# Patient Record
Sex: MALE | Race: WHITE | NOT HISPANIC OR LATINO | Employment: OTHER | URBAN - METROPOLITAN AREA
[De-identification: names, ages, dates, MRNs, and addresses within clinical notes are randomized per-mention and may not be internally consistent; named-entity substitution may affect disease eponyms.]

---

## 2017-01-17 ENCOUNTER — HOSPITAL ENCOUNTER (OUTPATIENT)
Dept: FAMILY MEDICINE CLINIC | Facility: CLINIC | Age: 67
Setting detail: SPECIMEN
Discharge: HOME | End: 2017-01-17
Attending: FAMILY MEDICINE | Admitting: FAMILY MEDICINE

## 2017-01-23 ENCOUNTER — HOSPITAL ENCOUNTER (OUTPATIENT)
Dept: OTHER | Facility: HOSPITAL | Age: 67
Discharge: HOME | End: 2017-01-23
Attending: FAMILY MEDICINE | Admitting: FAMILY MEDICINE

## 2017-01-23 LAB
ALBUMIN SERPL-MCNC: 4 G/DL (ref 3.5–4.8)
ALBUMIN/GLOB SERPL: 1.3 {RATIO} (ref 1–1.7)
ALP SERPL-CCNC: 79 IU/L (ref 32–91)
ALT SERPL-CCNC: 19 IU/L (ref 17–63)
ANION GAP SERPL CALC-SCNC: 17 MMOL/L (ref 10–20)
AST SERPL-CCNC: 24 IU/L (ref 15–41)
BASOPHILS # BLD AUTO: 0.1 10*3/UL (ref 0–0.2)
BASOPHILS NFR BLD AUTO: 1 % (ref 0–2)
BILIRUB SERPL-MCNC: 0.7 MG/DL (ref 0.3–1.2)
BUN SERPL-MCNC: 16 MG/DL (ref 8–20)
BUN/CREAT SERPL: 17.8 (ref 6.2–20.3)
CALCIUM SERPL-MCNC: 9.7 MG/DL (ref 8.9–10.3)
CHLORIDE SERPL-SCNC: 108 MMOL/L (ref 101–111)
CHOLEST SERPL-MCNC: 152 MG/DL
CHOLEST/HDLC SERPL: 3.9 {RATIO}
CONV CO2: 24 MMOL/L (ref 22–32)
CONV LDL CHOLESTEROL DIRECT: 92 MG/DL (ref 0–100)
CONV TOTAL PROTEIN: 7 G/DL (ref 6.1–7.9)
CREAT UR-MCNC: 0.9 MG/DL (ref 0.7–1.2)
DIFFERENTIAL METHOD BLD: (no result)
EOSINOPHIL # BLD AUTO: 0.1 10*3/UL (ref 0–0.3)
EOSINOPHIL # BLD AUTO: 1 % (ref 0–3)
ERYTHROCYTE [DISTWIDTH] IN BLOOD BY AUTOMATED COUNT: 13.9 % (ref 11.5–14.5)
ERYTHROCYTE [SEDIMENTATION RATE] IN BLOOD BY WESTERGREN METHOD: 17 MM/HR (ref 0–20)
GLOBULIN UR ELPH-MCNC: 3 G/DL (ref 2.5–3.8)
GLUCOSE SERPL-MCNC: 132 MG/DL (ref 65–99)
HCT VFR BLD AUTO: 45.9 % (ref 40–54)
HDLC SERPL-MCNC: 39 MG/DL
HGB BLD-MCNC: 15 G/DL (ref 14–18)
LDLC/HDLC SERPL: 2.4 {RATIO}
LIPID INTERPRETATION: ABNORMAL
LYMPHOCYTES # BLD AUTO: 2.1 10*3/UL (ref 0.8–4.8)
LYMPHOCYTES NFR BLD AUTO: 23 % (ref 18–42)
MCH RBC QN AUTO: 28.1 PG (ref 26–32)
MCHC RBC AUTO-ENTMCNC: 32.8 G/DL (ref 32–36)
MCV RBC AUTO: 85.8 FL (ref 80–94)
MONOCYTES # BLD AUTO: 0.6 10*3/UL (ref 0.1–1.3)
MONOCYTES NFR BLD AUTO: 6 % (ref 2–11)
NEUTROPHILS # BLD AUTO: 6.5 10*3/UL (ref 2.3–8.6)
NEUTROPHILS NFR BLD AUTO: 69 % (ref 50–75)
NRBC BLD AUTO-RTO: 0 /100{WBCS}
NRBC/RBC NFR BLD MANUAL: 0 10*3/UL
PLATELET # BLD AUTO: 247 10*3/UL (ref 150–450)
PMV BLD AUTO: 7.8 FL (ref 7.4–10.4)
POTASSIUM SERPL-SCNC: 5 MMOL/L (ref 3.6–5.1)
RBC # BLD AUTO: 5.35 10*6/UL (ref 4.6–6)
SODIUM SERPL-SCNC: 144 MMOL/L (ref 136–144)
TRIGL SERPL-MCNC: 126 MG/DL
VLDLC SERPL CALC-MCNC: 21.2 MG/DL
WBC # BLD AUTO: 9.3 10*3/UL (ref 4.5–11.5)

## 2017-07-24 ENCOUNTER — HOSPITAL ENCOUNTER (OUTPATIENT)
Dept: FAMILY MEDICINE CLINIC | Facility: CLINIC | Age: 67
Setting detail: SPECIMEN
Discharge: HOME | End: 2017-07-24
Attending: FAMILY MEDICINE | Admitting: FAMILY MEDICINE

## 2017-07-27 ENCOUNTER — HOSPITAL ENCOUNTER (OUTPATIENT)
Dept: FAMILY MEDICINE CLINIC | Facility: CLINIC | Age: 67
Setting detail: SPECIMEN
Discharge: HOME | End: 2017-07-27
Attending: FAMILY MEDICINE | Admitting: FAMILY MEDICINE

## 2017-07-27 LAB
ANION GAP SERPL CALC-SCNC: 13.8 MMOL/L (ref 10–20)
BUN SERPL-MCNC: 16 MG/DL (ref 8–20)
BUN/CREAT SERPL: 14.5 (ref 6.2–20.3)
CALCIUM SERPL-MCNC: 9.4 MG/DL (ref 8.9–10.3)
CHLORIDE SERPL-SCNC: 101 MMOL/L (ref 101–111)
CONV CO2: 30 MMOL/L (ref 22–32)
CREAT UR-MCNC: 1.1 MG/DL (ref 0.7–1.2)
GLUCOSE SERPL-MCNC: 184 MG/DL (ref 65–99)
POTASSIUM SERPL-SCNC: 4.8 MMOL/L (ref 3.6–5.1)
SODIUM SERPL-SCNC: 140 MMOL/L (ref 136–144)

## 2017-09-12 ENCOUNTER — HOSPITAL ENCOUNTER (OUTPATIENT)
Dept: SLEEP MEDICINE | Facility: HOSPITAL | Age: 67
Discharge: HOME | End: 2017-09-12
Attending: PSYCHIATRY & NEUROLOGY | Admitting: PSYCHIATRY & NEUROLOGY

## 2017-12-23 ENCOUNTER — HOSPITAL ENCOUNTER (OUTPATIENT)
Dept: URGENT CARE | Facility: CLINIC | Age: 67
Discharge: HOME | End: 2017-12-23
Attending: FAMILY MEDICINE | Admitting: FAMILY MEDICINE

## 2018-01-24 ENCOUNTER — HOSPITAL ENCOUNTER (OUTPATIENT)
Dept: FAMILY MEDICINE CLINIC | Facility: CLINIC | Age: 68
Setting detail: SPECIMEN
Discharge: HOME | End: 2018-01-24
Attending: FAMILY MEDICINE | Admitting: FAMILY MEDICINE

## 2018-01-24 LAB
ALBUMIN SERPL-MCNC: 4.2 G/DL (ref 3.5–4.8)
ALBUMIN/GLOB SERPL: 1.4 {RATIO} (ref 1–1.7)
ALP SERPL-CCNC: 66 IU/L (ref 32–91)
ALT SERPL-CCNC: 30 IU/L (ref 17–63)
ANION GAP SERPL CALC-SCNC: 12.3 MMOL/L (ref 10–20)
AST SERPL-CCNC: 28 IU/L (ref 15–41)
BASOPHILS # BLD AUTO: 0.1 10*3/UL (ref 0–0.2)
BASOPHILS NFR BLD AUTO: 1 % (ref 0–2)
BILIRUB SERPL-MCNC: 0.6 MG/DL (ref 0.3–1.2)
BUN SERPL-MCNC: 16 MG/DL (ref 8–20)
BUN/CREAT SERPL: 16 (ref 6.2–20.3)
CALCIUM SERPL-MCNC: 9.3 MG/DL (ref 8.9–10.3)
CHLORIDE SERPL-SCNC: 105 MMOL/L (ref 101–111)
CHOLEST SERPL-MCNC: 161 MG/DL
CHOLEST/HDLC SERPL: 4.5 {RATIO}
CONV CO2: 28 MMOL/L (ref 22–32)
CONV LDL CHOLESTEROL DIRECT: 99 MG/DL (ref 0–100)
CONV TOTAL PROTEIN: 7.1 G/DL (ref 6.1–7.9)
CREAT UR-MCNC: 1 MG/DL (ref 0.7–1.2)
DIFFERENTIAL METHOD BLD: (no result)
EOSINOPHIL # BLD AUTO: 0.1 10*3/UL (ref 0–0.3)
EOSINOPHIL # BLD AUTO: 1 % (ref 0–3)
ERYTHROCYTE [DISTWIDTH] IN BLOOD BY AUTOMATED COUNT: 13.3 % (ref 11.5–14.5)
GLOBULIN UR ELPH-MCNC: 2.9 G/DL (ref 2.5–3.8)
GLUCOSE SERPL-MCNC: 116 MG/DL (ref 65–99)
HCT VFR BLD AUTO: 45.6 % (ref 40–54)
HDLC SERPL-MCNC: 36 MG/DL
HGB BLD-MCNC: 15.4 G/DL (ref 14–18)
LDLC/HDLC SERPL: 2.8 {RATIO}
LIPID INTERPRETATION: ABNORMAL
LYMPHOCYTES # BLD AUTO: 2.2 10*3/UL (ref 0.8–4.8)
LYMPHOCYTES NFR BLD AUTO: 25 % (ref 18–42)
MCH RBC QN AUTO: 29.1 PG (ref 26–32)
MCHC RBC AUTO-ENTMCNC: 33.8 G/DL (ref 32–36)
MCV RBC AUTO: 85.9 FL (ref 80–94)
MONOCYTES # BLD AUTO: 0.7 10*3/UL (ref 0.1–1.3)
MONOCYTES NFR BLD AUTO: 7 % (ref 2–11)
NEUTROPHILS # BLD AUTO: 6 10*3/UL (ref 2.3–8.6)
NEUTROPHILS NFR BLD AUTO: 66 % (ref 50–75)
NRBC BLD AUTO-RTO: 0 /100{WBCS}
NRBC/RBC NFR BLD MANUAL: 0 10*3/UL
PLATELET # BLD AUTO: 209 10*3/UL (ref 150–450)
PMV BLD AUTO: 8.1 FL (ref 7.4–10.4)
POTASSIUM SERPL-SCNC: 4.3 MMOL/L (ref 3.6–5.1)
RBC # BLD AUTO: 5.31 10*6/UL (ref 4.6–6)
SODIUM SERPL-SCNC: 141 MMOL/L (ref 136–144)
TRIGL SERPL-MCNC: 195 MG/DL
VLDLC SERPL CALC-MCNC: 26.1 MG/DL
WBC # BLD AUTO: 9.1 10*3/UL (ref 4.5–11.5)

## 2018-01-29 ENCOUNTER — HOSPITAL ENCOUNTER (OUTPATIENT)
Dept: OTHER | Facility: HOSPITAL | Age: 68
Discharge: HOME | End: 2018-01-29
Attending: FAMILY MEDICINE | Admitting: FAMILY MEDICINE

## 2018-01-31 ENCOUNTER — HOSPITAL ENCOUNTER (OUTPATIENT)
Dept: CT IMAGING | Facility: HOSPITAL | Age: 68
Discharge: HOME | End: 2018-01-31
Attending: FAMILY MEDICINE | Admitting: FAMILY MEDICINE

## 2018-07-26 ENCOUNTER — HOSPITAL ENCOUNTER (OUTPATIENT)
Dept: FAMILY MEDICINE CLINIC | Facility: CLINIC | Age: 68
Setting detail: SPECIMEN
Discharge: HOME | End: 2018-07-26
Attending: FAMILY MEDICINE | Admitting: FAMILY MEDICINE

## 2018-07-26 LAB
ANION GAP SERPL CALC-SCNC: 11.1 MMOL/L (ref 10–20)
BUN SERPL-MCNC: 14 MG/DL (ref 8–20)
BUN/CREAT SERPL: 14 (ref 6.2–20.3)
CALCIUM SERPL-MCNC: 9.4 MG/DL (ref 8.9–10.3)
CHLORIDE SERPL-SCNC: 103 MMOL/L (ref 101–111)
CONV CO2: 28 MMOL/L (ref 22–32)
CREAT UR-MCNC: 1 MG/DL (ref 0.7–1.2)
GLUCOSE SERPL-MCNC: 127 MG/DL (ref 65–99)
POTASSIUM SERPL-SCNC: 4.1 MMOL/L (ref 3.6–5.1)
SODIUM SERPL-SCNC: 138 MMOL/L (ref 136–144)

## 2019-02-06 ENCOUNTER — HOSPITAL ENCOUNTER (OUTPATIENT)
Dept: CARDIOLOGY | Facility: HOSPITAL | Age: 69
Discharge: HOME | End: 2019-02-06
Attending: INTERNAL MEDICINE | Admitting: INTERNAL MEDICINE

## 2019-02-25 ENCOUNTER — HOSPITAL ENCOUNTER (OUTPATIENT)
Dept: FAMILY MEDICINE CLINIC | Facility: CLINIC | Age: 69
Setting detail: SPECIMEN
Discharge: HOME | End: 2019-02-25
Attending: FAMILY MEDICINE | Admitting: FAMILY MEDICINE

## 2019-02-25 LAB
ALBUMIN SERPL-MCNC: 4.2 G/DL (ref 3.5–4.8)
ALBUMIN/GLOB SERPL: 1.2 {RATIO} (ref 1–1.7)
ALP SERPL-CCNC: 87 IU/L (ref 32–91)
ALT SERPL-CCNC: 44 IU/L (ref 17–63)
ANION GAP SERPL CALC-SCNC: 15 MMOL/L (ref 10–20)
AST SERPL-CCNC: 37 IU/L (ref 15–41)
BASOPHILS # BLD AUTO: 0.1 10*3/UL (ref 0–0.2)
BASOPHILS NFR BLD AUTO: 1 % (ref 0–2)
BILIRUB SERPL-MCNC: 0.7 MG/DL (ref 0.3–1.2)
BUN SERPL-MCNC: 19 MG/DL (ref 8–20)
BUN/CREAT SERPL: 21.1 (ref 6.2–20.3)
CALCIUM SERPL-MCNC: 9.3 MG/DL (ref 8.9–10.3)
CHLORIDE SERPL-SCNC: 98 MMOL/L (ref 101–111)
CHOLEST SERPL-MCNC: 178 MG/DL
CHOLEST/HDLC SERPL: 5 {RATIO}
CONV CO2: 26 MMOL/L (ref 22–32)
CONV LDL CHOLESTEROL DIRECT: 115 MG/DL (ref 0–100)
CONV TOTAL PROTEIN: 7.7 G/DL (ref 6.1–7.9)
CREAT UR-MCNC: 0.9 MG/DL (ref 0.7–1.2)
DIFFERENTIAL METHOD BLD: (no result)
EOSINOPHIL # BLD AUTO: 0.1 10*3/UL (ref 0–0.3)
EOSINOPHIL # BLD AUTO: 1 % (ref 0–3)
ERYTHROCYTE [DISTWIDTH] IN BLOOD BY AUTOMATED COUNT: 13.9 % (ref 11.5–14.5)
GLOBULIN UR ELPH-MCNC: 3.5 G/DL (ref 2.5–3.8)
GLUCOSE SERPL-MCNC: 164 MG/DL (ref 65–99)
HAV IGM SERPL QL IA: NONREACTIVE
HBV CORE IGM SERPL QL IA: NONREACTIVE
HBV SURFACE AG SERPL QL IA: NONREACTIVE
HCT VFR BLD AUTO: 49.1 % (ref 40–54)
HCV AB SER DONR QL: NORMAL
HCV AB SER DONR QL: NORMAL
HDLC SERPL-MCNC: 36 MG/DL
HGB BLD-MCNC: 16.5 G/DL (ref 14–18)
LDLC/HDLC SERPL: 3.2 {RATIO}
LIPID INTERPRETATION: ABNORMAL
LYMPHOCYTES # BLD AUTO: 2.2 10*3/UL (ref 0.8–4.8)
LYMPHOCYTES NFR BLD AUTO: 24 % (ref 18–42)
MCH RBC QN AUTO: 28.8 PG (ref 26–32)
MCHC RBC AUTO-ENTMCNC: 33.7 G/DL (ref 32–36)
MCV RBC AUTO: 85.5 FL (ref 80–94)
MONOCYTES # BLD AUTO: 0.6 10*3/UL (ref 0.1–1.3)
MONOCYTES NFR BLD AUTO: 7 % (ref 2–11)
NEUTROPHILS # BLD AUTO: 6.3 10*3/UL (ref 2.3–8.6)
NEUTROPHILS NFR BLD AUTO: 67 % (ref 50–75)
NRBC BLD AUTO-RTO: 0 /100{WBCS}
NRBC/RBC NFR BLD MANUAL: 0 10*3/UL
PLATELET # BLD AUTO: 278 10*3/UL (ref 150–450)
PMV BLD AUTO: 8 FL (ref 7.4–10.4)
POTASSIUM SERPL-SCNC: 4 MMOL/L (ref 3.6–5.1)
RBC # BLD AUTO: 5.74 10*6/UL (ref 4.6–6)
SODIUM SERPL-SCNC: 135 MMOL/L (ref 136–144)
TRIGL SERPL-MCNC: 227 MG/DL
VLDLC SERPL CALC-MCNC: 27.6 MG/DL
WBC # BLD AUTO: 9.3 10*3/UL (ref 4.5–11.5)

## 2019-03-08 ENCOUNTER — HOSPITAL ENCOUNTER (OUTPATIENT)
Dept: ULTRASOUND IMAGING | Facility: HOSPITAL | Age: 69
Discharge: HOME | End: 2019-03-08
Attending: FAMILY MEDICINE | Admitting: FAMILY MEDICINE

## 2019-06-20 ENCOUNTER — ANTICOAGULATION VISIT (OUTPATIENT)
Dept: CARDIOLOGY | Facility: CLINIC | Age: 69
End: 2019-06-20

## 2019-06-20 VITALS
WEIGHT: 219 LBS | BODY MASS INDEX: 32.81 KG/M2 | SYSTOLIC BLOOD PRESSURE: 124 MMHG | HEART RATE: 74 BPM | DIASTOLIC BLOOD PRESSURE: 71 MMHG

## 2019-06-20 DIAGNOSIS — I48.21 PERMANENT ATRIAL FIBRILLATION (HCC): ICD-10-CM

## 2019-06-20 DIAGNOSIS — Z79.01 LONG TERM (CURRENT) USE OF ANTICOAGULANTS: ICD-10-CM

## 2019-06-20 PROBLEM — I48.91 ATRIAL FIBRILLATION: Status: ACTIVE | Noted: 2019-06-20

## 2019-06-20 LAB — INR PPP: 2.4 (ref 2–3)

## 2019-06-20 PROCEDURE — 85610 PROTHROMBIN TIME: CPT | Performed by: INTERNAL MEDICINE

## 2019-06-20 PROCEDURE — 36416 COLLJ CAPILLARY BLOOD SPEC: CPT | Performed by: INTERNAL MEDICINE

## 2019-07-24 RX ORDER — LOSARTAN POTASSIUM 50 MG/1
TABLET ORAL
Qty: 90 TABLET | Refills: 0 | Status: SHIPPED | OUTPATIENT
Start: 2019-07-24 | End: 2019-10-16 | Stop reason: SDUPTHER

## 2019-07-25 ENCOUNTER — ANTICOAGULATION VISIT (OUTPATIENT)
Dept: CARDIOLOGY | Facility: CLINIC | Age: 69
End: 2019-07-25

## 2019-07-25 VITALS
DIASTOLIC BLOOD PRESSURE: 65 MMHG | WEIGHT: 224 LBS | BODY MASS INDEX: 33.56 KG/M2 | HEART RATE: 63 BPM | SYSTOLIC BLOOD PRESSURE: 110 MMHG

## 2019-07-25 DIAGNOSIS — I48.21 PERMANENT ATRIAL FIBRILLATION (HCC): ICD-10-CM

## 2019-07-25 DIAGNOSIS — Z79.01 LONG TERM (CURRENT) USE OF ANTICOAGULANTS: ICD-10-CM

## 2019-07-25 LAB — INR PPP: 2.2 (ref 2–3)

## 2019-07-25 PROCEDURE — 36416 COLLJ CAPILLARY BLOOD SPEC: CPT | Performed by: INTERNAL MEDICINE

## 2019-07-25 PROCEDURE — 85610 PROTHROMBIN TIME: CPT | Performed by: INTERNAL MEDICINE

## 2019-08-09 RX ORDER — WARFARIN SODIUM 7.5 MG/1
TABLET ORAL
Qty: 94 TABLET | Refills: 0 | Status: SHIPPED | OUTPATIENT
Start: 2019-08-09 | End: 2019-09-03 | Stop reason: SDUPTHER

## 2019-08-26 ENCOUNTER — LAB (OUTPATIENT)
Dept: LAB | Facility: HOSPITAL | Age: 69
End: 2019-08-26

## 2019-08-26 ENCOUNTER — OFFICE VISIT (OUTPATIENT)
Dept: FAMILY MEDICINE CLINIC | Facility: CLINIC | Age: 69
End: 2019-08-26

## 2019-08-26 VITALS
DIASTOLIC BLOOD PRESSURE: 78 MMHG | HEART RATE: 84 BPM | OXYGEN SATURATION: 94 % | TEMPERATURE: 97.8 F | BODY MASS INDEX: 33.3 KG/M2 | RESPIRATION RATE: 14 BRPM | HEIGHT: 69 IN | SYSTOLIC BLOOD PRESSURE: 133 MMHG | WEIGHT: 224.8 LBS

## 2019-08-26 DIAGNOSIS — C67.9 MALIGNANT NEOPLASM OF URINARY BLADDER, UNSPECIFIED SITE (HCC): ICD-10-CM

## 2019-08-26 DIAGNOSIS — I48.20 CHRONIC ATRIAL FIBRILLATION (HCC): ICD-10-CM

## 2019-08-26 DIAGNOSIS — E79.0 HYPERURICEMIA: ICD-10-CM

## 2019-08-26 DIAGNOSIS — E78.5 HYPERLIPIDEMIA, UNSPECIFIED HYPERLIPIDEMIA TYPE: ICD-10-CM

## 2019-08-26 DIAGNOSIS — I10 ESSENTIAL HYPERTENSION: ICD-10-CM

## 2019-08-26 DIAGNOSIS — I25.10 CORONARY ARTERY DISEASE INVOLVING NATIVE CORONARY ARTERY OF NATIVE HEART WITHOUT ANGINA PECTORIS: ICD-10-CM

## 2019-08-26 DIAGNOSIS — I10 ESSENTIAL HYPERTENSION: Primary | ICD-10-CM

## 2019-08-26 PROBLEM — Z13.9 ENCOUNTER FOR SCREENING: Status: RESOLVED | Noted: 2019-02-25 | Resolved: 2019-08-26

## 2019-08-26 PROBLEM — Z79.01 LONG TERM (CURRENT) USE OF ANTICOAGULANTS: Status: RESOLVED | Noted: 2019-06-20 | Resolved: 2019-08-26

## 2019-08-26 PROBLEM — Z13.9 ENCOUNTER FOR SCREENING: Status: ACTIVE | Noted: 2019-02-25

## 2019-08-26 LAB
ANION GAP SERPL CALCULATED.3IONS-SCNC: 17.5 MMOL/L (ref 5–15)
BUN BLD-MCNC: 14 MG/DL (ref 8–20)
BUN/CREAT SERPL: 15.6 (ref 6.2–20.3)
CALCIUM SPEC-SCNC: 8.8 MG/DL (ref 8.9–10.3)
CHLORIDE SERPL-SCNC: 100 MMOL/L (ref 101–111)
CO2 SERPL-SCNC: 24 MMOL/L (ref 22–32)
CREAT BLD-MCNC: 0.9 MG/DL (ref 0.7–1.2)
GFR SERPL CREATININE-BSD FRML MDRD: 84 ML/MIN/1.73
GLUCOSE BLD-MCNC: 174 MG/DL (ref 65–99)
POTASSIUM BLD-SCNC: 4.5 MMOL/L (ref 3.6–5.1)
SODIUM BLD-SCNC: 137 MMOL/L (ref 136–144)

## 2019-08-26 PROCEDURE — 80048 BASIC METABOLIC PNL TOTAL CA: CPT

## 2019-08-26 PROCEDURE — 99214 OFFICE O/P EST MOD 30 MIN: CPT | Performed by: FAMILY MEDICINE

## 2019-08-26 PROCEDURE — 36415 COLL VENOUS BLD VENIPUNCTURE: CPT

## 2019-08-26 RX ORDER — ALBUTEROL SULFATE 90 UG/1
1 AEROSOL, METERED RESPIRATORY (INHALATION) EVERY 4 HOURS PRN
COMMUNITY
Start: 2019-04-18

## 2019-08-26 RX ORDER — ALLOPURINOL 300 MG/1
300 TABLET ORAL DAILY
Refills: 1 | COMMUNITY
Start: 2019-08-08 | End: 2019-11-06

## 2019-08-26 RX ORDER — ATENOLOL 50 MG/1
50 TABLET ORAL DAILY
Refills: 2 | COMMUNITY
Start: 2019-08-10 | End: 2019-11-06 | Stop reason: SDUPTHER

## 2019-08-26 RX ORDER — FUROSEMIDE 40 MG/1
TABLET ORAL EVERY 24 HOURS
COMMUNITY
Start: 2017-11-11 | End: 2021-01-06 | Stop reason: SDUPTHER

## 2019-08-26 RX ORDER — ATORVASTATIN CALCIUM 20 MG/1
20 TABLET, FILM COATED ORAL DAILY
Refills: 1 | COMMUNITY
Start: 2019-08-17 | End: 2020-02-10

## 2019-08-26 RX ORDER — INDOMETHACIN 50 MG/1
CAPSULE ORAL
Refills: 1 | COMMUNITY
Start: 2019-06-27 | End: 2019-11-06

## 2019-08-26 RX ORDER — ALFUZOSIN HYDROCHLORIDE 10 MG/1
TABLET, EXTENDED RELEASE ORAL
Refills: 3 | COMMUNITY
Start: 2019-06-07 | End: 2021-10-21

## 2019-08-26 NOTE — PATIENT INSTRUCTIONS
Continue current medications and treatment.    Have the follow-up labs done and call for results.    Follow-up with me in 6 months.

## 2019-08-26 NOTE — PROGRESS NOTES
"Subjective   Sandro Ramirez is a 69 y.o. male.     Chief Complaint   Patient presents with   • Coronary Artery Disease     6 MTH F/U   • Hyperlipidemia   • Hypertension       HPI  Chief complaint: Hypertension hyperlipidemia coronary artery disease atrial fibrillation bladder cancer    The patient is a 69-year-old white male comes in for follow-up and maintenance of his current problems which include    1.  Hypertension-stable-patient is on Cozaar 50 mg daily and atenolol 50 mg daily and Lasix 40 mg daily.  He denied headache lightheadedness dizziness or chest pain.    2.  Hyperlipidemia-stable-patient on Lipitor 20 mg daily.  He denies myalgias and arthralgias.  Denied nausea or anorexia.    3.  Coronary artery disease with ischemic cardiomyopathy-stable-patient on Lasix atenolol Cozaar and aspirin.  He denies chest pain shortness of breath orthopnea or PND.    4.  Atrial fibrillation-stable-patient on atenolol 50 mg daily and Coumadin.  He denies episodes of rapid or slow heart rhythm.  He denied heart palpitations lightheadedness or dizziness.    5.  Hyperuricemia-stable-patient is currently on Zyloprim and Indocin as needed.  Denied recent episodes of gouty arthritis or tophi.    6.  Bladder cancer-stable-patient on Uroxatrol as needed for urinary frequency and urgency.      The following portions of the patient's history were reviewed and updated as appropriate: allergies, current medications, past family history, past medical history, past social history, past surgical history and problem list.    Review of Systems    Objective     /78 (BP Location: Left arm, Patient Position: Sitting, Cuff Size: Adult)   Pulse 84   Temp 97.8 °F (36.6 °C) (Oral)   Resp 14   Ht 174.1 cm (68.54\")   Wt 102 kg (224 lb 12.8 oz)   SpO2 94%   BMI 33.64 kg/m²     Physical Exam   Constitutional: He is oriented to person, place, and time. He appears well-developed and well-nourished.   HENT:   Head: Normocephalic and " atraumatic.   Eyes: Conjunctivae and EOM are normal. Pupils are equal, round, and reactive to light.   Neck: Normal range of motion. Neck supple.   Cardiovascular: Normal rate, normal heart sounds and intact distal pulses. An irregularly irregular rhythm present.   Pulmonary/Chest: Effort normal and breath sounds normal.   Abdominal: Soft. Bowel sounds are normal.   Musculoskeletal: Normal range of motion.   Neurological: He is alert and oriented to person, place, and time.   Skin: Skin is warm and dry.   Psychiatric: He has a normal mood and affect. His behavior is normal.   Nursing note and vitals reviewed.        Assessment/Plan   Sandro was seen today for coronary artery disease, hyperlipidemia and hypertension.    Diagnoses and all orders for this visit:    Essential hypertension  -     Basic Metabolic Panel; Future    Hyperlipidemia, unspecified hyperlipidemia type    Chronic atrial fibrillation (CMS/HCC)    Coronary artery disease involving native coronary artery of native heart without angina pectoris    Malignant neoplasm of urinary bladder, unspecified site (CMS/HCC)  -     PSA Screen; Future    Hyperuricemia      Patient Instructions   Continue current medications and treatment.    Have the follow-up labs done and call for results.    Follow-up with me in 6 months.      James Tirado Jr., MD    08/26/19

## 2019-08-29 ENCOUNTER — ANTICOAGULATION VISIT (OUTPATIENT)
Dept: CARDIOLOGY | Facility: CLINIC | Age: 69
End: 2019-08-29

## 2019-08-29 VITALS
DIASTOLIC BLOOD PRESSURE: 75 MMHG | HEART RATE: 92 BPM | SYSTOLIC BLOOD PRESSURE: 137 MMHG | WEIGHT: 222 LBS | BODY MASS INDEX: 33.23 KG/M2

## 2019-08-29 DIAGNOSIS — I48.20 CHRONIC ATRIAL FIBRILLATION (HCC): ICD-10-CM

## 2019-08-29 LAB — INR PPP: 2.1 (ref 0.9–1.1)

## 2019-08-29 PROCEDURE — 36416 COLLJ CAPILLARY BLOOD SPEC: CPT | Performed by: INTERNAL MEDICINE

## 2019-08-29 PROCEDURE — 85610 PROTHROMBIN TIME: CPT | Performed by: INTERNAL MEDICINE

## 2019-09-03 RX ORDER — WARFARIN SODIUM 7.5 MG/1
TABLET ORAL
Qty: 94 TABLET | Refills: 0 | Status: SHIPPED | OUTPATIENT
Start: 2019-09-03 | End: 2019-12-01 | Stop reason: SDUPTHER

## 2019-10-03 ENCOUNTER — ANTICOAGULATION VISIT (OUTPATIENT)
Dept: CARDIOLOGY | Facility: CLINIC | Age: 69
End: 2019-10-03

## 2019-10-03 VITALS
HEART RATE: 81 BPM | DIASTOLIC BLOOD PRESSURE: 72 MMHG | BODY MASS INDEX: 33.67 KG/M2 | SYSTOLIC BLOOD PRESSURE: 111 MMHG | WEIGHT: 225 LBS

## 2019-10-03 DIAGNOSIS — I48.21 PERMANENT ATRIAL FIBRILLATION (HCC): ICD-10-CM

## 2019-10-03 LAB — INR PPP: 2.3 (ref 0.9–1.1)

## 2019-10-03 PROCEDURE — 85610 PROTHROMBIN TIME: CPT | Performed by: INTERNAL MEDICINE

## 2019-10-03 PROCEDURE — 36416 COLLJ CAPILLARY BLOOD SPEC: CPT | Performed by: INTERNAL MEDICINE

## 2019-10-17 RX ORDER — LOSARTAN POTASSIUM 50 MG/1
TABLET ORAL
Qty: 90 TABLET | Refills: 0 | Status: SHIPPED | OUTPATIENT
Start: 2019-10-17 | End: 2020-01-11

## 2019-11-06 ENCOUNTER — ANTICOAGULATION VISIT (OUTPATIENT)
Dept: CARDIOLOGY | Facility: CLINIC | Age: 69
End: 2019-11-06

## 2019-11-06 ENCOUNTER — OFFICE VISIT (OUTPATIENT)
Dept: CARDIOLOGY | Facility: CLINIC | Age: 69
End: 2019-11-06

## 2019-11-06 VITALS
DIASTOLIC BLOOD PRESSURE: 78 MMHG | WEIGHT: 225.5 LBS | BODY MASS INDEX: 33.75 KG/M2 | HEART RATE: 92 BPM | SYSTOLIC BLOOD PRESSURE: 131 MMHG

## 2019-11-06 VITALS
DIASTOLIC BLOOD PRESSURE: 78 MMHG | BODY MASS INDEX: 34.17 KG/M2 | HEART RATE: 92 BPM | SYSTOLIC BLOOD PRESSURE: 131 MMHG | HEIGHT: 68 IN | WEIGHT: 225.5 LBS

## 2019-11-06 DIAGNOSIS — I71.40 ABDOMINAL AORTIC ANEURYSM (AAA) WITHOUT RUPTURE (HCC): ICD-10-CM

## 2019-11-06 DIAGNOSIS — E78.00 PURE HYPERCHOLESTEROLEMIA: ICD-10-CM

## 2019-11-06 DIAGNOSIS — I48.21 PERMANENT ATRIAL FIBRILLATION (HCC): ICD-10-CM

## 2019-11-06 DIAGNOSIS — I10 ESSENTIAL HYPERTENSION: ICD-10-CM

## 2019-11-06 DIAGNOSIS — G47.33 OBSTRUCTIVE SLEEP APNEA SYNDROME: ICD-10-CM

## 2019-11-06 DIAGNOSIS — I25.10 CORONARY ARTERY DISEASE INVOLVING NATIVE CORONARY ARTERY OF NATIVE HEART WITHOUT ANGINA PECTORIS: Primary | ICD-10-CM

## 2019-11-06 LAB — INR PPP: 2 (ref 0.9–1.1)

## 2019-11-06 PROCEDURE — 36416 COLLJ CAPILLARY BLOOD SPEC: CPT | Performed by: INTERNAL MEDICINE

## 2019-11-06 PROCEDURE — 99214 OFFICE O/P EST MOD 30 MIN: CPT | Performed by: INTERNAL MEDICINE

## 2019-11-06 PROCEDURE — 85610 PROTHROMBIN TIME: CPT | Performed by: INTERNAL MEDICINE

## 2019-11-06 RX ORDER — ATENOLOL 50 MG/1
TABLET ORAL
Qty: 90 TABLET | Refills: 2 | Status: SHIPPED | OUTPATIENT
Start: 2019-11-06 | End: 2020-07-27

## 2019-11-06 RX ORDER — ALLOPURINOL 300 MG/1
300 TABLET ORAL NIGHTLY
COMMUNITY
Start: 2019-11-05

## 2019-11-06 NOTE — PROGRESS NOTES
"    Subjective:     Encounter Date:11/06/2019      Patient ID: Sandro Ramirez is a 69 y.o. male.    Chief Complaint:  History of Present Illness 69-year-old white male with history of coronary artery disease history of atrial fibrillation abdominal aortic aneurysm hypertension hyperlipidemia sleep apnea coronary artery bypass surgery presents to my office for follow-up.  Patient is currently stable without any symptoms of chest pain but has some shortness of breath with exertion.  No complaints of any PND orthopnea.  No palpitations dizziness syncope but he has some swelling of the feet.  He noticed that he is gaining weight.  He is on diuretics.  He does not smoke.  He is unable to exercise very well because of the symptoms.    The following portions of the patient's history were reviewed and updated as appropriate: allergies, current medications, past family history, past medical history, past social history, past surgical history and problem list.  Past Medical History:   Diagnosis Date   • Atrial fibrillation (CMS/HCC)    • Coronary artery disease    • Hyperlipidemia    • Hypertension      Past Surgical History:   Procedure Laterality Date   • CHOLECYSTECTOMY     • CORONARY ARTERY BYPASS GRAFT       /78   Pulse 92   Ht 172.7 cm (68\")   Wt 102 kg (225 lb 8 oz)   BMI 34.29 kg/m²   Family History   Problem Relation Age of Onset   • Cancer Father    • Heart attack Father    • Heart disease Paternal Grandfather    • Heart attack Paternal Grandfather        Current Outpatient Medications:   •  albuterol sulfate HFA (VENTOLIN HFA) 108 (90 Base) MCG/ACT inhaler, VENTOLIN  (90 Base) MCG/ACT AERS, Disp: , Rfl:   •  alfuzosin (UROXATRAL) 10 MG 24 hr tablet, TAKE 1 TABLET BY MOUTH 2 TIMES EACH DAY, Disp: , Rfl: 3  •  allopurinol (ZYLOPRIM) 300 MG tablet, , Disp: , Rfl:   •  aspirin 81 MG tablet, ASPIRIN 81 MG ORAL TABLET, Disp: , Rfl:   •  atenolol (TENORMIN) 50 MG tablet, TAKE 1 TABLET BY MOUTH EVERY " DAY, Disp: 90 tablet, Rfl: 2  •  atorvastatin (LIPITOR) 20 MG tablet, Take 20 mg by mouth Daily., Disp: , Rfl: 1  •  furosemide (LASIX) 40 MG tablet, Daily., Disp: , Rfl:   •  losartan (COZAAR) 50 MG tablet, TAKE 1 TABLET BY MOUTH EVERY DAY, Disp: 90 tablet, Rfl: 0  •  warfarin (COUMADIN) 7.5 MG tablet, TAKE 1 AND ONE-HALF TABLETY BY MOUTH ON SUNDAY AND 1 TABLET BY MOUTH DAILY, Disp: 94 tablet, Rfl: 0  Allergies   Allergen Reactions   • Meperidine GI Intolerance   • Midazolam Mental Status Change   • Propofol Mental Status Change   • Sulfa Antibiotics Hives   • Simvastatin Myalgia     Social History     Socioeconomic History   • Marital status:      Spouse name: Not on file   • Number of children: Not on file   • Years of education: Not on file   • Highest education level: Not on file   Tobacco Use   • Smoking status: Former Smoker   • Smokeless tobacco: Never Used   Substance and Sexual Activity   • Alcohol use: No     Frequency: Never     Review of Systems   Constitution: Negative for fever and malaise/fatigue.   HENT: Negative for ear pain and nosebleeds.    Eyes: Negative for blurred vision and double vision.   Cardiovascular: Positive for leg swelling. Negative for chest pain, dyspnea on exertion and palpitations.   Respiratory: Positive for shortness of breath. Negative for cough.    Skin: Negative for rash.   Musculoskeletal: Negative for joint pain.   Gastrointestinal: Negative for abdominal pain, nausea and vomiting.   Neurological: Negative for focal weakness, headaches, light-headedness and numbness.   Psychiatric/Behavioral: Negative for depression. The patient is not nervous/anxious.    All other systems reviewed and are negative.             Objective:     Physical Exam   Constitutional: He appears well-developed and well-nourished.   HENT:   Head: Normocephalic and atraumatic.   Eyes: Conjunctivae and EOM are normal. Pupils are equal, round, and reactive to light. No scleral icterus.   Neck:  Normal range of motion. Neck supple. No JVD present. Carotid bruit is not present.   Cardiovascular: Normal rate, regular rhythm, S1 normal, S2 normal, normal heart sounds and intact distal pulses. PMI is not displaced.   Pulmonary/Chest: Effort normal and breath sounds normal. He has no wheezes. He has no rales.   Abdominal: Soft. Bowel sounds are normal.   Musculoskeletal: Normal range of motion.   Neurological: He is alert. He has normal strength.   No focal deficits   Skin: Skin is warm and dry. No rash noted.   Psychiatric: He has a normal mood and affect.     Procedures    Lab Review:       Assessment:          Diagnosis Plan   1. Coronary artery disease involving native coronary artery of native heart without angina pectoris     2. Permanent atrial fibrillation     3. Abdominal aortic aneurysm (AAA) without rupture (CMS/HCC)     4. Pure hypercholesterolemia     5. Essential hypertension     6. Obstructive sleep apnea syndrome            Plan:       Patient has history of coronary disease status post coronary bypass surgery and is currently stable on medications  Patient has shortness of breath and has sleep apnea with right heart failure  Patient is currently on diuretics and feeling better now  Patient's blood pressure and heart rate are stable  Patient's lipid levels are followed by the primary care doctor  Patient has sleep apnea and uses CPAP machine.  Patient also has abdominal aortic aneurysm and is followed by the primary care doctor.

## 2019-11-14 ENCOUNTER — HOSPITAL ENCOUNTER (OUTPATIENT)
Facility: HOSPITAL | Age: 69
Setting detail: OBSERVATION
Discharge: HOME | End: 2019-11-16
Attending: EMERGENCY MEDICINE | Admitting: FAMILY MEDICINE

## 2019-11-14 ENCOUNTER — APPOINTMENT (OUTPATIENT)
Dept: GENERAL RADIOLOGY | Facility: HOSPITAL | Age: 69
End: 2019-11-14

## 2019-11-14 DIAGNOSIS — I25.10 CORONARY ARTERY DISEASE INVOLVING NATIVE CORONARY ARTERY OF NATIVE HEART WITHOUT ANGINA PECTORIS: ICD-10-CM

## 2019-11-14 DIAGNOSIS — R50.9 FEVER AND CHILLS: Primary | ICD-10-CM

## 2019-11-14 DIAGNOSIS — I48.21 PERMANENT ATRIAL FIBRILLATION (HCC): ICD-10-CM

## 2019-11-14 DIAGNOSIS — J18.9 PNEUMONIA OF LEFT LOWER LOBE DUE TO INFECTIOUS ORGANISM: ICD-10-CM

## 2019-11-14 DIAGNOSIS — J98.01 BRONCHOSPASM: ICD-10-CM

## 2019-11-14 DIAGNOSIS — A41.9 SEPSIS, DUE TO UNSPECIFIED ORGANISM, UNSPECIFIED WHETHER ACUTE ORGAN DYSFUNCTION PRESENT (HCC): ICD-10-CM

## 2019-11-14 LAB
ALBUMIN SERPL-MCNC: 4.5 G/DL (ref 3.5–5.2)
ALBUMIN/GLOB SERPL: 1.5 G/DL
ALP SERPL-CCNC: 98 U/L (ref 39–117)
ALT SERPL W P-5'-P-CCNC: 59 U/L (ref 1–41)
ANION GAP SERPL CALCULATED.3IONS-SCNC: 11 MMOL/L (ref 5–15)
APTT PPP: 33.4 SECONDS (ref 24–31)
AST SERPL-CCNC: 42 U/L (ref 1–40)
BASOPHILS # BLD AUTO: 0.1 10*3/MM3 (ref 0–0.2)
BASOPHILS NFR BLD AUTO: 0.6 % (ref 0–1.5)
BILIRUB SERPL-MCNC: 0.6 MG/DL (ref 0.2–1.2)
BILIRUB UR QL STRIP: NEGATIVE
BUN BLD-MCNC: 15 MG/DL (ref 8–23)
BUN/CREAT SERPL: 16.7 (ref 7–25)
CA-I SERPL ISE-MCNC: 1.14 MMOL/L (ref 1.2–1.3)
CALCIUM SPEC-SCNC: 9 MG/DL (ref 8.6–10.5)
CHLORIDE SERPL-SCNC: 102 MMOL/L (ref 98–107)
CLARITY UR: CLEAR
CO2 SERPL-SCNC: 27 MMOL/L (ref 22–29)
COLOR UR: YELLOW
CREAT BLD-MCNC: 0.9 MG/DL (ref 0.76–1.27)
CRP SERPL-MCNC: 6.9 MG/DL (ref 0–0.5)
D-LACTATE SERPL-SCNC: 2.6 MMOL/L (ref 0.5–2)
DEPRECATED RDW RBC AUTO: 42 FL (ref 37–54)
EOSINOPHIL # BLD AUTO: 0.1 10*3/MM3 (ref 0–0.4)
EOSINOPHIL NFR BLD AUTO: 0.5 % (ref 0.3–6.2)
ERYTHROCYTE [DISTWIDTH] IN BLOOD BY AUTOMATED COUNT: 13.9 % (ref 12.3–15.4)
FLUAV SUBTYP SPEC NAA+PROBE: NOT DETECTED
FLUBV RNA ISLT QL NAA+PROBE: NOT DETECTED
GFR SERPL CREATININE-BSD FRML MDRD: 84 ML/MIN/1.73
GLOBULIN UR ELPH-MCNC: 3.1 GM/DL
GLUCOSE BLD-MCNC: 133 MG/DL (ref 65–99)
GLUCOSE UR STRIP-MCNC: NEGATIVE MG/DL
HCT VFR BLD AUTO: 41.8 % (ref 37.5–51)
HGB BLD-MCNC: 14.4 G/DL (ref 13–17.7)
HGB UR QL STRIP.AUTO: NEGATIVE
HOLD SPECIMEN: NORMAL
HOLD SPECIMEN: NORMAL
INR PPP: 1.69 (ref 2–3)
KETONES UR QL STRIP: ABNORMAL
L PNEUMO1 AG UR QL IA: NEGATIVE
LEUKOCYTE ESTERASE UR QL STRIP.AUTO: NEGATIVE
LYMPHOCYTES # BLD AUTO: 1.3 10*3/MM3 (ref 0.7–3.1)
LYMPHOCYTES NFR BLD AUTO: 9.6 % (ref 19.6–45.3)
MAGNESIUM SERPL-MCNC: 2 MG/DL (ref 1.6–2.4)
MCH RBC QN AUTO: 29.3 PG (ref 26.6–33)
MCHC RBC AUTO-ENTMCNC: 34.4 G/DL (ref 31.5–35.7)
MCV RBC AUTO: 85.2 FL (ref 79–97)
MONOCYTES # BLD AUTO: 0.9 10*3/MM3 (ref 0.1–0.9)
MONOCYTES NFR BLD AUTO: 6.8 % (ref 5–12)
NEUTROPHILS # BLD AUTO: 11.5 10*3/MM3 (ref 1.7–7)
NEUTROPHILS NFR BLD AUTO: 82.5 % (ref 42.7–76)
NITRITE UR QL STRIP: NEGATIVE
NRBC BLD AUTO-RTO: 0 /100 WBC (ref 0–0.2)
NT-PROBNP SERPL-MCNC: 505.3 PG/ML (ref 5–900)
PH UR STRIP.AUTO: 6 [PH] (ref 5–8)
PHOSPHATE SERPL-MCNC: 3.1 MG/DL (ref 2.5–4.5)
PLATELET # BLD AUTO: 225 10*3/MM3 (ref 140–450)
PMV BLD AUTO: 7.7 FL (ref 6–12)
POTASSIUM BLD-SCNC: 4.4 MMOL/L (ref 3.5–5.2)
PROT SERPL-MCNC: 7.6 G/DL (ref 6–8.5)
PROT UR QL STRIP: NEGATIVE
PROTHROMBIN TIME: 16.4 SECONDS (ref 19.4–28.5)
RBC # BLD AUTO: 4.9 10*6/MM3 (ref 4.14–5.8)
RSV AG SPEC QL: NEGATIVE
S PNEUM AG SPEC QL LA: NEGATIVE
SODIUM BLD-SCNC: 140 MMOL/L (ref 136–145)
SP GR UR STRIP: 1.02 (ref 1–1.03)
TROPONIN T SERPL-MCNC: <0.01 NG/ML (ref 0–0.03)
UROBILINOGEN UR QL STRIP: ABNORMAL
WBC NRBC COR # BLD: 13.9 10*3/MM3 (ref 3.4–10.8)
WHOLE BLOOD HOLD SPECIMEN: NORMAL
WHOLE BLOOD HOLD SPECIMEN: NORMAL

## 2019-11-14 PROCEDURE — 83735 ASSAY OF MAGNESIUM: CPT | Performed by: NURSE PRACTITIONER

## 2019-11-14 PROCEDURE — 84484 ASSAY OF TROPONIN QUANT: CPT | Performed by: NURSE PRACTITIONER

## 2019-11-14 PROCEDURE — 87899 AGENT NOS ASSAY W/OPTIC: CPT | Performed by: FAMILY MEDICINE

## 2019-11-14 PROCEDURE — 94799 UNLISTED PULMONARY SVC/PX: CPT

## 2019-11-14 PROCEDURE — 86140 C-REACTIVE PROTEIN: CPT | Performed by: NURSE PRACTITIONER

## 2019-11-14 PROCEDURE — 85025 COMPLETE CBC W/AUTO DIFF WBC: CPT

## 2019-11-14 PROCEDURE — 25010000002 CEFTRIAXONE PER 250 MG: Performed by: EMERGENCY MEDICINE

## 2019-11-14 PROCEDURE — 87040 BLOOD CULTURE FOR BACTERIA: CPT

## 2019-11-14 PROCEDURE — 0099U HC BIOFIRE FILMARRAY RESP PANEL 1: CPT | Performed by: FAMILY MEDICINE

## 2019-11-14 PROCEDURE — 99285 EMERGENCY DEPT VISIT HI MDM: CPT

## 2019-11-14 PROCEDURE — 96365 THER/PROPH/DIAG IV INF INIT: CPT

## 2019-11-14 PROCEDURE — 85730 THROMBOPLASTIN TIME PARTIAL: CPT | Performed by: NURSE PRACTITIONER

## 2019-11-14 PROCEDURE — 84100 ASSAY OF PHOSPHORUS: CPT | Performed by: NURSE PRACTITIONER

## 2019-11-14 PROCEDURE — 82330 ASSAY OF CALCIUM: CPT | Performed by: NURSE PRACTITIONER

## 2019-11-14 PROCEDURE — 25010000002 METHYLPREDNISOLONE PER 125 MG: Performed by: EMERGENCY MEDICINE

## 2019-11-14 PROCEDURE — G0378 HOSPITAL OBSERVATION PER HR: HCPCS

## 2019-11-14 PROCEDURE — 87807 RSV ASSAY W/OPTIC: CPT | Performed by: NURSE PRACTITIONER

## 2019-11-14 PROCEDURE — 87502 INFLUENZA DNA AMP PROBE: CPT | Performed by: NURSE PRACTITIONER

## 2019-11-14 PROCEDURE — 81003 URINALYSIS AUTO W/O SCOPE: CPT | Performed by: NURSE PRACTITIONER

## 2019-11-14 PROCEDURE — 83605 ASSAY OF LACTIC ACID: CPT

## 2019-11-14 PROCEDURE — 80053 COMPREHEN METABOLIC PANEL: CPT

## 2019-11-14 PROCEDURE — 94640 AIRWAY INHALATION TREATMENT: CPT

## 2019-11-14 PROCEDURE — 71045 X-RAY EXAM CHEST 1 VIEW: CPT

## 2019-11-14 PROCEDURE — 93005 ELECTROCARDIOGRAM TRACING: CPT | Performed by: EMERGENCY MEDICINE

## 2019-11-14 PROCEDURE — 25010000002 CEFTRIAXONE PER 250 MG: Performed by: NURSE PRACTITIONER

## 2019-11-14 PROCEDURE — 85610 PROTHROMBIN TIME: CPT | Performed by: NURSE PRACTITIONER

## 2019-11-14 PROCEDURE — 93005 ELECTROCARDIOGRAM TRACING: CPT

## 2019-11-14 PROCEDURE — 83880 ASSAY OF NATRIURETIC PEPTIDE: CPT | Performed by: NURSE PRACTITIONER

## 2019-11-14 PROCEDURE — 99214 OFFICE O/P EST MOD 30 MIN: CPT | Performed by: FAMILY MEDICINE

## 2019-11-14 PROCEDURE — 96375 TX/PRO/DX INJ NEW DRUG ADDON: CPT

## 2019-11-14 PROCEDURE — 25010000002 AZITHROMYCIN PER 500 MG: Performed by: EMERGENCY MEDICINE

## 2019-11-14 RX ORDER — METHYLPREDNISOLONE SODIUM SUCCINATE 125 MG/2ML
125 INJECTION, POWDER, LYOPHILIZED, FOR SOLUTION INTRAMUSCULAR; INTRAVENOUS ONCE
Status: COMPLETED | OUTPATIENT
Start: 2019-11-14 | End: 2019-11-14

## 2019-11-14 RX ORDER — ALBUTEROL SULFATE 2.5 MG/3ML
2.5 SOLUTION RESPIRATORY (INHALATION)
Status: COMPLETED | OUTPATIENT
Start: 2019-11-14 | End: 2019-11-14

## 2019-11-14 RX ORDER — IPRATROPIUM BROMIDE AND ALBUTEROL SULFATE 2.5; .5 MG/3ML; MG/3ML
3 SOLUTION RESPIRATORY (INHALATION)
Status: DISCONTINUED | OUTPATIENT
Start: 2019-11-14 | End: 2019-11-16 | Stop reason: HOSPADM

## 2019-11-14 RX ORDER — SODIUM CHLORIDE 0.9 % (FLUSH) 0.9 %
10 SYRINGE (ML) INJECTION AS NEEDED
Status: DISCONTINUED | OUTPATIENT
Start: 2019-11-14 | End: 2019-11-16 | Stop reason: HOSPADM

## 2019-11-14 RX ORDER — ACETAMINOPHEN 500 MG
1000 TABLET ORAL ONCE
Status: COMPLETED | OUTPATIENT
Start: 2019-11-14 | End: 2019-11-14

## 2019-11-14 RX ORDER — PREDNISONE 20 MG/1
20 TABLET ORAL 2 TIMES DAILY WITH MEALS
Status: DISCONTINUED | OUTPATIENT
Start: 2019-11-15 | End: 2019-11-16

## 2019-11-14 RX ADMIN — CEFTRIAXONE SODIUM 1 G: 10 INJECTION, POWDER, FOR SOLUTION INTRAVENOUS at 19:35

## 2019-11-14 RX ADMIN — AZITHROMYCIN MONOHYDRATE 500 MG: 500 INJECTION, POWDER, LYOPHILIZED, FOR SOLUTION INTRAVENOUS at 20:31

## 2019-11-14 RX ADMIN — ALBUTEROL SULFATE 2.5 MG: 2.5 SOLUTION RESPIRATORY (INHALATION) at 20:24

## 2019-11-14 RX ADMIN — ACETAMINOPHEN 1000 MG: 500 TABLET, FILM COATED ORAL at 20:19

## 2019-11-14 RX ADMIN — METHYLPREDNISOLONE SODIUM SUCCINATE 125 MG: 125 INJECTION, POWDER, FOR SOLUTION INTRAMUSCULAR; INTRAVENOUS at 20:19

## 2019-11-14 RX ADMIN — ALBUTEROL SULFATE 2.5 MG: 2.5 SOLUTION RESPIRATORY (INHALATION) at 20:20

## 2019-11-14 RX ADMIN — CEFTRIAXONE SODIUM 1 G: 10 INJECTION, POWDER, FOR SOLUTION INTRAVENOUS at 20:31

## 2019-11-14 RX ADMIN — ALBUTEROL SULFATE 2.5 MG: 2.5 SOLUTION RESPIRATORY (INHALATION) at 20:15

## 2019-11-14 RX ADMIN — IPRATROPIUM BROMIDE AND ALBUTEROL SULFATE 3 ML: .5; 3 SOLUTION RESPIRATORY (INHALATION) at 19:55

## 2019-11-14 NOTE — ED NOTES
Pt c/o soa with a productive cough with green sputum for 1 week. Pt sees dr spring. Hx of CABG with no stent placement.     Jacinta Rosario, LPN  11/14/19 [id number]

## 2019-11-15 ENCOUNTER — APPOINTMENT (OUTPATIENT)
Dept: CT IMAGING | Facility: HOSPITAL | Age: 69
End: 2019-11-15

## 2019-11-15 PROBLEM — E11.9 TYPE 2 DIABETES MELLITUS WITHOUT COMPLICATION, WITHOUT LONG-TERM CURRENT USE OF INSULIN: Status: ACTIVE | Noted: 2019-11-15

## 2019-11-15 LAB
ALBUMIN SERPL-MCNC: 4.1 G/DL (ref 3.5–5.2)
ALBUMIN/GLOB SERPL: 1.6 G/DL
ALP SERPL-CCNC: 87 U/L (ref 39–117)
ALT SERPL W P-5'-P-CCNC: 49 U/L (ref 1–41)
ANION GAP SERPL CALCULATED.3IONS-SCNC: 13 MMOL/L (ref 5–15)
AST SERPL-CCNC: 30 U/L (ref 1–40)
B PERT DNA SPEC QL NAA+PROBE: NOT DETECTED
BASOPHILS # BLD AUTO: 0 10*3/MM3 (ref 0–0.2)
BASOPHILS NFR BLD AUTO: 0.2 % (ref 0–1.5)
BILIRUB SERPL-MCNC: 0.4 MG/DL (ref 0.2–1.2)
BUN BLD-MCNC: 15 MG/DL (ref 8–23)
BUN/CREAT SERPL: 16.7 (ref 7–25)
C PNEUM DNA NPH QL NAA+NON-PROBE: NOT DETECTED
CALCIUM SPEC-SCNC: 8.6 MG/DL (ref 8.6–10.5)
CHLORIDE SERPL-SCNC: 101 MMOL/L (ref 98–107)
CO2 SERPL-SCNC: 25 MMOL/L (ref 22–29)
CREAT BLD-MCNC: 0.9 MG/DL (ref 0.76–1.27)
D-LACTATE SERPL-SCNC: 1.5 MMOL/L (ref 0.5–2)
DEPRECATED RDW RBC AUTO: 41.6 FL (ref 37–54)
EOSINOPHIL # BLD AUTO: 0 10*3/MM3 (ref 0–0.4)
EOSINOPHIL NFR BLD AUTO: 0 % (ref 0.3–6.2)
ERYTHROCYTE [DISTWIDTH] IN BLOOD BY AUTOMATED COUNT: 13.7 % (ref 12.3–15.4)
FLUAV H1 2009 PAND RNA NPH QL NAA+PROBE: NOT DETECTED
FLUAV H1 HA GENE NPH QL NAA+PROBE: NOT DETECTED
FLUAV H3 RNA NPH QL NAA+PROBE: NOT DETECTED
FLUAV SUBTYP SPEC NAA+PROBE: NOT DETECTED
FLUBV RNA ISLT QL NAA+PROBE: NOT DETECTED
GFR SERPL CREATININE-BSD FRML MDRD: 84 ML/MIN/1.73
GLOBULIN UR ELPH-MCNC: 2.6 GM/DL
GLUCOSE BLD-MCNC: 426 MG/DL (ref 65–99)
GLUCOSE BLDC GLUCOMTR-MCNC: 267 MG/DL (ref 70–105)
GLUCOSE BLDC GLUCOMTR-MCNC: 267 MG/DL (ref 70–105)
GLUCOSE BLDC GLUCOMTR-MCNC: 275 MG/DL (ref 70–105)
GLUCOSE BLDC GLUCOMTR-MCNC: 281 MG/DL (ref 70–105)
GLUCOSE BLDC GLUCOMTR-MCNC: 327 MG/DL (ref 70–105)
GLUCOSE BLDC GLUCOMTR-MCNC: 371 MG/DL (ref 70–105)
HADV DNA SPEC NAA+PROBE: NOT DETECTED
HCOV 229E RNA SPEC QL NAA+PROBE: NOT DETECTED
HCOV HKU1 RNA SPEC QL NAA+PROBE: NOT DETECTED
HCOV NL63 RNA SPEC QL NAA+PROBE: NOT DETECTED
HCOV OC43 RNA SPEC QL NAA+PROBE: NOT DETECTED
HCT VFR BLD AUTO: 38.7 % (ref 37.5–51)
HGB BLD-MCNC: 13.5 G/DL (ref 13–17.7)
HMPV RNA NPH QL NAA+NON-PROBE: NOT DETECTED
HPIV1 RNA SPEC QL NAA+PROBE: DETECTED
HPIV2 RNA SPEC QL NAA+PROBE: NOT DETECTED
HPIV3 RNA NPH QL NAA+PROBE: NOT DETECTED
HPIV4 P GENE NPH QL NAA+PROBE: NOT DETECTED
INR PPP: 1.67 (ref 2–3)
LYMPHOCYTES # BLD AUTO: 0.4 10*3/MM3 (ref 0.7–3.1)
LYMPHOCYTES NFR BLD AUTO: 3.4 % (ref 19.6–45.3)
M PNEUMO IGG SER IA-ACNC: NOT DETECTED
MCH RBC QN AUTO: 30.1 PG (ref 26.6–33)
MCHC RBC AUTO-ENTMCNC: 34.9 G/DL (ref 31.5–35.7)
MCV RBC AUTO: 86.2 FL (ref 79–97)
MONOCYTES # BLD AUTO: 0.1 10*3/MM3 (ref 0.1–0.9)
MONOCYTES NFR BLD AUTO: 1 % (ref 5–12)
NEUTROPHILS # BLD AUTO: 12.1 10*3/MM3 (ref 1.7–7)
NEUTROPHILS NFR BLD AUTO: 95.4 % (ref 42.7–76)
NRBC BLD AUTO-RTO: 0.1 /100 WBC (ref 0–0.2)
PLATELET # BLD AUTO: 202 10*3/MM3 (ref 140–450)
PMV BLD AUTO: 8.1 FL (ref 6–12)
POTASSIUM BLD-SCNC: 4.3 MMOL/L (ref 3.5–5.2)
PROT SERPL-MCNC: 6.7 G/DL (ref 6–8.5)
PROTHROMBIN TIME: 16.3 SECONDS (ref 19.4–28.5)
RBC # BLD AUTO: 4.5 10*6/MM3 (ref 4.14–5.8)
RHINOVIRUS RNA SPEC NAA+PROBE: NOT DETECTED
RSV RNA NPH QL NAA+NON-PROBE: NOT DETECTED
SODIUM BLD-SCNC: 139 MMOL/L (ref 136–145)
WBC NRBC COR # BLD: 12.7 10*3/MM3 (ref 3.4–10.8)

## 2019-11-15 PROCEDURE — G0378 HOSPITAL OBSERVATION PER HR: HCPCS

## 2019-11-15 PROCEDURE — 25010000002 AZITHROMYCIN PER 500 MG: Performed by: FAMILY MEDICINE

## 2019-11-15 PROCEDURE — 80053 COMPREHEN METABOLIC PANEL: CPT | Performed by: FAMILY MEDICINE

## 2019-11-15 PROCEDURE — 71250 CT THORAX DX C-: CPT

## 2019-11-15 PROCEDURE — 94799 UNLISTED PULMONARY SVC/PX: CPT

## 2019-11-15 PROCEDURE — 63710000001 PREDNISONE PER 1 MG: Performed by: FAMILY MEDICINE

## 2019-11-15 PROCEDURE — 87070 CULTURE OTHR SPECIMN AEROBIC: CPT | Performed by: FAMILY MEDICINE

## 2019-11-15 PROCEDURE — 87205 SMEAR GRAM STAIN: CPT | Performed by: FAMILY MEDICINE

## 2019-11-15 PROCEDURE — 63710000001 INSULIN LISPRO (HUMAN) PER 5 UNITS: Performed by: FAMILY MEDICINE

## 2019-11-15 PROCEDURE — 83605 ASSAY OF LACTIC ACID: CPT

## 2019-11-15 PROCEDURE — 85025 COMPLETE CBC W/AUTO DIFF WBC: CPT | Performed by: FAMILY MEDICINE

## 2019-11-15 PROCEDURE — 25010000002 CEFTRIAXONE PER 250 MG: Performed by: FAMILY MEDICINE

## 2019-11-15 PROCEDURE — 85610 PROTHROMBIN TIME: CPT | Performed by: FAMILY MEDICINE

## 2019-11-15 PROCEDURE — 99214 OFFICE O/P EST MOD 30 MIN: CPT | Performed by: INTERNAL MEDICINE

## 2019-11-15 PROCEDURE — 99214 OFFICE O/P EST MOD 30 MIN: CPT | Performed by: FAMILY MEDICINE

## 2019-11-15 PROCEDURE — 82962 GLUCOSE BLOOD TEST: CPT

## 2019-11-15 RX ORDER — WARFARIN SODIUM 2.5 MG/1
3.75 TABLET ORAL
Status: DISCONTINUED | OUTPATIENT
Start: 2019-11-17 | End: 2019-11-16 | Stop reason: HOSPADM

## 2019-11-15 RX ORDER — ALLOPURINOL 300 MG/1
300 TABLET ORAL DAILY
Status: DISCONTINUED | OUTPATIENT
Start: 2019-11-15 | End: 2019-11-16 | Stop reason: HOSPADM

## 2019-11-15 RX ORDER — DEXTROSE MONOHYDRATE 25 G/50ML
25 INJECTION, SOLUTION INTRAVENOUS
Status: DISCONTINUED | OUTPATIENT
Start: 2019-11-15 | End: 2019-11-16 | Stop reason: HOSPADM

## 2019-11-15 RX ORDER — FUROSEMIDE 20 MG/1
20 TABLET ORAL DAILY
Status: DISCONTINUED | OUTPATIENT
Start: 2019-11-15 | End: 2019-11-16 | Stop reason: HOSPADM

## 2019-11-15 RX ORDER — ALBUTEROL SULFATE 90 UG/1
2 AEROSOL, METERED RESPIRATORY (INHALATION) EVERY 4 HOURS PRN
Status: DISCONTINUED | OUTPATIENT
Start: 2019-11-15 | End: 2019-11-16 | Stop reason: HOSPADM

## 2019-11-15 RX ORDER — CALCIUM CARBONATE 200(500)MG
2 TABLET,CHEWABLE ORAL 2 TIMES DAILY PRN
Status: DISCONTINUED | OUTPATIENT
Start: 2019-11-15 | End: 2019-11-16 | Stop reason: HOSPADM

## 2019-11-15 RX ORDER — ACETAMINOPHEN 650 MG/1
650 SUPPOSITORY RECTAL EVERY 4 HOURS PRN
Status: DISCONTINUED | OUTPATIENT
Start: 2019-11-15 | End: 2019-11-16 | Stop reason: HOSPADM

## 2019-11-15 RX ORDER — LOSARTAN POTASSIUM 50 MG/1
50 TABLET ORAL DAILY
Status: DISCONTINUED | OUTPATIENT
Start: 2019-11-15 | End: 2019-11-16 | Stop reason: HOSPADM

## 2019-11-15 RX ORDER — ATENOLOL 50 MG/1
50 TABLET ORAL DAILY
Status: DISCONTINUED | OUTPATIENT
Start: 2019-11-15 | End: 2019-11-16 | Stop reason: HOSPADM

## 2019-11-15 RX ORDER — ACETAMINOPHEN 160 MG/5ML
650 SOLUTION ORAL EVERY 4 HOURS PRN
Status: DISCONTINUED | OUTPATIENT
Start: 2019-11-15 | End: 2019-11-16 | Stop reason: HOSPADM

## 2019-11-15 RX ORDER — SODIUM CHLORIDE 0.9 % (FLUSH) 0.9 %
10 SYRINGE (ML) INJECTION EVERY 12 HOURS SCHEDULED
Status: DISCONTINUED | OUTPATIENT
Start: 2019-11-15 | End: 2019-11-16 | Stop reason: HOSPADM

## 2019-11-15 RX ORDER — NICOTINE POLACRILEX 4 MG
15 LOZENGE BUCCAL
Status: DISCONTINUED | OUTPATIENT
Start: 2019-11-15 | End: 2019-11-16 | Stop reason: HOSPADM

## 2019-11-15 RX ORDER — NITROGLYCERIN 0.4 MG/1
0.4 TABLET SUBLINGUAL
Status: DISCONTINUED | OUTPATIENT
Start: 2019-11-15 | End: 2019-11-16 | Stop reason: HOSPADM

## 2019-11-15 RX ORDER — ONDANSETRON 2 MG/ML
4 INJECTION INTRAMUSCULAR; INTRAVENOUS EVERY 6 HOURS PRN
Status: DISCONTINUED | OUTPATIENT
Start: 2019-11-15 | End: 2019-11-16 | Stop reason: HOSPADM

## 2019-11-15 RX ORDER — CHOLECALCIFEROL (VITAMIN D3) 125 MCG
5 CAPSULE ORAL NIGHTLY PRN
Status: DISCONTINUED | OUTPATIENT
Start: 2019-11-15 | End: 2019-11-16 | Stop reason: HOSPADM

## 2019-11-15 RX ORDER — SODIUM CHLORIDE 0.9 % (FLUSH) 0.9 %
10 SYRINGE (ML) INJECTION AS NEEDED
Status: DISCONTINUED | OUTPATIENT
Start: 2019-11-15 | End: 2019-11-16 | Stop reason: HOSPADM

## 2019-11-15 RX ORDER — ACETAMINOPHEN 325 MG/1
650 TABLET ORAL EVERY 4 HOURS PRN
Status: DISCONTINUED | OUTPATIENT
Start: 2019-11-15 | End: 2019-11-16 | Stop reason: HOSPADM

## 2019-11-15 RX ORDER — FAMOTIDINE 20 MG/1
40 TABLET, FILM COATED ORAL DAILY
Status: DISCONTINUED | OUTPATIENT
Start: 2019-11-15 | End: 2019-11-16 | Stop reason: HOSPADM

## 2019-11-15 RX ORDER — ASPIRIN 81 MG/1
81 TABLET, CHEWABLE ORAL DAILY
Status: DISCONTINUED | OUTPATIENT
Start: 2019-11-15 | End: 2019-11-16 | Stop reason: HOSPADM

## 2019-11-15 RX ADMIN — IPRATROPIUM BROMIDE AND ALBUTEROL SULFATE 3 ML: .5; 3 SOLUTION RESPIRATORY (INHALATION) at 08:30

## 2019-11-15 RX ADMIN — IPRATROPIUM BROMIDE AND ALBUTEROL SULFATE 3 ML: .5; 3 SOLUTION RESPIRATORY (INHALATION) at 19:29

## 2019-11-15 RX ADMIN — CEFTRIAXONE SODIUM 1 G: 1 INJECTION, POWDER, FOR SOLUTION INTRAMUSCULAR; INTRAVENOUS at 21:27

## 2019-11-15 RX ADMIN — INSULIN LISPRO 6 UNITS: 100 INJECTION, SOLUTION INTRAVENOUS; SUBCUTANEOUS at 14:35

## 2019-11-15 RX ADMIN — IPRATROPIUM BROMIDE AND ALBUTEROL SULFATE 3 ML: .5; 3 SOLUTION RESPIRATORY (INHALATION) at 15:05

## 2019-11-15 RX ADMIN — INSULIN LISPRO 6 UNITS: 100 INJECTION, SOLUTION INTRAVENOUS; SUBCUTANEOUS at 21:28

## 2019-11-15 RX ADMIN — IPRATROPIUM BROMIDE AND ALBUTEROL SULFATE 3 ML: .5; 3 SOLUTION RESPIRATORY (INHALATION) at 11:58

## 2019-11-15 RX ADMIN — Medication 10 ML: at 02:21

## 2019-11-15 RX ADMIN — PREDNISONE 20 MG: 20 TABLET ORAL at 10:44

## 2019-11-15 RX ADMIN — Medication 10 ML: at 10:43

## 2019-11-15 RX ADMIN — INSULIN LISPRO 7 UNITS: 100 INJECTION, SOLUTION INTRAVENOUS; SUBCUTANEOUS at 10:40

## 2019-11-15 RX ADMIN — INSULIN LISPRO 6 UNITS: 100 INJECTION, SOLUTION INTRAVENOUS; SUBCUTANEOUS at 17:28

## 2019-11-15 RX ADMIN — Medication 10 ML: at 21:28

## 2019-11-15 RX ADMIN — PREDNISONE 20 MG: 20 TABLET ORAL at 17:28

## 2019-11-15 RX ADMIN — AZITHROMYCIN MONOHYDRATE 500 MG: 500 INJECTION, POWDER, LYOPHILIZED, FOR SOLUTION INTRAVENOUS at 22:18

## 2019-11-15 NOTE — ED NOTES
As the pt was leaving the floor he mention the congestion has increased slightly. Prior to this he was sleeping in bed.  JOSE Sanchez notified.      Mar Spencer RN  11/15/19 0122

## 2019-11-15 NOTE — NURSING NOTE
Secure chatted with Dr. Tirado and received verification to allow pt. To use home meds.  Taking home meds to [facility] pharmacist to have them dispense them.

## 2019-11-15 NOTE — NURSING NOTE
Placed call out to Dr. Tirado to check pt. Taking home medication.  Waiting on call back for verification.

## 2019-11-15 NOTE — CONSULTS
Referring Provider: Dr. Tirado  Reason for Consultation: Shortness of breath and cough    Patient Care Team:  James Tirado Jr., MD as PCP - General  RikyGiovanny Jr., DPM as PCP - Claims Attributed  Thai Galloway MD as Consulting Physician (Interventional Cardiology)    Chief complaint loss of breath and cough    Subjective .     History of present illness:  Sandro Ramirez is a 69 y.o. male with history of coronary artery disease congestive heart failure atrial fibrillation hypertension hyperlipidemia sleep apnea presented to the hospital with complaints of shortness of breath and cough and fever for the last several days.  Patient was noted to have pneumonia and was admitted to the hospital.  No complaints of any chest pain.  No PND orthopnea.  No palpitations dizziness syncope or swelling of the feet.  In the ER patient was noted to have atrial fibrillation with higher heart rates.  He says that he is taking his medicines regularly.  He does not smoke.  He is following a good diet.    Review of Systems   Constitution: Negative for fever and malaise/fatigue.   HENT: Negative for ear pain and nosebleeds.    Eyes: Negative for blurred vision and double vision.   Cardiovascular: Negative for chest pain, dyspnea on exertion and palpitations.   Respiratory: Positive for cough and shortness of breath.    Skin: Negative for rash.   Musculoskeletal: Negative for joint pain.   Gastrointestinal: Negative for abdominal pain, nausea and vomiting.   Neurological: Negative for focal weakness and headaches.   Psychiatric/Behavioral: Negative for depression. The patient is not nervous/anxious.    All other systems reviewed and are negative.      History  Past Medical History:   Diagnosis Date   • Arthritis    • Atrial fibrillation (CMS/HCC)    • Cancer (CMS/HCC)    • CHF (congestive heart failure) (CMS/HCC)    • Coronary artery disease    • Elevated cholesterol    • Hyperlipidemia    • Hypertension    • Sleep  apnea        Past Surgical History:   Procedure Laterality Date   • CARDIAC CATHETERIZATION     • CHOLECYSTECTOMY     • CORONARY ARTERY BYPASS GRAFT         Family History   Problem Relation Age of Onset   • Cancer Father    • Heart attack Father    • Heart disease Paternal Grandfather    • Heart attack Paternal Grandfather        Social History     Tobacco Use   • Smoking status: Former Smoker   • Smokeless tobacco: Never Used   Substance Use Topics   • Alcohol use: No     Frequency: Never   • Drug use: No        Medications Prior to Admission   Medication Sig Dispense Refill Last Dose   • albuterol sulfate HFA (VENTOLIN HFA) 108 (90 Base) MCG/ACT inhaler VENTOLIN  (90 Base) MCG/ACT AERS   Taking   • alfuzosin (UROXATRAL) 10 MG 24 hr tablet TAKE 1 TABLET BY MOUTH 2 TIMES EACH DAY  3 Taking   • allopurinol (ZYLOPRIM) 300 MG tablet Take 300 mg by mouth Daily.   Taking   • aspirin 81 MG tablet ASPIRIN 81 MG ORAL TABLET   Taking   • atenolol (TENORMIN) 50 MG tablet TAKE 1 TABLET BY MOUTH EVERY DAY 90 tablet 2 Taking   • atorvastatin (LIPITOR) 20 MG tablet Take 20 mg by mouth Daily.  1 Taking   • furosemide (LASIX) 40 MG tablet Daily.   Taking   • losartan (COZAAR) 50 MG tablet TAKE 1 TABLET BY MOUTH EVERY DAY 90 tablet 0 Taking   • warfarin (COUMADIN) 7.5 MG tablet TAKE 1 AND ONE-HALF TABLETY BY MOUTH ON SUNDAY AND 1 TABLET BY MOUTH DAILY 94 tablet 0 Taking         Meperidine; Midazolam; Propofol; Sulfa antibiotics; and Simvastatin    Scheduled Meds:    allopurinol 300 mg Oral Daily   aspirin 81 mg Oral Daily   atenolol 50 mg Oral Daily   azithromycin 500 mg Intravenous Q24H   cefTRIAXone 1 g Intravenous Q24H   famotidine 40 mg Oral Daily   furosemide 20 mg Oral Daily   insulin lispro 0-9 Units Subcutaneous 4x Daily With Meals & Nightly   ipratropium-albuterol 3 mL Nebulization 4x Daily - RT   losartan 50 mg Oral Daily   pharmacy 1 each Does not apply Once   predniSONE 20 mg Oral BID With Meals   sodium chloride  "10 mL Intravenous Q12H   warfarin 7.5 mg Oral Daily     Continuous Infusions:    Pharmacy Consult - Pharmacy to dose    Pharmacy to dose warfarin      PRN Meds:.•  acetaminophen **OR** acetaminophen **OR** acetaminophen  •  albuterol sulfate HFA  •  calcium carbonate  •  dextrose  •  dextrose  •  glucagon (human recombinant)  •  insulin lispro **AND** insulin lispro  •  magnesium hydroxide  •  melatonin  •  nitroglycerin  •  ondansetron  •  Pharmacy Consult - Pharmacy to dose  •  Pharmacy to dose warfarin  •  sodium chloride  •  sodium chloride    Objective     VITAL SIGNS  Vitals:    11/15/19 0834 11/15/19 1158 11/15/19 1201 11/15/19 1225   BP:    108/58   BP Location:    Left arm   Patient Position:    Lying   Pulse: 92 88 88 74   Resp: 18 18 18 18   Temp:    97.7 °F (36.5 °C)   TempSrc:    Oral   SpO2:    94%   Weight:       Height:           Flowsheet Rows      First Filed Value   Admission Height  180.3 cm (71\") Documented at 11/14/2019 1828   Admission Weight  103 kg (226 lb 9.6 oz) Documented at 11/14/2019 1828           TELEMETRY: Atrial fibrillation    Physical Exam:  Physical Exam   Constitutional: He appears well-developed and well-nourished.   HENT:   Head: Normocephalic and atraumatic.   Eyes: Conjunctivae and EOM are normal. Pupils are equal, round, and reactive to light. No scleral icterus.   Neck: Normal range of motion. Neck supple. No JVD present. Carotid bruit is not present.   Cardiovascular: Normal rate, S1 normal, S2 normal, normal heart sounds and intact distal pulses. An irregularly irregular rhythm present. PMI is not displaced.   Pulmonary/Chest: Effort normal and breath sounds normal. He has no wheezes. He has no rales.   Abdominal: Soft. Bowel sounds are normal.   Musculoskeletal: Normal range of motion.   Neurological: He is alert. He has normal strength.   No focal deficits   Skin: Skin is warm and dry. No rash noted.   Psychiatric: He has a normal mood and affect.        Results " Review:   I reviewed the patient's new clinical results.  Lab Results (last 24 hours)     Procedure Component Value Units Date/Time    POC Glucose Once [[id number]]  (Abnormal) Collected:  11/15/19 1154    Specimen:  Blood Updated:  11/15/19 1202     Glucose 267 mg/dL      Comment: Serial Number: [id number]:  [id number]       POC Glucose Once [[id number]]  (Abnormal) Collected:  11/15/19 0740    Specimen:  Blood Updated:  11/15/19 0744     Glucose 327 mg/dL      Comment: Serial Number: [id number]:  [id number]       POC Glucose Once [[id number]]  (Abnormal) Collected:  11/15/19 0441    Specimen:  Blood Updated:  11/15/19 0443     Glucose 371 mg/dL      Comment: Serial Number: [id number]:  [id number]       Comprehensive Metabolic Panel [[id number]]  (Abnormal) Collected:  11/15/19 0322    Specimen:  Blood Updated:  11/15/19 0433     Glucose 426 mg/dL      BUN 15 mg/dL      Creatinine 0.90 mg/dL      Sodium 139 mmol/L      Potassium 4.3 mmol/L      Chloride 101 mmol/L      CO2 25.0 mmol/L      Calcium 8.6 mg/dL      Total Protein 6.7 g/dL      Albumin 4.10 g/dL      ALT (SGPT) 49 U/L      AST (SGOT) 30 U/L      Alkaline Phosphatase 87 U/L      Total Bilirubin 0.4 mg/dL      eGFR Non African Amer 84 mL/min/1.73      Globulin 2.6 gm/dL      A/G Ratio 1.6 g/dL      BUN/Creatinine Ratio 16.7     Anion Gap 13.0 mmol/L     Narrative:       GFR Normal >60  Chronic Kidney Disease <60  Kidney Failure <15    Protime-INR [[id number]]  (Abnormal) Collected:  11/15/19 0322    Specimen:  Blood Updated:  11/15/19 0420     Protime 16.3 Seconds      INR 1.67    CBC & Differential [[id number]] Collected:  11/15/19 0322    Specimen:  Blood Updated:  11/15/19 0407    Narrative:       The following orders were created for panel order CBC & Differential.  Procedure                               Abnormality         Status                     ---------                               -----------         ------                      CBC Auto Differential[[id number]]        Abnormal            Final result                 Please view results for these tests on the individual orders.    CBC Auto Differential [[id number]]  (Abnormal) Collected:  11/15/19 0322    Specimen:  Blood Updated:  11/15/19 0407     WBC 12.70 10*3/mm3      RBC 4.50 10*6/mm3      Hemoglobin 13.5 g/dL      Hematocrit 38.7 %      MCV 86.2 fL      MCH 30.1 pg      MCHC 34.9 g/dL      RDW 13.7 %      RDW-SD 41.6 fl      MPV 8.1 fL      Platelets 202 10*3/mm3      Neutrophil % 95.4 %      Lymphocyte % 3.4 %      Monocyte % 1.0 %      Eosinophil % 0.0 %      Basophil % 0.2 %      Neutrophils, Absolute 12.10 10*3/mm3      Lymphocytes, Absolute 0.40 10*3/mm3      Monocytes, Absolute 0.10 10*3/mm3      Eosinophils, Absolute 0.00 10*3/mm3      Basophils, Absolute 0.00 10*3/mm3      nRBC 0.1 /100 WBC     Respiratory Panel, PCR - Wash, Nasopharynx [[id number]]  (Abnormal) Collected:  11/14/19 1939    Specimen:  Wash from Nasopharynx Updated:  11/15/19 0120     ADENOVIRUS, PCR Not Detected     Coronavirus 229E Not Detected     Coronavirus HKU1 Not Detected     Coronavirus NL63 Not Detected     Coronavirus OC43 Not Detected     Human Metapneumovirus Not Detected     Human Rhinovirus/Enterovirus Not Detected     Influenza B PCR Not Detected     Parainfluenza Virus 1 Detected     Parainfluenza Virus 2 Not Detected     Parainfluenza Virus 3 Not Detected     Parainfluenza Virus 4 Not Detected     Bordetella pertussis pcr Not Detected     Influenza A H1 2009 PCR Not Detected     Chlamydophila pneumoniae PCR Not Detected     Mycoplasma pneumo by PCR Not Detected     Influenza A PCR Not Detected     Influenza A H3 Not Detected     Influenza A H1 Not Detected     RSV, PCR Not Detected    Lactic Acid, Reflex [[id number]]  (Normal) Collected:  11/15/19 0024    Specimen:  Blood Updated:  11/15/19 0056     Lactate 1.5 mmol/L     [place] Draw [[id number]] Collected:  11/14/19 1846    Specimen:  Blood  Updated:  11/15/19 0021    Narrative:       The following orders were created for panel order [place] Draw.  Procedure                               Abnormality         Status                     ---------                               -----------         ------                     Light Blue Top[[id number]]                                   Final result               Green Top (Gel)[[id number]]                                  Final result               Lavender Top[[id number]]                                     Final result               Gold Top - SST[[id number]]                                                                Please view results for these tests on the individual orders.    S. Pneumo Ag Urine or CSF - Urine, Urine, Clean Catch [[id number]]  (Normal) Collected:  11/14/19 2302    Specimen:  Urine, Clean Catch Updated:  11/14/19 2329     Strep Pneumo Ag Negative    Legionella Antigen, Urine - Urine, Urine, Clean Catch [[id number]]  (Normal) Collected:  11/14/19 2302    Specimen:  Urine, Clean Catch Updated:  11/14/19 2328     LEGIONELLA ANTIGEN, URINE Negative    Urinalysis With Microscopic If Indicated (No Culture) - Urine, Clean Catch [[id number]]  (Abnormal) Collected:  11/14/19 2302    Specimen:  Urine, Clean Catch Updated:  11/14/19 2315     Color, UA Yellow     Appearance, UA Clear     pH, UA 6.0     Specific Gravity, UA 1.020     Glucose, UA Negative     Ketones, UA 15 mg/dL (1+)     Bilirubin, UA Negative     Blood, UA Negative     Protein, UA Negative     Leuk Esterase, UA Negative     Nitrite, UA Negative     Urobilinogen, UA 1.0 E.U./dL    Narrative:       Urine microscopic not indicated.    Lactic Acid, Reflex Timer (This will reflex a repeat order 3-3:15 hours after ordered.) [[id number]] Collected:  11/14/19 1852    Specimen:  Blood Updated:  11/14/19 2240     Extra Tube Hold for add-ons.     Comment: Auto resulted.       RSV Screen - Wash, Nasopharynx [[id number]]  (Normal) Collected:  11/14/19  1939    Specimen:  Wash from Nasopharynx Updated:  11/14/19 2007     RSV Rapid Ag Negative    Influenza Antigen, Rapid - Swab, Nasopharynx [[id number]]  (Normal) Collected:  11/14/19 1933    Specimen:  Swab from Nasopharynx Updated:  11/14/19 2004     Influenza A PCR Not Detected     Influenza B PCR Not Detected    Light Blue Top [[id number]] Collected:  11/14/19 1846    Specimen:  Blood Updated:  11/14/19 2002     Extra Tube hold for add-on     Comment: Auto resulted       Green Top (Gel) [[id number]] Collected:  11/14/19 1846    Specimen:  Blood Updated:  11/14/19 2002     Extra Tube Hold for add-ons.     Comment: Auto resulted.       Lavender Top [[id number]] Collected:  11/14/19 1846    Specimen:  Blood Updated:  11/14/19 2002     Extra Tube hold for add-on     Comment: Auto resulted       Calcium, Ionized [[id number]]  (Abnormal) Collected:  11/14/19 1938    Specimen:  Blood from Arm, Left Updated:  11/14/19 1959     Ionized Calcium 1.14 mmol/L     Blood Culture - Blood, Arm, Left [[id number]] Collected:  11/14/19 1935    Specimen:  Blood from Arm, Left Updated:  11/14/19 1945    C-reactive Protein [[id number]]  (Abnormal) Collected:  11/14/19 1846    Specimen:  Blood Updated:  11/14/19 1945     C-Reactive Protein 6.90 mg/dL     Magnesium [[id number]]  (Normal) Collected:  11/14/19 1846    Specimen:  Blood Updated:  11/14/19 1928     Magnesium 2.0 mg/dL     Phosphorus [[id number]]  (Normal) Collected:  11/14/19 1846    Specimen:  Blood Updated:  11/14/19 1928     Phosphorus 3.1 mg/dL     Protime-INR [[id number]]  (Abnormal) Collected:  11/14/19 1846    Specimen:  Blood Updated:  11/14/19 1927     Protime 16.4 Seconds      INR 1.69    aPTT [[id number]]  (Abnormal) Collected:  11/14/19 1846    Specimen:  Blood Updated:  11/14/19 1927     PTT 33.4 seconds     Troponin [[id number]]  (Normal) Collected:  11/14/19 1846    Specimen:  Blood Updated:  11/14/19 1927     Troponin T <0.010 ng/mL     Narrative:       Troponin T  Reference Range:  <= 0.03 ng/mL-   Negative for AMI  >0.03 ng/mL-     Abnormal for myocardial necrosis.  Clinicians would have to utilize clinical acumen, EKG, Troponin and serial changes to determine if it is an Acute Myocardial Infarction or myocardial injury due to an underlying chronic condition.     Comprehensive Metabolic Panel [[id number]]  (Abnormal) Collected:  11/14/19 1846    Specimen:  Blood Updated:  11/14/19 1920     Glucose 133 mg/dL      BUN 15 mg/dL      Creatinine 0.90 mg/dL      Sodium 140 mmol/L      Potassium 4.4 mmol/L      Chloride 102 mmol/L      CO2 27.0 mmol/L      Calcium 9.0 mg/dL      Total Protein 7.6 g/dL      Albumin 4.50 g/dL      ALT (SGPT) 59 U/L      AST (SGOT) 42 U/L      Alkaline Phosphatase 98 U/L      Total Bilirubin 0.6 mg/dL      eGFR Non African Amer 84 mL/min/1.73      Globulin 3.1 gm/dL      A/G Ratio 1.5 g/dL      BUN/Creatinine Ratio 16.7     Anion Gap 11.0 mmol/L     Narrative:       GFR Normal >60  Chronic Kidney Disease <60  Kidney Failure <15    BNP [[id number]]  (Normal) Collected:  11/14/19 1846    Specimen:  Blood Updated:  11/14/19 1916     proBNP 505.3 pg/mL     Narrative:       Among patients with dyspnea, NT-proBNP is highly sensitive for the detection of acute congestive heart failure. In addition NT-proBNP of <300 pg/ml effectively rules out acute congestive heart failure with 99% negative predictive value.    CBC & Differential [[id number]] Collected:  11/14/19 1846    Specimen:  Blood Updated:  11/14/19 1856    Narrative:       The following orders were created for panel order CBC & Differential.  Procedure                               Abnormality         Status                     ---------                               -----------         ------                     CBC Auto Differential[[id number]]        Abnormal            Final result                 Please view results for these tests on the individual orders.    CBC Auto Differential [[id number]]   (Abnormal) Collected:  11/14/19 1846    Specimen:  Blood Updated:  11/14/19 1856     WBC 13.90 10*3/mm3      RBC 4.90 10*6/mm3      Hemoglobin 14.4 g/dL      Hematocrit 41.8 %      MCV 85.2 fL      MCH 29.3 pg      MCHC 34.4 g/dL      RDW 13.9 %      RDW-SD 42.0 fl      MPV 7.7 fL      Platelets 225 10*3/mm3      Neutrophil % 82.5 %      Lymphocyte % 9.6 %      Monocyte % 6.8 %      Eosinophil % 0.5 %      Basophil % 0.6 %      Neutrophils, Absolute 11.50 10*3/mm3      Lymphocytes, Absolute 1.30 10*3/mm3      Monocytes, Absolute 0.90 10*3/mm3      Eosinophils, Absolute 0.10 10*3/mm3      Basophils, Absolute 0.10 10*3/mm3      nRBC 0.0 /100 WBC     POC Lactate [[id number]]  (Abnormal) Collected:  11/14/19 1852    Specimen:  Blood Updated:  11/14/19 1853     Lactate 2.6 mmol/L      Comment: Serial Number: [id number]:  [id number]       Blood Culture - Blood, Blood, Venous Line [[id number]] Collected:  11/14/19 1846    Specimen:  Blood, Venous Line Updated:  11/14/19 1853          Imaging Results (Last 24 Hours)     Procedure Component Value Units Date/Time    XR Chest 1 View [[id number]] Collected:  11/14/19 2209     Updated:  11/14/19 2214    Narrative:       Examination: XR CHEST 1 VW-     Date of Exam: 11/14/2019 9:46 PM     Indication: soa; R50.9-Fever, unspecified; A41.9-Sepsis, unspecified  organism; J98.01-Acute bronchospasm.     Comparison: Radiograph 11/14/2019, 01/29/2018     Technique: 1 view of the chest      Findings:  New mild patchy airspace changes are present within the left upper lobe.  Interstitial opacities are present bilaterally. No pleural effusions. No  pneumothorax. The heart size is mildly enlarged. Median sternotomy wires  are present. The pulmonary vasculature appears within normal limits. No  acute osseous abnormality identified.       Impression:       Mild patchy airspace changes within the left upper lobe likely related  to a mild pneumonia or atelectasis. Follow-up to resolution  recommended.     Electronically Signed By-Pauline Infante On:11/14/2019 10:11 PM  This report was finalized on [id number] by  Pauline Infante, .          EKG      I personally viewed and interpreted the patient's EKG/Telemetry data:    ECHOCARDIOGRAM:      STRESS MYOVIEW:    CARDIAC CATHETERIZATION:    OTHER:         Assessment/Plan     #1 shortness of breath with cough  2.  Pneumonia with probable sepsis  3.  Coronary artery disease  4.  Congestive heart failure  5.  Hypertension  6.  Hyperlipidemia  7.  Diabetes  8.  Atrial fibrillation    Patient presents with shortness of breath and cough and has pneumonia and is on IV antibiotics  Patient's lactate is slightly elevated  Patient is ruled out for MI by EKG and enzymes  Patient has history of atrial fibrillation is currently on medical therapy including atenolol and warfarin  We will keep his INR between 2.0-3.0  We will use additional medicines if his heart rate is high  Blood pressure and heart rate are stable  No further cardiac work-up at this time.  I discussed the patients findings and my recommendations with patient and nurse    Thai Galloway MD  11/15/19  2:32 PM

## 2019-11-15 NOTE — H&P
Patient Care Team:  James Tirado Jr., MD as PCP - General  Giovanny Lan Jr., DPM as PCP - Claims Attributed  Thai Galloway MD as Consulting Physician (Interventional Cardiology)    Chief complaint cough and shortness of breath    Subjective     The patient is a 69-year-old white male was admitted to hospital with cough and shortness of breath and wheezing.    Patient was admitted to hospital from the emergency room.    The patient states he became ill about 3 weeks ago.  He states at that time he developed sinus congestion drainage sore throat and a cough.  Patient states that he had some shortness of breath.  He states however he was on a hunting trip and was able to continue to hunt.  The patient states that over the past 3 weeks he has had gradually increasing shortness of breath.  Patient finally came home within the past 2 days.  His shortness of breath increased to the point that he could not walk across the room.  Patient also had fever of 100 101 °F.  Because of this he came the emergency room.  In the emergency room he was evaluated.  He was found to have pneumonia and bronchospasm.  Patient was stabilized and admitted for further care.    Review of Systems   Constitutional: Positive for chills, fatigue and fever.   HENT: Negative for sinus pain, sore throat and trouble swallowing.    Eyes: Negative for pain and discharge.   Respiratory: Positive for cough and shortness of breath.    Cardiovascular: Negative for chest pain and leg swelling.   Gastrointestinal: Negative for abdominal pain, diarrhea, nausea and vomiting.   Endocrine: Negative for cold intolerance, heat intolerance, polydipsia, polyphagia and polyuria.   Genitourinary: Negative for dysuria and frequency.   Musculoskeletal: Negative for arthralgias and myalgias.   Skin: Negative for color change and rash.   Neurological: Negative for dizziness, syncope and weakness.   Psychiatric/Behavioral: Negative for agitation, confusion  and decreased concentration.          History  Past Medical History:   Diagnosis Date   • Atrial fibrillation (CMS/HCC)    • Coronary artery disease    • Hyperlipidemia    • Hypertension      Past Surgical History:   Procedure Laterality Date   • CHOLECYSTECTOMY     • CORONARY ARTERY BYPASS GRAFT       Family History   Problem Relation Age of Onset   • Cancer Father    • Heart attack Father    • Heart disease Paternal Grandfather    • Heart attack Paternal Grandfather      Social History     Tobacco Use   • Smoking status: Former Smoker   • Smokeless tobacco: Never Used   Substance Use Topics   • Alcohol use: No     Frequency: Never   • Drug use: Not on file       (Not in a hospital admission)  Allergies:  Meperidine; Midazolam; Propofol; Sulfa antibiotics; and Simvastatin    Objective     Vital Signs  Temp:  [97.6 °F (36.4 °C)-97.9 °F (36.6 °C)] 97.9 °F (36.6 °C)  Heart Rate:  [85-92] 89  Resp:  [20-28] 20  BP: (142-180)/(55-73) 144/55    Physical Exam   Constitutional: He is oriented to person, place, and time. He appears well-developed and well-nourished.   HENT:   Head: Normocephalic and atraumatic.   Eyes: EOM are normal. Pupils are equal, round, and reactive to light.   Neck: Neck supple.   Cardiovascular: Normal rate, normal heart sounds and intact distal pulses. An irregularly irregular rhythm present.   Pulmonary/Chest: Effort normal. He has decreased breath sounds in the right middle field, the right lower field, the left middle field and the left lower field. He has wheezes in the right middle field, the right lower field, the left middle field and the left lower field. He has rales in the right lower field and the left lower field.   Abdominal: Soft. Bowel sounds are normal.   Musculoskeletal: Normal range of motion.   Neurological: He is oriented to person, place, and time.   Skin: Skin is warm and dry.   Psychiatric: He has a normal mood and affect. His behavior is normal. Judgment and thought content  normal.   Nursing note and vitals reviewed.       Results Review:   Lab Results (last 24 hours)     Procedure Component Value Units Date/Time    RSV Screen - Wash, Nasopharynx [[id number]]  (Normal) Collected:  11/14/19 1939    Specimen:  Wash from Nasopharynx Updated:  11/14/19 2007     RSV Rapid Ag Negative    Influenza Antigen, Rapid - Swab, Nasopharynx [[id number]]  (Normal) Collected:  11/14/19 1933    Specimen:  Swab from Nasopharynx Updated:  11/14/19 2004     Influenza A PCR Not Detected     Influenza B PCR Not Detected    [place] Draw [[id number]] Collected:  11/14/19 1846    Specimen:  Blood Updated:  11/14/19 2002    Narrative:       The following orders were created for panel order [place] Draw.  Procedure                               Abnormality         Status                     ---------                               -----------         ------                     Light Blue Top[[id number]]                                   Final result               Green Top (Gel)[[id number]]                                  Final result               Lavender Top[[id number]]                                     Final result               Gold Top - SST[[id number]]                                                                Please view results for these tests on the individual orders.    Light Blue Top [[id number]] Collected:  11/14/19 1846    Specimen:  Blood Updated:  11/14/19 2002     Extra Tube hold for add-on     Comment: Auto resulted       Green Top (Gel) [[id number]] Collected:  11/14/19 1846    Specimen:  Blood Updated:  11/14/19 2002     Extra Tube Hold for add-ons.     Comment: Auto resulted.       Lavender Top [[id number]] Collected:  11/14/19 1846    Specimen:  Blood Updated:  11/14/19 2002     Extra Tube hold for add-on     Comment: Auto resulted       Calcium, Ionized [[id number]]  (Abnormal) Collected:  11/14/19 1938    Specimen:  Blood from Arm, Left Updated:  11/14/19 1959     Ionized Calcium 1.14 mmol/L      Blood Culture - Blood, Arm, Left [[id number]] Collected:  11/14/19 1935    Specimen:  Blood from Arm, Left Updated:  11/14/19 1945    C-reactive Protein [[id number]]  (Abnormal) Collected:  11/14/19 1846    Specimen:  Blood Updated:  11/14/19 1945     C-Reactive Protein 6.90 mg/dL     Magnesium [[id number]]  (Normal) Collected:  11/14/19 1846    Specimen:  Blood Updated:  11/14/19 1928     Magnesium 2.0 mg/dL     Phosphorus [[id number]]  (Normal) Collected:  11/14/19 1846    Specimen:  Blood Updated:  11/14/19 1928     Phosphorus 3.1 mg/dL     Protime-INR [[id number]]  (Abnormal) Collected:  11/14/19 1846    Specimen:  Blood Updated:  11/14/19 1927     Protime 16.4 Seconds      INR 1.69    aPTT [[id number]]  (Abnormal) Collected:  11/14/19 1846    Specimen:  Blood Updated:  11/14/19 1927     PTT 33.4 seconds     Troponin [[id number]]  (Normal) Collected:  11/14/19 1846    Specimen:  Blood Updated:  11/14/19 1927     Troponin T <0.010 ng/mL     Narrative:       Troponin T Reference Range:  <= 0.03 ng/mL-   Negative for AMI  >0.03 ng/mL-     Abnormal for myocardial necrosis.  Clinicians would have to utilize clinical acumen, EKG, Troponin and serial changes to determine if it is an Acute Myocardial Infarction or myocardial injury due to an underlying chronic condition.     Comprehensive Metabolic Panel [[id number]]  (Abnormal) Collected:  11/14/19 1846    Specimen:  Blood Updated:  11/14/19 1920     Glucose 133 mg/dL      BUN 15 mg/dL      Creatinine 0.90 mg/dL      Sodium 140 mmol/L      Potassium 4.4 mmol/L      Chloride 102 mmol/L      CO2 27.0 mmol/L      Calcium 9.0 mg/dL      Total Protein 7.6 g/dL      Albumin 4.50 g/dL      ALT (SGPT) 59 U/L      AST (SGOT) 42 U/L      Alkaline Phosphatase 98 U/L      Total Bilirubin 0.6 mg/dL      eGFR Non African Amer 84 mL/min/1.73      Globulin 3.1 gm/dL      A/G Ratio 1.5 g/dL      BUN/Creatinine Ratio 16.7     Anion Gap 11.0 mmol/L     Narrative:       GFR Normal >60  Chronic  Kidney Disease <60  Kidney Failure <15    BNP [[id number]]  (Normal) Collected:  11/14/19 1846    Specimen:  Blood Updated:  11/14/19 1916     proBNP 505.3 pg/mL     Narrative:       Among patients with dyspnea, NT-proBNP is highly sensitive for the detection of acute congestive heart failure. In addition NT-proBNP of <300 pg/ml effectively rules out acute congestive heart failure with 99% negative predictive value.    CBC & Differential [[id number]] Collected:  11/14/19 1846    Specimen:  Blood Updated:  11/14/19 1856    Narrative:       The following orders were created for panel order CBC & Differential.  Procedure                               Abnormality         Status                     ---------                               -----------         ------                     CBC Auto Differential[[id number]]        Abnormal            Final result                 Please view results for these tests on the individual orders.    CBC Auto Differential [[id number]]  (Abnormal) Collected:  11/14/19 1846    Specimen:  Blood Updated:  11/14/19 1856     WBC 13.90 10*3/mm3      RBC 4.90 10*6/mm3      Hemoglobin 14.4 g/dL      Hematocrit 41.8 %      MCV 85.2 fL      MCH 29.3 pg      MCHC 34.4 g/dL      RDW 13.9 %      RDW-SD 42.0 fl      MPV 7.7 fL      Platelets 225 10*3/mm3      Neutrophil % 82.5 %      Lymphocyte % 9.6 %      Monocyte % 6.8 %      Eosinophil % 0.5 %      Basophil % 0.6 %      Neutrophils, Absolute 11.50 10*3/mm3      Lymphocytes, Absolute 1.30 10*3/mm3      Monocytes, Absolute 0.90 10*3/mm3      Eosinophils, Absolute 0.10 10*3/mm3      Basophils, Absolute 0.10 10*3/mm3      nRBC 0.0 /100 WBC     POC Lactate [[id number]]  (Abnormal) Collected:  11/14/19 1852    Specimen:  Blood Updated:  11/14/19 1853     Lactate 2.6 mmol/L      Comment: Serial Number: [id number]:  [id number]       Lactic Acid, Reflex Timer (This will reflex a repeat order 3-3:15 hours after ordered.) [[id number]] Collected:  11/14/19  1852    Specimen:  Blood Updated:  11/14/19 1853    Blood Culture - Blood, Blood, Venous Line [[id number]] Collected:  11/14/19 1846    Specimen:  Blood, Venous Line Updated:  11/14/19 1853        Imaging Results (Last 24 Hours)     Procedure Component Value Units Date/Time    XR Chest 1 View [[id number]] Collected:  11/14/19 2209     Updated:  11/14/19 2214    Narrative:       Examination: XR CHEST 1 VW-     Date of Exam: 11/14/2019 9:46 PM     Indication: soa; R50.9-Fever, unspecified; A41.9-Sepsis, unspecified  organism; J98.01-Acute bronchospasm.     Comparison: Radiograph 11/14/2019, 01/29/2018     Technique: 1 view of the chest      Findings:  New mild patchy airspace changes are present within the left upper lobe.  Interstitial opacities are present bilaterally. No pleural effusions. No  pneumothorax. The heart size is mildly enlarged. Median sternotomy wires  are present. The pulmonary vasculature appears within normal limits. No  acute osseous abnormality identified.       Impression:       Mild patchy airspace changes within the left upper lobe likely related  to a mild pneumonia or atelectasis. Follow-up to resolution recommended.     Electronically Signed By-Pauline Infante On:11/14/2019 10:11 PM  This report was finalized on [id number] by  Pauline Infante, .         ECG/EMG Results (last 24 hours)     Procedure Component Value Units Date/Time    ECG 12 Lead [[id number]] Collected:  11/14/19 1841     Updated:  11/14/19 1842    Narrative:       HEART RATE= 81  bpm  RR Interval= 738  ms  WI Interval=   ms  P Horizontal Axis=   deg  P Front Axis=   deg  QRSD Interval= 151  ms  QT Interval= 409  ms  QRS Axis= 116  deg  T Wave Axis= 21  deg  - ABNORMAL ECG -  Atrial fibrillation  Ventricular premature complex  When compared with ECG of 11-Feb-2019 7:48:23,  Significant change in rhythm  Electronically Signed By:   Date and Time of Study: 2019-11-14 18:41:18           I reviewed the patient's new clinical  results.    Principal Problem:    Pneumonia due to infectious organism  Active Problems:    Fever and chills     The patient will be admitted to hospital.  He will be given antibiotics and bronchodilators and corticosteroids.  Furtherr evaluation will be performed.  Further care will depend results of studies and how he progresses.    Assessment & Plan    I discussed the patients findings and my recommendations with patient and family.     James Tirado Jr., MD  11/14/19  10:17 PM

## 2019-11-15 NOTE — PLAN OF CARE
Problem: Patient Care Overview  Goal: Individualization and Mutuality  Outcome: Ongoing (interventions implemented as appropriate)    Goal: Discharge Needs Assessment  Outcome: Ongoing (interventions implemented as appropriate)    Goal: Interprofessional Rounds/Family Conf  Outcome: Ongoing (interventions implemented as appropriate)      Problem: Pneumonia (Adult)  Goal: Signs and Symptoms of Listed Potential Problems Will be Absent, Minimized or Managed (Pneumonia)  Outcome: Ongoing (interventions implemented as appropriate)

## 2019-11-15 NOTE — CONSULTS
[facility]: [facility]         CONSULT NOTE    Patient Identification:  Sandro Ramirez  69 y.o.  male  1950  [id number]            Requesting physician: Attending physician    Reason for Consultation: Pneumonia       History of Present Illness:  69 y.o. male with history of obstructive sleep apnea and coronary artery disease congestive heart failure atrial fibrillation hypertension hyperlipidemia   presented to the hospital with complaints of shortness of breath and cough and fever for the last several days.  Patient was admitted for pneumonia   Has mild PND and orthopnea.  No palpitations dizziness syncope or swelling of the feet.    In the ER patient was noted to have atrial fibrillation   He does not smoke.    Assessment:    Acute bronchitis secondary to parainfluenza  Pulmonary edema  Atrial fibrillation  CHF  Obstructive sleep apnea  Hypertension  Hyperlipidemia          Recommendations:  CT scan chest without contrast  Antibiotics Rocephin and azithromycin  Oxygen titration  Bronchodilator as needed                Review of Sytems:  Review of Systems   HENT: Positive for congestion. Negative for rhinorrhea and sneezing.    Respiratory: Positive for shortness of breath and wheezing. Negative for apnea, cough, choking, chest tightness and stridor.    Cardiovascular: Positive for leg swelling.       Past Medical History:  Past Medical History:   Diagnosis Date   • Arthritis    • Atrial fibrillation (CMS/HCC)    • Cancer (CMS/HCC)    • CHF (congestive heart failure) (CMS/HCC)    • Coronary artery disease    • Elevated cholesterol    • Hyperlipidemia    • Hypertension    • Sleep apnea        Past Surgical History:  Past Surgical History:   Procedure Laterality Date   • CARDIAC CATHETERIZATION     • CHOLECYSTECTOMY     • CORONARY ARTERY BYPASS GRAFT          Home Meds:  Medications Prior to Admission   Medication Sig Dispense Refill Last Dose   • albuterol sulfate HFA (VENTOLIN HFA) 108 (90 Base)  "MCG/ACT inhaler VENTOLIN  (90 Base) MCG/ACT AERS   Taking   • alfuzosin (UROXATRAL) 10 MG 24 hr tablet TAKE 1 TABLET BY MOUTH 2 TIMES EACH DAY  3 Taking   • allopurinol (ZYLOPRIM) 300 MG tablet Take 300 mg by mouth Daily.   Taking   • aspirin 81 MG tablet ASPIRIN 81 MG ORAL TABLET   Taking   • atenolol (TENORMIN) 50 MG tablet TAKE 1 TABLET BY MOUTH EVERY DAY 90 tablet 2 Taking   • atorvastatin (LIPITOR) 20 MG tablet Take 20 mg by mouth Daily.  1 Taking   • furosemide (LASIX) 40 MG tablet Daily.   Taking   • losartan (COZAAR) 50 MG tablet TAKE 1 TABLET BY MOUTH EVERY DAY 90 tablet 0 Taking   • warfarin (COUMADIN) 7.5 MG tablet TAKE 1 AND ONE-HALF TABLETY BY MOUTH ON SUNDAY AND 1 TABLET BY MOUTH DAILY 94 tablet 0 Taking       Allergies:  Allergies   Allergen Reactions   • Meperidine GI Intolerance   • Midazolam Mental Status Change   • Propofol Mental Status Change   • Sulfa Antibiotics Hives   • Simvastatin Myalgia       Social History:   Social History     Socioeconomic History   • Marital status:      Spouse name: Not on file   • Number of children: Not on file   • Years of education: Not on file   • Highest education level: Not on file   Tobacco Use   • Smoking status: Former Smoker   • Smokeless tobacco: Never Used   Substance and Sexual Activity   • Alcohol use: No     Frequency: Never   • Drug use: No   • Sexual activity: Defer       Family History:  Family History   Problem Relation Age of Onset   • Cancer Father    • Heart attack Father    • Heart disease Paternal Grandfather    • Heart attack Paternal Grandfather        Physical Exam:  /58 (BP Location: Left arm, Patient Position: Lying)   Pulse 75   Temp 97.7 °F (36.5 °C) (Oral)   Resp 16   Ht 180.3 cm (71\")   Wt 102 kg (224 lb 12.8 oz)   SpO2 95%   BMI 31.35 kg/m²  Body mass index is 31.35 kg/m². 95% 102 kg (224 lb 12.8 oz)  Physical Exam   Cardiovascular:   Murmur heard.  Pulmonary/Chest: No respiratory distress. He has no " wheezes. He has rales. He exhibits no tenderness.   Abdominal: He exhibits no distension. There is no tenderness.   Musculoskeletal: He exhibits edema.       LABS:  Lab Results   Component Value Date    CALCIUM 8.6 11/15/2019    PHOS 3.1 11/14/2019     Results from last 7 days   Lab Units 11/15/19  0322 11/14/19  1846   MAGNESIUM mg/dL  --  2.0   SODIUM mmol/L 139 140   POTASSIUM mmol/L 4.3 4.4   CHLORIDE mmol/L 101 102   CO2 mmol/L 25.0 27.0   BUN mg/dL 15 15   CREATININE mg/dL 0.90 0.90   GLUCOSE mg/dL 426* 133*   CALCIUM mg/dL 8.6 9.0   WBC 10*3/mm3 12.70* 13.90*   HEMOGLOBIN g/dL 13.5 14.4   PLATELETS 10*3/mm3 202 225   ALT (SGPT) U/L 49* 59*   AST (SGOT) U/L 30 42*   PROBNP pg/mL  --  505.3     Lab Results   Component Value Date    TROPONINT <0.010 11/14/2019     Results from last 7 days   Lab Units 11/14/19  1846   TROPONIN T ng/mL <0.010         Results from last 7 days   Lab Units 11/15/19  0024 11/14/19  1852   LACTATE mmol/L 1.5 2.6*         Results from last 7 days   Lab Units 11/14/19  1939   ADENOVIRUS DETECTION BY PCR  Not Detected   CORONAVIRUS 229E  Not Detected   CORONAVIRUS HKU1  Not Detected   CORONAVIRUS NL63  Not Detected   CORONAVIRUS OC43  Not Detected   HUMAN METAPNEUMOVIRUS  Not Detected   HUMAN RHINOVIRUS/ENTEROVIRUS  Not Detected   INFLUENZA B PCR  Not Detected   PARAINFLUENZA 1  Detected*   PARAINFLUENZA VIRUS 2  Not Detected   PARAINFLUENZA VIRUS 3  Not Detected   PARAINFLUENZA VIRUS 4  Not Detected   BORDETELLA PERTUSSIS PCR  Not Detected   CHLAMYDOPHILA PNEUMONIAE PCR  Not Detected   MYCOPLAMA PNEUMO PCR  Not Detected   INFLUENZA A PCR  Not Detected   INFLUENZA A H3  Not Detected   INFLUENZA A H1  Not Detected   RSV, PCR  Not Detected     Results from last 7 days   Lab Units 11/15/19  0322 11/14/19  1846   INR  1.67* 1.69*         No results found for: TSH  Estimated Creatinine Clearance: 96.9 mL/min (by C-G formula based on SCr of 0.9 mg/dL).  Results from last 7 days   Lab Units  11/14/19  2302   NITRITE UA  Negative        Imaging:  Imaging Results (Last 24 Hours)     Procedure Component Value Units Date/Time    XR Chest 1 View [[id number]] Collected:  11/14/19 2209     Updated:  11/14/19 2214    Narrative:       Examination: XR CHEST 1 VW-     Date of Exam: 11/14/2019 9:46 PM     Indication: soa; R50.9-Fever, unspecified; A41.9-Sepsis, unspecified  organism; J98.01-Acute bronchospasm.     Comparison: Radiograph 11/14/2019, 01/29/2018     Technique: 1 view of the chest      Findings:  New mild patchy airspace changes are present within the left upper lobe.  Interstitial opacities are present bilaterally. No pleural effusions. No  pneumothorax. The heart size is mildly enlarged. Median sternotomy wires  are present. The pulmonary vasculature appears within normal limits. No  acute osseous abnormality identified.       Impression:       Mild patchy airspace changes within the left upper lobe likely related  to a mild pneumonia or atelectasis. Follow-up to resolution recommended.     Electronically Signed By-Pauline Infante On:11/14/2019 10:11 PM  This report was finalized on [id number] by  Pauline Infante, .            Current Meds:   SCHEDULE    allopurinol 300 mg Oral Daily   aspirin 81 mg Oral Daily   atenolol 50 mg Oral Daily   azithromycin 500 mg Intravenous Q24H   cefTRIAXone 1 g Intravenous Q24H   famotidine 40 mg Oral Daily   furosemide 20 mg Oral Daily   insulin lispro 0-9 Units Subcutaneous 4x Daily With Meals & Nightly   ipratropium-albuterol 3 mL Nebulization 4x Daily - RT   losartan 50 mg Oral Daily   predniSONE 20 mg Oral BID With Meals   sodium chloride 10 mL Intravenous Q12H   [START ON 11/17/2019] warfarin 3.75 mg Oral Q7 Days   warfarin 7.5 mg Oral Daily     Infusions    Pharmacy Consult - Pharmacy to dose    Pharmacy to dose warfarin      PRNs  •  acetaminophen **OR** acetaminophen **OR** acetaminophen  •  albuterol sulfate HFA  •  calcium carbonate  •  dextrose  •  dextrose  •   glucagon (human recombinant)  •  insulin lispro **AND** insulin lispro  •  magnesium hydroxide  •  melatonin  •  nitroglycerin  •  ondansetron  •  Pharmacy Consult - Pharmacy to dose  •  Pharmacy to dose warfarin  •  sodium chloride  •  sodium chloride        Jaylan Phillips MD  11/15/2019  5:39 PM      Much of this encounter note is an electronic transcription/translation of spoken language to printed text using Dragon Software.

## 2019-11-15 NOTE — PROGRESS NOTES
Continued Stay Note  KATHY Parr     Patient Name: Sandro Ramirez  MRN: [id number]  Today's Date: 11/15/2019    Admit Date: 11/14/2019    Discharge Plan     Row Name 11/15/19 1414       Plan    Plan  Anticipate routine home.                     Tashia Goldsmith RN

## 2019-11-15 NOTE — PLAN OF CARE
Problem: Patient Care Overview  Goal: Plan of Care Review  Outcome: Ongoing (interventions implemented as appropriate)   11/15/19 0512   Coping/Psychosocial   Plan of Care Reviewed With patient   Plan of Care Review   Progress no change   OTHER   Outcome Summary Patient rested well through the night. Has had no complaints. Patient states he is not diabetic but had elevated blood sugar this morning.       Problem: Pneumonia (Adult)  Goal: Signs and Symptoms of Listed Potential Problems Will be Absent, Minimized or Managed (Pneumonia)  Outcome: Ongoing (interventions implemented as appropriate)

## 2019-11-16 VITALS
HEIGHT: 71 IN | HEART RATE: 63 BPM | TEMPERATURE: 97.3 F | OXYGEN SATURATION: 97 % | RESPIRATION RATE: 16 BRPM | BODY MASS INDEX: 30.86 KG/M2 | SYSTOLIC BLOOD PRESSURE: 120 MMHG | DIASTOLIC BLOOD PRESSURE: 66 MMHG | WEIGHT: 220.46 LBS

## 2019-11-16 LAB
GLUCOSE BLDC GLUCOMTR-MCNC: 186 MG/DL (ref 70–105)
GLUCOSE BLDC GLUCOMTR-MCNC: 290 MG/DL (ref 70–105)
GLUCOSE BLDC GLUCOMTR-MCNC: 328 MG/DL (ref 70–105)
INR PPP: 1.98 (ref 2–3)
PROTHROMBIN TIME: 19 SECONDS (ref 19.4–28.5)

## 2019-11-16 PROCEDURE — 82962 GLUCOSE BLOOD TEST: CPT

## 2019-11-16 PROCEDURE — 99217 PR OBSERVATION CARE DISCHARGE MANAGEMENT: CPT | Performed by: FAMILY MEDICINE

## 2019-11-16 PROCEDURE — 63710000001 INSULIN LISPRO (HUMAN) PER 5 UNITS: Performed by: FAMILY MEDICINE

## 2019-11-16 PROCEDURE — 85610 PROTHROMBIN TIME: CPT | Performed by: FAMILY MEDICINE

## 2019-11-16 PROCEDURE — 94799 UNLISTED PULMONARY SVC/PX: CPT

## 2019-11-16 PROCEDURE — 63710000001 PREDNISONE PER 1 MG: Performed by: FAMILY MEDICINE

## 2019-11-16 PROCEDURE — G0378 HOSPITAL OBSERVATION PER HR: HCPCS

## 2019-11-16 RX ORDER — AZITHROMYCIN 500 MG/1
500 TABLET, FILM COATED ORAL DAILY
Qty: 2 TABLET | Refills: 0 | Status: SHIPPED | OUTPATIENT
Start: 2019-11-16 | End: 2019-11-25

## 2019-11-16 RX ORDER — PREDNISONE 20 MG/1
20 TABLET ORAL DAILY
Qty: 12 TABLET | Refills: 0 | Status: SHIPPED | OUTPATIENT
Start: 2019-11-16 | End: 2019-11-25

## 2019-11-16 RX ADMIN — INSULIN LISPRO 7 UNITS: 100 INJECTION, SOLUTION INTRAVENOUS; SUBCUTANEOUS at 17:47

## 2019-11-16 RX ADMIN — LOSARTAN POTASSIUM 50 MG: 50 TABLET ORAL at 08:47

## 2019-11-16 RX ADMIN — IPRATROPIUM BROMIDE AND ALBUTEROL SULFATE 3 ML: .5; 3 SOLUTION RESPIRATORY (INHALATION) at 15:15

## 2019-11-16 RX ADMIN — FUROSEMIDE 20 MG: 20 TABLET ORAL at 08:48

## 2019-11-16 RX ADMIN — Medication 10 ML: at 08:45

## 2019-11-16 RX ADMIN — IPRATROPIUM BROMIDE AND ALBUTEROL SULFATE 3 ML: .5; 3 SOLUTION RESPIRATORY (INHALATION) at 11:31

## 2019-11-16 RX ADMIN — INSULIN LISPRO 6 UNITS: 100 INJECTION, SOLUTION INTRAVENOUS; SUBCUTANEOUS at 12:35

## 2019-11-16 RX ADMIN — INSULIN LISPRO 2 UNITS: 100 INJECTION, SOLUTION INTRAVENOUS; SUBCUTANEOUS at 08:45

## 2019-11-16 RX ADMIN — PREDNISONE 20 MG: 20 TABLET ORAL at 08:45

## 2019-11-16 RX ADMIN — ALLOPURINOL 300 MG: 300 TABLET ORAL at 08:47

## 2019-11-16 RX ADMIN — ATENOLOL 50 MG: 50 TABLET ORAL at 08:47

## 2019-11-16 RX ADMIN — IPRATROPIUM BROMIDE AND ALBUTEROL SULFATE 3 ML: .5; 3 SOLUTION RESPIRATORY (INHALATION) at 07:20

## 2019-11-16 RX ADMIN — ASPIRIN 81 MG: 81 TABLET, CHEWABLE ORAL at 08:47

## 2019-11-16 NOTE — PROGRESS NOTES
Daily Progress Note        Pneumonia due to infectious organism    Atrial fibrillation (CMS/[facility]) [I48.91]    Coronary artery disease    Fever and chills    Type 2 diabetes mellitus without complication, without long-term current use of insulin (CMS/[facility])    Assessment:     Acute bronchitis secondary to parainfluenza  Pulmonary edema  Atrial fibrillation  CHF  Obstructive sleep apnea  Hypertension  Hyperlipidemia              Recommendations:    Antibiotics Rocephin and azithromycin   May DC Rocephin on discharge   due to hyperglycemia will DC prednisone  Oxygen titration  Bronchodilator as needed                      LOS: 0 days     Subjective         Objective     Vital signs for last 24 hours:  Vitals:    11/16/19 1131 11/16/19 1136 11/16/19 1515 11/16/19 1519   BP:       BP Location:       Patient Position:       Pulse: 62 61 64 63   Resp: 16 16 16 16   Temp:       TempSrc:       SpO2: 97% 98% 97% 97%   Weight:       Height:           Intake/Output last 3 shifts:  I/O last 3 completed shifts:  In: 1200 [P.O.:1200]  Out: 0   Intake/Output this shift:  I/O this shift:  In: 400 [P.O.:400]  Out: 501 [Urine:500; Stool:1]      Radiology  Imaging Results (Last 24 Hours)     Procedure Component Value Units Date/Time    CT Chest Without Contrast [[id number]] Collected:  11/15/19 1910     Updated:  11/15/19 1917    Narrative:       CT CHEST WO CONTRAST-     Date of Exam: 11/15/2019 6:17 PM     Indication: Shortness of air, cough, pneumonia Dyspnea chronic, prior  xray; R50.9-Fever, unspecified; A41.9-Sepsis, unspecified organism;  J98.01-Acute bronchospasm; J18.1-Lobar pneumonia, unspecified organism.     Comparison: CT 01/31/2018, radiograph 11/14/2019, ultrasound 03/08/2019     Technique: Serial and axial CT images of the chest were obtained.  Reconstructions in the coronal and sagittal planes were performed.   Automated exposure control and iterative reconstruction methods were  used.     FINDINGS:     Hilum and  Mediastinum: No pathologically enlarged lymph nodes. The heart  appears mildly enlarged. No pericardial effusion. Atherosclerotic  calcifications are present. There is dilatation of the descending aorta,  incompletely visualized measuring up to 3.8 cm which has been previously  imaged.     Lung Parenchyma and Pleura: No focal consolidations.  No suspicious  pulmonary nodules.  No endobronchial lesions.  No significant pleural  effusions.     Upper Abdomen: No acute process identified. Postsurgical changes are  seen from previous cholecystectomy.     Soft tissues: Unremarkable.     Osseous structures: No aggressive focal lytic or sclerotic osseous  lesions.       Impression:          1. No acute cardiac pulmonary process. The previously described left  upper lobe airspace disease is likely related to atelectasis.  2. Significant atherosclerosis.  3. Aneurysmal dilatation of the descending aorta as seen on previous  imaging, incompletely visualized.  4. Ancillary findings as described above.           Electronically Signed By-Pauline Infante On:11/15/2019 7:15 PM  This report was finalized on [id number] by  Pauline Infante, .          Labs:  Results from last 7 days   Lab Units 11/15/19  0322   WBC 10*3/mm3 12.70*   HEMOGLOBIN g/dL 13.5   HEMATOCRIT % 38.7   PLATELETS 10*3/mm3 202     Results from last 7 days   Lab Units 11/15/19  0322   SODIUM mmol/L 139   POTASSIUM mmol/L 4.3   CHLORIDE mmol/L 101   CO2 mmol/L 25.0   BUN mg/dL 15   CREATININE mg/dL 0.90   CALCIUM mg/dL 8.6   BILIRUBIN mg/dL 0.4   ALK PHOS U/L 87   ALT (SGPT) U/L 49*   AST (SGOT) U/L 30   GLUCOSE mg/dL 426*         Results from last 7 days   Lab Units 11/15/19  0322 11/14/19  1846   ALBUMIN g/dL 4.10 4.50     Results from last 7 days   Lab Units 11/14/19  1846   TROPONIN T ng/mL <0.010         Results from last 7 days   Lab Units 11/14/19  1846   MAGNESIUM mg/dL 2.0     Results from last 7 days   Lab Units 11/16/19  0335 11/15/19  0322 11/14/19  1846    INR  1.98* 1.67* 1.69*   APTT seconds  --   --  33.4*               Meds:   SCHEDULE    allopurinol 300 mg Oral Daily   aspirin 81 mg Oral Daily   atenolol 50 mg Oral Daily   azithromycin 500 mg Intravenous Q24H   cefTRIAXone 1 g Intravenous Q24H   famotidine 40 mg Oral Daily   furosemide 20 mg Oral Daily   insulin lispro 0-9 Units Subcutaneous 4x Daily With Meals & Nightly   ipratropium-albuterol 3 mL Nebulization 4x Daily - RT   losartan 50 mg Oral Daily   sodium chloride 10 mL Intravenous Q12H   [START ON 11/17/2019] warfarin 3.75 mg Oral Q7 Days   warfarin 7.5 mg Oral Daily     Infusions    Pharmacy Consult - Pharmacy to dose    Pharmacy to dose warfarin      PRNs  •  acetaminophen **OR** acetaminophen **OR** acetaminophen  •  albuterol sulfate HFA  •  calcium carbonate  •  dextrose  •  dextrose  •  glucagon (human recombinant)  •  insulin lispro **AND** insulin lispro  •  magnesium hydroxide  •  melatonin  •  nitroglycerin  •  ondansetron  •  Pharmacy Consult - Pharmacy to dose  •  Pharmacy to dose warfarin  •  sodium chloride  •  sodium chloride    Physical Exam:  Physical Exam   Cardiovascular:   Murmur heard.  Pulmonary/Chest: No respiratory distress. He has wheezes. He has rales. He exhibits no tenderness.   Abdominal: He exhibits no distension. There is no tenderness.   Musculoskeletal: He exhibits edema.       ROS  Review of Systems   HENT: Positive for congestion, rhinorrhea and sneezing.    Respiratory: Positive for cough, shortness of breath and wheezing. Negative for apnea, choking, chest tightness and stridor.    Cardiovascular: Positive for leg swelling.             Total time spent with patient greater than: 1 Hour

## 2019-11-16 NOTE — DISCHARGE SUMMARY
Date of Discharge:  11/16/2019    Discharge Diagnosis:   Pneumonia due to infectious organism  Respiratory infection secondary to parainfluenza virus  Atrial fibrillation  Ischemic cardiomyopathy    Presenting Problem/History of Present Illness  Active Hospital Problems    Diagnosis  POA   • **Pneumonia due to infectious organism [J18.9]  Yes   • Type 2 diabetes mellitus without complication, without long-term current use of insulin (CMS/[facility]) [E11.9]  Unknown   • Fever and chills [R50.9]  Yes   • Atrial fibrillation (CMS/[facility]) [I48.91] [I48.91]  Yes   • Coronary artery disease [I25.10]  Yes      Resolved Hospital Problems   No resolved problems to display.     Hospital Course  Patient is a 69 y.o. male presented with shortness of breath cough and wheezing.      The patient is a 69-year-old white male was admitted to hospital with cough and shortness of breath and wheezing.     Patient was admitted to hospital from the emergency room.     The patient states he became ill about 3 weeks ago.  He states at that time he developed sinus congestion drainage sore throat and a cough.  Patient states that he had some shortness of breath.  He states however he was on a hunting trip and was able to continue to hunt.  The patient states that over the past 3 weeks he has had gradually increasing shortness of breath.  Patient finally came home within the past 2 days.  His shortness of breath increased to the point that he could not walk across the room.  Patient also had fever of 100 101 °F.  Because of this he came the emergency room.  In the emergency room he was evaluated.  He was found to have pneumonia and bronchospasm.  Patient was stabilized and admitted for further care.    Patient was admitted to hospital.  He was seen in consultation by pulmonary medicine.  Evaluation was performed.  Patient was treated with antibiotics corticosteroids and bronchodilators.  With this the patient improved significantly.      At the  present time the patient feels much better.  He states he would like to go home.  He states his cough shortness of breath and wheezing is much better.    The patient is therefore can be discharged home he is to complete a 3-day course of Zithromax.  He is to complete a 7-day course of prednisone.  He is to use an inhaler as needed.    He is to follow-up in the office in 1 to 2 weeks.  He is to follow-up with cardiology in 1 to 2 weeks.  He is to have an INR in 2 days.    Procedures Performed         Consults:   Consults     Date and Time Order Name Status Description    11/14/2019 2223 Inpatient Pulmonology Consult Completed     11/14/2019 2223 Inpatient Cardiology Consult            Pertinent Test Results:   Lab Results (last 48 hours)     Procedure Component Value Units Date/Time    POC Glucose Once [[id number]]  (Abnormal) Collected:  11/16/19 1636    Specimen:  Blood Updated:  11/16/19 1639     Glucose 328 mg/dL      Comment: Serial Number: [id number]:  [id number]       Respiratory Culture - Sputum, Cough [[id number]] Collected:  11/15/19 1726    Specimen:  Sputum from Cough Updated:  11/16/19 1343     Respiratory Culture Scant growth (1+) Normal Respiratory Michaela     Gram Stain No organisms seen    POC Glucose Once [[id number]]  (Abnormal) Collected:  11/16/19 1125    Specimen:  Blood Updated:  11/16/19 1129     Glucose 290 mg/dL      Comment: Serial Number: [id number]:  [id number]       POC Glucose Once [[id number]]  (Abnormal) Collected:  11/16/19 0804    Specimen:  Blood Updated:  11/16/19 0806     Glucose 186 mg/dL      Comment: Serial Number: [id number]:  [id number]       Protime-INR [[id number]]  (Abnormal) Collected:  11/16/19 0335    Specimen:  Blood Updated:  11/16/19 0502     Protime 19.0 Seconds      INR 1.98    POC Glucose Once [[id number]]  (Abnormal) Collected:  11/15/19 2120    Specimen:  Blood Updated:  11/15/19 2122     Glucose 267 mg/dL      Comment: Serial Number:  [id number]:  [id number]       Blood Culture - Blood, Arm, Left [[id number]] Collected:  11/14/19 1935    Specimen:  Blood from Arm, Left Updated:  11/15/19 1946     Blood Culture No growth at 24 hours    POC Glucose Once [[id number]]  (Abnormal) Collected:  11/15/19 1943    Specimen:  Blood Updated:  11/15/19 1944     Glucose 281 mg/dL      Comment: Serial Number: [id number]:  [id number]       Blood Culture - Blood, Blood, Venous Line [[id number]] Collected:  11/14/19 1846    Specimen:  Blood, Venous Line Updated:  11/15/19 1901     Blood Culture No growth at 24 hours    POC Glucose Once [[id number]]  (Abnormal) Collected:  11/15/19 1652    Specimen:  Blood Updated:  11/15/19 1703     Glucose 275 mg/dL      Comment: Serial Number: [id number]:  [id number]       POC Glucose Once [[id number]]  (Abnormal) Collected:  11/15/19 1154    Specimen:  Blood Updated:  11/15/19 1202     Glucose 267 mg/dL      Comment: Serial Number: [id number]:  [id number]       POC Glucose Once [[id number]]  (Abnormal) Collected:  11/15/19 0740    Specimen:  Blood Updated:  11/15/19 0744     Glucose 327 mg/dL      Comment: Serial Number: [id number]:  [id number]       POC Glucose Once [[id number]]  (Abnormal) Collected:  11/15/19 0441    Specimen:  Blood Updated:  11/15/19 0443     Glucose 371 mg/dL      Comment: Serial Number: [id number]:  [id number]       Comprehensive Metabolic Panel [[id number]]  (Abnormal) Collected:  11/15/19 0322    Specimen:  Blood Updated:  11/15/19 0433     Glucose 426 mg/dL      BUN 15 mg/dL      Creatinine 0.90 mg/dL      Sodium 139 mmol/L      Potassium 4.3 mmol/L      Chloride 101 mmol/L      CO2 25.0 mmol/L      Calcium 8.6 mg/dL      Total Protein 6.7 g/dL      Albumin 4.10 g/dL      ALT (SGPT) 49 U/L      AST (SGOT) 30 U/L      Alkaline Phosphatase 87 U/L      Total Bilirubin 0.4 mg/dL      eGFR Non African Amer 84 mL/min/1.73      Globulin 2.6 gm/dL      A/G Ratio 1.6 g/dL       BUN/Creatinine Ratio 16.7     Anion Gap 13.0 mmol/L     Narrative:       GFR Normal >60  Chronic Kidney Disease <60  Kidney Failure <15    Protime-INR [[id number]]  (Abnormal) Collected:  11/15/19 0322    Specimen:  Blood Updated:  11/15/19 0420     Protime 16.3 Seconds      INR 1.67    CBC & Differential [[id number]] Collected:  11/15/19 0322    Specimen:  Blood Updated:  11/15/19 0407    Narrative:       The following orders were created for panel order CBC & Differential.  Procedure                               Abnormality         Status                     ---------                               -----------         ------                     CBC Auto Differential[[id number]]        Abnormal            Final result                 Please view results for these tests on the individual orders.    CBC Auto Differential [[id number]]  (Abnormal) Collected:  11/15/19 0322    Specimen:  Blood Updated:  11/15/19 0407     WBC 12.70 10*3/mm3      RBC 4.50 10*6/mm3      Hemoglobin 13.5 g/dL      Hematocrit 38.7 %      MCV 86.2 fL      MCH 30.1 pg      MCHC 34.9 g/dL      RDW 13.7 %      RDW-SD 41.6 fl      MPV 8.1 fL      Platelets 202 10*3/mm3      Neutrophil % 95.4 %      Lymphocyte % 3.4 %      Monocyte % 1.0 %      Eosinophil % 0.0 %      Basophil % 0.2 %      Neutrophils, Absolute 12.10 10*3/mm3      Lymphocytes, Absolute 0.40 10*3/mm3      Monocytes, Absolute 0.10 10*3/mm3      Eosinophils, Absolute 0.00 10*3/mm3      Basophils, Absolute 0.00 10*3/mm3      nRBC 0.1 /100 WBC     Respiratory Panel, PCR - Wash, Nasopharynx [[id number]]  (Abnormal) Collected:  11/14/19 1939    Specimen:  Wash from Nasopharynx Updated:  11/15/19 0120     ADENOVIRUS, PCR Not Detected     Coronavirus 229E Not Detected     Coronavirus HKU1 Not Detected     Coronavirus NL63 Not Detected     Coronavirus OC43 Not Detected     Human Metapneumovirus Not Detected     Human Rhinovirus/Enterovirus Not Detected     Influenza B PCR Not Detected      Parainfluenza Virus 1 Detected     Parainfluenza Virus 2 Not Detected     Parainfluenza Virus 3 Not Detected     Parainfluenza Virus 4 Not Detected     Bordetella pertussis pcr Not Detected     Influenza A H1 2009 PCR Not Detected     Chlamydophila pneumoniae PCR Not Detected     Mycoplasma pneumo by PCR Not Detected     Influenza A PCR Not Detected     Influenza A H3 Not Detected     Influenza A H1 Not Detected     RSV, PCR Not Detected    Lactic Acid, Reflex [[id number]]  (Normal) Collected:  11/15/19 0024    Specimen:  Blood Updated:  11/15/19 0056     Lactate 1.5 mmol/L     [place] Draw [[id number]] Collected:  11/14/19 1846    Specimen:  Blood Updated:  11/15/19 0021    Narrative:       The following orders were created for panel order [place] Draw.  Procedure                               Abnormality         Status                     ---------                               -----------         ------                     Light Blue Top[[id number]]                                   Final result               Green Top (Gel)[[id number]]                                  Final result               Lavender Top[[id number]]                                     Final result               Gold Top - SST[[id number]]                                                                Please view results for these tests on the individual orders.    S. Pneumo Ag Urine or CSF - Urine, Urine, Clean Catch [[id number]]  (Normal) Collected:  11/14/19 2302    Specimen:  Urine, Clean Catch Updated:  11/14/19 2329     Strep Pneumo Ag Negative    Legionella Antigen, Urine - Urine, Urine, Clean Catch [[id number]]  (Normal) Collected:  11/14/19 2302    Specimen:  Urine, Clean Catch Updated:  11/14/19 2328     LEGIONELLA ANTIGEN, URINE Negative    Urinalysis With Microscopic If Indicated (No Culture) - Urine, Clean Catch [[id number]]  (Abnormal) Collected:  11/14/19 2302    Specimen:  Urine, Clean Catch Updated:  11/14/19 2315     Color, UA Yellow      Appearance, UA Clear     pH, UA 6.0     Specific Gravity, UA 1.020     Glucose, UA Negative     Ketones, UA 15 mg/dL (1+)     Bilirubin, UA Negative     Blood, UA Negative     Protein, UA Negative     Leuk Esterase, UA Negative     Nitrite, UA Negative     Urobilinogen, UA 1.0 E.U./dL    Narrative:       Urine microscopic not indicated.    Lactic Acid, Reflex Timer (This will reflex a repeat order 3-3:15 hours after ordered.) [[id number]] Collected:  11/14/19 1852    Specimen:  Blood Updated:  11/14/19 2240     Extra Tube Hold for add-ons.     Comment: Auto resulted.       RSV Screen - Wash, Nasopharynx [[id number]]  (Normal) Collected:  11/14/19 1939    Specimen:  Wash from Nasopharynx Updated:  11/14/19 2007     RSV Rapid Ag Negative    Influenza Antigen, Rapid - Swab, Nasopharynx [[id number]]  (Normal) Collected:  11/14/19 1933    Specimen:  Swab from Nasopharynx Updated:  11/14/19 2004     Influenza A PCR Not Detected     Influenza B PCR Not Detected    Light Blue Top [[id number]] Collected:  11/14/19 1846    Specimen:  Blood Updated:  11/14/19 2002     Extra Tube hold for add-on     Comment: Auto resulted       Green Top (Gel) [[id number]] Collected:  11/14/19 1846    Specimen:  Blood Updated:  11/14/19 2002     Extra Tube Hold for add-ons.     Comment: Auto resulted.       Lavender Top [[id number]] Collected:  11/14/19 1846    Specimen:  Blood Updated:  11/14/19 2002     Extra Tube hold for add-on     Comment: Auto resulted       Calcium, Ionized [[id number]]  (Abnormal) Collected:  11/14/19 1938    Specimen:  Blood from Arm, Left Updated:  11/14/19 1959     Ionized Calcium 1.14 mmol/L     C-reactive Protein [[id number]]  (Abnormal) Collected:  11/14/19 1846    Specimen:  Blood Updated:  11/14/19 1945     C-Reactive Protein 6.90 mg/dL     Magnesium [[id number]]  (Normal) Collected:  11/14/19 1846    Specimen:  Blood Updated:  11/14/19 1928     Magnesium 2.0 mg/dL     Phosphorus [[id number]]  (Normal) Collected:   11/14/19 1846    Specimen:  Blood Updated:  11/14/19 1928     Phosphorus 3.1 mg/dL     Protime-INR [[id number]]  (Abnormal) Collected:  11/14/19 1846    Specimen:  Blood Updated:  11/14/19 1927     Protime 16.4 Seconds      INR 1.69    aPTT [[id number]]  (Abnormal) Collected:  11/14/19 1846    Specimen:  Blood Updated:  11/14/19 1927     PTT 33.4 seconds     Troponin [[id number]]  (Normal) Collected:  11/14/19 1846    Specimen:  Blood Updated:  11/14/19 1927     Troponin T <0.010 ng/mL     Narrative:       Troponin T Reference Range:  <= 0.03 ng/mL-   Negative for AMI  >0.03 ng/mL-     Abnormal for myocardial necrosis.  Clinicians would have to utilize clinical acumen, EKG, Troponin and serial changes to determine if it is an Acute Myocardial Infarction or myocardial injury due to an underlying chronic condition.     Comprehensive Metabolic Panel [[id number]]  (Abnormal) Collected:  11/14/19 1846    Specimen:  Blood Updated:  11/14/19 1920     Glucose 133 mg/dL      BUN 15 mg/dL      Creatinine 0.90 mg/dL      Sodium 140 mmol/L      Potassium 4.4 mmol/L      Chloride 102 mmol/L      CO2 27.0 mmol/L      Calcium 9.0 mg/dL      Total Protein 7.6 g/dL      Albumin 4.50 g/dL      ALT (SGPT) 59 U/L      AST (SGOT) 42 U/L      Alkaline Phosphatase 98 U/L      Total Bilirubin 0.6 mg/dL      eGFR Non African Amer 84 mL/min/1.73      Globulin 3.1 gm/dL      A/G Ratio 1.5 g/dL      BUN/Creatinine Ratio 16.7     Anion Gap 11.0 mmol/L     Narrative:       GFR Normal >60  Chronic Kidney Disease <60  Kidney Failure <15    BNP [[id number]]  (Normal) Collected:  11/14/19 1846    Specimen:  Blood Updated:  11/14/19 1916     proBNP 505.3 pg/mL     Narrative:       Among patients with dyspnea, NT-proBNP is highly sensitive for the detection of acute congestive heart failure. In addition NT-proBNP of <300 pg/ml effectively rules out acute congestive heart failure with 99% negative predictive value.    CBC & Differential [[id number]]  Collected:  11/14/19 1846    Specimen:  Blood Updated:  11/14/19 1856    Narrative:       The following orders were created for panel order CBC & Differential.  Procedure                               Abnormality         Status                     ---------                               -----------         ------                     CBC Auto Differential[[id number]]        Abnormal            Final result                 Please view results for these tests on the individual orders.    CBC Auto Differential [[id number]]  (Abnormal) Collected:  11/14/19 1846    Specimen:  Blood Updated:  11/14/19 1856     WBC 13.90 10*3/mm3      RBC 4.90 10*6/mm3      Hemoglobin 14.4 g/dL      Hematocrit 41.8 %      MCV 85.2 fL      MCH 29.3 pg      MCHC 34.4 g/dL      RDW 13.9 %      RDW-SD 42.0 fl      MPV 7.7 fL      Platelets 225 10*3/mm3      Neutrophil % 82.5 %      Lymphocyte % 9.6 %      Monocyte % 6.8 %      Eosinophil % 0.5 %      Basophil % 0.6 %      Neutrophils, Absolute 11.50 10*3/mm3      Lymphocytes, Absolute 1.30 10*3/mm3      Monocytes, Absolute 0.90 10*3/mm3      Eosinophils, Absolute 0.10 10*3/mm3      Basophils, Absolute 0.10 10*3/mm3      nRBC 0.0 /100 WBC     POC Lactate [[id number]]  (Abnormal) Collected:  11/14/19 1852    Specimen:  Blood Updated:  11/14/19 1853     Lactate 2.6 mmol/L      Comment: Serial Number: [id number]:  [id number]            Imaging Results (Last 24 Hours)     Procedure Component Value Units Date/Time    CT Chest Without Contrast [[id number]] Collected:  11/15/19 1910     Updated:  11/15/19 1917    Narrative:       CT CHEST WO CONTRAST-     Date of Exam: 11/15/2019 6:17 PM     Indication: Shortness of air, cough, pneumonia Dyspnea chronic, prior  xray; R50.9-Fever, unspecified; A41.9-Sepsis, unspecified organism;  J98.01-Acute bronchospasm; J18.1-Lobar pneumonia, unspecified organism.     Comparison: CT 01/31/2018, radiograph 11/14/2019, ultrasound 03/08/2019     Technique: Serial and  axial CT images of the chest were obtained.  Reconstructions in the coronal and sagittal planes were performed.   Automated exposure control and iterative reconstruction methods were  used.     FINDINGS:     Hilum and Mediastinum: No pathologically enlarged lymph nodes. The heart  appears mildly enlarged. No pericardial effusion. Atherosclerotic  calcifications are present. There is dilatation of the descending aorta,  incompletely visualized measuring up to 3.8 cm which has been previously  imaged.     Lung Parenchyma and Pleura: No focal consolidations.  No suspicious  pulmonary nodules.  No endobronchial lesions.  No significant pleural  effusions.     Upper Abdomen: No acute process identified. Postsurgical changes are  seen from previous cholecystectomy.     Soft tissues: Unremarkable.     Osseous structures: No aggressive focal lytic or sclerotic osseous  lesions.       Impression:          1. No acute cardiac pulmonary process. The previously described left  upper lobe airspace disease is likely related to atelectasis.  2. Significant atherosclerosis.  3. Aneurysmal dilatation of the descending aorta as seen on previous  imaging, incompletely visualized.  4. Ancillary findings as described above.           Electronically Signed By-Pauline Infante On:11/15/2019 7:15 PM  This report was finalized on [id number] by  Pauline Infante, .         ECG/EMG Results (last 24 hours)     ** No results found for the last 24 hours. **               Condition on Discharge: Stable    Vital Signs  Temp:  [97.3 °F (36.3 °C)-97.7 °F (36.5 °C)] 97.3 °F (36.3 °C)  Heart Rate:  [56-77] 63  Resp:  [15-18] 16  BP: (113-120)/(54-66) 120/66     Physical Exam   Cardiovascular: Normal rate. A regularly irregular rhythm present.   Pulmonary/Chest: Effort normal and breath sounds normal.   Abdominal: Soft. Bowel sounds are normal.   Musculoskeletal: Normal range of motion.   Skin: Skin is warm and dry.   Nursing note and vitals reviewed.        Discharge Disposition  Home or Self Care    Discharge Medications     Discharge Medications      New Medications      Instructions Start Date   azithromycin 500 MG tablet  Commonly known as:  ZITHROMAX   500 mg, Oral, Daily      predniSONE 20 MG tablet  Commonly known as:  DELTASONE   20 mg, Oral, Daily         Continue These Medications      Instructions Start Date   alfuzosin 10 MG 24 hr tablet  Commonly known as:  UROXATRAL   TAKE 1 TABLET BY MOUTH 2 TIMES EACH DAY      allopurinol 300 MG tablet  Commonly known as:  ZYLOPRIM   300 mg, Oral, Daily      aspirin 81 MG tablet   ASPIRIN 81 MG ORAL TABLET      atenolol 50 MG tablet  Commonly known as:  TENORMIN   TAKE 1 TABLET BY MOUTH EVERY DAY      atorvastatin 20 MG tablet  Commonly known as:  LIPITOR   20 mg, Oral, Daily      furosemide 40 MG tablet  Commonly known as:  LASIX   Every 24 Hours      losartan 50 MG tablet  Commonly known as:  COZAAR   TAKE 1 TABLET BY MOUTH EVERY DAY      VENTOLIN  (90 Base) MCG/ACT inhaler  Generic drug:  albuterol sulfate HFA   VENTOLIN  (90 Base) MCG/ACT AERS      warfarin 7.5 MG tablet  Commonly known as:  COUMADIN   TAKE 1 AND ONE-HALF TABLETY BY MOUTH ON SUNDAY AND 1 TABLET BY MOUTH DAILY             Discharge Diet:   Diet Instructions     Diet: Regular      Discharge Diet:  Regular          Activity at Discharge:   Activity Instructions     Activity as Tolerated            Follow-up Appointments  Future Appointments   Date Time Provider Department Center   12/9/2019  8:45 AM Seipel, Joseph F, MD [facility] None   12/11/2019  9:45 AM MAGDALENA CARRINGTON Clever [facility]   2/26/2020  9:00 AM James Tirado Jr., MD [facility] None   9/24/2020  2:10 PM Thai Galloway MD [facility]     Additional Instructions for the Follow-ups that You Need to Schedule     Discharge Follow-up with PCP   As directed       Currently Documented PCP:    James Tirado Jr., MD    PCP Phone Number:     [phone]     Follow Up Details:  1 - 2 weeks         Protime-INR    Nov 21, 2019 (Approximate)      Is Patient on anti-coag:  Yes               Test Results Pending at Discharge   Order Current Status    Blood Culture - Blood, Arm, Left Preliminary result    Blood Culture - Blood, Blood, Venous Line Preliminary result    Respiratory Culture - Sputum, Cough Preliminary result           James Tirado Jr., MD  11/16/19  5:29 PM

## 2019-11-16 NOTE — PROGRESS NOTES
LOS: 0 days   Patient Care Team:  Jaems Tirado Jr., MD as PCP - General  Giovanny Lan Jr., DPM as PCP - Claims Attributed  Thai Galloway MD as Consulting Physician (Interventional Cardiology)    Chief Complaint: Cough and fever    Subjective     Interval History:     States that he feels significantly better.  States his cough shortness of breath and fevers improved.  He states he wants to go home.    Review of Systems   Constitutional: Negative for chills and fatigue.   Respiratory: Positive for cough and wheezing.    Cardiovascular: Negative for chest pain and leg swelling.   Gastrointestinal: Negative for abdominal pain, diarrhea and nausea.   Skin: Negative for pallor and rash.         Objective     Vital Signs  Temp:  [97.3 °F (36.3 °C)-97.7 °F (36.5 °C)] 97.3 °F (36.3 °C)  Heart Rate:  [56-88] 77  Resp:  [15-18] 15  BP: (108-118)/(54-58) 113/57    Physical Exam   Cardiovascular: Normal rate and S2 normal. An irregularly irregular rhythm present.   Pulmonary/Chest: Effort normal.   Abdominal: Soft. Bowel sounds are normal.   Musculoskeletal: Normal range of motion.   Skin: Skin is warm and dry.   Nursing note and vitals reviewed.        Results Review:    Lab Results (last 24 hours)     Procedure Component Value Units Date/Time    POC Glucose Once [[id number]]  (Abnormal) Collected:  11/16/19 0804    Specimen:  Blood Updated:  11/16/19 0806     Glucose 186 mg/dL      Comment: Serial Number: [id number]:  [id number]       Protime-INR [[id number]]  (Abnormal) Collected:  11/16/19 0335    Specimen:  Blood Updated:  11/16/19 0502     Protime 19.0 Seconds      INR 1.98    POC Glucose Once [[id number]]  (Abnormal) Collected:  11/15/19 2120    Specimen:  Blood Updated:  11/15/19 2122     Glucose 267 mg/dL      Comment: Serial Number: [id number]:  [id number]       Respiratory Culture - Sputum, Cough [[id number]] Collected:  11/15/19 1726    Specimen:  Sputum from Cough Updated:  11/15/19 1951      Gram Stain No organisms seen    Blood Culture - Blood, Arm, Left [[id number]] Collected:  11/14/19 1935    Specimen:  Blood from Arm, Left Updated:  11/15/19 1946     Blood Culture No growth at 24 hours    POC Glucose Once [[id number]]  (Abnormal) Collected:  11/15/19 1943    Specimen:  Blood Updated:  11/15/19 1944     Glucose 281 mg/dL      Comment: Serial Number: [id number]:  [id number]       Blood Culture - Blood, Blood, Venous Line [[id number]] Collected:  11/14/19 1846    Specimen:  Blood, Venous Line Updated:  11/15/19 1901     Blood Culture No growth at 24 hours    POC Glucose Once [[id number]]  (Abnormal) Collected:  11/15/19 1652    Specimen:  Blood Updated:  11/15/19 1703     Glucose 275 mg/dL      Comment: Serial Number: [id number]:  [id number]       POC Glucose Once [[id number]]  (Abnormal) Collected:  11/15/19 1154    Specimen:  Blood Updated:  11/15/19 1202     Glucose 267 mg/dL      Comment: Serial Number: [id number]:  [id number]            Imaging Results (Last 24 Hours)     Procedure Component Value Units Date/Time    CT Chest Without Contrast [[id number]] Collected:  11/15/19 1910     Updated:  11/15/19 1917    Narrative:       CT CHEST WO CONTRAST-     Date of Exam: 11/15/2019 6:17 PM     Indication: Shortness of air, cough, pneumonia Dyspnea chronic, prior  xray; R50.9-Fever, unspecified; A41.9-Sepsis, unspecified organism;  J98.01-Acute bronchospasm; J18.1-Lobar pneumonia, unspecified organism.     Comparison: CT 01/31/2018, radiograph 11/14/2019, ultrasound 03/08/2019     Technique: Serial and axial CT images of the chest were obtained.  Reconstructions in the coronal and sagittal planes were performed.   Automated exposure control and iterative reconstruction methods were  used.     FINDINGS:     Hilum and Mediastinum: No pathologically enlarged lymph nodes. The heart  appears mildly enlarged. No pericardial effusion. Atherosclerotic  calcifications are present. There is  dilatation of the descending aorta,  incompletely visualized measuring up to 3.8 cm which has been previously  imaged.     Lung Parenchyma and Pleura: No focal consolidations.  No suspicious  pulmonary nodules.  No endobronchial lesions.  No significant pleural  effusions.     Upper Abdomen: No acute process identified. Postsurgical changes are  seen from previous cholecystectomy.     Soft tissues: Unremarkable.     Osseous structures: No aggressive focal lytic or sclerotic osseous  lesions.       Impression:          1. No acute cardiac pulmonary process. The previously described left  upper lobe airspace disease is likely related to atelectasis.  2. Significant atherosclerosis.  3. Aneurysmal dilatation of the descending aorta as seen on previous  imaging, incompletely visualized.  4. Ancillary findings as described above.           Electronically Signed By-Pauline Infante On:11/15/2019 7:15 PM  This report was finalized on [id number] by  Pauline Infante, .         ECG/EMG Results (last 24 hours)     ** No results found for the last 24 hours. **           I reviewed the patient's new clinical results.    Medication Review: I have reviewed the medications    Current Facility-Administered Medications:   •  acetaminophen (TYLENOL) tablet 650 mg, 650 mg, Oral, Q4H PRN **OR** acetaminophen (TYLENOL) 160 MG/5ML solution 650 mg, 650 mg, Oral, Q4H PRN **OR** acetaminophen (TYLENOL) suppository 650 mg, 650 mg, Rectal, Q4H PRN, James Tirado Jr., MD  •  albuterol sulfate HFA (PROVENTIL HFA;VENTOLIN HFA;PROAIR HFA) inhaler 2 puff, 2 puff, Inhalation, Q4H PRN, James Tirado Jr., MD  •  allopurinol (ZYLOPRIM) tablet 300 mg, 300 mg, Oral, Daily, James Tirado Jr., MD, 300 mg at 11/16/19 0847  •  aspirin chewable tablet 81 mg, 81 mg, Oral, Daily, James Tirado Jr., MD, 81 mg at 11/16/19 0847  •  atenolol (TENORMIN) tablet 50 mg, 50 mg, Oral, Daily, James Tirado Jr., MD, 50 mg at 11/16/19 0847  •  azithromycin  (ZITHROMAX) 500 mg in 250mL NS IVPB, 500 mg, Intravenous, Q24H, James Tirado Jr., MD, 500 mg at 11/15/19 2218  •  calcium carbonate (TUMS) chewable tablet 500 mg (200 mg elemental), 2 tablet, Oral, BID PRN, James Tirado Jr., MD  •  cefTRIAXone (ROCEPHIN) 1 g in sodium chloride 0.9 % 100 mL IVPB, 1 g, Intravenous, Q24H, James Tirado Jr., MD, Last Rate: 200 mL/hr at 11/15/19 2127, 1 g at 11/15/19 2127  •  dextrose (D50W) 25 g/ 50mL Intravenous Solution 25 g, 25 g, Intravenous, Q15 Min PRN, James Tirado Jr., MD  •  dextrose (GLUTOSE) oral gel 15 g, 15 g, Oral, Q15 Min PRN, James Tirado Jr., MD  •  famotidine (PEPCID) tablet 40 mg, 40 mg, Oral, Daily, James Tirado Jr., MD  •  furosemide (LASIX) tablet 20 mg, 20 mg, Oral, Daily, James Tirado Jr., MD, 20 mg at 11/16/19 0848  •  glucagon (human recombinant) (GLUCAGEN DIAGNOSTIC) injection 1 mg, 1 mg, Subcutaneous, Q15 Min PRN, James Tirado Jr., MD  •  insulin lispro (humaLOG) injection 0-9 Units, 0-9 Units, Subcutaneous, 4x Daily With Meals & Nightly, 2 Units at 11/16/19 0845 **AND** insulin lispro (humaLOG) injection 0-9 Units, 0-9 Units, Subcutaneous, PRN, James Tirado Jr., MD  •  ipratropium-albuterol (DUO-NEB) nebulizer solution 3 mL, 3 mL, Nebulization, 4x Daily - RT, Trinidad Yu APRN, 3 mL at 11/16/19 0720  •  losartan (COZAAR) tablet 50 mg, 50 mg, Oral, Daily, James Tirado Jr., MD, 50 mg at 11/16/19 0847  •  magnesium hydroxide (MILK OF MAGNESIA) suspension 2400 mg/10mL 10 mL, 10 mL, Oral, Daily PRN, James Tirado Jr., MD  •  melatonin tablet 5 mg, 5 mg, Oral, Nightly PRN, James Tirado Jr., MD  •  nitroglycerin (NITROSTAT) SL tablet 0.4 mg, 0.4 mg, Sublingual, Q5 Min PRN, James Tirado Jr., MD  •  ondansetron (ZOFRAN) injection 4 mg, 4 mg, Intravenous, Q6H PRN, James Tirado Jr., MD  •  Pharmacy Consult - Pharmacy to dose, , Does not apply, Continuous PRN, James Tirado Jr., MD  •   Pharmacy to dose warfarin, , Does not apply, Continuous PRN, James Tirado Jr., MD  •  predniSONE (DELTASONE) tablet 20 mg, 20 mg, Oral, BID With Meals, James Tirado Jr., MD, 20 mg at 11/16/19 0845  •  sodium chloride 0.9 % flush 10 mL, 10 mL, Intravenous, PRN, Nilesh Maki MD  •  sodium chloride 0.9 % flush 10 mL, 10 mL, Intravenous, Q12H, James Tirado Jr., MD, 10 mL at 11/16/19 0845  •  sodium chloride 0.9 % flush 10 mL, 10 mL, Intravenous, PRN, James Tirado Jr., MD  •  [START ON 11/17/2019] warfarin (COUMADIN) tablet 3.75 mg, 3.75 mg, Oral, Q7 Days, James Tirado Jr., MD  •  warfarin (COUMADIN) tablet 7.5 mg, 7.5 mg, Oral, Daily, James Tirado Jr., MD       Principal Problem:    Pneumonia due to infectious knkxjdbk-xvtvib-[id number] revealed infection with parainfluenza virus.  Patient is improved.  We will continue antibiotics and steroids and bronchodilators.  Patient could go home if okay with other providers    Active Problems:    Fever and chills-improved      Type 2 diabetes mellitus without complication, without long-term current use of insulin (CMS/[facility])-stable-blood sugars are coming down.  Blood sugar elevation is likely secondary to corticosteroids.    Ischemic cardiomyopathy-stable     Continue current treatment.  He will be discharged home on oral antibiotics corticosteroids and bronchodilators okay with other providers.    Assessment & Plan     Plan for disposition:Where: home    James Tirado Jr., MD  11/16/19  11:14 AM

## 2019-11-16 NOTE — PLAN OF CARE
Problem: Pneumonia (Adult)  Goal: Signs and Symptoms of Listed Potential Problems Will be Absent, Minimized or Managed (Pneumonia)   11/16/19 0504   Goal/Outcome Evaluation   Problems Assessed (Pneumonia) infection progression;respiratory compromise   Problems Present (Pneumonia) infection progression

## 2019-11-16 NOTE — PLAN OF CARE
Problem: Patient Care Overview  Goal: Plan of Care Review   11/16/19 0507   Coping/Psychosocial   Plan of Care Reviewed With patient   OTHER   Outcome Summary pt restedwell through the night. pt states he is feeling much better since admission

## 2019-11-17 ENCOUNTER — READMISSION MANAGEMENT (OUTPATIENT)
Dept: CALL CENTER | Facility: HOSPITAL | Age: 69
End: 2019-11-17

## 2019-11-17 LAB
BACTERIA SPEC RESP CULT: NORMAL
GRAM STN SPEC: NORMAL

## 2019-11-17 NOTE — OUTREACH NOTE
Prep Survey      Responses   Facility patient discharged from?  [facility]   Is patient eligible?  Yes   Discharge diagnosis  Pneumonia   Does the patient have one of the following disease processes/diagnoses(primary or secondary)?  COPD/Pneumonia   Does the patient have Home health ordered?  No   Is there a DME ordered?  No   Prep survey completed?  Yes          Lucita Edmonds RN

## 2019-11-18 NOTE — PROGRESS NOTES
Case Management Discharge Note                 Final Discharge Disposition Code: 01 - home or self-care

## 2019-11-19 ENCOUNTER — ANTICOAGULATION VISIT (OUTPATIENT)
Dept: CARDIOLOGY | Facility: CLINIC | Age: 69
End: 2019-11-19

## 2019-11-19 ENCOUNTER — READMISSION MANAGEMENT (OUTPATIENT)
Dept: CALL CENTER | Facility: HOSPITAL | Age: 69
End: 2019-11-19

## 2019-11-19 VITALS
WEIGHT: 222 LBS | DIASTOLIC BLOOD PRESSURE: 87 MMHG | BODY MASS INDEX: 30.96 KG/M2 | HEART RATE: 77 BPM | SYSTOLIC BLOOD PRESSURE: 148 MMHG

## 2019-11-19 DIAGNOSIS — I48.21 PERMANENT ATRIAL FIBRILLATION (HCC): ICD-10-CM

## 2019-11-19 LAB
BACTERIA SPEC AEROBE CULT: NORMAL
BACTERIA SPEC AEROBE CULT: NORMAL
INR PPP: 1.9 (ref 0.9–1.1)

## 2019-11-19 PROCEDURE — 36416 COLLJ CAPILLARY BLOOD SPEC: CPT | Performed by: INTERNAL MEDICINE

## 2019-11-19 PROCEDURE — 85610 PROTHROMBIN TIME: CPT | Performed by: INTERNAL MEDICINE

## 2019-11-19 NOTE — OUTREACH NOTE
COPD/PN Week 1 Survey      Responses   Facility patient discharged from?  [facility]   Does the patient have one of the following disease processes/diagnoses(primary or secondary)?  COPD/Pneumonia   Is there a successful TCM telephone encounter documented?  No   Was the primary reason for admission:  Pneumonia   Week 1 attempt successful?  Yes   Call start time  0956   Call end time  0959   Discharge diagnosis  Pneumonia   Meds reviewed with patient/caregiver?  Yes   Is the patient having any side effects they believe may be caused by any medication additions or changes?  No   Does the patient have all medications ordered at discharge?  Yes   Is the patient taking all medications as directed (includes completed medication regime)?  Yes   Does the patient have a primary care provider?   Yes   Does the patient have an appointment with their PCP or pulmonologist within 7 days of discharge?  Greater than 7 days   Comments regarding PCP  PATIENT HAS APPOINTMENT WITH DR. KRUSE ON 11/25   What is preventing the patient from scheduling follow up appointments within 7 days of discharge?  Earlier appointment not available   Nursing Interventions  Verified appointment date/time/provider   Has the patient kept scheduled appointments due by today?  N/A   Has home health visited the patient within 72 hours of discharge?  N/A   Did the patient receive a copy of their discharge instructions?  Yes   Nursing interventions  Reviewed instructions with patient   What is the patient's perception of their health status since discharge?  Improving   Is the patient/caregiver able to teach back the hierarchy of who to call/visit for symptoms/problems? PCP, Specialist, Home health nurse, Urgent Care, ED, 911  Yes   Is the patient/caregiver able to teach back signs and symptoms of worsening condition:  Fever/chills, Shortness of breath, Chest pain   Is the patient/caregiver able to teach back importance of completing antibiotic course of  treatment?  Yes   Week 1 call completed?  Yes          Stephany Antonio LPN

## 2019-11-25 ENCOUNTER — OFFICE VISIT (OUTPATIENT)
Dept: FAMILY MEDICINE CLINIC | Facility: CLINIC | Age: 69
End: 2019-11-25

## 2019-11-25 VITALS
BODY MASS INDEX: 30.69 KG/M2 | HEIGHT: 71 IN | DIASTOLIC BLOOD PRESSURE: 78 MMHG | TEMPERATURE: 97.4 F | OXYGEN SATURATION: 96 % | RESPIRATION RATE: 18 BRPM | HEART RATE: 93 BPM | SYSTOLIC BLOOD PRESSURE: 148 MMHG | WEIGHT: 219.2 LBS

## 2019-11-25 DIAGNOSIS — J18.9 PNEUMONIA OF BOTH LUNGS DUE TO INFECTIOUS ORGANISM, UNSPECIFIED PART OF LUNG: Primary | ICD-10-CM

## 2019-11-25 DIAGNOSIS — I25.10 CORONARY ARTERY DISEASE INVOLVING NATIVE CORONARY ARTERY OF NATIVE HEART WITHOUT ANGINA PECTORIS: ICD-10-CM

## 2019-11-25 DIAGNOSIS — I48.21 PERMANENT ATRIAL FIBRILLATION (HCC): ICD-10-CM

## 2019-11-25 PROBLEM — R50.9 FEVER AND CHILLS: Status: RESOLVED | Noted: 2019-11-14 | Resolved: 2019-11-25

## 2019-11-25 PROCEDURE — 99213 OFFICE O/P EST LOW 20 MIN: CPT | Performed by: FAMILY MEDICINE

## 2019-11-25 NOTE — PROGRESS NOTES
"Subjective   Sandro Ramirez is a 69 y.o. male.     Chief Complaint   Patient presents with   • Pneumonia     E/R FOLLOWUP       HPI  Chief complaint: Pneumonia coronary artery disease atrial fibrillation    The patient is a 69-year-old white male comes in for follow-up and maintenance of his current problems which includes    1.  Pneumonia-stable-patient was recently hospitalized because of pneumonia.  Patient was treated with antibiotic therapy and may empirically.  Patient was given bronchodilators and corticosteroids.  With this he improved to the point that he can be discharged home.  Evaluation revealed patient had infection with parainfluenza virus.  Does still have some cough.  He states however he feels significantly better.    2.  Coronary artery disease-stable-patient on aspirin and a atenolol.  He denies chest pain shortness of breath orthopnea or PND.    3.  Atrial fibrillation-stable-patient is currently on Coumadin and a beta-blocker.  He denies episodes of rapid or slow heart rhythm.  Denied heart palpitations lightheadedness or dizziness.  Patient's dose of her Coumadin was adjusted because of taking Zithromax for the infection.        The following portions of the patient's history were reviewed and updated as appropriate: allergies, current medications, past family history, past medical history, past social history, past surgical history and problem list.    Review of Systems    Objective     /78 (BP Location: Right arm, Patient Position: Sitting, Cuff Size: Adult)   Pulse 93   Temp 97.4 °F (36.3 °C) (Oral)   Resp 18   Ht 180.3 cm (71\")   Wt 99.4 kg (219 lb 3.2 oz)   SpO2 96%   BMI 30.57 kg/m²     Physical Exam   Constitutional: He is oriented to person, place, and time. He appears well-developed and well-nourished.   HENT:   Head: Normocephalic and atraumatic.   Eyes: Conjunctivae and EOM are normal. Pupils are equal, round, and reactive to light.   Neck: Normal range of motion. " Neck supple.   Cardiovascular: Normal rate, regular rhythm, normal heart sounds and intact distal pulses.   Pulmonary/Chest: Effort normal and breath sounds normal.   Abdominal: Soft. Bowel sounds are normal.   Musculoskeletal: Normal range of motion.   Neurological: He is alert and oriented to person, place, and time.   Skin: Skin is warm and dry.   Psychiatric: He has a normal mood and affect. His behavior is normal.         Assessment/Plan   Sandro was seen today for pneumonia.    Diagnoses and all orders for this visit:    Pneumonia of both lungs due to infectious organism, unspecified part of lung    Permanent atrial fibrillation    Coronary artery disease involving native coronary artery of native heart without angina pectoris      Patient Instructions   Continue your current medications and treatment.    Follow up in the office at your next appointment.    Laboratory testing at that time.          James Tirado Jr., MD    11/25/19

## 2019-11-25 NOTE — PATIENT INSTRUCTIONS
Continue your current medications and treatment.    Follow up in the office at your next appointment.    Laboratory testing at that time.

## 2019-11-26 ENCOUNTER — READMISSION MANAGEMENT (OUTPATIENT)
Dept: CALL CENTER | Facility: HOSPITAL | Age: 69
End: 2019-11-26

## 2019-11-26 NOTE — OUTREACH NOTE
"COPD/PN Week 2 Survey      Responses   Facility patient discharged from?  [facility]   Does the patient have one of the following disease processes/diagnoses(primary or secondary)?  COPD/Pneumonia   Was the primary reason for admission:  Pneumonia   Week 2 attempt successful?  Yes   Call start time  1106   Call end time  1109   Discharge diagnosis  Pneumonia   Meds reviewed with patient/caregiver?  Yes   Is the patient having any side effects they believe may be caused by any medication additions or changes?  No   Does the patient have all medications ordered at discharge?  Yes   Does the patient have a primary care provider?   Yes   Comments regarding PCP  PATIENT SAW DR. KRUSE YESTERDAY   Has the patient kept scheduled appointments due by today?  Yes   Has home health visited the patient within 72 hours of discharge?  N/A   Did the patient receive a copy of their discharge instructions?  Yes   Nursing interventions  Reviewed instructions with patient   What is the patient's perception of their health status since discharge?  Improving   Is the patient/caregiver able to teach back the hierarchy of who to call/visit for symptoms/problems? PCP, Specialist, Home health nurse, Urgent Care, ED, 911  Yes   Is the patient/caregiver able to teach back signs and symptoms of worsening condition:  Fever/chills, Shortness of breath, Chest pain   Is the patient/caregiver able to teach back importance of completing antibiotic course of treatment?  Yes   Week 2 call completed?  Yes   Revoked  No further contact(revokes)-requires comment   Graduated/Revoked comments  PATIENT VOICES FEELING MUCH BETTER AND BACK TO \"NORMAL.\" PATIENT VOICES NO NEED FOR FURTHER CALLS.           Stephany Antonio, AURELIA  "

## 2019-11-27 ENCOUNTER — EPISODE CHANGES (OUTPATIENT)
Dept: CASE MANAGEMENT | Facility: OTHER | Age: 69
End: 2019-11-27

## 2019-12-03 RX ORDER — WARFARIN SODIUM 7.5 MG/1
TABLET ORAL
Qty: 96 TABLET | Refills: 0 | Status: SHIPPED | OUTPATIENT
Start: 2019-12-03 | End: 2020-02-24

## 2019-12-09 ENCOUNTER — OFFICE VISIT (OUTPATIENT)
Dept: NEUROLOGY | Facility: CLINIC | Age: 69
End: 2019-12-09

## 2019-12-09 VITALS
WEIGHT: 221.4 LBS | SYSTOLIC BLOOD PRESSURE: 130 MMHG | HEIGHT: 69 IN | BODY MASS INDEX: 32.79 KG/M2 | DIASTOLIC BLOOD PRESSURE: 78 MMHG | HEART RATE: 90 BPM

## 2019-12-09 DIAGNOSIS — G47.33 OBSTRUCTIVE SLEEP APNEA SYNDROME: Primary | ICD-10-CM

## 2019-12-09 DIAGNOSIS — E66.09 CLASS 1 OBESITY DUE TO EXCESS CALORIES WITH BODY MASS INDEX (BMI) OF 33.0 TO 33.9 IN ADULT, UNSPECIFIED WHETHER SERIOUS COMORBIDITY PRESENT: ICD-10-CM

## 2019-12-09 PROCEDURE — 99213 OFFICE O/P EST LOW 20 MIN: CPT | Performed by: PSYCHIATRY & NEUROLOGY

## 2019-12-09 NOTE — PROGRESS NOTES
Sleep medicine follow-up visit    Sandro Ramirez   1950  69 y.o. male   DATE OF SERVICE: 12/9/2019     Chief complaint: viviane treated with pap    On NPSG at [facility] , 09/12/2017 patient had Mild obstructive sleep apnea syndrome with apnea-hypopnea index of 6.3 per sleep hour, minimum SpO2 of 88%    The compliance data reviewed and the patient is on CPAP therapy at 10-20 cm/H2O and compliance data indicates excellent compliance with 95% usage for more than 4 hours with an average usage of 10 hours 58 minutes. AHI down to 0.8 with CPAP therapy and mean CPAP pressure 10.3 cm of water.  The patient's hypersomnia also resolved with [place] Sleepiness Scale score of 3 with CPAP therapy.  The patient feels great and is benefiting from it and is compliant.     Yearly f/u for CPAP compliance, patient doing well with pap therapy. Patient uses a nasal mask and goes through "[employer] for supplies.     pts weight is up some, on steroids recently.     Review of Systems   Constitutional: Negative for appetite change and fatigue.   HENT: Negative for sinus pressure and sinus pain.    Eyes: Negative for pain and itching.   Respiratory: Positive for shortness of breath. Negative for cough.    Cardiovascular: Negative for chest pain and leg swelling.   Gastrointestinal: Negative for constipation and diarrhea.   Endocrine: Negative for cold intolerance and heat intolerance.   Genitourinary: Positive for frequency. Negative for difficulty urinating.   Musculoskeletal: Negative for back pain and neck pain.   Allergic/Immunologic: Negative for environmental allergies.   Neurological: Negative for dizziness, tremors, seizures, syncope, facial asymmetry, speech difficulty, weakness, light-headedness, numbness and headaches.   Psychiatric/Behavioral: Negative for agitation and confusion.     I reviewed and addressed ROS entered by MA.        The following portions of the patient's history were reviewed and updated as appropriate: allergies,  current medications, past family history, past medical history, past social history, past surgical history and problem list.      Family History   Problem Relation Age of Onset   • Cancer Father    • Heart attack Father    • Heart disease Paternal Grandfather    • Heart attack Paternal Grandfather        Past Medical History:   Diagnosis Date   • Arthritis    • Atrial fibrillation (CMS/HCC)    • Cancer (CMS/HCC)    • CHF (congestive heart failure) (CMS/HCC)    • Coronary artery disease    • Elevated cholesterol    • Hyperlipidemia    • Hypertension    • Sleep apnea        Social History     Socioeconomic History   • Marital status:      Spouse name: Not on file   • Number of children: Not on file   • Years of education: Not on file   • Highest education level: Not on file   Tobacco Use   • Smoking status: Former Smoker   • Smokeless tobacco: Never Used   Substance and Sexual Activity   • Alcohol use: No     Frequency: Never   • Drug use: No   • Sexual activity: Defer         Current Outpatient Medications:   •  albuterol sulfate HFA (VENTOLIN HFA) 108 (90 Base) MCG/ACT inhaler, VENTOLIN  (90 Base) MCG/ACT AERS, Disp: , Rfl:   •  alfuzosin (UROXATRAL) 10 MG 24 hr tablet, TAKE 1 TABLET BY MOUTH 2 TIMES EACH DAY, Disp: , Rfl: 3  •  allopurinol (ZYLOPRIM) 300 MG tablet, Take 300 mg by mouth Daily., Disp: , Rfl:   •  aspirin 81 MG tablet, ASPIRIN 81 MG ORAL TABLET, Disp: , Rfl:   •  atenolol (TENORMIN) 50 MG tablet, TAKE 1 TABLET BY MOUTH EVERY DAY, Disp: 90 tablet, Rfl: 2  •  atorvastatin (LIPITOR) 20 MG tablet, Take 20 mg by mouth Daily., Disp: , Rfl: 1  •  furosemide (LASIX) 40 MG tablet, Daily., Disp: , Rfl:   •  losartan (COZAAR) 50 MG tablet, TAKE 1 TABLET BY MOUTH EVERY DAY, Disp: 90 tablet, Rfl: 0  •  warfarin (COUMADIN) 7.5 MG tablet, TAKE 1 AND A HALF TABLETS BY MOUTH ON SUNDAY AND 1 TABLET BY MOUTH DAILY, Disp: 96 tablet, Rfl: 0    Allergies   Allergen Reactions   • Meperidine GI Intolerance   •  Midazolam Mental Status Change   • Propofol Mental Status Change   • Sulfa Antibiotics Hives   • Simvastatin Myalgia        PHYSICAL EXAMINATION:  Vitals:    12/09/19 0845   BP: 130/78   Pulse: 90      Body mass index is 33.17 kg/m².       HEENT: Normal.      EXTREMITIES: No edema.     IMPRESSION:     Patient with obstructive sleep apnea syndrome with hypersomnia successfully treated with CPAP therapy and is compliant and benefiting from it.     Obesity, bmi 33.2    RECOMMENDATIONS:   1. Continue present CPAP.   2. Follow up 1 year.   3. Pt encouraged to lose weight    EPWORTH SLEEPINESS SCALE  Sitting and reading  0  WatchingTV  0  Sitting, inactive, in a public place  0  As a passenger in a car for 1 hour w/o a break  0  Lying down to rest in the afternoon  3  Sitting and talking to someone  0  Sitting quietly after a lunch  0  In a car, while stopped for traffic or a light  0  Total 3        This document has been electronically signed by Joseph Seipel, MD on December 9, 2019 8:56 AM

## 2019-12-11 ENCOUNTER — ANTICOAGULATION VISIT (OUTPATIENT)
Dept: CARDIOLOGY | Facility: CLINIC | Age: 69
End: 2019-12-11

## 2019-12-11 VITALS
DIASTOLIC BLOOD PRESSURE: 67 MMHG | BODY MASS INDEX: 33 KG/M2 | SYSTOLIC BLOOD PRESSURE: 103 MMHG | HEART RATE: 93 BPM | WEIGHT: 220.25 LBS

## 2019-12-11 DIAGNOSIS — I48.21 PERMANENT ATRIAL FIBRILLATION (HCC): ICD-10-CM

## 2019-12-11 LAB — INR PPP: 2.1 (ref 0.9–1.1)

## 2019-12-11 PROCEDURE — 85610 PROTHROMBIN TIME: CPT | Performed by: INTERNAL MEDICINE

## 2019-12-11 PROCEDURE — 36416 COLLJ CAPILLARY BLOOD SPEC: CPT | Performed by: INTERNAL MEDICINE

## 2019-12-13 ENCOUNTER — TELEPHONE (OUTPATIENT)
Dept: NEUROLOGY | Facility: CLINIC | Age: 69
End: 2019-12-13

## 2019-12-13 DIAGNOSIS — G47.33 OBSTRUCTIVE SLEEP APNEA: Primary | ICD-10-CM

## 2019-12-17 ENCOUNTER — ANTICOAGULATION VISIT (OUTPATIENT)
Dept: CARDIOLOGY | Facility: CLINIC | Age: 69
End: 2019-12-17

## 2019-12-17 ENCOUNTER — EPISODE CHANGES (OUTPATIENT)
Dept: CASE MANAGEMENT | Facility: OTHER | Age: 69
End: 2019-12-17

## 2019-12-17 VITALS
DIASTOLIC BLOOD PRESSURE: 64 MMHG | SYSTOLIC BLOOD PRESSURE: 140 MMHG | HEART RATE: 60 BPM | WEIGHT: 222 LBS | BODY MASS INDEX: 33.26 KG/M2

## 2019-12-17 DIAGNOSIS — I48.21 PERMANENT ATRIAL FIBRILLATION (HCC): ICD-10-CM

## 2019-12-17 LAB — INR PPP: 1.9 (ref 0.9–1.1)

## 2019-12-17 PROCEDURE — 36416 COLLJ CAPILLARY BLOOD SPEC: CPT | Performed by: INTERNAL MEDICINE

## 2019-12-17 PROCEDURE — 85610 PROTHROMBIN TIME: CPT | Performed by: INTERNAL MEDICINE

## 2020-01-11 RX ORDER — LOSARTAN POTASSIUM 50 MG/1
TABLET ORAL
Qty: 90 TABLET | Refills: 0 | Status: SHIPPED | OUTPATIENT
Start: 2020-01-11 | End: 2020-06-30

## 2020-01-15 ENCOUNTER — ANTICOAGULATION VISIT (OUTPATIENT)
Dept: CARDIOLOGY | Facility: CLINIC | Age: 70
End: 2020-01-15

## 2020-01-15 VITALS
HEART RATE: 97 BPM | DIASTOLIC BLOOD PRESSURE: 78 MMHG | WEIGHT: 214 LBS | BODY MASS INDEX: 32.07 KG/M2 | SYSTOLIC BLOOD PRESSURE: 133 MMHG

## 2020-01-15 DIAGNOSIS — I48.21 PERMANENT ATRIAL FIBRILLATION (HCC): ICD-10-CM

## 2020-01-15 LAB — INR PPP: 1.7 (ref 0.9–1.1)

## 2020-01-15 PROCEDURE — 36416 COLLJ CAPILLARY BLOOD SPEC: CPT | Performed by: INTERNAL MEDICINE

## 2020-01-15 PROCEDURE — 85610 PROTHROMBIN TIME: CPT | Performed by: INTERNAL MEDICINE

## 2020-01-28 RX ORDER — LOSARTAN POTASSIUM 25 MG/1
50 TABLET ORAL DAILY
Qty: 180 TABLET | Refills: 1 | Status: SHIPPED | OUTPATIENT
Start: 2020-01-28 | End: 2020-07-20

## 2020-02-10 RX ORDER — ATORVASTATIN CALCIUM 20 MG/1
TABLET, FILM COATED ORAL
Qty: 90 TABLET | Refills: 3 | Status: SHIPPED | OUTPATIENT
Start: 2020-02-10 | End: 2021-02-01

## 2020-02-13 ENCOUNTER — ANTICOAGULATION VISIT (OUTPATIENT)
Dept: CARDIOLOGY | Facility: CLINIC | Age: 70
End: 2020-02-13

## 2020-02-13 VITALS
BODY MASS INDEX: 32.81 KG/M2 | HEART RATE: 79 BPM | WEIGHT: 219 LBS | SYSTOLIC BLOOD PRESSURE: 147 MMHG | DIASTOLIC BLOOD PRESSURE: 72 MMHG

## 2020-02-13 DIAGNOSIS — I48.21 PERMANENT ATRIAL FIBRILLATION (HCC): ICD-10-CM

## 2020-02-13 LAB — INR PPP: 1.9 (ref 0.9–1.1)

## 2020-02-13 PROCEDURE — 85610 PROTHROMBIN TIME: CPT | Performed by: INTERNAL MEDICINE

## 2020-02-13 PROCEDURE — 36416 COLLJ CAPILLARY BLOOD SPEC: CPT | Performed by: INTERNAL MEDICINE

## 2020-02-24 RX ORDER — WARFARIN SODIUM 7.5 MG/1
TABLET ORAL
Qty: 96 TABLET | Refills: 0 | Status: SHIPPED | OUTPATIENT
Start: 2020-02-24 | End: 2020-05-20

## 2020-02-26 ENCOUNTER — LAB (OUTPATIENT)
Dept: LAB | Facility: HOSPITAL | Age: 70
End: 2020-02-26

## 2020-02-26 ENCOUNTER — HOSPITAL ENCOUNTER (OUTPATIENT)
Dept: GENERAL RADIOLOGY | Facility: HOSPITAL | Age: 70
Discharge: HOME | End: 2020-02-26
Admitting: FAMILY MEDICINE

## 2020-02-26 ENCOUNTER — OFFICE VISIT (OUTPATIENT)
Dept: FAMILY MEDICINE CLINIC | Facility: CLINIC | Age: 70
End: 2020-02-26

## 2020-02-26 VITALS
HEIGHT: 69 IN | OXYGEN SATURATION: 93 % | HEART RATE: 101 BPM | WEIGHT: 218.2 LBS | TEMPERATURE: 98.3 F | DIASTOLIC BLOOD PRESSURE: 88 MMHG | RESPIRATION RATE: 18 BRPM | SYSTOLIC BLOOD PRESSURE: 124 MMHG | BODY MASS INDEX: 32.32 KG/M2

## 2020-02-26 DIAGNOSIS — Z00.00 HEALTH CARE MAINTENANCE: ICD-10-CM

## 2020-02-26 DIAGNOSIS — Z00.00 ENCOUNTER FOR MEDICARE ANNUAL WELLNESS EXAM: ICD-10-CM

## 2020-02-26 DIAGNOSIS — J18.9 PNEUMONIA OF BOTH LUNGS DUE TO INFECTIOUS ORGANISM, UNSPECIFIED PART OF LUNG: ICD-10-CM

## 2020-02-26 DIAGNOSIS — R73.01 IMPAIRED FASTING GLUCOSE: ICD-10-CM

## 2020-02-26 DIAGNOSIS — C67.9 MALIGNANT NEOPLASM OF URINARY BLADDER, UNSPECIFIED SITE (HCC): ICD-10-CM

## 2020-02-26 DIAGNOSIS — I48.21 PERMANENT ATRIAL FIBRILLATION (HCC): ICD-10-CM

## 2020-02-26 DIAGNOSIS — I10 ESSENTIAL HYPERTENSION: Primary | ICD-10-CM

## 2020-02-26 DIAGNOSIS — I25.10 CORONARY ARTERY DISEASE INVOLVING NATIVE CORONARY ARTERY OF NATIVE HEART WITHOUT ANGINA PECTORIS: ICD-10-CM

## 2020-02-26 DIAGNOSIS — E78.00 PURE HYPERCHOLESTEROLEMIA: ICD-10-CM

## 2020-02-26 DIAGNOSIS — I71.40 ABDOMINAL AORTIC ANEURYSM (AAA) WITHOUT RUPTURE (HCC): ICD-10-CM

## 2020-02-26 DIAGNOSIS — I10 ESSENTIAL HYPERTENSION: ICD-10-CM

## 2020-02-26 LAB
ALBUMIN SERPL-MCNC: 4.5 G/DL (ref 3.5–5.2)
ALBUMIN/GLOB SERPL: 1.9 G/DL
ALP SERPL-CCNC: 84 U/L (ref 39–117)
ALT SERPL W P-5'-P-CCNC: 34 U/L (ref 1–41)
ANION GAP SERPL CALCULATED.3IONS-SCNC: 13.3 MMOL/L (ref 5–15)
AST SERPL-CCNC: 23 U/L (ref 1–40)
BASOPHILS # BLD AUTO: 0.04 10*3/MM3 (ref 0–0.2)
BASOPHILS NFR BLD AUTO: 0.5 % (ref 0–1.5)
BILIRUB SERPL-MCNC: 0.4 MG/DL (ref 0.2–1.2)
BUN BLD-MCNC: 17 MG/DL (ref 8–23)
BUN/CREAT SERPL: 15.2 (ref 7–25)
CALCIUM SPEC-SCNC: 9.5 MG/DL (ref 8.6–10.5)
CHLORIDE SERPL-SCNC: 97 MMOL/L (ref 98–107)
CHOLEST SERPL-MCNC: 165 MG/DL (ref 0–200)
CO2 SERPL-SCNC: 27.7 MMOL/L (ref 22–29)
CREAT BLD-MCNC: 1.12 MG/DL (ref 0.76–1.27)
DEPRECATED RDW RBC AUTO: 39.3 FL (ref 37–54)
EOSINOPHIL # BLD AUTO: 0.09 10*3/MM3 (ref 0–0.4)
EOSINOPHIL NFR BLD AUTO: 1.1 % (ref 0.3–6.2)
ERYTHROCYTE [DISTWIDTH] IN BLOOD BY AUTOMATED COUNT: 12.7 % (ref 12.3–15.4)
GFR SERPL CREATININE-BSD FRML MDRD: 65 ML/MIN/1.73
GLOBULIN UR ELPH-MCNC: 2.4 GM/DL
GLUCOSE BLD-MCNC: 325 MG/DL (ref 65–99)
HBA1C MFR BLD: 9.1 % (ref 3.5–5.6)
HCT VFR BLD AUTO: 44.4 % (ref 37.5–51)
HDLC SERPL-MCNC: 30 MG/DL (ref 40–60)
HGB BLD-MCNC: 14.8 G/DL (ref 13–17.7)
IMM GRANULOCYTES # BLD AUTO: 0.03 10*3/MM3 (ref 0–0.05)
IMM GRANULOCYTES NFR BLD AUTO: 0.4 % (ref 0–0.5)
LDLC SERPL CALC-MCNC: 64 MG/DL (ref 0–100)
LDLC/HDLC SERPL: 2.13 {RATIO}
LYMPHOCYTES # BLD AUTO: 1.73 10*3/MM3 (ref 0.7–3.1)
LYMPHOCYTES NFR BLD AUTO: 21.8 % (ref 19.6–45.3)
MCH RBC QN AUTO: 28.7 PG (ref 26.6–33)
MCHC RBC AUTO-ENTMCNC: 33.3 G/DL (ref 31.5–35.7)
MCV RBC AUTO: 86.2 FL (ref 79–97)
MONOCYTES # BLD AUTO: 0.53 10*3/MM3 (ref 0.1–0.9)
MONOCYTES NFR BLD AUTO: 6.7 % (ref 5–12)
NEUTROPHILS # BLD AUTO: 5.52 10*3/MM3 (ref 1.7–7)
NEUTROPHILS NFR BLD AUTO: 69.5 % (ref 42.7–76)
NRBC BLD AUTO-RTO: 0 /100 WBC (ref 0–0.2)
PLATELET # BLD AUTO: 235 10*3/MM3 (ref 140–450)
PMV BLD AUTO: 10.4 FL (ref 6–12)
POTASSIUM BLD-SCNC: 4.1 MMOL/L (ref 3.5–5.2)
PROT SERPL-MCNC: 6.9 G/DL (ref 6–8.5)
PSA SERPL-MCNC: 4.37 NG/ML (ref 0–4)
RBC # BLD AUTO: 5.15 10*6/MM3 (ref 4.14–5.8)
SODIUM BLD-SCNC: 138 MMOL/L (ref 136–145)
TRIGL SERPL-MCNC: 355 MG/DL (ref 0–150)
VLDLC SERPL-MCNC: 71 MG/DL (ref 5–40)
WBC NRBC COR # BLD: 7.94 10*3/MM3 (ref 3.4–10.8)

## 2020-02-26 PROCEDURE — G0103 PSA SCREENING: HCPCS

## 2020-02-26 PROCEDURE — 36415 COLL VENOUS BLD VENIPUNCTURE: CPT

## 2020-02-26 PROCEDURE — G0438 PPPS, INITIAL VISIT: HCPCS | Performed by: FAMILY MEDICINE

## 2020-02-26 PROCEDURE — 71046 X-RAY EXAM CHEST 2 VIEWS: CPT

## 2020-02-26 PROCEDURE — 85025 COMPLETE CBC W/AUTO DIFF WBC: CPT

## 2020-02-26 PROCEDURE — 80061 LIPID PANEL: CPT

## 2020-02-26 PROCEDURE — 83036 HEMOGLOBIN GLYCOSYLATED A1C: CPT

## 2020-02-26 PROCEDURE — 99214 OFFICE O/P EST MOD 30 MIN: CPT | Performed by: FAMILY MEDICINE

## 2020-02-26 PROCEDURE — 80053 COMPREHEN METABOLIC PANEL: CPT

## 2020-02-26 RX ORDER — COLCHICINE 0.6 MG/1
0.6 TABLET ORAL 2 TIMES DAILY
COMMUNITY
Start: 2020-02-07 | End: 2022-06-22

## 2020-02-26 NOTE — PROGRESS NOTES
"Subjective   Sandro Ramirez is a 69 y.o. male.     Chief Complaint   Patient presents with   • Medicare Wellness-Initial Visit   • Hypertension     6 MTH F/U   • Knee Pain       HPI  Chief complaint: Hypertension hyperlipidemia coronary disease atrial fibrillation hyperuricemia    The patient is a 69-year-old white male comes in for follow-up maintenance of his current problems which include    1.  Hypertension-stable-patient Lasix 40 mg daily potassium replacement therapy atenolol 50 mg daily and losartan 50 mg daily.  He denied headache lightheadedness dizziness or chest pain.    2.  Hyperlipidemia-stable-patient on Lipitor 20 mg daily.  He denies myalgias arthralgias.  No nausea or anorexia.      3. atrial fibrillation-stable-patient currently on.  Coumadin and atenolol.  He denies episodes of rapid or slow heart rhythm.  Denied heart palpitations lightheadedness or dizziness.      4.  Coronary artery disease-stable-patient on aspirin and beta-blocker.  Denies chest pain shortness breath orthopnea or PND.    5.  Impaired fasting glucose-stable-patient's currently on a diet.  He is asymptomatic.      6. hyperuricemia-stable-patient is on allopurinol and colchicine.  Recent episode of gout that required colchicine.    7.  Bladder cancer-stable-patient's had no evidence of recurrence or metastatic disease        The following portions of the patient's history were reviewed and updated as appropriate: allergies, current medications, past family history, past medical history, past social history, past surgical history and problem list.    Review of Systems    Objective     /88 (BP Location: Left arm, Patient Position: Sitting, Cuff Size: Large Adult)   Pulse 101   Temp 98.3 °F (36.8 °C) (Oral)   Resp 18   Ht 174 cm (68.5\")   Wt 99 kg (218 lb 3.2 oz)   SpO2 93%   BMI 32.69 kg/m²     Physical Exam   Constitutional: He is oriented to person, place, and time.   HENT:   Head: Normocephalic and atraumatic. "   Eyes: Pupils are equal, round, and reactive to light. Conjunctivae and EOM are normal.   Neck: Normal range of motion. Neck supple.   Cardiovascular: Normal rate, normal heart sounds and intact distal pulses. A regularly irregular rhythm present.   Pulmonary/Chest: Effort normal and breath sounds normal.   Abdominal: Soft. Bowel sounds are normal.   Musculoskeletal: Normal range of motion.   Neurological: He is alert and oriented to person, place, and time.   Skin: Skin is warm and dry.   Psychiatric: He has a normal mood and affect. His behavior is normal.   Nursing note and vitals reviewed.        Assessment/Plan   Sandro was seen today for medicare wellness-initial visit, hypertension and knee pain.    Diagnoses and all orders for this visit:    Essential hypertension  -     Comprehensive Metabolic Panel; Future  -     CBC & Differential; Future    Pure hypercholesterolemia  -     Lipid Panel; Future    Impaired fasting glucose  -     Hemoglobin A1c; Future    Malignant neoplasm of urinary bladder, unspecified site (CMS/HCC)    Health care maintenance  -     PSA Screen; Future    Encounter for Medicare annual wellness exam    Abdominal aortic aneurysm (AAA) without rupture (CMS/HCC)  -     US Aorta Limited; Future    Permanent atrial fibrillation    Coronary artery disease involving native coronary artery of native heart without angina pectoris      Patient Instructions   Continue your current medicines and treatment.    Have the follow up labs done and call for results.    Create a living will.    Follow up in the office in 6 months.        James Tirado Jr., MD    02/26/20

## 2020-02-26 NOTE — PROGRESS NOTES
The ABCs of the Annual Wellness Visit  Initial Medicare Wellness Visit    Chief Complaint   Patient presents with   • Medicare Wellness-Initial Visit   • Hypertension     6 MTH F/U   • Knee Pain       Subjective   History of Present Illness:  Sandro Ramirez is a 69 y.o. male who presents for an Initial Medicare Wellness Visit.    HEALTH RISK ASSESSMENT    Recent Hospitalizations:  Recently treated at the following:  [facility]     Current Medical Providers:  Patient Care Team:  James Tirado Jr., MD as PCP - General  RikyGiovanny Jr., DPM as PCP - Claims Attributed  Thai Galloway MD as Consulting Physician (Interventional Cardiology)    Smoking Status:  Social History     Tobacco Use   Smoking Status Former Smoker   Smokeless Tobacco Never Used       Alcohol Consumption:  Social History     Substance and Sexual Activity   Alcohol Use No   • Frequency: Never       Depression Screen:   PHQ-2/PHQ-9 Depression Screening 2/26/2020   Little interest or pleasure in doing things 0   Feeling down, depressed, or hopeless 0   Total Score 0       Fall Risk Screen:  JESSICA Fall Risk Assessment was completed, and patient is at LOW risk for falls.Assessment completed on:2/26/2020    Health Habits and Functional and Cognitive Screening:  Functional & Cognitive Status 2/26/2020   Do you have difficulty preparing food and eating? No   Do you have difficulty bathing yourself, getting dressed or grooming yourself? No   Do you have difficulty using the toilet? No   Do you have difficulty moving around from place to place? No   Do you have trouble with steps or getting out of a bed or a chair? No   Current Diet Unhealthy Diet   Dental Exam Not up to date   Eye Exam Up to date   Exercise (times per week) 0 times per week   Current Exercise Activities Include None   Do you need help using the phone?  No   Are you deaf or do you have serious difficulty hearing?  Yes   Do you need help with transportation? No   Do  you need help shopping? No   Do you need help preparing meals?  No   Do you need help with housework?  No   Do you need help with laundry? No   Do you need help taking your medications? No   Do you need help managing money? No   Do you ever drive or ride in a car without wearing a seat belt? No   Have you felt unusual stress, anger or loneliness in the last month? No   Who do you live with? Spouse   If you need help, do you have trouble finding someone available to you? No   Have you been bothered in the last four weeks by sexual problems? No   Do you have difficulty concentrating, remembering or making decisions? Yes         Does the patient have evidence of cognitive impairment? No    Asprin use counseling:Taking ASA appropriately as indicated    Age-appropriate Screening Schedule:  Refer to the list below for future screening recommendations based on patient's age, sex and/or medical conditions. Orders for these recommended tests are listed in the plan section. The patient has been provided with a written plan.    Health Maintenance   Topic Date Due   • LIPID PANEL  02/26/2020 (Originally 2/25/2020)   • TDAP/TD VACCINES (1 - Tdap) 02/26/2020 (Originally 6/18/1961)   • HEMOGLOBIN A1C  08/26/2020   • DIABETIC EYE EXAM  09/15/2020   • COLONOSCOPY  10/25/2027   • INFLUENZA VACCINE  Completed   • DIABETIC FOOT EXAM  Discontinued   • URINE MICROALBUMIN  Discontinued   • ZOSTER VACCINE  Discontinued          The following portions of the patient's history were reviewed and updated as appropriate: allergies, current medications, past family history, past medical history, past social history, past surgical history and problem list.    Outpatient Medications Prior to Visit   Medication Sig Dispense Refill   • albuterol sulfate HFA (VENTOLIN HFA) 108 (90 Base) MCG/ACT inhaler VENTOLIN  (90 Base) MCG/ACT AERS     • alfuzosin (UROXATRAL) 10 MG 24 hr tablet TAKE 1 TABLET BY MOUTH 2 TIMES EACH DAY  3   • allopurinol  "(ZYLOPRIM) 300 MG tablet Take 300 mg by mouth Daily.     • aspirin 81 MG tablet ASPIRIN 81 MG ORAL TABLET     • atenolol (TENORMIN) 50 MG tablet TAKE 1 TABLET BY MOUTH EVERY DAY 90 tablet 2   • atorvastatin (LIPITOR) 20 MG tablet TAKE 1 TABLET BY MOUTH EVERY DAY 90 tablet 3   • colchicine 0.6 MG tablet Take 0.6 mg by mouth 2 (Two) Times a Day.     • furosemide (LASIX) 40 MG tablet Daily.     • losartan (COZAAR) 25 MG tablet Take 2 tablets by mouth Daily. 180 tablet 1   • losartan (COZAAR) 50 MG tablet TAKE 1 TABLET BY MOUTH EVERY DAY 90 tablet 0   • warfarin (COUMADIN) 7.5 MG tablet TAKE 1 AND A HALF TABLETS BY MOUTH ON SUNDAY AND 1 TABLET BY MOUTH DAILY 96 tablet 0     No facility-administered medications prior to visit.        Patient Active Problem List   Diagnosis   • Atrial fibrillation (CMS/HCC) [I48.91]   • Abdominal aortic aneurysm (CMS/HCC)   • Bladder cancer (CMS/HCC)   • Coronary artery disease   • Hyperlipidemia   • Hypertension   • Sleep apnea   • Status post coronary artery bypass graft   • Hyperuricemia   • Pneumonia due to infectious organism   • Impaired fasting glucose       Advanced Care Planning:  ACP discussion was held with the patient during this visit.    Review of Systems    Compared to one year ago, the patient feels his physical health is the same.  Compared to one year ago, the patient feels his mental health is the same.    Reviewed chart for potential of high risk medication in the elderly: yes  Reviewed chart for potential of harmful drug interactions in the elderly:yes    Objective         Vitals:    02/26/20 0848   BP: 124/88   BP Location: Left arm   Patient Position: Sitting   Cuff Size: Large Adult   Pulse: 101   Resp: 18   Temp: 98.3 °F (36.8 °C)   TempSrc: Oral   SpO2: 93%   Weight: 99 kg (218 lb 3.2 oz)   Height: 174 cm (68.5\")       Body mass index is 32.69 kg/m².  Discussed the patient's BMI with him. The BMI is above average; BMI management plan is completed.    Physical " Exam   Constitutional: He is oriented to person, place, and time. He appears well-developed and well-nourished.   HENT:   Head: Normocephalic and atraumatic.   Eyes: Pupils are equal, round, and reactive to light. EOM are normal.   Neck: Neck supple.   Cardiovascular: Normal rate, regular rhythm, normal heart sounds and intact distal pulses.   Pulmonary/Chest: Effort normal and breath sounds normal.   Abdominal: Soft. Bowel sounds are normal.   Musculoskeletal: Normal range of motion.   Neurological: He is oriented to person, place, and time.   Skin: Skin is warm and dry.   Psychiatric: He has a normal mood and affect. His behavior is normal. Judgment and thought content normal.   Nursing note and vitals reviewed.            Assessment/Plan   Medicare Risks and Personalized Health Plan  CMS Preventative Services Quick Reference  Advance Directive Discussion  Immunizations Discussed/Encouraged (specific immunizations; Td, Influenza, Pneumococcal 23 and Shingrix )    The above risks/problems have been discussed with the patient.  Pertinent information has been shared with the patient in the After Visit Summary.  Follow up plans and orders are seen below in the Assessment/Plan Section.    Diagnoses and all orders for this visit:    1. Encounter for Medicare annual wellness exam (Primary)    2. Essential hypertension  -     Comprehensive Metabolic Panel; Future  -     CBC & Differential; Future    3. Pure hypercholesterolemia  -     Lipid Panel; Future    4. Impaired fasting glucose  -     Hemoglobin A1c; Future    5. Malignant neoplasm of urinary bladder, unspecified site (CMS/HCC)    6. Health care maintenance  -     PSA Screen; Future    7. Abdominal aortic aneurysm (AAA) without rupture (CMS/[facility])    8. Permanent atrial fibrillation      Follow Up:  Return in about 6 months (around 8/26/2020) for Recheck.     An After Visit Summary and PPPS were given to the patient.

## 2020-02-26 NOTE — PATIENT INSTRUCTIONS
Continue your current medicines and treatment.    Have the follow up labs done and call for results.    Create a living will.    Follow up in the office in 6 months.

## 2020-02-29 ENCOUNTER — TELEPHONE (OUTPATIENT)
Dept: FAMILY MEDICINE CLINIC | Facility: CLINIC | Age: 70
End: 2020-02-29

## 2020-02-29 NOTE — TELEPHONE ENCOUNTER
I spoke with the patient regarding his blood sugar.  He is to decrease his Carbohydrate intake.  He is to start Metformin and follow up in the offcie in 6 weeks.

## 2020-03-04 ENCOUNTER — HOSPITAL ENCOUNTER (OUTPATIENT)
Dept: ULTRASOUND IMAGING | Facility: HOSPITAL | Age: 70
Discharge: HOME | End: 2020-03-04
Admitting: FAMILY MEDICINE

## 2020-03-04 DIAGNOSIS — I71.40 ABDOMINAL AORTIC ANEURYSM (AAA) WITHOUT RUPTURE (HCC): ICD-10-CM

## 2020-03-04 PROCEDURE — 76775 US EXAM ABDO BACK WALL LIM: CPT

## 2020-03-19 ENCOUNTER — ANTICOAGULATION VISIT (OUTPATIENT)
Dept: CARDIOLOGY | Facility: CLINIC | Age: 70
End: 2020-03-19

## 2020-03-19 VITALS
SYSTOLIC BLOOD PRESSURE: 130 MMHG | BODY MASS INDEX: 33.11 KG/M2 | HEART RATE: 80 BPM | DIASTOLIC BLOOD PRESSURE: 62 MMHG | WEIGHT: 221 LBS

## 2020-03-19 DIAGNOSIS — I48.21 PERMANENT ATRIAL FIBRILLATION (HCC): ICD-10-CM

## 2020-03-19 LAB — INR PPP: 1.7 (ref 0.9–1.1)

## 2020-03-19 PROCEDURE — 36416 COLLJ CAPILLARY BLOOD SPEC: CPT | Performed by: INTERNAL MEDICINE

## 2020-03-19 PROCEDURE — 85610 PROTHROMBIN TIME: CPT | Performed by: INTERNAL MEDICINE

## 2020-03-31 ENCOUNTER — LAB (OUTPATIENT)
Dept: LAB | Facility: HOSPITAL | Age: 70
End: 2020-03-31

## 2020-03-31 ENCOUNTER — OFFICE VISIT (OUTPATIENT)
Dept: FAMILY MEDICINE CLINIC | Facility: CLINIC | Age: 70
End: 2020-03-31

## 2020-03-31 VITALS
BODY MASS INDEX: 32.7 KG/M2 | TEMPERATURE: 97.3 F | HEIGHT: 69 IN | HEART RATE: 64 BPM | RESPIRATION RATE: 18 BRPM | DIASTOLIC BLOOD PRESSURE: 80 MMHG | OXYGEN SATURATION: 97 % | SYSTOLIC BLOOD PRESSURE: 138 MMHG | WEIGHT: 220.8 LBS

## 2020-03-31 DIAGNOSIS — E11.9 TYPE 2 DIABETES MELLITUS WITHOUT COMPLICATION, WITHOUT LONG-TERM CURRENT USE OF INSULIN (HCC): ICD-10-CM

## 2020-03-31 DIAGNOSIS — E11.9 TYPE 2 DIABETES MELLITUS WITHOUT COMPLICATION, WITHOUT LONG-TERM CURRENT USE OF INSULIN (HCC): Primary | ICD-10-CM

## 2020-03-31 PROBLEM — J18.9 PNEUMONIA DUE TO INFECTIOUS ORGANISM: Status: RESOLVED | Noted: 2019-11-14 | Resolved: 2020-03-31

## 2020-03-31 LAB
ANION GAP SERPL CALCULATED.3IONS-SCNC: 12.7 MMOL/L (ref 5–15)
BUN BLD-MCNC: 13 MG/DL (ref 8–23)
BUN/CREAT SERPL: 15.7 (ref 7–25)
CALCIUM SPEC-SCNC: 9.1 MG/DL (ref 8.6–10.5)
CHLORIDE SERPL-SCNC: 103 MMOL/L (ref 98–107)
CO2 SERPL-SCNC: 24.3 MMOL/L (ref 22–29)
CREAT BLD-MCNC: 0.83 MG/DL (ref 0.76–1.27)
GFR SERPL CREATININE-BSD FRML MDRD: 92 ML/MIN/1.73
GLUCOSE BLD-MCNC: 123 MG/DL (ref 65–99)
POTASSIUM BLD-SCNC: 4.4 MMOL/L (ref 3.5–5.2)
SODIUM BLD-SCNC: 140 MMOL/L (ref 136–145)

## 2020-03-31 PROCEDURE — 80048 BASIC METABOLIC PNL TOTAL CA: CPT

## 2020-03-31 PROCEDURE — 83036 HEMOGLOBIN GLYCOSYLATED A1C: CPT

## 2020-03-31 PROCEDURE — 99213 OFFICE O/P EST LOW 20 MIN: CPT | Performed by: FAMILY MEDICINE

## 2020-03-31 PROCEDURE — 36415 COLL VENOUS BLD VENIPUNCTURE: CPT

## 2020-03-31 NOTE — PROGRESS NOTES
"Subjective   Sandro Ramirez is a 69 y.o. male.     Chief Complaint   Patient presents with   • Blood Sugar Problem     running high    • Elevated PSA       HPI  Chief complaint: Type 2 diabetes mellitus    The patient is a 69-year-old white male comes in for follow-up and maintenance of his current problems which include    1.  Type 2 diabetes mellitus-new-patient was seen by me for follow-up pneumonia and for follow-up of all his medical problems recently.  His A1c was found to be 9.2.  His blood sugar was 325.  The patient was placed on metformin 500 mg twice a day.  He states he is not checked his sugar recently.  He denied polydipsia polyphagia or polyuria.  Denied blurred vision.    His other problems include atrial fibrillation history of abdominal aortic aneurysm coronary artery disease hypertension hyperlipidemia sleep apnea bladder cancer and hyperuricemia.        The following portions of the patient's history were reviewed and updated as appropriate: allergies, current medications, past family history, past medical history, past social history, past surgical history and problem list.    Review of Systems    Objective     /80 (BP Location: Left arm, Patient Position: Sitting, Cuff Size: Large Adult)   Pulse 64   Temp 97.3 °F (36.3 °C) (Oral)   Resp 18   Ht 174 cm (68.5\")   Wt 100 kg (220 lb 12.8 oz)   SpO2 97%   BMI 33.08 kg/m²     Physical Exam   Constitutional: He is oriented to person, place, and time. He appears well-developed and well-nourished.   Obese   HENT:   Head: Normocephalic and atraumatic.   Eyes: Pupils are equal, round, and reactive to light. Conjunctivae and EOM are normal.   Neck: Normal range of motion. Neck supple.   Cardiovascular: Normal rate, regular rhythm, normal heart sounds and intact distal pulses.   Pulmonary/Chest: Effort normal and breath sounds normal.   Abdominal: Soft. Bowel sounds are normal.   Musculoskeletal: Normal range of motion.   Neurological: He is " alert and oriented to person, place, and time.   Skin: Skin is warm and dry.   Psychiatric: He has a normal mood and affect. His behavior is normal.   Nursing note and vitals reviewed.        Assessment/Plan   Sandro was seen today for blood sugar problem and elevated psa.    Diagnoses and all orders for this visit:    Type 2 diabetes mellitus without complication, without long-term current use of insulin (CMS/[facility])-patient states he is tolerating his medications well.  He does not want to go to the [facility] at the present time.  He is to let me know the type of glucometer his insurance will pay for so I can prescribe this and he can start checking his sugars regularly.  Right now is using his wife's glucometer.          Patient Instructions   Continue your current medications and treatment.    Have the follow up labs done and call for results.    Call to let me know the type of glucometer your insurance covers.     Follow up in the office in 3 months.        James Tirado Jr., MD    03/31/20

## 2020-03-31 NOTE — PATIENT INSTRUCTIONS
Continue your current medications and treatment.    Have the follow up labs done and call for results.    Call to let me know the type of glucometer your insurance covers.     Follow up in the office in 3 months.

## 2020-04-01 LAB — HBA1C MFR BLD: 8.4 % (ref 3.5–5.6)

## 2020-04-28 ENCOUNTER — ANTICOAGULATION VISIT (OUTPATIENT)
Dept: CARDIOLOGY | Facility: CLINIC | Age: 70
End: 2020-04-28

## 2020-04-28 VITALS
BODY MASS INDEX: 31.92 KG/M2 | HEART RATE: 90 BPM | SYSTOLIC BLOOD PRESSURE: 129 MMHG | DIASTOLIC BLOOD PRESSURE: 71 MMHG | WEIGHT: 213 LBS

## 2020-04-28 DIAGNOSIS — I48.21 PERMANENT ATRIAL FIBRILLATION (HCC): ICD-10-CM

## 2020-04-28 LAB — INR PPP: 1.8 (ref 0.9–1.1)

## 2020-04-28 PROCEDURE — 36416 COLLJ CAPILLARY BLOOD SPEC: CPT | Performed by: INTERNAL MEDICINE

## 2020-04-28 PROCEDURE — 85610 PROTHROMBIN TIME: CPT | Performed by: INTERNAL MEDICINE

## 2020-05-14 NOTE — ED PROVIDER NOTES
Pt says he thinks he has a mycoplasma and a ureaplasma infection. Asks if he can be tested. Subjective   Chief complaint cough/shortness of breath.  This is a 69-year-old presents with shortness of breath and cough productive of green sputum is had over the last 2 to 3 days.  He states that he does not normally take oxygen or inhalers.  He states that usually he develops a bronchial infection about once a year and responds to antibiotics and steroids.  The patient was seen initially urgent care center and sent here for further evaluation.  He does have a history of A. fib.  He states he has some diffuse myalgias he rates as 6/10 with no radiation no other alleviating or exacerbating factors he describes a sharp stabbing.  He denies any recent known ill contacts.        History provided by:  Patient      Review of Systems   Constitutional: Positive for chills and fever.   HENT: Negative for nosebleeds and sore throat.    Eyes: Negative for redness.   Respiratory: Positive for cough and shortness of breath.    Cardiovascular: Negative for chest pain.   Gastrointestinal: Negative for abdominal pain.   Endocrine: Negative for polyuria.   Genitourinary: Negative for decreased urine volume.   Musculoskeletal: Negative for arthralgias and myalgias.   Skin: Negative for rash and wound.   Allergic/Immunologic: Negative for immunocompromised state.   Neurological: Negative for weakness.   Hematological: Does not bruise/bleed easily.   Psychiatric/Behavioral: Negative for behavioral problems.   All other systems reviewed and are negative.      Past Medical History:   Diagnosis Date   • Atrial fibrillation (CMS/HCC)    • Coronary artery disease    • Hyperlipidemia    • Hypertension        Allergies   Allergen Reactions   • Meperidine GI Intolerance   • Midazolam Mental Status Change   • Propofol Mental Status Change   • Sulfa Antibiotics Hives   • Simvastatin Myalgia       Past Surgical History:   Procedure Laterality Date   • CHOLECYSTECTOMY     • CORONARY ARTERY BYPASS GRAFT         Family History   Problem Relation  Age of Onset   • Cancer Father    • Heart attack Father    • Heart disease Paternal Grandfather    • Heart attack Paternal Grandfather        Social History     Socioeconomic History   • Marital status:      Spouse name: Not on file   • Number of children: Not on file   • Years of education: Not on file   • Highest education level: Not on file   Tobacco Use   • Smoking status: Former Smoker   • Smokeless tobacco: Never Used   Substance and Sexual Activity   • Alcohol use: No     Frequency: Never           Objective   Physical Exam   Constitutional: He is oriented to person, place, and time. He appears well-developed and well-nourished. No distress.   HENT:   Head: Normocephalic and atraumatic.   Right Ear: External ear normal.   Left Ear: External ear normal.   Nose: Nose normal.   Mouth/Throat: Oropharynx is clear and moist.   Eyes: Conjunctivae and EOM are normal. Pupils are equal, round, and reactive to light.   Neck: Normal range of motion. Neck supple. No JVD present. No thyromegaly present.   Cardiovascular: Normal rate, normal heart sounds and intact distal pulses. An irregularly irregular rhythm present.   Pulmonary/Chest: Effort normal. No stridor. Tachypnea noted. He has wheezes.   Abdominal: Soft. Bowel sounds are normal. There is no tenderness.   Musculoskeletal: Normal range of motion.   Lymphadenopathy:     He has no cervical adenopathy.   Neurological: He is alert and oriented to person, place, and time.   Skin: Skin is warm and dry. Capillary refill takes less than 2 seconds. No rash noted.   Psychiatric: He has a normal mood and affect.   Nursing note and vitals reviewed.      Procedures           ED Course      XR Chest 1 View    (Results Pending)     Lab Results (last 72 hours)     Procedure Component Value Units Date/Time    CBC & Differential [[id number]] Collected:  11/14/19 1846    Specimen:  Blood Updated:  11/14/19 1856    Narrative:       The following orders were created for panel  order CBC & Differential.  Procedure                               Abnormality         Status                     ---------                               -----------         ------                     CBC Auto Differential[[id number]]        Abnormal            Final result                 Please view results for these tests on the individual orders.    Comprehensive Metabolic Panel [[id number]]  (Abnormal) Collected:  11/14/19 1846    Specimen:  Blood Updated:  11/14/19 1920     Glucose 133 mg/dL      BUN 15 mg/dL      Creatinine 0.90 mg/dL      Sodium 140 mmol/L      Potassium 4.4 mmol/L      Chloride 102 mmol/L      CO2 27.0 mmol/L      Calcium 9.0 mg/dL      Total Protein 7.6 g/dL      Albumin 4.50 g/dL      ALT (SGPT) 59 U/L      AST (SGOT) 42 U/L      Alkaline Phosphatase 98 U/L      Total Bilirubin 0.6 mg/dL      eGFR Non African Amer 84 mL/min/1.73      Globulin 3.1 gm/dL      A/G Ratio 1.5 g/dL      BUN/Creatinine Ratio 16.7     Anion Gap 11.0 mmol/L     Narrative:       GFR Normal >60  Chronic Kidney Disease <60  Kidney Failure <15    Blood Culture - Blood, Blood, Venous Line [[id number]] Collected:  11/14/19 1846    Specimen:  Blood, Venous Line Updated:  11/14/19 1853    CBC Auto Differential [[id number]]  (Abnormal) Collected:  11/14/19 1846    Specimen:  Blood Updated:  11/14/19 1856     WBC 13.90 10*3/mm3      RBC 4.90 10*6/mm3      Hemoglobin 14.4 g/dL      Hematocrit 41.8 %      MCV 85.2 fL      MCH 29.3 pg      MCHC 34.4 g/dL      RDW 13.9 %      RDW-SD 42.0 fl      MPV 7.7 fL      Platelets 225 10*3/mm3      Neutrophil % 82.5 %      Lymphocyte % 9.6 %      Monocyte % 6.8 %      Eosinophil % 0.5 %      Basophil % 0.6 %      Neutrophils, Absolute 11.50 10*3/mm3      Lymphocytes, Absolute 1.30 10*3/mm3      Monocytes, Absolute 0.90 10*3/mm3      Eosinophils, Absolute 0.10 10*3/mm3      Basophils, Absolute 0.10 10*3/mm3      nRBC 0.0 /100 WBC     BNP [[id number]]  (Normal) Collected:  11/14/19 1846     Specimen:  Blood Updated:  11/14/19 1916     proBNP 505.3 pg/mL     Narrative:       Among patients with dyspnea, NT-proBNP is highly sensitive for the detection of acute congestive heart failure. In addition NT-proBNP of <300 pg/ml effectively rules out acute congestive heart failure with 99% negative predictive value.    Protime-INR [[id number]]  (Abnormal) Collected:  11/14/19 1846    Specimen:  Blood Updated:  11/14/19 1927     Protime 16.4 Seconds      INR 1.69    aPTT [[id number]]  (Abnormal) Collected:  11/14/19 1846    Specimen:  Blood Updated:  11/14/19 1927     PTT 33.4 seconds     Troponin [[id number]]  (Normal) Collected:  11/14/19 1846    Specimen:  Blood Updated:  11/14/19 1927     Troponin T <0.010 ng/mL     Narrative:       Troponin T Reference Range:  <= 0.03 ng/mL-   Negative for AMI  >0.03 ng/mL-     Abnormal for myocardial necrosis.  Clinicians would have to utilize clinical acumen, EKG, Troponin and serial changes to determine if it is an Acute Myocardial Infarction or myocardial injury due to an underlying chronic condition.     Magnesium [[id number]]  (Normal) Collected:  11/14/19 1846    Specimen:  Blood Updated:  11/14/19 1928     Magnesium 2.0 mg/dL     Phosphorus [[id number]]  (Normal) Collected:  11/14/19 1846    Specimen:  Blood Updated:  11/14/19 1928     Phosphorus 3.1 mg/dL     C-reactive Protein [[id number]]  (Abnormal) Collected:  11/14/19 1846    Specimen:  Blood Updated:  11/14/19 1945     C-Reactive Protein 6.90 mg/dL     POC Lactate [[id number]]  (Abnormal) Collected:  11/14/19 1852    Specimen:  Blood Updated:  11/14/19 1853     Lactate 2.6 mmol/L      Comment: Serial Number: [id number]:  [id number]       Lactic Acid, Reflex Timer (This will reflex a repeat order 3-3:15 hours after ordered.) [[id number]] Collected:  11/14/19 1852    Specimen:  Blood Updated:  11/14/19 1853    Influenza Antigen, Rapid - Swab, Nasopharynx [[id number]]  (Normal) Collected:  11/14/19 1933    Specimen:  " Swab from Nasopharynx Updated:  11/14/19 2004     Influenza A PCR Not Detected     Influenza B PCR Not Detected    Blood Culture - Blood, Arm, Left [[id number]] Collected:  11/14/19 1935    Specimen:  Blood from Arm, Left Updated:  11/14/19 1945    Calcium, Ionized [[id number]]  (Abnormal) Collected:  11/14/19 1938    Specimen:  Blood from Arm, Left Updated:  11/14/19 1959     Ionized Calcium 1.14 mmol/L     RSV Screen - Wash, Nasopharynx [[id number]]  (Normal) Collected:  11/14/19 1939    Specimen:  Wash from Nasopharynx Updated:  11/14/19 2007     RSV Rapid Ag Negative        Medications   sodium chloride 0.9 % flush 10 mL (not administered)   ipratropium-albuterol (DUO-NEB) nebulizer solution 3 mL (3 mL Nebulization Given 11/14/19 1955)   cefTRIAXone (ROCEPHIN) in SWFI 1 gram/10ml IV PUSH syringe (1 g Intravenous Given 11/14/19 1935)   azithromycin (ZITHROMAX) 500 mg in 250mL NS IVPB (500 mg Intravenous New Bag 11/14/19 2031)   acetaminophen (TYLENOL) tablet 1,000 mg (1,000 mg Oral Given 11/14/19 2019)   methylPREDNISolone sodium succinate (SOLU-Medrol) injection 125 mg (125 mg Intravenous Given 11/14/19 2019)   albuterol (PROVENTIL) nebulizer solution 0.083% 2.5 mg/3mL (2.5 mg Nebulization Given 11/14/19 2024)   cefTRIAXone (ROCEPHIN) in SWFI 1 gram/10ml IV PUSH syringe (1 g Intravenous Given 11/14/19 2031)     /55   Pulse 89   Temp 97.9 °F (36.6 °C) (Oral)   Resp 20   Ht 180.3 cm (71\")   Wt 103 kg (226 lb 9.6 oz)   SpO2 99%   BMI 31.60 kg/m²   EKG personally reviewed and interpreted by me shows:    A. fib with PVC and right bundle branch block.  No significant change noted compared to previous EKG dated 2/11/2019.  further information as documented on EKG.    Chest x-ray reviewed by me shows slight increased left basilar lung markings given the patient's total picture I feel are consistent with left basilar pneumonia  Patient started on IV fluids and IV antibiotics for sepsis criteria.  Records " obtained from urgent care center showed chest x-ray reportedly showing no acute disease.  Duo nebulizer treatments and IV Solu-Medrol was given.    I discussed results with the patient and family as well as call discussed results with the patient's PMD, Dr. Tirado, who agreed to admit          MDM  Differential diagnosis; this does not constitute the entirety of considered causes:        Acute coronary syndrome, pneumothorax, pneumonia, dissection, electrolyte abnormality, anemia, DVT, pulmonary embolus    Final diagnoses:   Fever and chills   Sepsis, due to unspecified organism, unspecified whether acute organ dysfunction present (CMS/HCC)   Bronchospasm   Pneumonia of left lower lobe due to infectious organism (CMS/HCC)              Nilesh Maki MD  11/14/19 6146

## 2020-05-20 RX ORDER — WARFARIN SODIUM 7.5 MG/1
TABLET ORAL
Qty: 90 TABLET | Refills: 0 | Status: SHIPPED | OUTPATIENT
Start: 2020-05-20 | End: 2020-08-12

## 2020-06-09 ENCOUNTER — ANTICOAGULATION VISIT (OUTPATIENT)
Dept: CARDIOLOGY | Facility: CLINIC | Age: 70
End: 2020-06-09

## 2020-06-09 VITALS
HEART RATE: 88 BPM | SYSTOLIC BLOOD PRESSURE: 124 MMHG | BODY MASS INDEX: 31.92 KG/M2 | DIASTOLIC BLOOD PRESSURE: 69 MMHG | WEIGHT: 213 LBS

## 2020-06-09 DIAGNOSIS — I48.21 PERMANENT ATRIAL FIBRILLATION (HCC): ICD-10-CM

## 2020-06-09 LAB — INR PPP: 2 (ref 0.9–1.1)

## 2020-06-09 PROCEDURE — 36416 COLLJ CAPILLARY BLOOD SPEC: CPT | Performed by: INTERNAL MEDICINE

## 2020-06-09 PROCEDURE — 85610 PROTHROMBIN TIME: CPT | Performed by: INTERNAL MEDICINE

## 2020-06-30 ENCOUNTER — OFFICE VISIT (OUTPATIENT)
Dept: FAMILY MEDICINE CLINIC | Facility: CLINIC | Age: 70
End: 2020-06-30

## 2020-06-30 ENCOUNTER — LAB (OUTPATIENT)
Dept: LAB | Facility: HOSPITAL | Age: 70
End: 2020-06-30

## 2020-06-30 VITALS
TEMPERATURE: 98.7 F | HEIGHT: 71 IN | SYSTOLIC BLOOD PRESSURE: 110 MMHG | RESPIRATION RATE: 22 BRPM | OXYGEN SATURATION: 94 % | HEART RATE: 90 BPM | WEIGHT: 212.6 LBS | DIASTOLIC BLOOD PRESSURE: 66 MMHG | BODY MASS INDEX: 29.76 KG/M2

## 2020-06-30 DIAGNOSIS — E79.0 HYPERURICEMIA: ICD-10-CM

## 2020-06-30 DIAGNOSIS — I25.119 ATHEROSCLEROSIS OF NATIVE CORONARY ARTERY OF NATIVE HEART WITH ANGINA PECTORIS (HCC): ICD-10-CM

## 2020-06-30 DIAGNOSIS — E78.00 PURE HYPERCHOLESTEROLEMIA: ICD-10-CM

## 2020-06-30 DIAGNOSIS — E11.9 TYPE 2 DIABETES MELLITUS WITHOUT COMPLICATION, WITHOUT LONG-TERM CURRENT USE OF INSULIN (HCC): Primary | ICD-10-CM

## 2020-06-30 DIAGNOSIS — E11.9 TYPE 2 DIABETES MELLITUS WITHOUT COMPLICATION, WITHOUT LONG-TERM CURRENT USE OF INSULIN (HCC): ICD-10-CM

## 2020-06-30 DIAGNOSIS — G47.33 OBSTRUCTIVE SLEEP APNEA SYNDROME: ICD-10-CM

## 2020-06-30 DIAGNOSIS — I10 ESSENTIAL HYPERTENSION: ICD-10-CM

## 2020-06-30 DIAGNOSIS — I48.21 PERMANENT ATRIAL FIBRILLATION (HCC): ICD-10-CM

## 2020-06-30 LAB
ALBUMIN SERPL-MCNC: 4.4 G/DL (ref 3.5–5.2)
ALBUMIN/GLOB SERPL: 1.6 G/DL
ALP SERPL-CCNC: 77 U/L (ref 39–117)
ALT SERPL W P-5'-P-CCNC: 28 U/L (ref 1–41)
ANION GAP SERPL CALCULATED.3IONS-SCNC: 10.7 MMOL/L (ref 5–15)
AST SERPL-CCNC: 21 U/L (ref 1–40)
BASOPHILS # BLD AUTO: 0.06 10*3/MM3 (ref 0–0.2)
BASOPHILS NFR BLD AUTO: 0.7 % (ref 0–1.5)
BILIRUB SERPL-MCNC: 0.3 MG/DL (ref 0.2–1.2)
BUN SERPL-MCNC: 14 MG/DL (ref 8–23)
BUN/CREAT SERPL: 14.9 (ref 7–25)
CALCIUM SPEC-SCNC: 9.5 MG/DL (ref 8.6–10.5)
CHLORIDE SERPL-SCNC: 98 MMOL/L (ref 98–107)
CHOLEST SERPL-MCNC: 141 MG/DL (ref 0–200)
CO2 SERPL-SCNC: 28.3 MMOL/L (ref 22–29)
CREAT SERPL-MCNC: 0.94 MG/DL (ref 0.76–1.27)
CREAT UR-MCNC: 40 MG/DL
DEPRECATED RDW RBC AUTO: 42.6 FL (ref 37–54)
EOSINOPHIL # BLD AUTO: 0.09 10*3/MM3 (ref 0–0.4)
EOSINOPHIL NFR BLD AUTO: 1 % (ref 0.3–6.2)
ERYTHROCYTE [DISTWIDTH] IN BLOOD BY AUTOMATED COUNT: 13.4 % (ref 12.3–15.4)
GFR SERPL CREATININE-BSD FRML MDRD: 80 ML/MIN/1.73
GLOBULIN UR ELPH-MCNC: 2.7 GM/DL
GLUCOSE SERPL-MCNC: 131 MG/DL (ref 65–99)
HBA1C MFR BLD: 6.9 % (ref 3.5–5.6)
HCT VFR BLD AUTO: 45.9 % (ref 37.5–51)
HDLC SERPL-MCNC: 35 MG/DL (ref 40–60)
HGB BLD-MCNC: 15.2 G/DL (ref 13–17.7)
IMM GRANULOCYTES # BLD AUTO: 0.03 10*3/MM3 (ref 0–0.05)
IMM GRANULOCYTES NFR BLD AUTO: 0.3 % (ref 0–0.5)
LDLC SERPL CALC-MCNC: 72 MG/DL (ref 0–100)
LDLC/HDLC SERPL: 2.06 {RATIO}
LYMPHOCYTES # BLD AUTO: 2.04 10*3/MM3 (ref 0.7–3.1)
LYMPHOCYTES NFR BLD AUTO: 23.1 % (ref 19.6–45.3)
MCH RBC QN AUTO: 28.9 PG (ref 26.6–33)
MCHC RBC AUTO-ENTMCNC: 33.1 G/DL (ref 31.5–35.7)
MCV RBC AUTO: 87.3 FL (ref 79–97)
MONOCYTES # BLD AUTO: 0.57 10*3/MM3 (ref 0.1–0.9)
MONOCYTES NFR BLD AUTO: 6.4 % (ref 5–12)
NEUTROPHILS NFR BLD AUTO: 6.06 10*3/MM3 (ref 1.7–7)
NEUTROPHILS NFR BLD AUTO: 68.5 % (ref 42.7–76)
NRBC BLD AUTO-RTO: 0 /100 WBC (ref 0–0.2)
PLATELET # BLD AUTO: 243 10*3/MM3 (ref 140–450)
PMV BLD AUTO: 9.8 FL (ref 6–12)
POTASSIUM SERPL-SCNC: 4.4 MMOL/L (ref 3.5–5.2)
PROT SERPL-MCNC: 7.1 G/DL (ref 6–8.5)
PROT UR-MCNC: <4 MG/DL
PROT/CREAT UR: NORMAL MG/G{CREAT}
RBC # BLD AUTO: 5.26 10*6/MM3 (ref 4.14–5.8)
SODIUM SERPL-SCNC: 137 MMOL/L (ref 136–145)
TRIGL SERPL-MCNC: 169 MG/DL (ref 0–150)
VLDLC SERPL-MCNC: 33.8 MG/DL (ref 5–40)
WBC # BLD AUTO: 8.85 10*3/MM3 (ref 3.4–10.8)

## 2020-06-30 PROCEDURE — 99214 OFFICE O/P EST MOD 30 MIN: CPT | Performed by: FAMILY MEDICINE

## 2020-06-30 PROCEDURE — 82570 ASSAY OF URINE CREATININE: CPT

## 2020-06-30 PROCEDURE — 83036 HEMOGLOBIN GLYCOSYLATED A1C: CPT

## 2020-06-30 PROCEDURE — 84156 ASSAY OF PROTEIN URINE: CPT

## 2020-06-30 PROCEDURE — 36415 COLL VENOUS BLD VENIPUNCTURE: CPT

## 2020-06-30 PROCEDURE — 85025 COMPLETE CBC W/AUTO DIFF WBC: CPT

## 2020-06-30 PROCEDURE — 80053 COMPREHEN METABOLIC PANEL: CPT

## 2020-06-30 PROCEDURE — 80061 LIPID PANEL: CPT

## 2020-06-30 NOTE — PROGRESS NOTES
Subjective   Sandro Ramirez is a 70 y.o. male.     Chief Complaint   Patient presents with   • Hypertension     6 month follow up    • Hyperlipidemia   • Diabetes   • TicK Bites     One leg and shoulder        HPI  Chief complaint: Hypertension hyperlipidemia atrial fibrillation coronary artery disease type 2 diabetes mellitus gout sleep apnea    Patient is a 70-year-old white male comes in for follow-up and maintenance of his current problems which include    1.  Hypertension-stable-patient is currently on Cozaar 50 mg daily atenolol 50 mg daily Lasix 40 mg daily potassium.  He denied headache lightheadedness dizziness or chest pain.    2.  Hyperlipidemia-stable-patient on Lipitor 20 mg daily.  He denies myalgias and arthralgias.  Denied nausea or anorexia.    3.  Type 2 diabetes mellitus-stable-patient on metformin.  He denied polydipsia polyphagia or polyuria.  Denied low blood sugars.    5.  Atrial fibrillation-stable-patient is on Coumadin and atenolol.  He denies episodes of rapid or slow heart rhythm.  He denied heart palpitations lightheadedness or dizziness.    6.  Coronary artery disease-stable-patient on aspirin and atenolol.  He denies chest pain shortness of breath orthopnea or PND.    7.  Gout-stable-patient is currently on allopurinol and colchicine.  Denied recent gouty arthritis.    8.  Sleep apnea-stable-patient on CPAP.    Patient was found to have an elevated PSA last time.  Patient has since been to urology and was treated for prostatitis.  Patient's follow-up PSA was less than 3.            The following portions of the patient's history were reviewed and updated as appropriate: allergies, current medications, past family history, past medical history, past social history, past surgical history and problem list.    Review of Systems    Objective     /66 (BP Location: Left arm, Patient Position: Sitting, Cuff Size: Large Adult)   Pulse 90   Temp 98.7 °F (37.1 °C) (Oral)   Resp 22    "Ht 180.3 cm (71\")   Wt 96.4 kg (212 lb 9.6 oz)   SpO2 94%   BMI 29.65 kg/m²     Physical Exam   Constitutional: He is oriented to person, place, and time.   HENT:   Head: Normocephalic and atraumatic.   Eyes: Pupils are equal, round, and reactive to light. Conjunctivae and EOM are normal.   Neck: Normal range of motion. Neck supple.   Cardiovascular: Normal rate, normal heart sounds and intact distal pulses. An irregularly irregular rhythm present.   Pulmonary/Chest: Effort normal and breath sounds normal.   Abdominal: Soft. Bowel sounds are normal.   Musculoskeletal: Normal range of motion.   Neurological: He is alert and oriented to person, place, and time.   Skin: Skin is warm and dry.   The patient has 3 tick bites 1 in the left anterior chest and 2 on the posterior left lower extremity.  These do not appear to be inflamed.  There is no evidence of erythema marginatum   Psychiatric: He has a normal mood and affect. His behavior is normal.   Nursing note and vitals reviewed.        Assessment/Plan   Sandro was seen today for hypertension, hyperlipidemia, diabetes and tick bites.    Diagnoses and all orders for this visit:    Type 2 diabetes mellitus without complication, without long-term current use of insulin (CMS/HCC)  -     Hemoglobin A1c; Future  -     Protein / Creatinine Ratio, Urine - Urine, Clean Catch; Future    Atherosclerosis of native coronary artery of native heart with angina pectoris (CMS/HCC)    Pure hypercholesterolemia  -     Lipid Panel; Future    Essential hypertension  -     CBC & Differential; Future  -     Comprehensive Metabolic Panel; Future    Permanent atrial fibrillation (CMS/HCC)    Obstructive sleep apnea syndrome    Hyperuricemia      Patient Instructions   Continiue your current medications and treatment.    Have the follow up labs done and call for results.    Follow up in the office in 6 months.        James Tirado Jr., MD    06/30/20  "

## 2020-06-30 NOTE — PATIENT INSTRUCTIONS
Continiue your current medications and treatment.    Have the follow up labs done and call for results.    Follow up in the office in 6 months.

## 2020-07-20 RX ORDER — LOSARTAN POTASSIUM 25 MG/1
TABLET ORAL
Qty: 180 TABLET | Refills: 1 | Status: SHIPPED | OUTPATIENT
Start: 2020-07-20 | End: 2021-01-14

## 2020-07-21 ENCOUNTER — ANTICOAGULATION VISIT (OUTPATIENT)
Dept: CARDIOLOGY | Facility: CLINIC | Age: 70
End: 2020-07-21

## 2020-07-21 VITALS
DIASTOLIC BLOOD PRESSURE: 72 MMHG | WEIGHT: 217 LBS | SYSTOLIC BLOOD PRESSURE: 133 MMHG | BODY MASS INDEX: 30.27 KG/M2 | HEART RATE: 89 BPM

## 2020-07-21 DIAGNOSIS — I48.21 PERMANENT ATRIAL FIBRILLATION (HCC): ICD-10-CM

## 2020-07-21 LAB — INR PPP: 1.4 (ref 0.9–1.1)

## 2020-07-21 PROCEDURE — 36416 COLLJ CAPILLARY BLOOD SPEC: CPT | Performed by: INTERNAL MEDICINE

## 2020-07-21 PROCEDURE — 85610 PROTHROMBIN TIME: CPT | Performed by: INTERNAL MEDICINE

## 2020-07-27 RX ORDER — ATENOLOL 50 MG/1
TABLET ORAL
Qty: 90 TABLET | Refills: 2 | Status: SHIPPED | OUTPATIENT
Start: 2020-07-27 | End: 2021-04-26

## 2020-08-12 RX ORDER — WARFARIN SODIUM 7.5 MG/1
TABLET ORAL
Qty: 90 TABLET | Refills: 0 | Status: SHIPPED | OUTPATIENT
Start: 2020-08-12 | End: 2020-11-04

## 2020-08-21 ENCOUNTER — ANTICOAGULATION VISIT (OUTPATIENT)
Dept: CARDIOLOGY | Facility: CLINIC | Age: 70
End: 2020-08-21

## 2020-08-21 VITALS
WEIGHT: 219 LBS | HEART RATE: 81 BPM | BODY MASS INDEX: 30.54 KG/M2 | DIASTOLIC BLOOD PRESSURE: 73 MMHG | SYSTOLIC BLOOD PRESSURE: 127 MMHG

## 2020-08-21 DIAGNOSIS — I48.21 PERMANENT ATRIAL FIBRILLATION (HCC): ICD-10-CM

## 2020-08-21 LAB — INR PPP: 1.8 (ref 0.9–1.1)

## 2020-08-21 PROCEDURE — 85610 PROTHROMBIN TIME: CPT | Performed by: INTERNAL MEDICINE

## 2020-08-21 PROCEDURE — 36416 COLLJ CAPILLARY BLOOD SPEC: CPT | Performed by: INTERNAL MEDICINE

## 2020-09-24 ENCOUNTER — OFFICE VISIT (OUTPATIENT)
Dept: CARDIOLOGY | Facility: CLINIC | Age: 70
End: 2020-09-24

## 2020-09-24 ENCOUNTER — ANTICOAGULATION VISIT (OUTPATIENT)
Dept: CARDIOLOGY | Facility: CLINIC | Age: 70
End: 2020-09-24

## 2020-09-24 VITALS
SYSTOLIC BLOOD PRESSURE: 130 MMHG | BODY MASS INDEX: 32.66 KG/M2 | WEIGHT: 218 LBS | HEART RATE: 90 BPM | DIASTOLIC BLOOD PRESSURE: 77 MMHG

## 2020-09-24 VITALS
OXYGEN SATURATION: 97 % | SYSTOLIC BLOOD PRESSURE: 130 MMHG | WEIGHT: 218 LBS | HEART RATE: 90 BPM | BODY MASS INDEX: 32.29 KG/M2 | DIASTOLIC BLOOD PRESSURE: 77 MMHG | HEIGHT: 69 IN

## 2020-09-24 DIAGNOSIS — I25.10 CORONARY ARTERY DISEASE INVOLVING NATIVE CORONARY ARTERY OF NATIVE HEART WITHOUT ANGINA PECTORIS: ICD-10-CM

## 2020-09-24 DIAGNOSIS — E78.00 PURE HYPERCHOLESTEROLEMIA: ICD-10-CM

## 2020-09-24 DIAGNOSIS — I10 ESSENTIAL HYPERTENSION: ICD-10-CM

## 2020-09-24 DIAGNOSIS — I48.21 PERMANENT ATRIAL FIBRILLATION (HCC): Primary | ICD-10-CM

## 2020-09-24 DIAGNOSIS — G47.33 OBSTRUCTIVE SLEEP APNEA SYNDROME: ICD-10-CM

## 2020-09-24 DIAGNOSIS — I48.21 PERMANENT ATRIAL FIBRILLATION (HCC): ICD-10-CM

## 2020-09-24 DIAGNOSIS — I71.40 ABDOMINAL AORTIC ANEURYSM (AAA) WITHOUT RUPTURE (HCC): ICD-10-CM

## 2020-09-24 LAB — INR PPP: 2 (ref 0.9–1.1)

## 2020-09-24 PROCEDURE — 85610 PROTHROMBIN TIME: CPT | Performed by: INTERNAL MEDICINE

## 2020-09-24 PROCEDURE — 36416 COLLJ CAPILLARY BLOOD SPEC: CPT | Performed by: INTERNAL MEDICINE

## 2020-09-24 PROCEDURE — 99214 OFFICE O/P EST MOD 30 MIN: CPT | Performed by: INTERNAL MEDICINE

## 2020-09-24 NOTE — PROGRESS NOTES
"    Subjective:     Encounter Date:09/24/2020      Patient ID: Sandro Ramirez is a 70 y.o. male.    Chief Complaint:  History of Present Illness 70-year-old white male with history of coronary artery disease history of atrial fibrillation history of diabetes hypertension hyperlipidemia sleep apnea presents to my office for follow-up.  Patient is currently stable without any signs of chest pain or shortness of breath at rest on exertion.  No complains any PND orthopnea.  No palpitations dizziness syncope or swelling of the feet.  Patient has been taking all the medicines regularly.  Patient does not smoke.  Patient is trying to exercise regularly he follows a good diet.    The following portions of the patient's history were reviewed and updated as appropriate: allergies, current medications, past family history, past medical history, past social history, past surgical history and problem list.  Past Medical History:   Diagnosis Date   • Arthritis    • Asthma    • Atrial fibrillation (CMS/HCC)    • Benign prostatic hyperplasia    • Cancer (CMS/HCC)    • CHF (congestive heart failure) (CMS/HCC)    • Colon polyp    • Coronary artery disease    • Diabetes mellitus (CMS/HCC)    • Elevated cholesterol    • Erectile dysfunction    • HL (hearing loss)    • Hyperlipidemia    • Hypertension    • Myocardial infarction (CMS/HCC)    • Obesity    • Pancreatitis    • Pneumonia    • Sleep apnea      Past Surgical History:   Procedure Laterality Date   • CARDIAC CATHETERIZATION     • CHOLECYSTECTOMY     • COLONOSCOPY     • CORONARY ARTERY BYPASS GRAFT       /77 (BP Location: Left arm, Patient Position: Sitting)   Pulse 90   Ht 174 cm (68.5\")   Wt 98.9 kg (218 lb)   SpO2 97%   BMI 32.66 kg/m²   Family History   Problem Relation Age of Onset   • Cancer Father    • Heart attack Father    • Hearing loss Father    • Heart disease Paternal Grandfather    • Heart attack Paternal Grandfather        Current Outpatient " Medications:   •  albuterol sulfate HFA (VENTOLIN HFA) 108 (90 Base) MCG/ACT inhaler, VENTOLIN  (90 Base) MCG/ACT AERS, Disp: , Rfl:   •  alfuzosin (UROXATRAL) 10 MG 24 hr tablet, TAKE 1 TABLET BY MOUTH 2 TIMES EACH DAY, Disp: , Rfl: 3  •  allopurinol (ZYLOPRIM) 300 MG tablet, Take 300 mg by mouth Daily., Disp: , Rfl:   •  aspirin 81 MG tablet, ASPIRIN 81 MG ORAL TABLET, Disp: , Rfl:   •  atenolol (TENORMIN) 50 MG tablet, TAKE 1 TABLET BY MOUTH EVERY DAY, Disp: 90 tablet, Rfl: 2  •  atorvastatin (LIPITOR) 20 MG tablet, TAKE 1 TABLET BY MOUTH EVERY DAY, Disp: 90 tablet, Rfl: 3  •  colchicine 0.6 MG tablet, Take 0.6 mg by mouth 2 (Two) Times a Day., Disp: , Rfl:   •  furosemide (LASIX) 40 MG tablet, Daily., Disp: , Rfl:   •  losartan (COZAAR) 25 MG tablet, TAKE 2 TABLETS BY MOUTH EVERY DAY, Disp: 180 tablet, Rfl: 1  •  metFORMIN (GLUCOPHAGE) 500 MG tablet, TAKE 1 TABLET BY MOUTH TWICE A DAY WITH MEALS, Disp: 180 tablet, Rfl: 0  •  warfarin (COUMADIN) 7.5 MG tablet, TAKE 1 TABLET BY MOUTH DAILY, OR AS DIRECTED., Disp: 90 tablet, Rfl: 0  Allergies   Allergen Reactions   • Meperidine GI Intolerance   • Midazolam Mental Status Change   • Propofol Mental Status Change   • Sulfa Antibiotics Hives   • Simvastatin Myalgia     Social History     Socioeconomic History   • Marital status:      Spouse name: Not on file   • Number of children: Not on file   • Years of education: Not on file   • Highest education level: Not on file   Tobacco Use   • Smoking status: Former Smoker     Packs/day: 1.00     Years: 50.00     Pack years: 50.00     Types: Cigarettes   • Smokeless tobacco: Never Used   Substance and Sexual Activity   • Alcohol use: No     Frequency: Never   • Drug use: No   • Sexual activity: Not Currently     Partners: Female     Birth control/protection: Surgical     Review of Systems   Constitution: Negative for fever and malaise/fatigue.   HENT: Negative for ear pain and nosebleeds.    Eyes: Negative for  blurred vision and double vision.   Cardiovascular: Negative for chest pain, dyspnea on exertion and palpitations.   Respiratory: Negative for cough and shortness of breath.    Skin: Negative for rash.   Musculoskeletal: Negative for joint pain.   Gastrointestinal: Negative for abdominal pain, nausea and vomiting.   Neurological: Negative for focal weakness and headaches.   Psychiatric/Behavioral: Negative for depression. The patient is not nervous/anxious.    All other systems reviewed and are negative.             Objective:     Constitutional:       Appearance: Well-developed.   Eyes:      General: No scleral icterus.     Conjunctiva/sclera: Conjunctivae normal.      Pupils: Pupils are equal, round, and reactive to light.   HENT:      Head: Normocephalic and atraumatic.   Neck:      Musculoskeletal: Normal range of motion and neck supple.      Vascular: No carotid bruit or JVD.   Pulmonary:      Effort: Pulmonary effort is normal.      Breath sounds: Normal breath sounds. No wheezing. No rales.   Cardiovascular:      Normal rate. Regular rhythm.   Pulses:     Intact distal pulses.   Abdominal:      General: Bowel sounds are normal.      Palpations: Abdomen is soft.   Musculoskeletal: Normal range of motion.   Skin:     General: Skin is warm and dry.      Findings: No rash.   Neurological:      Mental Status: Alert.      Comments: No focal deficits       Procedures    Lab Review:       Assessment:          Diagnosis Plan   1. Permanent atrial fibrillation (CMS/HCC)     2. Coronary artery disease involving native coronary artery of native heart without angina pectoris     3. Abdominal aortic aneurysm (AAA) without rupture (CMS/HCC)     4. Pure hypercholesterolemia     5. Essential hypertension     6. Obstructive sleep apnea syndrome            Plan:       Patient has history of coronaries and is currently stable on medical therapy  Patient's blood pressure and heart rate stable  Patient has congestive heart failure  diastolic dysfunction  Patient is sleep apnea and uses CPAP machine  Patient blood pressure heart rate stable  Patient lipid levels are followed by the primary care doctor  Patient has history of permanent atrial fibrillation and is on anticoagulation for the same.  Continue current medicines and follow-up in 6 months

## 2020-10-13 ENCOUNTER — OFFICE VISIT (OUTPATIENT)
Dept: NEUROLOGY | Facility: CLINIC | Age: 70
End: 2020-10-13

## 2020-10-13 ENCOUNTER — TELEPHONE (OUTPATIENT)
Dept: NEUROLOGY | Facility: CLINIC | Age: 70
End: 2020-10-13

## 2020-10-13 VITALS
HEART RATE: 80 BPM | HEIGHT: 69 IN | WEIGHT: 218 LBS | BODY MASS INDEX: 32.29 KG/M2 | SYSTOLIC BLOOD PRESSURE: 115 MMHG | DIASTOLIC BLOOD PRESSURE: 74 MMHG | TEMPERATURE: 97.1 F

## 2020-10-13 DIAGNOSIS — G47.33 OBSTRUCTIVE SLEEP APNEA: Primary | ICD-10-CM

## 2020-10-13 DIAGNOSIS — G47.33 OBSTRUCTIVE SLEEP APNEA SYNDROME: Primary | ICD-10-CM

## 2020-10-13 PROCEDURE — 99212 OFFICE O/P EST SF 10 MIN: CPT | Performed by: PSYCHIATRY & NEUROLOGY

## 2020-10-13 NOTE — TELEPHONE ENCOUNTER
----- Message from Joseph F Seipel, MD sent at 10/13/2020  9:54 AM EDT -----   nasal mask and goes through [facility]

## 2020-10-13 NOTE — PROGRESS NOTES
Sleep medicine follow-up visit    Sandro Ramirez   1950  70 y.o. male   DATE OF SERVICE: 10/13/2020     SUPA, yearly f/u for CPAP compliance, patient doing well with pap therapy. Patient uses a nasal mask and goes through [employer] for supplies.      SLEEP TESTING HISTORY:    On NPSG at [facility] , 09/12/2017 patient had Mild obstructive sleep apnea syndrome with apnea-hypopnea index of 6.3 per sleep hour, minimum SpO2 of 88%    The compliance data reviewed and the patient is on CPAP therapy at 10-20 cm/H2O and compliance data indicates excellent compliance with 89% usage for more than 4 hours with an average usage of 10 hours 49 minutes. AHI down to 0.5 with CPAP therapy and mean CPAP pressure 10.3 cm of water.  The patient's hypersomnia also resolved with Dunlap Sleepiness Scale score of 3 with CPAP therapy.  The patient feels great and is benefiting from it and is compliant.     Obesity, BMI-32.6    Review of Systems   Constitutional: Negative for appetite change and fatigue.   HENT: Negative for sinus pressure and sinus pain.    Eyes: Negative for pain and itching.   Respiratory: Positive for apnea and shortness of breath. Negative for cough.    Cardiovascular: Negative for chest pain and leg swelling.   Gastrointestinal: Negative for constipation and diarrhea.   Endocrine: Negative for cold intolerance and heat intolerance.   Genitourinary: Positive for frequency. Negative for difficulty urinating.   Musculoskeletal: Negative for back pain and neck pain.   Allergic/Immunologic: Negative for environmental allergies.   Neurological: Negative for dizziness, tremors, seizures, syncope, facial asymmetry, speech difficulty, weakness, light-headedness, numbness and headaches.   Psychiatric/Behavioral: Negative for agitation and confusion.     I reviewed and addressed ROS entered by MA.        The following portions of the patient's history were reviewed and updated as appropriate: allergies, current medications, past  family history, past medical history, past social history, past surgical history and problem list.      Family History   Problem Relation Age of Onset   • Cancer Father    • Heart attack Father    • Hearing loss Father    • Heart disease Paternal Grandfather    • Heart attack Paternal Grandfather        Past Medical History:   Diagnosis Date   • Arthritis    • Asthma    • Atrial fibrillation (CMS/HCC)    • Benign prostatic hyperplasia    • Cancer (CMS/HCC)    • CHF (congestive heart failure) (CMS/HCC)    • Colon polyp    • Coronary artery disease    • Diabetes mellitus (CMS/HCC)    • Elevated cholesterol    • Erectile dysfunction    • HL (hearing loss)    • Hyperlipidemia    • Hypertension    • Myocardial infarction (CMS/HCC)    • Obesity    • Pancreatitis    • Pneumonia    • Sleep apnea        Social History     Socioeconomic History   • Marital status:      Spouse name: Not on file   • Number of children: Not on file   • Years of education: Not on file   • Highest education level: Not on file   Tobacco Use   • Smoking status: Former Smoker     Packs/day: 1.00     Years: 50.00     Pack years: 50.00     Types: Cigarettes   • Smokeless tobacco: Never Used   Substance and Sexual Activity   • Alcohol use: No     Frequency: Never   • Drug use: No   • Sexual activity: Not Currently     Partners: Female     Birth control/protection: Surgical         Current Outpatient Medications:   •  albuterol sulfate HFA (VENTOLIN HFA) 108 (90 Base) MCG/ACT inhaler, VENTOLIN  (90 Base) MCG/ACT AERS, Disp: , Rfl:   •  alfuzosin (UROXATRAL) 10 MG 24 hr tablet, TAKE 1 TABLET BY MOUTH 2 TIMES EACH DAY, Disp: , Rfl: 3  •  allopurinol (ZYLOPRIM) 300 MG tablet, Take 300 mg by mouth Daily., Disp: , Rfl:   •  aspirin 81 MG tablet, ASPIRIN 81 MG ORAL TABLET, Disp: , Rfl:   •  atenolol (TENORMIN) 50 MG tablet, TAKE 1 TABLET BY MOUTH EVERY DAY, Disp: 90 tablet, Rfl: 2  •  atorvastatin (LIPITOR) 20 MG tablet, TAKE 1 TABLET BY MOUTH  EVERY DAY, Disp: 90 tablet, Rfl: 3  •  colchicine 0.6 MG tablet, Take 0.6 mg by mouth 2 (Two) Times a Day., Disp: , Rfl:   •  furosemide (LASIX) 40 MG tablet, Daily., Disp: , Rfl:   •  losartan (COZAAR) 25 MG tablet, TAKE 2 TABLETS BY MOUTH EVERY DAY, Disp: 180 tablet, Rfl: 1  •  metFORMIN (GLUCOPHAGE) 500 MG tablet, TAKE 1 TABLET BY MOUTH TWICE A DAY WITH MEALS, Disp: 180 tablet, Rfl: 0  •  warfarin (COUMADIN) 7.5 MG tablet, TAKE 1 TABLET BY MOUTH DAILY, OR AS DIRECTED., Disp: 90 tablet, Rfl: 0    Allergies   Allergen Reactions   • Meperidine GI Intolerance   • Midazolam Mental Status Change   • Propofol Mental Status Change   • Sulfa Antibiotics Hives   • Simvastatin Myalgia        PHYSICAL EXAMINATION:  Vitals:    10/13/20 0920   BP: 115/74   Pulse: 80   Temp: 97.1 °F (36.2 °C)      Body mass index is 32.66 kg/m².       HEENT: Normal.      EXTREMITIES: No edema.     IMPRESSION:     Patient with obstructive sleep apnea syndrome with hypersomnia successfully treated with CPAP therapy and is compliant and benefiting from it.         RECOMMENDATIONS:   1. Continue present CPAP.   2. Follow up 1 year.     EPWORTH SLEEPINESS SCALE  Sitting and reading  0  WatchingTV  0  Sitting, inactive, in a public place  0  As a passenger in a car for 1 hour w/o a break  0  Lying down to rest in the afternoon  3  Sitting and talking to someone  0  Sitting quietly after a lunch  0  In a car, while stopped for traffic or a light  0  Total 3          This document has been electronically signed by Joseph Seipel, MD on October 13, 2020 09:55 EDT

## 2020-10-27 ENCOUNTER — ANTICOAGULATION VISIT (OUTPATIENT)
Dept: CARDIOLOGY | Facility: CLINIC | Age: 70
End: 2020-10-27

## 2020-10-27 VITALS
BODY MASS INDEX: 32.96 KG/M2 | DIASTOLIC BLOOD PRESSURE: 64 MMHG | WEIGHT: 220 LBS | HEART RATE: 86 BPM | SYSTOLIC BLOOD PRESSURE: 165 MMHG

## 2020-10-27 DIAGNOSIS — I48.21 PERMANENT ATRIAL FIBRILLATION (HCC): ICD-10-CM

## 2020-10-27 LAB — INR PPP: 1.9 (ref 0.9–1.1)

## 2020-10-27 PROCEDURE — 85610 PROTHROMBIN TIME: CPT | Performed by: INTERNAL MEDICINE

## 2020-10-27 PROCEDURE — 36416 COLLJ CAPILLARY BLOOD SPEC: CPT | Performed by: INTERNAL MEDICINE

## 2020-11-04 RX ORDER — WARFARIN SODIUM 7.5 MG/1
TABLET ORAL
Qty: 90 TABLET | Refills: 0 | Status: SHIPPED | OUTPATIENT
Start: 2020-11-04 | End: 2021-01-27

## 2020-11-10 ENCOUNTER — TELEPHONE (OUTPATIENT)
Dept: FAMILY MEDICINE CLINIC | Facility: CLINIC | Age: 70
End: 2020-11-10

## 2020-11-10 RX ORDER — BLOOD-GLUCOSE METER
KIT MISCELLANEOUS
Qty: 100 EACH | Refills: 3 | Status: SHIPPED | OUTPATIENT
Start: 2020-11-10 | End: 2022-06-22

## 2020-11-10 NOTE — TELEPHONE ENCOUNTER
I spoke with the patient's wife.  He stopped taking the Metformin due to Diarrhea.  His sugars are controlled without the Metformin.  He will call if his sugars are consistently above 150.

## 2020-12-10 ENCOUNTER — ANTICOAGULATION VISIT (OUTPATIENT)
Dept: CARDIOLOGY | Facility: CLINIC | Age: 70
End: 2020-12-10

## 2020-12-10 VITALS
HEART RATE: 88 BPM | SYSTOLIC BLOOD PRESSURE: 125 MMHG | DIASTOLIC BLOOD PRESSURE: 71 MMHG | TEMPERATURE: 97.5 F | WEIGHT: 215 LBS | BODY MASS INDEX: 32.22 KG/M2

## 2020-12-10 DIAGNOSIS — I48.21 PERMANENT ATRIAL FIBRILLATION (HCC): ICD-10-CM

## 2020-12-10 LAB — INR PPP: 2 (ref 0.9–1.1)

## 2020-12-10 PROCEDURE — 36416 COLLJ CAPILLARY BLOOD SPEC: CPT | Performed by: INTERNAL MEDICINE

## 2020-12-10 PROCEDURE — 85610 PROTHROMBIN TIME: CPT | Performed by: INTERNAL MEDICINE

## 2021-01-06 ENCOUNTER — LAB (OUTPATIENT)
Dept: LAB | Facility: HOSPITAL | Age: 71
End: 2021-01-06

## 2021-01-06 ENCOUNTER — OFFICE VISIT (OUTPATIENT)
Dept: FAMILY MEDICINE CLINIC | Facility: CLINIC | Age: 71
End: 2021-01-06

## 2021-01-06 VITALS
OXYGEN SATURATION: 97 % | WEIGHT: 218 LBS | HEIGHT: 69 IN | BODY MASS INDEX: 32.29 KG/M2 | RESPIRATION RATE: 26 BRPM | DIASTOLIC BLOOD PRESSURE: 77 MMHG | SYSTOLIC BLOOD PRESSURE: 138 MMHG | TEMPERATURE: 96.8 F | HEART RATE: 77 BPM

## 2021-01-06 DIAGNOSIS — E11.9 TYPE 2 DIABETES MELLITUS WITHOUT COMPLICATION, WITHOUT LONG-TERM CURRENT USE OF INSULIN (HCC): Chronic | ICD-10-CM

## 2021-01-06 DIAGNOSIS — I71.40 ABDOMINAL AORTIC ANEURYSM (AAA) WITHOUT RUPTURE (HCC): Primary | ICD-10-CM

## 2021-01-06 DIAGNOSIS — E11.9 TYPE 2 DIABETES MELLITUS WITHOUT COMPLICATION, WITHOUT LONG-TERM CURRENT USE OF INSULIN (HCC): ICD-10-CM

## 2021-01-06 DIAGNOSIS — I25.119 ATHEROSCLEROSIS OF NATIVE CORONARY ARTERY OF NATIVE HEART WITH ANGINA PECTORIS (HCC): Chronic | ICD-10-CM

## 2021-01-06 DIAGNOSIS — G47.33 OBSTRUCTIVE SLEEP APNEA SYNDROME: Chronic | ICD-10-CM

## 2021-01-06 DIAGNOSIS — E78.00 PURE HYPERCHOLESTEROLEMIA: Chronic | ICD-10-CM

## 2021-01-06 DIAGNOSIS — I10 ESSENTIAL HYPERTENSION: Chronic | ICD-10-CM

## 2021-01-06 DIAGNOSIS — E79.0 HYPERURICEMIA: Chronic | ICD-10-CM

## 2021-01-06 DIAGNOSIS — I48.21 PERMANENT ATRIAL FIBRILLATION (HCC): Chronic | ICD-10-CM

## 2021-01-06 PROBLEM — I48.91 ATRIAL FIBRILLATION: Chronic | Status: ACTIVE | Noted: 2019-06-20

## 2021-01-06 LAB
ANION GAP SERPL CALCULATED.3IONS-SCNC: 9.3 MMOL/L (ref 5–15)
BUN SERPL-MCNC: 17 MG/DL (ref 8–23)
BUN/CREAT SERPL: 17.5 (ref 7–25)
CALCIUM SPEC-SCNC: 9.7 MG/DL (ref 8.6–10.5)
CHLORIDE SERPL-SCNC: 98 MMOL/L (ref 98–107)
CO2 SERPL-SCNC: 28.7 MMOL/L (ref 22–29)
CREAT SERPL-MCNC: 0.97 MG/DL (ref 0.76–1.27)
CREAT UR-MCNC: 25.9 MG/DL
GFR SERPL CREATININE-BSD FRML MDRD: 77 ML/MIN/1.73
GLUCOSE SERPL-MCNC: 229 MG/DL (ref 65–99)
HBA1C MFR BLD: 8.8 % (ref 3.5–5.6)
POTASSIUM SERPL-SCNC: 4.9 MMOL/L (ref 3.5–5.2)
PROT UR-MCNC: 5 MG/DL
PROT/CREAT UR: 193.1 MG/G CREA (ref 0–200)
SODIUM SERPL-SCNC: 136 MMOL/L (ref 136–145)

## 2021-01-06 PROCEDURE — 36415 COLL VENOUS BLD VENIPUNCTURE: CPT

## 2021-01-06 PROCEDURE — 80048 BASIC METABOLIC PNL TOTAL CA: CPT

## 2021-01-06 PROCEDURE — 82570 ASSAY OF URINE CREATININE: CPT

## 2021-01-06 PROCEDURE — 83036 HEMOGLOBIN GLYCOSYLATED A1C: CPT

## 2021-01-06 PROCEDURE — 99214 OFFICE O/P EST MOD 30 MIN: CPT | Performed by: FAMILY MEDICINE

## 2021-01-06 PROCEDURE — 84156 ASSAY OF PROTEIN URINE: CPT

## 2021-01-06 RX ORDER — FUROSEMIDE 40 MG/1
40 TABLET ORAL DAILY
Qty: 90 TABLET | Refills: 3 | Status: SHIPPED | OUTPATIENT
Start: 2021-01-06 | End: 2021-10-21

## 2021-01-06 NOTE — PATIENT INSTRUCTIONS
Continue your current medications and treatment.    Have the follow up labs and ultrasound done and call for results.    Follow up in the office in 6 months.

## 2021-01-06 NOTE — PROGRESS NOTES
Subjective   Sandro Ramirez is a 70 y.o. male.     Chief Complaint   Patient presents with   • Diabetes     6 month follow up    • Hyperlipidemia   • Hypertension   • Atrial Fibrillation       HPI chief complaint: Type 2 diabetes mellitus hypertension hyperlipidemia atrial fibrillation coronary artery disease gout sleep apnea    The patient is a 70-year-old white male comes in for for maintenance of his current problems which include    1.  Type 2 diabetes mellitus-deteriorated-patient states he had to quit taking Metformin because of gastrointestinal issues.  Patient states his blood sugars have been running higher since being off Metformin.  His last A1c was excellent.  Patient is asymptomatic.    2.  Hypertension-stable-the patient is on Cozaar 25 mg daily atenolol 50 mg daily Lasix 40 mg once a day.  He denied headache lightheadedness dizziness or chest pain.    3.  Hyperlipidemia-stable-patient on Lipitor 20 mg daily.  He denies myalgias and arthralgias.  He denies nausea or anorexia.    4.  Atrial fibrillation-stable-patient is on atenolol 50 mg daily and Coumadin.  He denies episodes of rapid or slow heart rhythm.  Denied heart palpitations lightheadedness or dizziness.    5.  Coronary artery disease-stable-patient on aspirin and metoprolol.  He denied chest breath orthopnea or PND.    6.  Gout-stable-patient is on colchicine 0.6 mg twice a day and Zyloprim 300 mg daily.  He denied episodes of gouty arthritis or tophi.    7.  Bladder cancer-stable-patient sees urology on a regular basis.  There is no evidence of recurrence or metastatic disease.    8.  Sleep apnea-stable-patient on CPAP    9.  Abdominal aortic aneurysm-deteriorated-patient has an asymptomatic abdominal aortic aneurysm.  It is gradually getting larger.  He is due for follow-up ultrasound.          The following portions of the patient's history were reviewed and updated as appropriate: allergies, current medications, past family history,  "past medical history, past social history, past surgical history and problem list.    Review of Systems    Objective     /77 (BP Location: Left arm, Patient Position: Sitting, Cuff Size: Adult)   Pulse 77   Temp 96.8 °F (36 °C) (Temporal)   Resp 26   Ht 174 cm (68.5\")   Wt 98.9 kg (218 lb)   SpO2 97%   BMI 32.66 kg/m²     Physical Exam  Vitals signs and nursing note reviewed.   Constitutional:       Appearance: He is well-developed. He is obese.   HENT:      Head: Normocephalic and atraumatic.   Eyes:      Pupils: Pupils are equal, round, and reactive to light.   Cardiovascular:      Rate and Rhythm: Normal rate. Rhythm irregularly irregular.      Heart sounds: Normal heart sounds.   Pulmonary:      Effort: Pulmonary effort is normal.      Breath sounds: Normal breath sounds.   Abdominal:      General: Bowel sounds are normal.      Palpations: Abdomen is soft.   Musculoskeletal: Normal range of motion.   Skin:     General: Skin is warm and dry.   Neurological:      Mental Status: He is alert and oriented to person, place, and time.   Psychiatric:         Behavior: Behavior normal.         Thought Content: Thought content normal.         Judgment: Judgment normal.       US Aorta Limited (03/04/2020 08:17)    Protein / Creatinine Ratio, Urine - Urine, Clean Catch (06/30/2020 09:01)  Lipid Panel (06/30/2020 09:01)  Hemoglobin A1c (06/30/2020 09:01)  Comprehensive Metabolic Panel (06/30/2020 09:01)  CBC & Differential (06/30/2020 09:01)    Assessment/Plan   Diagnoses and all orders for this visit:    1. Abdominal aortic aneurysm (AAA) without rupture (CMS/HCC) (Primary)-have a follow-up ultrasound  -     US Aorta Limited; Future    2. Type 2 diabetes mellitus without complication, without long-term current use of insulin (CMS/HCC)-have a follow-up laboratory testing.  Further care depend results of studies  -     Basic Metabolic Panel; Future  -     Hemoglobin A1c; Future    3. Essential " hypertension-continue Lasix 40 daily Cozaar 25 mg daily and atenolol 50 mg daily    4. Pure hypercholesterolemia-continue Lipitor 20 daily    5. Permanent atrial fibrillation (CMS/HCC)-continue atenolol 50 mg daily and Coumadin 7.5 mg a day    6. Atherosclerosis of native coronary artery of native heart with angina pectoris (CMS/HCC)    7. Hyperuricemia-continue colchicine 0.6 twice a day and allopurinol 300 mg daily    8. Obstructive sleep apnea syndrome    Other orders  -     furosemide (LASIX) 40 MG tablet; Take 1 tablet by mouth Daily.  Dispense: 90 tablet; Refill: 3      Patient Instructions   Continue your current medications and treatment.    Have the follow up labs and ultrasound done and call for results.    Follow up in the office in 6 months.      James Tirado Jr., MD    01/06/21

## 2021-01-08 ENCOUNTER — TELEPHONE (OUTPATIENT)
Dept: FAMILY MEDICINE CLINIC | Facility: CLINIC | Age: 71
End: 2021-01-08

## 2021-01-08 RX ORDER — EMPAGLIFLOZIN 25 MG/1
1 TABLET, FILM COATED ORAL DAILY
Qty: 90 TABLET | Refills: 1 | Status: SHIPPED | OUTPATIENT
Start: 2021-01-08 | End: 2021-01-15

## 2021-01-11 ENCOUNTER — HOSPITAL ENCOUNTER (OUTPATIENT)
Dept: ULTRASOUND IMAGING | Facility: HOSPITAL | Age: 71
Discharge: HOME | End: 2021-01-11
Admitting: FAMILY MEDICINE

## 2021-01-11 DIAGNOSIS — I71.40 ABDOMINAL AORTIC ANEURYSM (AAA) WITHOUT RUPTURE (HCC): ICD-10-CM

## 2021-01-11 PROCEDURE — 76775 US EXAM ABDO BACK WALL LIM: CPT

## 2021-01-13 ENCOUNTER — TELEPHONE (OUTPATIENT)
Dept: FAMILY MEDICINE CLINIC | Facility: CLINIC | Age: 71
End: 2021-01-13

## 2021-01-13 NOTE — TELEPHONE ENCOUNTER
Patient called stating Jardiance is too expensive, cannot afford it. Would like generic. Please call

## 2021-01-14 RX ORDER — LOSARTAN POTASSIUM 25 MG/1
TABLET ORAL
Qty: 180 TABLET | Refills: 1 | Status: SHIPPED | OUTPATIENT
Start: 2021-01-14 | End: 2021-07-14

## 2021-01-15 ENCOUNTER — TELEPHONE (OUTPATIENT)
Dept: FAMILY MEDICINE CLINIC | Facility: CLINIC | Age: 71
End: 2021-01-15

## 2021-01-15 RX ORDER — GLIMEPIRIDE 2 MG/1
2 TABLET ORAL
Qty: 60 TABLET | Refills: 2 | Status: SHIPPED | OUTPATIENT
Start: 2021-01-15 | End: 2021-02-08 | Stop reason: SDUPTHER

## 2021-01-15 NOTE — TELEPHONE ENCOUNTER
I spoke with the patient.  He is going to try Amaryl.  He was advise that he needs to eat regularly because fo the potential for Hypoglycemia.

## 2021-01-21 ENCOUNTER — ANTICOAGULATION VISIT (OUTPATIENT)
Dept: CARDIOLOGY | Facility: CLINIC | Age: 71
End: 2021-01-21

## 2021-01-21 VITALS
TEMPERATURE: 96.8 F | HEART RATE: 82 BPM | BODY MASS INDEX: 32.36 KG/M2 | WEIGHT: 216 LBS | DIASTOLIC BLOOD PRESSURE: 74 MMHG | SYSTOLIC BLOOD PRESSURE: 138 MMHG

## 2021-01-21 DIAGNOSIS — I48.21 PERMANENT ATRIAL FIBRILLATION (HCC): ICD-10-CM

## 2021-01-21 LAB — INR PPP: 1.7 (ref 0.9–1.1)

## 2021-01-21 PROCEDURE — 36416 COLLJ CAPILLARY BLOOD SPEC: CPT | Performed by: INTERNAL MEDICINE

## 2021-01-21 PROCEDURE — 85610 PROTHROMBIN TIME: CPT | Performed by: INTERNAL MEDICINE

## 2021-01-27 RX ORDER — WARFARIN SODIUM 7.5 MG/1
TABLET ORAL
Qty: 110 TABLET | Refills: 0 | Status: SHIPPED | OUTPATIENT
Start: 2021-01-27 | End: 2021-04-29

## 2021-02-01 RX ORDER — ATORVASTATIN CALCIUM 20 MG/1
TABLET, FILM COATED ORAL
Qty: 90 TABLET | Refills: 1 | Status: SHIPPED | OUTPATIENT
Start: 2021-02-01 | End: 2021-09-29 | Stop reason: SDUPTHER

## 2021-02-08 RX ORDER — GLIMEPIRIDE 2 MG/1
2 TABLET ORAL
Qty: 60 TABLET | Refills: 2 | Status: CANCELLED | OUTPATIENT
Start: 2021-02-08

## 2021-02-08 RX ORDER — GLIMEPIRIDE 2 MG/1
2 TABLET ORAL
Qty: 60 TABLET | Refills: 2 | Status: SHIPPED | OUTPATIENT
Start: 2021-02-08 | End: 2021-02-10 | Stop reason: SDUPTHER

## 2021-02-10 ENCOUNTER — TELEPHONE (OUTPATIENT)
Dept: FAMILY MEDICINE CLINIC | Facility: CLINIC | Age: 71
End: 2021-02-10

## 2021-02-10 RX ORDER — GLIMEPIRIDE 2 MG/1
2 TABLET ORAL 2 TIMES DAILY
Qty: 60 TABLET | Refills: 2 | Status: SHIPPED | OUTPATIENT
Start: 2021-02-10 | End: 2021-03-08

## 2021-02-10 NOTE — TELEPHONE ENCOUNTER
Patient states last time he spoke with you, you increased his Glimepiride to 2x a day. His most recent refill is for only 1 tablet a day.

## 2021-02-12 ENCOUNTER — ANTICOAGULATION VISIT (OUTPATIENT)
Dept: CARDIOLOGY | Facility: CLINIC | Age: 71
End: 2021-02-12

## 2021-02-12 VITALS
WEIGHT: 220 LBS | TEMPERATURE: 97.2 F | BODY MASS INDEX: 32.96 KG/M2 | DIASTOLIC BLOOD PRESSURE: 71 MMHG | SYSTOLIC BLOOD PRESSURE: 151 MMHG | HEART RATE: 75 BPM

## 2021-02-12 DIAGNOSIS — I48.21 PERMANENT ATRIAL FIBRILLATION (HCC): ICD-10-CM

## 2021-02-12 LAB — INR PPP: 2 (ref 0.9–1.1)

## 2021-02-12 PROCEDURE — 85610 PROTHROMBIN TIME: CPT | Performed by: INTERNAL MEDICINE

## 2021-02-12 PROCEDURE — 36416 COLLJ CAPILLARY BLOOD SPEC: CPT | Performed by: INTERNAL MEDICINE

## 2021-03-08 RX ORDER — GLIMEPIRIDE 2 MG/1
TABLET ORAL
Qty: 60 TABLET | Refills: 2 | Status: SHIPPED | OUTPATIENT
Start: 2021-03-08 | End: 2021-07-19

## 2021-03-18 ENCOUNTER — ANTICOAGULATION VISIT (OUTPATIENT)
Dept: CARDIOLOGY | Facility: CLINIC | Age: 71
End: 2021-03-18

## 2021-03-18 VITALS
DIASTOLIC BLOOD PRESSURE: 75 MMHG | BODY MASS INDEX: 32.66 KG/M2 | WEIGHT: 218 LBS | SYSTOLIC BLOOD PRESSURE: 125 MMHG | HEART RATE: 94 BPM

## 2021-03-18 DIAGNOSIS — I48.21 PERMANENT ATRIAL FIBRILLATION (HCC): ICD-10-CM

## 2021-03-18 LAB — INR PPP: 1.8 (ref 0.9–1.1)

## 2021-03-18 PROCEDURE — 36416 COLLJ CAPILLARY BLOOD SPEC: CPT | Performed by: INTERNAL MEDICINE

## 2021-03-18 PROCEDURE — 85610 PROTHROMBIN TIME: CPT | Performed by: INTERNAL MEDICINE

## 2021-03-22 ENCOUNTER — OFFICE VISIT (OUTPATIENT)
Dept: FAMILY MEDICINE CLINIC | Facility: CLINIC | Age: 71
End: 2021-03-22

## 2021-03-22 VITALS
TEMPERATURE: 97.1 F | SYSTOLIC BLOOD PRESSURE: 141 MMHG | BODY MASS INDEX: 32.58 KG/M2 | OXYGEN SATURATION: 95 % | DIASTOLIC BLOOD PRESSURE: 79 MMHG | HEART RATE: 88 BPM | HEIGHT: 69 IN | WEIGHT: 220 LBS | RESPIRATION RATE: 18 BRPM

## 2021-03-22 DIAGNOSIS — E11.9 TYPE 2 DIABETES MELLITUS WITHOUT COMPLICATION, WITHOUT LONG-TERM CURRENT USE OF INSULIN (HCC): Primary | Chronic | ICD-10-CM

## 2021-03-22 PROCEDURE — 99214 OFFICE O/P EST MOD 30 MIN: CPT | Performed by: FAMILY MEDICINE

## 2021-03-22 NOTE — PATIENT INSTRUCTIONS
Continue your current medications and treatment.    Start taking Januvia in addition to the Amaryl.    Go to the [facility] for education.    Follow up in the office in 6 weeks.

## 2021-03-22 NOTE — PROGRESS NOTES
"Subjective   Sandro Ramirez is a 70 y.o. male.     Chief Complaint   Patient presents with   • Blood Sugar Problem     running high       HPI  Chief complaint: Type 2 diabetes mellitus    The patient is a 70-year-old white male comes in for follow-up and maintenance of his current problems which include    1.  Type 2 diabetes mellitus-deteriorated-patient is currently on Amaryl 2 mg twice a day.  His blood sugars are running consistently around 200-2 20 fasting.  Patient has not been to diabetic education he really does not know what to do to control his diabetes.  Patient could not tolerate Metformin. Jardiance was too expensive.        The following portions of the patient's history were reviewed and updated as appropriate: allergies, current medications, past family history, past medical history, past social history, past surgical history and problem list.    Review of Systems    Objective     /79 (BP Location: Right arm, Patient Position: Sitting, Cuff Size: Adult)   Pulse 88   Temp 97.1 °F (36.2 °C) (Infrared)   Resp 18   Ht 174 cm (68.5\")   Wt 99.8 kg (220 lb)   SpO2 95%   BMI 32.96 kg/m²     Physical Exam  Vitals and nursing note reviewed.   Constitutional:       Appearance: He is well-developed. He is obese.   HENT:      Head: Normocephalic and atraumatic.   Cardiovascular:      Rate and Rhythm: Normal rate and regular rhythm.      Pulses: Normal pulses.      Heart sounds: Normal heart sounds.   Pulmonary:      Effort: Pulmonary effort is normal.      Breath sounds: Normal breath sounds.   Abdominal:      General: Abdomen is flat. Bowel sounds are normal.      Palpations: Abdomen is soft.   Musculoskeletal:         General: Normal range of motion.   Skin:     General: Skin is warm and dry.   Neurological:      Mental Status: He is alert and oriented to person, place, and time.   Psychiatric:         Behavior: Behavior normal.         Thought Content: Thought content normal.         Judgment: " Judgment normal.       Hemoglobin A1c (01/06/2021 08:54)  Basic Metabolic Panel (01/06/2021 08:54)  Protein / Creatinine Ratio, Urine - Urine, Clean Catch (01/06/2021 08:54)      Assessment/Plan   Diagnoses and all orders for this visit:    1. Type 2 diabetes mellitus without complication, without long-term current use of insulin (CMS/[facility]) (Primary)-the patient was counseled on decreasing his caloric intake and decreasing his carbohydrate intake.  He was counseled on increasing activity.  Patient was referred to the [facility] for education.  Patient was advised that first-line drug for type 2 diabetes mellitus is Metformin which he says he cannot tolerate due to diarrhea.  Is therefore to start Januvia in addition to the Amaryl.  He is to follow-up in the office in 6 weeks.  -     Ambulatory Referral to Diabetic Education    Other orders  -     SITagliptin (Januvia) 50 MG tablet; Take 1 tablet by mouth Daily.  Dispense: 30 tablet; Refill: 2      Patient Instructions   Continue your current medications and treatment.    Start taking Januvia in addition to the Amaryl.    Go to the [facility] for education.    Follow up in the office in 6 weeks.          James Tirado Jr., MD    03/22/21

## 2021-03-23 ENCOUNTER — OFFICE VISIT (OUTPATIENT)
Dept: ENDOCRINOLOGY | Facility: CLINIC | Age: 71
End: 2021-03-23

## 2021-03-23 DIAGNOSIS — E11.9 TYPE 2 DIABETES MELLITUS WITHOUT COMPLICATION, WITHOUT LONG-TERM CURRENT USE OF INSULIN (HCC): Chronic | ICD-10-CM

## 2021-03-23 PROCEDURE — G0108 DIAB MANAGE TRN  PER INDIV: HCPCS | Performed by: DIETITIAN, REGISTERED

## 2021-03-29 ENCOUNTER — HOSPITAL ENCOUNTER (EMERGENCY)
Facility: HOSPITAL | Age: 71
Discharge: HOME | End: 2021-03-29
Admitting: EMERGENCY MEDICINE

## 2021-03-29 VITALS
HEART RATE: 61 BPM | HEIGHT: 71 IN | SYSTOLIC BLOOD PRESSURE: 154 MMHG | RESPIRATION RATE: 15 BRPM | WEIGHT: 220.46 LBS | BODY MASS INDEX: 30.86 KG/M2 | OXYGEN SATURATION: 92 % | DIASTOLIC BLOOD PRESSURE: 95 MMHG | TEMPERATURE: 97.9 F

## 2021-03-29 DIAGNOSIS — R04.0 ACUTE ANTERIOR EPISTAXIS: Primary | ICD-10-CM

## 2021-03-29 LAB
INR PPP: 1.47 (ref 2–3)
PROTHROMBIN TIME: 15.9 SECONDS (ref 19.4–28.5)

## 2021-03-29 PROCEDURE — 99283 EMERGENCY DEPT VISIT LOW MDM: CPT

## 2021-03-29 PROCEDURE — 25010000002 COCAINE HCL 40 MG/ML SOLUTION: Performed by: PHYSICIAN ASSISTANT

## 2021-03-29 PROCEDURE — C9046 COCAINE HCL NASAL SOLUTION: HCPCS | Performed by: PHYSICIAN ASSISTANT

## 2021-03-29 PROCEDURE — 85610 PROTHROMBIN TIME: CPT | Performed by: PHYSICIAN ASSISTANT

## 2021-03-29 RX ORDER — COCAINE HYDROCHLORIDE 40 MG/ML
SOLUTION NASAL ONCE
Status: COMPLETED | OUTPATIENT
Start: 2021-03-29 | End: 2021-03-29

## 2021-03-29 RX ADMIN — COCAINE HYDROCHLORIDE 40 MG: 40 SOLUTION NASAL at 07:38

## 2021-03-29 RX ADMIN — PHENYLEPHRINE HYDROCHLORIDE 2 SPRAY: 0.5 SPRAY NASAL at 07:38

## 2021-04-10 ENCOUNTER — OFFICE VISIT (OUTPATIENT)
Dept: ENDOCRINOLOGY | Facility: CLINIC | Age: 71
End: 2021-04-10

## 2021-04-10 DIAGNOSIS — E11.9 TYPE 2 DIABETES MELLITUS WITHOUT COMPLICATION, WITHOUT LONG-TERM CURRENT USE OF INSULIN (HCC): Chronic | ICD-10-CM

## 2021-04-10 PROCEDURE — G0109 DIAB MANAGE TRN IND/GROUP: HCPCS | Performed by: DIETITIAN, REGISTERED

## 2021-04-12 ENCOUNTER — TELEPHONE (OUTPATIENT)
Dept: ENDOCRINOLOGY | Facility: CLINIC | Age: 71
End: 2021-04-12

## 2021-04-14 ENCOUNTER — ANTICOAGULATION VISIT (OUTPATIENT)
Dept: CARDIOLOGY | Facility: CLINIC | Age: 71
End: 2021-04-14

## 2021-04-14 VITALS
HEART RATE: 76 BPM | DIASTOLIC BLOOD PRESSURE: 70 MMHG | WEIGHT: 219 LBS | BODY MASS INDEX: 30.54 KG/M2 | TEMPERATURE: 97.1 F | SYSTOLIC BLOOD PRESSURE: 130 MMHG

## 2021-04-14 DIAGNOSIS — I48.11 LONGSTANDING PERSISTENT ATRIAL FIBRILLATION (HCC): Primary | ICD-10-CM

## 2021-04-14 LAB — INR PPP: 2.1 (ref 0.9–1.1)

## 2021-04-14 PROCEDURE — 36416 COLLJ CAPILLARY BLOOD SPEC: CPT | Performed by: INTERNAL MEDICINE

## 2021-04-14 PROCEDURE — 85610 PROTHROMBIN TIME: CPT | Performed by: INTERNAL MEDICINE

## 2021-04-26 RX ORDER — ATENOLOL 50 MG/1
TABLET ORAL
Qty: 90 TABLET | Refills: 1 | Status: SHIPPED | OUTPATIENT
Start: 2021-04-26 | End: 2021-10-27

## 2021-04-29 RX ORDER — WARFARIN SODIUM 7.5 MG/1
TABLET ORAL
Qty: 110 TABLET | Refills: 0 | Status: SHIPPED | OUTPATIENT
Start: 2021-04-29 | End: 2021-07-23

## 2021-05-05 ENCOUNTER — OFFICE VISIT (OUTPATIENT)
Dept: CARDIOLOGY | Facility: CLINIC | Age: 71
End: 2021-05-05

## 2021-05-05 ENCOUNTER — ANTICOAGULATION VISIT (OUTPATIENT)
Dept: CARDIOLOGY | Facility: CLINIC | Age: 71
End: 2021-05-05

## 2021-05-05 VITALS
BODY MASS INDEX: 31.92 KG/M2 | HEART RATE: 85 BPM | DIASTOLIC BLOOD PRESSURE: 64 MMHG | WEIGHT: 213 LBS | SYSTOLIC BLOOD PRESSURE: 137 MMHG

## 2021-05-05 VITALS
SYSTOLIC BLOOD PRESSURE: 137 MMHG | HEIGHT: 69 IN | BODY MASS INDEX: 31.55 KG/M2 | WEIGHT: 213 LBS | HEART RATE: 85 BPM | DIASTOLIC BLOOD PRESSURE: 64 MMHG

## 2021-05-05 DIAGNOSIS — I48.21 PERMANENT ATRIAL FIBRILLATION (HCC): Primary | ICD-10-CM

## 2021-05-05 DIAGNOSIS — I25.10 CORONARY ARTERY DISEASE INVOLVING NATIVE CORONARY ARTERY OF NATIVE HEART WITHOUT ANGINA PECTORIS: ICD-10-CM

## 2021-05-05 DIAGNOSIS — I10 ESSENTIAL HYPERTENSION: ICD-10-CM

## 2021-05-05 DIAGNOSIS — I71.40 ABDOMINAL AORTIC ANEURYSM (AAA) WITHOUT RUPTURE (HCC): ICD-10-CM

## 2021-05-05 DIAGNOSIS — E78.00 PURE HYPERCHOLESTEROLEMIA: ICD-10-CM

## 2021-05-05 DIAGNOSIS — E11.9 TYPE 2 DIABETES MELLITUS WITHOUT COMPLICATION, WITHOUT LONG-TERM CURRENT USE OF INSULIN (HCC): ICD-10-CM

## 2021-05-05 DIAGNOSIS — G47.33 OBSTRUCTIVE SLEEP APNEA SYNDROME: ICD-10-CM

## 2021-05-05 DIAGNOSIS — I48.91 ATRIAL FIBRILLATION, UNSPECIFIED TYPE (HCC): Primary | ICD-10-CM

## 2021-05-05 LAB — INR PPP: 1.6 (ref 0.9–1.1)

## 2021-05-05 PROCEDURE — 85610 PROTHROMBIN TIME: CPT | Performed by: INTERNAL MEDICINE

## 2021-05-05 PROCEDURE — 99214 OFFICE O/P EST MOD 30 MIN: CPT | Performed by: INTERNAL MEDICINE

## 2021-05-05 PROCEDURE — 36416 COLLJ CAPILLARY BLOOD SPEC: CPT | Performed by: INTERNAL MEDICINE

## 2021-05-05 NOTE — PROGRESS NOTES
"    Subjective:     Encounter Date:05/05/2021      Patient ID: Sandro Ramirez is a 70 y.o. male.    Chief Complaint:  History of Present Illness 70-year-old white male with history of coronary artery disease status post coronary artery bypass surgery history of atrial fibrillation abdominal aortic aneurysm hypertension hyperlipidemia sleep apnea presents to my office for follow-up.  Patient is currently stable without any symptoms of chest pain or shortness of breath at rest on exertion.  No complaints any PND orthopnea.  Patient has occasional palpitations without any dizziness syncope or swelling of the feet but does take his medicine regularly but he is also using his CPAP patient.  He recently was diagnosed with diabetes and is careful with his blood pressure and heart rates and exercising.    The following portions of the patient's history were reviewed and updated as appropriate: allergies, current medications, past family history, past medical history, past social history, past surgical history and problem list.  Past Medical History:   Diagnosis Date   • Arthritis    • Asthma    • Atrial fibrillation (CMS/HCC)    • Benign prostatic hyperplasia    • Cancer (CMS/HCC)    • CHF (congestive heart failure) (CMS/HCC)    • Colon polyp    • Coronary artery disease    • Diabetes mellitus (CMS/HCC)    • Elevated cholesterol    • Erectile dysfunction    • HL (hearing loss)    • Hyperlipidemia    • Hypertension    • Myocardial infarction (CMS/HCC)    • Obesity    • Pancreatitis    • Pneumonia    • Sleep apnea      Past Surgical History:   Procedure Laterality Date   • CARDIAC CATHETERIZATION     • CHOLECYSTECTOMY     • COLONOSCOPY     • CORONARY ARTERY BYPASS GRAFT       /64   Pulse 85   Ht 174 cm (68.5\")   Wt 96.6 kg (213 lb)   BMI 31.92 kg/m²   Family History   Problem Relation Age of Onset   • Cancer Father    • Heart attack Father    • Hearing loss Father    • Heart disease Paternal Grandfather    • " Heart attack Paternal Grandfather        Current Outpatient Medications:   •  albuterol sulfate HFA (VENTOLIN HFA) 108 (90 Base) MCG/ACT inhaler, VENTOLIN  (90 Base) MCG/ACT AERS, Disp: , Rfl:   •  alfuzosin (UROXATRAL) 10 MG 24 hr tablet, TAKE 1 TABLET BY MOUTH 2 TIMES EACH DAY, Disp: , Rfl: 3  •  allopurinol (ZYLOPRIM) 300 MG tablet, Take 300 mg by mouth Daily., Disp: , Rfl:   •  aspirin 81 MG tablet, ASPIRIN 81 MG ORAL TABLET, Disp: , Rfl:   •  atenolol (TENORMIN) 50 MG tablet, TAKE 1 TABLET BY MOUTH EVERY DAY, Disp: 90 tablet, Rfl: 1  •  atorvastatin (LIPITOR) 20 MG tablet, TAKE 1 TABLET BY MOUTH EVERY DAY, Disp: 90 tablet, Rfl: 1  •  colchicine 0.6 MG tablet, Take 0.6 mg by mouth 2 (Two) Times a Day., Disp: , Rfl:   •  furosemide (LASIX) 40 MG tablet, Take 1 tablet by mouth Daily., Disp: 90 tablet, Rfl: 3  •  glimepiride (AMARYL) 2 MG tablet, TAKE 1 TABLET BY MOUTH TWICE A DAY, Disp: 60 tablet, Rfl: 2  •  glucose blood ([employer] Blood Glucose Test) test strip, Use to check blood sugars once a day  Dx E11.9, Disp: 100 each, Rfl: 3  •  losartan (COZAAR) 25 MG tablet, TAKE 2 TABLETS BY MOUTH EVERY DAY, Disp: 180 tablet, Rfl: 1  •  warfarin (COUMADIN) 7.5 MG tablet, TAKE 1 AND 1/2 TABLETS ON MONDAY AND 1 TABLET BY MOUTH THE OTHER 6 DAYS A WEEK., Disp: 110 tablet, Rfl: 0  Allergies   Allergen Reactions   • Meperidine GI Intolerance   • Midazolam Mental Status Change   • Propofol Mental Status Change   • Sulfa Antibiotics Hives   • Simvastatin Myalgia     Social History     Socioeconomic History   • Marital status:      Spouse name: Not on file   • Number of children: Not on file   • Years of education: Not on file   • Highest education level: Not on file   Tobacco Use   • Smoking status: Former Smoker     Packs/day: 1.00     Years: 50.00     Pack years: 50.00     Types: Cigarettes     Quit date: 2006     Years since quitting: 15.3   • Smokeless tobacco: Never Used   Vaping Use   • Vaping Use: Never  used   Substance and Sexual Activity   • Alcohol use: No   • Drug use: No   • Sexual activity: Not Currently     Partners: Female     Birth control/protection: Surgical     Review of Systems   Constitutional: Negative for fever and malaise/fatigue.   Cardiovascular: Positive for palpitations. Negative for chest pain, dyspnea on exertion and leg swelling.   Respiratory: Negative for cough and shortness of breath.    Skin: Negative for rash.   Gastrointestinal: Negative for abdominal pain, nausea and vomiting.   Neurological: Negative for focal weakness, headaches, light-headedness and numbness.   All other systems reviewed and are negative.             Objective:     Constitutional:       Appearance: Well-developed.   Eyes:      General: No scleral icterus.     Conjunctiva/sclera: Conjunctivae normal.   HENT:      Head: Normocephalic and atraumatic.   Neck:      Vascular: No carotid bruit or JVD.   Pulmonary:      Effort: Pulmonary effort is normal.      Breath sounds: Normal breath sounds. No wheezing. No rales.   Cardiovascular:      Normal rate. Regular rhythm.   Pulses:     Intact distal pulses.   Abdominal:      General: Bowel sounds are normal.      Palpations: Abdomen is soft.   Musculoskeletal:      Cervical back: Normal range of motion and neck supple. Skin:     General: Skin is warm and dry.      Findings: No rash.   Neurological:      Mental Status: Alert.       Procedures    Lab Review:         MDM  1.  Coronary disease  Patient has coronary artery bypass surgery x3 vessels with a LIMA to LAD and saphenous graft to the marginal branch and RCA in the past and has normal LV systolic function is currently stable on medications  2.  Atrial fibrillation  Patient has history of atrial fibrillation and is currently stable on medical therapy including atenolol and warfarin  3.  Diabetes  Patient has new onset diabetes and is on Amaryl  4.  Hyperlipidemia  Patient lipid levels are followed by the primary care  doctor is on Lipitor 20 mg once a day  4.  Sleep apnea  Patient is sleep apnea and uses a CPAP machine  5.  Hypertension  Patient blood pressure currently stable on atenolol 50 mg and losartan 25 mg once a day  6.  Abdominal aortic aneurysm  Patient is followed by the primary care doctor for the same.

## 2021-05-24 ENCOUNTER — OFFICE VISIT (OUTPATIENT)
Dept: FAMILY MEDICINE CLINIC | Facility: CLINIC | Age: 71
End: 2021-05-24

## 2021-05-24 ENCOUNTER — LAB (OUTPATIENT)
Dept: LAB | Facility: HOSPITAL | Age: 71
End: 2021-05-24

## 2021-05-24 VITALS
DIASTOLIC BLOOD PRESSURE: 54 MMHG | HEIGHT: 69 IN | WEIGHT: 213.8 LBS | OXYGEN SATURATION: 92 % | BODY MASS INDEX: 31.67 KG/M2 | HEART RATE: 95 BPM | RESPIRATION RATE: 17 BRPM | SYSTOLIC BLOOD PRESSURE: 131 MMHG | TEMPERATURE: 97.1 F

## 2021-05-24 DIAGNOSIS — E11.9 TYPE 2 DIABETES MELLITUS WITHOUT COMPLICATION, WITHOUT LONG-TERM CURRENT USE OF INSULIN (HCC): ICD-10-CM

## 2021-05-24 DIAGNOSIS — Z00.00 ENCOUNTER FOR ANNUAL WELLNESS EXAM IN MEDICARE PATIENT: ICD-10-CM

## 2021-05-24 DIAGNOSIS — E11.9 TYPE 2 DIABETES MELLITUS WITHOUT COMPLICATION, WITHOUT LONG-TERM CURRENT USE OF INSULIN (HCC): Primary | ICD-10-CM

## 2021-05-24 LAB
ANION GAP SERPL CALCULATED.3IONS-SCNC: 9.6 MMOL/L (ref 5–15)
BUN SERPL-MCNC: 17 MG/DL (ref 8–23)
BUN/CREAT SERPL: 16.7 (ref 7–25)
CALCIUM SPEC-SCNC: 9.7 MG/DL (ref 8.6–10.5)
CHLORIDE SERPL-SCNC: 100 MMOL/L (ref 98–107)
CO2 SERPL-SCNC: 27.4 MMOL/L (ref 22–29)
CREAT SERPL-MCNC: 1.02 MG/DL (ref 0.76–1.27)
GFR SERPL CREATININE-BSD FRML MDRD: 73 ML/MIN/1.73
GLUCOSE SERPL-MCNC: 150 MG/DL (ref 65–99)
HBA1C MFR BLD: 7.4 % (ref 3.5–5.6)
POTASSIUM SERPL-SCNC: 4.2 MMOL/L (ref 3.5–5.2)
SODIUM SERPL-SCNC: 137 MMOL/L (ref 136–145)

## 2021-05-24 PROCEDURE — 80048 BASIC METABOLIC PNL TOTAL CA: CPT

## 2021-05-24 PROCEDURE — 99213 OFFICE O/P EST LOW 20 MIN: CPT | Performed by: FAMILY MEDICINE

## 2021-05-24 PROCEDURE — 83036 HEMOGLOBIN GLYCOSYLATED A1C: CPT

## 2021-05-24 PROCEDURE — 36415 COLL VENOUS BLD VENIPUNCTURE: CPT

## 2021-05-24 PROCEDURE — G0439 PPPS, SUBSEQ VISIT: HCPCS | Performed by: FAMILY MEDICINE

## 2021-05-24 NOTE — PATIENT INSTRUCTIONS
Continue your current medications and treatment.    Have the follow up labs done and call for results.    Follow up in the office in 3 months.    Further care will depend on the results of the tests and how you progress.

## 2021-05-24 NOTE — PROGRESS NOTES
The ABCs of the Annual Wellness Visit  Subsequent Medicare Wellness Visit    Chief Complaint   Patient presents with   • Medicare Wellness-subsequent   • Blood Sugar Problem     6 week followup       Subjective   History of Present Illness:  Sandro Ramirez is a 70 y.o. male who presents for a Subsequent Medicare Wellness Visit.    HEALTH RISK ASSESSMENT    Recent Hospitalizations:  No hospitalization(s) within the last year.    Current Medical Providers:  Patient Care Team:  James Tirado Jr., MD as PCP - General  Thai Galloway MD as Consulting Physician (Interventional Cardiology)    Smoking Status:  Social History     Tobacco Use   Smoking Status Former Smoker   • Packs/day: 1.00   • Years: 50.00   • Pack years: 50.00   • Types: Cigarettes   • Quit date: 2006   • Years since quitting: 15.4   Smokeless Tobacco Never Used       Alcohol Consumption:  Social History     Substance and Sexual Activity   Alcohol Use No       Depression Screen:   PHQ-2/PHQ-9 Depression Screening 5/24/2021   Little interest or pleasure in doing things 0   Feeling down, depressed, or hopeless 0   Total Score 0       Fall Risk Screen:  STEADI Fall Risk Assessment was completed, and patient is at LOW risk for falls.Assessment completed on:5/24/2021    Health Habits and Functional and Cognitive Screening:  Functional & Cognitive Status 5/24/2021   Do you have difficulty preparing food and eating? No   Do you have difficulty bathing yourself, getting dressed or grooming yourself? No   Do you have difficulty using the toilet? No   Do you have difficulty moving around from place to place? No   Do you have trouble with steps or getting out of a bed or a chair? No   Current Diet Well Balanced Diet   Dental Exam Not up to date   Eye Exam Up to date   Exercise (times per week) 0 times per week   Current Exercise Activities Include None   Do you need help using the phone?  No   Are you deaf or do you have serious difficulty hearing?  Yes   Do  you need help with transportation? No   Do you need help shopping? No   Do you need help preparing meals?  No   Do you need help with housework?  No   Do you need help with laundry? No   Do you need help taking your medications? No   Do you need help managing money? No   Do you ever drive or ride in a car without wearing a seat belt? No   Have you felt unusual stress, anger or loneliness in the last month? No   Who do you live with? Spouse   If you need help, do you have trouble finding someone available to you? No   Have you been bothered in the last four weeks by sexual problems? No   Do you have difficulty concentrating, remembering or making decisions? Yes         Does the patient have evidence of cognitive impairment? No    Asprin use counseling:Taking ASA appropriately as indicated    Age-appropriate Screening Schedule:  Refer to the list below for future screening recommendations based on patient's age, sex and/or medical conditions. Orders for these recommended tests are listed in the plan section. The patient has been provided with a written plan.    Health Maintenance   Topic Date Due   • LIPID PANEL  06/30/2021   • HEMOGLOBIN A1C  07/06/2021   • INFLUENZA VACCINE  08/01/2021   • DIABETIC EYE EXAM  09/21/2021   • DIABETIC FOOT EXAM  Discontinued   • URINE MICROALBUMIN  Discontinued   • TDAP/TD VACCINES  Discontinued   • ZOSTER VACCINE  Discontinued          The following portions of the patient's history were reviewed and updated as appropriate: allergies, current medications, past family history, past medical history, past social history, past surgical history and problem list.    Outpatient Medications Prior to Visit   Medication Sig Dispense Refill   • albuterol sulfate HFA (VENTOLIN HFA) 108 (90 Base) MCG/ACT inhaler VENTOLIN  (90 Base) MCG/ACT AERS     • alfuzosin (UROXATRAL) 10 MG 24 hr tablet TAKE 1 TABLET BY MOUTH 2 TIMES EACH DAY  3   • allopurinol (ZYLOPRIM) 300 MG tablet Take 300 mg by  mouth Daily.     • aspirin 81 MG tablet ASPIRIN 81 MG ORAL TABLET     • atenolol (TENORMIN) 50 MG tablet TAKE 1 TABLET BY MOUTH EVERY DAY 90 tablet 1   • atorvastatin (LIPITOR) 20 MG tablet TAKE 1 TABLET BY MOUTH EVERY DAY 90 tablet 1   • colchicine 0.6 MG tablet Take 0.6 mg by mouth 2 (Two) Times a Day.     • furosemide (LASIX) 40 MG tablet Take 1 tablet by mouth Daily. 90 tablet 3   • glimepiride (AMARYL) 2 MG tablet TAKE 1 TABLET BY MOUTH TWICE A DAY 60 tablet 2   • glucose blood ([facility] Blood Glucose Test) test strip Use to check blood sugars once a day    Dx E11.9 100 each 3   • losartan (COZAAR) 25 MG tablet TAKE 2 TABLETS BY MOUTH EVERY  tablet 1   • warfarin (COUMADIN) 7.5 MG tablet TAKE 1 AND 1/2 TABLETS ON MONDAY AND 1 TABLET BY MOUTH THE OTHER 6 DAYS A WEEK. 110 tablet 0     No facility-administered medications prior to visit.       Patient Active Problem List   Diagnosis   • Atrial fibrillation (CMS/HCC) [I48.91]   • Abdominal aortic aneurysm (CMS/HCC)   • Bladder cancer (CMS/HCC)   • Atherosclerosis of native coronary artery of native heart with angina pectoris (CMS/HCC)   • Hyperlipidemia   • Hypertension   • Sleep apnea   • Status post coronary artery bypass graft   • Hyperuricemia   • Type 2 diabetes mellitus without complication, without long-term current use of insulin (CMS/HCC)       Advanced Care Planning:  ACP discussion was held with the patient during this visit. Patient does not have an advance directive, information provided.    Review of Systems    Compared to one year ago, the patient feels his physical health is the same.  Compared to one year ago, the patient feels his mental health is the same.    Reviewed chart for potential of high risk medication in the elderly: yes  Reviewed chart for potential of harmful drug interactions in the elderly:yes    Objective         Vitals:    05/24/21 0956   BP: 131/54   BP Location: Left arm   Patient Position: Sitting   Cuff Size: Adult  "  Pulse: 95   Resp: 17   Temp: 97.1 °F (36.2 °C)   TempSrc: Infrared   SpO2: 92%   Weight: 97 kg (213 lb 12.8 oz)   Height: 174 cm (68.5\")   PainSc: 0-No pain       Body mass index is 32.04 kg/m².  Discussed the patient's BMI with him. The BMI is above average; BMI management plan is completed.    Physical Exam  Vitals and nursing note reviewed.   Constitutional:       Appearance: He is well-developed.   HENT:      Head: Normocephalic and atraumatic.      Mouth/Throat:      Mouth: Mucous membranes are moist.      Pharynx: Oropharynx is clear.   Eyes:      Extraocular Movements: Extraocular movements intact.      Conjunctiva/sclera: Conjunctivae normal.      Pupils: Pupils are equal, round, and reactive to light.   Cardiovascular:      Rate and Rhythm: Normal rate. Rhythm irregularly irregular.      Pulses: Normal pulses.      Heart sounds: Normal heart sounds.   Pulmonary:      Effort: Pulmonary effort is normal.      Breath sounds: Normal breath sounds.   Abdominal:      General: Abdomen is flat. Bowel sounds are normal.      Palpations: Abdomen is soft.   Musculoskeletal:         General: Normal range of motion.      Cervical back: Neck supple.   Skin:     General: Skin is warm and dry.   Neurological:      Mental Status: He is oriented to person, place, and time.   Psychiatric:         Behavior: Behavior normal.         Thought Content: Thought content normal.         Judgment: Judgment normal.               Assessment/Plan   Medicare Risks and Personalized Health Plan  CMS Preventative Services Quick Reference  Advance Directive Discussion  Immunizations Discussed/Encouraged (specific immunizations; Td, Influenza, Pneumococcal 23, Shingrix and COVID19 )    The above risks/problems have been discussed with the patient.  Pertinent information has been shared with the patient in the After Visit Summary.  Follow up plans and orders are seen below in the Assessment/Plan Section.    Diagnoses and all orders for this " visit:    1. Encounter for annual wellness exam in Medicare patient (Primary)      Follow Up:  Return in about 3 months (around 8/24/2021).     An After Visit Summary and PPPS were given to the patient.

## 2021-05-24 NOTE — PROGRESS NOTES
"Subjective   Sandro Ramirez is a 70 y.o. male.     Chief Complaint   Patient presents with   • Medicare Wellness-subsequent   • Blood Sugar Problem     6 week followup       HPI  Chief complaint: Type 2 diabetes mellitus    The patient is a 70-year-old white male comes in for follow-up and maintenance of his current problems which include    1.  Type 2 diabetes mellitus-stable-patient is currently on Amaryl 2 mg twice a day.  Patient's last A1c was not at goal.  Patient states that he has been doing better with his diet.  I recommended that the patient take Jardiance in addition to his current medications.  He states he cannot afford it.  He also states he cannot tolerate Metformin.  States his blood sugars are coming down.    His other problems include atrial fibrillation coronary artery disease hypertension hyperlipidemia bladder cancer.        The following portions of the patient's history were reviewed and updated as appropriate: allergies, current medications, past family history, past medical history, past social history, past surgical history and problem list.    Review of Systems    Objective     /54 (BP Location: Left arm, Patient Position: Sitting, Cuff Size: Adult)   Pulse 95   Temp 97.1 °F (36.2 °C) (Infrared)   Resp 17   Ht 174 cm (68.5\")   Wt 97 kg (213 lb 12.8 oz)   SpO2 92%   BMI 32.04 kg/m²     Physical Exam  Vitals and nursing note reviewed.   Constitutional:       Appearance: He is well-developed. He is obese.   HENT:      Head: Normocephalic and atraumatic.      Nose: Nose normal.      Mouth/Throat:      Mouth: Mucous membranes are moist.      Pharynx: Oropharynx is clear.   Eyes:      Extraocular Movements: Extraocular movements intact.      Conjunctiva/sclera: Conjunctivae normal.      Pupils: Pupils are equal, round, and reactive to light.   Cardiovascular:      Rate and Rhythm: Normal rate. Rhythm irregularly irregular.      Pulses: Normal pulses.      Heart sounds: Normal " heart sounds.   Pulmonary:      Effort: Pulmonary effort is normal.      Breath sounds: Normal breath sounds.   Abdominal:      General: Bowel sounds are normal.      Palpations: Abdomen is soft.   Musculoskeletal:         General: Normal range of motion.      Cervical back: Neck supple.   Skin:     General: Skin is warm and dry.   Neurological:      Mental Status: He is alert and oriented to person, place, and time.   Psychiatric:         Behavior: Behavior normal.         Thought Content: Thought content normal.         Judgment: Judgment normal.           Assessment/Plan   Diagnoses and all orders for this visit:    1. Type 2 diabetes mellitus without complication, without long-term current use of insulin (CMS/[facility]) (Primary)  -     Hemoglobin A1c; Future  -     Basic Metabolic Panel; Future    2. Encounter for annual wellness exam in Medicare patient      Patient Instructions   Continue your current medications and treatment.    Have the follow up labs done and call for results.    Follow up in the office in 3 months.    Further care will depend on the results of the tests and how you progress.      James Tirado Jr., MD    05/24/21

## 2021-06-10 ENCOUNTER — ANTICOAGULATION VISIT (OUTPATIENT)
Dept: CARDIOLOGY | Facility: CLINIC | Age: 71
End: 2021-06-10

## 2021-06-10 VITALS
HEART RATE: 75 BPM | BODY MASS INDEX: 32.81 KG/M2 | SYSTOLIC BLOOD PRESSURE: 142 MMHG | DIASTOLIC BLOOD PRESSURE: 78 MMHG | WEIGHT: 219 LBS

## 2021-06-10 DIAGNOSIS — I48.91 ATRIAL FIBRILLATION, UNSPECIFIED TYPE (HCC): Primary | ICD-10-CM

## 2021-06-10 LAB — INR PPP: 2.3 (ref 0.9–1.1)

## 2021-06-10 PROCEDURE — 36416 COLLJ CAPILLARY BLOOD SPEC: CPT | Performed by: INTERNAL MEDICINE

## 2021-06-10 PROCEDURE — 85610 PROTHROMBIN TIME: CPT | Performed by: INTERNAL MEDICINE

## 2021-06-17 ENCOUNTER — TELEPHONE (OUTPATIENT)
Dept: CARDIOLOGY | Facility: CLINIC | Age: 71
End: 2021-06-17

## 2021-06-17 NOTE — TELEPHONE ENCOUNTER
DR. DENTON LESTER  PHONE [phone]  [phone]  [facility]  OK TO HOLD WARFARIN?  LAST OFFICE VISIT 5/5/21

## 2021-07-14 RX ORDER — LOSARTAN POTASSIUM 25 MG/1
TABLET ORAL
Qty: 180 TABLET | Refills: 1 | Status: SHIPPED | OUTPATIENT
Start: 2021-07-14 | End: 2022-03-08

## 2021-07-19 RX ORDER — GLIMEPIRIDE 2 MG/1
TABLET ORAL
Qty: 180 TABLET | Refills: 0 | Status: SHIPPED | OUTPATIENT
Start: 2021-07-19 | End: 2021-07-21 | Stop reason: SDUPTHER

## 2021-07-20 ENCOUNTER — ANTICOAGULATION VISIT (OUTPATIENT)
Dept: CARDIOLOGY | Facility: CLINIC | Age: 71
End: 2021-07-20

## 2021-07-20 VITALS
DIASTOLIC BLOOD PRESSURE: 62 MMHG | BODY MASS INDEX: 32.66 KG/M2 | HEART RATE: 88 BPM | SYSTOLIC BLOOD PRESSURE: 144 MMHG | WEIGHT: 218 LBS

## 2021-07-20 DIAGNOSIS — I48.91 ATRIAL FIBRILLATION, UNSPECIFIED TYPE (HCC): Primary | ICD-10-CM

## 2021-07-20 LAB — INR PPP: 1.1 (ref 0.9–1.1)

## 2021-07-20 PROCEDURE — 36416 COLLJ CAPILLARY BLOOD SPEC: CPT | Performed by: INTERNAL MEDICINE

## 2021-07-20 PROCEDURE — 85610 PROTHROMBIN TIME: CPT | Performed by: INTERNAL MEDICINE

## 2021-07-21 NOTE — TELEPHONE ENCOUNTER
Caller: Sandro Ramirez    Relationship: Self    Best call back number: [phone]    Medication needed:   Requested Prescriptions     Pending Prescriptions Disp Refills   • glimepiride (AMARYL) 2 MG tablet 180 tablet 0     Sig: Take 1 tablet by mouth 2 (Two) Times a Day.       What additional details did the patient provide when requesting the medication: PATIENT IS GOING TO PAY FOR IT HIMSELF AT [facility] WITH [employer]. PATIENT CURRENTLY HAS A 90 SUPPLY. PATIENT IS REQUESTING AN EXTRA 90 DAY SUPPLY     Does the patient have less than a 3 day supply:  [] Yes  [x] No    What is the patient's preferred pharmacy:   [address], [place]  [address] [facility] [phone] [facility] [phone] [facility]

## 2021-07-22 RX ORDER — GLIMEPIRIDE 2 MG/1
2 TABLET ORAL 2 TIMES DAILY
Qty: 180 TABLET | Refills: 0 | Status: SHIPPED | OUTPATIENT
Start: 2021-07-22 | End: 2021-10-14

## 2021-07-23 RX ORDER — WARFARIN SODIUM 7.5 MG/1
TABLET ORAL
Qty: 110 TABLET | Refills: 0 | Status: SHIPPED | OUTPATIENT
Start: 2021-07-23 | End: 2022-06-13 | Stop reason: SDUPTHER

## 2021-08-12 ENCOUNTER — ANTICOAGULATION VISIT (OUTPATIENT)
Dept: CARDIOLOGY | Facility: CLINIC | Age: 71
End: 2021-08-12

## 2021-08-12 VITALS
HEART RATE: 83 BPM | SYSTOLIC BLOOD PRESSURE: 140 MMHG | WEIGHT: 218 LBS | BODY MASS INDEX: 32.66 KG/M2 | DIASTOLIC BLOOD PRESSURE: 74 MMHG

## 2021-08-12 DIAGNOSIS — I48.11 LONGSTANDING PERSISTENT ATRIAL FIBRILLATION (HCC): Primary | ICD-10-CM

## 2021-08-12 LAB — INR PPP: 1.9 (ref 0.9–1.1)

## 2021-08-12 PROCEDURE — 36416 COLLJ CAPILLARY BLOOD SPEC: CPT | Performed by: INTERNAL MEDICINE

## 2021-08-12 PROCEDURE — 85610 PROTHROMBIN TIME: CPT | Performed by: INTERNAL MEDICINE

## 2021-08-25 ENCOUNTER — LAB (OUTPATIENT)
Dept: LAB | Facility: HOSPITAL | Age: 71
End: 2021-08-25

## 2021-08-25 ENCOUNTER — OFFICE VISIT (OUTPATIENT)
Dept: FAMILY MEDICINE CLINIC | Facility: CLINIC | Age: 71
End: 2021-08-25

## 2021-08-25 VITALS
DIASTOLIC BLOOD PRESSURE: 88 MMHG | TEMPERATURE: 96.8 F | SYSTOLIC BLOOD PRESSURE: 127 MMHG | HEART RATE: 81 BPM | RESPIRATION RATE: 18 BRPM | OXYGEN SATURATION: 97 % | BODY MASS INDEX: 32.29 KG/M2 | WEIGHT: 218 LBS | HEIGHT: 69 IN

## 2021-08-25 DIAGNOSIS — E11.9 TYPE 2 DIABETES MELLITUS WITHOUT COMPLICATION, WITHOUT LONG-TERM CURRENT USE OF INSULIN (HCC): Chronic | ICD-10-CM

## 2021-08-25 DIAGNOSIS — G47.33 OBSTRUCTIVE SLEEP APNEA SYNDROME: Chronic | ICD-10-CM

## 2021-08-25 DIAGNOSIS — E78.00 PURE HYPERCHOLESTEROLEMIA: Chronic | ICD-10-CM

## 2021-08-25 DIAGNOSIS — I25.119 ATHEROSCLEROSIS OF NATIVE CORONARY ARTERY OF NATIVE HEART WITH ANGINA PECTORIS (HCC): Chronic | ICD-10-CM

## 2021-08-25 DIAGNOSIS — I10 ESSENTIAL HYPERTENSION: Chronic | ICD-10-CM

## 2021-08-25 DIAGNOSIS — I10 ESSENTIAL HYPERTENSION: Primary | Chronic | ICD-10-CM

## 2021-08-25 DIAGNOSIS — E79.0 HYPERURICEMIA: Chronic | ICD-10-CM

## 2021-08-25 DIAGNOSIS — I48.21 PERMANENT ATRIAL FIBRILLATION (HCC): Chronic | ICD-10-CM

## 2021-08-25 PROBLEM — N40.0 PROSTATISM: Status: ACTIVE | Noted: 2021-08-25

## 2021-08-25 LAB
ALT SERPL W P-5'-P-CCNC: 23 U/L (ref 1–41)
ANION GAP SERPL CALCULATED.3IONS-SCNC: 7.3 MMOL/L (ref 5–15)
BUN SERPL-MCNC: 15 MG/DL (ref 8–23)
BUN/CREAT SERPL: 16.1 (ref 7–25)
CALCIUM SPEC-SCNC: 8.9 MG/DL (ref 8.6–10.5)
CHLORIDE SERPL-SCNC: 102 MMOL/L (ref 98–107)
CHOLEST SERPL-MCNC: 134 MG/DL (ref 0–200)
CO2 SERPL-SCNC: 26.7 MMOL/L (ref 22–29)
CREAT SERPL-MCNC: 0.93 MG/DL (ref 0.76–1.27)
CREAT UR-MCNC: 120.8 MG/DL
GFR SERPL CREATININE-BSD FRML MDRD: 81 ML/MIN/1.73
GLUCOSE SERPL-MCNC: 134 MG/DL (ref 65–99)
HBA1C MFR BLD: 7.9 % (ref 3.5–5.6)
HDLC SERPL-MCNC: 33 MG/DL (ref 40–60)
LDLC SERPL CALC-MCNC: 76 MG/DL (ref 0–100)
LDLC/HDLC SERPL: 2.19 {RATIO}
POTASSIUM SERPL-SCNC: 4.3 MMOL/L (ref 3.5–5.2)
PROT UR-MCNC: 38 MG/DL
PROT/CREAT UR: 314.6 MG/G CREA (ref 0–200)
SODIUM SERPL-SCNC: 136 MMOL/L (ref 136–145)
TRIGL SERPL-MCNC: 144 MG/DL (ref 0–150)
VLDLC SERPL-MCNC: 25 MG/DL (ref 5–40)

## 2021-08-25 PROCEDURE — 84156 ASSAY OF PROTEIN URINE: CPT

## 2021-08-25 PROCEDURE — 99214 OFFICE O/P EST MOD 30 MIN: CPT | Performed by: FAMILY MEDICINE

## 2021-08-25 PROCEDURE — 80061 LIPID PANEL: CPT

## 2021-08-25 PROCEDURE — 36415 COLL VENOUS BLD VENIPUNCTURE: CPT

## 2021-08-25 PROCEDURE — 82570 ASSAY OF URINE CREATININE: CPT

## 2021-08-25 PROCEDURE — 80048 BASIC METABOLIC PNL TOTAL CA: CPT

## 2021-08-25 PROCEDURE — 84460 ALANINE AMINO (ALT) (SGPT): CPT

## 2021-08-25 PROCEDURE — 83036 HEMOGLOBIN GLYCOSYLATED A1C: CPT

## 2021-08-25 NOTE — PROGRESS NOTES
"Subjective   Sandro Ramirez is a 71 y.o. male.     Chief Complaint   Patient presents with   • Diabetes     6 month follouwp   • Hypertension   • Hyperlipidemia   • Atrial Fibrillation       HPI  Chief complaint: Hypertension hyperlipidemia coronary artery disease atrial fibrillation type 2 diabetes mellitus    Patient is a 71-year-old white male comes in for follow-up and maintenance of his current problems which include    1.  Hypertension-stable-patient on Cozaar 25 mg daily atenolol 50 mg daily Lasix 40 mg daily.  He denied headache lightheadedness dizziness or chest pain.    2.  Hyperlipidemia-stable-patient on Lipitor 20 mg daily.  He denies myalgias and arthralgias.  Denies nausea or anorexia.    3.  Type 2 diabetes mellitus-stable-patient on glimepiride 2 mg twice a day.  He is asymptomatic.  States his blood sugars run between 150 and 200.  He is due for an A1c.    4.  Atrial fibrillation-stable-patient on atenolol 50 mg daily and Coumadin 7.5 mg daily.  He denies episodes of rapid or slow heart rhythm.  Are no palpitations lightheadedness or dizziness.      5.  Coronary artery disease-stable-patient on aspirin 81 mg daily metoprolol.  Denies chest pain shortness of breath orthopnea or PND.    6.  Hyperuricemia-stable-patient on allopurinol 300 mg daily colchicine 0.6 twice a day.  Denied urinary episode of gouty arthritis.    7.  Prostatism-stable on patient on Uroxatrol 10 mg daily.        The following portions of the patient's history were reviewed and updated as appropriate: allergies, current medications, past family history, past medical history, past social history, past surgical history and problem list.    Review of Systems    Objective     /88 (BP Location: Left arm, Patient Position: Sitting, Cuff Size: Adult)   Pulse 81   Temp 96.8 °F (36 °C) (Infrared)   Resp 18   Ht 174 cm (68.5\")   Wt 98.9 kg (218 lb)   SpO2 97%   BMI 32.66 kg/m²     Physical Exam  Vitals and nursing note " reviewed.   Constitutional:       Appearance: He is well-developed. He is obese.   HENT:      Head: Normocephalic and atraumatic.      Nose: Nose normal.      Mouth/Throat:      Mouth: Mucous membranes are moist.      Pharynx: Oropharynx is clear.   Eyes:      Extraocular Movements: Extraocular movements intact.      Conjunctiva/sclera: Conjunctivae normal.      Pupils: Pupils are equal, round, and reactive to light.   Cardiovascular:      Rate and Rhythm: Normal rate. Rhythm regularly irregular.      Pulses: Normal pulses.      Heart sounds: Normal heart sounds.   Pulmonary:      Effort: Pulmonary effort is normal.      Breath sounds: Normal breath sounds.   Abdominal:      General: Bowel sounds are normal.      Palpations: Abdomen is soft.   Musculoskeletal:         General: Normal range of motion.      Cervical back: Neck supple.   Skin:     General: Skin is warm and dry.   Neurological:      Mental Status: He is alert and oriented to person, place, and time.   Psychiatric:         Behavior: Behavior normal.         Thought Content: Thought content normal.         Judgment: Judgment normal.           Assessment/Plan   Diagnoses and all orders for this visit:    1. Essential hypertension (Primary)  -     Basic Metabolic Panel; Future    2. Pure hypercholesterolemia  -     Lipid Panel; Future  -     ALT; Future    3. Atherosclerosis of native coronary artery of native heart with angina pectoris (CMS/HCC)    4. Type 2 diabetes mellitus without complication, without long-term current use of insulin (CMS/HCC)  -     Protein / Creatinine Ratio, Urine - Urine, Clean Catch; Future  -     Hemoglobin A1c; Future    5. Permanent atrial fibrillation (CMS/HCC)    6. Hyperuricemia    7. Obstructive sleep apnea syndrome      Patient Instructions   Continue your current medications and treatment.    Have the follow up labs done and call for results.    Follow up in the office in 3 months.      James Tirado Jr.,  MD    08/25/21

## 2021-09-16 ENCOUNTER — ANTICOAGULATION VISIT (OUTPATIENT)
Dept: CARDIOLOGY | Facility: CLINIC | Age: 71
End: 2021-09-16

## 2021-09-16 VITALS
SYSTOLIC BLOOD PRESSURE: 169 MMHG | WEIGHT: 219 LBS | DIASTOLIC BLOOD PRESSURE: 84 MMHG | BODY MASS INDEX: 32.81 KG/M2 | HEART RATE: 73 BPM

## 2021-09-16 DIAGNOSIS — I48.11 LONGSTANDING PERSISTENT ATRIAL FIBRILLATION (HCC): Primary | ICD-10-CM

## 2021-09-16 DIAGNOSIS — I48.91 ATRIAL FIBRILLATION, UNSPECIFIED TYPE (HCC): ICD-10-CM

## 2021-09-16 LAB — INR PPP: 1.9 (ref 0.9–1.1)

## 2021-09-16 PROCEDURE — 36416 COLLJ CAPILLARY BLOOD SPEC: CPT | Performed by: INTERNAL MEDICINE

## 2021-09-16 PROCEDURE — 85610 PROTHROMBIN TIME: CPT | Performed by: INTERNAL MEDICINE

## 2021-09-29 ENCOUNTER — TELEPHONE (OUTPATIENT)
Dept: CARDIOLOGY | Facility: CLINIC | Age: 71
End: 2021-09-29

## 2021-09-29 RX ORDER — ATORVASTATIN CALCIUM 20 MG/1
20 TABLET, FILM COATED ORAL DAILY
Qty: 90 TABLET | Refills: 1 | Status: SHIPPED | OUTPATIENT
Start: 2021-09-29 | End: 2022-03-28

## 2021-09-29 NOTE — TELEPHONE ENCOUNTER
Incoming Refill Request      Medication requested (name and dose): Atorvastatin 20 mg    Pharmacy where request should be sent: [facility]    Additional details provided by patient: HE IS OUT OF MED. KNOWS HIS BP IS HIGH.    Best call back number: [phone]    Does the patient have less than a 3 day supply:  [x] Yes  [] No    Robyn Munson Rep  09/29/21, 09:37 EDT

## 2021-09-29 NOTE — TELEPHONE ENCOUNTER
Rx Refill Note  Requested Prescriptions     Pending Prescriptions Disp Refills   • atorvastatin (LIPITOR) 20 MG tablet 90 tablet 1     Sig: Take 1 tablet by mouth Daily.      Last office visit with prescribing clinician: 5/5/2021      Next office visit with prescribing clinician: 12/2/2021            Camelia Mccray MA  09/29/21, 09:41 EDT

## 2021-10-08 NOTE — PROGRESS NOTES
Chief Complaint  Sleep Apnea    Subjective          Sandro Ramirez presents to [facility] NEUROLOGY  History of Present Illness  SUPA, yearly f/u for CPAP compliance, patient doing well with pap therapy. paptient states he want to change to FFM due to sinus issues this time of year,Patient uses a nasal mask and goes through [employer] for supplies.      SLEEP TESTING HISTORY:    On NPSG at [facility] , 09/12/2017 patient had Mild obstructive sleep apnea syndrome with apnea-hypopnea index of 6.3 per sleep hour, minimum SpO2 of 88%    PAP download:  The patient is on CPAP therapy at 10-20 cm/H2O.   Data indicates Excellent compliance. With 90% usage for more than 4 hours with an average usage of 11 hours 4 minutes. AHI down to 0.6 .  Average pressures 10.3.  Average large leak 2min.     The patient's hypersomnia has resolved       [place]on Sleepiness Scale:  Sitting and reading 0 WatchingTV 0  Sitting, inactive, in a public place 0  As a passenger in a car for 1 hour w/o a break  0  Lying down to rest in the afternoon  3  Sitting and talking to someone  0  Sitting quietly after a lunch  0  In a car, while stopped for traffic or a light  0  Total 3  Review of Systems   Constitutional: Positive for fatigue.   HENT: Positive for congestion, hearing loss and sinus pressure. Negative for sinus pain.    Eyes: Positive for itching.   Respiratory: Negative for cough and shortness of breath.    Cardiovascular: Negative for chest pain.   Gastrointestinal: Negative for abdominal distention and abdominal pain.   Endocrine: Negative for cold intolerance and heat intolerance.   Genitourinary: Negative for frequency and urgency.   Musculoskeletal: Negative for neck pain and neck stiffness.   Neurological: Negative for dizziness and headaches.   Psychiatric/Behavioral: Negative for decreased concentration and sleep disturbance.       Objective   Vital Signs:   /91   Pulse 80   Temp 96.9 °F (36.1 °C) (Temporal)    "Resp 18   Ht 180.3 cm (71\")   Wt 96.2 kg (212 lb)   BMI 29.57 kg/m²     Physical Exam  Vitals reviewed.   Neurological:      General: No focal deficit present.      Mental Status: He is alert and oriented to person, place, and time.   Psychiatric:         Mood and Affect: Mood normal.         Behavior: Behavior normal.        Result Review :                 Assessment and Plan    Diagnoses and all orders for this visit:    1. Obstructive sleep apnea syndrome (Primary)      Pt is aware of the recall, will continue to use due to cad, chf history  The patient is compliant with and benefiting from PAP therapy.      Follow Up   Return in about 1 year (around 10/14/2022).    Patient was given instructions and counseling regarding his condition or for health maintenance advice. Please see specific information pulled into the AVS if appropriate.       This document has been electronically signed by Joseph Seipel, MD on October 14, 2021 11:24 EDT  "

## 2021-10-14 ENCOUNTER — OFFICE VISIT (OUTPATIENT)
Dept: NEUROLOGY | Facility: CLINIC | Age: 71
End: 2021-10-14

## 2021-10-14 VITALS
DIASTOLIC BLOOD PRESSURE: 91 MMHG | WEIGHT: 212 LBS | SYSTOLIC BLOOD PRESSURE: 147 MMHG | HEIGHT: 71 IN | BODY MASS INDEX: 29.68 KG/M2 | HEART RATE: 80 BPM | RESPIRATION RATE: 18 BRPM | TEMPERATURE: 96.9 F

## 2021-10-14 DIAGNOSIS — G47.33 OBSTRUCTIVE SLEEP APNEA SYNDROME: Primary | Chronic | ICD-10-CM

## 2021-10-14 PROCEDURE — 99213 OFFICE O/P EST LOW 20 MIN: CPT | Performed by: PSYCHIATRY & NEUROLOGY

## 2021-10-14 RX ORDER — GLIMEPIRIDE 2 MG/1
TABLET ORAL
Qty: 90 TABLET | Refills: 1 | Status: SHIPPED | OUTPATIENT
Start: 2021-10-14 | End: 2021-11-23

## 2021-10-21 ENCOUNTER — ANTICOAGULATION VISIT (OUTPATIENT)
Dept: CARDIOLOGY | Facility: CLINIC | Age: 71
End: 2021-10-21

## 2021-10-21 VITALS
BODY MASS INDEX: 29.29 KG/M2 | SYSTOLIC BLOOD PRESSURE: 152 MMHG | WEIGHT: 210 LBS | DIASTOLIC BLOOD PRESSURE: 77 MMHG | HEART RATE: 77 BPM

## 2021-10-21 DIAGNOSIS — Z79.01 LONG TERM (CURRENT) USE OF ANTICOAGULANTS: ICD-10-CM

## 2021-10-21 DIAGNOSIS — I48.21 PERMANENT ATRIAL FIBRILLATION (HCC): Primary | ICD-10-CM

## 2021-10-21 LAB — INR PPP: 1.7 (ref 0.9–1.1)

## 2021-10-21 PROCEDURE — 36416 COLLJ CAPILLARY BLOOD SPEC: CPT | Performed by: INTERNAL MEDICINE

## 2021-10-21 PROCEDURE — 85610 PROTHROMBIN TIME: CPT | Performed by: INTERNAL MEDICINE

## 2021-10-26 ENCOUNTER — TELEPHONE (OUTPATIENT)
Dept: NEUROLOGY | Facility: CLINIC | Age: 71
End: 2021-10-26

## 2021-10-26 DIAGNOSIS — G47.33 OBSTRUCTIVE SLEEP APNEA SYNDROME: Primary | ICD-10-CM

## 2021-10-26 NOTE — TELEPHONE ENCOUNTER
----- Message from Joseph F Seipel, MD sent at 10/14/2021 11:22 AM EDT -----   nasal mask and goes through [facility]

## 2021-10-27 RX ORDER — ATENOLOL 50 MG/1
TABLET ORAL
Qty: 90 TABLET | Refills: 3 | Status: SHIPPED | OUTPATIENT
Start: 2021-10-27 | End: 2022-12-19

## 2021-10-27 NOTE — TELEPHONE ENCOUNTER
Rx Refill Note  Requested Prescriptions     Pending Prescriptions Disp Refills   • atenolol (TENORMIN) 50 MG tablet [Pharmacy Med Name: ATENOLOL 50 MG TABLET] 90 tablet 1     Sig: TAKE 1 TABLET BY MOUTH EVERY DAY      Last office visit with prescribing clinician: 5/5/2021      Next office visit with prescribing clinician: 12/2/2021            Kaleb Tamez MA  10/27/21, 08:07 EDT

## 2021-11-23 RX ORDER — GLIMEPIRIDE 2 MG/1
TABLET ORAL
Qty: 180 TABLET | Refills: 0 | Status: SHIPPED | OUTPATIENT
Start: 2021-11-23 | End: 2022-03-23

## 2021-11-24 ENCOUNTER — APPOINTMENT (OUTPATIENT)
Dept: CT IMAGING | Facility: HOSPITAL | Age: 71
End: 2021-11-24

## 2021-11-24 ENCOUNTER — HOSPITAL ENCOUNTER (EMERGENCY)
Facility: HOSPITAL | Age: 71
Discharge: HOME | End: 2021-11-24
Admitting: EMERGENCY MEDICINE

## 2021-11-24 VITALS
SYSTOLIC BLOOD PRESSURE: 129 MMHG | WEIGHT: 215 LBS | HEIGHT: 71 IN | HEART RATE: 89 BPM | TEMPERATURE: 98 F | DIASTOLIC BLOOD PRESSURE: 65 MMHG | OXYGEN SATURATION: 93 % | RESPIRATION RATE: 18 BRPM | BODY MASS INDEX: 30.1 KG/M2

## 2021-11-24 DIAGNOSIS — B34.8 RHINOVIRUS: ICD-10-CM

## 2021-11-24 DIAGNOSIS — R06.02 SHORTNESS OF BREATH: Primary | ICD-10-CM

## 2021-11-24 DIAGNOSIS — J20.6 ACUTE BRONCHITIS DUE TO RHINOVIRUS: ICD-10-CM

## 2021-11-24 LAB
ALBUMIN SERPL-MCNC: 4.4 G/DL (ref 3.5–5.2)
ALBUMIN/GLOB SERPL: 1.6 G/DL
ALP SERPL-CCNC: 91 U/L (ref 39–117)
ALT SERPL W P-5'-P-CCNC: 22 U/L (ref 1–41)
ANION GAP SERPL CALCULATED.3IONS-SCNC: 14 MMOL/L (ref 5–15)
ARTERIAL PATENCY WRIST A: POSITIVE
AST SERPL-CCNC: 21 U/L (ref 1–40)
ATMOSPHERIC PRESS: ABNORMAL MM[HG]
B PARAPERT DNA SPEC QL NAA+PROBE: NOT DETECTED
B PERT DNA SPEC QL NAA+PROBE: NOT DETECTED
BASE EXCESS BLDA CALC-SCNC: 1.9 MMOL/L (ref 0–3)
BASOPHILS # BLD AUTO: 0.1 10*3/MM3 (ref 0–0.2)
BASOPHILS NFR BLD AUTO: 0.8 % (ref 0–1.5)
BDY SITE: ABNORMAL
BILIRUB SERPL-MCNC: 0.4 MG/DL (ref 0–1.2)
BUN SERPL-MCNC: 20 MG/DL (ref 8–23)
BUN/CREAT SERPL: 22.7 (ref 7–25)
C PNEUM DNA NPH QL NAA+NON-PROBE: NOT DETECTED
CALCIUM SPEC-SCNC: 9 MG/DL (ref 8.6–10.5)
CHLORIDE SERPL-SCNC: 101 MMOL/L (ref 98–107)
CO2 BLDA-SCNC: 29 MMOL/L (ref 22–29)
CO2 SERPL-SCNC: 24 MMOL/L (ref 22–29)
CREAT SERPL-MCNC: 0.88 MG/DL (ref 0.76–1.27)
D DIMER PPP FEU-MCNC: 0.56 MG/L (FEU) (ref 0–0.59)
DEPRECATED RDW RBC AUTO: 44.2 FL (ref 37–54)
EOSINOPHIL # BLD AUTO: 0 10*3/MM3 (ref 0–0.4)
EOSINOPHIL NFR BLD AUTO: 0.5 % (ref 0.3–6.2)
ERYTHROCYTE [DISTWIDTH] IN BLOOD BY AUTOMATED COUNT: 14.8 % (ref 12.3–15.4)
FLUAV SUBTYP SPEC NAA+PROBE: NOT DETECTED
FLUBV RNA ISLT QL NAA+PROBE: NOT DETECTED
GFR SERPL CREATININE-BSD FRML MDRD: 86 ML/MIN/1.73
GLOBULIN UR ELPH-MCNC: 2.7 GM/DL
GLUCOSE SERPL-MCNC: 95 MG/DL (ref 65–99)
HADV DNA SPEC NAA+PROBE: NOT DETECTED
HCO3 BLDA-SCNC: 27.6 MMOL/L (ref 21–28)
HCOV 229E RNA SPEC QL NAA+PROBE: NOT DETECTED
HCOV HKU1 RNA SPEC QL NAA+PROBE: NOT DETECTED
HCOV NL63 RNA SPEC QL NAA+PROBE: NOT DETECTED
HCOV OC43 RNA SPEC QL NAA+PROBE: NOT DETECTED
HCT VFR BLD AUTO: 45.3 % (ref 37.5–51)
HEMODILUTION: NO
HGB BLD-MCNC: 15.3 G/DL (ref 13–17.7)
HMPV RNA NPH QL NAA+NON-PROBE: NOT DETECTED
HOLD SPECIMEN: NORMAL
HOLD SPECIMEN: NORMAL
HPIV1 RNA ISLT QL NAA+PROBE: NOT DETECTED
HPIV2 RNA SPEC QL NAA+PROBE: NOT DETECTED
HPIV3 RNA NPH QL NAA+PROBE: NOT DETECTED
HPIV4 P GENE NPH QL NAA+PROBE: NOT DETECTED
INHALED O2 CONCENTRATION: 21 %
INR PPP: 1.85 (ref 2–3)
LYMPHOCYTES # BLD AUTO: 0.8 10*3/MM3 (ref 0.7–3.1)
LYMPHOCYTES NFR BLD AUTO: 11.1 % (ref 19.6–45.3)
M PNEUMO IGG SER IA-ACNC: NOT DETECTED
MCH RBC QN AUTO: 28.7 PG (ref 26.6–33)
MCHC RBC AUTO-ENTMCNC: 33.7 G/DL (ref 31.5–35.7)
MCV RBC AUTO: 85.1 FL (ref 79–97)
MODALITY: ABNORMAL
MONOCYTES # BLD AUTO: 0.8 10*3/MM3 (ref 0.1–0.9)
MONOCYTES NFR BLD AUTO: 10.6 % (ref 5–12)
NEUTROPHILS NFR BLD AUTO: 5.8 10*3/MM3 (ref 1.7–7)
NEUTROPHILS NFR BLD AUTO: 77 % (ref 42.7–76)
NRBC BLD AUTO-RTO: 0.4 /100 WBC (ref 0–0.2)
NT-PROBNP SERPL-MCNC: 280.3 PG/ML (ref 0–900)
PCO2 BLDA: 45.6 MM HG (ref 35–48)
PH BLDA: 7.39 PH UNITS (ref 7.35–7.45)
PLATELET # BLD AUTO: 206 10*3/MM3 (ref 140–450)
PMV BLD AUTO: 7.6 FL (ref 6–12)
PO2 BLDA: 60 MM HG (ref 83–108)
POTASSIUM SERPL-SCNC: 3.9 MMOL/L (ref 3.5–5.2)
PROCALCITONIN SERPL-MCNC: 0.05 NG/ML (ref 0–0.25)
PROT SERPL-MCNC: 7.1 G/DL (ref 6–8.5)
PROTHROMBIN TIME: 19.6 SECONDS (ref 19.4–28.5)
RBC # BLD AUTO: 5.33 10*6/MM3 (ref 4.14–5.8)
RHINOVIRUS RNA SPEC NAA+PROBE: DETECTED
RSV RNA NPH QL NAA+NON-PROBE: NOT DETECTED
SAO2 % BLDCOA: 90.1 % (ref 94–98)
SARS-COV-2 RNA NPH QL NAA+NON-PROBE: NOT DETECTED
SODIUM SERPL-SCNC: 139 MMOL/L (ref 136–145)
TROPONIN T SERPL-MCNC: <0.01 NG/ML (ref 0–0.03)
WBC NRBC COR # BLD: 7.6 10*3/MM3 (ref 3.4–10.8)
WHOLE BLOOD HOLD SPECIMEN: NORMAL
WHOLE BLOOD HOLD SPECIMEN: NORMAL

## 2021-11-24 PROCEDURE — 85610 PROTHROMBIN TIME: CPT | Performed by: NURSE PRACTITIONER

## 2021-11-24 PROCEDURE — 84484 ASSAY OF TROPONIN QUANT: CPT | Performed by: NURSE PRACTITIONER

## 2021-11-24 PROCEDURE — 85379 FIBRIN DEGRADATION QUANT: CPT | Performed by: NURSE PRACTITIONER

## 2021-11-24 PROCEDURE — 0 IOPAMIDOL PER 1 ML: Performed by: NURSE PRACTITIONER

## 2021-11-24 PROCEDURE — 71275 CT ANGIOGRAPHY CHEST: CPT

## 2021-11-24 PROCEDURE — 94640 AIRWAY INHALATION TREATMENT: CPT

## 2021-11-24 PROCEDURE — 96374 THER/PROPH/DIAG INJ IV PUSH: CPT

## 2021-11-24 PROCEDURE — 85025 COMPLETE CBC W/AUTO DIFF WBC: CPT | Performed by: NURSE PRACTITIONER

## 2021-11-24 PROCEDURE — 36415 COLL VENOUS BLD VENIPUNCTURE: CPT

## 2021-11-24 PROCEDURE — 93005 ELECTROCARDIOGRAM TRACING: CPT

## 2021-11-24 PROCEDURE — 80053 COMPREHEN METABOLIC PANEL: CPT | Performed by: NURSE PRACTITIONER

## 2021-11-24 PROCEDURE — 84145 PROCALCITONIN (PCT): CPT | Performed by: NURSE PRACTITIONER

## 2021-11-24 PROCEDURE — 36600 WITHDRAWAL OF ARTERIAL BLOOD: CPT

## 2021-11-24 PROCEDURE — 0202U NFCT DS 22 TRGT SARS-COV-2: CPT | Performed by: NURSE PRACTITIONER

## 2021-11-24 PROCEDURE — 94799 UNLISTED PULMONARY SVC/PX: CPT

## 2021-11-24 PROCEDURE — 25010000002 METHYLPREDNISOLONE PER 125 MG: Performed by: NURSE PRACTITIONER

## 2021-11-24 PROCEDURE — 83880 ASSAY OF NATRIURETIC PEPTIDE: CPT | Performed by: NURSE PRACTITIONER

## 2021-11-24 PROCEDURE — 99284 EMERGENCY DEPT VISIT MOD MDM: CPT

## 2021-11-24 PROCEDURE — 82803 BLOOD GASES ANY COMBINATION: CPT

## 2021-11-24 RX ORDER — DOXYCYCLINE HYCLATE 100 MG
100 TABLET ORAL 2 TIMES DAILY
Qty: 20 TABLET | Refills: 0 | Status: SHIPPED | OUTPATIENT
Start: 2021-11-24 | End: 2021-12-02

## 2021-11-24 RX ORDER — SODIUM CHLORIDE 0.9 % (FLUSH) 0.9 %
10 SYRINGE (ML) INJECTION AS NEEDED
Status: DISCONTINUED | OUTPATIENT
Start: 2021-11-24 | End: 2021-11-25 | Stop reason: HOSPADM

## 2021-11-24 RX ORDER — PREDNISONE 20 MG/1
20 TABLET ORAL 2 TIMES DAILY
Qty: 10 TABLET | Refills: 0 | Status: SHIPPED | OUTPATIENT
Start: 2021-11-24 | End: 2021-11-29

## 2021-11-24 RX ORDER — ALBUTEROL SULFATE 90 UG/1
2 AEROSOL, METERED RESPIRATORY (INHALATION) ONCE
Status: COMPLETED | OUTPATIENT
Start: 2021-11-24 | End: 2021-11-24

## 2021-11-24 RX ORDER — METHYLPREDNISOLONE SODIUM SUCCINATE 125 MG/2ML
125 INJECTION, POWDER, LYOPHILIZED, FOR SOLUTION INTRAMUSCULAR; INTRAVENOUS ONCE
Status: COMPLETED | OUTPATIENT
Start: 2021-11-24 | End: 2021-11-24

## 2021-11-24 RX ORDER — BENZONATATE 200 MG/1
200 CAPSULE ORAL 3 TIMES DAILY PRN
Qty: 15 CAPSULE | Refills: 0 | Status: SHIPPED | OUTPATIENT
Start: 2021-11-24 | End: 2021-12-02

## 2021-11-24 RX ORDER — ALBUTEROL SULFATE 2.5 MG/3ML
2.5 SOLUTION RESPIRATORY (INHALATION) EVERY 4 HOURS PRN
Qty: 3 ML | Refills: 0 | Status: SHIPPED | OUTPATIENT
Start: 2021-11-24 | End: 2021-11-25 | Stop reason: SDUPTHER

## 2021-11-24 RX ADMIN — IOPAMIDOL 100 ML: 755 INJECTION, SOLUTION INTRAVENOUS at 22:14

## 2021-11-24 RX ADMIN — METHYLPREDNISOLONE SODIUM SUCCINATE 125 MG: 125 INJECTION, POWDER, FOR SOLUTION INTRAMUSCULAR; INTRAVENOUS at 21:03

## 2021-11-24 RX ADMIN — ALBUTEROL SULFATE 2 PUFF: 108 INHALANT RESPIRATORY (INHALATION) at 20:51

## 2021-11-25 ENCOUNTER — NURSE TRIAGE (OUTPATIENT)
Dept: CALL CENTER | Facility: HOSPITAL | Age: 71
End: 2021-11-25

## 2021-11-25 LAB — QT INTERVAL: 382 MS

## 2021-11-25 RX ORDER — ALBUTEROL SULFATE 2.5 MG/3ML
2.5 SOLUTION RESPIRATORY (INHALATION) EVERY 4 HOURS PRN
Qty: 3 ML | Refills: 0 | Status: SHIPPED | OUTPATIENT
Start: 2021-11-25

## 2021-11-25 NOTE — DISCHARGE INSTRUCTIONS
Take medications as prescribed.  Drink plenty of fluids.  Use Mucinex for congestion.  Follow-up with your primary care provider.  Return for new or worsening symptoms.

## 2021-11-25 NOTE — TELEPHONE ENCOUNTER
His prescription for Albuterol needs a diagnosis code before they can fill it.  Was unable to reach a  and called the House Supervisor at [facility] who states she will take care of it.      Reason for Disposition  • [1] Caller has URGENT medicine question about med that PCP or specialist prescribed AND [2] triager unable to answer question    Additional Information  • Negative: [1] Intentional drug overdose AND [2] suicidal thoughts or ideas  • Negative: Drug overdose and triager unable to answer question  • Negative: Caller requesting information unrelated to medicine  • Negative: Caller requesting information about COVID-19 Vaccine  • Negative: Caller requesting information about Emergency Contraception  • Negative: Caller requesting information about Combined Birth Control Pills  • Negative: Caller requesting information about Progestin Birth Control Pills  • Negative: Caller requesting information about Post-Op pain or medicines  • Negative: Caller requesting a prescription antibiotic (such as Penicillin) for Strep throat and has a positive culture result  • Negative: Caller requesting a prescription anti-viral med (such as Tamiflu) and has influenza (flu)  symptoms  • Negative: Immunization reaction suspected  • Negative: Rash while taking a medicine or within 3 days of stopping it  • Negative: [1] Asthma and [2] having symptoms of asthma (cough, wheezing, etc.)  • Negative: [1] Symptom of illness (e.g., headache, abdominal pain, earache, vomiting) AND [2] more than mild  • Negative: Breastfeeding questions about mother's medicines and diet  • Negative: MORE THAN A DOUBLE DOSE of a prescription or over-the-counter (OTC) drug  • Negative: [1] DOUBLE DOSE (an extra dose or lesser amount) of prescription drug AND [2] any symptoms (e.g., dizziness, nausea, pain, sleepiness)  • Negative: [1] DOUBLE DOSE (an extra dose or lesser amount) of over-the-counter (OTC) drug AND [2] any symptoms (e.g., dizziness,  "nausea, pain, sleepiness)  • Negative: Took another person's prescription drug  • Negative: [1] DOUBLE DOSE (an extra dose or lesser amount) of prescription drug AND [2] NO symptoms (Exception: a double dose of antibiotics)  • Negative: Diabetes drug error or overdose (e.g., took wrong type of insulin or took extra dose)  • Negative: [1] Prescription refill request for ESSENTIAL medicine (i.e., likelihood of harm to patient if not taken) AND [2] triager unable to refill per department policy  • Negative: [1] Prescription not at pharmacy AND [2] was prescribed by PCP recently (Exception: triager has access to EMR and prescription is recorded there. Go to Home Care and confirm for pharmacy.)  • Negative: [1] Pharmacy calling with prescription question AND [2] triager unable to answer question  • Negative: Medicine patch causing local rash or itching  • Negative: [1] Caller has medicine question about med NOT prescribed by PCP AND [2] triager unable to answer question (e.g., compatibility with other med, storage)  • Negative: Prescription request for new medicine (not a refill)  • Negative: Prescription refill request for a CONTROLLED substance (e.g., narcotics, ADHD medicines)  • Negative: [1] Prescription refill request for NON-ESSENTIAL medicine (i.e., no harm to patient if med not taken) AND [2] triager unable to refill per department policy  • Negative: [1] Caller has NON-URGENT medicine question about med that PCP prescribed AND [2] triager unable to answer question  • Negative: Caller wants to use a complementary or alternative medicine  • Negative: [1] Prescription prescribed recently is not at pharmacy AND [2] triager has access to patient's EMR AND [3] prescription is recorded in the EMR    Answer Assessment - Initial Assessment Questions  1. NAME of MEDICATION: \"What medicine are you calling about?\"      Pharmacy would not fill his Albuterol for his nebulizer because it needs a diagnosis code.    2. " "QUESTION: \"What is your question?\" (e.g., medication refill, side effect)      Needs medication filled.    3. PRESCRIBING HCP: \"Who prescribed it?\" Reason: if prescribed by specialist, call should be referred to that group.      Kirstie Prieto Md    4. SYMPTOMS: \"Do you have any symptoms?\"     Unknown    5. SEVERITY: If symptoms are present, ask \"Are they mild, moderate or severe?\"      Unknown   6. PREGNANCY:  \"Is there any chance that you are pregnant?\" \"When was your last menstrual period?\"      Male    Protocols used: MEDICATION QUESTION CALL-ADULT-      "

## 2021-11-25 NOTE — ED PROVIDER NOTES
Subjective   Patient is a 71-year-old white male with history of hypertension, high cholesterol, CABG, A. fib on warfarin, non-insulin-dependent diabetes.  Also reports history of mild COPD not oxygen dependent.  Presents today from the urgent care center with reports of cough congestion and intermittent shortness of breath.  States has had symptoms for 3 or 4 days.  States he has felt feverish.  He denies chills.  He denies any chest pain.  He reports intermittent shortness of breath mostly after his coughing fits.  Does report some wheezing.  Denies any leg swelling.  He denies nausea vomiting or diarrhea.  He denies any ill contacts or recent travel.  States he has been fully Covid vaccinated.          Review of Systems   Constitutional: Positive for fever. Negative for chills.   HENT: Positive for congestion.    Respiratory: Positive for cough, shortness of breath and wheezing.    Cardiovascular: Negative for chest pain, palpitations and leg swelling.   Gastrointestinal: Negative for abdominal pain, nausea and vomiting.   Genitourinary: Negative for decreased urine volume.   Musculoskeletal: Negative for neck pain and neck stiffness.   Skin: Negative for rash.   Neurological: Negative for dizziness, weakness and light-headedness.       Past Medical History:   Diagnosis Date   • Arthritis    • Asthma    • Atrial fibrillation (HCC)    • Benign prostatic hyperplasia    • Cancer (HCC)    • CHF (congestive heart failure) (HCC)    • Clotting disorder (HCC)    • Colon polyp    • Coronary artery disease    • Diabetes mellitus (HCC)    • Elevated cholesterol    • Erectile dysfunction    • HL (hearing loss)    • Hyperlipidemia    • Hypertension    • Myocardial infarction (HCC)    • Obesity    • Pancreatitis    • Pneumonia    • Sleep apnea        Allergies   Allergen Reactions   • Meperidine GI Intolerance   • Midazolam Mental Status Change   • Propofol Mental Status Change   • Sulfa Antibiotics Hives   • Simvastatin  Myalgia       Past Surgical History:   Procedure Laterality Date   • CARDIAC CATHETERIZATION     • CHOLECYSTECTOMY     • COLONOSCOPY     • CORONARY ARTERY BYPASS GRAFT         Family History   Problem Relation Age of Onset   • Cancer Father    • Heart attack Father    • Hearing loss Father    • Heart disease Paternal Grandfather    • Heart attack Paternal Grandfather        Social History     Socioeconomic History   • Marital status:    Tobacco Use   • Smoking status: Former Smoker     Packs/day: 1.00     Years: 15.00     Pack years: 15.00     Types: Cigarettes     Quit date: 2006     Years since quitting: 15.4   • Smokeless tobacco: Never Used   Vaping Use   • Vaping Use: Never used   Substance and Sexual Activity   • Alcohol use: No   • Drug use: No   • Sexual activity: Not Currently     Partners: Female     Birth control/protection: Surgical           Objective   Physical Exam  Vital signs and triage nurse note reviewed.  Constitutional: Awake, alert; well-developed and well-nourished. No acute distress is noted.  Obese.  HEENT: Normocephalic, atraumatic; pupils are PERRL with intact EOM; oropharynx is pink and moist without exudate or erythema.  No drooling or pooling of oral secretions.  Neck: Supple, full range of motion without pain; no cervical lymphadenopathy. Normal phonation.  Cardiovascular: Regular rate and rhythm, normal S1-S2.  No murmur noted.  Pulmonary: Respiratory effort regular nonlabored, breath sounds diffuse expiratory wheezes.  No rhonchi or rales noted.  Abdomen: Soft, nontender, nondistended with normoactive bowel sounds; no rebound or guarding.  Musculoskeletal: Independent range of motion of all extremities with no palpable tenderness or edema.  Calves are symmetric and nontender.  Neuro: Alert oriented x3, speech is clear and appropriate, GCS 15.    Skin: Flesh tone, warm, dry, intact; no erythematous or petechial rash or lesion.      Procedures           ED Course      Labs  Reviewed   RESPIRATORY PANEL PCR W/ COVID-19 (SARS-COV-2) SAVITA/SAMARIA/MICHAEL/PAD/COR/MAD/COLT IN-HOUSE, NP SWAB IN UT/VTP, 3-4 HR TAT - Abnormal; Notable for the following components:       Result Value    Human Rhinovirus/Enterovirus Detected (*)     All other components within normal limits    Narrative:     In the setting of a positive respiratory panel with a viral infection PLUS a negative procalcitonin without other underlying concern for bacterial infection, consider observing off antibiotics or discontinuation of antibiotics and continue supportive care. If the respiratory panel is positive for atypical bacterial infection (Bordetella pertussis, Chlamydophila pneumoniae, or Mycoplasma pneumoniae), consider antibiotic de-escalation to target atypical bacterial infection.   PROTIME-INR - Abnormal; Notable for the following components:    INR 1.85 (*)     All other components within normal limits   CBC WITH AUTO DIFFERENTIAL - Abnormal; Notable for the following components:    Neutrophil % 77.0 (*)     Lymphocyte % 11.1 (*)     nRBC 0.4 (*)     All other components within normal limits   BLOOD GAS, ARTERIAL - Abnormal; Notable for the following components:    pO2, Arterial 60.0 (*)     O2 Saturation, Arterial 90.1 (*)     All other components within normal limits   PROCALCITONIN - Normal    Narrative:     As a Marker for Sepsis (Non-Neonates):     1. <0.5 ng/mL represents a low risk of severe sepsis and/or septic shock.  2. >2 ng/mL represents a high risk of severe sepsis and/or septic shock.    As a Marker for Lower Respiratory Tract Infections that require antibiotic therapy:  PCT on Admission     Antibiotic Therapy             6-12 Hrs later  >0.5                          Strongly Recommended            >0.25 - <0.5             Recommended  0.1 - 0.25                  Discouraged                       Remeasure/reassess PCT  <0.1                         Strongly Discouraged         Remeasure/reassess PCT      As 28  "day mortality risk marker: \"Change in Procalcitonin Result\" (>80% or <=80%) if Day 0 (or Day 1) and Day 4 values are available. Refer to http://www.[email or web address] – [email or web address] Communications Holdingspct-calculator.com/    Change in PCT <=80 %   A decrease of PCT levels below or equal to 80% defines a positive change in PCT test result representing a higher risk for 28-day all-cause mortality of patients diagnosed with severe sepsis or septic shock.    Change in PCT >80 %   A decrease of PCT levels of more than 80% defines a negative change in PCT result representing a lower risk for 28-day all-cause mortality of patients diagnosed with severe sepsis or septic shock.               BNP (IN-HOUSE) - Normal    Narrative:     Among patients with dyspnea, NT-proBNP is highly sensitive for the detection of acute congestive heart failure. In addition NT-proBNP of <300 pg/ml effectively rules out acute congestive heart failure with 99% negative predictive value.    Results may be falsely decreased if patient taking Biotin.     TROPONIN (IN-HOUSE) - Normal    Narrative:     Troponin T Reference Range:  <= 0.03 ng/mL-   Negative for AMI  >0.03 ng/mL-     Abnormal for myocardial necrosis.  Clinicians would have to utilize clinical acumen, EKG, Troponin and serial changes to determine if it is an Acute Myocardial Infarction or myocardial injury due to an underlying chronic condition.       Results may be falsely decreased if patient taking Biotin.     D-DIMER, QUANTITATIVE - Normal    Narrative:     Reference Range  --------------------------------------------------------------------     < 0.50   Negative Predictive Value  0.50-0.59   Indeterminate    >= 0.60   Probable VTE             A very low percentage of patients with DVT may yield D-Dimer results   below the cut-off of 0.50 mg/L FEU.  This is known to be more   prevalent in patients with distal DVT.             Results of this test should always be interpreted in conjunction with   the patient's medical history, " clinical presentation and other   findings.  Clinical diagnosis should not be based on the result of   INNOVANCE D-Dimer alone.   RAINBOW DRAW    Narrative:     The following orders were created for panel order [place] Draw.  Procedure                               Abnormality         Status                     ---------                               -----------         ------                     Green Top (Gel)[[id number]]                                  Final result               Lavender Top[[id number]]                                     Final result               Gold Top - SST[[id number]]                                   Final result               Light Blue Top[[id number]]                                   Final result                 Please view results for these tests on the individual orders.   COMPREHENSIVE METABOLIC PANEL    Narrative:     GFR Normal >60  Chronic Kidney Disease <60  Kidney Failure <15     BLOOD GAS, ARTERIAL   GREEN TOP   LAVENDER TOP   GOLD TOP - SST   LIGHT BLUE TOP   CBC AND DIFFERENTIAL    Narrative:     The following orders were created for panel order CBC & Differential.  Procedure                               Abnormality         Status                     ---------                               -----------         ------                     CBC Auto Differential[[id number]]        Abnormal            Final result                 Please view results for these tests on the individual orders.     XR Chest 2 View    Result Date: 11/24/2021  No change from the previous study with no evidence for acute cardiopulmonary process.  Electronically Signed By-Cody Denny MD On:11/24/2021 5:54 PM This report was finalized on [id number] by  Cody Denny MD.    CT Chest Pulmonary Embolism    Result Date: 11/24/2021  1.No evidence for pulmonary embolism. 2.No evidence for acute intrathoracic abnormality.  Electronically Signed By-Cody Denny MD On:11/24/2021 10:22 PM This report was  finalized on [id number] by  Cody Denny MD.    Medications   sodium chloride 0.9 % flush 10 mL (has no administration in time range)   albuterol sulfate HFA (PROVENTIL HFA;VENTOLIN HFA;PROAIR HFA) inhaler 2 puff (2 puffs Inhalation Given 11/24/21 2051)   methylPREDNISolone sodium succinate (SOLU-Medrol) injection 125 mg (125 mg Intravenous Given 11/24/21 2103)   iopamidol (ISOVUE-370) 76 % injection 100 mL (100 mL Intravenous Given 11/24/21 2214)                                          MDM  Number of Diagnoses or Management Options  Acute bronchitis due to Rhinovirus  Rhinovirus  Shortness of breath  Diagnosis management comments: Comorbidities: Hypertension, high cholesterol, CABG, A. fib on warfarin, COPD, diabetes non-insulin-dependent  Differentials: Bronchitis, Covid, viral respiratory infection, pneumonia, PE, cardiac ischemia, CHF exacerbation;this list is not all inclusive and does not constitute the entirety of considered causes  Discussion with provider:  Radiology interpretation: X-rays reviewed by me and interpreted by radiologist: As above  Lab interpretation: Labs viewed by me significant for: As above    Patient was placed on continuous cardiac monitor.  He had IV established.  He had labs, EKG obtained.  His chest x-ray from urgent care center was reviewed and showed no acute abnormality.  He was given a dose of IV Solu-Medrol as well as 2 puffs of an albuterol inhaler.    Work-up: EKG reviewed by me and interpreted by Dr. Bernal shows A. fib with ventricular rate of 96, right bundle branch block, no acute ST or T wave changes.  CBC and metabolic panel are grossly unremarkable.  Negative troponin.  proBNP within normal limits.  D-dimer indeterminate at 0.56.  INR 1.85. Viral respiratory panel including Covid is negative for Covid but positive for rhinovirus.  CT PE protocol shows no acute abnormality.    On reexamination the patient is resting comfortably.  He is in no distress.  He has stable  vital signs.  He has a work-up positive for rhinovirus consistent with acute bronchitis due to rhinovirus.  He has had some coughing throughout his ED stay.  He is in no distress.  He has stable vital signs.  He is not hypoxic on reexamination he is an O2 saturation of 93% on room air bedside monitor.  He is ambulatory without difficulty.  He is anxious to go home.    Diagnosis and treatment plan discussed with patient.  Patient agreeable to plan.   I discussed findings with patient who voices understanding of discharge instructions, signs and symptoms requiring return to ED; discharged improved and in stable condition with follow up for re-evaluation.  Prescriptions for prednisone, doxycycline, Tessalon, albuterol solution.         Amount and/or Complexity of Data Reviewed  Clinical lab tests: reviewed and ordered  Tests in the radiology section of CPT®: reviewed and ordered  Tests in the medicine section of CPT®: reviewed    Patient Progress  Patient progress: stable      Final diagnoses:   Shortness of breath   Acute bronchitis due to Rhinovirus   Rhinovirus       ED Disposition  ED Disposition     ED Disposition Condition Comment    Discharge Stable           James Tirado Jr., MD  [address] [place], [place] [id number]  [phone]               Medication List      New Prescriptions    benzonatate 200 MG capsule  Commonly known as: TESSALON  Take 1 capsule by mouth 3 (Three) Times a Day As Needed for Cough.     doxycycline 100 MG tablet  Commonly known as: VIBRAMYICN  Take 1 tablet by mouth 2 (Two) Times a Day for 10 days.     predniSONE 20 MG tablet  Commonly known as: DELTASONE  Take 1 tablet by mouth 2 (Two) Times a Day for 5 days.        Changed    * Ventolin  (90 Base) MCG/ACT inhaler  Generic drug: albuterol sulfate HFA  What changed: Another medication with the same name was added. Make sure you understand how and when to take each.     * albuterol (2.5 MG/3ML) 0.083% nebulizer  solution  Commonly known as: PROVENTIL  Take 2.5 mg by nebulization Every 4 (Four) Hours As Needed for Wheezing.  What changed: You were already taking a medication with the same name, and this prescription was added. Make sure you understand how and when to take each.         * This list has 2 medication(s) that are the same as other medications prescribed for you. Read the directions carefully, and ask your doctor or other care provider to review them with you.               Where to Get Your Medications      These medications were sent to [facility]/pharmacy #[id number] - [facility] [place] - [address] [place] [address] [phone] [place] [phone] [address] [place] [address]    Hours: 24-hours Phone: [phone]   · albuterol (2.5 MG/3ML) 0.083% nebulizer solution  · benzonatate 200 MG capsule  · doxycycline 100 MG tablet  · predniSONE 20 MG tablet          Olimpia De La Torre APRN  11/24/21 [id number]

## 2021-12-02 ENCOUNTER — ANTICOAGULATION VISIT (OUTPATIENT)
Dept: CARDIOLOGY | Facility: CLINIC | Age: 71
End: 2021-12-02

## 2021-12-02 ENCOUNTER — OFFICE VISIT (OUTPATIENT)
Dept: FAMILY MEDICINE CLINIC | Facility: CLINIC | Age: 71
End: 2021-12-02

## 2021-12-02 ENCOUNTER — LAB (OUTPATIENT)
Dept: LAB | Facility: HOSPITAL | Age: 71
End: 2021-12-02

## 2021-12-02 ENCOUNTER — OFFICE VISIT (OUTPATIENT)
Dept: CARDIOLOGY | Facility: CLINIC | Age: 71
End: 2021-12-02

## 2021-12-02 VITALS
SYSTOLIC BLOOD PRESSURE: 140 MMHG | BODY MASS INDEX: 29.29 KG/M2 | HEART RATE: 80 BPM | WEIGHT: 210 LBS | DIASTOLIC BLOOD PRESSURE: 76 MMHG

## 2021-12-02 VITALS
BODY MASS INDEX: 29.4 KG/M2 | SYSTOLIC BLOOD PRESSURE: 140 MMHG | WEIGHT: 210 LBS | HEIGHT: 71 IN | DIASTOLIC BLOOD PRESSURE: 76 MMHG | HEART RATE: 80 BPM

## 2021-12-02 VITALS
TEMPERATURE: 96.4 F | WEIGHT: 210 LBS | OXYGEN SATURATION: 94 % | HEART RATE: 58 BPM | BODY MASS INDEX: 29.4 KG/M2 | HEIGHT: 71 IN | RESPIRATION RATE: 16 BRPM | SYSTOLIC BLOOD PRESSURE: 146 MMHG | DIASTOLIC BLOOD PRESSURE: 78 MMHG

## 2021-12-02 DIAGNOSIS — I10 PRIMARY HYPERTENSION: Primary | Chronic | ICD-10-CM

## 2021-12-02 DIAGNOSIS — Z79.01 LONG TERM (CURRENT) USE OF ANTICOAGULANTS: ICD-10-CM

## 2021-12-02 DIAGNOSIS — E11.9 TYPE 2 DIABETES MELLITUS WITHOUT COMPLICATION, WITHOUT LONG-TERM CURRENT USE OF INSULIN (HCC): ICD-10-CM

## 2021-12-02 DIAGNOSIS — G47.33 OBSTRUCTIVE SLEEP APNEA SYNDROME: ICD-10-CM

## 2021-12-02 DIAGNOSIS — I48.21 PERMANENT ATRIAL FIBRILLATION (HCC): Chronic | ICD-10-CM

## 2021-12-02 DIAGNOSIS — E78.00 PURE HYPERCHOLESTEROLEMIA: ICD-10-CM

## 2021-12-02 DIAGNOSIS — I48.21 PERMANENT ATRIAL FIBRILLATION (HCC): Primary | ICD-10-CM

## 2021-12-02 DIAGNOSIS — I25.119 ATHEROSCLEROSIS OF NATIVE CORONARY ARTERY OF NATIVE HEART WITH ANGINA PECTORIS (HCC): Chronic | ICD-10-CM

## 2021-12-02 DIAGNOSIS — E11.9 TYPE 2 DIABETES MELLITUS WITHOUT COMPLICATION, WITHOUT LONG-TERM CURRENT USE OF INSULIN (HCC): Chronic | ICD-10-CM

## 2021-12-02 DIAGNOSIS — I25.10 CORONARY ARTERY DISEASE INVOLVING NATIVE CORONARY ARTERY OF NATIVE HEART WITHOUT ANGINA PECTORIS: ICD-10-CM

## 2021-12-02 DIAGNOSIS — J22 ACUTE RESPIRATORY INFECTION: ICD-10-CM

## 2021-12-02 DIAGNOSIS — I10 ESSENTIAL HYPERTENSION: ICD-10-CM

## 2021-12-02 DIAGNOSIS — E78.00 PURE HYPERCHOLESTEROLEMIA: Chronic | ICD-10-CM

## 2021-12-02 DIAGNOSIS — E79.0 HYPERURICEMIA: Chronic | ICD-10-CM

## 2021-12-02 LAB
ANION GAP SERPL CALCULATED.3IONS-SCNC: 8.8 MMOL/L (ref 5–15)
BUN SERPL-MCNC: 17 MG/DL (ref 8–23)
BUN/CREAT SERPL: 21.5 (ref 7–25)
CALCIUM SPEC-SCNC: 8.8 MG/DL (ref 8.6–10.5)
CHLORIDE SERPL-SCNC: 102 MMOL/L (ref 98–107)
CO2 SERPL-SCNC: 28.2 MMOL/L (ref 22–29)
CREAT SERPL-MCNC: 0.79 MG/DL (ref 0.76–1.27)
GFR SERPL CREATININE-BSD FRML MDRD: 97 ML/MIN/1.73
GLUCOSE SERPL-MCNC: 144 MG/DL (ref 65–99)
HBA1C MFR BLD: 7.5 % (ref 3.5–5.6)
INR PPP: 2.1 (ref 0.9–1.1)
POTASSIUM SERPL-SCNC: 4.4 MMOL/L (ref 3.5–5.2)
SODIUM SERPL-SCNC: 139 MMOL/L (ref 136–145)

## 2021-12-02 PROCEDURE — 85610 PROTHROMBIN TIME: CPT | Performed by: INTERNAL MEDICINE

## 2021-12-02 PROCEDURE — 99214 OFFICE O/P EST MOD 30 MIN: CPT | Performed by: INTERNAL MEDICINE

## 2021-12-02 PROCEDURE — 99214 OFFICE O/P EST MOD 30 MIN: CPT | Performed by: FAMILY MEDICINE

## 2021-12-02 PROCEDURE — 36416 COLLJ CAPILLARY BLOOD SPEC: CPT | Performed by: INTERNAL MEDICINE

## 2021-12-02 PROCEDURE — 80048 BASIC METABOLIC PNL TOTAL CA: CPT

## 2021-12-02 PROCEDURE — 83036 HEMOGLOBIN GLYCOSYLATED A1C: CPT

## 2021-12-02 PROCEDURE — 36415 COLL VENOUS BLD VENIPUNCTURE: CPT

## 2021-12-02 RX ORDER — DOXYCYCLINE HYCLATE 100 MG
100 TABLET ORAL 2 TIMES DAILY
Qty: 20 TABLET | Refills: 0
Start: 2021-12-02 | End: 2021-12-12

## 2021-12-02 RX ORDER — BENZONATATE 200 MG/1
200 CAPSULE ORAL 3 TIMES DAILY PRN
Qty: 15 CAPSULE | Refills: 0
Start: 2021-12-02 | End: 2022-03-08

## 2021-12-02 NOTE — PROGRESS NOTES
Subjective   Sandro Ramirez is a 71 y.o. male.     Chief Complaint   Patient presents with   • Diabetes     3 month followup   • Hyperlipidemia   • Hypertension       HPI  Chief complaint: Hypertension atrial fibrillation coronary artery disease type 2 diabetes mellitus respiratory infection    The patient is a 71-year-old white male comes in for follow-up and maintenance of his current problems which include    1.  Hypertension-stable patient on Cozaar 25 mg daily atenolol 50 mg daily.  He denied headache lightheadedness dizziness or chest pain.    2.  Hyperlipidemia-stable-patient on Lipitor 20 mg daily.  Denies myalgias and arthralgias.    3.  Coronary artery disease with ischemic cardiomyopathy-stable-patient on Cozaar 25 mg daily atenolol 50 mg daily and aspirin 81 mg daily.  He denies chest pain shortness breath orthopnea or PND.    4.  Type 2 diabetes mellitus-stable-patient on glimepiride 2 mg twice a day.  He is asymptomatic.  He is trying to do better with diet and compliance.    5.  Atrial fibrillation-stable patient on atenolol 50 mg daily and Coumadin 7.5 mg daily.  He denied episodes of rapid or slow heart rhythm.  Denied heart palpitations lightheadedness or dizziness.    6.  Hyperuricemia-stable-patient on colchicine 0.6 mg twice daily and allopurinol 300 mg daily.  He denied episodes of gouty arthritis or tophi.    7.  Respiratory infection-new-patient was recently treated at the urgent care center for respiratory infection.  Patient is on doxycycline and Tessalon.  He states he is gradually getting better.  He tested positive for rhinovirus.        The following portions of the patient's history were reviewed and updated as appropriate: allergies, current medications, past family history, past medical history, past social history, past surgical history and problem list.    Review of Systems    Objective     /78 (BP Location: Left arm, Patient Position: Sitting, Cuff Size: Adult)   Pulse  "58   Temp 96.4 °F (35.8 °C) (Infrared)   Resp 16   Ht 180.3 cm (71\")   Wt 95.3 kg (210 lb)   SpO2 94%   BMI 29.29 kg/m²     Physical Exam  Vitals and nursing note reviewed.   Constitutional:       Appearance: He is well-developed. He is obese.   HENT:      Head: Normocephalic and atraumatic.      Nose: Nose normal.      Mouth/Throat:      Mouth: Mucous membranes are moist.      Pharynx: Oropharynx is clear.   Eyes:      Conjunctiva/sclera: Conjunctivae normal.      Pupils: Pupils are equal, round, and reactive to light.   Cardiovascular:      Rate and Rhythm: Normal rate. Rhythm irregularly irregular.      Pulses: Normal pulses.      Heart sounds: Normal heart sounds. No murmur heard.  No friction rub. No gallop.    Pulmonary:      Effort: Pulmonary effort is normal.      Breath sounds: Normal breath sounds.   Abdominal:      General: Bowel sounds are normal.      Palpations: Abdomen is soft.   Musculoskeletal:         General: Normal range of motion.      Cervical back: Neck supple.   Skin:     General: Skin is warm and dry.   Neurological:      Mental Status: He is alert and oriented to person, place, and time.   Psychiatric:         Behavior: Behavior normal.         Thought Content: Thought content normal.         Judgment: Judgment normal.       Blood Gas, Arterial - (11/24/2021 20:15)  Respiratory Panel PCR w/COVID-19(SARS-CoV-2) S[facility]/SAMARIA/MICHAEL/[facility] In-House, NP Swab in UTM/VTM, 3-4 HR TAT - Swab, Nasopharynx (11/24/2021 20:08)  Blood Gas, Arterial - (11/24/2021 19:57)  D-dimer, Quantitative (11/24/2021 19:41)  Protime-INR (11/24/2021 19:41)  Troponin (11/24/2021 19:41)  BNP (11/24/2021 19:41)  Procalcitonin (11/24/2021 19:41)  Comprehensive Metabolic Panel (11/24/2021 19:41)  CBC & Differential (11/24/2021 19:41)  ECG 12 Lead (11/24/2021 19:24)  CT Chest Pulmonary Embolism (11/24/2021 22:14)      Assessment/Plan   Diagnoses and all orders for this visit:    1. Primary hypertension " (Primary)    2. Pure hypercholesterolemia    3. Atherosclerosis of native coronary artery of native heart with angina pectoris (HCC)    4. Permanent atrial fibrillation (HCC)    5. Type 2 diabetes mellitus without complication, without long-term current use of insulin (HCC)  -     Hemoglobin A1c; Future  -     Basic Metabolic Panel; Future    6. Hyperuricemia    7. Acute respiratory infection    Other orders  -     doxycycline (VIBRAMYICN) 100 MG tablet; Take 1 tablet by mouth 2 (Two) Times a Day for 10 days.  Dispense: 20 tablet; Refill: 0  -     benzonatate (TESSALON) 200 MG capsule; Take 1 capsule by mouth 3 (Three) Times a Day As Needed for Cough.  Dispense: 15 capsule; Refill: 0      Patient Instructions   Continue your current medications and treatment.    Follow up in the office in 3 months.    Laboraoty testing at that time.      James Tirado Jr., MD    12/02/21

## 2021-12-02 NOTE — PATIENT INSTRUCTIONS
Continue your current medications and treatment.    Follow up in the office in 3 months.    Laboraoty testing at that time.

## 2021-12-02 NOTE — PROGRESS NOTES
"    Subjective:     Encounter Date:12/02/2021      Patient ID: Sandro Ramirez is a 71 y.o. male.    Chief Complaint:  History of Present Illness 71-year-old white male with history of coronary disease history of atrial fibrillation sleep apnea hypertension hyperlipidemia and diabetes presents to my office for follow-up.  Patient is currently stable without any symptoms of chest pain or shortness of breath at rest or exertion.  No complains any PND orthopnea.  He has occasional palpitation without any dizziness syncope or swelling of the feet.  He is taking meds regular but he does not smoke.  He is trying to exercise Regular but he also uses CPAP machine regularly.    The following portions of the patient's history were reviewed and updated as appropriate: allergies, current medications, past family history, past medical history, past social history, past surgical history and problem list.  Past Medical History:   Diagnosis Date   • Arthritis    • Asthma    • Atrial fibrillation (HCC)    • Benign prostatic hyperplasia    • Cancer (HCC)    • CHF (congestive heart failure) (HCC)    • Clotting disorder (HCC)    • Colon polyp    • Coronary artery disease    • Diabetes mellitus (HCC)    • Elevated cholesterol    • Erectile dysfunction    • HL (hearing loss)    • Hyperlipidemia    • Hypertension    • Myocardial infarction (HCC)    • Obesity    • Pancreatitis    • Pneumonia    • Sleep apnea      Past Surgical History:   Procedure Laterality Date   • CARDIAC CATHETERIZATION     • CHOLECYSTECTOMY     • COLONOSCOPY     • CORONARY ARTERY BYPASS GRAFT       /76 (BP Location: Left arm, Patient Position: Sitting, Cuff Size: Adult)   Pulse 80   Ht 180.3 cm (71\")   Wt 95.3 kg (210 lb)   BMI 29.29 kg/m²   Family History   Problem Relation Age of Onset   • Cancer Father    • Heart attack Father    • Hearing loss Father    • Heart disease Paternal Grandfather    • Heart attack Paternal Grandfather        Current " Outpatient Medications:   •  albuterol (PROVENTIL) (2.5 MG/3ML) 0.083% nebulizer solution, Take 2.5 mg by nebulization Every 4 (Four) Hours As Needed for Wheezing. Indications: Dx codes: R06.02, J20.6, Disp: 3 mL, Rfl: 0  •  albuterol sulfate HFA (VENTOLIN HFA) 108 (90 Base) MCG/ACT inhaler, VENTOLIN  (90 Base) MCG/ACT AERS, Disp: , Rfl:   •  allopurinol (ZYLOPRIM) 300 MG tablet, Take 300 mg by mouth Daily., Disp: , Rfl:   •  aspirin 81 MG tablet, ASPIRIN 81 MG ORAL TABLET, Disp: , Rfl:   •  atenolol (TENORMIN) 50 MG tablet, TAKE 1 TABLET BY MOUTH EVERY DAY, Disp: 90 tablet, Rfl: 3  •  atorvastatin (LIPITOR) 20 MG tablet, Take 1 tablet by mouth Daily., Disp: 90 tablet, Rfl: 1  •  benzonatate (TESSALON) 200 MG capsule, Take 1 capsule by mouth 3 (Three) Times a Day As Needed for Cough., Disp: 15 capsule, Rfl: 0  •  colchicine 0.6 MG tablet, Take 0.6 mg by mouth 2 (Two) Times a Day., Disp: , Rfl:   •  doxycycline (VIBRAMYICN) 100 MG tablet, Take 1 tablet by mouth 2 (Two) Times a Day for 10 days., Disp: 20 tablet, Rfl: 0  •  glimepiride (AMARYL) 2 MG tablet, TAKE 1 TABLET BY MOUTH TWICE A DAY, Disp: 180 tablet, Rfl: 0  •  glucose blood ([employer] Blood Glucose Test) test strip, Use to check blood sugars once a day  Dx E11.9, Disp: 100 each, Rfl: 3  •  losartan (COZAAR) 25 MG tablet, TAKE 2 TABLETS BY MOUTH EVERY DAY, Disp: 180 tablet, Rfl: 1  •  warfarin (COUMADIN) 7.5 MG tablet, TAKE 1 AND 1/2 TABLETS ON MONDAY AND 1 TABLET BY MOUTH THE OTHER 6 DAYS A WEEK., Disp: 110 tablet, Rfl: 0  Allergies   Allergen Reactions   • Meperidine GI Intolerance   • Midazolam Mental Status Change   • Propofol Mental Status Change   • Sulfa Antibiotics Hives   • Simvastatin Myalgia     Social History     Socioeconomic History   • Marital status:    Tobacco Use   • Smoking status: Former Smoker     Packs/day: 1.00     Years: 15.00     Pack years: 15.00     Types: Cigarettes     Quit date: 2006     Years since quitting: 15.4    • Smokeless tobacco: Never Used   Vaping Use   • Vaping Use: Never used   Substance and Sexual Activity   • Alcohol use: No   • Drug use: No   • Sexual activity: Not Currently     Partners: Female     Birth control/protection: Surgical     Review of Systems   Constitutional: Negative for fever and malaise/fatigue.   Cardiovascular: Positive for palpitations (due to patients afib). Negative for chest pain and dyspnea on exertion.   Respiratory: Negative for cough and shortness of breath.    Skin: Negative for rash.   Gastrointestinal: Negative for abdominal pain, nausea and vomiting.   Neurological: Negative for focal weakness and headaches.   All other systems reviewed and are negative.             Objective:     Constitutional:       Appearance: Well-developed.   Eyes:      General: No scleral icterus.     Conjunctiva/sclera: Conjunctivae normal.   HENT:      Head: Normocephalic and atraumatic.   Neck:      Vascular: No carotid bruit or JVD.   Pulmonary:      Effort: Pulmonary effort is normal.      Breath sounds: Normal breath sounds. No wheezing. No rales.   Cardiovascular:      Normal rate. Irregularly irregular rhythm.   Pulses:     Intact distal pulses.   Abdominal:      General: Bowel sounds are normal.      Palpations: Abdomen is soft.   Musculoskeletal:      Cervical back: Normal range of motion and neck supple. Skin:     General: Skin is warm and dry.      Findings: No rash.   Neurological:      Mental Status: Alert.       Procedures    Lab Review:         MDM  1.  Coronary disease  Patient had coronary bypass surgery x3 vessels with a LIMA to LAD and saphenous graft to the marginal branch and RCA with normal LV function in the past and is currently stable on medical therapy  2 hypertension  Patient blood pressure currently stable on atenolol and losartan  3.  Atrial fibrillation  Patient's heart rate is well controlled with atenolol and is also on warfarin to keep his INR between 2.0-3.0  4.   Hyperlipidemia  Patient is currently on atorvastatin and lipid levels are well within normal limits  5.  Diabetes  Patient is on oral medicines and followed by the primary care doctor  6.  Obstructive sleep apnea  Venous sleep apnea and uses CPAP machine    Patient's previous medical records, labs, and EKG were reviewed and discussed with the patient at today's visit.

## 2022-01-06 ENCOUNTER — ANTICOAGULATION VISIT (OUTPATIENT)
Dept: CARDIOLOGY | Facility: CLINIC | Age: 72
End: 2022-01-06

## 2022-01-06 VITALS
DIASTOLIC BLOOD PRESSURE: 77 MMHG | BODY MASS INDEX: 29.57 KG/M2 | WEIGHT: 212 LBS | HEART RATE: 79 BPM | SYSTOLIC BLOOD PRESSURE: 154 MMHG

## 2022-01-06 DIAGNOSIS — I48.11 LONGSTANDING PERSISTENT ATRIAL FIBRILLATION: Primary | ICD-10-CM

## 2022-01-06 LAB — INR PPP: 1.8 (ref 0.9–1.1)

## 2022-01-06 PROCEDURE — 36416 COLLJ CAPILLARY BLOOD SPEC: CPT | Performed by: INTERNAL MEDICINE

## 2022-01-06 PROCEDURE — 85610 PROTHROMBIN TIME: CPT | Performed by: INTERNAL MEDICINE

## 2022-01-22 RX ORDER — FUROSEMIDE 40 MG/1
TABLET ORAL
Qty: 90 TABLET | Refills: 3 | Status: SHIPPED | OUTPATIENT
Start: 2022-01-22

## 2022-01-26 ENCOUNTER — TELEPHONE (OUTPATIENT)
Dept: CARDIOLOGY | Facility: CLINIC | Age: 72
End: 2022-01-26

## 2022-01-26 NOTE — TELEPHONE ENCOUNTER
CARDIAC CLEARANCE  DENTAL SURGERY  [facility]  FAX: [phone]

## 2022-02-09 ENCOUNTER — ANTICOAGULATION VISIT (OUTPATIENT)
Dept: CARDIOLOGY | Facility: CLINIC | Age: 72
End: 2022-02-09

## 2022-02-09 VITALS
SYSTOLIC BLOOD PRESSURE: 152 MMHG | WEIGHT: 216 LBS | DIASTOLIC BLOOD PRESSURE: 69 MMHG | BODY MASS INDEX: 30.13 KG/M2 | HEART RATE: 75 BPM

## 2022-02-09 DIAGNOSIS — I48.11 LONGSTANDING PERSISTENT ATRIAL FIBRILLATION: Primary | ICD-10-CM

## 2022-02-09 LAB — INR PPP: 1.5 (ref 0.9–1.1)

## 2022-02-09 PROCEDURE — 85610 PROTHROMBIN TIME: CPT | Performed by: INTERNAL MEDICINE

## 2022-02-09 PROCEDURE — 36416 COLLJ CAPILLARY BLOOD SPEC: CPT | Performed by: INTERNAL MEDICINE

## 2022-02-23 ENCOUNTER — ANTICOAGULATION VISIT (OUTPATIENT)
Dept: CARDIOLOGY | Facility: CLINIC | Age: 72
End: 2022-02-23

## 2022-02-23 VITALS
HEART RATE: 74 BPM | WEIGHT: 216 LBS | BODY MASS INDEX: 30.13 KG/M2 | DIASTOLIC BLOOD PRESSURE: 81 MMHG | SYSTOLIC BLOOD PRESSURE: 137 MMHG

## 2022-02-23 DIAGNOSIS — I48.21 PERMANENT ATRIAL FIBRILLATION: Primary | ICD-10-CM

## 2022-02-23 LAB — INR PPP: 1.5 (ref 0.9–1.1)

## 2022-02-23 PROCEDURE — 36416 COLLJ CAPILLARY BLOOD SPEC: CPT | Performed by: INTERNAL MEDICINE

## 2022-02-23 PROCEDURE — 85610 PROTHROMBIN TIME: CPT | Performed by: INTERNAL MEDICINE

## 2022-03-08 ENCOUNTER — OFFICE VISIT (OUTPATIENT)
Dept: FAMILY MEDICINE CLINIC | Facility: CLINIC | Age: 72
End: 2022-03-08

## 2022-03-08 VITALS
TEMPERATURE: 95.2 F | BODY MASS INDEX: 30.24 KG/M2 | DIASTOLIC BLOOD PRESSURE: 73 MMHG | SYSTOLIC BLOOD PRESSURE: 136 MMHG | HEIGHT: 71 IN | RESPIRATION RATE: 18 BRPM | HEART RATE: 84 BPM | OXYGEN SATURATION: 96 % | WEIGHT: 216 LBS

## 2022-03-08 DIAGNOSIS — I48.21 PERMANENT ATRIAL FIBRILLATION: Chronic | ICD-10-CM

## 2022-03-08 DIAGNOSIS — I25.119 ATHEROSCLEROSIS OF NATIVE CORONARY ARTERY OF NATIVE HEART WITH ANGINA PECTORIS: Chronic | ICD-10-CM

## 2022-03-08 DIAGNOSIS — E79.0 HYPERURICEMIA: Chronic | ICD-10-CM

## 2022-03-08 DIAGNOSIS — I10 PRIMARY HYPERTENSION: Primary | Chronic | ICD-10-CM

## 2022-03-08 DIAGNOSIS — I71.40 ABDOMINAL AORTIC ANEURYSM (AAA) WITHOUT RUPTURE: ICD-10-CM

## 2022-03-08 DIAGNOSIS — E11.9 TYPE 2 DIABETES MELLITUS WITHOUT COMPLICATION, WITHOUT LONG-TERM CURRENT USE OF INSULIN: ICD-10-CM

## 2022-03-08 DIAGNOSIS — E78.00 PURE HYPERCHOLESTEROLEMIA: Chronic | ICD-10-CM

## 2022-03-08 DIAGNOSIS — N40.0 PROSTATISM: ICD-10-CM

## 2022-03-08 PROCEDURE — 99214 OFFICE O/P EST MOD 30 MIN: CPT | Performed by: FAMILY MEDICINE

## 2022-03-08 RX ORDER — LOSARTAN POTASSIUM 50 MG/1
50 TABLET ORAL DAILY
Start: 2022-03-08 | End: 2022-05-06 | Stop reason: SDUPTHER

## 2022-03-08 NOTE — PATIENT INSTRUCTIONS
Continue your current medications and treatment.    Have the CT abdomen done and call for results.    I recommend that you drop weight.    Follow up in the office in 3 months.

## 2022-03-08 NOTE — PROGRESS NOTES
"Subjective   Sandro Ramirez is a 71 y.o. male.     Chief Complaint   Patient presents with   • Diabetes     3 month follow up   • Hypertension   • Hyperlipidemia       HPI  Chief complaint: Type 2 diabetes mellitus hypertension hyperlipidemia atrial fibrillation cardiomyopathy    Patient is a 71-year-old white male comes in for follow-up and maintenance of his current problems which include    1.  Hypertension-stable-patient is on atenolol 50 mg daily losartan 50 mg daily Lasix 40 mg daily.  Is asymptomatic.  Blood pressures controlled.    2.  Hyperlipidemia-stable out of is on Lipitor 20 mg daily.  Denies myalgias and arthralgias.    3.  Atrial fibrillation-stable-patient on atenolol 50 mg daily and Coumadin.  He denied episodes of rapid or slow heart rhythm.    4.  Ischemic cardiomyopathy/coronary artery disease-stable-patient is currently on Lasix atenolol and losartan.  He denies chest pain shortness of breath orthopnea or PND.    5.  Type 2 diabetes mellitus-deteriorated-patient is on Amaryl 2 mg twice a day.  He does not tolerate Metformin.  He cannot afford sodium glucose transporter inhibitors.  Patient's blood sugars are running higher.  His weight is up 6 pounds.  I strongly encouraged the patient to drop weight.    6.  Hyperuricemia-stable-patient is currently on allopurinol 300 mg daily colchicine 0.6 mg twice a day as needed.  Denied recent episodes of gout.        The following portions of the patient's history were reviewed and updated as appropriate: allergies, current medications, past family history, past medical history, past social history, past surgical history and problem list.    Review of Systems    Objective     /73   Pulse 84   Temp 95.2 °F (35.1 °C) (Infrared)   Resp 18   Ht 180.3 cm (71\")   Wt 98 kg (216 lb)   SpO2 96%   BMI 30.13 kg/m²     Physical Exam  Vitals and nursing note reviewed.   Constitutional:       Appearance: He is well-developed. He is obese.   HENT:      " Head: Normocephalic and atraumatic.      Nose: Nose normal.      Mouth/Throat:      Mouth: Mucous membranes are moist.      Pharynx: Oropharynx is clear.   Eyes:      Extraocular Movements: Extraocular movements intact.      Conjunctiva/sclera: Conjunctivae normal.      Pupils: Pupils are equal, round, and reactive to light.   Cardiovascular:      Rate and Rhythm: Normal rate. Rhythm irregularly irregular.      Pulses: Normal pulses.      Heart sounds: Normal heart sounds. No murmur heard.    No friction rub. No gallop.   Pulmonary:      Effort: Pulmonary effort is normal.      Breath sounds: Normal breath sounds.   Abdominal:      General: Bowel sounds are normal.      Palpations: Abdomen is soft.   Musculoskeletal:         General: Normal range of motion.      Cervical back: Neck supple.   Skin:     General: Skin is warm and dry.   Neurological:      Mental Status: He is alert and oriented to person, place, and time.   Psychiatric:         Behavior: Behavior normal.         Thought Content: Thought content normal.         Judgment: Judgment normal.           Assessment/Plan   Diagnoses and all orders for this visit:    1. Primary hypertension (Primary)  -     Basic Metabolic Panel; Future    2. Pure hypercholesterolemia    3. Atherosclerosis of native coronary artery of native heart with angina pectoris (HCC)    4. Permanent atrial fibrillation (HCC)    5. Prostatism    6. Hyperuricemia    7. Abdominal aortic aneurysm (AAA) without rupture (HCC)  -     CT Abdomen Pelvis With Contrast; Future    8. Type 2 diabetes mellitus without complication, without long-term current use of insulin (HCC)  -     Hemoglobin A1c; Future      Patient Instructions   Continue your current medications and treatment.    Have the CT abdomen done and call for results.    I recommend that you drop weight.    Follow up in the office in 3 months.          James Tirado Jr., MD    03/08/22  Answers for HPI/ROS submitted by the patient on  3/4/2022  What is the primary reason for your visit?: Physical

## 2022-03-11 ENCOUNTER — ANTICOAGULATION VISIT (OUTPATIENT)
Dept: CARDIOLOGY | Facility: CLINIC | Age: 72
End: 2022-03-11

## 2022-03-11 VITALS
SYSTOLIC BLOOD PRESSURE: 155 MMHG | DIASTOLIC BLOOD PRESSURE: 81 MMHG | WEIGHT: 217 LBS | HEART RATE: 81 BPM | BODY MASS INDEX: 30.27 KG/M2

## 2022-03-11 DIAGNOSIS — I48.91 ATRIAL FIBRILLATION, UNSPECIFIED TYPE: Primary | ICD-10-CM

## 2022-03-11 LAB — INR PPP: 2 (ref 0.9–1.1)

## 2022-03-11 PROCEDURE — 36416 COLLJ CAPILLARY BLOOD SPEC: CPT | Performed by: INTERNAL MEDICINE

## 2022-03-11 PROCEDURE — 85610 PROTHROMBIN TIME: CPT | Performed by: INTERNAL MEDICINE

## 2022-03-11 NOTE — PROGRESS NOTES
INR is WNL, CPM and recheck in a month. Pt is scheduled for possible tooth extraction and may be holding Warfarin for that procedure. Maribel

## 2022-03-22 ENCOUNTER — HOSPITAL ENCOUNTER (OUTPATIENT)
Dept: CT IMAGING | Facility: HOSPITAL | Age: 72
Discharge: HOME | End: 2022-03-22

## 2022-03-22 ENCOUNTER — LAB (OUTPATIENT)
Dept: LAB | Facility: HOSPITAL | Age: 72
End: 2022-03-22

## 2022-03-22 DIAGNOSIS — I10 PRIMARY HYPERTENSION: Chronic | ICD-10-CM

## 2022-03-22 DIAGNOSIS — E11.9 TYPE 2 DIABETES MELLITUS WITHOUT COMPLICATION, WITHOUT LONG-TERM CURRENT USE OF INSULIN: ICD-10-CM

## 2022-03-22 DIAGNOSIS — I71.40 ABDOMINAL AORTIC ANEURYSM (AAA) WITHOUT RUPTURE: ICD-10-CM

## 2022-03-22 LAB
ANION GAP SERPL CALCULATED.3IONS-SCNC: 9.9 MMOL/L (ref 5–15)
BUN SERPL-MCNC: 18 MG/DL (ref 8–23)
BUN/CREAT SERPL: 22.5 (ref 7–25)
CALCIUM SPEC-SCNC: 9.6 MG/DL (ref 8.6–10.5)
CHLORIDE SERPL-SCNC: 98 MMOL/L (ref 98–107)
CO2 SERPL-SCNC: 25.1 MMOL/L (ref 22–29)
CREAT BLDA-MCNC: 0.7 MG/DL (ref 0.6–1.3)
CREAT SERPL-MCNC: 0.8 MG/DL (ref 0.76–1.27)
EGFRCR SERPLBLD CKD-EPI 2021: 95.8 ML/MIN/1.73
EGFRCR SERPLBLD CKD-EPI 2021: 99.7 ML/MIN/1.73
GLUCOSE SERPL-MCNC: 237 MG/DL (ref 65–99)
HBA1C MFR BLD: 9.6 % (ref 3.5–5.6)
POTASSIUM SERPL-SCNC: 5.1 MMOL/L (ref 3.5–5.2)
SODIUM SERPL-SCNC: 133 MMOL/L (ref 136–145)

## 2022-03-22 PROCEDURE — 82565 ASSAY OF CREATININE: CPT

## 2022-03-22 PROCEDURE — 80048 BASIC METABOLIC PNL TOTAL CA: CPT

## 2022-03-22 PROCEDURE — 83036 HEMOGLOBIN GLYCOSYLATED A1C: CPT

## 2022-03-22 PROCEDURE — 0 IOPAMIDOL PER 1 ML: Performed by: FAMILY MEDICINE

## 2022-03-22 PROCEDURE — 36415 COLL VENOUS BLD VENIPUNCTURE: CPT

## 2022-03-22 PROCEDURE — 74177 CT ABD & PELVIS W/CONTRAST: CPT

## 2022-03-22 RX ADMIN — IOPAMIDOL 100 ML: 755 INJECTION, SOLUTION INTRAVENOUS at 10:19

## 2022-03-23 ENCOUNTER — ANTICOAGULATION VISIT (OUTPATIENT)
Dept: CARDIOLOGY | Facility: CLINIC | Age: 72
End: 2022-03-23

## 2022-03-23 VITALS
BODY MASS INDEX: 30.4 KG/M2 | DIASTOLIC BLOOD PRESSURE: 78 MMHG | HEART RATE: 79 BPM | WEIGHT: 218 LBS | SYSTOLIC BLOOD PRESSURE: 144 MMHG

## 2022-03-23 DIAGNOSIS — I48.91 ATRIAL FIBRILLATION, UNSPECIFIED TYPE: Primary | ICD-10-CM

## 2022-03-23 LAB — INR PPP: 1.1 (ref 0.9–1.1)

## 2022-03-23 PROCEDURE — 36416 COLLJ CAPILLARY BLOOD SPEC: CPT | Performed by: INTERNAL MEDICINE

## 2022-03-23 PROCEDURE — 85610 PROTHROMBIN TIME: CPT | Performed by: INTERNAL MEDICINE

## 2022-03-23 RX ORDER — GLIMEPIRIDE 2 MG/1
TABLET ORAL
Qty: 180 TABLET | Refills: 0 | Status: SHIPPED | OUTPATIENT
Start: 2022-03-23 | End: 2022-03-24 | Stop reason: SDUPTHER

## 2022-03-24 DIAGNOSIS — I71.40 ABDOMINAL AORTIC ANEURYSM (AAA) WITHOUT RUPTURE: ICD-10-CM

## 2022-03-24 DIAGNOSIS — E11.9 TYPE 2 DIABETES MELLITUS WITHOUT COMPLICATION, WITHOUT LONG-TERM CURRENT USE OF INSULIN: Primary | ICD-10-CM

## 2022-03-24 RX ORDER — GLIMEPIRIDE 2 MG/1
2 TABLET ORAL 2 TIMES DAILY
Qty: 180 TABLET | Refills: 0 | Status: SHIPPED | OUTPATIENT
Start: 2022-03-24 | End: 2022-05-31

## 2022-03-28 RX ORDER — ATORVASTATIN CALCIUM 20 MG/1
TABLET, FILM COATED ORAL
Qty: 90 TABLET | Refills: 1 | Status: SHIPPED | OUTPATIENT
Start: 2022-03-28 | End: 2022-07-13

## 2022-03-28 NOTE — TELEPHONE ENCOUNTER
Rx Refill Note  Requested Prescriptions     Pending Prescriptions Disp Refills   • atorvastatin (LIPITOR) 20 MG tablet [Pharmacy Med Name: ATORVASTATIN 20 MG TABLET] 90 tablet 1     Sig: TAKE 1 TABLET BY MOUTH EVERY DAY      Last office visit with prescribing clinician: 12/2/2021      Next office visit with prescribing clinician: 7/14/2022            Kaleb Tamez MA  03/28/22, 07:28 EDT

## 2022-03-30 ENCOUNTER — TELEPHONE (OUTPATIENT)
Dept: CARDIOLOGY | Facility: CLINIC | Age: 72
End: 2022-03-30

## 2022-03-30 ENCOUNTER — TELEPHONE (OUTPATIENT)
Dept: FAMILY MEDICINE CLINIC | Facility: CLINIC | Age: 72
End: 2022-03-30

## 2022-03-30 NOTE — TELEPHONE ENCOUNTER
DR. JOHN CAZARES  CYSTOSCOPY REMOVAL OF FOREIGN BODY ON ASAP  SCHEDULED TBD  [phone] PHONE  [phone] FAX    PLACED ON DR JULIA TOTH DESK.

## 2022-04-01 RX ORDER — EMPAGLIFLOZIN 25 MG/1
TABLET, FILM COATED ORAL DAILY
Qty: 90 TABLET | Refills: 1 | OUTPATIENT
Start: 2022-04-01

## 2022-04-01 NOTE — TELEPHONE ENCOUNTER
PATIENT'S WIFE CALLED AND STATES PATIENT WILL USE THE MEDICATION JAURDIANCE 25 MG.    SHE STATES THE PHARMACY TOLD HER IT HAS BEEN DENIED    [facility]/pharmacy #[id number] - [place], [place] - [address] [phone] [facility] [phone] [phone]    CALL BACK NUMBER [phone]    THIS MEDICATION WAS MENTIONED AT HIS LAST VISIT.

## 2022-04-04 PROCEDURE — 82962 GLUCOSE BLOOD TEST: CPT

## 2022-04-06 ENCOUNTER — LAB (OUTPATIENT)
Dept: LAB | Facility: HOSPITAL | Age: 72
End: 2022-04-06

## 2022-04-06 ENCOUNTER — TRANSCRIBE ORDERS (OUTPATIENT)
Dept: ADMINISTRATIVE | Facility: HOSPITAL | Age: 72
End: 2022-04-06

## 2022-04-06 ENCOUNTER — HOSPITAL ENCOUNTER (OUTPATIENT)
Dept: CARDIOLOGY | Facility: HOSPITAL | Age: 72
Discharge: HOME | End: 2022-04-06

## 2022-04-06 DIAGNOSIS — Z01.818 PRE-OP TESTING: ICD-10-CM

## 2022-04-06 DIAGNOSIS — Z01.818 PRE-OP TESTING: Primary | ICD-10-CM

## 2022-04-06 LAB
ANION GAP SERPL CALCULATED.3IONS-SCNC: 13 MMOL/L (ref 5–15)
BASOPHILS # BLD AUTO: 0.04 10*3/MM3 (ref 0–0.2)
BASOPHILS NFR BLD AUTO: 0.5 % (ref 0–1.5)
BUN SERPL-MCNC: 22 MG/DL (ref 8–23)
BUN/CREAT SERPL: 21 (ref 7–25)
CALCIUM SPEC-SCNC: 9.4 MG/DL (ref 8.6–10.5)
CHLORIDE SERPL-SCNC: 103 MMOL/L (ref 98–107)
CO2 SERPL-SCNC: 25 MMOL/L (ref 22–29)
CREAT SERPL-MCNC: 1.05 MG/DL (ref 0.76–1.27)
DEPRECATED RDW RBC AUTO: 40.4 FL (ref 37–54)
EGFRCR SERPLBLD CKD-EPI 2021: 76.8 ML/MIN/1.73
EOSINOPHIL # BLD AUTO: 0.06 10*3/MM3 (ref 0–0.4)
EOSINOPHIL NFR BLD AUTO: 0.7 % (ref 0.3–6.2)
ERYTHROCYTE [DISTWIDTH] IN BLOOD BY AUTOMATED COUNT: 13.1 % (ref 12.3–15.4)
GLUCOSE SERPL-MCNC: 131 MG/DL (ref 65–99)
HCT VFR BLD AUTO: 44.7 % (ref 37.5–51)
HGB BLD-MCNC: 15.1 G/DL (ref 13–17.7)
IMM GRANULOCYTES # BLD AUTO: 0.03 10*3/MM3 (ref 0–0.05)
IMM GRANULOCYTES NFR BLD AUTO: 0.3 % (ref 0–0.5)
LYMPHOCYTES # BLD AUTO: 2.21 10*3/MM3 (ref 0.7–3.1)
LYMPHOCYTES NFR BLD AUTO: 25.5 % (ref 19.6–45.3)
MCH RBC QN AUTO: 29.2 PG (ref 26.6–33)
MCHC RBC AUTO-ENTMCNC: 33.8 G/DL (ref 31.5–35.7)
MCV RBC AUTO: 86.3 FL (ref 79–97)
MONOCYTES # BLD AUTO: 0.59 10*3/MM3 (ref 0.1–0.9)
MONOCYTES NFR BLD AUTO: 6.8 % (ref 5–12)
NEUTROPHILS NFR BLD AUTO: 5.74 10*3/MM3 (ref 1.7–7)
NEUTROPHILS NFR BLD AUTO: 66.2 % (ref 42.7–76)
NRBC BLD AUTO-RTO: 0 /100 WBC (ref 0–0.2)
PLATELET # BLD AUTO: 232 10*3/MM3 (ref 140–450)
PMV BLD AUTO: 9.8 FL (ref 6–12)
POTASSIUM SERPL-SCNC: 4.5 MMOL/L (ref 3.5–5.2)
QT INTERVAL: 417 MS
RBC # BLD AUTO: 5.18 10*6/MM3 (ref 4.14–5.8)
SODIUM SERPL-SCNC: 141 MMOL/L (ref 136–145)
WBC NRBC COR # BLD: 8.67 10*3/MM3 (ref 3.4–10.8)

## 2022-04-06 PROCEDURE — 85025 COMPLETE CBC W/AUTO DIFF WBC: CPT

## 2022-04-06 PROCEDURE — 93010 ELECTROCARDIOGRAM REPORT: CPT | Performed by: INTERNAL MEDICINE

## 2022-04-06 PROCEDURE — 36415 COLL VENOUS BLD VENIPUNCTURE: CPT

## 2022-04-06 PROCEDURE — 93005 ELECTROCARDIOGRAM TRACING: CPT | Performed by: UROLOGY

## 2022-04-06 PROCEDURE — 80048 BASIC METABOLIC PNL TOTAL CA: CPT

## 2022-04-12 RX ORDER — LOSARTAN POTASSIUM 25 MG/1
TABLET ORAL
Qty: 180 TABLET | Refills: 1 | OUTPATIENT
Start: 2022-04-12

## 2022-04-19 ENCOUNTER — APPOINTMENT (OUTPATIENT)
Dept: VASCULAR SURGERY | Facility: HOSPITAL | Age: 72
End: 2022-04-19

## 2022-05-02 ENCOUNTER — ANTICOAGULATION VISIT (OUTPATIENT)
Dept: CARDIOLOGY | Facility: CLINIC | Age: 72
End: 2022-05-02

## 2022-05-02 VITALS
BODY MASS INDEX: 30.68 KG/M2 | HEART RATE: 71 BPM | WEIGHT: 220 LBS | SYSTOLIC BLOOD PRESSURE: 125 MMHG | DIASTOLIC BLOOD PRESSURE: 66 MMHG

## 2022-05-02 DIAGNOSIS — I48.11 LONGSTANDING PERSISTENT ATRIAL FIBRILLATION: Primary | ICD-10-CM

## 2022-05-02 LAB — INR PPP: 1.9 (ref 0.9–1.1)

## 2022-05-02 PROCEDURE — 85610 PROTHROMBIN TIME: CPT | Performed by: INTERNAL MEDICINE

## 2022-05-02 PROCEDURE — 36416 COLLJ CAPILLARY BLOOD SPEC: CPT | Performed by: INTERNAL MEDICINE

## 2022-05-06 ENCOUNTER — TELEPHONE (OUTPATIENT)
Dept: CARDIOLOGY | Facility: CLINIC | Age: 72
End: 2022-05-06

## 2022-05-06 DIAGNOSIS — I48.11 LONGSTANDING PERSISTENT ATRIAL FIBRILLATION: Primary | ICD-10-CM

## 2022-05-06 RX ORDER — LOSARTAN POTASSIUM 25 MG/1
TABLET ORAL
Qty: 90 TABLET | Refills: 0 | Status: SHIPPED | OUTPATIENT
Start: 2022-05-06 | End: 2022-06-13

## 2022-05-06 NOTE — TELEPHONE ENCOUNTER
Patient requesting a 90 day refill on Losartan Potassium 25mg once daily. Dr. Tirado was prescribing this previously and the patient has been having difficult getting medication ordered. Spoke with Dr. Galloway. Per Dr. Galloway sent in RX for 25mg daily apLPN

## 2022-05-10 ENCOUNTER — APPOINTMENT (OUTPATIENT)
Dept: VASCULAR SURGERY | Facility: HOSPITAL | Age: 72
End: 2022-05-10

## 2022-05-10 ENCOUNTER — TRANSCRIBE ORDERS (OUTPATIENT)
Dept: ADMINISTRATIVE | Facility: HOSPITAL | Age: 72
End: 2022-05-10

## 2022-05-10 DIAGNOSIS — I71.30 AAA (ABDOMINAL AORTIC ANEURYSM, RUPTURED): Primary | ICD-10-CM

## 2022-05-10 PROCEDURE — G0463 HOSPITAL OUTPT CLINIC VISIT: HCPCS

## 2022-05-16 ENCOUNTER — HOSPITAL ENCOUNTER (OUTPATIENT)
Dept: CT IMAGING | Facility: HOSPITAL | Age: 72
Discharge: HOME | End: 2022-05-16
Admitting: SURGERY

## 2022-05-16 DIAGNOSIS — I71.30 AAA (ABDOMINAL AORTIC ANEURYSM, RUPTURED): ICD-10-CM

## 2022-05-16 LAB
CREAT BLDA-MCNC: 0.9 MG/DL (ref 0.6–1.3)
EGFRCR SERPLBLD CKD-EPI 2021: 92.5 ML/MIN/1.73

## 2022-05-16 PROCEDURE — 74174 CTA ABD&PLVS W/CONTRAST: CPT

## 2022-05-16 PROCEDURE — 0 IOPAMIDOL PER 1 ML: Performed by: SURGERY

## 2022-05-16 PROCEDURE — 82565 ASSAY OF CREATININE: CPT

## 2022-05-16 RX ADMIN — IOPAMIDOL 100 ML: 755 INJECTION, SOLUTION INTRAVENOUS at 15:25

## 2022-05-25 ENCOUNTER — TELEPHONE (OUTPATIENT)
Dept: NEUROLOGY | Facility: CLINIC | Age: 72
End: 2022-05-25

## 2022-05-25 NOTE — TELEPHONE ENCOUNTER
"Caller: Sandro Ramirez     Relationship: SELF    Best call back number: [phone]    Can the office call and leave a detailed voicemail regarding the call: [x]Yes []No    Do you have an active MyChart: [x]Yes []No   If yes, can the office send a response through [employer] regarding the recall: []Yes [x]No    Have you registered the machine with [employer]: [x]Yes []No    Registration confirmation #: [id number]    How old is the current machine: 9/27/2017 IS THE MANUFACTURED DATE    [facility]    PT STATES THAT HE RECEIVED THE NOZZLE OF THE HOSE AND SAW THAT THERE WERE BLACK SPOTS INSIDE. PT STATES THAT THERE IS ALSO A \"FUNNY ODOR\" COMING FROM THE MACHINE AS WELL. PT STATES THAT MACHINE IS MAKING A VERY LOUD NOISE, SIMILAR TO A VACUUM.    PT HAS BEEN SCHEDULED FOR F/U SLEEP APPT W/ DR. SEIPEL ON Thursday, 6/2/22.    I DID ADVISE PT THAT CORRESPONDENCE REGARDING RECOMMENDATION TO EITHER CONTINUE OR STOP USAGE OF PAP MACHINE MAY TAKE A FEW DAYS. PT VERBALIZED UNDERSTANDING.    PLEASE REVIEW AND ADVISE.    "

## 2022-05-31 ENCOUNTER — LAB (OUTPATIENT)
Dept: LAB | Facility: HOSPITAL | Age: 72
End: 2022-05-31

## 2022-05-31 ENCOUNTER — APPOINTMENT (OUTPATIENT)
Dept: VASCULAR SURGERY | Facility: HOSPITAL | Age: 72
End: 2022-05-31

## 2022-05-31 ENCOUNTER — OFFICE VISIT (OUTPATIENT)
Dept: FAMILY MEDICINE CLINIC | Facility: CLINIC | Age: 72
End: 2022-05-31

## 2022-05-31 ENCOUNTER — HOSPITAL ENCOUNTER (OUTPATIENT)
Dept: GENERAL RADIOLOGY | Facility: HOSPITAL | Age: 72
Discharge: HOME | End: 2022-05-31

## 2022-05-31 VITALS
SYSTOLIC BLOOD PRESSURE: 116 MMHG | HEART RATE: 103 BPM | TEMPERATURE: 96.9 F | DIASTOLIC BLOOD PRESSURE: 70 MMHG | WEIGHT: 218 LBS | BODY MASS INDEX: 30.52 KG/M2 | OXYGEN SATURATION: 93 % | RESPIRATION RATE: 16 BRPM | HEIGHT: 71 IN

## 2022-05-31 DIAGNOSIS — I10 PRIMARY HYPERTENSION: Primary | Chronic | ICD-10-CM

## 2022-05-31 DIAGNOSIS — Z00.00 ENCOUNTER FOR ANNUAL WELLNESS EXAM IN MEDICARE PATIENT: ICD-10-CM

## 2022-05-31 DIAGNOSIS — J44.9 CHRONIC OBSTRUCTIVE PULMONARY DISEASE, UNSPECIFIED COPD TYPE: ICD-10-CM

## 2022-05-31 DIAGNOSIS — I71.40 ABDOMINAL AORTIC ANEURYSM (AAA) WITHOUT RUPTURE: ICD-10-CM

## 2022-05-31 DIAGNOSIS — R05.9 COUGH: ICD-10-CM

## 2022-05-31 DIAGNOSIS — E78.00 PURE HYPERCHOLESTEROLEMIA: Chronic | ICD-10-CM

## 2022-05-31 DIAGNOSIS — E11.9 TYPE 2 DIABETES MELLITUS WITHOUT COMPLICATION, WITHOUT LONG-TERM CURRENT USE OF INSULIN: Chronic | ICD-10-CM

## 2022-05-31 DIAGNOSIS — I25.119 ATHEROSCLEROSIS OF NATIVE CORONARY ARTERY OF NATIVE HEART WITH ANGINA PECTORIS: Chronic | ICD-10-CM

## 2022-05-31 DIAGNOSIS — I10 PRIMARY HYPERTENSION: ICD-10-CM

## 2022-05-31 DIAGNOSIS — I48.21 PERMANENT ATRIAL FIBRILLATION: Chronic | ICD-10-CM

## 2022-05-31 DIAGNOSIS — E79.0 HYPERURICEMIA: Chronic | ICD-10-CM

## 2022-05-31 LAB
ALBUMIN SERPL-MCNC: 4.2 G/DL (ref 3.5–5.2)
ALBUMIN/GLOB SERPL: 1.4 G/DL
ALP SERPL-CCNC: 83 U/L (ref 39–117)
ALT SERPL W P-5'-P-CCNC: 21 U/L (ref 1–41)
ANION GAP SERPL CALCULATED.3IONS-SCNC: 14.4 MMOL/L (ref 5–15)
AST SERPL-CCNC: 21 U/L (ref 1–40)
BASOPHILS # BLD AUTO: 0.06 10*3/MM3 (ref 0–0.2)
BASOPHILS NFR BLD AUTO: 0.7 % (ref 0–1.5)
BILIRUB SERPL-MCNC: 0.5 MG/DL (ref 0–1.2)
BUN SERPL-MCNC: 22 MG/DL (ref 8–23)
BUN/CREAT SERPL: 25 (ref 7–25)
CALCIUM SPEC-SCNC: 9.7 MG/DL (ref 8.6–10.5)
CHLORIDE SERPL-SCNC: 102 MMOL/L (ref 98–107)
CO2 SERPL-SCNC: 25.6 MMOL/L (ref 22–29)
CREAT SERPL-MCNC: 0.88 MG/DL (ref 0.76–1.27)
CREAT UR-MCNC: 59.6 MG/DL
D DIMER PPP FEU-MCNC: 0.68 MG/L (FEU) (ref 0–0.59)
DEPRECATED RDW RBC AUTO: 37.9 FL (ref 37–54)
EGFRCR SERPLBLD CKD-EPI 2021: 93.1 ML/MIN/1.73
EOSINOPHIL # BLD AUTO: 0.07 10*3/MM3 (ref 0–0.4)
EOSINOPHIL NFR BLD AUTO: 0.8 % (ref 0.3–6.2)
ERYTHROCYTE [DISTWIDTH] IN BLOOD BY AUTOMATED COUNT: 12.4 % (ref 12.3–15.4)
GLOBULIN UR ELPH-MCNC: 3 GM/DL
GLUCOSE SERPL-MCNC: 120 MG/DL (ref 65–99)
HBA1C MFR BLD: 7.4 % (ref 3.5–5.6)
HCT VFR BLD AUTO: 47.9 % (ref 37.5–51)
HGB BLD-MCNC: 16.2 G/DL (ref 13–17.7)
IMM GRANULOCYTES # BLD AUTO: 0.05 10*3/MM3 (ref 0–0.05)
IMM GRANULOCYTES NFR BLD AUTO: 0.6 % (ref 0–0.5)
LYMPHOCYTES # BLD AUTO: 2.3 10*3/MM3 (ref 0.7–3.1)
LYMPHOCYTES NFR BLD AUTO: 25.5 % (ref 19.6–45.3)
MCH RBC QN AUTO: 28.9 PG (ref 26.6–33)
MCHC RBC AUTO-ENTMCNC: 33.8 G/DL (ref 31.5–35.7)
MCV RBC AUTO: 85.4 FL (ref 79–97)
MONOCYTES # BLD AUTO: 0.69 10*3/MM3 (ref 0.1–0.9)
MONOCYTES NFR BLD AUTO: 7.7 % (ref 5–12)
NEUTROPHILS NFR BLD AUTO: 5.84 10*3/MM3 (ref 1.7–7)
NEUTROPHILS NFR BLD AUTO: 64.7 % (ref 42.7–76)
NRBC BLD AUTO-RTO: 0 /100 WBC (ref 0–0.2)
NT-PROBNP SERPL-MCNC: 250 PG/ML (ref 0–900)
PLATELET # BLD AUTO: 231 10*3/MM3 (ref 140–450)
PMV BLD AUTO: 9.5 FL (ref 6–12)
POTASSIUM SERPL-SCNC: 4.2 MMOL/L (ref 3.5–5.2)
PROCALCITONIN SERPL-MCNC: <0.02 NG/ML (ref 0–0.25)
PROT ?TM UR-MCNC: 30.3 MG/DL
PROT SERPL-MCNC: 7.2 G/DL (ref 6–8.5)
PROT/CREAT UR: 508.4 MG/G CREA (ref 0–200)
RBC # BLD AUTO: 5.61 10*6/MM3 (ref 4.14–5.8)
SODIUM SERPL-SCNC: 142 MMOL/L (ref 136–145)
WBC NRBC COR # BLD: 9.01 10*3/MM3 (ref 3.4–10.8)

## 2022-05-31 PROCEDURE — 36415 COLL VENOUS BLD VENIPUNCTURE: CPT

## 2022-05-31 PROCEDURE — 85379 FIBRIN DEGRADATION QUANT: CPT

## 2022-05-31 PROCEDURE — 99214 OFFICE O/P EST MOD 30 MIN: CPT | Performed by: FAMILY MEDICINE

## 2022-05-31 PROCEDURE — 1170F FXNL STATUS ASSESSED: CPT | Performed by: FAMILY MEDICINE

## 2022-05-31 PROCEDURE — 83036 HEMOGLOBIN GLYCOSYLATED A1C: CPT

## 2022-05-31 PROCEDURE — 85025 COMPLETE CBC W/AUTO DIFF WBC: CPT

## 2022-05-31 PROCEDURE — 71046 X-RAY EXAM CHEST 2 VIEWS: CPT

## 2022-05-31 PROCEDURE — 82570 ASSAY OF URINE CREATININE: CPT

## 2022-05-31 PROCEDURE — 1159F MED LIST DOCD IN RCRD: CPT | Performed by: FAMILY MEDICINE

## 2022-05-31 PROCEDURE — 83880 ASSAY OF NATRIURETIC PEPTIDE: CPT

## 2022-05-31 PROCEDURE — G0439 PPPS, SUBSEQ VISIT: HCPCS | Performed by: FAMILY MEDICINE

## 2022-05-31 PROCEDURE — 80053 COMPREHEN METABOLIC PANEL: CPT

## 2022-05-31 PROCEDURE — G0463 HOSPITAL OUTPT CLINIC VISIT: HCPCS

## 2022-05-31 PROCEDURE — 1126F AMNT PAIN NOTED NONE PRSNT: CPT | Performed by: FAMILY MEDICINE

## 2022-05-31 PROCEDURE — 84145 PROCALCITONIN (PCT): CPT

## 2022-05-31 PROCEDURE — 84156 ASSAY OF PROTEIN URINE: CPT

## 2022-05-31 NOTE — PROGRESS NOTES
Subjective   Sandro Ramirez is a 71 y.o. male.     Chief Complaint   Patient presents with   • Medicare Wellness-subsequent   • Diabetes     3 month follow up       HPI  Chief complaint: Type 2 diabetes mellitus hypertension hyperlipidemia abdominal aortic aneurysm COPD    Patient is a 71-year-old white male comes in for follow-up and maintenance of his current problems which include    1.  Type 2 diabetes mellitus-deteriorated-patient is current on Jardiance 25 mg daily.  He was supposed to add this to glimepiride.  Patient however stopped taking the glimepiride and is just on Jardiance.  He states his blood sugars are lower.  Patient however is gained 8 pounds since I saw him last.    2.  Hypertension-stable-patient on Cozaar 25 mg daily.  He also takes atenolol 50 mg daily Lasix 40 mg daily and potassium.  Blood pressure stable.    3.  Hyperlipidemia-stable-patient is on Lipitor 20 mg daily.  Denies myalgias and arthralgias.    4.  Abdominal aortic aneurysm-deteriorated-patient has abdominal aortic aneurysm.  Is gradually getting larger.  Is been evaluated by vascular surgery.  Patient is then scheduled to have an endovascular repair of the aneurysm.  Patient also has iliac aneurysm and is scheduled to have this repaired at the same time.    5.  Atrial fibrillation-stable-patient is currently on atenolol 50 mg daily and Coumadin 7.5 mg daily.  Denied episodes of rapid or slow heart rhythm.    6.  COPD-stable-patient is on a rescue inhaler mini nebs every 4 hours as needed.  Patient is complains of increased shortness of breath and cough.  Cough is productive of sputum.  Patient states he has had this for several weeks.  Patient states that he has CPAP and was wearing his CPAP.  He states that he quit using his CPAP and the cough is improved.    7.  Hyperuricemia-stable-patient is currently on allopurinol 300 mg daily colchicine 0.6 mg twice a day as needed.  The following portions of the patient's history were  "reviewed and updated as appropriate: allergies, current medications, past family history, past medical history, past social history, past surgical history and problem list.    Review of Systems    Objective     /70 (BP Location: Left arm, Patient Position: Sitting, Cuff Size: Adult)   Pulse 103   Temp 96.9 °F (36.1 °C) (Infrared)   Resp 16   Ht 180.3 cm (71\")   Wt 98.9 kg (218 lb)   SpO2 93%   BMI 30.40 kg/m²     Physical Exam  Vitals and nursing note reviewed.   Constitutional:       Appearance: He is well-developed. He is obese.   HENT:      Head: Normocephalic and atraumatic.   Eyes:      Pupils: Pupils are equal, round, and reactive to light.   Cardiovascular:      Rate and Rhythm: Normal rate. Rhythm irregularly irregular.      Pulses: Normal pulses.      Heart sounds: Normal heart sounds. No murmur heard.    No friction rub. No gallop.   Pulmonary:      Effort: Respiratory distress present.      Breath sounds: Rhonchi present.   Abdominal:      General: Abdomen is flat. Bowel sounds are normal.      Palpations: Abdomen is soft.   Musculoskeletal:         General: Normal range of motion.      Cervical back: Neck supple.   Skin:     General: Skin is warm and dry.   Neurological:      Mental Status: He is alert and oriented to person, place, and time.   Psychiatric:         Behavior: Behavior normal.         Thought Content: Thought content normal.         Judgment: Judgment normal.           Assessment & Plan   Diagnoses and all orders for this visit:    1. Primary hypertension (Primary)  -     Cancel: Basic Metabolic Panel; Future  -     CBC & Differential; Future  -     Comprehensive Metabolic Panel; Future    2. Encounter for annual wellness exam in Medicare patient    3. Pure hypercholesterolemia    4. Permanent atrial fibrillation (HCC)    5. Atherosclerosis of native coronary artery of native heart with angina pectoris (HCC)    6. Type 2 diabetes mellitus without complication, without " long-term current use of insulin (HCC)  -     Cancel: Hemoglobin A1c; Future  -     Hemoglobin A1c; Future  -     Protein / Creatinine Ratio, Urine - Urine, Clean Catch; Future    7. Hyperuricemia    8. Abdominal aortic aneurysm (AAA) without rupture (HCC)    9. Cough-we discussed evaluation and treatment options.  Patient is audibly wheezing today.  He is to have chest x-ray and laboratory testing.  Further care depend results of the studies and how he progresses.  -     D-dimer, Quantitative; Future  -     proBNP; Future  -     Procalcitonin; Future  -     XR Chest 2 View; Future    10. Chronic obstructive pulmonary disease, unspecified COPD type (HCC)      Patient Instructions   Continue your current medications and treatment.    Create a living will.    Follow up in the office in 6 months.    Have the labs done and call for results.    Called around the results of the studies and how you progress.      James Tirado Jr., MD    05/31/22

## 2022-05-31 NOTE — PATIENT INSTRUCTIONS
Continue your current medications and treatment.    Create a living will.    Follow up in the office in 6 months.    Have the labs done and call for results.    Called around the results of the studies and how you progress.

## 2022-05-31 NOTE — PROGRESS NOTES
The ABCs of the Annual Wellness Visit  Subsequent Medicare Wellness Visit    Chief Complaint   Patient presents with   • Medicare Wellness-subsequent   • Diabetes     3 month follow up      Subjective    History of Present Illness:  Sandro Ramirez is a 71 y.o. male who presents for a Subsequent Medicare Wellness Visit.    The following portions of the patient's history were reviewed and   updated as appropriate: allergies, current medications, past family history, past medical history, past social history, past surgical history and problem list.    Compared to one year ago, the patient feels his physical   health is the same.    Compared to one year ago, the patient feels his mental   health is the same.    Recent Hospitalizations:  He was not admitted to the hospital during the last year.       Current Medical Providers:  Patient Care Team:  James Tirado Jr., MD as PCP - Thai Contreras MD as Consulting Physician (Interventional Cardiology)    Outpatient Medications Prior to Visit   Medication Sig Dispense Refill   • albuterol (PROVENTIL) (2.5 MG/3ML) 0.083% nebulizer solution Take 2.5 mg by nebulization Every 4 (Four) Hours As Needed for Wheezing. Indications: Dx codes: R06.02, J20.6 3 mL 0   • albuterol sulfate HFA (VENTOLIN HFA) 108 (90 Base) MCG/ACT inhaler VENTOLIN  (90 Base) MCG/ACT AERS     • allopurinol (ZYLOPRIM) 300 MG tablet Take 300 mg by mouth Daily.     • aspirin 81 MG tablet ASPIRIN 81 MG ORAL TABLET     • atenolol (TENORMIN) 50 MG tablet TAKE 1 TABLET BY MOUTH EVERY DAY 90 tablet 3   • atorvastatin (LIPITOR) 20 MG tablet TAKE 1 TABLET BY MOUTH EVERY DAY 90 tablet 1   • colchicine 0.6 MG tablet Take 0.6 mg by mouth 2 (Two) Times a Day.     • empagliflozin (Jardiance) 25 MG tablet tablet Take 1 tablet by mouth Daily. 90 tablet 1   • furosemide (LASIX) 40 MG tablet TAKE 1 TABLET BY MOUTH EVERY DAY 90 tablet 3   • glimepiride (AMARYL) 2 MG tablet Take 1 tablet by mouth 2 (Two)  "Times a Day. 180 tablet 0   • glucose blood ([employer] Blood Glucose Test) test strip Use to check blood sugars once a day    Dx E11.9 100 each 3   • losartan (Cozaar) 25 MG tablet Take one tablet by mouth daily 90 tablet 0   • warfarin (COUMADIN) 7.5 MG tablet TAKE 1 AND 1/2 TABLETS ON MONDAY AND 1 TABLET BY MOUTH THE OTHER 6 DAYS A WEEK. 110 tablet 0     No facility-administered medications prior to visit.       No opioid medication identified on active medication list. I have reviewed chart for other potential  high risk medication/s and harmful drug interactions in the elderly.          Aspirin is on active medication list. Aspirin use is indicated based on review of current medical condition/s. Pros and cons of this therapy have been discussed today. Benefits of this medication outweigh potential harm.  Patient has been encouraged to continue taking this medication.  .      Patient Active Problem List   Diagnosis   • Atrial fibrillation (CMS/HCC) [I48.91]   • Abdominal aortic aneurysm (HCC)   • Bladder cancer (HCC)   • Atherosclerosis of native coronary artery of native heart with angina pectoris (HCC)   • Hyperlipidemia   • Hypertension   • Sleep apnea   • Status post coronary artery bypass graft   • Hyperuricemia   • Type 2 diabetes mellitus without complication, without long-term current use of insulin (HCC)   • Prostatism     Advance Care Planning  Advance Directive is not on file.  ACP discussion was held with the patient during this visit. Patient does not have an advance directive, information provided.          Objective    Vitals:    05/31/22 1409   BP: 116/70   BP Location: Left arm   Patient Position: Sitting   Cuff Size: Adult   Pulse: 103   Resp: 16   Temp: 96.9 °F (36.1 °C)   TempSrc: Infrared   SpO2: 93%   Weight: 98.9 kg (218 lb)   Height: 180.3 cm (71\")   PainSc: 0-No pain     Body mass index is 30.4 kg/m².  BMI is >= 30 and <= 34.9 (Class 1 obesity). The following options were offered after " discussion: exercise counseling/recommendations    Does the patient have evidence of cognitive impairment? No    Physical Exam  Vitals and nursing note reviewed.   Constitutional:       Appearance: He is well-developed.   HENT:      Head: Normocephalic and atraumatic.   Eyes:      Pupils: Pupils are equal, round, and reactive to light.   Cardiovascular:      Rate and Rhythm: Normal rate. Rhythm irregularly irregular.      Pulses: Normal pulses.      Heart sounds: Normal heart sounds. No murmur heard.    No friction rub. No gallop.   Pulmonary:      Effort: Pulmonary effort is normal.      Breath sounds: Normal breath sounds.   Abdominal:      General: Bowel sounds are normal.      Palpations: Abdomen is soft.   Musculoskeletal:         General: Normal range of motion.      Cervical back: Neck supple.   Skin:     General: Skin is warm and dry.   Neurological:      Mental Status: He is oriented to person, place, and time.   Psychiatric:         Behavior: Behavior normal.         Thought Content: Thought content normal.         Judgment: Judgment normal.       Lab Results   Component Value Date    HGBA1C 9.6 (H) 03/22/2022            HEALTH RISK ASSESSMENT    Smoking Status:  Social History     Tobacco Use   Smoking Status Former Smoker   • Packs/day: 1.00   • Years: 30.00   • Pack years: 30.00   • Types: Cigarettes   • Quit date: 2006   • Years since quitting: 15.9   Smokeless Tobacco Never Used     Alcohol Consumption:  Social History     Substance and Sexual Activity   Alcohol Use Not Currently    Comment: one or two beers 6 times a year     Fall Risk Screen:    JESSICA Fall Risk Assessment was completed, and patient is at LOW risk for falls.Assessment completed on:5/31/2022    Depression Screening:  PHQ-2/PHQ-9 Depression Screening 5/31/2022   Retired PHQ-9 Total Score -   Retired Total Score -   Little Interest or Pleasure in Doing Things 0-->not at all   Feeling Down, Depressed or Hopeless 0-->not at all   PHQ-9:  Brief Depression Severity Measure Score 0       Health Habits and Functional and Cognitive Screening:  Functional & Cognitive Status 5/31/2022   Do you have difficulty preparing food and eating? No   Do you have difficulty bathing yourself, getting dressed or grooming yourself? No   Do you have difficulty using the toilet? No   Do you have difficulty moving around from place to place? No   Do you have trouble with steps or getting out of a bed or a chair? No   Current Diet Well Balanced Diet   Dental Exam Up to date   Eye Exam Up to date   Exercise (times per week) -   Current Exercise Activities Include -   Do you need help using the phone?  No   Are you deaf or do you have serious difficulty hearing?  Yes   Do you need help with transportation? No   Do you need help shopping? No   Do you need help preparing meals?  No   Do you need help with housework?  No   Do you need help with laundry? No   Do you need help taking your medications? No   Do you need help managing money? No   Do you ever drive or ride in a car without wearing a seat belt? No   Have you felt unusual stress, anger or loneliness in the last month? No   Who do you live with? Spouse   If you need help, do you have trouble finding someone available to you? No   Have you been bothered in the last four weeks by sexual problems? No   Do you have difficulty concentrating, remembering or making decisions? Yes       Age-appropriate Screening Schedule:  Refer to the list below for future screening recommendations based on patient's age, sex and/or medical conditions. Orders for these recommended tests are listed in the plan section. The patient has been provided with a written plan.    Health Maintenance   Topic Date Due   • DIABETIC EYE EXAM  07/19/2022 (Originally 9/21/2021)   • INFLUENZA VACCINE  08/01/2022   • LIPID PANEL  08/25/2022   • HEMOGLOBIN A1C  09/22/2022   • DIABETIC FOOT EXAM  Discontinued   • URINE MICROALBUMIN  Discontinued   • TDAP/TD  VACCINES  Discontinued   • ZOSTER VACCINE  Discontinued              Assessment & Plan   CMS Preventative Services Quick Reference  Risk Factors Identified During Encounter  Immunizations Discussed/Encouraged (specific Immunizations; Td, Influenza, Pneumococcal 23, Prevnar 20 (Pneumococcal 20-valent conjugate), Vaxneuvance (Pneumococcal 15-valent conjugate), Shingrix and COVID19  The above risks/problems have been discussed with the patient.  Follow up actions/plans if indicated are seen below in the Assessment/Plan Section.  Pertinent information has been shared with the patient in the After Visit Summary.    Diagnoses and all orders for this visit:    1. Encounter for annual wellness exam in Medicare patient (Primary)        Follow Up:   Return in about 6 months (around 11/30/2022).     An After Visit Summary and PPPS were made available to the patient.

## 2022-06-02 ENCOUNTER — OFFICE VISIT (OUTPATIENT)
Dept: NEUROLOGY | Facility: CLINIC | Age: 72
End: 2022-06-02

## 2022-06-02 VITALS
DIASTOLIC BLOOD PRESSURE: 59 MMHG | HEART RATE: 76 BPM | BODY MASS INDEX: 29.82 KG/M2 | SYSTOLIC BLOOD PRESSURE: 110 MMHG | HEIGHT: 71 IN | TEMPERATURE: 98.5 F | RESPIRATION RATE: 18 BRPM | WEIGHT: 213 LBS

## 2022-06-02 DIAGNOSIS — G47.33 OBSTRUCTIVE SLEEP APNEA SYNDROME: Primary | Chronic | ICD-10-CM

## 2022-06-02 PROCEDURE — 99213 OFFICE O/P EST LOW 20 MIN: CPT | Performed by: PSYCHIATRY & NEUROLOGY

## 2022-06-06 ENCOUNTER — ANTICOAGULATION VISIT (OUTPATIENT)
Dept: CARDIOLOGY | Facility: CLINIC | Age: 72
End: 2022-06-06

## 2022-06-06 VITALS
HEART RATE: 58 BPM | BODY MASS INDEX: 29.99 KG/M2 | SYSTOLIC BLOOD PRESSURE: 107 MMHG | DIASTOLIC BLOOD PRESSURE: 59 MMHG | WEIGHT: 215 LBS

## 2022-06-06 DIAGNOSIS — I48.11 LONGSTANDING PERSISTENT ATRIAL FIBRILLATION: Primary | ICD-10-CM

## 2022-06-06 LAB — INR PPP: 1.7 (ref 0.9–1.1)

## 2022-06-06 PROCEDURE — 85610 PROTHROMBIN TIME: CPT | Performed by: INTERNAL MEDICINE

## 2022-06-06 PROCEDURE — 36416 COLLJ CAPILLARY BLOOD SPEC: CPT | Performed by: INTERNAL MEDICINE

## 2022-06-08 ENCOUNTER — TELEPHONE (OUTPATIENT)
Dept: CARDIOLOGY | Facility: CLINIC | Age: 72
End: 2022-06-08

## 2022-06-08 NOTE — TELEPHONE ENCOUNTER
DR. MYCHAL MARTIN  [facility]  AORTIC STENT GRAFT WITH LEFT SARITA  SCHEDULED 6/29/22  PHONE# [phone]  FAX# [phone]      PLACED ON DR. JULIA TOTH DESDANIELLA

## 2022-06-13 ENCOUNTER — TELEPHONE (OUTPATIENT)
Dept: CARDIOLOGY | Facility: CLINIC | Age: 72
End: 2022-06-13

## 2022-06-13 DIAGNOSIS — I48.21 PERMANENT ATRIAL FIBRILLATION: Primary | ICD-10-CM

## 2022-06-13 DIAGNOSIS — I48.11 LONGSTANDING PERSISTENT ATRIAL FIBRILLATION: ICD-10-CM

## 2022-06-13 RX ORDER — WARFARIN SODIUM 7.5 MG/1
TABLET ORAL
Qty: 110 TABLET | Refills: 0 | Status: SHIPPED | OUTPATIENT
Start: 2022-06-13 | End: 2022-07-13

## 2022-06-13 RX ORDER — LOSARTAN POTASSIUM 25 MG/1
TABLET ORAL
Qty: 90 TABLET | Refills: 0 | Status: SHIPPED | OUTPATIENT
Start: 2022-06-13 | End: 2022-10-21

## 2022-06-13 NOTE — TELEPHONE ENCOUNTER
Rx Refill Note  Requested Prescriptions     Pending Prescriptions Disp Refills   • losartan (COZAAR) 25 MG tablet [Pharmacy Med Name: LOSARTAN POTASSIUM 25 MG TAB] 90 tablet 0     Sig: TAKE 1 TABLET BY MOUTH DAILY      Last office visit with prescribing clinician: 12/2/2021      Next office visit with prescribing clinician: 7/14/2022            Kaleb Tamez MA  06/13/22, 15:10 EDT

## 2022-06-13 NOTE — TELEPHONE ENCOUNTER
Rx Refill Note  Requested Prescriptions     Pending Prescriptions Disp Refills   • warfarin (COUMADIN) 7.5 MG tablet 110 tablet 0     Sig: Take one and one half tablets on Monday Wednesday and Friday and one tablet by mouth all other days or as directed      Last office visit with prescribing clinician: 12/2/2021      Next office visit with prescribing clinician: 6/13/2022              Anticoagulation Visit (06/06/2022)      Samantha Santizo LPN  06/13/22, 16:05 EDT

## 2022-06-13 NOTE — TELEPHONE ENCOUNTER
Incoming Refill Request      Medication requested (name and dose):warfarin 7.5 mg    Pharmacy where request should be sent: [facility] [address]    Additional details provided by patient:     Best call back number: [phone]    Does the patient have less than a 3 day supply:  [x] Yes  [] No    Robyn Esquivel Rep  06/13/22, 15:21 EDT        Incoming Refill Request      Medication requested (name and dose):    Pharmacy where request should be sent:     Additional details provided by patient:     Best call back number:     Does the patient have less than a 3 day supply:  [] Yes  [] No    Robyn Esquivel Rep  06/13/22, 15:21 EDT

## 2022-06-22 ENCOUNTER — PRE-ADMISSION TESTING (OUTPATIENT)
Dept: PREADMISSION TESTING | Facility: HOSPITAL | Age: 72
End: 2022-06-22

## 2022-06-22 VITALS
SYSTOLIC BLOOD PRESSURE: 118 MMHG | DIASTOLIC BLOOD PRESSURE: 77 MMHG | TEMPERATURE: 98.1 F | OXYGEN SATURATION: 94 % | WEIGHT: 211.4 LBS | RESPIRATION RATE: 20 BRPM | HEIGHT: 71 IN | BODY MASS INDEX: 29.6 KG/M2 | HEART RATE: 73 BPM

## 2022-06-22 RX ORDER — VIBEGRON 75 MG/1
75 TABLET, FILM COATED ORAL DAILY
COMMUNITY

## 2022-06-22 RX ORDER — ALFUZOSIN HYDROCHLORIDE 10 MG/1
10 TABLET, EXTENDED RELEASE ORAL 2 TIMES DAILY
COMMUNITY

## 2022-06-22 RX ORDER — ENOXAPARIN SODIUM 100 MG/ML
100 INJECTION SUBCUTANEOUS EVERY 12 HOURS SCHEDULED
COMMUNITY
End: 2022-07-13

## 2022-06-28 ENCOUNTER — LAB (OUTPATIENT)
Dept: LAB | Facility: HOSPITAL | Age: 72
End: 2022-06-28

## 2022-06-28 DIAGNOSIS — Z01.818 OTHER SPECIFIED PRE-OPERATIVE EXAMINATION: Primary | ICD-10-CM

## 2022-06-29 ENCOUNTER — HOSPITAL ENCOUNTER (INPATIENT)
Facility: HOSPITAL | Age: 72
LOS: 1 days | Discharge: HOME | End: 2022-06-30
Attending: SURGERY | Admitting: SURGERY

## 2022-06-29 ENCOUNTER — ANESTHESIA EVENT (OUTPATIENT)
Dept: PERIOP | Facility: HOSPITAL | Age: 72
End: 2022-06-29

## 2022-06-29 ENCOUNTER — APPOINTMENT (OUTPATIENT)
Dept: GENERAL RADIOLOGY | Facility: HOSPITAL | Age: 72
End: 2022-06-29

## 2022-06-29 ENCOUNTER — ANESTHESIA (OUTPATIENT)
Dept: PERIOP | Facility: HOSPITAL | Age: 72
End: 2022-06-29

## 2022-06-29 DIAGNOSIS — I71.40 AAA (ABDOMINAL AORTIC ANEURYSM) WITHOUT RUPTURE: Primary | ICD-10-CM

## 2022-06-29 LAB
ABO GROUP BLD: NORMAL
BLD GP AB SCN SERPL QL: NEGATIVE
GLUCOSE BLDC GLUCOMTR-MCNC: 126 MG/DL (ref 70–130)
GLUCOSE BLDC GLUCOMTR-MCNC: 141 MG/DL (ref 70–130)
GLUCOSE BLDC GLUCOMTR-MCNC: 144 MG/DL (ref 70–130)
GLUCOSE BLDC GLUCOMTR-MCNC: 149 MG/DL (ref 70–130)
INR PPP: 1.04 (ref 0.9–1.1)
PROTHROMBIN TIME: 13.5 SECONDS (ref 11.7–14.2)
RH BLD: POSITIVE
SARS-COV-2 RNA PNL SPEC NAA+PROBE: NOT DETECTED
T&S EXPIRATION DATE: NORMAL

## 2022-06-29 PROCEDURE — 85610 PROTHROMBIN TIME: CPT | Performed by: STUDENT IN AN ORGANIZED HEALTH CARE EDUCATION/TRAINING PROGRAM

## 2022-06-29 PROCEDURE — 87635 SARS-COV-2 COVID-19 AMP PRB: CPT | Performed by: SURGERY

## 2022-06-29 PROCEDURE — C1889 IMPLANT/INSERT DEVICE, NOC: HCPCS | Performed by: SURGERY

## 2022-06-29 PROCEDURE — 25010000002 PROTAMINE SULFATE PER 10 MG: Performed by: NURSE ANESTHETIST, CERTIFIED REGISTERED

## 2022-06-29 PROCEDURE — C1894 INTRO/SHEATH, NON-LASER: HCPCS | Performed by: SURGERY

## 2022-06-29 PROCEDURE — 82962 GLUCOSE BLOOD TEST: CPT

## 2022-06-29 PROCEDURE — 25010000002 ALBUMIN HUMAN 5% PER 50 ML: Performed by: NURSE ANESTHETIST, CERTIFIED REGISTERED

## 2022-06-29 PROCEDURE — 25010000002 CEFAZOLIN IN DEXTROSE 2-4 GM/100ML-% SOLUTION: Performed by: SURGERY

## 2022-06-29 PROCEDURE — 85347 COAGULATION TIME ACTIVATED: CPT

## 2022-06-29 PROCEDURE — 25010000002 SUCCINYLCHOLINE PER 20 MG: Performed by: NURSE ANESTHETIST, CERTIFIED REGISTERED

## 2022-06-29 PROCEDURE — C2628 CATHETER, OCCLUSION: HCPCS | Performed by: SURGERY

## 2022-06-29 PROCEDURE — C1725 CATH, TRANSLUMIN NON-LASER: HCPCS | Performed by: SURGERY

## 2022-06-29 PROCEDURE — C1769 GUIDE WIRE: HCPCS | Performed by: SURGERY

## 2022-06-29 PROCEDURE — 047E3ZZ DILATION OF RIGHT INTERNAL ILIAC ARTERY, PERCUTANEOUS APPROACH: ICD-10-PCS | Performed by: SURGERY

## 2022-06-29 PROCEDURE — 0 IOPAMIDOL PER 1 ML: Performed by: SURGERY

## 2022-06-29 PROCEDURE — 25010000002 HYDROMORPHONE PER 4 MG: Performed by: NURSE ANESTHETIST, CERTIFIED REGISTERED

## 2022-06-29 PROCEDURE — 25010000002 FENTANYL CITRATE (PF) 50 MCG/ML SOLUTION: Performed by: NURSE ANESTHETIST, CERTIFIED REGISTERED

## 2022-06-29 PROCEDURE — 25010000002 HEPARIN (PORCINE) PER 1000 UNITS: Performed by: SURGERY

## 2022-06-29 PROCEDURE — C1887 CATHETER, GUIDING: HCPCS | Performed by: SURGERY

## 2022-06-29 PROCEDURE — 25010000002 PHENYLEPHRINE 10 MG/ML SOLUTION: Performed by: NURSE ANESTHETIST, CERTIFIED REGISTERED

## 2022-06-29 PROCEDURE — P9041 ALBUMIN (HUMAN),5%, 50ML: HCPCS | Performed by: NURSE ANESTHETIST, CERTIFIED REGISTERED

## 2022-06-29 PROCEDURE — 25010000002 ONDANSETRON PER 1 MG: Performed by: NURSE ANESTHETIST, CERTIFIED REGISTERED

## 2022-06-29 PROCEDURE — 86850 RBC ANTIBODY SCREEN: CPT | Performed by: SURGERY

## 2022-06-29 PROCEDURE — 86900 BLOOD TYPING SEROLOGIC ABO: CPT | Performed by: SURGERY

## 2022-06-29 PROCEDURE — C1760 CLOSURE DEV, VASC: HCPCS | Performed by: SURGERY

## 2022-06-29 PROCEDURE — 04VF3DZ RESTRICTION OF LEFT INTERNAL ILIAC ARTERY WITH INTRALUMINAL DEVICE, PERCUTANEOUS APPROACH: ICD-10-PCS | Performed by: SURGERY

## 2022-06-29 PROCEDURE — 04VB3DZ RESTRICTION OF INFERIOR MESENTERIC ARTERY WITH INTRALUMINAL DEVICE, PERCUTANEOUS APPROACH: ICD-10-PCS | Performed by: SURGERY

## 2022-06-29 PROCEDURE — 86901 BLOOD TYPING SEROLOGIC RH(D): CPT | Performed by: SURGERY

## 2022-06-29 PROCEDURE — 04V03EZ RESTRICTION OF ABDOMINAL AORTA WITH BRANCHED OR FENESTRATED INTRALUMINAL DEVICE, ONE OR TWO ARTERIES, PERCUTANEOUS APPROACH: ICD-10-PCS | Performed by: SURGERY

## 2022-06-29 PROCEDURE — 25010000002 HEPARIN (PORCINE) PER 1000 UNITS: Performed by: NURSE ANESTHETIST, CERTIFIED REGISTERED

## 2022-06-29 PROCEDURE — 25010000002 PHENYLEPHRINE 10 MG/ML SOLUTION 5 ML VIAL: Performed by: NURSE ANESTHETIST, CERTIFIED REGISTERED

## 2022-06-29 DEVICE — IMPLANTABLE DEVICE: Type: IMPLANTABLE DEVICE | Site: ARTERY ILIAC | Status: FUNCTIONAL

## 2022-06-29 DEVICE — COIL AZUR CX 5MMX11CM: Type: IMPLANTABLE DEVICE | Site: ARTERY ILIAC | Status: FUNCTIONAL

## 2022-06-29 DEVICE — GRFT EXCLDR CONTRALAT 12F 16MM 11.5CM: Type: IMPLANTABLE DEVICE | Site: ARTERY ILIAC | Status: FUNCTIONAL

## 2022-06-29 DEVICE — IMPLANTABLE DEVICE: Type: IMPLANTABLE DEVICE | Site: AORTA | Status: FUNCTIONAL

## 2022-06-29 RX ORDER — TAMSULOSIN HYDROCHLORIDE 0.4 MG/1
0.4 CAPSULE ORAL NIGHTLY
Status: DISCONTINUED | OUTPATIENT
Start: 2022-06-29 | End: 2022-06-30 | Stop reason: HOSPADM

## 2022-06-29 RX ORDER — LOSARTAN POTASSIUM 25 MG/1
25 TABLET ORAL DAILY
Status: DISCONTINUED | OUTPATIENT
Start: 2022-06-29 | End: 2022-06-30 | Stop reason: HOSPADM

## 2022-06-29 RX ORDER — ATORVASTATIN CALCIUM 20 MG/1
20 TABLET, FILM COATED ORAL DAILY
Status: DISCONTINUED | OUTPATIENT
Start: 2022-06-29 | End: 2022-06-30 | Stop reason: HOSPADM

## 2022-06-29 RX ORDER — SUCCINYLCHOLINE CHLORIDE 20 MG/ML
INJECTION INTRAMUSCULAR; INTRAVENOUS AS NEEDED
Status: DISCONTINUED | OUTPATIENT
Start: 2022-06-29 | End: 2022-06-29 | Stop reason: SURG

## 2022-06-29 RX ORDER — SODIUM CHLORIDE, SODIUM LACTATE, POTASSIUM CHLORIDE, CALCIUM CHLORIDE 600; 310; 30; 20 MG/100ML; MG/100ML; MG/100ML; MG/100ML
9 INJECTION, SOLUTION INTRAVENOUS CONTINUOUS
Status: DISCONTINUED | OUTPATIENT
Start: 2022-06-29 | End: 2022-06-30

## 2022-06-29 RX ORDER — HYDRALAZINE HYDROCHLORIDE 20 MG/ML
5 INJECTION INTRAMUSCULAR; INTRAVENOUS
Status: DISCONTINUED | OUTPATIENT
Start: 2022-06-29 | End: 2022-06-29 | Stop reason: HOSPADM

## 2022-06-29 RX ORDER — FENTANYL CITRATE 50 UG/ML
50 INJECTION, SOLUTION INTRAMUSCULAR; INTRAVENOUS
Status: DISCONTINUED | OUTPATIENT
Start: 2022-06-29 | End: 2022-06-30 | Stop reason: HOSPADM

## 2022-06-29 RX ORDER — EPHEDRINE SULFATE 50 MG/ML
5 INJECTION, SOLUTION INTRAVENOUS ONCE AS NEEDED
Status: DISCONTINUED | OUTPATIENT
Start: 2022-06-29 | End: 2022-06-29 | Stop reason: HOSPADM

## 2022-06-29 RX ORDER — FENTANYL CITRATE 50 UG/ML
50 INJECTION, SOLUTION INTRAMUSCULAR; INTRAVENOUS
Status: DISCONTINUED | OUTPATIENT
Start: 2022-06-29 | End: 2022-06-29 | Stop reason: HOSPADM

## 2022-06-29 RX ORDER — ESMOLOL HYDROCHLORIDE 10 MG/ML
INJECTION INTRAVENOUS AS NEEDED
Status: DISCONTINUED | OUTPATIENT
Start: 2022-06-29 | End: 2022-06-29 | Stop reason: SURG

## 2022-06-29 RX ORDER — SODIUM CHLORIDE, SODIUM LACTATE, POTASSIUM CHLORIDE, CALCIUM CHLORIDE 600; 310; 30; 20 MG/100ML; MG/100ML; MG/100ML; MG/100ML
75 INJECTION, SOLUTION INTRAVENOUS CONTINUOUS
Status: DISCONTINUED | OUTPATIENT
Start: 2022-06-29 | End: 2022-06-30

## 2022-06-29 RX ORDER — BUPIVACAINE HYDROCHLORIDE 2.5 MG/ML
INJECTION, SOLUTION INFILTRATION; PERINEURAL AS NEEDED
Status: DISCONTINUED | OUTPATIENT
Start: 2022-06-29 | End: 2022-06-29 | Stop reason: HOSPADM

## 2022-06-29 RX ORDER — CEFAZOLIN SODIUM 2 G/100ML
2 INJECTION, SOLUTION INTRAVENOUS ONCE
Status: COMPLETED | OUTPATIENT
Start: 2022-06-29 | End: 2022-06-29

## 2022-06-29 RX ORDER — ACETAMINOPHEN 325 MG/1
650 TABLET ORAL EVERY 4 HOURS PRN
Status: DISCONTINUED | OUTPATIENT
Start: 2022-06-29 | End: 2022-06-30 | Stop reason: HOSPADM

## 2022-06-29 RX ORDER — DEXTROSE MONOHYDRATE 25 G/50ML
25 INJECTION, SOLUTION INTRAVENOUS
Status: DISCONTINUED | OUTPATIENT
Start: 2022-06-29 | End: 2022-06-30 | Stop reason: HOSPADM

## 2022-06-29 RX ORDER — ALBUTEROL SULFATE 2.5 MG/3ML
2.5 SOLUTION RESPIRATORY (INHALATION) EVERY 4 HOURS PRN
Status: DISCONTINUED | OUTPATIENT
Start: 2022-06-29 | End: 2022-06-30 | Stop reason: HOSPADM

## 2022-06-29 RX ORDER — ETOMIDATE 2 MG/ML
INJECTION INTRAVENOUS AS NEEDED
Status: DISCONTINUED | OUTPATIENT
Start: 2022-06-29 | End: 2022-06-29 | Stop reason: SURG

## 2022-06-29 RX ORDER — LIDOCAINE HYDROCHLORIDE 10 MG/ML
0.5 INJECTION, SOLUTION EPIDURAL; INFILTRATION; INTRACAUDAL; PERINEURAL ONCE AS NEEDED
Status: DISCONTINUED | OUTPATIENT
Start: 2022-06-29 | End: 2022-06-30 | Stop reason: HOSPADM

## 2022-06-29 RX ORDER — PROTAMINE SULFATE 10 MG/ML
INJECTION, SOLUTION INTRAVENOUS AS NEEDED
Status: DISCONTINUED | OUTPATIENT
Start: 2022-06-29 | End: 2022-06-29 | Stop reason: SURG

## 2022-06-29 RX ORDER — NITROGLYCERIN 0.4 MG/1
0.4 TABLET SUBLINGUAL
Status: DISCONTINUED | OUTPATIENT
Start: 2022-06-29 | End: 2022-06-30 | Stop reason: HOSPADM

## 2022-06-29 RX ORDER — NICOTINE POLACRILEX 4 MG
15 LOZENGE BUCCAL
Status: DISCONTINUED | OUTPATIENT
Start: 2022-06-29 | End: 2022-06-30 | Stop reason: HOSPADM

## 2022-06-29 RX ORDER — ONDANSETRON 2 MG/ML
4 INJECTION INTRAMUSCULAR; INTRAVENOUS ONCE AS NEEDED
Status: DISCONTINUED | OUTPATIENT
Start: 2022-06-29 | End: 2022-06-29 | Stop reason: HOSPADM

## 2022-06-29 RX ORDER — PROMETHAZINE HYDROCHLORIDE 25 MG/1
25 SUPPOSITORY RECTAL ONCE AS NEEDED
Status: DISCONTINUED | OUTPATIENT
Start: 2022-06-29 | End: 2022-06-29 | Stop reason: HOSPADM

## 2022-06-29 RX ORDER — DIPHENHYDRAMINE HCL 25 MG
25 CAPSULE ORAL
Status: DISCONTINUED | OUTPATIENT
Start: 2022-06-29 | End: 2022-06-29 | Stop reason: HOSPADM

## 2022-06-29 RX ORDER — CALCIUM CARBONATE 200(500)MG
2 TABLET,CHEWABLE ORAL 3 TIMES DAILY PRN
Status: DISCONTINUED | OUTPATIENT
Start: 2022-06-29 | End: 2022-06-30 | Stop reason: HOSPADM

## 2022-06-29 RX ORDER — INSULIN LISPRO 100 [IU]/ML
0-7 INJECTION, SOLUTION INTRAVENOUS; SUBCUTANEOUS
Status: DISCONTINUED | OUTPATIENT
Start: 2022-06-29 | End: 2022-06-30 | Stop reason: HOSPADM

## 2022-06-29 RX ORDER — ALBUMIN, HUMAN INJ 5% 5 %
SOLUTION INTRAVENOUS CONTINUOUS PRN
Status: DISCONTINUED | OUTPATIENT
Start: 2022-06-29 | End: 2022-06-29 | Stop reason: SURG

## 2022-06-29 RX ORDER — SODIUM CHLORIDE 0.9 % (FLUSH) 0.9 %
3-10 SYRINGE (ML) INJECTION AS NEEDED
Status: DISCONTINUED | OUTPATIENT
Start: 2022-06-29 | End: 2022-06-30 | Stop reason: HOSPADM

## 2022-06-29 RX ORDER — OXYCODONE HYDROCHLORIDE 5 MG/1
5 TABLET ORAL EVERY 6 HOURS PRN
Status: DISCONTINUED | OUTPATIENT
Start: 2022-06-29 | End: 2022-06-30 | Stop reason: HOSPADM

## 2022-06-29 RX ORDER — ROCURONIUM BROMIDE 10 MG/ML
INJECTION, SOLUTION INTRAVENOUS AS NEEDED
Status: DISCONTINUED | OUTPATIENT
Start: 2022-06-29 | End: 2022-06-29 | Stop reason: SURG

## 2022-06-29 RX ORDER — HYDROCODONE BITARTRATE AND ACETAMINOPHEN 7.5; 325 MG/1; MG/1
1 TABLET ORAL ONCE AS NEEDED
Status: DISCONTINUED | OUTPATIENT
Start: 2022-06-29 | End: 2022-06-29 | Stop reason: HOSPADM

## 2022-06-29 RX ORDER — FLUMAZENIL 0.1 MG/ML
0.2 INJECTION INTRAVENOUS AS NEEDED
Status: DISCONTINUED | OUTPATIENT
Start: 2022-06-29 | End: 2022-06-29 | Stop reason: HOSPADM

## 2022-06-29 RX ORDER — ONDANSETRON 2 MG/ML
4 INJECTION INTRAMUSCULAR; INTRAVENOUS EVERY 6 HOURS PRN
Status: DISCONTINUED | OUTPATIENT
Start: 2022-06-29 | End: 2022-06-30 | Stop reason: HOSPADM

## 2022-06-29 RX ORDER — FUROSEMIDE 40 MG/1
40 TABLET ORAL DAILY
Status: DISCONTINUED | OUTPATIENT
Start: 2022-06-29 | End: 2022-06-30 | Stop reason: HOSPADM

## 2022-06-29 RX ORDER — ENOXAPARIN SODIUM 100 MG/ML
40 INJECTION SUBCUTANEOUS DAILY
Status: DISCONTINUED | OUTPATIENT
Start: 2022-06-30 | End: 2022-06-30 | Stop reason: HOSPADM

## 2022-06-29 RX ORDER — HYDROMORPHONE HYDROCHLORIDE 1 MG/ML
0.5 INJECTION, SOLUTION INTRAMUSCULAR; INTRAVENOUS; SUBCUTANEOUS
Status: DISCONTINUED | OUTPATIENT
Start: 2022-06-29 | End: 2022-06-29 | Stop reason: HOSPADM

## 2022-06-29 RX ORDER — HEPARIN SODIUM 1000 [USP'U]/ML
INJECTION, SOLUTION INTRAVENOUS; SUBCUTANEOUS AS NEEDED
Status: DISCONTINUED | OUTPATIENT
Start: 2022-06-29 | End: 2022-06-29 | Stop reason: SURG

## 2022-06-29 RX ORDER — ATENOLOL 50 MG/1
50 TABLET ORAL DAILY
Status: DISCONTINUED | OUTPATIENT
Start: 2022-06-29 | End: 2022-06-30 | Stop reason: HOSPADM

## 2022-06-29 RX ORDER — NALOXONE HCL 0.4 MG/ML
0.2 VIAL (ML) INJECTION AS NEEDED
Status: DISCONTINUED | OUTPATIENT
Start: 2022-06-29 | End: 2022-06-29 | Stop reason: HOSPADM

## 2022-06-29 RX ORDER — OXYCODONE AND ACETAMINOPHEN 7.5; 325 MG/1; MG/1
1 TABLET ORAL EVERY 4 HOURS PRN
Status: DISCONTINUED | OUTPATIENT
Start: 2022-06-29 | End: 2022-06-29 | Stop reason: HOSPADM

## 2022-06-29 RX ORDER — ONDANSETRON 4 MG/1
4 TABLET, FILM COATED ORAL EVERY 6 HOURS PRN
Status: DISCONTINUED | OUTPATIENT
Start: 2022-06-29 | End: 2022-06-30 | Stop reason: HOSPADM

## 2022-06-29 RX ORDER — DIPHENHYDRAMINE HYDROCHLORIDE 50 MG/ML
12.5 INJECTION INTRAMUSCULAR; INTRAVENOUS
Status: DISCONTINUED | OUTPATIENT
Start: 2022-06-29 | End: 2022-06-29 | Stop reason: HOSPADM

## 2022-06-29 RX ORDER — PANTOPRAZOLE SODIUM 40 MG/1
40 TABLET, DELAYED RELEASE ORAL
Status: DISCONTINUED | OUTPATIENT
Start: 2022-06-29 | End: 2022-06-30 | Stop reason: HOSPADM

## 2022-06-29 RX ORDER — CEFAZOLIN SODIUM 2 G/100ML
2 INJECTION, SOLUTION INTRAVENOUS EVERY 8 HOURS
Status: COMPLETED | OUTPATIENT
Start: 2022-06-29 | End: 2022-06-30

## 2022-06-29 RX ORDER — ONDANSETRON 2 MG/ML
INJECTION INTRAMUSCULAR; INTRAVENOUS AS NEEDED
Status: DISCONTINUED | OUTPATIENT
Start: 2022-06-29 | End: 2022-06-29 | Stop reason: SURG

## 2022-06-29 RX ORDER — LABETALOL HYDROCHLORIDE 5 MG/ML
5 INJECTION, SOLUTION INTRAVENOUS
Status: DISCONTINUED | OUTPATIENT
Start: 2022-06-29 | End: 2022-06-29 | Stop reason: HOSPADM

## 2022-06-29 RX ORDER — FENTANYL CITRATE 50 UG/ML
INJECTION, SOLUTION INTRAMUSCULAR; INTRAVENOUS AS NEEDED
Status: DISCONTINUED | OUTPATIENT
Start: 2022-06-29 | End: 2022-06-29 | Stop reason: SURG

## 2022-06-29 RX ORDER — SODIUM CHLORIDE 0.9 % (FLUSH) 0.9 %
3 SYRINGE (ML) INJECTION EVERY 12 HOURS SCHEDULED
Status: DISCONTINUED | OUTPATIENT
Start: 2022-06-29 | End: 2022-06-30 | Stop reason: HOSPADM

## 2022-06-29 RX ORDER — PROMETHAZINE HYDROCHLORIDE 25 MG/1
25 TABLET ORAL ONCE AS NEEDED
Status: DISCONTINUED | OUTPATIENT
Start: 2022-06-29 | End: 2022-06-29 | Stop reason: HOSPADM

## 2022-06-29 RX ORDER — SODIUM CHLORIDE, SODIUM LACTATE, POTASSIUM CHLORIDE, CALCIUM CHLORIDE 600; 310; 30; 20 MG/100ML; MG/100ML; MG/100ML; MG/100ML
INJECTION, SOLUTION INTRAVENOUS CONTINUOUS PRN
Status: DISCONTINUED | OUTPATIENT
Start: 2022-06-29 | End: 2022-06-29 | Stop reason: SURG

## 2022-06-29 RX ORDER — PHENYLEPHRINE HYDROCHLORIDE 10 MG/ML
INJECTION INTRAVENOUS AS NEEDED
Status: DISCONTINUED | OUTPATIENT
Start: 2022-06-29 | End: 2022-06-29 | Stop reason: SURG

## 2022-06-29 RX ADMIN — CEFAZOLIN SODIUM 2 G: 2 INJECTION, SOLUTION INTRAVENOUS at 18:34

## 2022-06-29 RX ADMIN — ESMOLOL HYDROCHLORIDE 30 MG: 100 INJECTION, SOLUTION INTRAVENOUS at 08:01

## 2022-06-29 RX ADMIN — ROCURONIUM BROMIDE 10 MG: 50 INJECTION INTRAVENOUS at 08:35

## 2022-06-29 RX ADMIN — HYDROMORPHONE HYDROCHLORIDE 0.5 MG: 1 INJECTION, SOLUTION INTRAMUSCULAR; INTRAVENOUS; SUBCUTANEOUS at 12:38

## 2022-06-29 RX ADMIN — FENTANYL CITRATE 50 MCG: 50 INJECTION INTRAMUSCULAR; INTRAVENOUS at 11:40

## 2022-06-29 RX ADMIN — LOSARTAN POTASSIUM 25 MG: 25 TABLET, FILM COATED ORAL at 18:34

## 2022-06-29 RX ADMIN — ONDANSETRON 4 MG: 2 INJECTION INTRAMUSCULAR; INTRAVENOUS at 09:46

## 2022-06-29 RX ADMIN — IOPAMIDOL 137 ML: 510 INJECTION, SOLUTION INTRAVASCULAR at 09:17

## 2022-06-29 RX ADMIN — PROTAMINE SULFATE 10 MG: 10 INJECTION, SOLUTION INTRAVENOUS at 09:53

## 2022-06-29 RX ADMIN — ALBUMIN HUMAN: 0.05 INJECTION, SOLUTION INTRAVENOUS at 09:03

## 2022-06-29 RX ADMIN — HEPARIN SODIUM 5000 UNITS: 1000 INJECTION INTRAVENOUS; SUBCUTANEOUS at 08:45

## 2022-06-29 RX ADMIN — SUCCINYLCHOLINE CHLORIDE 180 MG: 20 INJECTION, SOLUTION INTRAMUSCULAR; INTRAVENOUS; PARENTERAL at 07:55

## 2022-06-29 RX ADMIN — PANTOPRAZOLE SODIUM 40 MG: 40 TABLET, DELAYED RELEASE ORAL at 20:01

## 2022-06-29 RX ADMIN — ESMOLOL HYDROCHLORIDE 20 MG: 100 INJECTION, SOLUTION INTRAVENOUS at 08:18

## 2022-06-29 RX ADMIN — METOPROLOL TARTRATE 1 MG: 5 INJECTION, SOLUTION INTRAVENOUS at 08:51

## 2022-06-29 RX ADMIN — FUROSEMIDE 40 MG: 40 TABLET ORAL at 18:34

## 2022-06-29 RX ADMIN — FENTANYL CITRATE 50 MCG: 50 INJECTION INTRAMUSCULAR; INTRAVENOUS at 07:59

## 2022-06-29 RX ADMIN — FENTANYL CITRATE 50 MCG: 50 INJECTION INTRAMUSCULAR; INTRAVENOUS at 07:55

## 2022-06-29 RX ADMIN — PHENYLEPHRINE HYDROCHLORIDE 100 MCG: 10 INJECTION, SOLUTION INTRAVENOUS at 08:11

## 2022-06-29 RX ADMIN — CALCIUM CARBONATE 2 TABLET: 500 TABLET, CHEWABLE ORAL at 20:01

## 2022-06-29 RX ADMIN — ONDANSETRON 4 MG: 2 INJECTION INTRAMUSCULAR; INTRAVENOUS at 15:05

## 2022-06-29 RX ADMIN — HEPARIN SODIUM 10000 UNITS: 1000 INJECTION INTRAVENOUS; SUBCUTANEOUS at 08:31

## 2022-06-29 RX ADMIN — PHENYLEPHRINE HYDROCHLORIDE 0.5 MCG/KG/MIN: 10 INJECTION INTRAVENOUS at 08:11

## 2022-06-29 RX ADMIN — ATENOLOL 50 MG: 50 TABLET ORAL at 20:01

## 2022-06-29 RX ADMIN — METOPROLOL TARTRATE 2 MG: 5 INJECTION, SOLUTION INTRAVENOUS at 10:01

## 2022-06-29 RX ADMIN — PROTAMINE SULFATE 10 MG: 10 INJECTION, SOLUTION INTRAVENOUS at 09:51

## 2022-06-29 RX ADMIN — PHENYLEPHRINE HYDROCHLORIDE 100 MCG: 10 INJECTION, SOLUTION INTRAVENOUS at 09:57

## 2022-06-29 RX ADMIN — PROTAMINE SULFATE 10 MG: 10 INJECTION, SOLUTION INTRAVENOUS at 09:50

## 2022-06-29 RX ADMIN — SODIUM CHLORIDE, POTASSIUM CHLORIDE, SODIUM LACTATE AND CALCIUM CHLORIDE 75 ML/HR: 600; 310; 30; 20 INJECTION, SOLUTION INTRAVENOUS at 16:21

## 2022-06-29 RX ADMIN — ROCURONIUM BROMIDE 20 MG: 50 INJECTION INTRAVENOUS at 09:18

## 2022-06-29 RX ADMIN — ETOMIDATE 30 MG: 2 INJECTION, SOLUTION INTRAVENOUS at 07:55

## 2022-06-29 RX ADMIN — SODIUM CHLORIDE, POTASSIUM CHLORIDE, SODIUM LACTATE AND CALCIUM CHLORIDE: 600; 310; 30; 20 INJECTION, SOLUTION INTRAVENOUS at 07:38

## 2022-06-29 RX ADMIN — OXYCODONE HYDROCHLORIDE AND ACETAMINOPHEN 1 TABLET: 7.5; 325 TABLET ORAL at 14:09

## 2022-06-29 RX ADMIN — ESMOLOL HYDROCHLORIDE 20 MG: 100 INJECTION, SOLUTION INTRAVENOUS at 10:07

## 2022-06-29 RX ADMIN — CEFAZOLIN SODIUM 2 G: 2 INJECTION, SOLUTION INTRAVENOUS at 07:33

## 2022-06-29 RX ADMIN — ROCURONIUM BROMIDE 30 MG: 50 INJECTION INTRAVENOUS at 08:05

## 2022-06-29 RX ADMIN — PROTAMINE SULFATE 10 MG: 10 INJECTION, SOLUTION INTRAVENOUS at 09:57

## 2022-06-29 RX ADMIN — ATORVASTATIN CALCIUM 20 MG: 20 TABLET, FILM COATED ORAL at 20:01

## 2022-06-29 RX ADMIN — METOPROLOL TARTRATE 1 MG: 5 INJECTION, SOLUTION INTRAVENOUS at 10:07

## 2022-06-29 RX ADMIN — PROTAMINE SULFATE 10 MG: 10 INJECTION, SOLUTION INTRAVENOUS at 09:47

## 2022-06-29 RX ADMIN — Medication 3 ML: at 20:01

## 2022-06-29 RX ADMIN — SUGAMMADEX 100 MG: 100 INJECTION, SOLUTION INTRAVENOUS at 10:03

## 2022-06-29 RX ADMIN — HYDROMORPHONE HYDROCHLORIDE 0.5 MG: 1 INJECTION, SOLUTION INTRAMUSCULAR; INTRAVENOUS; SUBCUTANEOUS at 14:29

## 2022-06-29 RX ADMIN — METOPROLOL TARTRATE 1 MG: 5 INJECTION, SOLUTION INTRAVENOUS at 08:22

## 2022-06-29 RX ADMIN — HYDROMORPHONE HYDROCHLORIDE 0.5 MG: 1 INJECTION, SOLUTION INTRAMUSCULAR; INTRAVENOUS; SUBCUTANEOUS at 13:03

## 2022-06-29 RX ADMIN — ROCURONIUM BROMIDE 10 MG: 50 INJECTION INTRAVENOUS at 08:51

## 2022-06-29 RX ADMIN — SUGAMMADEX 200 MG: 100 INJECTION, SOLUTION INTRAVENOUS at 09:54

## 2022-06-29 RX ADMIN — TAMSULOSIN HYDROCHLORIDE 0.4 MG: 0.4 CAPSULE ORAL at 20:01

## 2022-06-29 NOTE — ANESTHESIA PREPROCEDURE EVALUATION
Anesthesia Evaluation     Patient summary reviewed and Nursing notes reviewed   history of anesthetic complications: difficult airway  NPO Solid Status: > 8 hours  NPO Liquid Status: > 2 hours           Airway   Mallampati: III  TM distance: <3 FB  Neck ROM: full  Difficult intubation highly probable and Large neck circumference  Dental      Pulmonary    (+) a smoker Former, COPD, sleep apnea,   Cardiovascular     ECG reviewed    (+) hypertension, CABG, dysrhythmias Atrial Fib, hyperlipidemia,     ROS comment: Abdominal aortic aneurysm    Neuro/Psych  GI/Hepatic/Renal/Endo    (+) morbid obesity,  diabetes mellitus,     Musculoskeletal     Abdominal    Substance History      OB/GYN          Other                      Anesthesia Plan    ASA 4     general and Madhavi     (Plan for CMAC)  intravenous induction     Anesthetic plan, risks, benefits, and alternatives have been provided, discussed and informed consent has been obtained with: patient.        CODE STATUS:

## 2022-06-29 NOTE — ANESTHESIA PROCEDURE NOTES
Arterial Line      Patient reassessed immediately prior to procedure    Patient location during procedure: pre-op  Start time: 6/29/2022 7:06 AM  Stop Time:6/29/2022 7:08 AM       Line placed for hemodynamic monitoring and ABGs/Labs/ISTAT.  Performed By   Anesthesiologist: Lui Bowles MD  Preanesthetic Checklist  Completed: patient identified, IV checked, site marked, risks and benefits discussed, surgical consent, monitors and equipment checked, pre-op evaluation and timeout performed  Arterial Line Prep   Sterile Tech: gloves, mask, cap and sterile barriers  Prep: ChloraPrep  Patient monitoring: blood pressure monitoring, continuous pulse oximetry and EKG  Arterial Line Procedure   Laterality:right  Location:  radial artery  Catheter size: 20 G   Guidance: ultrasound guided  PROCEDURE NOTE/ULTRASOUND INTERPRETATION.  Using ultrasound guidance the potential vascular sites for insertion of the catheter were visualized to determine the patency of the vessel to be used for vascular access.  After selecting the appropriate site for insertion, the needle was visualized under ultrasound being inserted into the radial artery, followed by ultrasound confirmation of wire and catheter placement. There were no abnormalities seen on ultrasound; an image was taken; and the patient tolerated the procedure with no complications.   Number of attempts: 1  Successful placement: yes  Post Assessment   Dressing Type: occlusive dressing applied, secured with tape and wrist guard applied.   Complications no  Circ/Move/Sens Assessment: unchanged.   Patient Tolerance: patient tolerated the procedure well with no apparent complications  Additional Notes  US used to visualize radial artery, guide catheter placement

## 2022-06-29 NOTE — ANESTHESIA POSTPROCEDURE EVALUATION
Patient: Sandro Ramirez    Procedure Summary     Date: 06/29/22 Room / Location: [facility]    Anesthesia Start: 0738 Anesthesia Stop: 1027    Procedure: AORTIC STENT GRAFT PLACEMENT WITH ILIAC COILING (Left ) Diagnosis:     Surgeons: Eric Sainz MD Provider: Lui Bowles MD    Anesthesia Type: general, Manson ASA Status: 4          Anesthesia Type: general, Madhavi    Vitals  Vitals Value Taken Time   /55 06/29/22 1201   Temp 36.7 °C (98 °F) 06/29/22 1020   Pulse 82 06/29/22 1209   Resp 14 06/29/22 1130   SpO2 88 % 06/29/22 1209   Vitals shown include unvalidated device data.        Post Anesthesia Care and Evaluation    Patient location during evaluation: bedside  Pain management: adequate    Airway patency: patent  Anesthetic complications: No anesthetic complications    Cardiovascular status: acceptable  Respiratory status: acceptable  Hydration status: acceptable

## 2022-06-29 NOTE — ANESTHESIA PROCEDURE NOTES
Airway  Urgency: elective    Date/Time: 6/29/2022 7:58 AM  Airway not difficult    General Information and Staff    Patient location during procedure: OR    Indications and Patient Condition  Indications for airway management: airway protection    Preoxygenated: yes  Mask difficulty assessment: 2 - vent by mask + OA or adjuvant +/- NMBA    Final Airway Details  Final airway type: endotracheal airway      Successful airway: ETT  Cuffed: yes   Successful intubation technique: video laryngoscopy  Facilitating devices/methods: intubating stylet  Endotracheal tube insertion site: oral  Blade: CMAC  Blade size: D  ETT size (mm): 7.5  Cormack-Lehane Classification: grade IIb - view of arytenoids or posterior of glottis only  Placement verified by: chest auscultation and capnometry   Measured from: lips  ETT/EBT  to lips (cm): 23  Number of attempts at approach: 1  Assessment: lips, teeth, and gum same as pre-op and atraumatic intubation    Additional Comments  Smooth induction, able to 2 hand mask with 100mm oral airway, limited view of cords with CMAC but able to easily pass ETT

## 2022-06-30 ENCOUNTER — READMISSION MANAGEMENT (OUTPATIENT)
Dept: CALL CENTER | Facility: HOSPITAL | Age: 72
End: 2022-06-30

## 2022-06-30 VITALS
TEMPERATURE: 98.4 F | SYSTOLIC BLOOD PRESSURE: 138 MMHG | BODY MASS INDEX: 29.82 KG/M2 | WEIGHT: 213 LBS | HEIGHT: 71 IN | OXYGEN SATURATION: 95 % | HEART RATE: 69 BPM | RESPIRATION RATE: 16 BRPM | DIASTOLIC BLOOD PRESSURE: 59 MMHG

## 2022-06-30 LAB
ACT BLD: 126 SECONDS (ref 82–152)
ACT BLD: 231 SECONDS (ref 82–152)
ACT BLD: 254 SECONDS (ref 82–152)
ANION GAP SERPL CALCULATED.3IONS-SCNC: 8.4 MMOL/L (ref 5–15)
BASOPHILS # BLD AUTO: 0.04 10*3/MM3 (ref 0–0.2)
BASOPHILS NFR BLD AUTO: 0.3 % (ref 0–1.5)
BUN SERPL-MCNC: 8 MG/DL (ref 8–23)
BUN/CREAT SERPL: 11.6 (ref 7–25)
CALCIUM SPEC-SCNC: 9.3 MG/DL (ref 8.6–10.5)
CHLORIDE SERPL-SCNC: 98 MMOL/L (ref 98–107)
CO2 SERPL-SCNC: 28.6 MMOL/L (ref 22–29)
CREAT SERPL-MCNC: 0.69 MG/DL (ref 0.76–1.27)
DEPRECATED RDW RBC AUTO: 39.9 FL (ref 37–54)
EGFRCR SERPLBLD CKD-EPI 2021: 99.6 ML/MIN/1.73
EOSINOPHIL # BLD AUTO: 0.04 10*3/MM3 (ref 0–0.4)
EOSINOPHIL NFR BLD AUTO: 0.3 % (ref 0.3–6.2)
ERYTHROCYTE [DISTWIDTH] IN BLOOD BY AUTOMATED COUNT: 12.4 % (ref 12.3–15.4)
GLUCOSE BLDC GLUCOMTR-MCNC: 187 MG/DL (ref 70–130)
GLUCOSE SERPL-MCNC: 155 MG/DL (ref 65–99)
HCT VFR BLD AUTO: 44.7 % (ref 37.5–51)
HGB BLD-MCNC: 14.3 G/DL (ref 13–17.7)
IMM GRANULOCYTES # BLD AUTO: 0.06 10*3/MM3 (ref 0–0.05)
IMM GRANULOCYTES NFR BLD AUTO: 0.5 % (ref 0–0.5)
LYMPHOCYTES # BLD AUTO: 1.49 10*3/MM3 (ref 0.7–3.1)
LYMPHOCYTES NFR BLD AUTO: 12.3 % (ref 19.6–45.3)
MCH RBC QN AUTO: 28.1 PG (ref 26.6–33)
MCHC RBC AUTO-ENTMCNC: 32 G/DL (ref 31.5–35.7)
MCV RBC AUTO: 87.8 FL (ref 79–97)
MONOCYTES # BLD AUTO: 0.85 10*3/MM3 (ref 0.1–0.9)
MONOCYTES NFR BLD AUTO: 7 % (ref 5–12)
NEUTROPHILS NFR BLD AUTO: 79.6 % (ref 42.7–76)
NEUTROPHILS NFR BLD AUTO: 9.59 10*3/MM3 (ref 1.7–7)
NRBC BLD AUTO-RTO: 0 /100 WBC (ref 0–0.2)
PLATELET # BLD AUTO: 170 10*3/MM3 (ref 140–450)
PMV BLD AUTO: 9.6 FL (ref 6–12)
POTASSIUM SERPL-SCNC: 3.9 MMOL/L (ref 3.5–5.2)
RBC # BLD AUTO: 5.09 10*6/MM3 (ref 4.14–5.8)
SODIUM SERPL-SCNC: 135 MMOL/L (ref 136–145)
WBC NRBC COR # BLD: 12.07 10*3/MM3 (ref 3.4–10.8)

## 2022-06-30 PROCEDURE — 85025 COMPLETE CBC W/AUTO DIFF WBC: CPT | Performed by: SURGERY

## 2022-06-30 PROCEDURE — 63710000001 INSULIN LISPRO (HUMAN) PER 5 UNITS: Performed by: SURGERY

## 2022-06-30 PROCEDURE — 80048 BASIC METABOLIC PNL TOTAL CA: CPT | Performed by: SURGERY

## 2022-06-30 PROCEDURE — 25010000002 CEFAZOLIN IN DEXTROSE 2-4 GM/100ML-% SOLUTION: Performed by: SURGERY

## 2022-06-30 PROCEDURE — 25010000002 ENOXAPARIN PER 10 MG: Performed by: SURGERY

## 2022-06-30 PROCEDURE — 82962 GLUCOSE BLOOD TEST: CPT

## 2022-06-30 RX ORDER — ASPIRIN 81 MG/1
81 TABLET, CHEWABLE ORAL DAILY
Status: DISCONTINUED | OUTPATIENT
Start: 2022-06-30 | End: 2022-06-30 | Stop reason: HOSPADM

## 2022-06-30 RX ORDER — OXYCODONE HYDROCHLORIDE AND ACETAMINOPHEN 5; 325 MG/1; MG/1
1 TABLET ORAL EVERY 6 HOURS PRN
Qty: 10 TABLET | Refills: 0 | Status: SHIPPED | OUTPATIENT
Start: 2022-06-30 | End: 2022-07-13

## 2022-06-30 RX ADMIN — INSULIN LISPRO 2 UNITS: 100 INJECTION, SOLUTION INTRAVENOUS; SUBCUTANEOUS at 08:38

## 2022-06-30 RX ADMIN — SODIUM CHLORIDE, POTASSIUM CHLORIDE, SODIUM LACTATE AND CALCIUM CHLORIDE 75 ML/HR: 600; 310; 30; 20 INJECTION, SOLUTION INTRAVENOUS at 05:37

## 2022-06-30 RX ADMIN — CEFAZOLIN SODIUM 2 G: 2 INJECTION, SOLUTION INTRAVENOUS at 00:41

## 2022-06-30 RX ADMIN — LOSARTAN POTASSIUM 25 MG: 25 TABLET, FILM COATED ORAL at 08:38

## 2022-06-30 RX ADMIN — ASPIRIN 81 MG: 81 TABLET, CHEWABLE ORAL at 08:38

## 2022-06-30 RX ADMIN — ENOXAPARIN SODIUM 40 MG: 100 INJECTION SUBCUTANEOUS at 08:38

## 2022-06-30 RX ADMIN — PANTOPRAZOLE SODIUM 40 MG: 40 TABLET, DELAYED RELEASE ORAL at 05:32

## 2022-07-01 ENCOUNTER — TRANSITIONAL CARE MANAGEMENT TELEPHONE ENCOUNTER (OUTPATIENT)
Dept: CALL CENTER | Facility: HOSPITAL | Age: 72
End: 2022-07-01

## 2022-07-01 NOTE — OUTREACH NOTE
Call Center TCM Note    Flowsheet Row Responses   [facility] patient discharged from? [place]   Does the patient have one of the following disease processes/diagnoses(primary or secondary)? General Surgery   TCM attempt successful? Yes   Call start time 1343   Call end time 1345   Discharge diagnosis Abdominal Aortic Aneurysm-repaired   Is patient permission given to speak with other caregiver? Yes   List who call center can speak with spouse- Nichelle   Person spoke with today (if not patient) and relationship spouse   Does the patient have all medications related to this admission filled (includes all antibiotics, pain medications, etc.) Yes   Is the patient taking all medications as directed (includes completed medication regime)? Yes   Does the patient have a follow up appointment scheduled with their surgeon? No   Nursing Interventions Advised patient to make appointment   Has the patient kept scheduled appointments due by today? N/A   Comments f/u with cardiology on 7/14   Has home health visited the patient within 72 hours of discharge? N/A   Psychosocial issues? No   Did the patient receive a copy of their discharge instructions? Yes   What is the patient's perception of their health status since discharge? Improving   Nursing interventions Nurse provided patient education  [advised to follow discharge instructions]   Is the patient/caregiver able to teach back signs and symptoms of incisional infection? Fever, Pus or odor from incision, Incisional warmth, Increased drainage or bleeding, Increased redness, swelling or pain at the incisonal site   Is the patient/caregiver able to teach back the hierarchy of who to call/visit for symptoms/problems? PCP, Specialist, Home health nurse, Urgent Care, ED, 911 Yes   TCM call completed? Yes   Wrap up additional comments Per spouse, patient is Doing well, no questions, he will be following up with cardiology and his surgeon.          Antonette Liriano,  RN    7/1/2022, 13:45 EDT

## 2022-07-05 ENCOUNTER — TELEPHONE (OUTPATIENT)
Dept: NEUROLOGY | Facility: CLINIC | Age: 72
End: 2022-07-05

## 2022-07-05 DIAGNOSIS — G47.33 OBSTRUCTIVE SLEEP APNEA: Primary | ICD-10-CM

## 2022-07-05 NOTE — TELEPHONE ENCOUNTER
----- Message from Joseph F Seipel, MD sent at 6/2/2022 10:52 AM EDT -----  Pt reports that his machine is not working. Making loud noise like a vacuome  and not putting out enough air.    Order new auto cpap 10-20  if possible

## 2022-07-06 ENCOUNTER — APPOINTMENT (OUTPATIENT)
Dept: VASCULAR SURGERY | Facility: HOSPITAL | Age: 72
End: 2022-07-06

## 2022-07-06 ENCOUNTER — TRANSCRIBE ORDERS (OUTPATIENT)
Dept: ADMINISTRATIVE | Facility: HOSPITAL | Age: 72
End: 2022-07-06

## 2022-07-06 DIAGNOSIS — I71.40 AAA (ABDOMINAL AORTIC ANEURYSM) WITHOUT RUPTURE: Primary | ICD-10-CM

## 2022-07-06 PROCEDURE — G0463 HOSPITAL OUTPT CLINIC VISIT: HCPCS

## 2022-07-13 DIAGNOSIS — I48.21 PERMANENT ATRIAL FIBRILLATION: ICD-10-CM

## 2022-07-13 RX ORDER — ATORVASTATIN CALCIUM 20 MG/1
TABLET, FILM COATED ORAL
Qty: 90 TABLET | Refills: 1 | Status: SHIPPED | OUTPATIENT
Start: 2022-07-13 | End: 2023-03-08

## 2022-07-13 RX ORDER — WARFARIN SODIUM 7.5 MG/1
TABLET ORAL
Qty: 110 TABLET | Refills: 0 | Status: SHIPPED | OUTPATIENT
Start: 2022-07-13 | End: 2022-12-05

## 2022-07-13 NOTE — TELEPHONE ENCOUNTER
Rx Refill Note  Requested Prescriptions     Pending Prescriptions Disp Refills   • atorvastatin (LIPITOR) 20 MG tablet [Pharmacy Med Name: ATORVASTATIN 20 MG TABLET] 90 tablet 1     Sig: TAKE 1 TABLET BY MOUTH EVERY DAY   • warfarin (COUMADIN) 7.5 MG tablet [Pharmacy Med Name: WARFARIN SODIUM 7.5 MG TABLET] 110 tablet 0     Sig: TAKE 1 AND 1/2 TABLETS BY MOUTH ON MONDAY,WEDNESDAY,AND FRIDAY AND 1 TABLET ON OTHER DAYS      Last office visit with prescribing clinician: 12/2/2021      Next office visit with prescribing clinician: 7/14/2022            Radha Ruiz MA  07/13/22, 14:39 EDT

## 2022-07-13 NOTE — TELEPHONE ENCOUNTER
Rx Refill Note  Requested Prescriptions     Pending Prescriptions Disp Refills   • atorvastatin (LIPITOR) 20 MG tablet [Pharmacy Med Name: ATORVASTATIN 20 MG TABLET] 90 tablet 1     Sig: TAKE 1 TABLET BY MOUTH EVERY DAY     Signed Prescriptions Disp Refills   • warfarin (COUMADIN) 7.5 MG tablet 110 tablet 0     Sig: TAKE 1 AND 1/2 TABLETS BY MOUTH ON Monday AND FRIDAY AND 1 TABLET ON ALL OTHER DAYS OR AS DIRECTED     Authorizing Provider: MEGAN DUMONT     Ordering User: SAMANTHA SANTIZO      Last office visit with prescribing clinician: 12/2/2021      Next office visit with prescribing clinician: 7/14/2022              Anticoagulation Visit (06/06/2022)      Samantha Santizo LPN  07/13/22, 15:47 EDT

## 2022-07-14 ENCOUNTER — ANTICOAGULATION VISIT (OUTPATIENT)
Dept: CARDIOLOGY | Facility: CLINIC | Age: 72
End: 2022-07-14

## 2022-07-14 ENCOUNTER — OFFICE VISIT (OUTPATIENT)
Dept: CARDIOLOGY | Facility: CLINIC | Age: 72
End: 2022-07-14

## 2022-07-14 VITALS
HEART RATE: 90 BPM | SYSTOLIC BLOOD PRESSURE: 126 MMHG | DIASTOLIC BLOOD PRESSURE: 68 MMHG | WEIGHT: 207 LBS | BODY MASS INDEX: 28.87 KG/M2

## 2022-07-14 VITALS
WEIGHT: 207 LBS | DIASTOLIC BLOOD PRESSURE: 68 MMHG | BODY MASS INDEX: 28.98 KG/M2 | HEART RATE: 90 BPM | SYSTOLIC BLOOD PRESSURE: 126 MMHG | OXYGEN SATURATION: 94 % | HEIGHT: 71 IN

## 2022-07-14 DIAGNOSIS — E78.00 PURE HYPERCHOLESTEROLEMIA: ICD-10-CM

## 2022-07-14 DIAGNOSIS — I71.40 ABDOMINAL AORTIC ANEURYSM (AAA) WITHOUT RUPTURE: ICD-10-CM

## 2022-07-14 DIAGNOSIS — I48.21 PERMANENT ATRIAL FIBRILLATION: Primary | ICD-10-CM

## 2022-07-14 DIAGNOSIS — I10 ESSENTIAL HYPERTENSION: ICD-10-CM

## 2022-07-14 DIAGNOSIS — I48.91 ATRIAL FIBRILLATION, UNSPECIFIED TYPE: Primary | ICD-10-CM

## 2022-07-14 DIAGNOSIS — G47.33 OBSTRUCTIVE SLEEP APNEA SYNDROME: ICD-10-CM

## 2022-07-14 DIAGNOSIS — I25.10 CORONARY ARTERY DISEASE INVOLVING NATIVE CORONARY ARTERY OF NATIVE HEART WITHOUT ANGINA PECTORIS: ICD-10-CM

## 2022-07-14 DIAGNOSIS — E11.9 TYPE 2 DIABETES MELLITUS WITHOUT COMPLICATION, WITHOUT LONG-TERM CURRENT USE OF INSULIN: ICD-10-CM

## 2022-07-14 LAB — INR PPP: 2.4 (ref 0.9–1.1)

## 2022-07-14 PROCEDURE — 99214 OFFICE O/P EST MOD 30 MIN: CPT | Performed by: INTERNAL MEDICINE

## 2022-07-14 PROCEDURE — 85610 PROTHROMBIN TIME: CPT | Performed by: INTERNAL MEDICINE

## 2022-07-14 PROCEDURE — 36416 COLLJ CAPILLARY BLOOD SPEC: CPT | Performed by: INTERNAL MEDICINE

## 2022-07-14 NOTE — PROGRESS NOTES
Subjective:     Encounter Date:07/14/2022      Patient ID: Sandro Ramirez is a 72 y.o. male.    Chief Complaint:  History of Present Illness 72-year-old white male with history of coronary status post coronary artery bypass surgery history of atrial fibrillation hypertension hyperlipidemia diabetes sleep apnea and abdominal aortic asthma presents to office for follow-up.  Patient is currently stable without any signs of chest pain or shortness of breath at rest on exertion.  No complains any PND orthopnea.  No palpitation dizziness syncope or swelling of the feet.  He is taking meds regular but he does not smoke.  The following portions of the patient's history were reviewed and updated as appropriate: allergies, current medications, past family history, past medical history, past social history, past surgical history and problem list.  Past Medical History:   Diagnosis Date   • AAA (abdominal aortic aneurysm) (HCC) 2006   • Arthritis    • Asthma    • Atrial fibrillation (HCC)     COUMADIN   • Benign prostatic hyperplasia    • Bladder cancer (HCC)    • CHF (congestive heart failure) (HCC)    • Chronic anticoagulation     COUMADIN STARTED 2006   • Coronary artery disease    • Diabetes mellitus (HCC)    • Elevated cholesterol    • Erectile dysfunction    • Facial basal cell cancer    • Gout    • Hard to intubate    • History of pancreatitis 2006   • History of pneumonia    • History of tick-borne disease 2015   • HL (hearing loss)     BILAT HEARING AIDS   • Hyperlipidemia    • Hypertension    • Myocardial infarction (HCC)     X5   • Obesity    • Pneumonia    • Sleep apnea     CURRENTLY NOT USING CPAP D/T RECALL   • Transfusion history     STATES HIS SISTER HAD A SEVERE REACTION TO BLOOD TRANSFUSION     Past Surgical History:   Procedure Laterality Date   • ARTERIOGRAM AORTIC Left 6/29/2022    Procedure: AORTIC STENT GRAFT PLACEMENT WITH ILIAC COILING;  Surgeon: Eric Sainz MD;  Location: [facility] OR  "18/19;  Service: Vascular;  Laterality: Left;   • CARDIAC CATHETERIZATION     • CHOLECYSTECTOMY     • COLONOSCOPY     • CORONARY ARTERY BYPASS GRAFT  2006    X2   • CYSTOSCOPY      STATES HAD BLADDER TUMORS REMOVED X2   • PROSTATE SURGERY      X2- STATES PLACED COILS IN TO HELP WITH FLOW AND THEN HAD TO REMOVE A PIECE THAT BROKE OFF AND WAS IN BLADDER     /68 (BP Location: Left arm, Patient Position: Sitting)   Pulse 90   Ht 180.3 cm (71\")   Wt 93.9 kg (207 lb)   SpO2 94%   BMI 28.87 kg/m²   Family History   Problem Relation Age of Onset   • Cancer Father    • Heart attack Father    • Hearing loss Father    • Heart disease Paternal Grandfather    • Heart attack Paternal Grandfather    • Malig Hyperthermia Neg Hx        Current Outpatient Medications:   •  albuterol (PROVENTIL) (2.5 MG/3ML) 0.083% nebulizer solution, Take 2.5 mg by nebulization Every 4 (Four) Hours As Needed for Wheezing. Indications: Dx codes: R06.02, J20.6, Disp: 3 mL, Rfl: 0  •  albuterol sulfate  (90 Base) MCG/ACT inhaler, Inhale 1 puff Every 4 (Four) Hours As Needed., Disp: , Rfl:   •  alfuzosin (UROXATRAL) 10 MG 24 hr tablet, Take 10 mg by mouth 2 (Two) Times a Day., Disp: , Rfl:   •  allopurinol (ZYLOPRIM) 300 MG tablet, Take 300 mg by mouth Every Night., Disp: , Rfl:   •  aspirin 81 MG tablet, Take 81 mg by mouth Daily. PT STATES WAS INSTRUCTED TO CONTINUE TO TAKE THIS PER GET ANDERSEN, Disp: , Rfl:   •  atenolol (TENORMIN) 50 MG tablet, TAKE 1 TABLET BY MOUTH EVERY DAY (Patient taking differently: Take 50 mg by mouth Every Night.), Disp: 90 tablet, Rfl: 3  •  atorvastatin (LIPITOR) 20 MG tablet, TAKE 1 TABLET BY MOUTH EVERY DAY, Disp: 90 tablet, Rfl: 1  •  empagliflozin (Jardiance) 25 MG tablet tablet, Take 1 tablet by mouth Daily., Disp: 90 tablet, Rfl: 1  •  furosemide (LASIX) 40 MG tablet, TAKE 1 TABLET BY MOUTH EVERY DAY (Patient taking differently: Take 40 mg by mouth As Needed. STATES HAS NOT TAKEN IN THE LAST YEAR), " Disp: 90 tablet, Rfl: 3  •  losartan (COZAAR) 25 MG tablet, TAKE 1 TABLET BY MOUTH DAILY (Patient taking differently: Take 25 mg by mouth Daily.), Disp: 90 tablet, Rfl: 0  •  Vibegron (Gemtesa) 75 MG tablet, Take 75 mg by mouth Daily., Disp: , Rfl:   •  warfarin (COUMADIN) 7.5 MG tablet, TAKE 1 AND 1/2 TABLETS BY MOUTH ON Monday AND FRIDAY AND 1 TABLET ON ALL OTHER DAYS OR AS DIRECTED, Disp: 110 tablet, Rfl: 0  Allergies   Allergen Reactions   • Bee Venom Anaphylaxis     HIVES-SWOLLEN THROAT   • Meperidine Hives   • Midazolam Hives   • Propofol Other (See Comments)     UNCLEAR-SVT, HYPOTENSION-STATES SEVERE ALMOST CODED   • Sulfa Antibiotics Hives   • Hydrocodone Nausea And Vomiting   • Simvastatin Myalgia     Social History     Socioeconomic History   • Marital status:    Tobacco Use   • Smoking status: Former Smoker     Packs/day: 1.00     Years: 30.00     Pack years: 30.00     Types: Cigarettes     Quit date:      Years since quittin.0   • Smokeless tobacco: Never Used   Vaping Use   • Vaping Use: Never used   Substance and Sexual Activity   • Alcohol use: Not Currently     Comment: one or two beers 6 times a year   • Drug use: Never   • Sexual activity: Not Currently     Partners: Female     Birth control/protection: None     Review of Systems   Constitutional: Negative for fever and malaise/fatigue.   Cardiovascular: Negative for chest pain, dyspnea on exertion and palpitations.   Respiratory: Negative for cough and shortness of breath.    Skin: Negative for rash.   Gastrointestinal: Negative for abdominal pain, nausea and vomiting.   Neurological: Negative for focal weakness and headaches.   All other systems reviewed and are negative.             Objective:     Constitutional:       Appearance: Well-developed.   Eyes:      General: No scleral icterus.     Conjunctiva/sclera: Conjunctivae normal.   HENT:      Head: Normocephalic and atraumatic.   Neck:      Vascular: No carotid bruit or JVD.    Pulmonary:      Effort: Pulmonary effort is normal.      Breath sounds: Normal breath sounds. No wheezing. No rales.   Cardiovascular:      Normal rate. Regular rhythm.   Pulses:     Intact distal pulses.   Abdominal:      General: Bowel sounds are normal.      Palpations: Abdomen is soft.   Musculoskeletal:      Cervical back: Normal range of motion and neck supple. Skin:     General: Skin is warm and dry.      Findings: No rash.   Neurological:      Mental Status: Alert.       Procedures    Lab Review:         MDM  1.  Coronary disease  Patient has nonobstructive disease now but had coronary bypass surgery with a LIMA to LAD and saphenous graft to the diagonal branch and RCA  2.  Hypertension  Patient blood pressure currently stable on medications with atenolol and losartan  3.  Hyperlipidemia  Patient is on Lipitor and the lipid levels are well within normal limits  4.  Diabetes  Patient is on oral medicines  5.  Atrial fibrillation  Patient is currently stable on beta-blockers and warfarin to keep his INR between 2.0-3.0  Over 6 obstructive sleep apnea  Venous sleep apnea and uses a CPAP      Patient's previous medical records, labs, and EKG were reviewed and discussed with the patient at today's visit.

## 2022-07-15 RX ORDER — EMPAGLIFLOZIN 25 MG/1
TABLET, FILM COATED ORAL
Qty: 90 TABLET | Refills: 1 | OUTPATIENT
Start: 2022-07-15

## 2022-07-15 RX ORDER — GLIMEPIRIDE 2 MG/1
TABLET ORAL
Qty: 180 TABLET | Refills: 0 | OUTPATIENT
Start: 2022-07-15

## 2022-08-01 ENCOUNTER — HOSPITAL ENCOUNTER (OUTPATIENT)
Dept: CT IMAGING | Facility: HOSPITAL | Age: 72
Discharge: HOME | End: 2022-08-01
Admitting: SURGERY

## 2022-08-01 DIAGNOSIS — I71.40 AAA (ABDOMINAL AORTIC ANEURYSM) WITHOUT RUPTURE: ICD-10-CM

## 2022-08-01 LAB
CREAT BLDA-MCNC: 0.8 MG/DL (ref 0.6–1.3)
EGFRCR SERPLBLD CKD-EPI 2021: 95.2 ML/MIN/1.73

## 2022-08-01 PROCEDURE — 82565 ASSAY OF CREATININE: CPT

## 2022-08-01 PROCEDURE — 74174 CTA ABD&PLVS W/CONTRAST: CPT

## 2022-08-01 PROCEDURE — 0 IOPAMIDOL PER 1 ML: Performed by: SURGERY

## 2022-08-01 RX ADMIN — IOPAMIDOL 100 ML: 755 INJECTION, SOLUTION INTRAVENOUS at 09:53

## 2022-08-08 ENCOUNTER — APPOINTMENT (OUTPATIENT)
Dept: VASCULAR SURGERY | Facility: HOSPITAL | Age: 72
End: 2022-08-08

## 2022-08-08 PROCEDURE — G0463 HOSPITAL OUTPT CLINIC VISIT: HCPCS

## 2022-08-18 ENCOUNTER — ANTICOAGULATION VISIT (OUTPATIENT)
Dept: CARDIOLOGY | Facility: CLINIC | Age: 72
End: 2022-08-18

## 2022-08-18 VITALS
WEIGHT: 204 LBS | BODY MASS INDEX: 28.45 KG/M2 | HEART RATE: 74 BPM | SYSTOLIC BLOOD PRESSURE: 116 MMHG | DIASTOLIC BLOOD PRESSURE: 73 MMHG

## 2022-08-18 DIAGNOSIS — I48.21 PERMANENT ATRIAL FIBRILLATION: Primary | ICD-10-CM

## 2022-08-18 LAB — INR PPP: 2.3 (ref 0.9–1.1)

## 2022-08-18 PROCEDURE — 36416 COLLJ CAPILLARY BLOOD SPEC: CPT | Performed by: INTERNAL MEDICINE

## 2022-08-18 PROCEDURE — 85610 PROTHROMBIN TIME: CPT | Performed by: INTERNAL MEDICINE

## 2022-09-01 ENCOUNTER — TRANSCRIBE ORDERS (OUTPATIENT)
Dept: ADMINISTRATIVE | Facility: HOSPITAL | Age: 72
End: 2022-09-01

## 2022-09-01 DIAGNOSIS — I71.40 ABDOMINAL AORTIC ANEURYSM WITHOUT RUPTURE: Primary | ICD-10-CM

## 2022-09-20 ENCOUNTER — ANTICOAGULATION VISIT (OUTPATIENT)
Dept: CARDIOLOGY | Facility: CLINIC | Age: 72
End: 2022-09-20

## 2022-09-20 VITALS
HEART RATE: 63 BPM | WEIGHT: 202 LBS | SYSTOLIC BLOOD PRESSURE: 130 MMHG | BODY MASS INDEX: 28.17 KG/M2 | DIASTOLIC BLOOD PRESSURE: 65 MMHG

## 2022-09-20 DIAGNOSIS — I48.21 PERMANENT ATRIAL FIBRILLATION: Primary | ICD-10-CM

## 2022-09-20 LAB — INR PPP: 2.2 (ref 0.9–1.1)

## 2022-09-20 PROCEDURE — 85610 PROTHROMBIN TIME: CPT | Performed by: INTERNAL MEDICINE

## 2022-09-20 PROCEDURE — 36416 COLLJ CAPILLARY BLOOD SPEC: CPT | Performed by: INTERNAL MEDICINE

## 2022-10-21 DIAGNOSIS — I48.11 LONGSTANDING PERSISTENT ATRIAL FIBRILLATION: ICD-10-CM

## 2022-10-21 RX ORDER — LOSARTAN POTASSIUM 25 MG/1
25 TABLET ORAL DAILY
Qty: 90 TABLET | Refills: 3 | Status: SHIPPED | OUTPATIENT
Start: 2022-10-21

## 2022-10-21 NOTE — TELEPHONE ENCOUNTER
Rx Refill Note  Requested Prescriptions     Pending Prescriptions Disp Refills   • losartan (COZAAR) 25 MG tablet [Pharmacy Med Name: LOSARTAN POTASSIUM 25 MG TAB] 90 tablet 3     Sig: Take 1 tablet by mouth Daily.      Last office visit with prescribing clinician: 7/14/2022      Next office visit with prescribing clinician: 1/23/2023            Kaleb Tamez MA  10/21/22, 08:11 EDT

## 2022-10-25 ENCOUNTER — ANTICOAGULATION VISIT (OUTPATIENT)
Dept: CARDIOLOGY | Facility: CLINIC | Age: 72
End: 2022-10-25

## 2022-10-25 VITALS
HEART RATE: 72 BPM | WEIGHT: 205 LBS | DIASTOLIC BLOOD PRESSURE: 73 MMHG | BODY MASS INDEX: 28.59 KG/M2 | SYSTOLIC BLOOD PRESSURE: 132 MMHG

## 2022-10-25 DIAGNOSIS — I48.21 PERMANENT ATRIAL FIBRILLATION: Primary | Chronic | ICD-10-CM

## 2022-10-25 LAB — INR PPP: 2.7 (ref 0.9–1.1)

## 2022-10-25 PROCEDURE — 85610 PROTHROMBIN TIME: CPT | Performed by: INTERNAL MEDICINE

## 2022-10-25 PROCEDURE — 36416 COLLJ CAPILLARY BLOOD SPEC: CPT | Performed by: INTERNAL MEDICINE

## 2022-11-21 ENCOUNTER — TELEPHONE (OUTPATIENT)
Dept: CARDIOLOGY | Facility: CLINIC | Age: 72
End: 2022-11-21

## 2022-11-21 NOTE — TELEPHONE ENCOUNTER
Pt called to report that he has recently had the flu and a sinus infection. He is on antibiotic and steroids currently. He has noticed some nose bleeding and some blood tinged phlegm. Advised him to hold Warfarin for 2 days, then resume at a lower dosage of 5 mgs, qd until we check him in the office on Monday 11/28/22. Pt to call if any further problems. Maribel

## 2022-11-28 ENCOUNTER — ANTICOAGULATION VISIT (OUTPATIENT)
Dept: CARDIOLOGY | Facility: CLINIC | Age: 72
End: 2022-11-28

## 2022-11-28 VITALS
HEART RATE: 79 BPM | SYSTOLIC BLOOD PRESSURE: 135 MMHG | BODY MASS INDEX: 28.6 KG/M2 | WEIGHT: 205 LBS | DIASTOLIC BLOOD PRESSURE: 72 MMHG

## 2022-11-28 DIAGNOSIS — I48.21 PERMANENT ATRIAL FIBRILLATION: Primary | Chronic | ICD-10-CM

## 2022-11-28 LAB — INR PPP: 2.1 (ref 0.9–1.1)

## 2022-11-28 PROCEDURE — 85610 PROTHROMBIN TIME: CPT | Performed by: INTERNAL MEDICINE

## 2022-11-28 PROCEDURE — 36416 COLLJ CAPILLARY BLOOD SPEC: CPT | Performed by: INTERNAL MEDICINE

## 2022-11-30 ENCOUNTER — LAB (OUTPATIENT)
Dept: LAB | Facility: HOSPITAL | Age: 72
End: 2022-11-30

## 2022-11-30 ENCOUNTER — OFFICE VISIT (OUTPATIENT)
Dept: FAMILY MEDICINE CLINIC | Facility: CLINIC | Age: 72
End: 2022-11-30

## 2022-11-30 VITALS
HEIGHT: 70 IN | SYSTOLIC BLOOD PRESSURE: 133 MMHG | DIASTOLIC BLOOD PRESSURE: 71 MMHG | TEMPERATURE: 97.9 F | HEART RATE: 97 BPM | WEIGHT: 201 LBS | BODY MASS INDEX: 28.77 KG/M2 | OXYGEN SATURATION: 93 %

## 2022-11-30 DIAGNOSIS — E11.9 TYPE 2 DIABETES MELLITUS WITHOUT COMPLICATION, WITHOUT LONG-TERM CURRENT USE OF INSULIN: Chronic | ICD-10-CM

## 2022-11-30 DIAGNOSIS — I48.21 PERMANENT ATRIAL FIBRILLATION: Chronic | ICD-10-CM

## 2022-11-30 DIAGNOSIS — E79.0 HYPERURICEMIA: Chronic | ICD-10-CM

## 2022-11-30 DIAGNOSIS — J44.9 CHRONIC OBSTRUCTIVE PULMONARY DISEASE, UNSPECIFIED COPD TYPE: ICD-10-CM

## 2022-11-30 DIAGNOSIS — N40.0 PROSTATISM: ICD-10-CM

## 2022-11-30 DIAGNOSIS — I10 PRIMARY HYPERTENSION: Primary | Chronic | ICD-10-CM

## 2022-11-30 DIAGNOSIS — I25.119 ATHEROSCLEROSIS OF NATIVE CORONARY ARTERY OF NATIVE HEART WITH ANGINA PECTORIS: Chronic | ICD-10-CM

## 2022-11-30 DIAGNOSIS — I10 PRIMARY HYPERTENSION: Chronic | ICD-10-CM

## 2022-11-30 DIAGNOSIS — E78.00 PURE HYPERCHOLESTEROLEMIA: Chronic | ICD-10-CM

## 2022-11-30 LAB
ALT SERPL W P-5'-P-CCNC: 29 U/L (ref 1–41)
CHOLEST SERPL-MCNC: 138 MG/DL (ref 0–200)
CREAT UR-MCNC: 90.2 MG/DL
HBA1C MFR BLD: 6.8 % (ref 3.5–5.6)
HDLC SERPL-MCNC: 35 MG/DL (ref 40–60)
LDLC SERPL CALC-MCNC: 72 MG/DL (ref 0–100)
LDLC/HDLC SERPL: 1.9 {RATIO}
PROT ?TM UR-MCNC: 44.5 MG/DL
PROT/CREAT UR: 493.3 MG/G CREA (ref 0–200)
TRIGL SERPL-MCNC: 182 MG/DL (ref 0–150)
VLDLC SERPL-MCNC: 31 MG/DL (ref 5–40)

## 2022-11-30 PROCEDURE — 83036 HEMOGLOBIN GLYCOSYLATED A1C: CPT

## 2022-11-30 PROCEDURE — 80061 LIPID PANEL: CPT

## 2022-11-30 PROCEDURE — 36415 COLL VENOUS BLD VENIPUNCTURE: CPT

## 2022-11-30 PROCEDURE — 84460 ALANINE AMINO (ALT) (SGPT): CPT

## 2022-11-30 PROCEDURE — 82570 ASSAY OF URINE CREATININE: CPT

## 2022-11-30 PROCEDURE — 84156 ASSAY OF PROTEIN URINE: CPT

## 2022-11-30 PROCEDURE — 99214 OFFICE O/P EST MOD 30 MIN: CPT | Performed by: FAMILY MEDICINE

## 2022-12-04 DIAGNOSIS — I48.21 PERMANENT ATRIAL FIBRILLATION: ICD-10-CM

## 2022-12-05 RX ORDER — WARFARIN SODIUM 7.5 MG/1
TABLET ORAL
Qty: 117 TABLET | Refills: 0 | Status: SHIPPED | OUTPATIENT
Start: 2022-12-05 | End: 2023-03-06

## 2022-12-05 NOTE — TELEPHONE ENCOUNTER
Rx Refill Note  Requested Prescriptions     Pending Prescriptions Disp Refills   • warfarin (COUMADIN) 7.5 MG tablet [Pharmacy Med Name: WARFARIN SODIUM 7.5 MG TABLET] 117 tablet 0     Sig: TAKE 1 AND 1/2 TABLETS BY MOUTH ON Monday Wednesday and FRIDAY AND 1 TABLET ON ALL OTHER DAYS OR AS DIRECTED      Last office visit with prescribing clinician: 7/14/2022   Last telemedicine visit with prescribing clinician: 12/28/2022   Next office visit with prescribing clinician: 1/23/2023                         Would you like a call back once the refill request has been completed: [] Yes [] No    If the office needs to give you a call back, can they leave a voicemail: [] Yes [] No    Anticoagulation Visit (11/28/2022)      Samantha Santizo LPN  12/05/22, 16:39 EST

## 2022-12-19 RX ORDER — ATENOLOL 50 MG/1
50 TABLET ORAL NIGHTLY
Qty: 90 TABLET | Refills: 3 | Status: SHIPPED | OUTPATIENT
Start: 2022-12-19

## 2022-12-19 NOTE — TELEPHONE ENCOUNTER
Rx Refill Note  Requested Prescriptions     Pending Prescriptions Disp Refills   • atenolol (TENORMIN) 50 MG tablet [Pharmacy Med Name: ATENOLOL 50 MG TABLET] 90 tablet 3     Sig: TAKE 1 TABLET BY MOUTH EVERY DAY      Last office visit with prescribing clinician: 7/14/2022   Last telemedicine visit with prescribing clinician: 12/28/2022   Next office visit with prescribing clinician: 1/23/2023   {TIP  Encounters:23}                      Would you like a call back once the refill request has been completed: [] Yes [] No    If the office needs to give you a call back, can they leave a voicemail: [] Yes [] No    Stacey Martínez MA  12/19/22, 08:43 EST

## 2022-12-28 ENCOUNTER — ANTICOAGULATION VISIT (OUTPATIENT)
Dept: CARDIOLOGY | Facility: CLINIC | Age: 72
End: 2022-12-28

## 2022-12-28 VITALS
SYSTOLIC BLOOD PRESSURE: 156 MMHG | BODY MASS INDEX: 28.55 KG/M2 | HEART RATE: 76 BPM | WEIGHT: 199 LBS | DIASTOLIC BLOOD PRESSURE: 75 MMHG

## 2022-12-28 DIAGNOSIS — I48.21 PERMANENT ATRIAL FIBRILLATION: Primary | Chronic | ICD-10-CM

## 2022-12-28 LAB — INR PPP: 2.2 (ref 0.9–1.1)

## 2022-12-28 PROCEDURE — 36416 COLLJ CAPILLARY BLOOD SPEC: CPT | Performed by: INTERNAL MEDICINE

## 2022-12-28 PROCEDURE — 85610 PROTHROMBIN TIME: CPT | Performed by: INTERNAL MEDICINE

## 2023-01-23 ENCOUNTER — ANTICOAGULATION VISIT (OUTPATIENT)
Dept: CARDIOLOGY | Facility: CLINIC | Age: 73
End: 2023-01-23
Payer: MEDICARE

## 2023-01-23 ENCOUNTER — OFFICE VISIT (OUTPATIENT)
Dept: CARDIOLOGY | Facility: CLINIC | Age: 73
End: 2023-01-23
Payer: MEDICARE

## 2023-01-23 VITALS — DIASTOLIC BLOOD PRESSURE: 77 MMHG | SYSTOLIC BLOOD PRESSURE: 167 MMHG | HEART RATE: 94 BPM

## 2023-01-23 VITALS
DIASTOLIC BLOOD PRESSURE: 63 MMHG | SYSTOLIC BLOOD PRESSURE: 135 MMHG | WEIGHT: 201 LBS | BODY MASS INDEX: 28.77 KG/M2 | HEIGHT: 70 IN | HEART RATE: 94 BPM

## 2023-01-23 DIAGNOSIS — I10 ESSENTIAL HYPERTENSION: ICD-10-CM

## 2023-01-23 DIAGNOSIS — I25.10 CORONARY ARTERY DISEASE INVOLVING NATIVE CORONARY ARTERY OF NATIVE HEART WITHOUT ANGINA PECTORIS: ICD-10-CM

## 2023-01-23 DIAGNOSIS — I48.21 PERMANENT ATRIAL FIBRILLATION: Primary | Chronic | ICD-10-CM

## 2023-01-23 DIAGNOSIS — I48.21 PERMANENT ATRIAL FIBRILLATION: Primary | ICD-10-CM

## 2023-01-23 DIAGNOSIS — E11.9 TYPE 2 DIABETES MELLITUS WITHOUT COMPLICATION, WITHOUT LONG-TERM CURRENT USE OF INSULIN: ICD-10-CM

## 2023-01-23 DIAGNOSIS — E78.00 PURE HYPERCHOLESTEROLEMIA: ICD-10-CM

## 2023-01-23 DIAGNOSIS — G47.33 OBSTRUCTIVE SLEEP APNEA SYNDROME: ICD-10-CM

## 2023-01-23 LAB — INR PPP: 2.4 (ref 2–3)

## 2023-01-23 PROCEDURE — 36416 COLLJ CAPILLARY BLOOD SPEC: CPT | Performed by: INTERNAL MEDICINE

## 2023-01-23 PROCEDURE — 85610 PROTHROMBIN TIME: CPT | Performed by: INTERNAL MEDICINE

## 2023-01-23 PROCEDURE — 99214 OFFICE O/P EST MOD 30 MIN: CPT | Performed by: INTERNAL MEDICINE

## 2023-01-23 NOTE — PROGRESS NOTES
Subjective:     Encounter Date:01/23/2023      Patient ID: Sandro Ramirez is a 72 y.o. male.    Chief Complaint:  History of Present Illness 72-year-old white male with history of coronary artery disease history of hypertension hyperlipidemia diabetes sleep apnea and atrial fibrillation presents to my office for follow-up.  Patient is currently stable without any symptoms of chest pain or shortness of breath at rest or exertion.  No complains any PND orthopnea.  Patient has occasional palpitation without any dizziness syncope or swelling of the feet.  Patient has been taking all medicines regularly.  Patient does not smoke.  He is trying eccentric lipid and follow good diet.    The following portions of the patient's history were reviewed and updated as appropriate: allergies, current medications, past family history, past medical history, past social history, past surgical history and problem list.  Past Medical History:   Diagnosis Date   • AAA (abdominal aortic aneurysm) 2006   • Abnormal ECG    • Aneurysm (HCC)    • Arthritis    • Asthma    • Atrial fibrillation (HCC)     COUMADIN   • Benign prostatic hyperplasia    • Bladder cancer (HCC)    • CHF (congestive heart failure) (HCC)    • Chronic anticoagulation     COUMADIN STARTED 2006   • Colon polyp    • Congenital heart disease    • Coronary artery disease    • Diabetes mellitus (HCC)    • Elevated cholesterol    • Erectile dysfunction    • Facial basal cell cancer    • Gout    • Hard to intubate    • History of pancreatitis 2006   • History of pneumonia    • History of tick-borne disease 2015   • HL (hearing loss)     BILAT HEARING AIDS   • Hyperlipidemia    • Hypertension    • Myocardial infarction (HCC)     X5   • Obesity    • Pancreatitis    • Pneumonia    • Sleep apnea     CURRENTLY NOT USING CPAP D/T RECALL   • Transfusion history     STATES HIS SISTER HAD A SEVERE REACTION TO BLOOD TRANSFUSION     Past Surgical History:   Procedure Laterality Date  "  • ARTERIOGRAM AORTIC Left 06/29/2022    Procedure: AORTIC STENT GRAFT PLACEMENT WITH ILIAC COILING;  Surgeon: Eric Sainz MD;  Location: [facility] 18/19;  Service: Vascular;  Laterality: Left;   • CARDIAC CATHETERIZATION     • CHOLECYSTECTOMY     • COLONOSCOPY     • CORONARY ARTERY BYPASS GRAFT  2006    X2   • CYSTOSCOPY      STATES HAD BLADDER TUMORS REMOVED X2   • PROSTATE SURGERY      X2- STATES PLACED COILS IN TO HELP WITH FLOW AND THEN HAD TO REMOVE A PIECE THAT BROKE OFF AND WAS IN BLADDER     /63   Pulse 94   Ht 177.8 cm (70\")   Wt 91.2 kg (201 lb)   BMI 28.84 kg/m²   Family History   Problem Relation Age of Onset   • Cancer Father    • Heart attack Father    • Hearing loss Father    • Heart disease Paternal Grandfather    • Heart attack Paternal Grandfather    • Malig Hyperthermia Neg Hx        Current Outpatient Medications:   •  albuterol (PROVENTIL) (2.5 MG/3ML) 0.083% nebulizer solution, Take 2.5 mg by nebulization Every 4 (Four) Hours As Needed for Wheezing. Indications: Dx codes: R06.02, J20.6, Disp: 3 mL, Rfl: 0  •  albuterol sulfate  (90 Base) MCG/ACT inhaler, Inhale 1 puff Every 4 (Four) Hours As Needed., Disp: , Rfl:   •  albuterol sulfate  (90 Base) MCG/ACT inhaler, Inhale 2 puffs Every 4 (Four) Hours As Needed for Wheezing or Shortness of Air., Disp: 8 g, Rfl: 0  •  alfuzosin (UROXATRAL) 10 MG 24 hr tablet, Take 10 mg by mouth 2 (Two) Times a Day., Disp: , Rfl:   •  allopurinol (ZYLOPRIM) 300 MG tablet, Take 300 mg by mouth Every Night., Disp: , Rfl:   •  aspirin 81 MG tablet, Take 81 mg by mouth Daily. PT STATES WAS INSTRUCTED TO CONTINUE TO TAKE THIS PER GET ANDERSEN, Disp: , Rfl:   •  atenolol (TENORMIN) 50 MG tablet, Take 1 tablet by mouth Every Night., Disp: 90 tablet, Rfl: 3  •  atorvastatin (LIPITOR) 20 MG tablet, TAKE 1 TABLET BY MOUTH EVERY DAY, Disp: 90 tablet, Rfl: 1  •  cetirizine (zyrTEC) 10 MG tablet, Take 1 tablet by mouth Daily., Disp: 90 " tablet, Rfl: 3  •  empagliflozin (Jardiance) 25 MG tablet tablet, Take 1 tablet by mouth Daily., Disp: 90 tablet, Rfl: 1  •  fluticasone (FLONASE) 50 MCG/ACT nasal spray, 2 sprays into the nostril(s) as directed by provider Daily., Disp: 11.1 mL, Rfl: 0  •  furosemide (LASIX) 40 MG tablet, TAKE 1 TABLET BY MOUTH EVERY DAY (Patient taking differently: Take 40 mg by mouth As Needed. STATES HAS NOT TAKEN IN THE LAST YEAR), Disp: 90 tablet, Rfl: 3  •  losartan (COZAAR) 25 MG tablet, Take 1 tablet by mouth Daily., Disp: 90 tablet, Rfl: 3  •  Vibegron (Gemtesa) 75 MG tablet, Take 75 mg by mouth Daily., Disp: , Rfl:   •  warfarin (COUMADIN) 7.5 MG tablet, TAKE 1 AND 1/2 TABLETS BY MOUTH ON  and FRIDAY AND 1 TABLET ON ALL OTHER DAYS OR AS DIRECTED, Disp: 117 tablet, Rfl: 0  Allergies   Allergen Reactions   • Bee Venom Anaphylaxis     HIVES-SWOLLEN THROAT   • Meperidine Hives   • Midazolam Hives   • Propofol Other (See Comments)     UNCLEAR-SVT, HYPOTENSION-STATES SEVERE ALMOST CODED   • Sulfa Antibiotics Hives   • Hydrocodone Nausea And Vomiting   • Simvastatin Myalgia     Social History     Socioeconomic History   • Marital status:    Tobacco Use   • Smoking status: Former     Packs/day: 1.00     Years: 30.00     Pack years: 30.00     Types: Cigarettes     Quit date:      Years since quittin.6   • Smokeless tobacco: Never   Vaping Use   • Vaping Use: Never used   Substance and Sexual Activity   • Alcohol use: Not Currently     Comment: one or two beers 6 times a year   • Drug use: Never   • Sexual activity: Not Currently     Partners: Female     Birth control/protection: None     Review of Systems   Constitutional: Negative for malaise/fatigue.   Cardiovascular: Positive for palpitations. Negative for chest pain, dyspnea on exertion and leg swelling.   Respiratory: Negative for cough and shortness of breath.    Gastrointestinal: Negative for abdominal pain, nausea and vomiting.    Neurological: Negative for dizziness, focal weakness, headaches, light-headedness and numbness.   All other systems reviewed and are negative.             Objective:     Constitutional:       Appearance: Well-developed.   Eyes:      General: No scleral icterus.     Conjunctiva/sclera: Conjunctivae normal.   HENT:      Head: Normocephalic and atraumatic.   Neck:      Vascular: No carotid bruit or JVD.   Pulmonary:      Effort: Pulmonary effort is normal.      Breath sounds: Normal breath sounds. No wheezing. No rales.   Cardiovascular:      Normal rate. Regular rhythm.   Pulses:     Intact distal pulses.   Abdominal:      General: Bowel sounds are normal.      Palpations: Abdomen is soft.   Musculoskeletal:      Cervical back: Normal range of motion and neck supple. Skin:     General: Skin is warm and dry.      Findings: No rash.   Neurological:      Mental Status: Alert.       Procedures    Lab Review:         MDM  1.  Coronary disease  Patient had coronary bypass x2 vessels with a LIMA to LAD and saphenous graft to the marginal branch and is currently stable on medications  2.  Atrial fibrillation  Patient is currently on atenolol for rate control but also on warfarin and keeps INR around 2.0-2.5  3.  Hypertension  Patient blood pressure currently stable on losartan and atenolol  4.  Hyperlipidemia  Patient is on atorvastatin and the lipid levels are well within normal limits  5.  Diabetes  Patient is on oral medicines and followed by the primary care doctor  6.  Obstructive sleep apnea  Patient is sleep apnea and uses a CPAP machine    Patient's previous medical records, labs, and EKG were reviewed and discussed with the patient at today's visit.

## 2023-02-13 ENCOUNTER — APPOINTMENT (OUTPATIENT)
Dept: VASCULAR SURGERY | Facility: HOSPITAL | Age: 73
End: 2023-02-13
Payer: MEDICARE

## 2023-02-13 ENCOUNTER — TRANSCRIBE ORDERS (OUTPATIENT)
Dept: ADMINISTRATIVE | Facility: HOSPITAL | Age: 73
End: 2023-02-13
Payer: MEDICARE

## 2023-02-13 ENCOUNTER — HOSPITAL ENCOUNTER (OUTPATIENT)
Dept: CARDIOLOGY | Facility: HOSPITAL | Age: 73
Discharge: HOME | End: 2023-02-13
Payer: MEDICARE

## 2023-02-13 DIAGNOSIS — I65.23 BILATERAL CAROTID ARTERY OCCLUSION: ICD-10-CM

## 2023-02-13 DIAGNOSIS — I71.40 ABDOMINAL AORTIC ANEURYSM WITHOUT RUPTURE: ICD-10-CM

## 2023-02-13 DIAGNOSIS — I71.40 ABDOMINAL AORTIC ANEURYSM (AAA) WITHOUT RUPTURE, UNSPECIFIED PART: Primary | ICD-10-CM

## 2023-02-13 LAB
ABDOMINAL AORTA AORTIC ANASTOMOSIS PSV: 46 CM/S
ABDOMINAL AORTA AORTIC GRAFT BODY PSV: 68 CM/S
ABDOMINAL AORTA LEFT DIST ANASTOMOSIS PSV: 153 CM/S
ABDOMINAL AORTA LEFT LIMB GRAFT PSV: 73 CM/S
ABDOMINAL AORTA RIGHT DIST ANASTOMOSIS PSV: 68 CM/S
ABDOMINAL AORTA RIGHT LIMB GRAFT PSV: 84 CM/S
ABDOMINAL DIST AORTA AP: 4.5 CM
ABDOMINAL DIST AORTA TRANS: 4.4 CM
ABDOMINAL DIST AORTA VEL: 68 CM/S
ABDOMINAL LT COM ILIAC VEL: 166 CM/S
ABDOMINAL MID AORTA AP: 2.5 CM
ABDOMINAL MID AORTA TRANS: 2.4 CM
ABDOMINAL MID AORTA VEL: 71 CM/S
ABDOMINAL PROX AORTA AP: 2.5 CM
ABDOMINAL PROX AORTA TRANS: 2.3 CM
ABDOMINAL PROX AORTA VEL: 64 CM/S
ABDOMINAL RT COM ILIAC VEL: 63 CM/S
BH CV VAS ABDOMINAL AO RESIDUAL LUMEN AP MEASURE: 4.5 CM
BH CV VAS ABDOMINAL AO RESIDUAL LUMEN TRANS MEASURE: 4.4 CM
BH CV VAS ABDOMINAL AORTA DISTAL LEFT LIMB GRAFT PSV: 83 CM/S
BH CV VAS ABDOMINAL AORTA DISTAL RIGHT LIMB GRAFT PSV: 76 CM/S
MAXIMAL PREDICTED HEART RATE: 150 BPM
STRESS TARGET HR: 128 BPM

## 2023-02-13 PROCEDURE — 93978 VASCULAR STUDY: CPT

## 2023-02-13 PROCEDURE — G0463 HOSPITAL OUTPT CLINIC VISIT: HCPCS

## 2023-02-28 ENCOUNTER — TRANSCRIBE ORDERS (OUTPATIENT)
Dept: ADMINISTRATIVE | Facility: HOSPITAL | Age: 73
End: 2023-02-28
Payer: MEDICARE

## 2023-02-28 DIAGNOSIS — I71.40 ABDOMINAL AORTIC ANEURYSM (AAA) WITHOUT RUPTURE, UNSPECIFIED PART: Primary | ICD-10-CM

## 2023-02-28 DIAGNOSIS — I65.23 BILATERAL CAROTID ARTERY OCCLUSION: ICD-10-CM

## 2023-03-01 ENCOUNTER — ANTICOAGULATION VISIT (OUTPATIENT)
Dept: CARDIOLOGY | Facility: CLINIC | Age: 73
End: 2023-03-01
Payer: MEDICARE

## 2023-03-01 VITALS
HEART RATE: 74 BPM | WEIGHT: 202 LBS | BODY MASS INDEX: 28.98 KG/M2 | SYSTOLIC BLOOD PRESSURE: 157 MMHG | DIASTOLIC BLOOD PRESSURE: 63 MMHG | OXYGEN SATURATION: 97 %

## 2023-03-01 DIAGNOSIS — I48.21 PERMANENT ATRIAL FIBRILLATION: Primary | Chronic | ICD-10-CM

## 2023-03-01 LAB — INR PPP: 1.5 (ref 0.9–1.1)

## 2023-03-01 PROCEDURE — 85610 PROTHROMBIN TIME: CPT | Performed by: INTERNAL MEDICINE

## 2023-03-01 PROCEDURE — 36416 COLLJ CAPILLARY BLOOD SPEC: CPT | Performed by: INTERNAL MEDICINE

## 2023-03-04 DIAGNOSIS — I48.21 PERMANENT ATRIAL FIBRILLATION: ICD-10-CM

## 2023-03-06 RX ORDER — WARFARIN SODIUM 7.5 MG/1
TABLET ORAL
Qty: 110 TABLET | Refills: 0 | Status: SHIPPED | OUTPATIENT
Start: 2023-03-06

## 2023-03-06 NOTE — TELEPHONE ENCOUNTER
Rx Refill Note  Requested Prescriptions     Pending Prescriptions Disp Refills   • warfarin (COUMADIN) 7.5 MG tablet [Pharmacy Med Name: WARFARIN SODIUM 7.5 MG TABLET] 110 tablet 0     Sig: TAKE 1 AND 1/2 TABLETS BY MOUTH ON MONDAY WEDNESDAY AND FRIDAY AND 1 TABLET ON ALL OTHER DAYS OR AS DIRECTED    Last INR 3/1/26  Last office visit with prescribing clinician: 1/23/2023   Last telemedicine visit with prescribing clinician: 3/17/2023   Next office visit with prescribing clinician: 7/27/2023                         Would you like a call back once the refill request has been completed: [] Yes [] No    If the office needs to give you a call back, can they leave a voicemail: [] Yes [] No    Karol Ramirez RN  03/06/23, 13:46 EST

## 2023-03-08 RX ORDER — ATORVASTATIN CALCIUM 20 MG/1
TABLET, FILM COATED ORAL
Qty: 90 TABLET | Refills: 1 | Status: SHIPPED | OUTPATIENT
Start: 2023-03-08

## 2023-03-08 NOTE — TELEPHONE ENCOUNTER
Rx Refill Note  Requested Prescriptions     Pending Prescriptions Disp Refills   • atorvastatin (LIPITOR) 20 MG tablet [Pharmacy Med Name: ATORVASTATIN 20 MG TABLET] 90 tablet 1     Sig: TAKE 1 TABLET BY MOUTH EVERY DAY      Last office visit with prescribing clinician: 1/23/2023   Last telemedicine visit with prescribing clinician: 3/17/2023   Next office visit with prescribing clinician: 7/27/2023                         Would you like a call back once the refill request has been completed: [] Yes [] No    If the office needs to give you a call back, can they leave a voicemail: [] Yes [] No    Stacey Martínez MA  03/08/23, 07:58 EST

## 2023-03-14 ENCOUNTER — TELEPHONE (OUTPATIENT)
Dept: FAMILY MEDICINE CLINIC | Facility: CLINIC | Age: 73
End: 2023-03-14

## 2023-03-14 DIAGNOSIS — Z12.5 SCREENING PSA (PROSTATE SPECIFIC ANTIGEN): Primary | ICD-10-CM

## 2023-03-14 NOTE — TELEPHONE ENCOUNTER
WIFE ESTELA STATES PATIENTS URINOLOGIST DR. CAZARES WANTS PATIENT TO HAVE LABS DONE TO CHECK HIS > PSA  IF HE HASN'T YET, PLEASE CALL TO SCHEDULE ASAP     ESTELA> HOME [phone] OR CELL [phone]

## 2023-03-15 ENCOUNTER — LAB (OUTPATIENT)
Dept: LAB | Facility: HOSPITAL | Age: 73
End: 2023-03-15
Payer: MEDICARE

## 2023-03-15 DIAGNOSIS — I10 PRIMARY HYPERTENSION: Chronic | ICD-10-CM

## 2023-03-15 DIAGNOSIS — Z12.5 SCREENING PSA (PROSTATE SPECIFIC ANTIGEN): ICD-10-CM

## 2023-03-15 LAB
ANION GAP SERPL CALCULATED.3IONS-SCNC: 8.6 MMOL/L (ref 5–15)
BUN SERPL-MCNC: 18 MG/DL (ref 8–23)
BUN/CREAT SERPL: 19.8 (ref 7–25)
CALCIUM SPEC-SCNC: 10 MG/DL (ref 8.6–10.5)
CHLORIDE SERPL-SCNC: 103 MMOL/L (ref 98–107)
CO2 SERPL-SCNC: 27.4 MMOL/L (ref 22–29)
CREAT SERPL-MCNC: 0.91 MG/DL (ref 0.76–1.27)
EGFRCR SERPLBLD CKD-EPI 2021: 90.7 ML/MIN/1.73
GLUCOSE SERPL-MCNC: 141 MG/DL (ref 65–99)
POTASSIUM SERPL-SCNC: 4.5 MMOL/L (ref 3.5–5.2)
PSA SERPL-MCNC: 2.9 NG/ML (ref 0–4)
SODIUM SERPL-SCNC: 139 MMOL/L (ref 136–145)

## 2023-03-15 PROCEDURE — G0103 PSA SCREENING: HCPCS

## 2023-03-15 PROCEDURE — 36415 COLL VENOUS BLD VENIPUNCTURE: CPT

## 2023-03-15 PROCEDURE — 80048 BASIC METABOLIC PNL TOTAL CA: CPT

## 2023-03-17 ENCOUNTER — ANTICOAGULATION VISIT (OUTPATIENT)
Dept: CARDIOLOGY | Facility: CLINIC | Age: 73
End: 2023-03-17
Payer: MEDICARE

## 2023-03-17 VITALS
BODY MASS INDEX: 29.27 KG/M2 | HEART RATE: 77 BPM | SYSTOLIC BLOOD PRESSURE: 141 MMHG | DIASTOLIC BLOOD PRESSURE: 82 MMHG | WEIGHT: 204 LBS

## 2023-03-17 DIAGNOSIS — I48.21 PERMANENT ATRIAL FIBRILLATION: Primary | Chronic | ICD-10-CM

## 2023-03-17 LAB — INR PPP: 2.2 (ref 0.9–1.1)

## 2023-03-17 PROCEDURE — 36416 COLLJ CAPILLARY BLOOD SPEC: CPT | Performed by: INTERNAL MEDICINE

## 2023-03-17 PROCEDURE — 85610 PROTHROMBIN TIME: CPT | Performed by: INTERNAL MEDICINE

## 2023-04-19 ENCOUNTER — ANTICOAGULATION VISIT (OUTPATIENT)
Dept: CARDIOLOGY | Facility: CLINIC | Age: 73
End: 2023-04-19
Payer: MEDICARE

## 2023-04-19 VITALS
BODY MASS INDEX: 28.84 KG/M2 | DIASTOLIC BLOOD PRESSURE: 58 MMHG | WEIGHT: 201 LBS | HEART RATE: 61 BPM | SYSTOLIC BLOOD PRESSURE: 143 MMHG

## 2023-04-19 DIAGNOSIS — I48.21 PERMANENT ATRIAL FIBRILLATION: Primary | Chronic | ICD-10-CM

## 2023-04-19 LAB — INR PPP: 2.8 (ref 0.9–1.1)

## 2023-04-19 PROCEDURE — 85610 PROTHROMBIN TIME: CPT | Performed by: INTERNAL MEDICINE

## 2023-04-19 PROCEDURE — 36416 COLLJ CAPILLARY BLOOD SPEC: CPT | Performed by: INTERNAL MEDICINE

## 2023-05-01 ENCOUNTER — PREP FOR SURGERY (OUTPATIENT)
Dept: SURGERY | Facility: SURGERY CENTER | Age: 73
End: 2023-05-01
Payer: MEDICARE

## 2023-05-01 ENCOUNTER — TELEPHONE (OUTPATIENT)
Dept: CARDIOLOGY | Facility: CLINIC | Age: 73
End: 2023-05-01
Payer: MEDICARE

## 2023-05-01 ENCOUNTER — OFFICE VISIT (OUTPATIENT)
Dept: GASTROENTEROLOGY | Facility: CLINIC | Age: 73
End: 2023-05-01
Payer: MEDICARE

## 2023-05-01 VITALS
BODY MASS INDEX: 28.85 KG/M2 | DIASTOLIC BLOOD PRESSURE: 86 MMHG | WEIGHT: 201.5 LBS | TEMPERATURE: 97.8 F | HEART RATE: 117 BPM | SYSTOLIC BLOOD PRESSURE: 150 MMHG | OXYGEN SATURATION: 95 % | HEIGHT: 70 IN

## 2023-05-01 DIAGNOSIS — Z12.11 ENCOUNTER FOR SCREENING FOR MALIGNANT NEOPLASM OF COLON: ICD-10-CM

## 2023-05-01 DIAGNOSIS — Z86.010 HISTORY OF COLON POLYPS: Primary | ICD-10-CM

## 2023-05-01 PROBLEM — Z86.0100 HISTORY OF COLON POLYPS: Status: ACTIVE | Noted: 2023-05-01

## 2023-05-01 PROCEDURE — 1160F RVW MEDS BY RX/DR IN RCRD: CPT | Performed by: NURSE PRACTITIONER

## 2023-05-01 PROCEDURE — 99212 OFFICE O/P EST SF 10 MIN: CPT | Performed by: NURSE PRACTITIONER

## 2023-05-01 PROCEDURE — 1159F MED LIST DOCD IN RCRD: CPT | Performed by: NURSE PRACTITIONER

## 2023-05-01 PROCEDURE — 3079F DIAST BP 80-89 MM HG: CPT | Performed by: NURSE PRACTITIONER

## 2023-05-01 PROCEDURE — 3077F SYST BP >= 140 MM HG: CPT | Performed by: NURSE PRACTITIONER

## 2023-05-01 RX ORDER — SODIUM CHLORIDE, SODIUM LACTATE, POTASSIUM CHLORIDE, CALCIUM CHLORIDE 600; 310; 30; 20 MG/100ML; MG/100ML; MG/100ML; MG/100ML
30 INJECTION, SOLUTION INTRAVENOUS CONTINUOUS PRN
OUTPATIENT
Start: 2023-05-01

## 2023-05-01 RX ORDER — SODIUM CHLORIDE 0.9 % (FLUSH) 0.9 %
3 SYRINGE (ML) INJECTION EVERY 12 HOURS SCHEDULED
OUTPATIENT
Start: 2023-05-01

## 2023-05-01 RX ORDER — SODIUM CHLORIDE 0.9 % (FLUSH) 0.9 %
10 SYRINGE (ML) INJECTION AS NEEDED
OUTPATIENT
Start: 2023-05-01

## 2023-05-01 NOTE — PROGRESS NOTES
"Chief Complaint   Patient presents with   • Colon Polyps         History of Present Illness  Patient is a 72-year-old male who presents today for evaluation.  Patient reports a history of colon polyps.  It has been greater than 5 years since his last colonoscopy.    He reports he has had issues with reflux in the past but not currently experiencing any issues.  Denies any abdominal pain, nausea, or vomiting.    Denies any bowel issues, blood in the stool, or black stools.    He is on warfarin for atrial fibrillation.     Result Review :           Vital Signs:   /86   Pulse 117   Temp 97.8 °F (36.6 °C)   Ht 177.8 cm (70\")   Wt 91.4 kg (201 lb 8 oz)   SpO2 95%   BMI 28.91 kg/m²     Body mass index is 28.91 kg/m².     Physical Exam  Vitals reviewed.   Constitutional:       General: He is not in acute distress.     Appearance: He is well-developed.   HENT:      Head: Normocephalic and atraumatic.   Pulmonary:      Effort: Pulmonary effort is normal. No respiratory distress.   Abdominal:      General: Abdomen is flat. Bowel sounds are normal. There is no distension.      Palpations: Abdomen is soft.      Tenderness: There is no abdominal tenderness.   Skin:     General: Skin is dry.      Coloration: Skin is not pale.   Neurological:      Mental Status: He is alert and oriented to person, place, and time.   Psychiatric:         Thought Content: Thought content normal.           Assessment and Plan    Diagnoses and all orders for this visit:    1. History of colon polyps (Primary)    2. Encounter for screening for malignant neoplasm of colon         Discussion  Patient presents today for evaluation with history of colon polyps.  He is due for colonoscopy for surveillance which we will arrange.  He reports he has had an EGD in the past, generally performed with colonoscopies.  Denies any upper GI complaints at this time.  We will obtain his previous records for review, if any indication for follow-up EGD seen " upon review of records, will add EGD to upcoming colonoscopy.          Follow Up   Return if symptoms worsen or fail to improve.    Patient Instructions   Schedule colonoscopy for further evaluation.

## 2023-05-01 NOTE — TELEPHONE ENCOUNTER
[facility] [place]  COLONOSCOPY  SURGERY 6/20/23  PHONE [phone]  [phone]    PLACED ON DR. JULIA TOTH DESK.

## 2023-05-24 ENCOUNTER — ANTICOAGULATION VISIT (OUTPATIENT)
Dept: CARDIOLOGY | Facility: CLINIC | Age: 73
End: 2023-05-24
Payer: MEDICARE

## 2023-05-24 VITALS
WEIGHT: 193 LBS | BODY MASS INDEX: 26.92 KG/M2 | HEART RATE: 70 BPM | SYSTOLIC BLOOD PRESSURE: 126 MMHG | DIASTOLIC BLOOD PRESSURE: 71 MMHG

## 2023-05-24 DIAGNOSIS — I48.21 PERMANENT ATRIAL FIBRILLATION: Primary | Chronic | ICD-10-CM

## 2023-05-24 LAB — INR PPP: 2.6 (ref 2–3)

## 2023-05-24 PROCEDURE — 36416 COLLJ CAPILLARY BLOOD SPEC: CPT | Performed by: INTERNAL MEDICINE

## 2023-05-24 PROCEDURE — 85610 PROTHROMBIN TIME: CPT | Performed by: INTERNAL MEDICINE

## 2023-06-01 NOTE — PROGRESS NOTES
"Chief Complaint  Sleep Apnea    Subjective          Sandro Ramirez presents to [facility] NEUROLOGY  History of Present Illness    SUPA, f/u patient  Patient is currently using a CPAP wears nasa mask and gets supplies from [place].   He is sleeping well.   not sleepy during the day        SLEEP TESTING HISTORY:    On NPSG at [facility] , 09/12/2017 patient had Mild obstructive sleep apnea syndrome with apnea-hypopnea index of 6.3 per sleep hour, minimum SpO2 of 88%    PAP download:  The patient is on CPAP therapy at 10-20 cm/H2O.   Data indicates Excellent compliance. With 85% usage for more than 4 hours with an average usage of 10 hours 35 minutes. AHI down to 1.3 .  Average pressures 9.  Average large leak 14sec.     The patient's hypersomnia has resolved       [place] Sleepiness Scale:  Sitting and reading 2 WatchingTV 2  Sitting, inactive, in a public place 0  As a passenger in a car for 1 hour w/o a break  0  Lying down to rest in the afternoon  3  Sitting and talking to someone  0  Sitting quietly after a lunch  1  In a car, while stopped for traffic or a light  0  Total 8    Review of Systems   Constitutional:  Positive for fatigue. Negative for fever.   HENT: Negative.     Eyes: Negative.    Respiratory:  Positive for apnea.    Cardiovascular: Negative.    Gastrointestinal:  Negative for abdominal pain and nausea.   Endocrine: Negative.    Neurological:  Negative for dizziness and light-headedness.   Psychiatric/Behavioral:  Negative for agitation and confusion.    All other systems reviewed and are negative.  Objective   Vital Signs:   /67 (BP Location: Left arm, Patient Position: Sitting, Cuff Size: Adult)   Pulse 65   Ht 180.3 cm (71\")   Wt 90.3 kg (199 lb)   BMI 27.75 kg/m²     Physical Exam  Vitals reviewed.   Cardiovascular:      Pulses: Normal pulses.   Pulmonary:      Effort: Pulmonary effort is normal. No respiratory distress.   Neurological:      General: No focal deficit " present.      Mental Status: He is alert and oriented to person, place, and time.   Psychiatric:         Mood and Affect: Mood normal.      Result Review :                 Assessment and Plan    Diagnoses and all orders for this visit:    1. Obstructive sleep apnea syndrome (Primary)      Continue cpap 10-20  The patient is compliant with and benefiting from PAP therapy.      Follow Up   Return in about 1 year (around 6/5/2024).    Patient was given instructions and counseling regarding his condition or for health maintenance advice. Please see specific information pulled into the AVS if appropriate.       This document has been electronically signed by Joseph Seipel, MD on June 5, 2023 10:08 EDT

## 2023-06-02 DIAGNOSIS — I48.21 PERMANENT ATRIAL FIBRILLATION: ICD-10-CM

## 2023-06-02 RX ORDER — WARFARIN SODIUM 7.5 MG/1
TABLET ORAL
Qty: 110 TABLET | Refills: 0 | Status: SHIPPED | OUTPATIENT
Start: 2023-06-02

## 2023-06-02 NOTE — TELEPHONE ENCOUNTER
Rx Refill Note  Requested Prescriptions     Pending Prescriptions Disp Refills   • warfarin (COUMADIN) 7.5 MG tablet [Pharmacy Med Name: WARFARIN SODIUM 7.5 MG TABLET] 110 tablet 0     Sig: TAKE 1 & 1/2 TABLETS BY MOUTH ON MONDAY, WEDNESDAY, AND FRIDAY, 1 TABLET ON ALL OTHER DAYS OR AS DIRECTED      Last office visit with prescribing clinician: 1/23/2023   Last telemedicine visit with prescribing clinician: Visit date not found   Next office visit with prescribing clinician: 7/27/2023   Anticoagulation Visit 5/24/23 INR 2.6                        Would you like a call back once the refill request has been completed: [] Yes [] No    If the office needs to give you a call back, can they leave a voicemail: [] Yes [] No    Ainsley Chavez RN  06/02/23, 11:33 EDT

## 2023-06-05 ENCOUNTER — OFFICE VISIT (OUTPATIENT)
Dept: NEUROLOGY | Facility: CLINIC | Age: 73
End: 2023-06-05
Payer: MEDICARE

## 2023-06-05 VITALS
HEART RATE: 65 BPM | HEIGHT: 71 IN | DIASTOLIC BLOOD PRESSURE: 67 MMHG | SYSTOLIC BLOOD PRESSURE: 152 MMHG | WEIGHT: 199 LBS | BODY MASS INDEX: 27.86 KG/M2

## 2023-06-05 DIAGNOSIS — G47.33 OBSTRUCTIVE SLEEP APNEA SYNDROME: Primary | Chronic | ICD-10-CM

## 2023-06-05 PROCEDURE — 3078F DIAST BP <80 MM HG: CPT | Performed by: PSYCHIATRY & NEUROLOGY

## 2023-06-05 PROCEDURE — 3077F SYST BP >= 140 MM HG: CPT | Performed by: PSYCHIATRY & NEUROLOGY

## 2023-06-05 PROCEDURE — 99213 OFFICE O/P EST LOW 20 MIN: CPT | Performed by: PSYCHIATRY & NEUROLOGY

## 2023-06-05 PROCEDURE — 1160F RVW MEDS BY RX/DR IN RCRD: CPT | Performed by: PSYCHIATRY & NEUROLOGY

## 2023-06-05 PROCEDURE — 1159F MED LIST DOCD IN RCRD: CPT | Performed by: PSYCHIATRY & NEUROLOGY

## 2023-06-16 ENCOUNTER — TELEPHONE (OUTPATIENT)
Dept: GASTROENTEROLOGY | Facility: CLINIC | Age: 73
End: 2023-06-16
Payer: MEDICARE

## 2023-07-20 ENCOUNTER — LAB (OUTPATIENT)
Dept: LAB | Facility: HOSPITAL | Age: 73
End: 2023-07-20
Payer: MEDICARE

## 2023-07-20 DIAGNOSIS — E11.65 TYPE 2 DIABETES MELLITUS WITH HYPERGLYCEMIA, WITHOUT LONG-TERM CURRENT USE OF INSULIN: ICD-10-CM

## 2023-07-20 LAB
ALBUMIN SERPL-MCNC: 4.5 G/DL (ref 3.5–5.2)
ALBUMIN UR-MCNC: 123.2 MG/DL
ALBUMIN/GLOB SERPL: 1.7 G/DL
ALP SERPL-CCNC: 183 U/L (ref 39–117)
ALT SERPL W P-5'-P-CCNC: 29 U/L (ref 1–41)
ANION GAP SERPL CALCULATED.3IONS-SCNC: 7 MMOL/L (ref 5–15)
AST SERPL-CCNC: 26 U/L (ref 1–40)
BILIRUB SERPL-MCNC: 0.5 MG/DL (ref 0–1.2)
BUN SERPL-MCNC: 16 MG/DL (ref 8–23)
BUN/CREAT SERPL: 20.3 (ref 7–25)
CALCIUM SPEC-SCNC: 9.5 MG/DL (ref 8.6–10.5)
CHLORIDE SERPL-SCNC: 106 MMOL/L (ref 98–107)
CHOLEST SERPL-MCNC: 117 MG/DL (ref 0–200)
CO2 SERPL-SCNC: 28 MMOL/L (ref 22–29)
CREAT SERPL-MCNC: 0.79 MG/DL (ref 0.76–1.27)
CREAT UR-MCNC: 125.4 MG/DL
EGFRCR SERPLBLD CKD-EPI 2021: 95 ML/MIN/1.73
GLOBULIN UR ELPH-MCNC: 2.7 GM/DL
GLUCOSE SERPL-MCNC: 119 MG/DL (ref 65–99)
HBA1C MFR BLD: 6.5 % (ref 4.8–5.6)
HDLC SERPL-MCNC: 40 MG/DL (ref 40–60)
LDLC SERPL CALC-MCNC: 65 MG/DL (ref 0–100)
LDLC/HDLC SERPL: 1.67 {RATIO}
MICROALBUMIN/CREAT UR: 982.5 MG/G
POTASSIUM SERPL-SCNC: 4.7 MMOL/L (ref 3.5–5.2)
PROT SERPL-MCNC: 7.2 G/DL (ref 6–8.5)
SODIUM SERPL-SCNC: 141 MMOL/L (ref 136–145)
TRIGL SERPL-MCNC: 52 MG/DL (ref 0–150)
TSH SERPL DL<=0.05 MIU/L-ACNC: 2.61 UIU/ML (ref 0.27–4.2)
VLDLC SERPL-MCNC: 12 MG/DL (ref 5–40)

## 2023-07-20 PROCEDURE — 82043 UR ALBUMIN QUANTITATIVE: CPT

## 2023-07-20 PROCEDURE — 80061 LIPID PANEL: CPT

## 2023-07-20 PROCEDURE — 82570 ASSAY OF URINE CREATININE: CPT

## 2023-07-20 PROCEDURE — 80053 COMPREHEN METABOLIC PANEL: CPT

## 2023-07-20 PROCEDURE — 36415 COLL VENOUS BLD VENIPUNCTURE: CPT

## 2023-07-20 PROCEDURE — 84443 ASSAY THYROID STIM HORMONE: CPT

## 2023-07-20 PROCEDURE — 83036 HEMOGLOBIN GLYCOSYLATED A1C: CPT

## 2023-07-20 PROCEDURE — 82948 REAGENT STRIP/BLOOD GLUCOSE: CPT | Performed by: INTERNAL MEDICINE

## 2023-07-27 ENCOUNTER — OFFICE VISIT (OUTPATIENT)
Dept: CARDIOLOGY | Facility: CLINIC | Age: 73
End: 2023-07-27
Payer: MEDICARE

## 2023-07-27 ENCOUNTER — HOSPITAL ENCOUNTER (OUTPATIENT)
Facility: HOSPITAL | Age: 73
Setting detail: HOSPITAL OUTPATIENT SURGERY
Discharge: HOME | End: 2023-07-27
Attending: INTERNAL MEDICINE | Admitting: INTERNAL MEDICINE
Payer: MEDICARE

## 2023-07-27 ENCOUNTER — ANESTHESIA EVENT (OUTPATIENT)
Dept: GASTROENTEROLOGY | Facility: HOSPITAL | Age: 73
End: 2023-07-27
Payer: MEDICARE

## 2023-07-27 ENCOUNTER — ANESTHESIA (OUTPATIENT)
Dept: GASTROENTEROLOGY | Facility: HOSPITAL | Age: 73
End: 2023-07-27
Payer: MEDICARE

## 2023-07-27 VITALS
OXYGEN SATURATION: 96 % | HEIGHT: 71 IN | HEART RATE: 71 BPM | WEIGHT: 191 LBS | SYSTOLIC BLOOD PRESSURE: 157 MMHG | RESPIRATION RATE: 16 BRPM | DIASTOLIC BLOOD PRESSURE: 84 MMHG | BODY MASS INDEX: 26.74 KG/M2

## 2023-07-27 VITALS
BODY MASS INDEX: 27.99 KG/M2 | OXYGEN SATURATION: 92 % | WEIGHT: 195.5 LBS | SYSTOLIC BLOOD PRESSURE: 136 MMHG | DIASTOLIC BLOOD PRESSURE: 58 MMHG | HEIGHT: 70 IN | HEART RATE: 77 BPM

## 2023-07-27 DIAGNOSIS — I48.21 PERMANENT ATRIAL FIBRILLATION: Primary | ICD-10-CM

## 2023-07-27 DIAGNOSIS — G47.33 OBSTRUCTIVE SLEEP APNEA SYNDROME: ICD-10-CM

## 2023-07-27 DIAGNOSIS — E78.00 PURE HYPERCHOLESTEROLEMIA: ICD-10-CM

## 2023-07-27 DIAGNOSIS — I71.43 INFRARENAL ABDOMINAL AORTIC ANEURYSM (AAA) WITHOUT RUPTURE: ICD-10-CM

## 2023-07-27 DIAGNOSIS — Z86.010 HISTORY OF COLON POLYPS: ICD-10-CM

## 2023-07-27 DIAGNOSIS — I10 ESSENTIAL HYPERTENSION: ICD-10-CM

## 2023-07-27 DIAGNOSIS — I25.10 CORONARY ARTERY DISEASE INVOLVING NATIVE CORONARY ARTERY OF NATIVE HEART WITHOUT ANGINA PECTORIS: ICD-10-CM

## 2023-07-27 DIAGNOSIS — E11.9 TYPE 2 DIABETES MELLITUS WITHOUT COMPLICATION, WITHOUT LONG-TERM CURRENT USE OF INSULIN: ICD-10-CM

## 2023-07-27 DIAGNOSIS — Z12.11 ENCOUNTER FOR SCREENING FOR MALIGNANT NEOPLASM OF COLON: ICD-10-CM

## 2023-07-27 LAB — GLUCOSE BLDC GLUCOMTR-MCNC: 145 MG/DL (ref 70–130)

## 2023-07-27 PROCEDURE — 25010000002 FENTANYL CITRATE (PF) 100 MCG/2ML SOLUTION: Performed by: ANESTHESIOLOGY

## 2023-07-27 PROCEDURE — 45380 COLONOSCOPY AND BIOPSY: CPT | Performed by: INTERNAL MEDICINE

## 2023-07-27 PROCEDURE — 88305 TISSUE EXAM BY PATHOLOGIST: CPT | Performed by: INTERNAL MEDICINE

## 2023-07-27 PROCEDURE — 82948 REAGENT STRIP/BLOOD GLUCOSE: CPT

## 2023-07-27 PROCEDURE — 45385 COLONOSCOPY W/LESION REMOVAL: CPT | Performed by: INTERNAL MEDICINE

## 2023-07-27 DEVICE — DEV CLIP ENDO RESOLUTION360 CONTRL ROT 235CM: Type: IMPLANTABLE DEVICE | Site: CECUM | Status: FUNCTIONAL

## 2023-07-27 RX ORDER — SODIUM CHLORIDE 0.9 % (FLUSH) 0.9 %
3 SYRINGE (ML) INJECTION EVERY 12 HOURS SCHEDULED
Status: DISCONTINUED | OUTPATIENT
Start: 2023-07-27 | End: 2023-07-27 | Stop reason: HOSPADM

## 2023-07-27 RX ORDER — SODIUM CHLORIDE, SODIUM LACTATE, POTASSIUM CHLORIDE, CALCIUM CHLORIDE 600; 310; 30; 20 MG/100ML; MG/100ML; MG/100ML; MG/100ML
30 INJECTION, SOLUTION INTRAVENOUS CONTINUOUS PRN
Status: DISCONTINUED | OUTPATIENT
Start: 2023-07-27 | End: 2023-07-27 | Stop reason: HOSPADM

## 2023-07-27 RX ORDER — FENTANYL CITRATE 50 UG/ML
INJECTION, SOLUTION INTRAMUSCULAR; INTRAVENOUS AS NEEDED
Status: DISCONTINUED | OUTPATIENT
Start: 2023-07-27 | End: 2023-07-27 | Stop reason: SURG

## 2023-07-27 RX ORDER — KETAMINE HYDROCHLORIDE 10 MG/ML
INJECTION INTRAMUSCULAR; INTRAVENOUS AS NEEDED
Status: DISCONTINUED | OUTPATIENT
Start: 2023-07-27 | End: 2023-07-27 | Stop reason: SURG

## 2023-07-27 RX ORDER — SODIUM CHLORIDE 0.9 % (FLUSH) 0.9 %
10 SYRINGE (ML) INJECTION AS NEEDED
Status: DISCONTINUED | OUTPATIENT
Start: 2023-07-27 | End: 2023-07-27 | Stop reason: HOSPADM

## 2023-07-27 RX ADMIN — KETAMINE HYDROCHLORIDE 10 MG: 10 INJECTION INTRAMUSCULAR; INTRAVENOUS at 08:48

## 2023-07-27 RX ADMIN — KETAMINE HYDROCHLORIDE 30 MG: 10 INJECTION INTRAMUSCULAR; INTRAVENOUS at 07:46

## 2023-07-27 RX ADMIN — SODIUM CHLORIDE, POTASSIUM CHLORIDE, SODIUM LACTATE AND CALCIUM CHLORIDE 30 ML/HR: 600; 310; 30; 20 INJECTION, SOLUTION INTRAVENOUS at 07:28

## 2023-07-27 RX ADMIN — FENTANYL CITRATE 50 MCG: 50 INJECTION, SOLUTION INTRAMUSCULAR; INTRAVENOUS at 07:49

## 2023-07-27 RX ADMIN — KETAMINE HYDROCHLORIDE 10 MG: 10 INJECTION INTRAMUSCULAR; INTRAVENOUS at 08:20

## 2023-07-27 NOTE — ANESTHESIA PREPROCEDURE EVALUATION
Anesthesia Evaluation     history of anesthetic complications:  difficult airway  NPO Solid Status: > 8 hours             Airway   Mallampati: IV  Possible difficult intubation and Anterior  Dental          Pulmonary    (+) COPD, asthma,sleep apnea  Cardiovascular     (+) hypertension, past MI , CAD, CABGdysrhythmias Atrial Fib, angina, CHF , hyperlipidemia    ROS comment: Wife says he had severe hypotensive episode at [facility] after CABG on propofol as best I can tell.      Neuro/Psych  GI/Hepatic/Renal/Endo    (+) obesity, diabetes mellitus type 2    Musculoskeletal     Abdominal    Substance History      OB/GYN          Other   arthritis,   history of cancer                    Anesthesia Plan    ASA 4     MAC     intravenous induction     Anesthetic plan, risks, benefits, and alternatives have been provided, discussed and informed consent has been obtained with: patient.    CODE STATUS:

## 2023-07-27 NOTE — ANESTHESIA POSTPROCEDURE EVALUATION
Patient: Sandro Ramirez    Procedure Summary       Date: 07/27/23 Room / Location:  [facility]    Anesthesia Start: 0745 Anesthesia Stop: 0840    Procedure: COLONOSCOPY to cecum with cold biopsy and cold snare polypectomies and clips Diagnosis:       History of colon polyps      Encounter for screening for malignant neoplasm of colon      (History of colon polyps [Z86.010])      (Encounter for screening for malignant neoplasm of colon [Z12.11])    Surgeons: Vitor Haley MD Provider: Rere Gaston MD    Anesthesia Type: MAC ASA Status: 4            Anesthesia Type: MAC    Vitals  Vitals Value Taken Time   /72 07/27/23 0837   Temp     Pulse 75 07/27/23 0837   Resp 16 07/27/23 0837   SpO2 96 % 07/27/23 0837           Post Anesthesia Care and Evaluation    Patient location during evaluation: bedside  Patient participation: complete - patient participated  Level of consciousness: awake  Pain management: adequate    Airway patency: patent  Anesthetic complications: No anesthetic complications    Cardiovascular status: acceptable  Respiratory status: acceptable  Hydration status: acceptable

## 2023-07-27 NOTE — DISCHARGE INSTRUCTIONS
For the next 24 hours patient needs to be with a responsible adult.    For 24 hours DO NOT drive, operate machinery, appliances, drink alcohol, make important decisions or sign legal documents.    Start with a light or bland diet if you are feeling sick to your stomach otherwise advance to regular diet as tolerated.    Follow recommendations on procedure report if provided by your doctor.    Call Dr. Haley for problems [phone]    Problems may include but not limited to: large amounts of bleeding, trouble breathing, repeated vomiting, severe unrelieved pain, fever or chills.      Restart Coumadin tomorrow per Dr. Haley's instruction.

## 2023-07-27 NOTE — PROGRESS NOTES
Subjective:     Encounter Date:07/27/2023      Patient ID: Sandro Ramirez is a 73 y.o. male.    Chief Complaint:  History of Present Illness 73-year-old white male with history of coronary disease status post coronary bypass surgery history of abdominal aortic aneurysm hypertension hyperlipidemia diabetes and sleep apnea presents to my office for a follow-up.  Patient does not have any symptoms of chest pain but has shortness of breath with exertion but no complains any PND orthopnea.  No palpitations dizziness syncope or swelling of the feet.  Taking his medicines regularly.  He does not smoke.    The following portions of the patient's history were reviewed and updated as appropriate: allergies, current medications, past family history, past medical history, past social history, past surgical history, and problem list.  Past Medical History:   Diagnosis Date    AAA (abdominal aortic aneurysm) 2006    Abnormal ECG     Aneurysm     Arthritis     Asthma     Atrial fibrillation     COUMADIN    Benign prostatic hyperplasia     Bladder cancer     CHF (congestive heart failure)     Chronic anticoagulation     COUMADIN STARTED 2006    Colon polyp     Congenital heart disease     Coronary artery disease     Diabetes mellitus     Elevated cholesterol     Erectile dysfunction     Facial basal cell cancer     Gout     Hard to intubate     History of pancreatitis 2006    History of pneumonia     History of tick-borne disease 2015    HL (hearing loss)     BILAT HEARING AIDS    Hyperlipidemia     Hypertension     Myocardial infarction     X5    Obesity     Pancreatitis     Pneumonia     Sleep apnea     CURRENTLY NOT USING CPAP D/T RECALL    Transfusion history     STATES HIS SISTER HAD A SEVERE REACTION TO BLOOD TRANSFUSION    Type 2 diabetes mellitus      Past Surgical History:   Procedure Laterality Date    ARTERIOGRAM AORTIC Left 06/29/2022    Procedure: AORTIC STENT GRAFT PLACEMENT WITH ILIAC COILING;  Surgeon:  "Eric Sainz MD;  Location: [facility] OR 18/19;  Service: Vascular;  Laterality: Left;    CARDIAC CATHETERIZATION      CHOLECYSTECTOMY      COLONOSCOPY      CORONARY ARTERY BYPASS GRAFT  2006    X2    CYSTOSCOPY      STATES HAD BLADDER TUMORS REMOVED X2    PROSTATE SURGERY      X2- STATES PLACED COILS IN TO HELP WITH FLOW AND THEN HAD TO REMOVE A PIECE THAT BROKE OFF AND WAS IN BLADDER    UPPER GASTROINTESTINAL ENDOSCOPY       /58   Pulse 77   Ht 177.8 cm (70\")   Wt 88.7 kg (195 lb 8 oz)   SpO2 92%   BMI 28.05 kg/m²   Family History   Problem Relation Age of Onset    Hypertension Mother     Colon cancer Mother     Cancer Mother     Diabetes Mother     Cancer Father     Heart attack Father     Hearing loss Father     Heart disease Father     Hypertension Sister     Hypertension Brother     Colon cancer Brother     Cancer Brother     Heart disease Paternal Grandfather     Heart attack Paternal Grandfather     Malig Hyperthermia Neg Hx     Colon polyps Neg Hx     Crohn's disease Neg Hx     Irritable bowel syndrome Neg Hx     Ulcerative colitis Neg Hx        Current Outpatient Medications:     albuterol (PROVENTIL) (2.5 MG/3ML) 0.083% nebulizer solution, Take 2.5 mg by nebulization Every 4 (Four) Hours As Needed for Wheezing. Indications: Dx codes: R06.02, J20.6, Disp: 3 mL, Rfl: 0    alfuzosin (UROXATRAL) 10 MG 24 hr tablet, Take 1 tablet by mouth 2 (Two) Times a Day., Disp: , Rfl:     allopurinol (ZYLOPRIM) 300 MG tablet, Take 1 tablet by mouth Every Night., Disp: , Rfl:     aspirin 81 MG tablet, Take 1 tablet by mouth Daily. PT STATES WAS INSTRUCTED TO CONTINUE TO TAKE THIS PER GET ANDERSEN, Disp: , Rfl:     atenolol (TENORMIN) 50 MG tablet, Take 1 tablet by mouth Every Night., Disp: 90 tablet, Rfl: 3    atorvastatin (LIPITOR) 20 MG tablet, TAKE 1 TABLET BY MOUTH EVERY DAY, Disp: 90 tablet, Rfl: 1    cetirizine (zyrTEC) 10 MG tablet, Take 1 tablet by mouth Daily., Disp: 90 tablet, Rfl: 3    " fluticasone (FLONASE) 50 MCG/ACT nasal spray, 2 sprays into the nostril(s) as directed by provider Daily., Disp: 11.1 mL, Rfl: 0    losartan (COZAAR) 25 MG tablet, Take 1 tablet by mouth Daily., Disp: 90 tablet, Rfl: 3    warfarin (COUMADIN) 7.5 MG tablet, TAKE 1 & 1/2 TABLETS BY MOUTH ON MONDAY, WEDNESDAY, AND FRIDAY, 1 TABLET ON ALL OTHER DAYS OR AS DIRECTED (Patient taking differently: TAKE 1 & 1/2 TABLETS BY MOUTH ON MONDAY, WEDNESDAY, AND FRIDAY, 1 TABLET ON ALL OTHER DAYS OR AS DIRECTED LD  for scope), Disp: 110 tablet, Rfl: 0  No current facility-administered medications for this visit.  Allergies   Allergen Reactions    Bee Venom Anaphylaxis     HIVES-SWOLLEN THROAT    Meperidine Hives    Midazolam Hives    Propofol Other (See Comments)     UNCLEAR-SVT, HYPOTENSION-STATES SEVERE ALMOST CODED    Sulfa Antibiotics Hives    Hydrocodone Nausea And Vomiting    Simvastatin Myalgia     Social History     Socioeconomic History    Marital status:      Spouse name: Nichelle    Number of children: 3    Years of education: 12   Tobacco Use    Smoking status: Former     Packs/day: 1.00     Years: 30.00     Pack years: 30.00     Types: Cigarettes     Quit date: 2006     Years since quittin.1    Smokeless tobacco: Never   Vaping Use    Vaping Use: Never used   Substance and Sexual Activity    Alcohol use: Not Currently     Comment: one or two beers 6 times a year    Drug use: Never    Sexual activity: Not Currently     Partners: Female     Birth control/protection: None     Review of Systems   Constitutional: Positive for malaise/fatigue.   Cardiovascular:  Positive for palpitations. Negative for chest pain, dyspnea on exertion and leg swelling.   Respiratory:  Positive for shortness of breath. Negative for cough.    Gastrointestinal:  Negative for abdominal pain, nausea and vomiting.   Neurological:  Negative for dizziness, focal weakness, headaches, light-headedness and numbness.   All other systems  reviewed and are negative.           Objective:     Constitutional:       Appearance: Well-developed.   Eyes:      General: No scleral icterus.     Conjunctiva/sclera: Conjunctivae normal.   HENT:      Head: Normocephalic and atraumatic.   Neck:      Vascular: No carotid bruit or JVD.   Pulmonary:      Effort: Pulmonary effort is normal.      Breath sounds: Normal breath sounds. No wheezing. No rales.   Cardiovascular:      Normal rate. Regular rhythm.   Pulses:     Intact distal pulses.   Abdominal:      General: Bowel sounds are normal.      Palpations: Abdomen is soft.   Musculoskeletal:      Cervical back: Normal range of motion and neck supple. Skin:     General: Skin is warm and dry.      Findings: No rash.   Neurological:      Mental Status: Alert.     Procedures    Lab Review:         MDM    #1 coronary disease  Patient had coronary bypass surgery x2 vessels with a LIMA to LAD and saphenous vein to the marginal branch and is currently  2.  Sleep apnea  Patient is sleep apnea uses a CPAP machine  3.  Atrial fibrillation  Patient has history of atrial fibrillation is currently on nadolol and warfarin  having symptoms of shortness of breath and hence I will perform a stress Myoview and echocardiogram at his next visit keep his INR being 2.0-2.5  4.  Diabetes  Patient is on oral medicines and followed by primary care doctor  5.  Hyperlipidemia  Patient on atorvastatin the lipid levels are well within normal limits    Patient's previous medical records, labs, and EKG were reviewed and discussed with the patient at today's visit.

## 2023-07-27 NOTE — H&P
No chief complaint on file.      HPI  Patient today for a screening colonoscopy.  He has a history of colon polyps as well as diverticulosis in the sigmoid and descending colon.         Problem List:    Patient Active Problem List   Diagnosis    Atrial fibrillation    Abdominal aortic aneurysm    Bladder cancer    Atherosclerosis of native coronary artery of native heart with angina pectoris    Mixed hyperlipidemia    Primary hypertension    Sleep apnea    Status post coronary artery bypass graft    Hyperuricemia    Type 2 diabetes mellitus with hyperglycemia, without long-term current use of insulin    Prostatism    COPD (chronic obstructive pulmonary disease)    AAA (abdominal aortic aneurysm) without rupture    History of colon polyps    Encounter for screening for malignant neoplasm of colon       Medical History:    Past Medical History:   Diagnosis Date    AAA (abdominal aortic aneurysm) 2006    Abnormal ECG     Aneurysm     Arthritis     Asthma     Atrial fibrillation     COUMADIN    Benign prostatic hyperplasia     Bladder cancer     CHF (congestive heart failure)     Chronic anticoagulation     COUMADIN STARTED 2006    Colon polyp     Congenital heart disease     Coronary artery disease     Diabetes mellitus     Elevated cholesterol     Erectile dysfunction     Facial basal cell cancer     Gout     Hard to intubate     History of pancreatitis 2006    History of pneumonia     History of tick-borne disease 2015    HL (hearing loss)     BILAT HEARING AIDS    Hyperlipidemia     Hypertension     Myocardial infarction     X5    Obesity     Pancreatitis     Pneumonia     Sleep apnea     CURRENTLY NOT USING CPAP D/T RECALL    Transfusion history     STATES HIS SISTER HAD A SEVERE REACTION TO BLOOD TRANSFUSION    Type 2 diabetes mellitus         Social History:    Social History     Socioeconomic History    Marital status:      Spouse name: Nichelle    Number of children: 3    Years of education: 12   Tobacco Use     Smoking status: Former     Packs/day: 1.00     Years: 30.00     Pack years: 30.00     Types: Cigarettes     Quit date: 2006     Years since quittin.1    Smokeless tobacco: Never   Vaping Use    Vaping Use: Never used   Substance and Sexual Activity    Alcohol use: Not Currently     Comment: one or two beers 6 times a year    Drug use: Never    Sexual activity: Not Currently     Partners: Female     Birth control/protection: None       Family History:   Family History   Problem Relation Age of Onset    Hypertension Mother     Colon cancer Mother     Cancer Mother     Diabetes Mother     Cancer Father     Heart attack Father     Hearing loss Father     Heart disease Father     Hypertension Sister     Hypertension Brother     Colon cancer Brother     Cancer Brother     Heart disease Paternal Grandfather     Heart attack Paternal Grandfather     Malig Hyperthermia Neg Hx     Colon polyps Neg Hx     Crohn's disease Neg Hx     Irritable bowel syndrome Neg Hx     Ulcerative colitis Neg Hx        Surgical History:   Past Surgical History:   Procedure Laterality Date    ARTERIOGRAM AORTIC Left 2022    Procedure: AORTIC STENT GRAFT PLACEMENT WITH ILIAC COILING;  Surgeon: Eric Sainz MD;  Location: [facility] ;  Service: Vascular;  Laterality: Left;    CARDIAC CATHETERIZATION      CHOLECYSTECTOMY      COLONOSCOPY      CORONARY ARTERY BYPASS GRAFT  2006    X2    CYSTOSCOPY      STATES HAD BLADDER TUMORS REMOVED X2    PROSTATE SURGERY      X2- STATES PLACED COILS IN TO HELP WITH FLOW AND THEN HAD TO REMOVE A PIECE THAT BROKE OFF AND WAS IN BLADDER    UPPER GASTROINTESTINAL ENDOSCOPY           Current Facility-Administered Medications:     lactated ringers infusion, 30 mL/hr, Intravenous, Continuous PRN, Usman, Abbey G, APRN    sodium chloride 0.9 % flush 10 mL, 10 mL, Intravenous, PRN, Usman, Abbey G, APRN    sodium chloride 0.9 % flush 3 mL, 3 mL, Intravenous, Q12H, Usman, Abbey G,  APRN    Allergies:   Allergies   Allergen Reactions    Bee Venom Anaphylaxis     HIVES-SWOLLEN THROAT    Meperidine Hives    Midazolam Hives    Propofol Other (See Comments)     UNCLEAR-SVT, HYPOTENSION-STATES SEVERE ALMOST CODED    Sulfa Antibiotics Hives    Hydrocodone Nausea And Vomiting    Simvastatin Myalgia        The following portions of the patient's history were reviewed by me and updated as appropriate: review of systems, allergies, current medications, past family history, past medical history, past social history, past surgical history and problem list.    There were no vitals filed for this visit.    PHYSICAL EXAM:    CONSTITUTIONAL:  today's vital signs reviewed by me  GASTROINTESTINAL: abdomen is soft nontender nondistended with normal active bowel sounds, no masses are appreciated    Assessment/ Plan  We will proceed today with colonoscopy.    Risks and benefits as well as alternatives to endoscopic evaluation were explained to the patient and they voiced understanding and wish to proceed.  These risks include but are not limited to the risk of bleeding, perforation, adverse reaction to sedation, and missed lesions.  The patient was given the opportunity to ask questions prior to the endoscopic procedure.

## 2023-07-28 ENCOUNTER — TELEPHONE (OUTPATIENT)
Dept: CARDIOLOGY | Facility: CLINIC | Age: 73
End: 2023-07-28
Payer: MEDICARE

## 2023-07-28 LAB
LAB AP CASE REPORT: NORMAL
PATH REPORT.FINAL DX SPEC: NORMAL
PATH REPORT.GROSS SPEC: NORMAL

## 2023-07-28 NOTE — TELEPHONE ENCOUNTER
PT IS REQUESTING A REFERRAL TO A DR CAPABLE OF DOING AN ALLERGY TEST FOR MEDICATIONS.    PT HAS BEEN HAVING ALLERGIC REACTIONS TO CERTAIN MEDS AND WOULD LIKE TO BE ABLE TO KNOW IF THERE ARE ANY OTHERS

## 2023-08-09 ENCOUNTER — HOSPITAL ENCOUNTER (OUTPATIENT)
Dept: GENERAL RADIOLOGY | Facility: HOSPITAL | Age: 73
Discharge: HOME | End: 2023-08-09
Admitting: FAMILY MEDICINE
Payer: MEDICARE

## 2023-08-09 ENCOUNTER — OFFICE VISIT (OUTPATIENT)
Dept: FAMILY MEDICINE CLINIC | Facility: CLINIC | Age: 73
End: 2023-08-09
Payer: MEDICARE

## 2023-08-09 VITALS
TEMPERATURE: 98.4 F | HEIGHT: 70 IN | BODY MASS INDEX: 28.06 KG/M2 | HEART RATE: 74 BPM | DIASTOLIC BLOOD PRESSURE: 76 MMHG | SYSTOLIC BLOOD PRESSURE: 139 MMHG | RESPIRATION RATE: 16 BRPM | WEIGHT: 196 LBS | OXYGEN SATURATION: 92 %

## 2023-08-09 DIAGNOSIS — Z00.00 ENCOUNTER FOR ANNUAL WELLNESS EXAM IN MEDICARE PATIENT: Primary | ICD-10-CM

## 2023-08-09 DIAGNOSIS — R06.00 DYSPNEA, UNSPECIFIED TYPE: ICD-10-CM

## 2023-08-09 PROCEDURE — 1160F RVW MEDS BY RX/DR IN RCRD: CPT | Performed by: FAMILY MEDICINE

## 2023-08-09 PROCEDURE — 3075F SYST BP GE 130 - 139MM HG: CPT | Performed by: FAMILY MEDICINE

## 2023-08-09 PROCEDURE — 1159F MED LIST DOCD IN RCRD: CPT | Performed by: FAMILY MEDICINE

## 2023-08-09 PROCEDURE — G0439 PPPS, SUBSEQ VISIT: HCPCS | Performed by: FAMILY MEDICINE

## 2023-08-09 PROCEDURE — 3078F DIAST BP <80 MM HG: CPT | Performed by: FAMILY MEDICINE

## 2023-08-09 PROCEDURE — 71046 X-RAY EXAM CHEST 2 VIEWS: CPT

## 2023-08-09 PROCEDURE — 1170F FXNL STATUS ASSESSED: CPT | Performed by: FAMILY MEDICINE

## 2023-08-09 PROCEDURE — 3044F HG A1C LEVEL LT 7.0%: CPT | Performed by: FAMILY MEDICINE

## 2023-08-09 NOTE — PROGRESS NOTES
The ABCs of the Annual Wellness Visit  Subsequent Medicare Wellness Visit    Subjective    Sandro Ramirez is a 73 y.o. male who presents for a Subsequent Medicare Wellness Visit.    The following portions of the patient's history were reviewed and   updated as appropriate: allergies, current medications, past family history, past medical history, past social history, past surgical history, and problem list.    Compared to one year ago, the patient feels his physical   health is the same.    Compared to one year ago, the patient feels his mental   health is the same.    Recent Hospitalizations:  He was not admitted to the hospital during the last year.       Current Medical Providers:  Patient Care Team:  James Tirado Jr., MD as PCP - General  Thai Galloway MD as Consulting Physician (Interventional Cardiology)  Seipel, Joseph F, MD as Consulting Physician (Neurology)  Daniel Hamm MD as Consulting Physician (Urology)  Abbey Mary APRN as Nurse Practitioner (Nurse Practitioner)    Outpatient Medications Prior to Visit   Medication Sig Dispense Refill    albuterol (PROVENTIL) (2.5 MG/3ML) 0.083% nebulizer solution Take 2.5 mg by nebulization Every 4 (Four) Hours As Needed for Wheezing. Indications: Dx codes: R06.02, J20.6 3 mL 0    alfuzosin (UROXATRAL) 10 MG 24 hr tablet Take 1 tablet by mouth 2 (Two) Times a Day.      allopurinol (ZYLOPRIM) 300 MG tablet Take 1 tablet by mouth Every Night.      aspirin 81 MG tablet Take 1 tablet by mouth Daily. PT STATES WAS INSTRUCTED TO CONTINUE TO TAKE THIS PER GET ANDERSEN      atenolol (TENORMIN) 50 MG tablet Take 1 tablet by mouth Every Night. 90 tablet 3    atorvastatin (LIPITOR) 20 MG tablet TAKE 1 TABLET BY MOUTH EVERY DAY 90 tablet 1    cetirizine (zyrTEC) 10 MG tablet Take 1 tablet by mouth Daily. 90 tablet 3    fluticasone (FLONASE) 50 MCG/ACT nasal spray 2 sprays into the nostril(s) as directed by provider Daily. 11.1 mL 0    losartan (COZAAR) 25 MG  tablet Take 1 tablet by mouth Daily. 90 tablet 3    warfarin (COUMADIN) 7.5 MG tablet TAKE 1 & 1/2 TABLETS BY MOUTH ON MONDAY, WEDNESDAY, AND FRIDAY, 1 TABLET ON ALL OTHER DAYS OR AS DIRECTED (Patient taking differently: TAKE 1 & 1/2 TABLETS BY MOUTH ON MONDAY, WEDNESDAY, AND FRIDAY, 1 TABLET ON ALL OTHER DAYS OR AS DIRECTED  LD 7/22 for scope) 110 tablet 0     No facility-administered medications prior to visit.       No opioid medication identified on active medication list. I have reviewed chart for other potential  high risk medication/s and harmful drug interactions in the elderly.        Aspirin is on active medication list. Aspirin use is indicated based on review of current medical condition/s. Pros and cons of this therapy have been discussed today. Benefits of this medication outweigh potential harm.  Patient has been encouraged to continue taking this medication.  .      Patient Active Problem List   Diagnosis    Atrial fibrillation    Abdominal aortic aneurysm    Bladder cancer    Atherosclerosis of native coronary artery of native heart with angina pectoris    Mixed hyperlipidemia    Primary hypertension    Sleep apnea    Status post coronary artery bypass graft    Hyperuricemia    Type 2 diabetes mellitus with hyperglycemia, without long-term current use of insulin    Prostatism    COPD (chronic obstructive pulmonary disease)    AAA (abdominal aortic aneurysm) without rupture    History of colon polyps    Encounter for screening for malignant neoplasm of colon     Advance Care Planning   Advance Care Planning     Advance Directive is not on file.  ACP discussion was held with the patient during this visit. Patient does not have an advance directive, information provided.     Objective    Vitals:    08/09/23 1303   BP: 139/76   BP Location: Left arm   Patient Position: Sitting   Cuff Size: Adult   Pulse: 74   Resp: 16   Temp: 98.4 øF (36.9 øC)   TempSrc: Temporal   SpO2: 92%   Weight: 88.9 kg (196 lb)  "  Height: 177.8 cm (70\")   PainSc: 0-No pain     Estimated body mass index is 28.12 kg/mý as calculated from the following:    Height as of this encounter: 177.8 cm (70\").    Weight as of this encounter: 88.9 kg (196 lb).    BMI is >= 25 and <30. (Overweight) The following options were offered after discussion;: exercise counseling/recommendations      Does the patient have evidence of cognitive impairment? No    Lab Results   Component Value Date    TRIG 52 2023    HDL 40 2023    LDL 65 2023    VLDL 12 2023    HGBA1C 6.50 (H) 2023        HEALTH RISK ASSESSMENT    Smoking Status:  Social History     Tobacco Use   Smoking Status Former    Packs/day: 1.00    Years: 30.00    Pack years: 30.00    Types: Cigarettes    Quit date: 2006    Years since quittin.1   Smokeless Tobacco Never     Alcohol Consumption:  Social History     Substance and Sexual Activity   Alcohol Use Not Currently    Comment: one or two beers 6 times a year     Fall Risk Screen:    JESSICA Fall Risk Assessment was completed, and patient is at LOW risk for falls.Assessment completed on:2023    Depression Screenin/9/2023     1:07 PM   PHQ-2/PHQ-9 Depression Screening   Little Interest or Pleasure in Doing Things 0-->not at all   Feeling Down, Depressed or Hopeless 0-->not at all   PHQ-9: Brief Depression Severity Measure Score 0       Health Habits and Functional and Cognitive Screenin/9/2023     1:07 PM   Functional & Cognitive Status   Do you have difficulty preparing food and eating? No   Do you have difficulty bathing yourself, getting dressed or grooming yourself? No   Do you have difficulty using the toilet? No   Do you have difficulty moving around from place to place? No   Do you have trouble with steps or getting out of a bed or a chair? No   Current Diet Well Balanced Diet   Dental Exam Not up to date   Eye Exam Up to date   Exercise (times per week) 0 times per week   Current " Exercises Include No Regular Exercise   Do you need help using the phone?  No   Are you deaf or do you have serious difficulty hearing?  Yes   Do you need help to go to places out of walking distance? No   Do you need help shopping? No   Do you need help preparing meals?  No   Do you need help with housework?  No   Do you need help with laundry? No   Do you need help taking your medications? No   Do you need help managing money? No   Do you ever drive or ride in a car without wearing a seat belt? No   Have you felt unusual stress, anger or loneliness in the last month? No   Who do you live with? Spouse   If you need help, do you have trouble finding someone available to you? No   Have you been bothered in the last four weeks by sexual problems? No   Do you have difficulty concentrating, remembering or making decisions? Yes       Age-appropriate Screening Schedule:  Refer to the list below for future screening recommendations based on patient's age, sex and/or medical conditions. Orders for these recommended tests are listed in the plan section. The patient has been provided with a written plan.    Health Maintenance   Topic Date Due    COVID-19 Vaccine (4 - Moderna series) 03/01/2022    DIABETIC EYE EXAM  09/26/2023    INFLUENZA VACCINE  10/01/2023    HEMOGLOBIN A1C  01/20/2024    LIPID PANEL  07/20/2024    COLORECTAL CANCER SCREENING  07/27/2024    ANNUAL WELLNESS VISIT  08/09/2024    HEPATITIS C SCREENING  Completed    Pneumococcal Vaccine 65+  Completed    AAA SCREEN (ONE-TIME)  Completed    DIABETIC FOOT EXAM  Discontinued    URINE MICROALBUMIN  Discontinued    TDAP/TD VACCINES  Discontinued    ZOSTER VACCINE  Discontinued                  CMS Preventative Services Quick Reference  Risk Factors Identified During Encounter  Immunizations Discussed/Encouraged: Td, Influenza, Pneumococcal 23, Prevnar 20 (Pneumococcal 20-valent conjugate), Vaxneuvance (Pneumococcal 15-valent conjugate), Shingrix, and COVID19  The  "above risks/problems have been discussed with the patient.  Pertinent information has been shared with the patient in the After Visit Summary.  An After Visit Summary and PPPS were made available to the patient.    Follow Up:   Next Medicare Wellness visit to be scheduled in 1 year.       Additional E&M Note during same encounter follows:  Patient has multiple medical problems which are significant and separately identifiable that require additional work above and beyond the Medicare Wellness Visit.      Chief Complaint  Medicare Wellness-subsequent (BMI DUE), Diabetes, Hypertension, and Hyperlipidemia (6 mth f/u)    Subjective        HPI  Sandro Ramirez is also being seen today for hypertension hyperlipidemia atrial fibrillation coronary artery disease type 2 diabetes mellitus prostatism COPD hyperuricemia and bladder cancer.    His medical problems are stable although the patient does complain of increased shortness of breath.  He states that activity that used to not cause shortness of breath is causing shortness of breath.  He also noted increased chest congestion and cough.  The patient is scheduled for an echocardiogram and is scheduled for a stress Myoview.  I recommended getting a chest x-ray.         Objective   Vital Signs:  /76 (BP Location: Left arm, Patient Position: Sitting, Cuff Size: Adult)   Pulse 74   Temp 98.4 øF (36.9 øC) (Temporal)   Resp 16   Ht 177.8 cm (70\")   Wt 88.9 kg (196 lb)   SpO2 92%   BMI 28.12 kg/mý     Physical Exam  Vitals and nursing note reviewed.   Constitutional:       Appearance: He is well-developed.   HENT:      Head: Normocephalic and atraumatic.   Eyes:      Pupils: Pupils are equal, round, and reactive to light.   Cardiovascular:      Rate and Rhythm: Normal rate. Rhythm irregularly irregular.      Pulses: Normal pulses.      Heart sounds: Murmur heard.   Systolic murmur is present with a grade of 1/6.     No friction rub. No gallop.   Pulmonary:      " Effort: Pulmonary effort is normal.      Breath sounds: Normal breath sounds.   Abdominal:      General: Bowel sounds are normal.      Palpations: Abdomen is soft.   Musculoskeletal:         General: Normal range of motion.      Cervical back: Neck supple.   Skin:     General: Skin is warm and dry.   Neurological:      Mental Status: He is oriented to person, place, and time.   Psychiatric:         Behavior: Behavior normal.         Thought Content: Thought content normal.         Judgment: Judgment normal.                       Assessment and Plan   Diagnoses and all orders for this visit:    1. Encounter for annual wellness exam in Medicare patient (Primary)    2. Dyspnea, unspecified type             Follow Up   Return in about 6 months (around 2/9/2024).  Patient was given instructions and counseling regarding his condition or for health maintenance advice. Please see specific information pulled into the AVS if appropriate.

## 2023-08-09 NOTE — PATIENT INSTRUCTIONS
Continue your current medications and treatments.    Follow up in the office in 6 months.    Create a living will.    Have the follow-up chest x-ray done and call for results.    Further care depend results of the studies and how you progress.

## 2023-08-09 NOTE — PROGRESS NOTES
BMI is >= 25 and <30. (Overweight) The following options were offered after discussion;: weight loss educational material (shared in after visit summary)

## 2023-08-16 ENCOUNTER — ANTICOAGULATION VISIT (OUTPATIENT)
Dept: CARDIOLOGY | Facility: CLINIC | Age: 73
End: 2023-08-16
Payer: MEDICARE

## 2023-08-16 VITALS
BODY MASS INDEX: 28.12 KG/M2 | SYSTOLIC BLOOD PRESSURE: 141 MMHG | HEART RATE: 69 BPM | WEIGHT: 196 LBS | DIASTOLIC BLOOD PRESSURE: 89 MMHG

## 2023-08-16 DIAGNOSIS — I48.21 PERMANENT ATRIAL FIBRILLATION: Primary | Chronic | ICD-10-CM

## 2023-08-16 LAB — INR PPP: 2 (ref 2–3)

## 2023-08-16 PROCEDURE — 36416 COLLJ CAPILLARY BLOOD SPEC: CPT | Performed by: INTERNAL MEDICINE

## 2023-08-16 PROCEDURE — 85610 PROTHROMBIN TIME: CPT | Performed by: INTERNAL MEDICINE

## 2023-08-28 ENCOUNTER — OFFICE VISIT (OUTPATIENT)
Dept: GASTROENTEROLOGY | Facility: CLINIC | Age: 73
End: 2023-08-28
Payer: MEDICARE

## 2023-08-28 ENCOUNTER — PREP FOR SURGERY (OUTPATIENT)
Dept: SURGERY | Facility: SURGERY CENTER | Age: 73
End: 2023-08-28
Payer: MEDICARE

## 2023-08-28 VITALS
SYSTOLIC BLOOD PRESSURE: 120 MMHG | BODY MASS INDEX: 28.32 KG/M2 | OXYGEN SATURATION: 97 % | HEART RATE: 75 BPM | TEMPERATURE: 96.9 F | HEIGHT: 70 IN | WEIGHT: 197.8 LBS | DIASTOLIC BLOOD PRESSURE: 80 MMHG

## 2023-08-28 DIAGNOSIS — Z86.010 HISTORY OF COLON POLYPS: Primary | ICD-10-CM

## 2023-08-28 DIAGNOSIS — Z12.11 ENCOUNTER FOR SCREENING FOR MALIGNANT NEOPLASM OF COLON: ICD-10-CM

## 2023-08-28 DIAGNOSIS — K57.90 DIVERTICULOSIS: ICD-10-CM

## 2023-08-28 PROCEDURE — 3074F SYST BP LT 130 MM HG: CPT | Performed by: NURSE PRACTITIONER

## 2023-08-28 PROCEDURE — 3079F DIAST BP 80-89 MM HG: CPT | Performed by: NURSE PRACTITIONER

## 2023-08-28 PROCEDURE — 1160F RVW MEDS BY RX/DR IN RCRD: CPT | Performed by: NURSE PRACTITIONER

## 2023-08-28 PROCEDURE — 99213 OFFICE O/P EST LOW 20 MIN: CPT | Performed by: NURSE PRACTITIONER

## 2023-08-28 PROCEDURE — 1159F MED LIST DOCD IN RCRD: CPT | Performed by: NURSE PRACTITIONER

## 2023-08-28 RX ORDER — SODIUM CHLORIDE 0.9 % (FLUSH) 0.9 %
10 SYRINGE (ML) INJECTION AS NEEDED
OUTPATIENT
Start: 2023-08-28

## 2023-08-28 RX ORDER — SODIUM CHLORIDE, SODIUM LACTATE, POTASSIUM CHLORIDE, CALCIUM CHLORIDE 600; 310; 30; 20 MG/100ML; MG/100ML; MG/100ML; MG/100ML
30 INJECTION, SOLUTION INTRAVENOUS CONTINUOUS PRN
OUTPATIENT
Start: 2023-08-28

## 2023-08-28 RX ORDER — SODIUM CHLORIDE 0.9 % (FLUSH) 0.9 %
3 SYRINGE (ML) INJECTION EVERY 12 HOURS SCHEDULED
OUTPATIENT
Start: 2023-08-28

## 2023-08-28 NOTE — PATIENT INSTRUCTIONS
Due to multiple colon polyps, colonoscopy recommended at 1 year interval, due July 2024.    For diverticulosis, follow a high-fiber diet.

## 2023-08-28 NOTE — PROGRESS NOTES
"Chief Complaint   Patient presents with    Follow-up         History of Present Illness  Patient is a 73-year-old male who presents today for follow-up. He has a history of colon polyps for which he underwent colonoscopy July 2023.  Colonoscopy with internal hemorrhoids, diverticulosis, and multiple adenomatous colon polyps.    Patient presents today for follow-up after colonoscopy.  He has been feeling well and denies any GI complaints.     Result Review :       Tissue Pathology Exam (07/27/2023 07:55)    COLONOSCOPY (07/27/2023 07:06)    Office Visit with Abbey Mary APRN (05/01/2023)    Vital Signs:   /80   Pulse 75   Temp 96.9 øF (36.1 øC)   Ht 177.8 cm (70\")   Wt 89.7 kg (197 lb 12.8 oz)   SpO2 97%   BMI 28.38 kg/mý     Body mass index is 28.38 kg/mý.     Physical Exam  Vitals reviewed.   Constitutional:       General: He is not in acute distress.     Appearance: He is well-developed.   HENT:      Head: Normocephalic and atraumatic.   Pulmonary:      Effort: Pulmonary effort is normal. No respiratory distress.   Skin:     General: Skin is dry.      Coloration: Skin is not pale.   Neurological:      Mental Status: He is alert and oriented to person, place, and time.   Psychiatric:         Thought Content: Thought content normal.         Assessment and Plan    Diagnoses and all orders for this visit:    1. History of colon polyps (Primary)    2. Diverticulosis         Discussion  Patient presents today for follow-up after colonoscopy.  Colonoscopy findings reviewed at today's office visit.  Patient with multiple adenomatous polyps and due to number and type of polyp, recommend repeat colonoscopy at 1 year interval for surveillance.  If this is negative or only a few small polyps, can likely extend to 3-year surveillance interval.  We will provide further treatment recommendations after follow-up exam.  Also recommended high-fiber diet for diverticulosis.  Patient may follow-up in the office on " an as-needed basis for any new or worsening symptoms.          Follow Up   Return if symptoms worsen or fail to improve.    Patient Instructions   Due to multiple colon polyps, colonoscopy recommended at 1 year interval, due July 2024.    For diverticulosis, follow a high-fiber diet.

## 2023-08-31 DIAGNOSIS — I48.21 PERMANENT ATRIAL FIBRILLATION: ICD-10-CM

## 2023-08-31 RX ORDER — WARFARIN SODIUM 7.5 MG/1
TABLET ORAL
Qty: 116 TABLET | Refills: 0 | Status: SHIPPED | OUTPATIENT
Start: 2023-08-31

## 2023-08-31 NOTE — TELEPHONE ENCOUNTER
Rx Refill Note  Requested Prescriptions     Pending Prescriptions Disp Refills    warfarin (COUMADIN) 7.5 MG tablet [Pharmacy Med Name: WARFARIN SODIUM 7.5 MG TABLET] 116 tablet 0     Sig: TAKE 1 & 1/2 TABLETS BY MOUTH ON MONDAY, WEDNESDAY, AND FRIDAY, 1 TABLET ON ALL OTHER DAYS OR AS DIRECTED      Last office visit with prescribing clinician: 7/27/2023   Last telemedicine visit with prescribing clinician: Visit date not found   Next office visit with prescribing clinician: 2/1/2024     Anticoagulation Visit (08/16/2023)       Samantha Santizo LPN  08/31/23, 08:50 EDT

## 2023-09-01 RX ORDER — ATORVASTATIN CALCIUM 20 MG/1
TABLET, FILM COATED ORAL
Qty: 90 TABLET | Refills: 1 | Status: SHIPPED | OUTPATIENT
Start: 2023-09-01

## 2023-09-01 NOTE — TELEPHONE ENCOUNTER
Rx Refill Note  Requested Prescriptions     Pending Prescriptions Disp Refills    atorvastatin (LIPITOR) 20 MG tablet [Pharmacy Med Name: ATORVASTATIN 20 MG TABLET] 90 tablet 1     Sig: TAKE 1 TABLET BY MOUTH EVERY DAY      Last office visit with prescribing clinician: 7/27/2023   Last telemedicine visit with prescribing clinician: Visit date not found   Next office visit with prescribing clinician: 2/1/2024                         Would you like a call back once the refill request has been completed: [] Yes [] No    If the office needs to give you a call back, can they leave a voicemail: [] Yes [] No    Stacey Martínez MA  09/01/23, 11:10 EDT

## 2023-09-05 ENCOUNTER — HOSPITAL ENCOUNTER (EMERGENCY)
Facility: HOSPITAL | Age: 73
Discharge: HOME | End: 2023-09-05
Attending: EMERGENCY MEDICINE
Payer: MEDICARE

## 2023-09-05 ENCOUNTER — HOSPITAL ENCOUNTER (EMERGENCY)
Facility: HOSPITAL | Age: 73
Discharge: HOME | End: 2023-09-05
Payer: MEDICARE

## 2023-09-05 VITALS
WEIGHT: 198.63 LBS | RESPIRATION RATE: 16 BRPM | DIASTOLIC BLOOD PRESSURE: 60 MMHG | BODY MASS INDEX: 27.81 KG/M2 | OXYGEN SATURATION: 93 % | HEART RATE: 58 BPM | SYSTOLIC BLOOD PRESSURE: 113 MMHG | TEMPERATURE: 97.6 F | HEIGHT: 71 IN

## 2023-09-05 VITALS
SYSTOLIC BLOOD PRESSURE: 142 MMHG | BODY MASS INDEX: 27.9 KG/M2 | OXYGEN SATURATION: 91 % | WEIGHT: 199.3 LBS | HEART RATE: 59 BPM | HEIGHT: 71 IN | TEMPERATURE: 98.2 F | DIASTOLIC BLOOD PRESSURE: 63 MMHG | RESPIRATION RATE: 16 BRPM

## 2023-09-05 DIAGNOSIS — R04.0 NOSEBLEED: Primary | ICD-10-CM

## 2023-09-05 DIAGNOSIS — R04.0 EPISTAXIS: Primary | ICD-10-CM

## 2023-09-05 LAB
BASOPHILS # BLD AUTO: 0.1 10*3/MM3 (ref 0–0.2)
BASOPHILS # BLD AUTO: 0.1 10*3/MM3 (ref 0–0.2)
BASOPHILS NFR BLD AUTO: 1.1 % (ref 0–1.5)
BASOPHILS NFR BLD AUTO: 1.1 % (ref 0–1.5)
DEPRECATED RDW RBC AUTO: 46.4 FL (ref 37–54)
DEPRECATED RDW RBC AUTO: 46.8 FL (ref 37–54)
EOSINOPHIL # BLD AUTO: 0.1 10*3/MM3 (ref 0–0.4)
EOSINOPHIL # BLD AUTO: 0.1 10*3/MM3 (ref 0–0.4)
EOSINOPHIL NFR BLD AUTO: 1.5 % (ref 0.3–6.2)
EOSINOPHIL NFR BLD AUTO: 2.5 % (ref 0.3–6.2)
ERYTHROCYTE [DISTWIDTH] IN BLOOD BY AUTOMATED COUNT: 15.3 % (ref 12.3–15.4)
ERYTHROCYTE [DISTWIDTH] IN BLOOD BY AUTOMATED COUNT: 15.5 % (ref 12.3–15.4)
HCT VFR BLD AUTO: 42 % (ref 37.5–51)
HCT VFR BLD AUTO: 42.7 % (ref 37.5–51)
HGB BLD-MCNC: 13.4 G/DL (ref 13–17.7)
HGB BLD-MCNC: 13.8 G/DL (ref 13–17.7)
INR PPP: 2.41 (ref 2–3)
INR PPP: 2.66 (ref 2–3)
LYMPHOCYTES # BLD AUTO: 0.9 10*3/MM3 (ref 0.7–3.1)
LYMPHOCYTES # BLD AUTO: 1.2 10*3/MM3 (ref 0.7–3.1)
LYMPHOCYTES NFR BLD AUTO: 12.1 % (ref 19.6–45.3)
LYMPHOCYTES NFR BLD AUTO: 20.3 % (ref 19.6–45.3)
MCH RBC QN AUTO: 27.3 PG (ref 26.6–33)
MCH RBC QN AUTO: 27.9 PG (ref 26.6–33)
MCHC RBC AUTO-ENTMCNC: 31.8 G/DL (ref 31.5–35.7)
MCHC RBC AUTO-ENTMCNC: 32.4 G/DL (ref 31.5–35.7)
MCV RBC AUTO: 85.9 FL (ref 79–97)
MCV RBC AUTO: 86.2 FL (ref 79–97)
MONOCYTES # BLD AUTO: 0.5 10*3/MM3 (ref 0.1–0.9)
MONOCYTES # BLD AUTO: 0.6 10*3/MM3 (ref 0.1–0.9)
MONOCYTES NFR BLD AUTO: 10 % (ref 5–12)
MONOCYTES NFR BLD AUTO: 6.5 % (ref 5–12)
NEUTROPHILS NFR BLD AUTO: 3.9 10*3/MM3 (ref 1.7–7)
NEUTROPHILS NFR BLD AUTO: 5.6 10*3/MM3 (ref 1.7–7)
NEUTROPHILS NFR BLD AUTO: 66.1 % (ref 42.7–76)
NEUTROPHILS NFR BLD AUTO: 78.8 % (ref 42.7–76)
NRBC BLD AUTO-RTO: 0 /100 WBC (ref 0–0.2)
NRBC BLD AUTO-RTO: 0.1 /100 WBC (ref 0–0.2)
PLATELET # BLD AUTO: 162 10*3/MM3 (ref 140–450)
PLATELET # BLD AUTO: 172 10*3/MM3 (ref 140–450)
PMV BLD AUTO: 7.3 FL (ref 6–12)
PMV BLD AUTO: 7.7 FL (ref 6–12)
PROTHROMBIN TIME: 24.5 SECONDS (ref 19.4–28.5)
PROTHROMBIN TIME: 26.9 SECONDS (ref 19.4–28.5)
RBC # BLD AUTO: 4.89 10*6/MM3 (ref 4.14–5.8)
RBC # BLD AUTO: 4.96 10*6/MM3 (ref 4.14–5.8)
WBC NRBC COR # BLD: 6 10*3/MM3 (ref 3.4–10.8)
WBC NRBC COR # BLD: 7.1 10*3/MM3 (ref 3.4–10.8)

## 2023-09-05 PROCEDURE — 85610 PROTHROMBIN TIME: CPT | Performed by: NURSE PRACTITIONER

## 2023-09-05 PROCEDURE — 99282 EMERGENCY DEPT VISIT SF MDM: CPT

## 2023-09-05 PROCEDURE — 85610 PROTHROMBIN TIME: CPT | Performed by: PHYSICIAN ASSISTANT

## 2023-09-05 PROCEDURE — 85025 COMPLETE CBC W/AUTO DIFF WBC: CPT | Performed by: NURSE PRACTITIONER

## 2023-09-05 PROCEDURE — 85025 COMPLETE CBC W/AUTO DIFF WBC: CPT | Performed by: PHYSICIAN ASSISTANT

## 2023-09-05 PROCEDURE — 36415 COLL VENOUS BLD VENIPUNCTURE: CPT

## 2023-09-05 PROCEDURE — 99283 EMERGENCY DEPT VISIT LOW MDM: CPT

## 2023-09-05 RX ORDER — SODIUM CHLORIDE 0.9 % (FLUSH) 0.9 %
10 SYRINGE (ML) INJECTION AS NEEDED
Status: DISCONTINUED | OUTPATIENT
Start: 2023-09-05 | End: 2023-09-05 | Stop reason: HOSPADM

## 2023-09-05 RX ORDER — TRANEXAMIC ACID 100 MG/ML
500 INJECTION, SOLUTION INTRAVENOUS ONCE
Status: COMPLETED | OUTPATIENT
Start: 2023-09-05 | End: 2023-09-05

## 2023-09-05 RX ORDER — OXYMETAZOLINE HYDROCHLORIDE 0.05 G/100ML
2 SPRAY NASAL 2 TIMES DAILY
Status: DISCONTINUED | OUTPATIENT
Start: 2023-09-05 | End: 2023-09-06 | Stop reason: HOSPADM

## 2023-09-05 RX ADMIN — SILVER NITRATE APPLICATORS 1 APPLICATION: 25; 75 STICK TOPICAL at 08:17

## 2023-09-05 RX ADMIN — SILVER NITRATE APPLICATORS 1 APPLICATION: 25; 75 STICK TOPICAL at 08:18

## 2023-09-05 RX ADMIN — TRANEXAMIC ACID 500 MG: 100 INJECTION, SOLUTION INTRAVENOUS at 07:25

## 2023-09-05 NOTE — ED PROVIDER NOTES
Subjective   History of Present Illness  Patient is a 73-year-old male who presents with a nosebleed over the last 3 days.  He reports around 1 AM every night his nose started bleeding it usually takes him several hours to get it to stop with packing.  He is on a friend.  He reports 7 or 8 years ago he had recurrent nosebleeds and had to see ENT.      Review of Systems   HENT:  Positive for nosebleeds.      Past Medical History:   Diagnosis Date    AAA (abdominal aortic aneurysm) 2006    Abnormal ECG     Aneurysm     Arthritis     Asthma     Atrial fibrillation     COUMADIN    Benign prostatic hyperplasia     Bladder cancer     CHF (congestive heart failure)     Chronic anticoagulation     COUMADIN STARTED 2006    Colon polyp     Congenital heart disease     Coronary artery disease     Diabetes mellitus     Elevated cholesterol     Erectile dysfunction     Facial basal cell cancer     Gout     Hard to intubate     History of pancreatitis 2006    History of pneumonia     History of tick-borne disease 2015    HL (hearing loss)     BILAT HEARING AIDS    Hyperlipidemia     Hypertension     Myocardial infarction     X5    Obesity     Pancreatitis     Pneumonia     Sleep apnea     CURRENTLY NOT USING CPAP D/T RECALL    Transfusion history     STATES HIS SISTER HAD A SEVERE REACTION TO BLOOD TRANSFUSION    Type 2 diabetes mellitus        Allergies   Allergen Reactions    Bee Venom Anaphylaxis     HIVES-SWOLLEN THROAT    Meperidine Hives    Midazolam Hives    Propofol Other (See Comments)     UNCLEAR-SVT, HYPOTENSION-STATES SEVERE ALMOST CODED    Sulfa Antibiotics Hives    Hydrocodone Nausea And Vomiting    Simvastatin Myalgia       Past Surgical History:   Procedure Laterality Date    ARTERIOGRAM AORTIC Left 06/29/2022    Procedure: AORTIC STENT GRAFT PLACEMENT WITH ILIAC COILING;  Surgeon: Eric Sainz MD;  Location: [facility] 18/19;  Service: Vascular;  Laterality: Left;    CARDIAC CATHETERIZATION       CHOLECYSTECTOMY      COLONOSCOPY      COLONOSCOPY N/A 2023    Procedure: COLONOSCOPY to cecum with cold biopsy and cold snare polypectomies and clips;  Surgeon: Vitor Haley MD;  Location: [facility] ENDOSCOPY;  Service: Gastroenterology;  Laterality: N/A;  Pre - H/O polyps.  Post - diverticulosis, polyps, hemorrhoids    CORONARY ARTERY BYPASS GRAFT  2006    X2    CYSTOSCOPY      STATES HAD BLADDER TUMORS REMOVED X2    PROSTATE SURGERY      X2- STATES PLACED COILS IN TO HELP WITH FLOW AND THEN HAD TO REMOVE A PIECE THAT BROKE OFF AND WAS IN BLADDER    UPPER GASTROINTESTINAL ENDOSCOPY         Family History   Problem Relation Age of Onset    Hypertension Mother     Colon cancer Mother     Cancer Mother     Diabetes Mother     Cancer Father     Heart attack Father     Hearing loss Father     Heart disease Father     Hypertension Sister     Hypertension Brother     Colon cancer Brother     Cancer Brother     Heart disease Paternal Grandfather     Heart attack Paternal Grandfather     Malig Hyperthermia Neg Hx     Colon polyps Neg Hx     Crohn's disease Neg Hx     Irritable bowel syndrome Neg Hx     Ulcerative colitis Neg Hx        Social History     Socioeconomic History    Marital status:      Spouse name: Nichelle    Number of children: 3    Years of education: 12   Tobacco Use    Smoking status: Former     Packs/day: 1.00     Years: 30.00     Pack years: 30.00     Types: Cigarettes     Quit date: 2006     Years since quittin.2    Smokeless tobacco: Never   Vaping Use    Vaping Use: Never used   Substance and Sexual Activity    Alcohol use: Not Currently     Comment: one or two beers 6 times a year    Drug use: Never    Sexual activity: Not Currently     Partners: Female     Birth control/protection: None           Objective   Physical Exam  Vitals and nursing note reviewed.   Constitutional:       Appearance: He is well-developed.   HENT:      Head: Normocephalic and atraumatic.      Nose: Nose  "normal.   Eyes:      Pupils: Pupils are equal, round, and reactive to light.   Pulmonary:      Effort: Pulmonary effort is normal.   Musculoskeletal:         General: Normal range of motion.      Cervical back: Normal range of motion.   Skin:     General: Skin is warm and dry.   Neurological:      General: No focal deficit present.      Mental Status: He is alert and oriented to person, place, and time.   Psychiatric:         Mood and Affect: Mood normal.         Behavior: Behavior normal.         Thought Content: Thought content normal.         Judgment: Judgment normal.       Epistaxis Management    Date/Time: 9/5/2023 7:47 AM  Performed by: Nichelle Andujar PA-C  Authorized by: Nichelle Andujar PA-C     Consent:     Consent obtained:  Verbal    Consent given by:  Patient    Risks, benefits, and alternatives were discussed: yes      Risks discussed:  Bleeding  Universal protocol:     Procedure explained and questions answered to patient or proxy's satisfaction: yes      Test results available: yes      Required blood products, implants, devices, and special equipment available: yes      Site/side marked: yes      Immediately prior to procedure, a time out was called: yes      Patient identity confirmed:  Verbally with patient  Anesthesia:     Anesthesia method:  None  Procedure details:     Treatment site:  R anterior    Treatment method:  Silver nitrate    Treatment complexity:  Limited    Treatment episode: initial    Post-procedure details:     Assessment:  Bleeding stopped    Procedure completion:  Tolerated well, no immediate complications           ED Course                                           Medical Decision Making  Appropriate PPE worn during exam.    /70   Pulse 65   Temp 98.2 °F (36.8 °C) (Oral)   Resp 16   Ht 180.3 cm (71\")   Wt 90.4 kg (199 lb 4.7 oz)   SpO2 92%   BMI 27.80 kg/m²    Chart review:    Co-morbidities --  has a past medical history of AAA (abdominal aortic aneurysm) " (2006), Abnormal ECG, Aneurysm, Arthritis, Asthma, Atrial fibrillation, Benign prostatic hyperplasia, Bladder cancer, CHF (congestive heart failure), Chronic anticoagulation, Colon polyp, Congenital heart disease, Coronary artery disease, Diabetes mellitus, Elevated cholesterol, Erectile dysfunction, Facial basal cell cancer, Gout, Hard to intubate, History of pancreatitis (2006), History of pneumonia, History of tick-borne disease (2015), HL (hearing loss), Hyperlipidemia, Hypertension, Myocardial infarction, Obesity, Pancreatitis, Pneumonia, Sleep apnea, Transfusion history, and Type 2 diabetes mellitus.  Lab interpretation --  Labs viewed by me significant for the following: PT/INR 2.6, hemoglobin stable  Radiology interpretation --  X-rays reviewed by me and interpreted by radiologist:  No radiology results for the last day    Plan --patient presents with recurrent nosebleed over the last 3 days.  This was cauterized with silver nitrate.  Told to follow-up with ENT.  He did not want a Rhino Rocket today.  Return precaution problems were provided he was stable and in agreement with plan.    I discussed the findings and recommendations with the patient who voices understanding. Stable while in the ER.     Note Disclaimer: At [facility], we believe that sharing information builds trust and better relationships. You are receiving this note because you are receiving care at [facility] or recently visited. It is possible you will see health information before a provider has talked with you about it. This kind of information can be easy to misunderstand. To help you fully understand what it means for your health, we urge you to discuss this note with your provider.        Amount and/or Complexity of Data Reviewed  Labs: ordered.    Risk  Prescription drug management.        Final diagnoses:   Nosebleed       ED Disposition  ED Disposition       ED Disposition   Discharge    Condition   Stable    Comment   --                [facility]  [address]  [facility] [address]  [phone]  Schedule an appointment as soon as possible for a visit in 3 days  As needed, If symptoms worsen         Medication List      No changes were made to your prescriptions during this visit.            Nichelle Andujar, JENNY  09/05/23 0757

## 2023-09-06 NOTE — ED PROVIDER NOTES
Subjective   Provider in Triage Note  Patient is a 73-year-old male presents to the ED with complaints of recurrent nosebleed.  Patient states has been intermittent over the past 3 days was actually seen at our facility earlier today the area cauterized but it started rebleeding around 6 PM this evening.  Patient reports he was picking his nose earlier  to try to get rid of some of the dried blood.  Patient is currently on warfarin had blood work obtained which showed a hemoglobin of 13.8 INR 2.66    Due to significant overcrowding in the emergency department patient was initially seen and evaluated in triage.  Provider in triage recommended patient placed in treatment area to initiate therapy and movement to an ER bed as soon as possible.       History provided by:  Patient and spouse  I interviewed the patient.  Patient reports he had another nosebleed on the right side that is began around 5 this afternoon.  Review of Systems   HENT:  Positive for nosebleeds.    Skin:  Negative for color change and rash.     Past Medical History:   Diagnosis Date    AAA (abdominal aortic aneurysm) 2006    Abnormal ECG     Aneurysm     Arthritis     Asthma     Atrial fibrillation     COUMADIN    Benign prostatic hyperplasia     Bladder cancer     CHF (congestive heart failure)     Chronic anticoagulation     COUMADIN STARTED 2006    Colon polyp     Congenital heart disease     Coronary artery disease     Diabetes mellitus     Elevated cholesterol     Erectile dysfunction     Facial basal cell cancer     Gout     Hard to intubate     History of pancreatitis 2006    History of pneumonia     History of tick-borne disease 2015    HL (hearing loss)     BILAT HEARING AIDS    Hyperlipidemia     Hypertension     Myocardial infarction     X5    Obesity     Pancreatitis     Pneumonia     Sleep apnea     CURRENTLY NOT USING CPAP D/T RECALL    Transfusion history     STATES HIS SISTER HAD A SEVERE REACTION TO BLOOD TRANSFUSION    Type 2  diabetes mellitus        Allergies   Allergen Reactions    Bee Venom Anaphylaxis     HIVES-SWOLLEN THROAT    Meperidine Hives    Midazolam Hives    Propofol Other (See Comments)     UNCLEAR-SVT, HYPOTENSION-STATES SEVERE ALMOST CODED    Sulfa Antibiotics Hives    Hydrocodone Nausea And Vomiting    Simvastatin Myalgia       Past Surgical History:   Procedure Laterality Date    ARTERIOGRAM AORTIC Left 06/29/2022    Procedure: AORTIC STENT GRAFT PLACEMENT WITH ILIAC COILING;  Surgeon: Eric Sainz MD;  Location: [facility] HYBRID OR 18/19;  Service: Vascular;  Laterality: Left;    CARDIAC CATHETERIZATION      CHOLECYSTECTOMY      COLONOSCOPY      COLONOSCOPY N/A 7/27/2023    Procedure: COLONOSCOPY to cecum with cold biopsy and cold snare polypectomies and clips;  Surgeon: Vitor Haley MD;  Location: [facility] ENDOSCOPY;  Service: Gastroenterology;  Laterality: N/A;  Pre - H/O polyps.  Post - diverticulosis, polyps, hemorrhoids    CORONARY ARTERY BYPASS GRAFT  2006    X2    CYSTOSCOPY      STATES HAD BLADDER TUMORS REMOVED X2    PROSTATE SURGERY      X2- STATES PLACED COILS IN TO HELP WITH FLOW AND THEN HAD TO REMOVE A PIECE THAT BROKE OFF AND WAS IN BLADDER    UPPER GASTROINTESTINAL ENDOSCOPY         Family History   Problem Relation Age of Onset    Hypertension Mother     Colon cancer Mother     Cancer Mother     Diabetes Mother     Cancer Father     Heart attack Father     Hearing loss Father     Heart disease Father     Hypertension Sister     Hypertension Brother     Colon cancer Brother     Cancer Brother     Heart disease Paternal Grandfather     Heart attack Paternal Grandfather     Malig Hyperthermia Neg Hx     Colon polyps Neg Hx     Crohn's disease Neg Hx     Irritable bowel syndrome Neg Hx     Ulcerative colitis Neg Hx        Social History     Socioeconomic History    Marital status:      Spouse name: Nichelle    Number of children: 3    Years of education: 12   Tobacco Use    Smoking status:  "Former     Packs/day: 1.00     Years: 30.00     Pack years: 30.00     Types: Cigarettes     Quit date: 2006     Years since quittin.2    Smokeless tobacco: Never   Vaping Use    Vaping Use: Never used   Substance and Sexual Activity    Alcohol use: Not Currently     Comment: one or two beers 6 times a year    Drug use: Never    Sexual activity: Not Currently     Partners: Female     Birth control/protection: None           Objective   Physical Exam  Vitals and nursing note reviewed.   Constitutional:       Appearance: Normal appearance.   HENT:      Head: Normocephalic and atraumatic.      Nose:      Right Nostril: No septal hematoma or occlusion.      Left Nostril: No epistaxis, septal hematoma or occlusion.      Comments: Mucosal edema noted to right nare.  No active bleeding  Cardiovascular:      Rate and Rhythm: Normal rate and regular rhythm.   Skin:     General: Skin is warm and dry.      Capillary Refill: Capillary refill takes less than 2 seconds.   Neurological:      Mental Status: He is alert and oriented to person, place, and time.       Procedures           ED Course  ED Course as of 234   Tue Sep 05, 2023   2202 No bleeding at this time.  [LB]      ED Course User Index  [LB] Mariaelena Pressley, APRN      /60   Pulse 58   Temp 97.6 °F (36.4 °C) (Oral)   Resp 16   Ht 180.3 cm (71\")   Wt 90.1 kg (198 lb 10.2 oz)   SpO2 93%   BMI 27.70 kg/m²   Medications - No data to display    No radiology results for the last day  Lab Results (last 24 hours)       Procedure Component Value Units Date/Time    CBC & Differential [[id number]]  (Abnormal) Collected: 23    Specimen: Blood Updated: 23    Narrative:      The following orders were created for panel order CBC & Differential.  Procedure                               Abnormality         Status                     ---------                               -----------         ------                     CBC " Auto Differential[[id number]]        Abnormal            Final result                 Please view results for these tests on the individual orders.    Protime-INR [[id number]]  (Normal) Collected: 09/05/23 0722    Specimen: Blood Updated: 09/05/23 0755     Protime 26.9 Seconds      INR 2.66    CBC Auto Differential [[id number]]  (Abnormal) Collected: 09/05/23 0722    Specimen: Blood Updated: 09/05/23 0741     WBC 7.10 10*3/mm3      RBC 4.96 10*6/mm3      Hemoglobin 13.8 g/dL      Hematocrit 42.7 %      MCV 86.2 fL      MCH 27.9 pg      MCHC 32.4 g/dL      RDW 15.3 %      RDW-SD 46.4 fl      MPV 7.7 fL      Platelets 172 10*3/mm3      Neutrophil % 78.8 %      Lymphocyte % 12.1 %      Monocyte % 6.5 %      Eosinophil % 1.5 %      Basophil % 1.1 %      Neutrophils, Absolute 5.60 10*3/mm3      Lymphocytes, Absolute 0.90 10*3/mm3      Monocytes, Absolute 0.50 10*3/mm3      Eosinophils, Absolute 0.10 10*3/mm3      Basophils, Absolute 0.10 10*3/mm3      nRBC 0.0 /100 WBC     CBC & Differential [[id number]]  (Abnormal) Collected: 09/05/23 2140    Specimen: Blood Updated: 09/05/23 2145    Narrative:      The following orders were created for panel order CBC & Differential.  Procedure                               Abnormality         Status                     ---------                               -----------         ------                     CBC Auto Differential[[id number]]        Abnormal            Final result                 Please view results for these tests on the individual orders.    Protime-INR [[id number]]  (Normal) Collected: 09/05/23 2140    Specimen: Blood Updated: 09/05/23 2154     Protime 24.5 Seconds      INR 2.41    CBC Auto Differential [[id number]]  (Abnormal) Collected: 09/05/23 2140    Specimen: Blood Updated: 09/05/23 2145     WBC 6.00 10*3/mm3      RBC 4.89 10*6/mm3      Hemoglobin 13.4 g/dL      Hematocrit 42.0 %      MCV 85.9 fL      MCH 27.3 pg      MCHC 31.8 g/dL      RDW 15.5 %      RDW-SD 46.8 fl       MPV 7.3 fL      Platelets 162 10*3/mm3      Neutrophil % 66.1 %      Lymphocyte % 20.3 %      Monocyte % 10.0 %      Eosinophil % 2.5 %      Basophil % 1.1 %      Neutrophils, Absolute 3.90 10*3/mm3      Lymphocytes, Absolute 1.20 10*3/mm3      Monocytes, Absolute 0.60 10*3/mm3      Eosinophils, Absolute 0.10 10*3/mm3      Basophils, Absolute 0.10 10*3/mm3      nRBC 0.1 /100 WBC                                                Medical Decision Making  Problems Addressed:  Epistaxis: complicated acute illness or injury    Amount and/or Complexity of Data Reviewed  Labs: ordered.    Risk  OTC drugs.    73-year-old male anticoagulated on Coumadin, presents the ED with a complaint of a nosebleed.  He was seen here earlier in the ED, had cautery, he reports that he picked a scab in his nose and had bleeding around 5 PM.  On exam here in the ED.  Patient has no active bleeding, he has mucosal edema in the right nare.  He was given Jewel-Synephrine, monitored, and had no further bleeding.  Advised the patient to not pick his nose, use saline nasal spray at home and follow-up with ENT.  We discussed packing, however given he is not bleeding he will continue his ENT follow-up.  I spoke with the patient at the bedside regarding their plan of care, discharge instruction,  prescriptions, as well as reasons to return to the emergency department.  We discussed test results at the bedside, including incidental abnormal labs, radiological findings, understands need and importance  for follow-up with primary care or specialist if indicated.  Patient verbalizes understanding and agrees to the treatment plan at this time.  Note Disclaimer: At [facility], we believe that sharing information builds trust and better relationships. You are receiving this note because you recently visited [facility]. It is possible you will see health information before a provider has talked with you about it. This kind of information can be easy  to misunderstand. To help you fully understand what it means for your health, we urge you to discuss this note with your provider.Note dictated utilizing Dragon Dictation. Appropriate PPE worn during patient interactions.                    Final diagnoses:   Epistaxis       ED Disposition  ED Disposition       ED Disposition   Discharge    Condition   Stable    Comment   --               [facility] EMERGENCY DEPARTMENT  [address] [address] [phone]        James Tirado Jr., MD  [address]  [facility] [address]  [phone]          [facility]  [address]  [facility] [address]  [phone]             Medication List      No changes were made to your prescriptions during this visit.            Mariaelena Pressley, APRN  09/06/23 0115

## 2023-09-06 NOTE — DISCHARGE INSTRUCTIONS
Follow up with PCP, call for an appointment in 1-2 days, if you do not have a primary care provider please use patient connection [phone] to help establish care  Follow up with any specialist as indicated and discussed  Return to the ED for new or worsening symptoms  Take any medications as prescribed

## 2023-09-08 DIAGNOSIS — I48.21 PERMANENT ATRIAL FIBRILLATION: ICD-10-CM

## 2023-09-08 DIAGNOSIS — I48.11 LONGSTANDING PERSISTENT ATRIAL FIBRILLATION: ICD-10-CM

## 2023-09-08 RX ORDER — LOSARTAN POTASSIUM 25 MG/1
TABLET ORAL
Qty: 90 TABLET | Refills: 3 | Status: SHIPPED | OUTPATIENT
Start: 2023-09-08

## 2023-09-08 NOTE — TELEPHONE ENCOUNTER
Rx Refill Note  Requested Prescriptions     Pending Prescriptions Disp Refills    warfarin (COUMADIN) 7.5 MG tablet [Pharmacy Med Name: WARFARIN SODIUM 7.5 MG TABLET] 116 tablet 0     Sig: TAKE 1 & 1/2 TABLETS BY MOUTH ON MONDAY, WEDNESDAY, AND FRIDAY, 1 TABLET ON ALL OTHER DAYS OR AS DIRECTED    losartan (COZAAR) 25 MG tablet [Pharmacy Med Name: LOSARTAN POTASSIUM 25 MG TAB] 90 tablet 3     Sig: TAKE 1 TABLET BY MOUTH EVERY DAY      Last office visit with prescribing clinician: 7/27/2023   Last telemedicine visit with prescribing clinician: Visit date not found   Next office visit with prescribing clinician: 2/1/2024                         Would you like a call back once the refill request has been completed: [] Yes [] No    If the office needs to give you a call back, can they leave a voicemail: [] Yes [] No    Kelli Fuentes MA  09/08/23, 15:29 EDT

## 2023-09-11 RX ORDER — WARFARIN SODIUM 7.5 MG/1
TABLET ORAL
Qty: 116 TABLET | Refills: 0 | Status: SHIPPED | OUTPATIENT
Start: 2023-09-11

## 2023-09-11 NOTE — TELEPHONE ENCOUNTER
Rx Refill Note  Requested Prescriptions     Pending Prescriptions Disp Refills    warfarin (COUMADIN) 7.5 MG tablet [Pharmacy Med Name: WARFARIN SODIUM 7.5 MG TABLET] 116 tablet 0     Sig: TAKE 1 & 1/2 TABLETS BY MOUTH ON MONDAY, WEDNESDAY, AND FRIDAY, 1 TABLET ON ALL OTHER DAYS OR AS DIRECTED     Signed Prescriptions Disp Refills    losartan (COZAAR) 25 MG tablet 90 tablet 3     Sig: TAKE 1 TABLET BY MOUTH EVERY DAY     Authorizing Provider: MEGAN DUMONT     Ordering User: JUVENCIO JAUREGUI    Last INR 8/16/23  Last office visit with prescribing clinician: 7/27/2023   Last telemedicine visit with prescribing clinician: Visit date not found   Next office visit with prescribing clinician: 2/1/2024                         Would you like a call back once the refill request has been completed: [] Yes [] No    If the office needs to give you a call back, can they leave a voicemail: [] Yes [] No    Karol Ramirez RN  09/11/23, 09:29 EDT

## 2023-10-04 ENCOUNTER — ANTICOAGULATION VISIT (OUTPATIENT)
Dept: CARDIOLOGY | Facility: CLINIC | Age: 73
End: 2023-10-04
Payer: MEDICARE

## 2023-10-04 VITALS
SYSTOLIC BLOOD PRESSURE: 169 MMHG | WEIGHT: 197 LBS | DIASTOLIC BLOOD PRESSURE: 69 MMHG | HEART RATE: 66 BPM | BODY MASS INDEX: 27.48 KG/M2

## 2023-10-04 DIAGNOSIS — I48.21 PERMANENT ATRIAL FIBRILLATION: Primary | Chronic | ICD-10-CM

## 2023-10-04 LAB — INR PPP: 1.8 (ref 2–3)

## 2023-10-04 PROCEDURE — 85610 PROTHROMBIN TIME: CPT | Performed by: INTERNAL MEDICINE

## 2023-10-04 PROCEDURE — 36416 COLLJ CAPILLARY BLOOD SPEC: CPT | Performed by: INTERNAL MEDICINE

## 2023-10-10 ENCOUNTER — TELEPHONE (OUTPATIENT)
Dept: CARDIOLOGY | Facility: CLINIC | Age: 73
End: 2023-10-10
Payer: MEDICARE

## 2023-10-10 ENCOUNTER — HOSPITAL ENCOUNTER (OUTPATIENT)
Dept: GENERAL RADIOLOGY | Facility: HOSPITAL | Age: 73
Discharge: HOME | End: 2023-10-10
Payer: MEDICARE

## 2023-10-10 ENCOUNTER — OFFICE VISIT (OUTPATIENT)
Dept: FAMILY MEDICINE CLINIC | Facility: CLINIC | Age: 73
End: 2023-10-10
Payer: MEDICARE

## 2023-10-10 ENCOUNTER — LAB (OUTPATIENT)
Dept: LAB | Facility: HOSPITAL | Age: 73
End: 2023-10-10
Payer: MEDICARE

## 2023-10-10 VITALS
TEMPERATURE: 98.1 F | HEIGHT: 71 IN | DIASTOLIC BLOOD PRESSURE: 64 MMHG | RESPIRATION RATE: 16 BRPM | SYSTOLIC BLOOD PRESSURE: 148 MMHG | BODY MASS INDEX: 27.77 KG/M2 | OXYGEN SATURATION: 92 % | WEIGHT: 198.4 LBS | HEART RATE: 69 BPM

## 2023-10-10 DIAGNOSIS — I10 PRIMARY HYPERTENSION: ICD-10-CM

## 2023-10-10 DIAGNOSIS — Z79.01 WARFARIN ANTICOAGULATION: ICD-10-CM

## 2023-10-10 DIAGNOSIS — I48.21 PERMANENT ATRIAL FIBRILLATION: Chronic | ICD-10-CM

## 2023-10-10 DIAGNOSIS — R06.02 SHORTNESS OF BREATH: ICD-10-CM

## 2023-10-10 DIAGNOSIS — I10 PRIMARY HYPERTENSION: Primary | ICD-10-CM

## 2023-10-10 DIAGNOSIS — I48.21 PERMANENT ATRIAL FIBRILLATION: ICD-10-CM

## 2023-10-10 LAB
ALBUMIN SERPL-MCNC: 4 G/DL (ref 3.5–5.2)
ALBUMIN/GLOB SERPL: 1.5 G/DL
ALP SERPL-CCNC: 187 U/L (ref 39–117)
ALT SERPL W P-5'-P-CCNC: 25 U/L (ref 1–41)
ANION GAP SERPL CALCULATED.3IONS-SCNC: 10 MMOL/L (ref 5–15)
AST SERPL-CCNC: 23 U/L (ref 1–40)
BILIRUB SERPL-MCNC: 0.6 MG/DL (ref 0–1.2)
BUN SERPL-MCNC: 18 MG/DL (ref 8–23)
BUN/CREAT SERPL: 23.1 (ref 7–25)
CALCIUM SPEC-SCNC: 9.3 MG/DL (ref 8.6–10.5)
CHLORIDE SERPL-SCNC: 103 MMOL/L (ref 98–107)
CO2 SERPL-SCNC: 29 MMOL/L (ref 22–29)
CREAT SERPL-MCNC: 0.78 MG/DL (ref 0.76–1.27)
EGFRCR SERPLBLD CKD-EPI 2021: 95.3 ML/MIN/1.73
GLOBULIN UR ELPH-MCNC: 2.7 GM/DL
GLUCOSE SERPL-MCNC: 79 MG/DL (ref 65–99)
POTASSIUM SERPL-SCNC: 4.1 MMOL/L (ref 3.5–5.2)
PROT SERPL-MCNC: 6.7 G/DL (ref 6–8.5)
SODIUM SERPL-SCNC: 142 MMOL/L (ref 136–145)

## 2023-10-10 PROCEDURE — 71046 X-RAY EXAM CHEST 2 VIEWS: CPT

## 2023-10-10 PROCEDURE — 83880 ASSAY OF NATRIURETIC PEPTIDE: CPT | Performed by: NURSE PRACTITIONER

## 2023-10-10 PROCEDURE — 80053 COMPREHEN METABOLIC PANEL: CPT

## 2023-10-10 PROCEDURE — 85025 COMPLETE CBC W/AUTO DIFF WBC: CPT

## 2023-10-10 PROCEDURE — 36415 COLL VENOUS BLD VENIPUNCTURE: CPT | Performed by: NURSE PRACTITIONER

## 2023-10-10 RX ORDER — ALBUTEROL SULFATE 90 UG/1
2 AEROSOL, METERED RESPIRATORY (INHALATION) EVERY 4 HOURS PRN
COMMUNITY

## 2023-10-10 NOTE — PATIENT INSTRUCTIONS
Continue current medications and treatment.   Follow up with Dr. Galloway.  Call office for lab results if you have not received a call from our office or [facility] message in 1-2 days.    Take manual blood pressure at home at least 2-3 x per week.

## 2023-10-10 NOTE — PROGRESS NOTES
Subjective        Sandro Ramirez is a 73 y.o. male who presents to [facility].     Chief Complaint   Patient presents with    Hypertension     Establish new provider, pressure running since at least September       Hypertension  Associated symptoms include shortness of breath. Pertinent negatives include no chest pain.     Patient presents with report of elevated blood pressure on home blood pressure monitor for the past 1-2 months.  He is accompanied in clinic by his spouse and brings a very thorough daily record of his blood pressures and blood glucose. He states he has a prostate procedure coming up in the next month for his enlarged prostate.  This is  my first time evaluating patient.     Hypertension-worsening-patient monitors his blood pressure on an automatic blood pressure cuff at home.  He keeps records of this every single day.  Blood pressures in the last 2 months or so have ranged from 140-170/70-90.  He had blood drawn at the warfarin clinic recently, they told him his blood pressure was high but did not say how high, advised him to follow-up with his doctor.  He states every time he has his blood pressure checked with a manual cuff his blood pressure is normal.  He is followed by Dr. Galloway.  Currently he takes losartan 25 mg daily, atenolol 50 mg at night, aspirin 81 mg daily.    Shortness of breath-worsening-began at least 2 or 3 months ago, was complaining of this when he saw Dr. Galloway 7/27/2023.  He has planned a stress test and echocardiogram in February 2024.  Patient states that shortness of breath keeps worsening especially if he is climbing up his steps.  He wears his CPAP at night.  He feels like he does have some postnasal drainage but this improves with Flonase and saline nasal rinses.  Atrial saxatzccinvw-qyshtp-dt taking warfarin, complains of some shortness of breath, is taking atenolol 50 mg daily.    The following portions of the patient's history  were reviewed and updated as appropriate: allergies, current medications, past family history, past medical history, past social history, past surgical history and problem list.    Allergies   Allergen Reactions    Bee Venom Anaphylaxis     HIVES-SWOLLEN THROAT    Meperidine Hives    Midazolam Hives    Propofol Other (See Comments)     UNCLEAR-SVT, HYPOTENSION-STATES SEVERE ALMOST CODED    Sulfa Antibiotics Hives    Hydrocodone Nausea And Vomiting    Simvastatin Myalgia          Current Outpatient Medications:     albuterol sulfate  (90 Base) MCG/ACT inhaler, Inhale 2 puffs Every 4 (Four) Hours As Needed for Wheezing., Disp: , Rfl:     alfuzosin (UROXATRAL) 10 MG 24 hr tablet, Take 1 tablet by mouth 2 (Two) Times a Day., Disp: , Rfl:     allopurinol (ZYLOPRIM) 300 MG tablet, Take 1 tablet by mouth Every Night., Disp: , Rfl:     aspirin 81 MG tablet, Take 1 tablet by mouth Daily. PT STATES WAS INSTRUCTED TO CONTINUE TO TAKE THIS PER GET ANDERSEN, Disp: , Rfl:     atenolol (TENORMIN) 50 MG tablet, Take 1 tablet by mouth Every Night., Disp: 90 tablet, Rfl: 3    atorvastatin (LIPITOR) 20 MG tablet, TAKE 1 TABLET BY MOUTH EVERY DAY, Disp: 90 tablet, Rfl: 1    cetirizine (zyrTEC) 10 MG tablet, Take 1 tablet by mouth Daily., Disp: 90 tablet, Rfl: 3    fluticasone (FLONASE) 50 MCG/ACT nasal spray, 2 sprays into the nostril(s) as directed by provider Daily., Disp: 11.1 mL, Rfl: 0    losartan (COZAAR) 25 MG tablet, TAKE 1 TABLET BY MOUTH EVERY DAY, Disp: 90 tablet, Rfl: 3    warfarin (COUMADIN) 7.5 MG tablet, TAKE 1 & 1/2 TABLETS BY MOUTH ON MONDAY, WEDNESDAY, AND FRIDAY, 1 TABLET ON ALL OTHER DAYS OR AS DIRECTED, Disp: 116 tablet, Rfl: 0    Review of Systems   HENT:  Positive for postnasal drip. Negative for rhinorrhea.    Respiratory:  Positive for cough and shortness of breath. Negative for chest tightness and wheezing.    Cardiovascular:  Negative for chest pain and leg swelling.   Gastrointestinal:  Negative for  "constipation, diarrhea, nausea and vomiting.        Objective     /64 (BP Location: Left arm, Patient Position: Sitting)   Pulse 69   Temp 98.1 øF (36.7 øC) (Temporal)   Resp 16   Ht 180.3 cm (71\")   Wt 90 kg (198 lb 6.4 oz)   SpO2 92%   BMI 27.67 kg/mý         Physical Exam  Vitals and nursing note reviewed.   Constitutional:       Appearance: Normal appearance. He is not ill-appearing or diaphoretic.      Comments: Hard of hearing   Cardiovascular:      Rate and Rhythm: Normal rate. Rhythm irregularly irregular.      Pulses: Normal pulses.      Heart sounds: Murmur heard.      Systolic murmur is present with a grade of 2/6.   Pulmonary:      Effort: Pulmonary effort is normal.      Breath sounds: Normal breath sounds.   Musculoskeletal:      Right lower leg: No edema.      Left lower leg: No edema.   Skin:     General: Skin is warm and dry.      Capillary Refill: Capillary refill takes less than 2 seconds.   Neurological:      Mental Status: He is alert and oriented to person, place, and time.   Psychiatric:         Mood and Affect: Mood normal.         Behavior: Behavior normal.         Result Review    The following data was reviewed by: ALICJA Connor on 10/10/2023:  Common labs          3/15/2023    09:20 7/20/2023    09:33 9/5/2023    07:22 9/5/2023    21:40   Common Labs   Glucose 141  119      BUN 18  16      Creatinine 0.91  0.79      Sodium 139  141      Potassium 4.5  4.7      Chloride 103  106      Calcium 10.0  9.5      Albumin  4.5      Total Bilirubin  0.5      Alkaline Phosphatase  183      AST (SGOT)  26      ALT (SGPT)  29      WBC   7.10  6.00    Hemoglobin   13.8  13.4    Hematocrit   42.7  42.0    Platelets   172  162    Total Cholesterol  117      Triglycerides  52      HDL Cholesterol  40      LDL Cholesterol   65      Hemoglobin A1C  6.50      Microalbumin, Urine  123.2      PSA 2.900         CMP          11/30/2022    14:52 3/15/2023    09:20 7/20/2023    09:33   CMP "   Glucose  141  119    BUN  18  16    Creatinine  0.91  0.79    EGFR  90.7  95.0    Sodium  139  141    Potassium  4.5  4.7    Chloride  103  106    Calcium  10.0  9.5    Total Protein   7.2    Albumin   4.5    Globulin   2.7    Total Bilirubin   0.5    Alkaline Phosphatase   183    AST (SGOT)   26    ALT (SGPT) 29   29    Albumin/Globulin Ratio   1.7    BUN/Creatinine Ratio  19.8  20.3    Anion Gap  8.6  7.0      CBC          9/5/2023    07:22 9/5/2023    21:40   CBC   WBC 7.10  6.00    RBC 4.96  4.89    Hemoglobin 13.8  13.4    Hematocrit 42.7  42.0    MCV 86.2  85.9    MCH 27.9  27.3    MCHC 32.4  31.8    RDW 15.3  15.5    Platelets 172  162      CBC w/diff          9/5/2023    07:22 9/5/2023    21:40   CBC w/Diff   WBC 7.10  6.00    RBC 4.96  4.89    Hemoglobin 13.8  13.4    Hematocrit 42.7  42.0    MCV 86.2  85.9    MCH 27.9  27.3    MCHC 32.4  31.8    RDW 15.3  15.5    Platelets 172  162    Neutrophil Rel % 78.8  66.1    Lymphocyte Rel % 12.1  20.3    Monocyte Rel % 6.5  10.0    Eosinophil Rel % 1.5  2.5    Basophil Rel % 1.1  1.1      TSH          7/20/2023    09:33   TSH   TSH 2.610      A1C Last 3 Results          11/30/2022    14:52 7/20/2023    09:33   HGBA1C Last 3 Results   Hemoglobin A1C 6.8  6.50      Microalbumin          7/20/2023    09:33   Microalbumin   Microalbumin, Urine 123.2      XR CHEST 2 VW     Date of Exam: 8/9/2023 2:24 PM EDT     Indication: dyspnea     Comparison: 5/31/2022 and 11/19/2022     Findings:  Status post CABG. Cardiac mediastinal contours are within normal limits and unchanged. Left base density adjacent to the heart border is stable from several prior exams and likely represents prominent pericardial fat pad. Cardiac mediastinal contours are   within normal limits. Regional skeleton is unchanged.     IMPRESSION:  Impression:     1. No acute cardiopulmonary disease.        Electronically Signed: Bj Ojeda    8/9/2023 5:00 PM EDT              Assessment & Plan    Diagnoses  and all orders for this visit:    1. Primary hypertension (Primary)  -     Comprehensive metabolic panel; Future  -     BNP  -     XR Chest 2 View; Future  -     CBC w AUTO Differential; Future    2. Shortness of breath  -     Comprehensive metabolic panel; Future  -     BNP  -     XR Chest 2 View; Future  -     CBC w AUTO Differential; Future    3. Permanent atrial fibrillation  -     XR Chest 2 View; Future  -     CBC w AUTO Differential; Future    4. Warfarin anticoagulation  -     CBC w AUTO Differential; Future       Patient Instructions   Continue current medications and treatment.   Follow up with Dr. Galloway.  Call office for lab results if you have not received a call from our office or [facility] message in 1-2 days.    Take manual blood pressure at home at least 2-3 x per week.  Patient's blood pressure on automatic machine is 164/84, I repeated manually and blood pressure was 148/64.  Patient can consistently say that automatic blood pressure cuffs are getting higher readings than manual blood pressure cuff.  I am reluctant to adjust blood pressure medication at this point with manual blood pressure readings being within normal limits.  His daughter is a nurse and he thinks she can come to his house a few times a week to check his blood pressure.  He says his blood pressure cuff is about 20 years old.  Discussed with he and his spouse that blood pressure reading on automatic cuff may not be nearly as accurate as manual blood pressure.  I recommend he follow-up with Dr. Galloway.  I had office staff attempt to contact the office but no answer, they left a message for cardiology office to set up an appointment for patient.  I suspect he needs Myoview and echocardiogram sooner than February 2024.  With shortness of breath, plan BNP to rule out heart failure, chest checks x-ray for any abnormality, rule out anemia or metabolic issues.  Patient agrees to go to emergency department if worsening symptoms, chest pain,  leg swelling, dizziness.    Follow Up   Return for Next scheduled follow up.    Patient was given instructions and counseling regarding his condition or for health maintenance advice. Please see specific information pulled into the AVS if appropriate.     Colette Dominguez, APRN     10/10/23

## 2023-10-10 NOTE — TELEPHONE ENCOUNTER
HUB UNABLE TO WT        Caller: REJI-PCP    Relationship: DR. SEIPEL'S OFFICE    Best call back number: [phone]      PLEASE CALL THE PATIENT TO SCHEDULE    PATIENTS PCP IS WANTING THE PATIENT TO BE SEEN ASAP FOR SOB

## 2023-10-11 ENCOUNTER — TELEPHONE (OUTPATIENT)
Dept: FAMILY MEDICINE CLINIC | Facility: CLINIC | Age: 73
End: 2023-10-11
Payer: MEDICARE

## 2023-10-11 LAB
BASOPHILS # BLD AUTO: 0.05 10*3/MM3 (ref 0–0.2)
BASOPHILS NFR BLD AUTO: 0.7 % (ref 0–1.5)
DEPRECATED RDW RBC AUTO: 42.1 FL (ref 37–54)
EOSINOPHIL # BLD AUTO: 0.1 10*3/MM3 (ref 0–0.4)
EOSINOPHIL NFR BLD AUTO: 1.4 % (ref 0.3–6.2)
ERYTHROCYTE [DISTWIDTH] IN BLOOD BY AUTOMATED COUNT: 13.6 % (ref 12.3–15.4)
HCT VFR BLD AUTO: 41 % (ref 37.5–51)
HGB BLD-MCNC: 13.3 G/DL (ref 13–17.7)
IMM GRANULOCYTES # BLD AUTO: 0.02 10*3/MM3 (ref 0–0.05)
IMM GRANULOCYTES NFR BLD AUTO: 0.3 % (ref 0–0.5)
LYMPHOCYTES # BLD AUTO: 1.32 10*3/MM3 (ref 0.7–3.1)
LYMPHOCYTES NFR BLD AUTO: 18.7 % (ref 19.6–45.3)
MCH RBC QN AUTO: 27.8 PG (ref 26.6–33)
MCHC RBC AUTO-ENTMCNC: 32.4 G/DL (ref 31.5–35.7)
MCV RBC AUTO: 85.8 FL (ref 79–97)
MONOCYTES # BLD AUTO: 0.53 10*3/MM3 (ref 0.1–0.9)
MONOCYTES NFR BLD AUTO: 7.5 % (ref 5–12)
NEUTROPHILS NFR BLD AUTO: 5.05 10*3/MM3 (ref 1.7–7)
NEUTROPHILS NFR BLD AUTO: 71.4 % (ref 42.7–76)
NRBC BLD AUTO-RTO: 0 /100 WBC (ref 0–0.2)
NT-PROBNP SERPL-MCNC: 602 PG/ML (ref 0–900)
PLATELET # BLD AUTO: 183 10*3/MM3 (ref 140–450)
PMV BLD AUTO: 9.9 FL (ref 6–12)
RBC # BLD AUTO: 4.78 10*6/MM3 (ref 4.14–5.8)
WBC NRBC COR # BLD: 7.07 10*3/MM3 (ref 3.4–10.8)

## 2023-10-11 NOTE — TELEPHONE ENCOUNTER
Spoke with patient, discussed results of CBC, CMP, BNP, and chest x-ray.  No acute findings.  I reinforced recommendation to follow-up with Dr. Galloway and let him know I sent a message to Dr. Galloway she telling him that patient was complaining of shortness of breath and was seen in office.  Could consider CT of chest if patient has evaluation by Dr. Galloway with no significant finding.  Patient verbalizes understanding, denies further questions.

## 2023-10-12 ENCOUNTER — OFFICE VISIT (OUTPATIENT)
Dept: CARDIOLOGY | Facility: CLINIC | Age: 73
End: 2023-10-12
Payer: MEDICARE

## 2023-10-12 VITALS
SYSTOLIC BLOOD PRESSURE: 172 MMHG | HEART RATE: 71 BPM | OXYGEN SATURATION: 96 % | HEIGHT: 71 IN | BODY MASS INDEX: 27.72 KG/M2 | WEIGHT: 198 LBS | DIASTOLIC BLOOD PRESSURE: 91 MMHG

## 2023-10-12 DIAGNOSIS — E78.00 PURE HYPERCHOLESTEROLEMIA: ICD-10-CM

## 2023-10-12 DIAGNOSIS — G47.33 OBSTRUCTIVE SLEEP APNEA SYNDROME: ICD-10-CM

## 2023-10-12 DIAGNOSIS — I25.10 CORONARY ARTERY DISEASE INVOLVING NATIVE CORONARY ARTERY OF NATIVE HEART WITHOUT ANGINA PECTORIS: ICD-10-CM

## 2023-10-12 DIAGNOSIS — I48.21 PERMANENT ATRIAL FIBRILLATION: Primary | ICD-10-CM

## 2023-10-12 DIAGNOSIS — I10 ESSENTIAL HYPERTENSION: ICD-10-CM

## 2023-10-12 DIAGNOSIS — E11.9 TYPE 2 DIABETES MELLITUS WITHOUT COMPLICATION, WITHOUT LONG-TERM CURRENT USE OF INSULIN: ICD-10-CM

## 2023-10-12 NOTE — PROGRESS NOTES
Subjective:     Encounter Date:10/12/2023      Patient ID: Sandro Ramirez is a 73 y.o. male.    Chief Complaint:  History of Present Illness 73-year-old white male with history of coronary disease atrial fibrillation sleep apnea hypertension diabetes and hyperlipidemia presents to my office for follow-up.  Patient is having occasional episode of chest pain but has been having more shortness of breath with exertion.  No complaint of any PND orthopnea.  No palpitation but has some dizziness.  No syncope or swelling of the feet.  Taking his medicines regular.  He does not smoke at this time.    The following portions of the patient's history were reviewed and updated as appropriate: allergies, current medications, past family history, past medical history, past social history, past surgical history, and problem list.  Past Medical History:   Diagnosis Date    AAA (abdominal aortic aneurysm) 2006    Abnormal ECG     Aneurysm     Arthritis     Asthma     Atrial fibrillation     COUMADIN    Benign prostatic hyperplasia     Bladder cancer     CHF (congestive heart failure)     Chronic anticoagulation     COUMADIN STARTED 2006    Colon polyp     Congenital heart disease     Coronary artery disease     Diabetes mellitus     Elevated cholesterol     Erectile dysfunction     Facial basal cell cancer     Gout     Hard to intubate     History of pancreatitis 2006    History of pneumonia     History of tick-borne disease 2015    HL (hearing loss)     BILAT HEARING AIDS    Hyperlipidemia     Hypertension     Myocardial infarction     X5    Obesity     Pancreatitis     Pneumonia     Sleep apnea     CURRENTLY NOT USING CPAP D/T RECALL    Transfusion history     STATES HIS SISTER HAD A SEVERE REACTION TO BLOOD TRANSFUSION    Type 2 diabetes mellitus      Past Surgical History:   Procedure Laterality Date    ARTERIOGRAM AORTIC Left 06/29/2022    Procedure: AORTIC STENT GRAFT PLACEMENT WITH ILIAC COILING;  Surgeon: Alistair  "Eric GONZALEZ MD;  Location: [facility] HYBRID OR 18/19;  Service: Vascular;  Laterality: Left;    CARDIAC CATHETERIZATION      CHOLECYSTECTOMY      COLONOSCOPY      COLONOSCOPY N/A 7/27/2023    Procedure: COLONOSCOPY to cecum with cold biopsy and cold snare polypectomies and clips;  Surgeon: Vitor Haley MD;  Location: [facility] ENDOSCOPY;  Service: Gastroenterology;  Laterality: N/A;  Pre - H/O polyps.  Post - diverticulosis, polyps, hemorrhoids    CORONARY ARTERY BYPASS GRAFT  2006    X2    CYSTOSCOPY      STATES HAD BLADDER TUMORS REMOVED X2    PROSTATE SURGERY      X2- STATES PLACED COILS IN TO HELP WITH FLOW AND THEN HAD TO REMOVE A PIECE THAT BROKE OFF AND WAS IN BLADDER    UPPER GASTROINTESTINAL ENDOSCOPY       /91   Pulse 71   Ht 180.3 cm (70.98\")   Wt 89.8 kg (198 lb)   SpO2 96%   BMI 27.63 kg/mý   Family History   Problem Relation Age of Onset    Hypertension Mother     Colon cancer Mother     Cancer Mother     Diabetes Mother     Cancer Father     Heart attack Father     Hearing loss Father     Heart disease Father     Hypertension Sister     Hypertension Brother     Colon cancer Brother     Cancer Brother     Heart disease Paternal Grandfather     Heart attack Paternal Grandfather     Malig Hyperthermia Neg Hx     Colon polyps Neg Hx     Crohn's disease Neg Hx     Irritable bowel syndrome Neg Hx     Ulcerative colitis Neg Hx        Current Outpatient Medications:     albuterol sulfate  (90 Base) MCG/ACT inhaler, Inhale 2 puffs Every 4 (Four) Hours As Needed for Wheezing., Disp: , Rfl:     alfuzosin (UROXATRAL) 10 MG 24 hr tablet, Take 1 tablet by mouth 2 (Two) Times a Day., Disp: , Rfl:     allopurinol (ZYLOPRIM) 300 MG tablet, Take 1 tablet by mouth Every Night., Disp: , Rfl:     aspirin 81 MG tablet, Take 1 tablet by mouth Daily. PT STATES WAS INSTRUCTED TO CONTINUE TO TAKE THIS PER GET ANDERSEN, Disp: , Rfl:     atenolol (TENORMIN) 50 MG tablet, Take 1 tablet by mouth Every Night., Disp: " 90 tablet, Rfl: 3    atorvastatin (LIPITOR) 20 MG tablet, TAKE 1 TABLET BY MOUTH EVERY DAY, Disp: 90 tablet, Rfl: 1    fluticasone (FLONASE) 50 MCG/ACT nasal spray, 2 sprays into the nostril(s) as directed by provider Daily., Disp: 11.1 mL, Rfl: 0    losartan (COZAAR) 25 MG tablet, TAKE 1 TABLET BY MOUTH EVERY DAY, Disp: 90 tablet, Rfl: 3    warfarin (COUMADIN) 7.5 MG tablet, TAKE 1 & 1/2 TABLETS BY MOUTH ON MONDAY, WEDNESDAY, AND FRIDAY, 1 TABLET ON ALL OTHER DAYS OR AS DIRECTED, Disp: 116 tablet, Rfl: 0  Allergies   Allergen Reactions    Bee Venom Anaphylaxis     HIVES-SWOLLEN THROAT    Meperidine Hives    Midazolam Hives    Propofol Other (See Comments)     UNCLEAR-SVT, HYPOTENSION-STATES SEVERE ALMOST CODED    Sulfa Antibiotics Hives    Hydrocodone Nausea And Vomiting    Simvastatin Myalgia     Social History     Socioeconomic History    Marital status:      Spouse name: Nichelle    Number of children: 3    Years of education: 12   Tobacco Use    Smoking status: Former     Packs/day: 1.00     Years: 30.00     Additional pack years: 0.00     Total pack years: 30.00     Types: Cigarettes     Quit date: 2006     Years since quittin.3    Smokeless tobacco: Never   Vaping Use    Vaping Use: Never used   Substance and Sexual Activity    Alcohol use: Not Currently     Comment: one or two beers 6 times a year    Drug use: Never    Sexual activity: Not Currently     Partners: Female     Birth control/protection: None     Review of Systems   Constitutional: Positive for malaise/fatigue.   Cardiovascular:  Negative for chest pain, dyspnea on exertion, leg swelling and palpitations.   Respiratory:  Positive for shortness of breath. Negative for cough.    Gastrointestinal:  Negative for abdominal pain, nausea and vomiting.   Neurological:  Positive for dizziness and light-headedness. Negative for focal weakness, headaches and numbness.   All other systems reviewed and are negative.             Objective:      Constitutional:       Appearance: Well-developed.   Eyes:      General: No scleral icterus.     Conjunctiva/sclera: Conjunctivae normal.   HENT:      Head: Normocephalic and atraumatic.   Neck:      Vascular: No carotid bruit or JVD.   Pulmonary:      Effort: Pulmonary effort is normal.      Breath sounds: Normal breath sounds. No wheezing. No rales.   Cardiovascular:      Normal rate. Regular rhythm.   Pulses:     Intact distal pulses.   Abdominal:      General: Bowel sounds are normal.      Palpations: Abdomen is soft.   Musculoskeletal:      Cervical back: Normal range of motion and neck supple. Skin:     General: Skin is warm and dry.      Findings: No rash.   Neurological:      Mental Status: Alert.       Procedures    Lab Review:         MDM    #1 coronary disease  Patient had coronary bypass surgery x2 vessels with a LIMA to LAD and saphenous graft to marginal branch and is currently having symptoms of chest pain or shortness of breath and hence I will perform an echocardiogram and a stress Myoview study to rule out ischemia  2.  Hypertension  Patient blood pressure is currently very high and I will adjust medicines accordingly after he checks his blood pressure at home  3.  Hyperlipidemia  Patient on atorvastatin the lipid levels are well within normal limits  4.  Diabetes  Patient is on diet therapy only and followed by primary care doctor  5.  Sleep apnea  Patient is sleep apnea and uses a CPAP machine.    Patient's previous medical records, labs, and EKG were reviewed and discussed with the patient at today's visit.

## 2023-10-13 ENCOUNTER — TELEPHONE (OUTPATIENT)
Dept: CARDIOLOGY | Facility: CLINIC | Age: 73
End: 2023-10-13
Payer: MEDICARE

## 2023-10-13 NOTE — TELEPHONE ENCOUNTER
Called and LM for patient to call me back. Need to reschedule testing for when Dr. Galloway is in town. Gave patient my direct number but please forward patient to me to reschedule if he calls back.

## 2023-10-19 ENCOUNTER — HOSPITAL ENCOUNTER (OUTPATIENT)
Dept: CARDIOLOGY | Facility: HOSPITAL | Age: 73
Discharge: HOME | End: 2023-10-19
Payer: MEDICARE

## 2023-10-19 VITALS
SYSTOLIC BLOOD PRESSURE: 154 MMHG | DIASTOLIC BLOOD PRESSURE: 76 MMHG | HEIGHT: 71 IN | WEIGHT: 198 LBS | BODY MASS INDEX: 27.72 KG/M2

## 2023-10-19 DIAGNOSIS — I48.21 PERMANENT ATRIAL FIBRILLATION: ICD-10-CM

## 2023-10-19 DIAGNOSIS — I71.43 INFRARENAL ABDOMINAL AORTIC ANEURYSM (AAA) WITHOUT RUPTURE: ICD-10-CM

## 2023-10-19 DIAGNOSIS — E11.9 TYPE 2 DIABETES MELLITUS WITHOUT COMPLICATION, WITHOUT LONG-TERM CURRENT USE OF INSULIN: ICD-10-CM

## 2023-10-19 DIAGNOSIS — I25.10 CORONARY ARTERY DISEASE INVOLVING NATIVE CORONARY ARTERY OF NATIVE HEART WITHOUT ANGINA PECTORIS: ICD-10-CM

## 2023-10-19 DIAGNOSIS — I10 ESSENTIAL HYPERTENSION: ICD-10-CM

## 2023-10-19 DIAGNOSIS — G47.33 OBSTRUCTIVE SLEEP APNEA SYNDROME: ICD-10-CM

## 2023-10-19 DIAGNOSIS — E78.00 PURE HYPERCHOLESTEROLEMIA: ICD-10-CM

## 2023-10-19 LAB
BH CV ECHO MEAS - AI P1/2T: 435.8 MSEC
BH CV ECHO MEAS - AO MAX PG: 14.4 MMHG
BH CV ECHO MEAS - AO MEAN PG: 8.2 MMHG
BH CV ECHO MEAS - AO ROOT DIAM: 3.5 CM
BH CV ECHO MEAS - AO V2 MAX: 189.6 CM/SEC
BH CV ECHO MEAS - AO V2 VTI: 38.3 CM
BH CV ECHO MEAS - AVA(I,D): 0.92 CM2
BH CV ECHO MEAS - EDV(CUBED): 152.8 ML
BH CV ECHO MEAS - EDV(MOD-SP4): 100.6 ML
BH CV ECHO MEAS - EF(MOD-SP4): 46.4 %
BH CV ECHO MEAS - ESV(CUBED): 68.8 ML
BH CV ECHO MEAS - ESV(MOD-SP4): 54 ML
BH CV ECHO MEAS - FS: 23.4 %
BH CV ECHO MEAS - IVS/LVPW: 0.57 CM
BH CV ECHO MEAS - IVSD: 0.85 CM
BH CV ECHO MEAS - LV MASS(C)D: 249.7 GRAMS
BH CV ECHO MEAS - LV MAX PG: 2.8 MMHG
BH CV ECHO MEAS - LV MEAN PG: 1.36 MMHG
BH CV ECHO MEAS - LV V1 MAX: 83.1 CM/SEC
BH CV ECHO MEAS - LV V1 VTI: 17.1 CM
BH CV ECHO MEAS - LVIDD: 5.3 CM
BH CV ECHO MEAS - LVIDS: 4.1 CM
BH CV ECHO MEAS - LVOT AREA: 2.06 CM2
BH CV ECHO MEAS - LVOT DIAM: 1.62 CM
BH CV ECHO MEAS - LVPWD: 1.48 CM
BH CV ECHO MEAS - MR MAX PG: 71.9 MMHG
BH CV ECHO MEAS - MR MAX VEL: 424 CM/SEC
BH CV ECHO MEAS - MV E MAX VEL: 147.3 CM/SEC
BH CV ECHO MEAS - MV MAX PG: 7.5 MMHG
BH CV ECHO MEAS - MV MEAN PG: 3.7 MMHG
BH CV ECHO MEAS - MV V2 VTI: 31.4 CM
BH CV ECHO MEAS - MVA(VTI): 1.12 CM2
BH CV ECHO MEAS - PA ACC TIME: 0.13 SEC
BH CV ECHO MEAS - PA V2 MAX: 94.9 CM/SEC
BH CV ECHO MEAS - PI END-D VEL: 106 CM/SEC
BH CV ECHO MEAS - RAP SYSTOLE: 15 MMHG
BH CV ECHO MEAS - RV MAX PG: 1.56 MMHG
BH CV ECHO MEAS - RV V1 MAX: 62.4 CM/SEC
BH CV ECHO MEAS - RV V1 VTI: 13.8 CM
BH CV ECHO MEAS - RVDD: 3.4 CM
BH CV ECHO MEAS - RVSP: 37.4 MMHG
BH CV ECHO MEAS - SV(LVOT): 35.3 ML
BH CV ECHO MEAS - SV(MOD-SP4): 46.6 ML
BH CV ECHO MEAS - TR MAX PG: 22.4 MMHG
BH CV ECHO MEAS - TR MAX VEL: 235.7 CM/SEC
BH CV REST NUCLEAR ISOTOPE DOSE: 10.3 MCI
BH CV STRESS BP STAGE 1: NORMAL
BH CV STRESS COMMENTS STAGE 1: NORMAL
BH CV STRESS DOSE REGADENOSON STAGE 1: 0.4
BH CV STRESS DURATION MIN STAGE 1: 0
BH CV STRESS DURATION SEC STAGE 1: 10
BH CV STRESS HR STAGE 1: 75
BH CV STRESS NUCLEAR ISOTOPE DOSE: 31.7 MCI
BH CV STRESS PROTOCOL 1: NORMAL
BH CV STRESS RECOVERY BP: NORMAL MMHG
BH CV STRESS RECOVERY HR: 92 BPM
BH CV STRESS STAGE 1: 1
LV EF NUC BP: 51 %
MAXIMAL PREDICTED HEART RATE: 149 BPM
STRESS BASELINE BP: NORMAL MMHG
STRESS BASELINE HR: 75 BPM
STRESS TARGET HR: 127 BPM

## 2023-10-19 PROCEDURE — 93017 CV STRESS TEST TRACING ONLY: CPT

## 2023-10-19 PROCEDURE — 25010000002 REGADENOSON 0.4 MG/5ML SOLUTION: Performed by: INTERNAL MEDICINE

## 2023-10-19 PROCEDURE — 93306 TTE W/DOPPLER COMPLETE: CPT

## 2023-10-19 PROCEDURE — 0 TECHNETIUM SESTAMIBI: Performed by: INTERNAL MEDICINE

## 2023-10-19 PROCEDURE — A9500 TC99M SESTAMIBI: HCPCS | Performed by: INTERNAL MEDICINE

## 2023-10-19 PROCEDURE — 78452 HT MUSCLE IMAGE SPECT MULT: CPT

## 2023-10-19 RX ORDER — REGADENOSON 0.08 MG/ML
0.4 INJECTION, SOLUTION INTRAVENOUS
Status: COMPLETED | OUTPATIENT
Start: 2023-10-19 | End: 2023-10-19

## 2023-10-19 RX ADMIN — TECHNETIUM TC 99M SESTAMIBI 1 DOSE: 1 INJECTION INTRAVENOUS at 13:48

## 2023-10-19 RX ADMIN — TECHNETIUM TC 99M SESTAMIBI 1 DOSE: 1 INJECTION INTRAVENOUS at 14:24

## 2023-10-19 RX ADMIN — REGADENOSON 0.4 MG: 0.08 INJECTION, SOLUTION INTRAVENOUS at 14:23

## 2023-11-08 ENCOUNTER — ANTICOAGULATION VISIT (OUTPATIENT)
Dept: CARDIOLOGY | Facility: CLINIC | Age: 73
End: 2023-11-08
Payer: MEDICARE

## 2023-11-08 VITALS — SYSTOLIC BLOOD PRESSURE: 156 MMHG | DIASTOLIC BLOOD PRESSURE: 73 MMHG | HEART RATE: 70 BPM

## 2023-11-08 DIAGNOSIS — I48.21 PERMANENT ATRIAL FIBRILLATION: Primary | Chronic | ICD-10-CM

## 2023-11-08 LAB — INR PPP: 3.1 (ref 2–3)

## 2023-11-08 PROCEDURE — 85610 PROTHROMBIN TIME: CPT | Performed by: INTERNAL MEDICINE

## 2023-11-08 PROCEDURE — 36416 COLLJ CAPILLARY BLOOD SPEC: CPT | Performed by: INTERNAL MEDICINE

## 2023-12-06 ENCOUNTER — ANTICOAGULATION VISIT (OUTPATIENT)
Dept: CARDIOLOGY | Facility: CLINIC | Age: 73
End: 2023-12-06
Payer: MEDICARE

## 2023-12-06 VITALS
WEIGHT: 192 LBS | HEART RATE: 73 BPM | DIASTOLIC BLOOD PRESSURE: 77 MMHG | BODY MASS INDEX: 26.78 KG/M2 | SYSTOLIC BLOOD PRESSURE: 158 MMHG

## 2023-12-06 DIAGNOSIS — I48.21 PERMANENT ATRIAL FIBRILLATION: Primary | Chronic | ICD-10-CM

## 2023-12-06 LAB — INR PPP: 2.6 (ref 0.9–1.1)

## 2023-12-06 PROCEDURE — 85610 PROTHROMBIN TIME: CPT | Performed by: INTERNAL MEDICINE

## 2023-12-06 PROCEDURE — 36416 COLLJ CAPILLARY BLOOD SPEC: CPT | Performed by: INTERNAL MEDICINE

## 2023-12-14 ENCOUNTER — TELEPHONE (OUTPATIENT)
Dept: CARDIOLOGY | Facility: CLINIC | Age: 73
End: 2023-12-14
Payer: MEDICARE

## 2023-12-14 RX ORDER — ATENOLOL 50 MG/1
50 TABLET ORAL
Qty: 90 TABLET | Refills: 0 | Status: SHIPPED | OUTPATIENT
Start: 2023-12-14

## 2023-12-14 NOTE — TELEPHONE ENCOUNTER
"  Caller: Sandro Ramirez \"Manish\"    Relationship: Self    Best call back number:[phone]    What is the best time to reach you: ANY    Who are you requesting to speak with (clinical staff, provider,  specific staff member): CONNIE        What was the call regarding: PATIENT CALLING CONNIE BACK ABOUT HIS INR    "

## 2023-12-14 NOTE — TELEPHONE ENCOUNTER
Office Visit with Thai Galloway MD (10/12/2023)     -Atenolol 50 mg daily is currently active on medication list on 10/12/23-    Called patient and verified that he is still taking Atenolol 50 mg daily. He has been monitoring his BP and HR but the wife will need to call our office back to give us those readings. (THE WIFE - ESTELA IS ON HIPAA FORM)

## 2023-12-14 NOTE — TELEPHONE ENCOUNTER
JUST FYI     Patient called back and I verbally explained that I needed to verify his atenolol 50 mg daily and patient is currently still taking, and his losartan 25 mg daily.     -I verbally explained to the patient to monitor BP and HR for the next 7 days for further medication adjustments.     -patient verbally understood and had no further questions at this time

## 2023-12-14 NOTE — TELEPHONE ENCOUNTER
Rx Refill Note  Requested Prescriptions     Signed Prescriptions Disp Refills    atenolol (TENORMIN) 50 MG tablet 90 tablet 0     Sig: TAKE 1 TABLET BY MOUTH EVERY DAY AT NIGHT     Authorizing Provider: MEGAN DUMONT     Ordering User: KATHY MARTÍNEZ      Last office visit with prescribing clinician: 10/12/2023   Last telemedicine visit with prescribing clinician: Visit date not found   Next office visit with prescribing clinician: 12/14/2023                         Would you like a call back once the refill request has been completed: [] Yes [] No    If the office needs to give you a call back, can they leave a voicemail: [] Yes [] No    Kathy Martínez MA  12/14/23, 09:26 EST

## 2023-12-18 ENCOUNTER — TELEPHONE (OUTPATIENT)
Dept: CARDIOLOGY | Facility: CLINIC | Age: 73
End: 2023-12-18
Payer: MEDICARE

## 2023-12-18 NOTE — TELEPHONE ENCOUNTER
Called patient and verified with the patient his BP medication.    Verbally stated we will have Dr Galloway review BP and HR readings.    Patient verbally understood and had no further questions

## 2023-12-18 NOTE — TELEPHONE ENCOUNTER
Patient physically came into the office and gave us his BP & HR readings.    I verified patient is taking Atenolol 50 mg daily and Losartan 25 mg daily    I will review BP and HR readings tomorrow and I will call the patient with an update for any further medication changes.    -Patient verbally understood that we will call him with further medication adjustments after tomorrow.

## 2023-12-18 NOTE — TELEPHONE ENCOUNTER
"    Hub staff attempted to follow warm transfer process and was unsuccessful     Caller: Sandro Ramirez \"Manish\"    Relationship to patient: Self    Best call back number: [phone]    Patient is needing: PT LOOKING TO SPEAK WITH CONNIE. STATES TO DISCUSS RX.         "

## 2023-12-21 ENCOUNTER — TELEPHONE (OUTPATIENT)
Dept: CARDIOLOGY | Facility: CLINIC | Age: 73
End: 2023-12-21
Payer: MEDICARE

## 2023-12-21 DIAGNOSIS — I48.11 LONGSTANDING PERSISTENT ATRIAL FIBRILLATION: ICD-10-CM

## 2023-12-21 RX ORDER — LOSARTAN POTASSIUM 50 MG/1
50 TABLET ORAL DAILY
COMMUNITY

## 2023-12-21 NOTE — TELEPHONE ENCOUNTER
Patient dropped off blood pressure readings per Dr. Galloway he would like patient to increase Losartan from 25 mg daily to 50 mg daily due to patients blood pressures being elevated.       Called patient, no answer, left voicemail.

## 2023-12-21 NOTE — TELEPHONE ENCOUNTER
Caller: KENDALL    Relationship:     Best call back number: PLEASE CALL CELL PHONE  [phone]    What is the best time to reach you: ANYTIME    Who are you requesting to speak with (clinical staff, provider,  specific staff member): CLINICAL        What was the call regarding: RETURNING YOUR CALL

## 2024-01-11 ENCOUNTER — TELEPHONE (OUTPATIENT)
Dept: CARDIOLOGY | Facility: CLINIC | Age: 74
End: 2024-01-11

## 2024-01-11 ENCOUNTER — ANTICOAGULATION VISIT (OUTPATIENT)
Dept: CARDIOLOGY | Facility: CLINIC | Age: 74
End: 2024-01-11
Payer: MEDICARE

## 2024-01-11 VITALS
WEIGHT: 189 LBS | BODY MASS INDEX: 26.36 KG/M2 | HEART RATE: 69 BPM | SYSTOLIC BLOOD PRESSURE: 158 MMHG | DIASTOLIC BLOOD PRESSURE: 68 MMHG

## 2024-01-11 DIAGNOSIS — I48.21 PERMANENT ATRIAL FIBRILLATION: Primary | Chronic | ICD-10-CM

## 2024-01-11 LAB — INR PPP: 1.5 (ref 2–3)

## 2024-01-11 PROCEDURE — 85610 PROTHROMBIN TIME: CPT | Performed by: INTERNAL MEDICINE

## 2024-01-11 PROCEDURE — 36416 COLLJ CAPILLARY BLOOD SPEC: CPT | Performed by: INTERNAL MEDICINE

## 2024-01-11 NOTE — TELEPHONE ENCOUNTER
Patient dropped off blood pressure readings Dr. Galloway looked at them and wants patient to increase losartan 50 mg daily to 100 mg daily.     Called patient he is aware of the changes.     ASSESSMENT/PATIENT HISTORY - SCAN - BP READINGS, [facility], 12/18/2023 (12/18/2023)

## 2024-01-15 ENCOUNTER — ANTICOAGULATION VISIT (OUTPATIENT)
Dept: CARDIOLOGY | Facility: CLINIC | Age: 74
End: 2024-01-15
Payer: MEDICARE

## 2024-01-15 ENCOUNTER — HOSPITAL ENCOUNTER (OUTPATIENT)
Facility: HOSPITAL | Age: 74
Discharge: HOME | End: 2024-01-18
Attending: EMERGENCY MEDICINE | Admitting: NURSE PRACTITIONER
Payer: MEDICARE

## 2024-01-15 ENCOUNTER — TELEPHONE (OUTPATIENT)
Dept: CARDIOLOGY | Facility: CLINIC | Age: 74
End: 2024-01-15
Payer: MEDICARE

## 2024-01-15 VITALS — DIASTOLIC BLOOD PRESSURE: 96 MMHG | HEART RATE: 67 BPM | SYSTOLIC BLOOD PRESSURE: 166 MMHG

## 2024-01-15 DIAGNOSIS — N39.0 ACUTE UTI: ICD-10-CM

## 2024-01-15 DIAGNOSIS — R33.9 URINARY RETENTION: Primary | ICD-10-CM

## 2024-01-15 DIAGNOSIS — I48.21 PERMANENT ATRIAL FIBRILLATION: Primary | Chronic | ICD-10-CM

## 2024-01-15 DIAGNOSIS — R31.9 HEMATURIA, UNSPECIFIED TYPE: ICD-10-CM

## 2024-01-15 LAB
INR PPP: 2.56 (ref 2–3)
INR PPP: 2.6 (ref 2–3)
PROTHROMBIN TIME: 26 SECONDS (ref 19.4–28.5)

## 2024-01-15 PROCEDURE — 85610 PROTHROMBIN TIME: CPT | Performed by: NURSE PRACTITIONER

## 2024-01-15 PROCEDURE — 85610 PROTHROMBIN TIME: CPT | Performed by: INTERNAL MEDICINE

## 2024-01-15 PROCEDURE — 36416 COLLJ CAPILLARY BLOOD SPEC: CPT | Performed by: INTERNAL MEDICINE

## 2024-01-15 PROCEDURE — 99284 EMERGENCY DEPT VISIT MOD MDM: CPT

## 2024-01-15 NOTE — TELEPHONE ENCOUNTER
Pt called in to report nosebleeding, so advised pt to come in for INR today. Made appt for 11 am today. Maribel

## 2024-01-16 PROBLEM — N39.0 ACUTE UTI: Status: ACTIVE | Noted: 2024-01-16

## 2024-01-16 LAB
ANION GAP SERPL CALCULATED.3IONS-SCNC: 9 MMOL/L (ref 5–15)
BACTERIA UR QL AUTO: ABNORMAL /HPF
BASOPHILS # BLD AUTO: 0.1 10*3/MM3 (ref 0–0.2)
BASOPHILS NFR BLD AUTO: 1.4 % (ref 0–1.5)
BILIRUB UR QL STRIP: NEGATIVE
BUN SERPL-MCNC: 20 MG/DL (ref 8–23)
BUN/CREAT SERPL: 31.3 (ref 7–25)
CALCIUM SPEC-SCNC: 8.9 MG/DL (ref 8.6–10.5)
CHLORIDE SERPL-SCNC: 105 MMOL/L (ref 98–107)
CLARITY UR: ABNORMAL
CO2 SERPL-SCNC: 23 MMOL/L (ref 22–29)
COLOR UR: ABNORMAL
CREAT SERPL-MCNC: 0.64 MG/DL (ref 0.76–1.27)
DEPRECATED RDW RBC AUTO: 48.6 FL (ref 37–54)
EGFRCR SERPLBLD CKD-EPI 2021: 101.2 ML/MIN/1.73
EOSINOPHIL # BLD AUTO: 0.1 10*3/MM3 (ref 0–0.4)
EOSINOPHIL NFR BLD AUTO: 1 % (ref 0.3–6.2)
ERYTHROCYTE [DISTWIDTH] IN BLOOD BY AUTOMATED COUNT: 16.5 % (ref 12.3–15.4)
GLUCOSE BLDC GLUCOMTR-MCNC: 104 MG/DL (ref 70–105)
GLUCOSE BLDC GLUCOMTR-MCNC: 104 MG/DL (ref 70–105)
GLUCOSE BLDC GLUCOMTR-MCNC: 133 MG/DL (ref 70–105)
GLUCOSE BLDC GLUCOMTR-MCNC: 199 MG/DL (ref 70–105)
GLUCOSE SERPL-MCNC: 117 MG/DL (ref 65–99)
GLUCOSE UR STRIP-MCNC: NEGATIVE MG/DL
HBA1C MFR BLD: 6.6 % (ref 4.8–5.6)
HCT VFR BLD AUTO: 44.2 % (ref 37.5–51)
HGB BLD-MCNC: 14.1 G/DL (ref 13–17.7)
HGB UR QL STRIP.AUTO: ABNORMAL
HYALINE CASTS UR QL AUTO: ABNORMAL /LPF
KETONES UR QL STRIP: NEGATIVE
LEUKOCYTE ESTERASE UR QL STRIP.AUTO: ABNORMAL
LYMPHOCYTES # BLD AUTO: 1.7 10*3/MM3 (ref 0.7–3.1)
LYMPHOCYTES NFR BLD AUTO: 17.3 % (ref 19.6–45.3)
MCH RBC QN AUTO: 27.1 PG (ref 26.6–33)
MCHC RBC AUTO-ENTMCNC: 32 G/DL (ref 31.5–35.7)
MCV RBC AUTO: 84.6 FL (ref 79–97)
MONOCYTES # BLD AUTO: 0.6 10*3/MM3 (ref 0.1–0.9)
MONOCYTES NFR BLD AUTO: 5.6 % (ref 5–12)
NEUTROPHILS NFR BLD AUTO: 7.5 10*3/MM3 (ref 1.7–7)
NEUTROPHILS NFR BLD AUTO: 74.7 % (ref 42.7–76)
NITRITE UR QL STRIP: NEGATIVE
NRBC BLD AUTO-RTO: 0 /100 WBC (ref 0–0.2)
PH UR STRIP.AUTO: 7 [PH] (ref 5–8)
PLATELET # BLD AUTO: 228 10*3/MM3 (ref 140–450)
PMV BLD AUTO: 7.6 FL (ref 6–12)
POTASSIUM SERPL-SCNC: 4.2 MMOL/L (ref 3.5–5.2)
PROT UR QL STRIP: ABNORMAL
RBC # BLD AUTO: 5.22 10*6/MM3 (ref 4.14–5.8)
RBC # UR STRIP: ABNORMAL /HPF
REF LAB TEST METHOD: ABNORMAL
SODIUM SERPL-SCNC: 137 MMOL/L (ref 136–145)
SP GR UR STRIP: 1.02 (ref 1–1.03)
SQUAMOUS #/AREA URNS HPF: ABNORMAL /HPF
UROBILINOGEN UR QL STRIP: ABNORMAL
WBC # UR STRIP: ABNORMAL /HPF
WBC NRBC COR # BLD AUTO: 10 10*3/MM3 (ref 3.4–10.8)

## 2024-01-16 PROCEDURE — 25010000002 CEFTRIAXONE PER 250 MG: Performed by: UROLOGY

## 2024-01-16 PROCEDURE — G0378 HOSPITAL OBSERVATION PER HR: HCPCS

## 2024-01-16 PROCEDURE — 80048 BASIC METABOLIC PNL TOTAL CA: CPT | Performed by: PHYSICIAN ASSISTANT

## 2024-01-16 PROCEDURE — 25010000002 CEFTRIAXONE PER 250 MG: Performed by: PHYSICIAN ASSISTANT

## 2024-01-16 PROCEDURE — 63710000001 INSULIN LISPRO (HUMAN) PER 5 UNITS: Performed by: UROLOGY

## 2024-01-16 PROCEDURE — 82948 REAGENT STRIP/BLOOD GLUCOSE: CPT

## 2024-01-16 PROCEDURE — 87086 URINE CULTURE/COLONY COUNT: CPT | Performed by: PHYSICIAN ASSISTANT

## 2024-01-16 PROCEDURE — 0T9B8ZZ DRAINAGE OF BLADDER, VIA NATURAL OR ARTIFICIAL OPENING ENDOSCOPIC: ICD-10-PCS | Performed by: UROLOGY

## 2024-01-16 PROCEDURE — 85025 COMPLETE CBC W/AUTO DIFF WBC: CPT | Performed by: PHYSICIAN ASSISTANT

## 2024-01-16 PROCEDURE — 96365 THER/PROPH/DIAG IV INF INIT: CPT

## 2024-01-16 PROCEDURE — 81001 URINALYSIS AUTO W/SCOPE: CPT | Performed by: PHYSICIAN ASSISTANT

## 2024-01-16 PROCEDURE — 63710000001 INSULIN LISPRO (HUMAN) PER 5 UNITS: Performed by: NURSE PRACTITIONER

## 2024-01-16 PROCEDURE — 83036 HEMOGLOBIN GLYCOSYLATED A1C: CPT | Performed by: NURSE PRACTITIONER

## 2024-01-16 RX ORDER — INSULIN LISPRO 100 [IU]/ML
2-7 INJECTION, SOLUTION INTRAVENOUS; SUBCUTANEOUS
Status: DISCONTINUED | OUTPATIENT
Start: 2024-01-16 | End: 2024-01-18 | Stop reason: HOSPADM

## 2024-01-16 RX ORDER — ACETAMINOPHEN 325 MG/1
650 TABLET ORAL EVERY 4 HOURS PRN
Status: DISCONTINUED | OUTPATIENT
Start: 2024-01-16 | End: 2024-01-18 | Stop reason: HOSPADM

## 2024-01-16 RX ORDER — SODIUM CHLORIDE 0.9 % (FLUSH) 0.9 %
10 SYRINGE (ML) INJECTION EVERY 12 HOURS SCHEDULED
Status: DISCONTINUED | OUTPATIENT
Start: 2024-01-16 | End: 2024-01-18 | Stop reason: HOSPADM

## 2024-01-16 RX ORDER — AMOXICILLIN 250 MG
2 CAPSULE ORAL 2 TIMES DAILY
Status: DISCONTINUED | OUTPATIENT
Start: 2024-01-16 | End: 2024-01-18 | Stop reason: HOSPADM

## 2024-01-16 RX ORDER — ONDANSETRON 4 MG/1
4 TABLET, ORALLY DISINTEGRATING ORAL EVERY 6 HOURS PRN
Status: DISCONTINUED | OUTPATIENT
Start: 2024-01-16 | End: 2024-01-18 | Stop reason: HOSPADM

## 2024-01-16 RX ORDER — NICOTINE POLACRILEX 4 MG
15 LOZENGE BUCCAL
Status: DISCONTINUED | OUTPATIENT
Start: 2024-01-16 | End: 2024-01-18 | Stop reason: HOSPADM

## 2024-01-16 RX ORDER — ALUMINA, MAGNESIA, AND SIMETHICONE 2400; 2400; 240 MG/30ML; MG/30ML; MG/30ML
15 SUSPENSION ORAL EVERY 6 HOURS PRN
Status: DISCONTINUED | OUTPATIENT
Start: 2024-01-16 | End: 2024-01-18 | Stop reason: HOSPADM

## 2024-01-16 RX ORDER — DEXTROSE MONOHYDRATE 25 G/50ML
25 INJECTION, SOLUTION INTRAVENOUS
Status: DISCONTINUED | OUTPATIENT
Start: 2024-01-16 | End: 2024-01-18 | Stop reason: HOSPADM

## 2024-01-16 RX ORDER — ONDANSETRON 2 MG/ML
4 INJECTION INTRAMUSCULAR; INTRAVENOUS EVERY 6 HOURS PRN
Status: DISCONTINUED | OUTPATIENT
Start: 2024-01-16 | End: 2024-01-18 | Stop reason: HOSPADM

## 2024-01-16 RX ORDER — ATENOLOL 50 MG/1
50 TABLET ORAL
Status: DISCONTINUED | OUTPATIENT
Start: 2024-01-16 | End: 2024-01-18 | Stop reason: HOSPADM

## 2024-01-16 RX ORDER — WARFARIN SODIUM 7.5 MG/1
11.25 TABLET ORAL
COMMUNITY

## 2024-01-16 RX ORDER — BISACODYL 10 MG
10 SUPPOSITORY, RECTAL RECTAL DAILY PRN
Status: DISCONTINUED | OUTPATIENT
Start: 2024-01-16 | End: 2024-01-18 | Stop reason: HOSPADM

## 2024-01-16 RX ORDER — LOSARTAN POTASSIUM 50 MG/1
100 TABLET ORAL DAILY
Status: DISCONTINUED | OUTPATIENT
Start: 2024-01-16 | End: 2024-01-18 | Stop reason: HOSPADM

## 2024-01-16 RX ORDER — POLYETHYLENE GLYCOL 3350 17 G/17G
17 POWDER, FOR SOLUTION ORAL DAILY PRN
Status: DISCONTINUED | OUTPATIENT
Start: 2024-01-16 | End: 2024-01-18 | Stop reason: HOSPADM

## 2024-01-16 RX ORDER — ACETAMINOPHEN 160 MG/5ML
650 SOLUTION ORAL EVERY 4 HOURS PRN
Status: DISCONTINUED | OUTPATIENT
Start: 2024-01-16 | End: 2024-01-18 | Stop reason: HOSPADM

## 2024-01-16 RX ORDER — ALBUTEROL SULFATE 2.5 MG/3ML
2.5 SOLUTION RESPIRATORY (INHALATION) EVERY 6 HOURS PRN
Status: DISCONTINUED | OUTPATIENT
Start: 2024-01-16 | End: 2024-01-18 | Stop reason: HOSPADM

## 2024-01-16 RX ORDER — IBUPROFEN 600 MG/1
1 TABLET ORAL
Status: DISCONTINUED | OUTPATIENT
Start: 2024-01-16 | End: 2024-01-18 | Stop reason: HOSPADM

## 2024-01-16 RX ORDER — SODIUM CHLORIDE 0.9 % (FLUSH) 0.9 %
10 SYRINGE (ML) INJECTION AS NEEDED
Status: DISCONTINUED | OUTPATIENT
Start: 2024-01-16 | End: 2024-01-18 | Stop reason: HOSPADM

## 2024-01-16 RX ORDER — SODIUM CHLORIDE 9 MG/ML
40 INJECTION, SOLUTION INTRAVENOUS AS NEEDED
Status: DISCONTINUED | OUTPATIENT
Start: 2024-01-16 | End: 2024-01-18 | Stop reason: HOSPADM

## 2024-01-16 RX ORDER — ALLOPURINOL 100 MG/1
100 TABLET ORAL NIGHTLY
Status: DISCONTINUED | OUTPATIENT
Start: 2024-01-16 | End: 2024-01-18 | Stop reason: HOSPADM

## 2024-01-16 RX ORDER — ASPIRIN 81 MG/1
81 TABLET ORAL DAILY
Status: DISCONTINUED | OUTPATIENT
Start: 2024-01-16 | End: 2024-01-18 | Stop reason: HOSPADM

## 2024-01-16 RX ORDER — WARFARIN SODIUM 7.5 MG/1
7.5 TABLET ORAL
COMMUNITY

## 2024-01-16 RX ORDER — ATORVASTATIN CALCIUM 20 MG/1
20 TABLET, FILM COATED ORAL NIGHTLY
Status: DISCONTINUED | OUTPATIENT
Start: 2024-01-16 | End: 2024-01-18 | Stop reason: HOSPADM

## 2024-01-16 RX ORDER — ACETAMINOPHEN 650 MG/1
650 SUPPOSITORY RECTAL EVERY 4 HOURS PRN
Status: DISCONTINUED | OUTPATIENT
Start: 2024-01-16 | End: 2024-01-18 | Stop reason: HOSPADM

## 2024-01-16 RX ORDER — CHOLECALCIFEROL (VITAMIN D3) 125 MCG
5 CAPSULE ORAL NIGHTLY PRN
Status: DISCONTINUED | OUTPATIENT
Start: 2024-01-16 | End: 2024-01-18 | Stop reason: HOSPADM

## 2024-01-16 RX ORDER — BISACODYL 5 MG/1
5 TABLET, DELAYED RELEASE ORAL DAILY PRN
Status: DISCONTINUED | OUTPATIENT
Start: 2024-01-16 | End: 2024-01-18 | Stop reason: HOSPADM

## 2024-01-16 RX ADMIN — Medication 10 ML: at 21:32

## 2024-01-16 RX ADMIN — ATENOLOL 50 MG: 50 TABLET ORAL at 08:45

## 2024-01-16 RX ADMIN — INSULIN LISPRO 2 UNITS: 100 INJECTION, SOLUTION INTRAVENOUS; SUBCUTANEOUS at 17:39

## 2024-01-16 RX ADMIN — LOSARTAN POTASSIUM 100 MG: 50 TABLET, FILM COATED ORAL at 08:45

## 2024-01-16 RX ADMIN — Medication 10 ML: at 08:47

## 2024-01-16 RX ADMIN — ALLOPURINOL 100 MG: 100 TABLET ORAL at 21:26

## 2024-01-16 RX ADMIN — CEFTRIAXONE 1000 MG: 1 INJECTION, POWDER, FOR SOLUTION INTRAMUSCULAR; INTRAVENOUS at 02:56

## 2024-01-16 RX ADMIN — ATORVASTATIN CALCIUM 20 MG: 20 TABLET, FILM COATED ORAL at 21:26

## 2024-01-16 NOTE — PLAN OF CARE
Goal Outcome Evaluation:           Progress: no change  Outcome Evaluation: new admit  will have a urology consult in the morning

## 2024-01-16 NOTE — ED PROVIDER NOTES
Subjective   History of Present Illness   73-year-old  male presents to the emergency room for urinary retention and increased lower abdominal pain.  Patient denies fever or nausea vomiting or diarrhea.  Patient states that he had a prostate procedure performed on January 5 by a doctor in [facility].  Patient is on Coumadin and is being monitored by his cardiologist.      Review of Systems   Genitourinary:  Positive for dysuria, hematuria and urgency.   All other systems reviewed and are negative.      Past Medical History:   Diagnosis Date    AAA (abdominal aortic aneurysm) 2006    Abnormal ECG     Aneurysm     Arthritis     Asthma     Atrial fibrillation     COUMADIN    Benign prostatic hyperplasia     Bladder cancer     CHF (congestive heart failure)     Chronic anticoagulation     COUMADIN STARTED 2006    Colon polyp     Congenital heart disease     Coronary artery disease     Diabetes mellitus     Elevated cholesterol     Erectile dysfunction     Facial basal cell cancer     Gout     Hard to intubate     History of pancreatitis 2006    History of pneumonia     History of tick-borne disease 2015    HL (hearing loss)     BILAT HEARING AIDS    Hyperlipidemia     Hypertension     Myocardial infarction     X5    Obesity     Pancreatitis     Pneumonia     Sleep apnea     CURRENTLY NOT USING CPAP D/T RECALL    Transfusion history     STATES HIS SISTER HAD A SEVERE REACTION TO BLOOD TRANSFUSION    Type 2 diabetes mellitus        Allergies   Allergen Reactions    Bee Venom Anaphylaxis     HIVES-SWOLLEN THROAT    Meperidine Hives    Midazolam Hives    Propofol Other (See Comments)     UNCLEAR-SVT, HYPOTENSION-STATES SEVERE ALMOST CODED    Sulfa Antibiotics Hives    Hydrocodone Nausea And Vomiting    Simvastatin Myalgia       Past Surgical History:   Procedure Laterality Date    ARTERIOGRAM AORTIC Left 06/29/2022    Procedure: AORTIC STENT GRAFT PLACEMENT WITH ILIAC COILING;  Surgeon: Alistair  Eric GONZALEZ MD;  Location: [facility] HYBRID OR ;  Service: Vascular;  Laterality: Left;    CARDIAC CATHETERIZATION      CHOLECYSTECTOMY      COLONOSCOPY      COLONOSCOPY N/A 2023    Procedure: COLONOSCOPY to cecum with cold biopsy and cold snare polypectomies and clips;  Surgeon: Vitor Haley MD;  Location: [facility] ENDOSCOPY;  Service: Gastroenterology;  Laterality: N/A;  Pre - H/O polyps.  Post - diverticulosis, polyps, hemorrhoids    CORONARY ARTERY BYPASS GRAFT  2006    X2    CYSTOSCOPY      STATES HAD BLADDER TUMORS REMOVED X2    PROSTATE SURGERY      X2- STATES PLACED COILS IN TO HELP WITH FLOW AND THEN HAD TO REMOVE A PIECE THAT BROKE OFF AND WAS IN BLADDER    UPPER GASTROINTESTINAL ENDOSCOPY         Family History   Problem Relation Age of Onset    Hypertension Mother     Colon cancer Mother     Cancer Mother     Diabetes Mother     Cancer Father     Heart attack Father     Hearing loss Father     Heart disease Father     Hypertension Sister     Hypertension Brother     Colon cancer Brother     Cancer Brother     Heart disease Paternal Grandfather     Heart attack Paternal Grandfather     Malig Hyperthermia Neg Hx     Colon polyps Neg Hx     Crohn's disease Neg Hx     Irritable bowel syndrome Neg Hx     Ulcerative colitis Neg Hx        Social History     Socioeconomic History    Marital status:      Spouse name: Nichelle    Number of children: 3    Years of education: 12   Tobacco Use    Smoking status: Former     Packs/day: 1.00     Years: 30.00     Additional pack years: 0.00     Total pack years: 30.00     Types: Cigarettes     Quit date: 2006     Years since quittin.5    Smokeless tobacco: Never   Vaping Use    Vaping Use: Never used   Substance and Sexual Activity    Alcohol use: Not Currently     Comment: one or two beers 6 times a year    Drug use: Never    Sexual activity: Not Currently     Partners: Female     Birth control/protection: None           Objective   Physical  Exam  Constitutional:       General: He is in acute distress.      Appearance: He is not ill-appearing, toxic-appearing or diaphoretic.   HENT:      Head: Normocephalic and atraumatic.      Mouth/Throat:      Mouth: Mucous membranes are moist.      Pharynx: Oropharynx is clear.   Cardiovascular:      Pulses: Normal pulses.   Pulmonary:      Effort: Pulmonary effort is normal.   Abdominal:      General: There is distension.      Tenderness: There is no right CVA tenderness or left CVA tenderness.   Skin:     General: Skin is warm and dry.      Capillary Refill: Capillary refill takes less than 2 seconds.   Neurological:      General: No focal deficit present.      Mental Status: He is alert and oriented to person, place, and time.   Psychiatric:         Mood and Affect: Mood normal.         Behavior: Behavior normal.         Thought Content: Thought content normal.         Judgment: Judgment normal.         Procedures           ED Course  ED Course as of 01/16/24 0546   Tue Jan 16, 2024   0244 Upon reevaluation, patient's Cardoza catheter still having dark red output from the Cardoza I am concerned that this will take several hours to irrigate to clear.  UA was sent and does show 6-10 WBCs and urine culture sent off.  Will start patient on Rocephin for now for questionable UTI but given blood in the urine and postop that this would be a good idea.  Patient to be placed in ED observation unit for continuous irrigation of the bladder until clear.  Patient would benefit from urology consultation in the morning as well. [RL]      ED Course User Index  [RL] Johnnie Pedroza PA                               Medications   cefTRIAXone (ROCEPHIN) 1,000 mg in sodium chloride 0.9 % 100 mL IVPB (0 mg Intravenous Stopped 1/16/24 0458)   Pharmacy to dose warfarin (has no administration in time range)   sodium chloride 0.9 % flush 10 mL (has no administration in time range)   sodium chloride 0.9 % flush 10 mL (has no administration in time  range)   sodium chloride 0.9 % infusion 40 mL (has no administration in time range)   acetaminophen (TYLENOL) tablet 650 mg (has no administration in time range)     Or   acetaminophen (TYLENOL) 160 MG/5ML oral solution 650 mg (has no administration in time range)     Or   acetaminophen (TYLENOL) suppository 650 mg (has no administration in time range)   aluminum-magnesium hydroxide-simethicone (MAALOX MAX) 400-400-40 MG/5ML suspension 15 mL (has no administration in time range)   sennosides-docusate (PERICOLACE) 8.6-50 MG per tablet 2 tablet (has no administration in time range)     And   polyethylene glycol (MIRALAX) packet 17 g (has no administration in time range)     And   bisacodyl (DULCOLAX) EC tablet 5 mg (has no administration in time range)     And   bisacodyl (DULCOLAX) suppository 10 mg (has no administration in time range)   ondansetron ODT (ZOFRAN-ODT) disintegrating tablet 4 mg (has no administration in time range)     Or   ondansetron (ZOFRAN) injection 4 mg (has no administration in time range)   melatonin tablet 5 mg (has no administration in time range)   Potassium Replacement - Follow Nurse / BPA Driven Protocol (has no administration in time range)   Magnesium Standard Dose Replacement - Follow Nurse / BPA Driven Protocol (has no administration in time range)   Phosphorus Replacement - Follow Nurse / BPA Driven Protocol (has no administration in time range)   Calcium Replacement - Follow Nurse / BPA Driven Protocol (has no administration in time range)      No radiology results for the last day         Labs Reviewed   URINALYSIS W/ CULTURE IF INDICATED - Abnormal; Notable for the following components:       Result Value    Color, UA Red (*)     Appearance, UA Turbid (*)     Blood, UA Large (3+) (*)     Protein, UA >=300 mg/dL (3+) (*)     All other components within normal limits    Narrative:     In absence of clinical symptoms, the presence of pyuria, bacteria, and/or nitrites on the  urinalysis result does not correlate with infection.   URINALYSIS, MICROSCOPIC ONLY - Abnormal; Notable for the following components:    RBC, UA Too Numerous to Count (*)     WBC, UA 6-10 (*)     All other components within normal limits   BASIC METABOLIC PANEL - Abnormal; Notable for the following components:    Glucose 117 (*)     Creatinine 0.64 (*)     BUN/Creatinine Ratio 31.3 (*)     All other components within normal limits    Narrative:     GFR Normal >60  Chronic Kidney Disease <60  Kidney Failure <15    The GFR formula is only valid for adults with stable renal function between ages 18 and 70.   CBC WITH AUTO DIFFERENTIAL - Abnormal; Notable for the following components:    RDW 16.5 (*)     Lymphocyte % 17.3 (*)     Neutrophils, Absolute 7.50 (*)     All other components within normal limits   PROTIME-INR - Normal   URINE CULTURE      Medical Decision Making  74 y/o  male presents to ER for c/o urinary retention post prostate procedure on 1/5/24.    Problems Addressed:  Acute UTI: complicated acute illness or injury  Hematuria, unspecified type: complicated acute illness or injury  Urinary retention: complicated acute illness or injury    Amount and/or Complexity of Data Reviewed  Independent Historian:      Details: spouse  Labs: ordered.     Details: PT/INR    Risk  OTC drugs.  Prescription drug management.  Decision regarding hospitalization.        Final diagnoses:   Urinary retention   Hematuria, unspecified type   Acute UTI       ED Disposition  ED Disposition       ED Disposition   Decision to Admit    Condition   --    Comment   --               No follow-up provider specified.       Medication List        ASK your doctor about these medications      * warfarin 7.5 MG tablet  Commonly known as: COUMADIN  Ask about: Which instructions should I use?     * warfarin 7.5 MG tablet  Commonly known as: COUMADIN  Ask about: Which instructions should I use?           * This list has 2 medication(s)  that are the same as other medications prescribed for you. Read the directions carefully, and ask your doctor or other care provider to review them with you.                     Micheal Torres MD  01/16/24 0541

## 2024-01-16 NOTE — H&P
[facility] Observation Unit H&P    Patient Name: Sandro Ramirez  : 1950  MRN: [id number]  Primary Care Physician: Colette Dominguez APRN  Date of admission: 1/15/2024     Patient Care Team:  Colette Dominguez APRN as PCP - General (Nurse Practitioner)  Thai Galloway MD as Consulting Physician (Interventional Cardiology)  Seipel, Joseph F, MD as Consulting Physician (Neurology)  Daniel Hamm MD as Consulting Physician (Urology)  Abbey Mary APRN as Nurse Practitioner (Nurse Practitioner)          Subjective   History Present Illness     Chief Complaint:   Chief Complaint   Patient presents with    Difficulty Urinating    Post-op Problem     Difficulty urinating/postop problem    Difficulty Urinating   Associated symptoms include hematuria and urgency. Pertinent negatives include no nausea or vomiting.     ED 2024  73-year-old  male presents to the emergency room for urinary retention and increased lower abdominal pain.  Patient denies fever or nausea vomiting or diarrhea.  Patient states that he had a prostate procedure performed on  by a doctor in Dr. Hamm's urology group.  Patient is on Coumadin and is being monitored by his cardiologist.    Observation 2024  Patient agrees with HPI noted above including urinary retention and abdominal pain with onset yesterday.  He states he had a prostate procedure performed by urology on  and a few days after he restarted his Coumadin he noted hematuria.  He called his cardiologist and had his INR checked which was therapeutic.  He was told by cardiologist to hold Coumadin for 2 days until , however his hematuria did not resolve and he proceeded to the ED when he had urinary retention.  Patient denies any pain currently.     Review of Systems   Constitutional: Negative for fever and malaise/fatigue.   Gastrointestinal:  Negative for nausea and vomiting.   Genitourinary:  Positive for difficulty urinating, dysuria,  hematuria, pelvic pain and urgency.   All other systems reviewed and are negative.          Personal History     Past Medical History:   Past Medical History:   Diagnosis Date    AAA (abdominal aortic aneurysm) 2006    Abnormal ECG     Aneurysm     Arthritis     Asthma     Atrial fibrillation     COUMADIN    Benign prostatic hyperplasia     Bladder cancer     CHF (congestive heart failure)     Chronic anticoagulation     COUMADIN STARTED 2006    Colon polyp     Congenital heart disease     Coronary artery disease     Diabetes mellitus     Elevated cholesterol     Erectile dysfunction     Facial basal cell cancer     Gout     Hard to intubate     History of pancreatitis 2006    History of pneumonia     History of tick-borne disease 2015    HL (hearing loss)     BILAT HEARING AIDS    Hyperlipidemia     Hypertension     Myocardial infarction     X5    Obesity     Pancreatitis     Pneumonia     Sleep apnea     CURRENTLY NOT USING CPAP D/T RECALL    Transfusion history     STATES HIS SISTER HAD A SEVERE REACTION TO BLOOD TRANSFUSION    Type 2 diabetes mellitus        Surgical History:      Past Surgical History:   Procedure Laterality Date    ARTERIOGRAM AORTIC Left 06/29/2022    Procedure: AORTIC STENT GRAFT PLACEMENT WITH ILIAC COILING;  Surgeon: Eric Sainz MD;  Location: [facility] HYBRID OR 18/19;  Service: Vascular;  Laterality: Left;    CARDIAC CATHETERIZATION      CHOLECYSTECTOMY      COLONOSCOPY      COLONOSCOPY N/A 7/27/2023    Procedure: COLONOSCOPY to cecum with cold biopsy and cold snare polypectomies and clips;  Surgeon: Vitor Haley MD;  Location: [facility] ENDOSCOPY;  Service: Gastroenterology;  Laterality: N/A;  Pre - H/O polyps.  Post - diverticulosis, polyps, hemorrhoids    CORONARY ARTERY BYPASS GRAFT  2006    X2    CYSTOSCOPY      STATES HAD BLADDER TUMORS REMOVED X2    PROSTATE SURGERY      X2- STATES PLACED COILS IN TO HELP WITH FLOW AND THEN HAD TO REMOVE A PIECE THAT BROKE OFF AND WAS IN  BLADDER    UPPER GASTROINTESTINAL ENDOSCOPY             Family History: family history includes Cancer in his brother, father, and mother; Colon cancer in his brother and mother; Diabetes in his mother; Hearing loss in his father; Heart attack in his father and paternal grandfather; Heart disease in his father and paternal grandfather; Hypertension in his brother, mother, and sister. Otherwise pertinent FHx was reviewed and unremarkable.     Social History:  reports that he quit smoking about 17 years ago. His smoking use included cigarettes. He has a 30.00 pack-year smoking history. He has never used smokeless tobacco. He reports that he does not currently use alcohol. He reports that he does not use drugs.      Medications:  Prior to Admission medications    Medication Sig Start Date End Date Taking? Authorizing Provider   allopurinol (ZYLOPRIM) 100 MG tablet Take 1 tablet by mouth Every Night. 11/5/19  Yes Kelby Aguilar MD   aspirin 81 MG tablet Take 1 tablet by mouth Daily. PT STATES WAS INSTRUCTED TO CONTINUE TO TAKE THIS PER GET ANDERSEN 12/23/11  Yes Kelby Aguilar MD   atenolol (TENORMIN) 50 MG tablet TAKE 1 TABLET BY MOUTH EVERY DAY AT NIGHT 12/14/23  Yes Thai Galloway MD   atorvastatin (LIPITOR) 20 MG tablet TAKE 1 TABLET BY MOUTH EVERY DAY 9/1/23  Yes Thai Galloway MD   losartan (COZAAR) 25 MG tablet Take 4 tablets by mouth Daily.   Yes Kelby Aguilar MD   albuterol sulfate  (90 Base) MCG/ACT inhaler Inhale 2 puffs Every 4 (Four) Hours As Needed for Wheezing.    Kelby Aguilar MD   warfarin (COUMADIN) 7.5 MG tablet Take 1.5 tablets by mouth. On Mon, Wed, Fri.    Kelby Aguilar MD   warfarin (COUMADIN) 7.5 MG tablet Take 1 tablet by mouth. TONY WEISS, SAT, SUN.    Kelby Aguilar MD   alfuzosin (UROXATRAL) 10 MG 24 hr tablet Take 1 tablet by mouth 2 (Two) Times a Day.  1/16/24  Kelby Aguilar MD   fluticasone (FLONASE) 50 MCG/ACT nasal spray 2  sprays into the nostril(s) as directed by provider Daily. 11/19/22 1/16/24  Helena Jason APRN   warfarin (COUMADIN) 7.5 MG tablet TAKE 1 & 1/2 TABLETS BY MOUTH ON MONDAY, WEDNESDAY, AND FRIDAY, 1 TABLET ON ALL OTHER DAYS OR AS DIRECTED 9/11/23 1/16/24  Thai Galloway MD       Allergies:    Allergies   Allergen Reactions    Bee Venom Anaphylaxis     HIVES-SWOLLEN THROAT    Meperidine Hives    Midazolam Hives    Propofol Other (See Comments)     UNCLEAR-SVT, HYPOTENSION-STATES SEVERE ALMOST CODED    Sulfa Antibiotics Hives    Hydrocodone Nausea And Vomiting    Simvastatin Myalgia       Objective   Objective     Vital Signs  Temp:  [97.3 °F (36.3 °C)-97.6 °F (36.4 °C)] 97.3 °F (36.3 °C)  Heart Rate:  [54-94] 54  Resp:  [16-18] 17  BP: (124-170)/(58-96) 149/60  SpO2:  [90 %-93 %] 91 %  on   ;   Device (Oxygen Therapy): room air  Body mass index is 26.44 kg/m².    Physical Exam  Vitals and nursing note reviewed.   Constitutional:       Appearance: Normal appearance.   HENT:      Head: Normocephalic and atraumatic.      Right Ear: External ear normal.      Left Ear: External ear normal.      Nose: Nose normal.      Mouth/Throat:      Mouth: Mucous membranes are moist.   Eyes:      Extraocular Movements: Extraocular movements intact.   Cardiovascular:      Rate and Rhythm: Normal rate and regular rhythm.      Pulses: Normal pulses.      Heart sounds: Normal heart sounds.   Pulmonary:      Effort: Pulmonary effort is normal.      Breath sounds: Normal breath sounds.   Abdominal:      General: Abdomen is flat. Bowel sounds are normal.      Palpations: Abdomen is soft.   Genitourinary:     Comments: Cardoza catheter in place with continuous bladder irrigation.  Gross hematuria noted  Musculoskeletal:         General: Normal range of motion.      Cervical back: Normal range of motion.   Skin:     General: Skin is warm.   Neurological:      Mental Status: He is alert and oriented to person, place, and time.   Psychiatric:          Behavior: Behavior normal.         Thought Content: Thought content normal.         Judgment: Judgment normal.           Results Review:  I have personally reviewed most recent lab results and radiology images and interpretations and agree with findings, most notably: CMP, A1c, PT/INR, CBC, UA.    Results from last 7 days   Lab Units 01/16/24  0403 01/15/24  2244   WBC 10*3/mm3 10.00  --    HEMOGLOBIN g/dL 14.1  --    HEMATOCRIT % 44.2  --    PLATELETS 10*3/mm3 228  --    INR   --  2.56     Results from last 7 days   Lab Units 01/16/24  0403   SODIUM mmol/L 137   POTASSIUM mmol/L 4.2   CHLORIDE mmol/L 105   CO2 mmol/L 23.0   BUN mg/dL 20   CREATININE mg/dL 0.64*   GLUCOSE mg/dL 117*   CALCIUM mg/dL 8.9     Estimated Creatinine Clearance: 128.8 mL/min (A) (by C-G formula based on SCr of 0.64 mg/dL (L)).  Brief Urine Lab Results  (Last result in the past 365 days)        Color   Clarity   Blood   Leuk Est   Nitrite   Protein   CREAT   Urine HCG        01/16/24 0213 Red  Comment: Any Substance that causes an abnormal urine color can alter the accuracy of the chemical reactions.   Turbid   Large (3+)   --  Comment: The Leukoesterase test cannot be performed due to the centrifugation of the specimen.      Negative   >=300 mg/dL (3+)                   Microbiology Results (last 10 days)       ** No results found for the last 240 hours. **            ECG/EMG Results (most recent)       Procedure Component Value Units Date/Time    SCANNED - TELEMETRY   [[id number]] Resulted: 01/15/24     Updated: 01/16/24 0523    SCANNED - TELEMETRY   [[id number]] Resulted: 01/15/24     Updated: 01/16/24 0726            Results for orders placed during the hospital encounter of 02/13/23    Duplex aorta ivc iliac graft complete CAR    Interpretation Summary    Technically difficult study due to bowel gas but no evidence of endoleak's with patent endograft seen.  Maximum residual sac diameter approximately 4.5 cm.      Results for orders  placed during the hospital encounter of 10/19/23    Adult Transthoracic Echo Complete W/ Cont if Necessary Per Protocol    Interpretation Summary    Left ventricular ejection fraction appears to be 51 - 55%.    The right ventricular cavity is mildly dilated.    The left atrial cavity is moderately dilated.    The right atrial cavity is dilated.    Moderate aortic valve regurgitation is present.    Moderate mitral valve regurgitation is present.    Estimated right ventricular systolic pressure from tricuspid regurgitation is mildly elevated (35-45 mmHg).      No radiology results for the last 7 days      Estimated Creatinine Clearance: 128.8 mL/min (A) (by C-G formula based on SCr of 0.64 mg/dL (L)).    Assessment & Plan   Assessment/Plan       Active Hospital Problems    Diagnosis  POA    **Acute UTI [N39.0]  Yes      Resolved Hospital Problems   No resolved problems to display.       Acute UTI  Lab Results   Component Value Date    WBC 10.00 01/16/2024    AST 23 10/10/2023    ALT 25 10/10/2023    ALKPHOS 187 (H) 10/10/2023    BILITOT 0.6 10/10/2023   -CMP and CBC generally unremarkable  -UA positive for hematuria and WBC  -IV ceftriaxone empirically and urine culture pending results  -Cardoza catheter in place  -Continuous bladder irrigation  -Urology consulted and recommended monitoring patient while continuing bladder irrigation.  Possible voiding trial in a.m.  -N.p.o. diet at midnight in case patient requires clot evacuation and fulguration tomorrow -Monitor signs and symptoms while admitted     A-fib  -Coumadin currently on hold  -PT/INR therapeutic  -Recheck INR in a.m.    Hypertension  -Moderately controlled   BP Readings from Last 1 Encounters:   01/16/24 149/60   - Continue Cozaar and atenolol  - Monitor while admitted     Hyperlipidemia  -Continue Lipitor    VTE Prophylaxis -   Mechanical Order History:        Ordered        01/16/24 0305  Place Sequential Compression Device  Once            01/16/24 0305   Maintain Sequential Compression Device  Continuous                          Pharmalogical Order History:        Ordered     Dose Route Frequency Stop    01/16/24 0305  Pharmacy to dose warfarin        Question:  Target INR  Answer:  2 - 3    -- XX Continuous PRN --                    CODE STATUS:    Code Status and Medical Interventions:   Ordered at: 01/16/24 0253     Code Status (Patient has no pulse and is not breathing):    CPR (Attempt to Resuscitate)     Medical Interventions (Patient has pulse or is breathing):    Full Support       This patient has been examined wearing personal protective equipment.     I discussed the patient's findings and my recommendations with patient and nursing staff.      Signature:Electronically signed by ALICJA Carrizales, 01/16/24, 9:52 AM EST.

## 2024-01-16 NOTE — CONSULTS
Urology Consult Note    Patient:Sandro Ramirez :1950  Room:[facility] [place]  Admit Date1/15/2024  Age:73 y.o.     SEX:male     DOS:2024     MR:[id number]     Visit:[id number]       Attending: Micheal Torres MD  Referring Provider: Dr. Torres  Reason for Consultation: Gross hematuria    Patient Care Team:  Colette Dominguez APRN as PCP - General (Nurse Practitioner)  Thai Galloway MD as Consulting Physician (Interventional Cardiology)  Seipel, Joseph F, MD as Consulting Physician (Neurology)  Daniel Hamm MD as Consulting Physician (Urology)  Abbey Mary APRN as Nurse Practitioner (Nurse Practitioner)    Chief complaint blood in urine    Subjective .     History of present illness: 73-year-old gentleman who recently had a greenlight TURP of the prostate for BPH with lower urinary tract symptoms including slowing of his stream.  This was done on 2024.  Patient's urine was clearing but then he restarted his Coumadin.  He is bleeding worsened.  His cardiologist told him to hold his Coumadin for 3 days but he developed clot retention and presented to the emergency room.  A three-way Cardoza catheter was placed and several clots were irrigated from the bladder.  Urine is clearing this morning.  Urinalysis reveals to numerous count red cells, 6-10 white cells, no bacteria.  A urine culture is pending.  Patient has been started on empiric antibiotics.    Review of Systems  10 point review of systems were reviewed and are negative except for:  Constitution:  positive for See HPI    History  Past Medical History:   Diagnosis Date    AAA (abdominal aortic aneurysm)     Abnormal ECG     Aneurysm     Arthritis     Asthma     Atrial fibrillation     COUMADIN    Benign prostatic hyperplasia     Bladder cancer     CHF (congestive heart failure)     Chronic anticoagulation     COUMADIN STARTED     Colon polyp     Congenital heart disease     Coronary artery disease     Diabetes mellitus     Elevated  cholesterol     Erectile dysfunction     Facial basal cell cancer     Gout     Hard to intubate     History of pancreatitis 2006    History of pneumonia     History of tick-borne disease     HL (hearing loss)     BILAT HEARING AIDS    Hyperlipidemia     Hypertension     Myocardial infarction     X5    Obesity     Pancreatitis     Pneumonia     Sleep apnea     CURRENTLY NOT USING CPAP D/T RECALL    Transfusion history     STATES HIS SISTER HAD A SEVERE REACTION TO BLOOD TRANSFUSION    Type 2 diabetes mellitus      Past Surgical History:   Procedure Laterality Date    ARTERIOGRAM AORTIC Left 2022    Procedure: AORTIC STENT GRAFT PLACEMENT WITH ILIAC COILING;  Surgeon: Eric Sainz MD;  Location: [facility] HYBRID OR ;  Service: Vascular;  Laterality: Left;    CARDIAC CATHETERIZATION      CHOLECYSTECTOMY      COLONOSCOPY      COLONOSCOPY N/A 2023    Procedure: COLONOSCOPY to cecum with cold biopsy and cold snare polypectomies and clips;  Surgeon: Vitor Haley MD;  Location: [facility] ENDOSCOPY;  Service: Gastroenterology;  Laterality: N/A;  Pre - H/O polyps.  Post - diverticulosis, polyps, hemorrhoids    CORONARY ARTERY BYPASS GRAFT  2006    X2    CYSTOSCOPY      STATES HAD BLADDER TUMORS REMOVED X2    PROSTATE SURGERY      X2- STATES PLACED COILS IN TO HELP WITH FLOW AND THEN HAD TO REMOVE A PIECE THAT BROKE OFF AND WAS IN BLADDER    UPPER GASTROINTESTINAL ENDOSCOPY       Social History     Socioeconomic History    Marital status:      Spouse name: Nichelle    Number of children: 3    Years of education: 12   Tobacco Use    Smoking status: Former     Packs/day: 1.00     Years: 30.00     Additional pack years: 0.00     Total pack years: 30.00     Types: Cigarettes     Quit date: 2006     Years since quittin.5    Smokeless tobacco: Never   Vaping Use    Vaping Use: Never used   Substance and Sexual Activity    Alcohol use: Not Currently     Comment: one or two beers 6 times a year     Drug use: Never    Sexual activity: Not Currently     Partners: Female     Birth control/protection: None     Family History   Problem Relation Age of Onset    Hypertension Mother     Colon cancer Mother     Cancer Mother     Diabetes Mother     Cancer Father     Heart attack Father     Hearing loss Father     Heart disease Father     Hypertension Sister     Hypertension Brother     Colon cancer Brother     Cancer Brother     Heart disease Paternal Grandfather     Heart attack Paternal Grandfather     Malig Hyperthermia Neg Hx     Colon polyps Neg Hx     Crohn's disease Neg Hx     Irritable bowel syndrome Neg Hx     Ulcerative colitis Neg Hx      Allergy  Allergies   Allergen Reactions    Bee Venom Anaphylaxis     HIVES-SWOLLEN THROAT    Meperidine Hives    Midazolam Hives    Propofol Other (See Comments)     UNCLEAR-SVT, HYPOTENSION-STATES SEVERE ALMOST CODED    Sulfa Antibiotics Hives    Hydrocodone Nausea And Vomiting    Simvastatin Myalgia     Prior to Admission medications    Medication Sig Start Date End Date Taking? Authorizing Provider   allopurinol (ZYLOPRIM) 300 MG tablet Take 1 tablet by mouth Every Night. 11/5/19  Yes Kelby Aguilar MD   aspirin 81 MG tablet Take 1 tablet by mouth Daily. PT STATES WAS INSTRUCTED TO CONTINUE TO TAKE THIS PER GET ANDERSEN 12/23/11  Yes Kelby Aguilar MD   atenolol (TENORMIN) 50 MG tablet TAKE 1 TABLET BY MOUTH EVERY DAY AT NIGHT 12/14/23  Yes Thai Galloway MD   atorvastatin (LIPITOR) 20 MG tablet TAKE 1 TABLET BY MOUTH EVERY DAY 9/1/23  Yes Thai Galloway MD   albuterol sulfate  (90 Base) MCG/ACT inhaler Inhale 2 puffs Every 4 (Four) Hours As Needed for Wheezing.    Kelby Aguilar MD   losartan (COZAAR) 25 MG tablet Take 4 tablets by mouth Daily.    Kelby Aguilar MD   warfarin (COUMADIN) 7.5 MG tablet Take 1.5 tablets by mouth. On Mon, Wed, Fri.    Kelby Aguilar MD   warfarin (COUMADIN) 7.5 MG tablet Take 1 tablet by mouth.  TONY WEISS SAT, SUN.    Provider, MD Kelby   alfuzosin (UROXATRAL) 10 MG 24 hr tablet Take 1 tablet by mouth 2 (Two) Times a Day.  24  ProviderKelby MD   fluticasone (FLONASE) 50 MCG/ACT nasal spray 2 sprays into the nostril(s) as directed by provider Daily. 22  Helena Jason APRN   warfarin (COUMADIN) 7.5 MG tablet TAKE 1 & 1/2 TABLETS BY MOUTH ON MONDAY, WEDNESDAY, AND FRIDAY, 1 TABLET ON ALL OTHER DAYS OR AS DIRECTED 23  Thai Galloway MD         Objective     tMax 24 hours:  Temp (24hrs), Av.5 °F (36.4 °C), Min:97.3 °F (36.3 °C), Max:97.6 °F (36.4 °C)    Vital Sign Ranges:  Temp:  [97.3 °F (36.3 °C)-97.6 °F (36.4 °C)] 97.3 °F (36.3 °C)  Heart Rate:  [54-94] 54  Resp:  [16-18] 17  BP: (124-170)/(58-96) 149/60  Intake and Output Last 3 Shifts:  I/O last 3 completed shifts:  In: -   Out: 72246 [Urine:89817]      Physical Exam:   General Appearance alert, appears stated age, and cooperative  Head normocephalic, without obvious abnormality and atraumatic  Abdomen soft non-tender, no guarding, and no rebound tenderness  Male Genitalia Cardoza with red-tinged urine  Skin no bleeding, bruising or rash  Neurologic Mental Status orientated to person, place, time and situation    Results Review:     Lab Results (last 24 hours)       Procedure Component Value Units Date/Time    Basic Metabolic Panel [[id number]]  (Abnormal) Collected: 24 0403    Specimen: Blood Updated: 24     Glucose 117 mg/dL      BUN 20 mg/dL      Creatinine 0.64 mg/dL      Sodium 137 mmol/L      Potassium 4.2 mmol/L      Chloride 105 mmol/L      CO2 23.0 mmol/L      Calcium 8.9 mg/dL      BUN/Creatinine Ratio 31.3     Anion Gap 9.0 mmol/L      eGFR 101.2 mL/min/1.73     Narrative:      GFR Normal >60  Chronic Kidney Disease <60  Kidney Failure <15    The GFR formula is only valid for adults with stable renal function between ages 18 and 70.    CBC Auto Differential [[id number]]   (Abnormal) Collected: 01/16/24 0403    Specimen: Blood Updated: 01/16/24 0419     WBC 10.00 10*3/mm3      RBC 5.22 10*6/mm3      Hemoglobin 14.1 g/dL      Hematocrit 44.2 %      MCV 84.6 fL      MCH 27.1 pg      MCHC 32.0 g/dL      RDW 16.5 %      RDW-SD 48.6 fl      MPV 7.6 fL      Platelets 228 10*3/mm3      Neutrophil % 74.7 %      Lymphocyte % 17.3 %      Monocyte % 5.6 %      Eosinophil % 1.0 %      Basophil % 1.4 %      Neutrophils, Absolute 7.50 10*3/mm3      Lymphocytes, Absolute 1.70 10*3/mm3      Monocytes, Absolute 0.60 10*3/mm3      Eosinophils, Absolute 0.10 10*3/mm3      Basophils, Absolute 0.10 10*3/mm3      nRBC 0.0 /100 WBC     Urinalysis, Microscopic Only - Urine, Clean Catch [[id number]]  (Abnormal) Collected: 01/16/24 0213    Specimen: Urine, Clean Catch Updated: 01/16/24 0232     RBC, UA Too Numerous to Count /HPF      WBC, UA 6-10 /HPF      Bacteria, UA None Seen /HPF      Squamous Epithelial Cells, UA 0-2 /HPF      Hyaline Casts, UA       Unable to determine due to loaded field     /LPF     Methodology Automated Microscopy    Urine Culture - Urine, Urine, Clean Catch [[id number]] Collected: 01/16/24 0213    Specimen: Urine, Clean Catch Updated: 01/16/24 0232    Urinalysis With Culture If Indicated - Urine, Clean Catch [[id number]]  (Abnormal) Collected: 01/16/24 0213    Specimen: Urine, Clean Catch Updated: 01/16/24 0232     Color, UA Red     Comment: Any Substance that causes an abnormal urine color can alter the accuracy of the chemical reactions.        Appearance, UA Turbid     pH, UA 7.0     Specific Gravity, UA 1.025     Glucose, UA Negative     Comment: Due to the bloody appearance of the urine, macroscopic testing was performed on a centrifuged urine specimen to reduce RBC interference on the chemical testing.          Ketones, UA Negative     Bilirubin, UA Negative     Blood, UA Large (3+)     Protein, UA >=300 mg/dL (3+)     Leuk Esterase, UA --     Comment: The Leukoesterase test  "cannot be performed due to the centrifugation of the specimen.           Nitrite, UA Negative     Urobilinogen, UA 0.2 E.U./dL    Narrative:      In absence of clinical symptoms, the presence of pyuria, bacteria, and/or nitrites on the urinalysis result does not correlate with infection.    Protime-INR [[id number]]  (Normal) Collected: 01/15/24 2244    Specimen: Blood Updated: 01/15/24 2304     Protime 26.0 Seconds      INR 2.56           No results found for: \"URINECX\"     Imaging Results (Last 7 Days)       ** No results found for the last 168 hours. **            Inpatient Meds:   Scheduled Meds:allopurinol, 300 mg, Oral, Nightly  aspirin, 81 mg, Oral, Daily  atenolol, 50 mg, Oral, Q24H  atorvastatin, 20 mg, Oral, Nightly  cefTRIAXone, 1,000 mg, Intravenous, Q24H  losartan, 100 mg, Oral, Daily  senna-docusate sodium, 2 tablet, Oral, BID  sodium chloride, 10 mL, Intravenous, Q12H       Continuous Infusions:Pharmacy to dose warfarin,        PRN Meds:.  acetaminophen **OR** acetaminophen **OR** acetaminophen    albuterol    aluminum-magnesium hydroxide-simethicone    senna-docusate sodium **AND** polyethylene glycol **AND** bisacodyl **AND** bisacodyl    Calcium Replacement - Follow Nurse / BPA Driven Protocol    Magnesium Standard Dose Replacement - Follow Nurse / BPA Driven Protocol    melatonin    ondansetron ODT **OR** ondansetron    Pharmacy to dose warfarin    Phosphorus Replacement - Follow Nurse / BPA Driven Protocol    Potassium Replacement - Follow Nurse / BPA Driven Protocol    sodium chloride    sodium chloride      Assessment & Plan     Principal Problem:    Acute UTI    Gross hematuria due to recent urologic procedure and anticoagulation  BPH with lower urinary tract symptoms status post greenlight TURP  Clot retention    Plan  Continue three-way irrigation  Agree with empiric Rocephin  Await urine culture  If urine is fairly clear then will give voiding trial tomorrow  If urine remains bloody then we " will proceed with clot evacuation and fulguration tomorrow      I discussed the patient's findings and my recommendations with patient and nursing staff    Thank you for this  consult    Daniel Hamm MD  01/16/24  07:35 EST

## 2024-01-16 NOTE — PROGRESS NOTES
"Pharmacy dosing service  Anticoagulant  Warfarin     Subjective:    Sandro Ramirez is a 73 y.o.male being continued on warfarin for Atrial Fibrillation.    INR Goal: 2 - 3  Home medication?: warfarin 7.5 mg PO daily, except warfarin 11.25 mg PO on MWF.   Bridge Therapy Present?:  No  Interacting Medications Evaluation (New/Present/Discontinued): Ceftriaxone (can cause INR rise)  Additional Contributing Factors: no      Assessment/Plan:    INR today is therapeutic at 2.56. Urology's note today (1/16) documents \"gross hematuria due to recent urologic procedure\" and the patient's outpatient anticoagulation management visit note from yesterday (1/15) had advised the patient hold warfarin until Wednesday (1/17). After checking with the attending team will hold warfarin today due to bleeding and possibility for another procedure (related to the bleeding) tomorrow.     Continue to monitor and adjust based on INR.         Date 1/16           INR 2.56           Dose HOLD               Objective:  [Ht: 180.3 cm (71\"); Wt: 86 kg (189 lb 9.5 oz); BMI: Body mass index is 26.44 kg/m².]    Lab Results   Component Value Date    ALBUMIN 4.0 10/10/2023     Lab Results   Component Value Date    INR 2.56 01/15/2024    INR 2.60 01/15/2024    INR 1.50 (A) 01/11/2024    PROTIME 26.0 01/15/2024    PROTIME 24.5 09/05/2023    PROTIME 26.9 09/05/2023     Lab Results   Component Value Date    HGB 14.1 01/16/2024    HGB 13.3 10/10/2023    HGB 13.4 09/05/2023     Lab Results   Component Value Date    HCT 44.2 01/16/2024    HCT 41.0 10/10/2023    HCT 42.0 09/05/2023       Justo Pryor [facility]  01/16/24 09:48 EST     "

## 2024-01-16 NOTE — ED TRIAGE NOTES
Pt had prostate surgery on 01/05/24 at [facility]. Pt reports having increased pain, difficulty urinating and increased blood and blood clots.

## 2024-01-16 NOTE — CASE MANAGEMENT/SOCIAL WORK
Discharge Planning Assessment  [facility]     Patient Name: Sandro Ramirez  MRN: [id number]  Today's Date: 1/16/2024    Admit Date: 1/15/2024    Plan: Return home with spouse. Meds to Bed   Discharge Needs Assessment       Row Name 01/16/24 1437       Living Environment    People in Home spouse    Current Living Arrangements home    Potentially Unsafe Housing Conditions none    Primary Care Provided by self    Provides Primary Care For no one    Family Caregiver if Needed spouse    Quality of Family Relationships helpful;involved;supportive    Able to Return to Prior Arrangements yes       Resource/Environmental Concerns    Resource/Environmental Concerns none    Transportation Concerns none       Transition Planning    Patient/Family Anticipates Transition to home with family    Patient/Family Anticipated Services at Transition none    Transportation Anticipated car, drives self;family or friend will provide       Discharge Needs Assessment    Readmission Within the Last 30 Days no previous admission in last 30 days    Equipment Currently Used at Home cpap;glucometer    Concerns to be Addressed no discharge needs identified    Anticipated Changes Related to Illness none    Equipment Needed After Discharge none                   Discharge Plan       Row Name 01/16/24 5579       Plan    Plan Return home with spouse. Meds to Bed    Patient/Family in Agreement with Plan yes    Plan Comments Barriers: Urology following. IV antibiotics.  CM met with  and Mrs. Ramirez who said they live together,he drives and is independent. Only equipment is a CPAP and glucometer. Verified PCP and he chose Meds to Bed. Denied difficulty purchasing meds.                  Demographic Summary       Row Name 01/16/24 1436       General Information    Admission Type observation    Arrived From emergency department    Required Notices Provided Observation Status Notice    Referral Source admission list    Reason for Consult discharge  planning    Preferred Language English       Contact Information    Permission Granted to Share Info With                    Functional Status       Row Name 01/16/24 1437       Functional Status    Usual Activity Tolerance good    Current Activity Tolerance moderate       Functional Status, IADL    Medications independent    Meal Preparation independent;assistive person    Housekeeping independent;assistive person    Laundry independent;assistive person    Shopping independent;assistive person    IADL Comments independent. Spouse available       Mental Status    General Appearance WDL WDL       Mental Status Summary    Recent Changes in Mental Status/Cognitive Functioning no changes                             Maintained distance greater than six feet and spent less than 15 minutes in the room.     Nichelle SALDIVAR,RN Case Manager  [facility]  Phone: Desk- [phone] cell- [phone]

## 2024-01-16 NOTE — PLAN OF CARE
Goal Outcome Evaluation:  Plan of Care Reviewed With: patient           Outcome Evaluation: Patient continue to have bright red blood in urine. Continuous irrigation ran all shift with multiple draining. Pt has no complaints of pain this shift. Continue to monitor

## 2024-01-17 ENCOUNTER — ANESTHESIA (OUTPATIENT)
Dept: PERIOP | Facility: HOSPITAL | Age: 74
End: 2024-01-17
Payer: MEDICARE

## 2024-01-17 ENCOUNTER — ANESTHESIA EVENT (OUTPATIENT)
Dept: PERIOP | Facility: HOSPITAL | Age: 74
End: 2024-01-17
Payer: MEDICARE

## 2024-01-17 LAB
ANION GAP SERPL CALCULATED.3IONS-SCNC: 7 MMOL/L (ref 5–15)
BACTERIA SPEC AEROBE CULT: NO GROWTH
BASOPHILS # BLD AUTO: 0.1 10*3/MM3 (ref 0–0.2)
BASOPHILS NFR BLD AUTO: 0.8 % (ref 0–1.5)
BUN SERPL-MCNC: 20 MG/DL (ref 8–23)
BUN/CREAT SERPL: 26 (ref 7–25)
CALCIUM SPEC-SCNC: 9.2 MG/DL (ref 8.6–10.5)
CHLORIDE SERPL-SCNC: 105 MMOL/L (ref 98–107)
CO2 SERPL-SCNC: 29 MMOL/L (ref 22–29)
CREAT SERPL-MCNC: 0.77 MG/DL (ref 0.76–1.27)
DEPRECATED RDW RBC AUTO: 48.6 FL (ref 37–54)
EGFRCR SERPLBLD CKD-EPI 2021: 95.7 ML/MIN/1.73
EOSINOPHIL # BLD AUTO: 0.2 10*3/MM3 (ref 0–0.4)
EOSINOPHIL NFR BLD AUTO: 2.5 % (ref 0.3–6.2)
ERYTHROCYTE [DISTWIDTH] IN BLOOD BY AUTOMATED COUNT: 16.4 % (ref 12.3–15.4)
GLUCOSE BLDC GLUCOMTR-MCNC: 119 MG/DL (ref 70–105)
GLUCOSE BLDC GLUCOMTR-MCNC: 119 MG/DL (ref 70–105)
GLUCOSE BLDC GLUCOMTR-MCNC: 136 MG/DL (ref 70–105)
GLUCOSE BLDC GLUCOMTR-MCNC: 137 MG/DL (ref 70–105)
GLUCOSE BLDC GLUCOMTR-MCNC: 190 MG/DL (ref 70–105)
GLUCOSE SERPL-MCNC: 116 MG/DL (ref 65–99)
HCT VFR BLD AUTO: 44 % (ref 37.5–51)
HGB BLD-MCNC: 14.2 G/DL (ref 13–17.7)
INR PPP: 1.49 (ref 2–3)
LYMPHOCYTES # BLD AUTO: 2 10*3/MM3 (ref 0.7–3.1)
LYMPHOCYTES NFR BLD AUTO: 20.7 % (ref 19.6–45.3)
MCH RBC QN AUTO: 27.4 PG (ref 26.6–33)
MCHC RBC AUTO-ENTMCNC: 32.3 G/DL (ref 31.5–35.7)
MCV RBC AUTO: 84.7 FL (ref 79–97)
MONOCYTES # BLD AUTO: 0.6 10*3/MM3 (ref 0.1–0.9)
MONOCYTES NFR BLD AUTO: 6.5 % (ref 5–12)
NEUTROPHILS NFR BLD AUTO: 6.8 10*3/MM3 (ref 1.7–7)
NEUTROPHILS NFR BLD AUTO: 69.5 % (ref 42.7–76)
NRBC BLD AUTO-RTO: 0 /100 WBC (ref 0–0.2)
PLATELET # BLD AUTO: 228 10*3/MM3 (ref 140–450)
PMV BLD AUTO: 7.9 FL (ref 6–12)
POTASSIUM SERPL-SCNC: 4.8 MMOL/L (ref 3.5–5.2)
PROTHROMBIN TIME: 15.8 SECONDS (ref 19.4–28.5)
QT INTERVAL: 496 MS
QTC INTERVAL: 433 MS
RBC # BLD AUTO: 5.2 10*6/MM3 (ref 4.14–5.8)
SODIUM SERPL-SCNC: 141 MMOL/L (ref 136–145)
WBC NRBC COR # BLD AUTO: 9.8 10*3/MM3 (ref 3.4–10.8)

## 2024-01-17 PROCEDURE — 25010000002 LORAZEPAM PER 2 MG: Performed by: NURSE ANESTHETIST, CERTIFIED REGISTERED

## 2024-01-17 PROCEDURE — 25010000002 HYDROMORPHONE 1 MG/ML SOLUTION: Performed by: NURSE ANESTHETIST, CERTIFIED REGISTERED

## 2024-01-17 PROCEDURE — 25010000002 DEXAMETHASONE PER 1 MG: Performed by: NURSE ANESTHETIST, CERTIFIED REGISTERED

## 2024-01-17 PROCEDURE — 25810000003 LACTATED RINGERS PER 1000 ML: Performed by: ANESTHESIOLOGY

## 2024-01-17 PROCEDURE — 85610 PROTHROMBIN TIME: CPT | Performed by: NURSE PRACTITIONER

## 2024-01-17 PROCEDURE — 80048 BASIC METABOLIC PNL TOTAL CA: CPT | Performed by: NURSE PRACTITIONER

## 2024-01-17 PROCEDURE — G0378 HOSPITAL OBSERVATION PER HR: HCPCS

## 2024-01-17 PROCEDURE — 93005 ELECTROCARDIOGRAM TRACING: CPT | Performed by: UROLOGY

## 2024-01-17 PROCEDURE — 85025 COMPLETE CBC W/AUTO DIFF WBC: CPT | Performed by: NURSE PRACTITIONER

## 2024-01-17 PROCEDURE — 82948 REAGENT STRIP/BLOOD GLUCOSE: CPT | Performed by: NURSE PRACTITIONER

## 2024-01-17 PROCEDURE — 93010 ELECTROCARDIOGRAM REPORT: CPT | Performed by: INTERNAL MEDICINE

## 2024-01-17 PROCEDURE — 0T5C8ZZ DESTRUCTION OF BLADDER NECK, VIA NATURAL OR ARTIFICIAL OPENING ENDOSCOPIC: ICD-10-PCS | Performed by: UROLOGY

## 2024-01-17 PROCEDURE — 82948 REAGENT STRIP/BLOOD GLUCOSE: CPT

## 2024-01-17 PROCEDURE — 0TCC8ZZ EXTIRPATION OF MATTER FROM BLADDER NECK, VIA NATURAL OR ARTIFICIAL OPENING ENDOSCOPIC: ICD-10-PCS | Performed by: UROLOGY

## 2024-01-17 PROCEDURE — 25010000002 GLYCOPYRROLATE 1 MG/5ML SOLUTION: Performed by: NURSE ANESTHETIST, CERTIFIED REGISTERED

## 2024-01-17 PROCEDURE — 25010000002 FENTANYL CITRATE (PF) 100 MCG/2ML SOLUTION: Performed by: NURSE ANESTHETIST, CERTIFIED REGISTERED

## 2024-01-17 PROCEDURE — 25010000002 CEFTRIAXONE PER 250 MG: Performed by: PHYSICIAN ASSISTANT

## 2024-01-17 PROCEDURE — 25010000002 ONDANSETRON PER 1 MG: Performed by: NURSE ANESTHETIST, CERTIFIED REGISTERED

## 2024-01-17 PROCEDURE — 25010000002 LABETALOL 5 MG/ML SOLUTION: Performed by: NURSE ANESTHETIST, CERTIFIED REGISTERED

## 2024-01-17 PROCEDURE — 25010000002 DIPHENHYDRAMINE PER 50 MG: Performed by: NURSE ANESTHETIST, CERTIFIED REGISTERED

## 2024-01-17 PROCEDURE — 82948 REAGENT STRIP/BLOOD GLUCOSE: CPT | Performed by: NURSE ANESTHETIST, CERTIFIED REGISTERED

## 2024-01-17 RX ORDER — HYDROCODONE BITARTRATE AND ACETAMINOPHEN 5; 325 MG/1; MG/1
1 TABLET ORAL ONCE AS NEEDED
Status: DISCONTINUED | OUTPATIENT
Start: 2024-01-17 | End: 2024-01-17 | Stop reason: HOSPADM

## 2024-01-17 RX ORDER — PROMETHAZINE HYDROCHLORIDE 25 MG/1
25 TABLET ORAL ONCE AS NEEDED
Status: DISCONTINUED | OUTPATIENT
Start: 2024-01-17 | End: 2024-01-17 | Stop reason: HOSPADM

## 2024-01-17 RX ORDER — FENTANYL CITRATE 50 UG/ML
INJECTION, SOLUTION INTRAMUSCULAR; INTRAVENOUS AS NEEDED
Status: DISCONTINUED | OUTPATIENT
Start: 2024-01-17 | End: 2024-01-17 | Stop reason: SURG

## 2024-01-17 RX ORDER — DEXAMETHASONE SODIUM PHOSPHATE 4 MG/ML
INJECTION, SOLUTION INTRA-ARTICULAR; INTRALESIONAL; INTRAMUSCULAR; INTRAVENOUS; SOFT TISSUE AS NEEDED
Status: DISCONTINUED | OUTPATIENT
Start: 2024-01-17 | End: 2024-01-17 | Stop reason: SURG

## 2024-01-17 RX ORDER — ONDANSETRON 2 MG/ML
INJECTION INTRAMUSCULAR; INTRAVENOUS AS NEEDED
Status: DISCONTINUED | OUTPATIENT
Start: 2024-01-17 | End: 2024-01-17 | Stop reason: SURG

## 2024-01-17 RX ORDER — GLYCOPYRROLATE 0.2 MG/ML
INJECTION INTRAMUSCULAR; INTRAVENOUS AS NEEDED
Status: DISCONTINUED | OUTPATIENT
Start: 2024-01-17 | End: 2024-01-17 | Stop reason: SURG

## 2024-01-17 RX ORDER — FENTANYL CITRATE 50 UG/ML
50 INJECTION, SOLUTION INTRAMUSCULAR; INTRAVENOUS
Status: DISCONTINUED | OUTPATIENT
Start: 2024-01-17 | End: 2024-01-17 | Stop reason: HOSPADM

## 2024-01-17 RX ORDER — LABETALOL HYDROCHLORIDE 5 MG/ML
5 INJECTION, SOLUTION INTRAVENOUS
Status: DISCONTINUED | OUTPATIENT
Start: 2024-01-17 | End: 2024-01-17 | Stop reason: HOSPADM

## 2024-01-17 RX ORDER — DROPERIDOL 2.5 MG/ML
0.62 INJECTION, SOLUTION INTRAMUSCULAR; INTRAVENOUS
Status: DISCONTINUED | OUTPATIENT
Start: 2024-01-17 | End: 2024-01-17 | Stop reason: HOSPADM

## 2024-01-17 RX ORDER — LIDOCAINE HYDROCHLORIDE 20 MG/ML
INJECTION, SOLUTION EPIDURAL; INFILTRATION; INTRACAUDAL; PERINEURAL AS NEEDED
Status: DISCONTINUED | OUTPATIENT
Start: 2024-01-17 | End: 2024-01-17 | Stop reason: SURG

## 2024-01-17 RX ORDER — HYDRALAZINE HYDROCHLORIDE 20 MG/ML
5 INJECTION INTRAMUSCULAR; INTRAVENOUS
Status: DISCONTINUED | OUTPATIENT
Start: 2024-01-17 | End: 2024-01-17 | Stop reason: HOSPADM

## 2024-01-17 RX ORDER — ONDANSETRON 2 MG/ML
4 INJECTION INTRAMUSCULAR; INTRAVENOUS ONCE AS NEEDED
Status: DISCONTINUED | OUTPATIENT
Start: 2024-01-17 | End: 2024-01-17 | Stop reason: HOSPADM

## 2024-01-17 RX ORDER — IPRATROPIUM BROMIDE AND ALBUTEROL SULFATE 2.5; .5 MG/3ML; MG/3ML
3 SOLUTION RESPIRATORY (INHALATION) ONCE AS NEEDED
Status: DISCONTINUED | OUTPATIENT
Start: 2024-01-17 | End: 2024-01-17 | Stop reason: HOSPADM

## 2024-01-17 RX ORDER — LORAZEPAM 1 MG/1
1 TABLET ORAL ONCE
Status: DISCONTINUED | OUTPATIENT
Start: 2024-01-17 | End: 2024-01-17

## 2024-01-17 RX ORDER — PROMETHAZINE HYDROCHLORIDE 25 MG/1
25 SUPPOSITORY RECTAL ONCE AS NEEDED
Status: DISCONTINUED | OUTPATIENT
Start: 2024-01-17 | End: 2024-01-17 | Stop reason: HOSPADM

## 2024-01-17 RX ORDER — SODIUM CHLORIDE, SODIUM LACTATE, POTASSIUM CHLORIDE, CALCIUM CHLORIDE 600; 310; 30; 20 MG/100ML; MG/100ML; MG/100ML; MG/100ML
1000 INJECTION, SOLUTION INTRAVENOUS CONTINUOUS
Status: DISCONTINUED | OUTPATIENT
Start: 2024-01-17 | End: 2024-01-18 | Stop reason: HOSPADM

## 2024-01-17 RX ORDER — DIPHENHYDRAMINE HYDROCHLORIDE 50 MG/ML
12.5 INJECTION INTRAMUSCULAR; INTRAVENOUS
Status: DISCONTINUED | OUTPATIENT
Start: 2024-01-17 | End: 2024-01-17 | Stop reason: HOSPADM

## 2024-01-17 RX ORDER — ETOMIDATE 2 MG/ML
INJECTION INTRAVENOUS AS NEEDED
Status: DISCONTINUED | OUTPATIENT
Start: 2024-01-17 | End: 2024-01-17 | Stop reason: SURG

## 2024-01-17 RX ORDER — NALOXONE HCL 0.4 MG/ML
0.2 VIAL (ML) INJECTION AS NEEDED
Status: DISCONTINUED | OUTPATIENT
Start: 2024-01-17 | End: 2024-01-17 | Stop reason: HOSPADM

## 2024-01-17 RX ORDER — LORAZEPAM 2 MG/ML
INJECTION INTRAMUSCULAR AS NEEDED
Status: DISCONTINUED | OUTPATIENT
Start: 2024-01-17 | End: 2024-01-17 | Stop reason: SURG

## 2024-01-17 RX ADMIN — DIPHENHYDRAMINE HYDROCHLORIDE 12.5 MG: 50 INJECTION, SOLUTION INTRAMUSCULAR; INTRAVENOUS at 14:08

## 2024-01-17 RX ADMIN — ETOMIDATE 20 MG: 2 INJECTION INTRAVENOUS at 12:54

## 2024-01-17 RX ADMIN — DEXAMETHASONE SODIUM PHOSPHATE 4 MG: 4 INJECTION, SOLUTION INTRAMUSCULAR; INTRAVENOUS at 12:58

## 2024-01-17 RX ADMIN — Medication 10 ML: at 08:21

## 2024-01-17 RX ADMIN — HYDROMORPHONE HYDROCHLORIDE 0.5 MG: 1 INJECTION, SOLUTION INTRAMUSCULAR; INTRAVENOUS; SUBCUTANEOUS at 13:47

## 2024-01-17 RX ADMIN — CEFTRIAXONE 1000 MG: 1 INJECTION, POWDER, FOR SOLUTION INTRAMUSCULAR; INTRAVENOUS at 02:33

## 2024-01-17 RX ADMIN — GLYCOPYRROLATE 0.2 MCG: 0.2 INJECTION INTRAMUSCULAR; INTRAVENOUS at 13:08

## 2024-01-17 RX ADMIN — Medication 5 MG: at 14:39

## 2024-01-17 RX ADMIN — FENTANYL CITRATE 50 MCG: 50 INJECTION, SOLUTION INTRAMUSCULAR; INTRAVENOUS at 13:07

## 2024-01-17 RX ADMIN — ONDANSETRON 4 MG: 2 INJECTION INTRAMUSCULAR; INTRAVENOUS at 12:58

## 2024-01-17 RX ADMIN — Medication 5 MG: at 14:29

## 2024-01-17 RX ADMIN — LIDOCAINE HYDROCHLORIDE 100 MG: 20 INJECTION, SOLUTION EPIDURAL; INFILTRATION; INTRACAUDAL; PERINEURAL at 12:54

## 2024-01-17 RX ADMIN — SODIUM CHLORIDE, POTASSIUM CHLORIDE, SODIUM LACTATE AND CALCIUM CHLORIDE 1000 ML: 600; 310; 30; 20 INJECTION, SOLUTION INTRAVENOUS at 12:33

## 2024-01-17 RX ADMIN — LORAZEPAM 1 MG: 2 INJECTION, SOLUTION INTRAMUSCULAR; INTRAVENOUS at 14:13

## 2024-01-17 RX ADMIN — FENTANYL CITRATE 50 MCG: 50 INJECTION, SOLUTION INTRAMUSCULAR; INTRAVENOUS at 13:05

## 2024-01-17 RX ADMIN — LOSARTAN POTASSIUM 100 MG: 50 TABLET, FILM COATED ORAL at 08:19

## 2024-01-17 RX ADMIN — ATENOLOL 50 MG: 50 TABLET ORAL at 08:19

## 2024-01-17 NOTE — NURSING NOTE
CARMEN Lenz RN, Took over primary nursing for room 225 at this time, report given by Blanca ZIMMER RN

## 2024-01-17 NOTE — PROGRESS NOTES
Urology Progress Note    Patient Identification:  Name:  Sandro Ramirez  Age:  73 y.o.  Sex:  male  :  1950  MRN:  [id number]    Chief Complaint:  Patient without complaint    History of Present Illness: Gross hematuria still present and has not improved significantly.    Problem List:    Acute UTI     Past Medical History:  Past Medical History:   Diagnosis Date    AAA (abdominal aortic aneurysm) 2006    Abnormal ECG     Aneurysm     Arthritis     Asthma     Atrial fibrillation     COUMADIN    Benign prostatic hyperplasia     Bladder cancer     CHF (congestive heart failure)     Chronic anticoagulation     COUMADIN STARTED     Colon polyp     Congenital heart disease     Coronary artery disease     Diabetes mellitus     Elevated cholesterol     Erectile dysfunction     Facial basal cell cancer     Gout     Hard to intubate     History of pancreatitis 2006    History of pneumonia     History of tick-borne disease     HL (hearing loss)     BILAT HEARING AIDS    Hyperlipidemia     Hypertension     Myocardial infarction     X5    Obesity     Pancreatitis     Pneumonia     Sleep apnea     CURRENTLY NOT USING CPAP D/T RECALL    Transfusion history     STATES HIS SISTER HAD A SEVERE REACTION TO BLOOD TRANSFUSION    Type 2 diabetes mellitus      Past Surgical History:  Past Surgical History:   Procedure Laterality Date    ARTERIOGRAM AORTIC Left 2022    Procedure: AORTIC STENT GRAFT PLACEMENT WITH ILIAC COILING;  Surgeon: Eric Sainz MD;  Location: [facility] HYBRID OR ;  Service: Vascular;  Laterality: Left;    CARDIAC CATHETERIZATION      CHOLECYSTECTOMY      COLONOSCOPY      COLONOSCOPY N/A 2023    Procedure: COLONOSCOPY to cecum with cold biopsy and cold snare polypectomies and clips;  Surgeon: Vitor Haley MD;  Location: [facility] ENDOSCOPY;  Service: Gastroenterology;  Laterality: N/A;  Pre - H/O polyps.  Post - diverticulosis, polyps, hemorrhoids    CORONARY ARTERY BYPASS  GRAFT  2006    X2    CYSTOSCOPY      STATES HAD BLADDER TUMORS REMOVED X2    PROSTATE SURGERY      X2- STATES PLACED COILS IN TO HELP WITH FLOW AND THEN HAD TO REMOVE A PIECE THAT BROKE OFF AND WAS IN BLADDER    UPPER GASTROINTESTINAL ENDOSCOPY       Home Meds:  Medications Prior to Admission   Medication Sig Dispense Refill Last Dose    allopurinol (ZYLOPRIM) 100 MG tablet Take 1 tablet by mouth Every Night.   1/15/2024    aspirin 81 MG tablet Take 1 tablet by mouth Daily. PT STATES WAS INSTRUCTED TO CONTINUE TO TAKE THIS PER GET ANDERSEN   1/15/2024    atenolol (TENORMIN) 50 MG tablet TAKE 1 TABLET BY MOUTH EVERY DAY AT NIGHT 90 tablet 0 1/15/2024    atorvastatin (LIPITOR) 20 MG tablet TAKE 1 TABLET BY MOUTH EVERY DAY 90 tablet 1 1/15/2024    losartan (COZAAR) 25 MG tablet Take 4 tablets by mouth Daily.   1/15/2024    albuterol sulfate  (90 Base) MCG/ACT inhaler Inhale 2 puffs Every 4 (Four) Hours As Needed for Wheezing.       warfarin (COUMADIN) 7.5 MG tablet Take 1.5 tablets by mouth. On Mon, Wed, Fri.       warfarin (COUMADIN) 7.5 MG tablet Take 1 tablet by mouth. TONY WEISS, SAT, SUN.        Current Meds:    Current Facility-Administered Medications:     acetaminophen (TYLENOL) tablet 650 mg, 650 mg, Oral, Q4H PRN **OR** acetaminophen (TYLENOL) 160 MG/5ML oral solution 650 mg, 650 mg, Oral, Q4H PRN **OR** acetaminophen (TYLENOL) suppository 650 mg, 650 mg, Rectal, Q4H PRN, Johnnie Pedroza PA    albuterol (PROVENTIL) nebulizer solution 0.083% 2.5 mg/3mL, 2.5 mg, Nebulization, Q6H PRN, Tripure, Toya S, APRN    allopurinol (ZYLOPRIM) tablet 100 mg, 100 mg, Oral, Nightly, Tripure, Toya S, APRN, 100 mg at 01/16/24 2126    aluminum-magnesium hydroxide-simethicone (MAALOX MAX) 400-400-40 MG/5ML suspension 15 mL, 15 mL, Oral, Q6H PRN, Johnnie Pedroza PA    aspirin EC tablet 81 mg, 81 mg, Oral, Daily, Tripure, Toya S, APRN    atenolol (TENORMIN) tablet 50 mg, 50 mg, Oral, Q24H, Toya Rodgers APRN, 50  mg at 01/16/24 0845    atorvastatin (LIPITOR) tablet 20 mg, 20 mg, Oral, Nightly, Tripure, Toya S, APRN, 20 mg at 01/16/24 2126    sennosides-docusate (PERICOLACE) 8.6-50 MG per tablet 2 tablet, 2 tablet, Oral, BID **AND** polyethylene glycol (MIRALAX) packet 17 g, 17 g, Oral, Daily PRN **AND** bisacodyl (DULCOLAX) EC tablet 5 mg, 5 mg, Oral, Daily PRN **AND** bisacodyl (DULCOLAX) suppository 10 mg, 10 mg, Rectal, Daily PRN, Johnnie Pedroza PA    Calcium Replacement - Follow Nurse / BPA Driven Protocol, , Does not apply, PRN, Johnnie Pedroza PA    cefTRIAXone (ROCEPHIN) 1,000 mg in sodium chloride 0.9 % 100 mL IVPB, 1,000 mg, Intravenous, Q24H, Johnnie Pedroza PA, Last Rate: 200 mL/hr at 01/17/24 0233, 1,000 mg at 01/17/24 0233    dextrose (D50W) (25 g/50 mL) IV injection 25 g, 25 g, Intravenous, Q15 Min PRN, Tripure, Toya S, APRN    dextrose (GLUTOSE) oral gel 15 g, 15 g, Oral, Q15 Min PRN, Tripure, Toya S, APRN    glucagon (GLUCAGEN) injection 1 mg, 1 mg, Intramuscular, Q15 Min PRN, Tripure, Toya S, APRN    insulin lispro (HUMALOG/ADMELOG) injection 2-7 Units, 2-7 Units, Subcutaneous, 4x Daily AC & at Bedtime, Tripure, Toya S, APRN, 2 Units at 01/16/24 1739    losartan (COZAAR) tablet 100 mg, 100 mg, Oral, Daily, Tripure, Toya S, APRN, 100 mg at 01/16/24 0845    Magnesium Standard Dose Replacement - Follow Nurse / BPA Driven Protocol, , Does not apply, PRN, Johnnie Pedroza PA    melatonin tablet 5 mg, 5 mg, Oral, Nightly PRN, Johnnie Pedroza PA    ondansetron ODT (ZOFRAN-ODT) disintegrating tablet 4 mg, 4 mg, Oral, Q6H PRN **OR** ondansetron (ZOFRAN) injection 4 mg, 4 mg, Intravenous, Q6H PRN, Johnnie Pedroza PA    Pharmacy to dose warfarin, , Does not apply, Continuous PRN, Johnnie Pedroza PA    Phosphorus Replacement - Follow Nurse / BPA Driven Protocol, , Does not apply, PRN, Johnnie Pedroza PA    Potassium Replacement - Follow Nurse / BPA Driven Protocol, , Does not apply, PRYovany ROBINS Ryan, PA    sodium chloride 0.9 %  "flush 10 mL, 10 mL, Intravenous, Q12H, Johnnie Pedroza PA, 10 mL at 24 2132    sodium chloride 0.9 % flush 10 mL, 10 mL, Intravenous, PRN, Johnnie Pedroza PA    sodium chloride 0.9 % infusion 40 mL, 40 mL, Intravenous, PRN, Johnnie Pedroza PA  Allergies:  Bee venom, Meperidine, Midazolam, Propofol, Sulfa antibiotics, Hydrocodone, and Simvastatin    Review of Systems     Objective:  tMax 24 hours:  Temp (24hrs), Av.9 °F (36.6 °C), Min:97.5 °F (36.4 °C), Max:98.3 °F (36.8 °C)    Vital Sign Ranges:  Temp:  [97.5 °F (36.4 °C)-98.3 °F (36.8 °C)] 97.5 °F (36.4 °C)  Heart Rate:  [58-76] 76  Resp:  [16-26] 26  BP: (129-150)/(54-64) 150/54  Intake and Output Last 3 Shifts:  I/O last 3 completed shifts:  In: 60560 [P.O.:240; Other:00328]  Out: 02367 [Urine:71216]    Exam:  /54 (BP Location: Left arm, Patient Position: Lying)   Pulse 76   Temp 97.5 °F (36.4 °C) (Oral)   Resp 26   Ht 180.3 cm (71\")   Wt 86 kg (189 lb 9.5 oz)   SpO2 95%   BMI 26.44 kg/m²    General Appearance:    Alert, cooperative, no acute distress, general         appearance is normal   Head:    Normocephalic, without obvious abnormality, atraumatic   Eyes:            Pupils/Irises normal. Exterior inspection conjunctivae       and lids normal.   Ears:    Normal external inspection   Nose:   Exterior inspection of nose is normal   Throat:   Lips, mucosa, and tongue normal   Lungs:     Respirations unlabored; normal effort, no audible     abnormality   CV:   Regular rhythm and normal rate, no edema   Abdomen:     examination of the abdomen is normal with     no masses, tenderness, or distension    : Cardoza with red urine on slow CBI     Data Review:  All labs (24hrs):    Lab Results (last 24 hours)       Procedure Component Value Units Date/Time    Basic Metabolic Panel [[id number]]  (Abnormal) Collected: 24 0425    Specimen: Blood from Arm, Right Updated: 24 0609     Glucose 116 mg/dL      BUN 20 mg/dL      Creatinine 0.77 mg/dL      " Sodium 141 mmol/L      Potassium 4.8 mmol/L      Chloride 105 mmol/L      CO2 29.0 mmol/L      Calcium 9.2 mg/dL      BUN/Creatinine Ratio 26.0     Anion Gap 7.0 mmol/L      eGFR 95.7 mL/min/1.73     Narrative:      GFR Normal >60  Chronic Kidney Disease <60  Kidney Failure <15    The GFR formula is only valid for adults with stable renal function between ages 18 and 70.    Protime-INR [[id number]]  (Abnormal) Collected: 01/17/24 0425    Specimen: Blood from Arm, Right Updated: 01/17/24 0606     Protime 15.8 Seconds      INR 1.49    CBC & Differential [[id number]]  (Abnormal) Collected: 01/17/24 0425    Specimen: Blood from Arm, Right Updated: 01/17/24 0549    Narrative:      The following orders were created for panel order CBC & Differential.  Procedure                               Abnormality         Status                     ---------                               -----------         ------                     CBC Auto Differential[[id number]]        Abnormal            Final result                 Please view results for these tests on the individual orders.    CBC Auto Differential [[id number]]  (Abnormal) Collected: 01/17/24 0425    Specimen: Blood from Arm, Right Updated: 01/17/24 0549     WBC 9.80 10*3/mm3      RBC 5.20 10*6/mm3      Hemoglobin 14.2 g/dL      Hematocrit 44.0 %      MCV 84.7 fL      MCH 27.4 pg      MCHC 32.3 g/dL      RDW 16.4 %      RDW-SD 48.6 fl      MPV 7.9 fL      Platelets 228 10*3/mm3      Neutrophil % 69.5 %      Lymphocyte % 20.7 %      Monocyte % 6.5 %      Eosinophil % 2.5 %      Basophil % 0.8 %      Neutrophils, Absolute 6.80 10*3/mm3      Lymphocytes, Absolute 2.00 10*3/mm3      Monocytes, Absolute 0.60 10*3/mm3      Eosinophils, Absolute 0.20 10*3/mm3      Basophils, Absolute 0.10 10*3/mm3      nRBC 0.0 /100 WBC     POC Glucose Once [[id number]]  (Normal) Collected: 01/16/24 2056    Specimen: Blood Updated: 01/16/24 2058     Glucose 104 mg/dL      Comment: Serial Number:  [id number]:  [id number]       POC Glucose Once [[id number]]  (Abnormal) Collected: 01/16/24 1615    Specimen: Blood Updated: 01/16/24 1617     Glucose 199 mg/dL      Comment: Serial Number: [id number]:  [id number]       POC Glucose Once [[id number]]  (Abnormal) Collected: 01/16/24 1117    Specimen: Blood Updated: 01/16/24 1119     Glucose 133 mg/dL      Comment: Serial Number: [id number]:  [id number]       Hemoglobin A1c [[id number]]  (Abnormal) Collected: 01/16/24 0403    Specimen: Blood Updated: 01/16/24 0905     Hemoglobin A1C 6.60 %     POC Glucose Once [[id number]]  (Normal) Collected: 01/16/24 0846    Specimen: Blood Updated: 01/16/24 0848     Glucose 104 mg/dL      Comment: Serial Number: [id number]:  [id number]             Radiology:   Imaging Results (Last 72 Hours)       ** No results found for the last 72 hours. **            Assessment/Plan:    Principal Problem:    Acute UTI    Gross hematuria due to bleeding following greenlight TURP    Plan  Cystoscopy, clot evacuation, fulguration of bleeding.  Risk, benefits, alternatives have been discussed with the patient he wishes to proceed.      Daniel Hamm MD  1/17/2024  07:06 EST

## 2024-01-17 NOTE — ANESTHESIA PREPROCEDURE EVALUATION
Anesthesia Evaluation     Patient summary reviewed and Nursing notes reviewed   history of anesthetic complications:  difficult airway  NPO Solid Status: > 8 hours  NPO Liquid Status: > 8 hours           Airway   Mallampati: II  Dental      Pulmonary    (+) COPD,sleep apnea  Cardiovascular     ECG reviewed    (+) hypertension, past MI , CAD, CABG, dysrhythmias Atrial Fib, Atrial Flutter, angina, CHF , hyperlipidemia      Neuro/Psych  GI/Hepatic/Renal/Endo    (+) diabetes mellitus    Musculoskeletal     Abdominal    Substance History      OB/GYN          Other   arthritis,   history of cancer active    ROS/Med Hx Other: Propofol listed on allergy list. Possibly related to propofol infusion syndrome post sedation for ventilator post cardiac surgery.  After discussing options today, I recommended we use etomidate in lieu of propofol but if future procedures requiring short term sedation, propofol may be safest preferred option.                Anesthesia Plan    ASA 3     general     intravenous induction     Anesthetic plan, risks, benefits, and alternatives have been provided, discussed and informed consent has been obtained with: patient.    Plan discussed with CRNA.      CODE STATUS:    Code Status (Patient has no pulse and is not breathing): CPR (Attempt to Resuscitate)  Medical Interventions (Patient has pulse or is breathing): Full Support

## 2024-01-17 NOTE — CASE MANAGEMENT/SOCIAL WORK
Continued Stay Note  [facility]     Patient Name: Sandro Ramirez  MRN: [id number]  Today's Date: 1/17/2024    Admit Date: 1/15/2024    Plan: Return home with spouse. Meds to Bed   Discharge Plan       Row Name 01/17/24 1044       Plan    Plan Comments Barriers:   01/17/24 CYSTOSCOPY WITH CLOT EVACUATION, WITH FULGRATION.  CM will follow as needed                        Phone communication or documentation only - no physical contact with patient or family.   Nichelle SALDIVAR,RN Case Manager  [facility]  Phone: Desk- [phone] cell- [phone]

## 2024-01-17 NOTE — OP NOTE
CYSTOSCOPY WITH CLOT EVACUATION  Procedure Report    Patient Name:  Sandro Ramirez  YOB: 1950    Date of Surgery:  1/17/2024     Indications:  74 yo male s/p Greenlight laser TURP 2 weeks ago. Admitted with clot retention.    Pre-op Diagnosis:   Gross hematuria, clot retention       Post-Op Diagnosis Codes:   Gross hematuria, clot retention    Procedure/CPT® Codes:      Procedure(s):  CYSTOSCOPY WITH CLOT EVACUATION, WITH FULGRATION    Staff:  Surgeon(s):  Daniel Hamm MD            was responsible for performing the following activities:  none  and their skilled assistance was necessary for the success of this case.    Anesthesia: General    Estimated Blood Loss:  30 ml    Implants:    Nothing was implanted during the procedure    Specimen:          None      Findings: large amount blood clot in bladder    Complications: none    Description of Procedure: Pt induced with general anesthesia and placed in dorsal lithotomy position. Prepped and draped in sterile fashion. 28 Fr resectoscope advanced under visual guidance. Urethra normal, prostate s/p laser TURP, some bleeding around bladder neck, large amount of blood in bladder. Clot irrigated from bladder; some bleeding from mucosa where irritated by irrigation cauterized. Prostatic fossa fulgurated especially around bladder neck. Scope removed and 3 way kim placed to CBI. Pt awakened from anesthesia and taken to PACU in stable condition having tolerated procedure well without any complications.    Hold anticoagulation today. Voiding trial tomorrow if urine fairly clear.      Daniel Hamm MD     Date: 1/17/2024  Time: 16:52 EST

## 2024-01-17 NOTE — PLAN OF CARE
Problem: Adult Inpatient Plan of Care  Goal: Plan of Care Review  1/17/2024 0407 by Rimma Levin RN  Outcome: Ongoing, Progressing  Flowsheets (Taken 1/17/2024 0407)  Progress: no change  Plan of Care Reviewed With: patient  Outcome Evaluation: Patient continues to have CBI in place. Urine still red/pink. Catheter flushed PRN for clots. Will continue to monitor.  1/17/2024 0406 by Rimma Levin RN  Outcome: Ongoing, Progressing  Flowsheets (Taken 1/17/2024 0406)  Plan of Care Reviewed With: patient  Goal: Patient-Specific Goal (Individualized)  1/17/2024 0407 by Rimma Levin RN  Outcome: Ongoing, Progressing  1/17/2024 0406 by Rimma Levin RN  Outcome: Ongoing, Progressing  Goal: Absence of Hospital-Acquired Illness or Injury  1/17/2024 0407 by Rimma Levin RN  Outcome: Ongoing, Progressing  1/17/2024 0406 by Rimma Levin RN  Outcome: Ongoing, Progressing  Intervention: Identify and Manage Fall Risk  Recent Flowsheet Documentation  Taken 1/17/2024 0200 by Rimma Levin RN  Safety Promotion/Fall Prevention: safety round/check completed  Goal: Optimal Comfort and Wellbeing  1/17/2024 0407 by Rimma Levin RN  Outcome: Ongoing, Progressing  1/17/2024 0406 by Rimma Levin RN  Outcome: Ongoing, Progressing  Goal: Readiness for Transition of Care  1/17/2024 0407 by Rimma Levin RN  Outcome: Ongoing, Progressing  1/17/2024 0406 by Rimma Levin RN  Outcome: Ongoing, Progressing     Problem: Asthma Comorbidity  Goal: Maintenance of Asthma Control  1/17/2024 0407 by Rimma Levin RN  Outcome: Ongoing, Progressing  1/17/2024 0406 by Rimma Levin RN  Outcome: Ongoing, Progressing     Problem: Diabetes Comorbidity  Goal: Blood Glucose Level Within Targeted Range  1/17/2024 0407 by Rimma Levin RN  Outcome: Ongoing, Progressing  1/17/2024 0406 by Rimma Levin RN  Outcome: Ongoing, Progressing      Problem: Heart Failure Comorbidity  Goal: Maintenance of Heart Failure Symptom Control  1/17/2024 0407 by Rimma Levin RN  Outcome: Ongoing, Progressing  1/17/2024 0406 by Rimma Levin RN  Outcome: Ongoing, Progressing     Problem: Hypertension Comorbidity  Goal: Blood Pressure in Desired Range  1/17/2024 0407 by Rimma Levin RN  Outcome: Ongoing, Progressing  1/17/2024 0406 by Rimma Levin RN  Outcome: Ongoing, Progressing     Problem: Obstructive Sleep Apnea Risk or Actual Comorbidity Management  Goal: Unobstructed Breathing During Sleep  1/17/2024 0407 by Rimma Levin RN  Outcome: Ongoing, Progressing  1/17/2024 0406 by Rimma Levin RN  Outcome: Ongoing, Progressing     Problem: UTI (Urinary Tract Infection)  Goal: Improved Infection Symptoms  Outcome: Ongoing, Progressing   Goal Outcome Evaluation:  Plan of Care Reviewed With: patient        Progress: no change  Outcome Evaluation: Patient continues to have CBI in place. Urine still red/pink. Catheter flushed PRN for clots. Will continue to monitor.

## 2024-01-17 NOTE — NURSING NOTE
Pt returned from procedure sedated. Pt arouses and begins to pull at kim cath and tubing. No mittens found on unit. Non skid socks placed on hands to deter from pulling tubes.

## 2024-01-17 NOTE — ANESTHESIA PROCEDURE NOTES
Airway  Urgency: elective    Date/Time: 1/17/2024 12:58 PM    General Information and Staff    Patient location during procedure: OR  CRNA/CAA: Tashia Wilson CRNA    Indications and Patient Condition  Indications for airway management: airway protection    Preoxygenated: yes  Mask difficulty assessment: 0 - not attempted    Final Airway Details  Final airway type: supraglottic airway      Successful airway: I-gel  Size 4     Number of attempts at approach: 1    Additional Comments  Atraumatic placement. Dentition unchanged from preop.

## 2024-01-17 NOTE — PLAN OF CARE
Goal Outcome Evaluation:  Plan of Care Reviewed With: patient           Outcome Evaluation: Pt had cystoscopy with clot evacuation this shift. Pt continue to have bright red blood draining with continous irrigation. Draining well at this time. Pt also has traction set to kim. Dr ordered to keep in place until urine is clear. Pt has been sleeping since return from procedure. Will continue to monitor.

## 2024-01-17 NOTE — ANESTHESIA POSTPROCEDURE EVALUATION
Patient: Sandro Ramirez    Procedure Summary       Date: 01/17/24 Room / Location: [facility] OR 07 / [facility] MAIN OR    Anesthesia Start: 1247 Anesthesia Stop: 1419    Procedure: CYSTOSCOPY WITH CLOT EVACUATION, WITH FULGRATION Diagnosis:     Surgeons: Daniel Hamm MD Provider: Link Rivera MD    Anesthesia Type: general ASA Status: 3            Anesthesia Type: No value filed.    Vitals  Vitals Value Taken Time   /99 01/17/24 1446   Temp 97.9 °F (36.6 °C) 01/17/24 1357   Pulse 74 01/17/24 1451   Resp 20 01/17/24 1445   SpO2 95 % 01/17/24 1451   Vitals shown include unfiled device data.        Post Anesthesia Care and Evaluation    Patient location during evaluation: PACU  Patient participation: complete - patient participated  Level of consciousness: awake  Pain management: adequate    Airway patency: patent  Anesthetic complications: No anesthetic complications  PONV Status: none  Cardiovascular status: acceptable  Respiratory status: acceptable  Hydration status: acceptable    Comments: Patient seen and examined postoperatively; vital signs stable; SpO2 greater than or equal to 90%; cardiopulmonary status stable; nausea/vomiting adequately controlled; pain adequately controlled; no apparent anesthesia complications; patient discharged from anesthesia care when discharge criteria were met

## 2024-01-17 NOTE — NURSING NOTE
Pt extremely agitated upon admission to PACU. Fighting and cursing at staff, trying to get out of bed. At one point, pt was on all 4's in bed. CBI disconnected at one point from NS bags, pt agitation causing more bleeding from catheter. Unable to get accurate vitals or assessment on pt. CRNA was at bedside, medicated pt to calm him down. Pt's daughter was updated.

## 2024-01-17 NOTE — PROGRESS NOTES
[facility] Observation Unit H&P    Patient Name: Sandro Ramirez  : 1950  MRN: [id number]  Primary Care Physician: Colette Dominguez APRN  Date of admission: 1/15/2024     Patient Care Team:  Colette Dominguez APRN as PCP - General (Nurse Practitioner)  Thai Galloway MD as Consulting Physician (Interventional Cardiology)  Seipel, Joseph F, MD as Consulting Physician (Neurology)  Daniel Hamm MD as Consulting Physician (Urology)  Abbey Mary APRN as Nurse Practitioner (Nurse Practitioner)          Subjective   History Present Illness     Chief Complaint:   Chief Complaint   Patient presents with    Difficulty Urinating    Post-op Problem     Dysuria, hematuria    History of Present Illness    ED 2024  73-year-old  male presents to the emergency room for urinary retention and increased lower abdominal pain.  Patient denies fever or nausea vomiting or diarrhea.  Patient states that he had a prostate procedure performed on  by a doctor in Dr. Hamm's urology group.  Patient is on Coumadin and is being monitored by his cardiologist.     Observation 2024  Patient agrees with HPI noted above including urinary retention and abdominal pain with onset yesterday.  He states he had a prostate procedure performed by urology on  and a few days after he restarted his Coumadin he noted hematuria.  He called his cardiologist and had his INR checked which was therapeutic.  He was told by cardiologist to hold Coumadin for 2 days until , however his hematuria did not resolve and he proceeded to the ED when he had urinary retention.  Patient denies any pain currently.    Observation 24  Urology following. Bladder irrigation being performed. Cystoscopy scheduled for today.      ROS    Constitutional: Negative for fever and malaise/fatigue.   Gastrointestinal:  Negative for nausea and vomiting.   Genitourinary:  Positive for difficulty urinating, dysuria, hematuria, pelvic pain  and urgency.   All other systems reviewed and are negative.    Personal History     Past Medical History:   Past Medical History:   Diagnosis Date    AAA (abdominal aortic aneurysm) 2006    Abnormal ECG     Aneurysm     Arthritis     Asthma     Atrial fibrillation     COUMADIN    Benign prostatic hyperplasia     Bladder cancer     CHF (congestive heart failure)     Chronic anticoagulation     COUMADIN STARTED 2006    Colon polyp     Congenital heart disease     Coronary artery disease     Diabetes mellitus     Elevated cholesterol     Erectile dysfunction     Facial basal cell cancer     Gout     Hard to intubate     History of pancreatitis 2006    History of pneumonia     History of tick-borne disease 2015    HL (hearing loss)     BILAT HEARING AIDS    Hyperlipidemia     Hypertension     Myocardial infarction     X5    Obesity     Pancreatitis     Pneumonia     Sleep apnea     CURRENTLY NOT USING CPAP D/T RECALL    Transfusion history     STATES HIS SISTER HAD A SEVERE REACTION TO BLOOD TRANSFUSION    Type 2 diabetes mellitus        Surgical History:      Past Surgical History:   Procedure Laterality Date    ARTERIOGRAM AORTIC Left 06/29/2022    Procedure: AORTIC STENT GRAFT PLACEMENT WITH ILIAC COILING;  Surgeon: Eric Sainz MD;  Location: [facility] HYBRID OR 18/19;  Service: Vascular;  Laterality: Left;    CARDIAC CATHETERIZATION      CHOLECYSTECTOMY      COLONOSCOPY      COLONOSCOPY N/A 7/27/2023    Procedure: COLONOSCOPY to cecum with cold biopsy and cold snare polypectomies and clips;  Surgeon: Vitor Haley MD;  Location: [facility] ENDOSCOPY;  Service: Gastroenterology;  Laterality: N/A;  Pre - H/O polyps.  Post - diverticulosis, polyps, hemorrhoids    CORONARY ARTERY BYPASS GRAFT  2006    X2    CYSTOSCOPY      STATES HAD BLADDER TUMORS REMOVED X2    PROSTATE SURGERY      X2- STATES PLACED COILS IN TO HELP WITH FLOW AND THEN HAD TO REMOVE A PIECE THAT BROKE OFF AND WAS IN BLADDER    UPPER  GASTROINTESTINAL ENDOSCOPY             Family History: family history includes Cancer in his brother, father, and mother; Colon cancer in his brother and mother; Diabetes in his mother; Hearing loss in his father; Heart attack in his father and paternal grandfather; Heart disease in his father and paternal grandfather; Hypertension in his brother, mother, and sister. Otherwise pertinent FHx was reviewed and unremarkable.     Social History:  reports that he quit smoking about 17 years ago. His smoking use included cigarettes. He has a 30.00 pack-year smoking history. He has never used smokeless tobacco. He reports that he does not currently use alcohol. He reports that he does not use drugs.      Medications:  Prior to Admission medications    Medication Sig Start Date End Date Taking? Authorizing Provider   allopurinol (ZYLOPRIM) 100 MG tablet Take 1 tablet by mouth Every Night. 11/5/19  Yes Kelby Aguilar MD   aspirin 81 MG tablet Take 1 tablet by mouth Daily. PT STATES WAS INSTRUCTED TO CONTINUE TO TAKE THIS PER GET ANDERSEN 12/23/11  Yes Kelby Aguilar MD   atenolol (TENORMIN) 50 MG tablet TAKE 1 TABLET BY MOUTH EVERY DAY AT NIGHT 12/14/23  Yes Thai Galloway MD   atorvastatin (LIPITOR) 20 MG tablet TAKE 1 TABLET BY MOUTH EVERY DAY 9/1/23  Yes Thai Galloway MD   losartan (COZAAR) 25 MG tablet Take 4 tablets by mouth Daily.   Yes Kelby Aguilar MD   albuterol sulfate  (90 Base) MCG/ACT inhaler Inhale 2 puffs Every 4 (Four) Hours As Needed for Wheezing.    Kelby Aguilar MD   warfarin (COUMADIN) 7.5 MG tablet Take 1.5 tablets by mouth. On Mon, Wed, Fri.    Kelby Aguilar MD   warfarin (COUMADIN) 7.5 MG tablet Take 1 tablet by mouth. TONY WEISS, SAT, SUN.    Kelby Aguilar MD       Allergies:    Allergies   Allergen Reactions    Bee Venom Anaphylaxis     HIVES-SWOLLEN THROAT    Meperidine Hives    Midazolam Hives    Propofol Other (See Comments)     UNCLEAR-SVT,  HYPOTENSION-STATES SEVERE ALMOST CODED    Sulfa Antibiotics Hives    Hydrocodone Nausea And Vomiting    Simvastatin Myalgia       Objective   Objective     Vital Signs  Temp:  [97.5 °F (36.4 °C)-98.2 °F (36.8 °C)] 98.2 °F (36.8 °C)  Heart Rate:  [52-76] 52  Resp:  [16-26] 24  BP: (129-150)/(54-64) 139/64  SpO2:  [90 %-96 %] 95 %  on  Flow (L/min):  [2] 2;   Device (Oxygen Therapy): nasal cannula  Body mass index is 26.44 kg/m².    Physical Exam    Vitals and nursing note reviewed.   Constitutional:       Appearance: Normal appearance.   HENT:      Head: Normocephalic and atraumatic.      Right Ear: External ear normal.      Left Ear: External ear normal.      Nose: Nose normal.      Mouth/Throat:      Mouth: Mucous membranes are moist.   Eyes:      Extraocular Movements: Extraocular movements intact.   Cardiovascular:      Rate and Rhythm: Normal rate and regular rhythm.      Pulses: Normal pulses.      Heart sounds: Normal heart sounds.   Pulmonary:      Effort: Pulmonary effort is normal.      Breath sounds: Normal breath sounds.   Abdominal:      General: Abdomen is flat. Bowel sounds are normal.      Palpations: Abdomen is soft.   Genitourinary:     Comments: Cardoza catheter in place with continuous bladder irrigation.  Gross hematuria noted  Musculoskeletal:         General: Normal range of motion.      Cervical back: Normal range of motion.   Skin:     General: Skin is warm.   Neurological:      Mental Status: He is alert and oriented to person, place, and time.   Psychiatric:         Behavior: Behavior normal.         Thought Content: Thought content normal.         Judgment: Judgment normal.       Results Review:  I have personally reviewed most recent lab results and radiology images and interpretations and agree with findings, most notably: cbc, cmp, A1c, inr, ua, urine cx.    Results from last 7 days   Lab Units 01/17/24  0425   WBC 10*3/mm3 9.80   HEMOGLOBIN g/dL 14.2   HEMATOCRIT % 44.0   PLATELETS  10*3/mm3 228   INR  1.49*     Results from last 7 days   Lab Units 01/17/24  0425   SODIUM mmol/L 141   POTASSIUM mmol/L 4.8   CHLORIDE mmol/L 105   CO2 mmol/L 29.0   BUN mg/dL 20   CREATININE mg/dL 0.77   GLUCOSE mg/dL 116*   CALCIUM mg/dL 9.2     Estimated Creatinine Clearance: 107 mL/min (by C-G formula based on SCr of 0.77 mg/dL).  Brief Urine Lab Results  (Last result in the past 365 days)        Color   Clarity   Blood   Leuk Est   Nitrite   Protein   CREAT   Urine HCG        01/16/24 0213 Red  Comment: Any Substance that causes an abnormal urine color can alter the accuracy of the chemical reactions.   Turbid   Large (3+)   --  Comment: The Leukoesterase test cannot be performed due to the centrifugation of the specimen.      Negative   >=300 mg/dL (3+)                   Microbiology Results (last 10 days)       Procedure Component Value - Date/Time    Urine Culture - Urine, Urine, Clean Catch [[id number]]  (Normal) Collected: 01/16/24 0213    Lab Status: Final result Specimen: Urine, Clean Catch Updated: 01/17/24 0845     Urine Culture No growth            ECG/EMG Results (most recent)       Procedure Component Value Units Date/Time    SCANNED - TELEMETRY   [[id number]] Resulted: 01/15/24     Updated: 01/16/24 0523    SCANNED - TELEMETRY   [[id number]] Resulted: 01/15/24     Updated: 01/16/24 0726    SCANNED - TELEMETRY   [[id number]] Resulted: 01/15/24     Updated: 01/16/24 1222    SCANNED - TELEMETRY   [[id number]] Resulted: 01/15/24     Updated: 01/16/24 1530    SCANNED - TELEMETRY   [[id number]] Resulted: 01/15/24     Updated: 01/17/24 0015    SCANNED - TELEMETRY   [[id number]] Resulted: 01/15/24     Updated: 01/17/24 0050    SCANNED - TELEMETRY   [[id number]] Resulted: 01/15/24     Updated: 01/17/24 0054    SCANNED - TELEMETRY   [[id number]] Resulted: 01/15/24     Updated: 01/17/24 0426    SCANNED - TELEMETRY   [[id number]] Resulted: 01/15/24     Updated: 01/17/24 0746    SCANNED - TELEMETRY   [[id number]]  Resulted: 01/15/24     Updated: 01/17/24 1113    ECG 12 Lead Pre-Op / Pre-Procedure [[id number]] Collected: 01/17/24 1152     Updated: 01/17/24 1152     QT Interval 496 ms      QTC Interval 433 ms     Narrative:      HEART RATE= 46  bpm  RR Interval= 1310  ms  MD Interval=   ms  P Horizontal Axis=   deg  P Front Axis=   deg  QRSD Interval= 157  ms  QT Interval= 496  ms  QTcB= 433  ms  QRS Axis= 176  deg  T Wave Axis= -28  deg  - ABNORMAL ECG -  Atrial fibrillation  Right bundle branch block  Left posterior fascicular block  When compared with ECG of 06-Apr-2022 14:57:31,  Significant rate decrease  Electronically Signed By:   Date and Time of Study: 2024-01-17 11:52:10            Results for orders placed during the hospital encounter of 02/13/23    Duplex aorta ivc iliac graft complete CAR    Interpretation Summary    Technically difficult study due to bowel gas but no evidence of endoleak's with patent endograft seen.  Maximum residual sac diameter approximately 4.5 cm.      Results for orders placed during the hospital encounter of 10/19/23    Adult Transthoracic Echo Complete W/ Cont if Necessary Per Protocol    Interpretation Summary    Left ventricular ejection fraction appears to be 51 - 55%.    The right ventricular cavity is mildly dilated.    The left atrial cavity is moderately dilated.    The right atrial cavity is dilated.    Moderate aortic valve regurgitation is present.    Moderate mitral valve regurgitation is present.    Estimated right ventricular systolic pressure from tricuspid regurgitation is mildly elevated (35-45 mmHg).      No radiology results for the last 7 days      Estimated Creatinine Clearance: 107 mL/min (by C-G formula based on SCr of 0.77 mg/dL).    Assessment & Plan   Assessment/Plan       Active Hospital Problems    Diagnosis  POA    **Acute UTI [N39.0]  Yes      Resolved Hospital Problems   No resolved problems to display.     Acute UTI        Lab Results   Component Value Date      WBC 10.00 01/16/2024     AST 23 10/10/2023     ALT 25 10/10/2023     ALKPHOS 187 (H) 10/10/2023     BILITOT 0.6 10/10/2023   -CMP and CBC generally unremarkable  -UA positive for hematuria and WBC  -IV ceftriaxone empirically and urine culture pending results  -Cardoza catheter in place  -Continuous bladder irrigation  -Urology consulted and recommended monitoring patient while continuing bladder irrigation.    -cystoscopy scheduled for today  - will consult hospitalist for continued care  -Monitor signs and symptoms while admitted      A-fib  -Coumadin currently on hold  -PT/INR therapeutic       Hypertension  -Moderately controlled       BP Readings from Last 1 Encounters:   01/16/24 149/60   - Continue Cozaar and atenolol  - Monitor while admitted      Hyperlipidemia  -Continue Lipitor        VTE Prophylaxis -   Mechanical Order History:        Ordered        01/16/24 0305  Place Sequential Compression Device  Once            01/16/24 0305  Maintain Sequential Compression Device  Continuous                          Pharmalogical Order History:        Ordered     Dose Route Frequency Stop    01/16/24 0305  Pharmacy to dose warfarin        Question:  Target INR  Answer:  2 - 3    -- XX Continuous PRN --                    CODE STATUS:    Code Status and Medical Interventions:   Ordered at: 01/16/24 0253     Code Status (Patient has no pulse and is not breathing):    CPR (Attempt to Resuscitate)     Medical Interventions (Patient has pulse or is breathing):    Full Support       This patient has been examined wearing personal protective equipment.     I discussed the patient's findings and my recommendations with patient and nursing staff.      Signature:Electronically signed by Makayla Birmingham PA-C, 01/17/24, 12:09 PM EST.

## 2024-01-18 ENCOUNTER — READMISSION MANAGEMENT (OUTPATIENT)
Dept: CALL CENTER | Facility: HOSPITAL | Age: 74
End: 2024-01-18
Payer: MEDICARE

## 2024-01-18 VITALS
DIASTOLIC BLOOD PRESSURE: 59 MMHG | TEMPERATURE: 97.9 F | WEIGHT: 198.41 LBS | HEART RATE: 75 BPM | HEIGHT: 71 IN | SYSTOLIC BLOOD PRESSURE: 122 MMHG | RESPIRATION RATE: 22 BRPM | BODY MASS INDEX: 27.78 KG/M2 | OXYGEN SATURATION: 91 %

## 2024-01-18 LAB
ANION GAP SERPL CALCULATED.3IONS-SCNC: 10 MMOL/L (ref 5–15)
BASOPHILS # BLD AUTO: 0.1 10*3/MM3 (ref 0–0.2)
BASOPHILS NFR BLD AUTO: 0.3 % (ref 0–1.5)
BUN SERPL-MCNC: 18 MG/DL (ref 8–23)
BUN/CREAT SERPL: 26.9 (ref 7–25)
CALCIUM SPEC-SCNC: 8.6 MG/DL (ref 8.6–10.5)
CHLORIDE SERPL-SCNC: 97 MMOL/L (ref 98–107)
CO2 SERPL-SCNC: 22 MMOL/L (ref 22–29)
CREAT SERPL-MCNC: 0.67 MG/DL (ref 0.76–1.27)
DEPRECATED RDW RBC AUTO: 47.3 FL (ref 37–54)
EGFRCR SERPLBLD CKD-EPI 2021: 99.8 ML/MIN/1.73
EOSINOPHIL # BLD AUTO: 0 10*3/MM3 (ref 0–0.4)
EOSINOPHIL NFR BLD AUTO: 0 % (ref 0.3–6.2)
ERYTHROCYTE [DISTWIDTH] IN BLOOD BY AUTOMATED COUNT: 16.3 % (ref 12.3–15.4)
GLUCOSE BLDC GLUCOMTR-MCNC: 152 MG/DL (ref 70–105)
GLUCOSE SERPL-MCNC: 142 MG/DL (ref 65–99)
HCT VFR BLD AUTO: 47.9 % (ref 37.5–51)
HGB BLD-MCNC: 15.7 G/DL (ref 13–17.7)
INR PPP: 1.18 (ref 2–3)
LYMPHOCYTES # BLD AUTO: 1.6 10*3/MM3 (ref 0.7–3.1)
LYMPHOCYTES NFR BLD AUTO: 7.9 % (ref 19.6–45.3)
MCH RBC QN AUTO: 27.5 PG (ref 26.6–33)
MCHC RBC AUTO-ENTMCNC: 32.7 G/DL (ref 31.5–35.7)
MCV RBC AUTO: 84 FL (ref 79–97)
MONOCYTES # BLD AUTO: 1 10*3/MM3 (ref 0.1–0.9)
MONOCYTES NFR BLD AUTO: 5.1 % (ref 5–12)
NEUTROPHILS NFR BLD AUTO: 17.5 10*3/MM3 (ref 1.7–7)
NEUTROPHILS NFR BLD AUTO: 86.7 % (ref 42.7–76)
NRBC BLD AUTO-RTO: 0 /100 WBC (ref 0–0.2)
PLATELET # BLD AUTO: 258 10*3/MM3 (ref 140–450)
PMV BLD AUTO: 7.7 FL (ref 6–12)
POTASSIUM SERPL-SCNC: 4.5 MMOL/L (ref 3.5–5.2)
PROTHROMBIN TIME: 12.7 SECONDS (ref 19.4–28.5)
RBC # BLD AUTO: 5.71 10*6/MM3 (ref 4.14–5.8)
SODIUM SERPL-SCNC: 129 MMOL/L (ref 136–145)
WBC NRBC COR # BLD AUTO: 20.1 10*3/MM3 (ref 3.4–10.8)

## 2024-01-18 PROCEDURE — 82948 REAGENT STRIP/BLOOD GLUCOSE: CPT | Performed by: UROLOGY

## 2024-01-18 PROCEDURE — G0378 HOSPITAL OBSERVATION PER HR: HCPCS

## 2024-01-18 PROCEDURE — 85025 COMPLETE CBC W/AUTO DIFF WBC: CPT | Performed by: UROLOGY

## 2024-01-18 PROCEDURE — 80048 BASIC METABOLIC PNL TOTAL CA: CPT | Performed by: UROLOGY

## 2024-01-18 PROCEDURE — 25010000002 CEFTRIAXONE PER 250 MG: Performed by: UROLOGY

## 2024-01-18 PROCEDURE — A9270 NON-COVERED ITEM OR SERVICE: HCPCS | Performed by: NURSE PRACTITIONER

## 2024-01-18 PROCEDURE — 85610 PROTHROMBIN TIME: CPT | Performed by: UROLOGY

## 2024-01-18 PROCEDURE — A9270 NON-COVERED ITEM OR SERVICE: HCPCS | Performed by: UROLOGY

## 2024-01-18 PROCEDURE — 63710000001 ATENOLOL 50 MG TABLET: Performed by: NURSE PRACTITIONER

## 2024-01-18 PROCEDURE — 63710000001 ALUMINUM-MAGNESIUM HYDROXIDE-SIMETHICONE 400-400-40 MG/5ML SUSPENSION: Performed by: UROLOGY

## 2024-01-18 PROCEDURE — 63710000001 LOSARTAN 50 MG TABLET: Performed by: NURSE PRACTITIONER

## 2024-01-18 RX ORDER — DOXYCYCLINE HYCLATE 100 MG/1
100 CAPSULE ORAL 2 TIMES DAILY
Qty: 20 CAPSULE | Refills: 0 | Status: SHIPPED | OUTPATIENT
Start: 2024-01-18 | End: 2024-01-22 | Stop reason: HOSPADM

## 2024-01-18 RX ORDER — ONDANSETRON 4 MG/1
4 TABLET, ORALLY DISINTEGRATING ORAL EVERY 8 HOURS PRN
Qty: 12 TABLET | Refills: 0 | Status: SHIPPED | OUTPATIENT
Start: 2024-01-18 | End: 2024-01-29

## 2024-01-18 RX ADMIN — CEFTRIAXONE 1000 MG: 1 INJECTION, POWDER, FOR SOLUTION INTRAMUSCULAR; INTRAVENOUS at 03:32

## 2024-01-18 RX ADMIN — LOSARTAN POTASSIUM 100 MG: 50 TABLET, FILM COATED ORAL at 09:04

## 2024-01-18 RX ADMIN — ATENOLOL 50 MG: 50 TABLET ORAL at 09:04

## 2024-01-18 RX ADMIN — Medication 10 ML: at 09:06

## 2024-01-18 RX ADMIN — ALUMINUM HYDROXIDE, MAGNESIUM HYDROXIDE, AND DIMETHICONE 15 ML: 400; 400; 40 SUSPENSION ORAL at 04:05

## 2024-01-18 NOTE — OUTREACH NOTE
Prep Survey      Flowsheet Row Responses   [facility] facility patient discharged from? [facility]   Is LACE score < 7 ? No   Eligibility [facility]   Hospital [facility]   Date of Admission 01/15/24   Date of Discharge 01/18/24   Discharge Disposition Home or Self Care   Discharge diagnosis acute UTI   Does the patient have one of the following disease processes/diagnoses(primary or secondary)? Other   Does the patient have Home health ordered? No   Is there a DME ordered? No   Prep survey completed? Yes            YAIMA MORALES - Registered Nurse

## 2024-01-18 NOTE — DISCHARGE SUMMARY
[facility]    Colette Dominguez APRN    CHIEF COMPLAINT:     Dysuria, hematuria    HISTORY OF PRESENT ILLNESS:    HPI    ED 01/16/2024  73-year-old  male presents to the emergency room for urinary retention and increased lower abdominal pain.  Patient denies fever or nausea vomiting or diarrhea.  Patient states that he had a prostate procedure performed on January 5 by a doctor in [facility].  Patient is on Coumadin and is being monitored by his cardiologist.     Observation 01/16/2024  Patient agrees with HPI noted above including urinary retention and abdominal pain with onset yesterday.  He states he had a prostate procedure performed by urology on January 5 and a few days after he restarted his Coumadin he noted hematuria.  He called his cardiologist and had his INR checked which was therapeutic.  He was told by cardiologist to hold Coumadin for 2 days until 1/17, however his hematuria did not resolve and he proceeded to the ED when he had urinary retention.  Patient denies any pain currently.     Observation 1/17/24  Urology following. Bladder irrigation being performed. Cystoscopy scheduled for today.    Past Medical History:   Diagnosis Date    AAA (abdominal aortic aneurysm) 2006    Abnormal ECG     Aneurysm     Arthritis     Asthma     Atrial fibrillation     COUMADIN    Benign prostatic hyperplasia     Bladder cancer     CHF (congestive heart failure)     Chronic anticoagulation     COUMADIN STARTED 2006    Colon polyp     Congenital heart disease     Coronary artery disease     Diabetes mellitus     Elevated cholesterol     Erectile dysfunction     Facial basal cell cancer     Gout     Hard to intubate     History of pancreatitis 2006    History of pneumonia     History of tick-borne disease 2015    HL (hearing loss)     BILAT HEARING AIDS    Hyperlipidemia     Hypertension     Myocardial infarction     X5    Obesity     Pancreatitis     Pneumonia     Sleep  apnea     CURRENTLY NOT USING CPAP D/T RECALL    Transfusion history     STATES HIS SISTER HAD A SEVERE REACTION TO BLOOD TRANSFUSION    Type 2 diabetes mellitus      Past Surgical History:   Procedure Laterality Date    ARTERIOGRAM AORTIC Left 2022    Procedure: AORTIC STENT GRAFT PLACEMENT WITH ILIAC COILING;  Surgeon: Eric Sainz MD;  Location: [facility] HYBRID OR ;  Service: Vascular;  Laterality: Left;    CARDIAC CATHETERIZATION      CHOLECYSTECTOMY      COLONOSCOPY      COLONOSCOPY N/A 2023    Procedure: COLONOSCOPY to cecum with cold biopsy and cold snare polypectomies and clips;  Surgeon: Vitor Haley MD;  Location: [facility] ENDOSCOPY;  Service: Gastroenterology;  Laterality: N/A;  Pre - H/O polyps.  Post - diverticulosis, polyps, hemorrhoids    CORONARY ARTERY BYPASS GRAFT  2006    X2    CYSTOSCOPY      STATES HAD BLADDER TUMORS REMOVED X2    PROSTATE SURGERY      X2- STATES PLACED COILS IN TO HELP WITH FLOW AND THEN HAD TO REMOVE A PIECE THAT BROKE OFF AND WAS IN BLADDER    UPPER GASTROINTESTINAL ENDOSCOPY       Family History   Problem Relation Age of Onset    Hypertension Mother     Colon cancer Mother     Cancer Mother     Diabetes Mother     Cancer Father     Heart attack Father     Hearing loss Father     Heart disease Father     Hypertension Sister     Hypertension Brother     Colon cancer Brother     Cancer Brother     Heart disease Paternal Grandfather     Heart attack Paternal Grandfather     Malig Hyperthermia Neg Hx     Colon polyps Neg Hx     Crohn's disease Neg Hx     Irritable bowel syndrome Neg Hx     Ulcerative colitis Neg Hx      Social History     Tobacco Use    Smoking status: Former     Packs/day: 1.00     Years: 30.00     Additional pack years: 0.00     Total pack years: 30.00     Types: Cigarettes     Quit date: 2006     Years since quittin.5    Smokeless tobacco: Never   Vaping Use    Vaping Use: Never used   Substance Use Topics    Alcohol use: Not  Currently     Comment: one or two beers 6 times a year    Drug use: Never     Medications Prior to Admission   Medication Sig Dispense Refill Last Dose    allopurinol (ZYLOPRIM) 100 MG tablet Take 1 tablet by mouth Every Night.   1/15/2024    aspirin 81 MG tablet Take 1 tablet by mouth Daily. PT STATES WAS INSTRUCTED TO CONTINUE TO TAKE THIS PER GET ANDERSEN   1/15/2024    atenolol (TENORMIN) 50 MG tablet TAKE 1 TABLET BY MOUTH EVERY DAY AT NIGHT 90 tablet 0 1/15/2024    atorvastatin (LIPITOR) 20 MG tablet TAKE 1 TABLET BY MOUTH EVERY DAY 90 tablet 1 1/15/2024    losartan (COZAAR) 25 MG tablet Take 4 tablets by mouth Daily.   1/15/2024    albuterol sulfate  (90 Base) MCG/ACT inhaler Inhale 2 puffs Every 4 (Four) Hours As Needed for Wheezing.       warfarin (COUMADIN) 7.5 MG tablet Take 1.5 tablets by mouth. On Mon, Wed, Fri.       warfarin (COUMADIN) 7.5 MG tablet Take 1 tablet by mouth. TUES, THUR, SAT, MARIE.        Allergies:  Bee venom, Meperidine, Midazolam, Propofol, Sulfa antibiotics, Hydrocodone, and Simvastatin    Immunization History   Administered Date(s) Administered    COVID-19 (MODERNA) 1st,2nd,3rd Dose Monovalent 02/22/2021, 03/15/2021, 01/04/2022    FLUAD TRI 65YR+ 08/18/2019    Flu Vaccine Quad PF >36MO 11/22/2016    Fluzone High Dose =>65 Years ([employer] ONLY) 08/17/2017, 09/10/2018, 09/08/2021    Fluzone High-Dose 65+yrs 09/04/2023    Fluzone Quad >6mos (Multi-dose) 10/23/2015    H1N1 All Forms 01/31/2010    Hepatitis A 09/08/2018, 02/20/2019    Hepatitis B 02/20/2019    Influenza, Unspecified 08/13/2020    Pneumococcal Conjugate 13-Valent (PCV13) 10/23/2015, 01/24/2018    Pneumococcal Polysaccharide (PPSV23) 02/20/2019    Shingrix 05/17/2018    Tdap 02/27/2020           REVIEW OF SYSTEMS:    ROS    Constitutional: Negative for fever and malaise/fatigue.   Gastrointestinal:  Negative for nausea and vomiting.   Genitourinary:  Positive for difficulty urinating, dysuria, hematuria, pelvic pain  and urgency.   All other systems reviewed and are negative.    Vital Signs  Temp:  [97.3 °F (36.3 °C)-98.2 °F (36.8 °C)] 97.9 °F (36.6 °C)  Heart Rate:  [] 75  Resp:  [11-24] 22  BP: (122-206)/() 122/59          Physical Exam:  Physical Exam  Vitals and nursing note reviewed.   Constitutional:       Appearance: Normal appearance.   HENT:      Head: Normocephalic and atraumatic.      Right Ear: External ear normal.      Left Ear: External ear normal.      Nose: Nose normal.      Mouth/Throat:      Mouth: Mucous membranes are moist.   Eyes:      Extraocular Movements: Extraocular movements intact.   Cardiovascular:      Rate and Rhythm: Normal rate and regular rhythm.      Pulses: Normal pulses.      Heart sounds: Normal heart sounds.   Pulmonary:      Effort: Pulmonary effort is normal.      Breath sounds: Normal breath sounds.   Abdominal:      General: Abdomen is flat. Bowel sounds are normal.      Palpations: Abdomen is soft.   Genitourinary:     Comments: Cardoza catheter in place with continuous bladder irrigation.  Gross hematuria noted  Musculoskeletal:         General: Normal range of motion.      Cervical back: Normal range of motion.   Skin:     General: Skin is warm.   Neurological:      Mental Status: He is alert and oriented to person, place, and time.   Psychiatric:         Behavior: Behavior normal.         Thought Content: Thought content normal.         Judgment: Judgment normal.     Emotional Behavior:    wnl   Debilities:   None      Results Review:    I reviewed the patient's new clinical results.  Lab Results (most recent)       Procedure Component Value Units Date/Time    POC Glucose 4x Daily Before Meals & at Bedtime [[id number]]  (Abnormal) Collected: 01/18/24 0714    Specimen: Blood Updated: 01/18/24 0715     Glucose 152 mg/dL      Comment: Serial Number: [id number]:  [id number]       CBC & Differential [[id number]]  (Abnormal) Collected: 01/18/24 0348    Specimen: Blood  Updated: 01/18/24 0459    Narrative:      The following orders were created for panel order CBC & Differential.  Procedure                               Abnormality         Status                     ---------                               -----------         ------                     CBC Auto Differential[[id number]]        Abnormal            Final result                 Please view results for these tests on the individual orders.    CBC Auto Differential [[id number]]  (Abnormal) Collected: 01/18/24 0348    Specimen: Blood Updated: 01/18/24 0459     WBC 20.10 10*3/mm3      RBC 5.71 10*6/mm3      Hemoglobin 15.7 g/dL      Hematocrit 47.9 %      MCV 84.0 fL      MCH 27.5 pg      MCHC 32.7 g/dL      RDW 16.3 %      RDW-SD 47.3 fl      MPV 7.7 fL      Platelets 258 10*3/mm3      Neutrophil % 86.7 %      Lymphocyte % 7.9 %      Monocyte % 5.1 %      Eosinophil % 0.0 %      Basophil % 0.3 %      Neutrophils, Absolute 17.50 10*3/mm3      Lymphocytes, Absolute 1.60 10*3/mm3      Monocytes, Absolute 1.00 10*3/mm3      Eosinophils, Absolute 0.00 10*3/mm3      Basophils, Absolute 0.10 10*3/mm3      nRBC 0.0 /100 WBC     Basic Metabolic Panel [[id number]]  (Abnormal) Collected: 01/18/24 0348    Specimen: Blood Updated: 01/18/24 0459     Glucose 142 mg/dL      BUN 18 mg/dL      Creatinine 0.67 mg/dL      Sodium 129 mmol/L      Potassium 4.5 mmol/L      Chloride 97 mmol/L      CO2 22.0 mmol/L      Calcium 8.6 mg/dL      BUN/Creatinine Ratio 26.9     Anion Gap 10.0 mmol/L      eGFR 99.8 mL/min/1.73     Narrative:      GFR Normal >60  Chronic Kidney Disease <60  Kidney Failure <15    The GFR formula is only valid for adults with stable renal function between ages 18 and 70.    Protime-INR [[id number]]  (Abnormal) Collected: 01/18/24 0348    Specimen: Blood Updated: 01/18/24 0449     Protime 12.7 Seconds      INR 1.18    POC Glucose Once [[id number]]  (Abnormal) Collected: 01/17/24 2057    Specimen: Blood Updated: 01/17/24 2059      Glucose 137 mg/dL      Comment: Serial Number: [id number]:  [id number]       Urine Culture - Urine, Urine, Clean Catch [[id number]]  (Normal) Collected: 01/16/24 0213    Specimen: Urine, Clean Catch Updated: 01/17/24 0845     Urine Culture No growth    Basic Metabolic Panel [[id number]]  (Abnormal) Collected: 01/17/24 0425    Specimen: Blood from Arm, Right Updated: 01/17/24 0609     Glucose 116 mg/dL      BUN 20 mg/dL      Creatinine 0.77 mg/dL      Sodium 141 mmol/L      Potassium 4.8 mmol/L      Chloride 105 mmol/L      CO2 29.0 mmol/L      Calcium 9.2 mg/dL      BUN/Creatinine Ratio 26.0     Anion Gap 7.0 mmol/L      eGFR 95.7 mL/min/1.73     Narrative:      GFR Normal >60  Chronic Kidney Disease <60  Kidney Failure <15    The GFR formula is only valid for adults with stable renal function between ages 18 and 70.    Protime-INR [[id number]]  (Abnormal) Collected: 01/17/24 0425    Specimen: Blood from Arm, Right Updated: 01/17/24 0606     Protime 15.8 Seconds      INR 1.49    CBC & Differential [[id number]]  (Abnormal) Collected: 01/17/24 0425    Specimen: Blood from Arm, Right Updated: 01/17/24 0549    Narrative:      The following orders were created for panel order CBC & Differential.  Procedure                               Abnormality         Status                     ---------                               -----------         ------                     CBC Auto Differential[[id number]]        Abnormal            Final result                 Please view results for these tests on the individual orders.    CBC Auto Differential [[id number]]  (Abnormal) Collected: 01/17/24 0425    Specimen: Blood from Arm, Right Updated: 01/17/24 0549     WBC 9.80 10*3/mm3      RBC 5.20 10*6/mm3      Hemoglobin 14.2 g/dL      Hematocrit 44.0 %      MCV 84.7 fL      MCH 27.4 pg      MCHC 32.3 g/dL      RDW 16.4 %      RDW-SD 48.6 fl      MPV 7.9 fL      Platelets 228 10*3/mm3      Neutrophil % 69.5 %      Lymphocyte % 20.7 %       Monocyte % 6.5 %      Eosinophil % 2.5 %      Basophil % 0.8 %      Neutrophils, Absolute 6.80 10*3/mm3      Lymphocytes, Absolute 2.00 10*3/mm3      Monocytes, Absolute 0.60 10*3/mm3      Eosinophils, Absolute 0.20 10*3/mm3      Basophils, Absolute 0.10 10*3/mm3      nRBC 0.0 /100 WBC     Hemoglobin A1c [[id number]]  (Abnormal) Collected: 01/16/24 0403    Specimen: Blood Updated: 01/16/24 0905     Hemoglobin A1C 6.60 %     Urinalysis, Microscopic Only - Urine, Clean Catch [[id number]]  (Abnormal) Collected: 01/16/24 0213    Specimen: Urine, Clean Catch Updated: 01/16/24 0232     RBC, UA Too Numerous to Count /HPF      WBC, UA 6-10 /HPF      Bacteria, UA None Seen /HPF      Squamous Epithelial Cells, UA 0-2 /HPF      Hyaline Casts, UA       Unable to determine due to loaded field     /LPF     Methodology Automated Microscopy    Urinalysis With Culture If Indicated - Urine, Clean Catch [[id number]]  (Abnormal) Collected: 01/16/24 0213    Specimen: Urine, Clean Catch Updated: 01/16/24 0232     Color, UA Red     Comment: Any Substance that causes an abnormal urine color can alter the accuracy of the chemical reactions.        Appearance, UA Turbid     pH, UA 7.0     Specific Gravity, UA 1.025     Glucose, UA Negative     Comment: Due to the bloody appearance of the urine, macroscopic testing was performed on a centrifuged urine specimen to reduce RBC interference on the chemical testing.          Ketones, UA Negative     Bilirubin, UA Negative     Blood, UA Large (3+)     Protein, UA >=300 mg/dL (3+)     Leuk Esterase, UA --     Comment: The Leukoesterase test cannot be performed due to the centrifugation of the specimen.           Nitrite, UA Negative     Urobilinogen, UA 0.2 E.U./dL    Narrative:      In absence of clinical symptoms, the presence of pyuria, bacteria, and/or nitrites on the urinalysis result does not correlate with infection.            Imaging Results (Most Recent)       None           reviewed    ECG/EMG Results (most recent)       Procedure Component Value Units Date/Time    SCANNED - TELEMETRY   [[id number]] Resulted: 01/15/24     Updated: 01/16/24 0523    SCANNED - TELEMETRY   [[id number]] Resulted: 01/15/24     Updated: 01/16/24 0726    SCANNED - TELEMETRY   [[id number]] Resulted: 01/15/24     Updated: 01/16/24 1222    SCANNED - TELEMETRY   [[id number]] Resulted: 01/15/24     Updated: 01/16/24 1530    SCANNED - TELEMETRY   [[id number]] Resulted: 01/15/24     Updated: 01/17/24 0015    SCANNED - TELEMETRY   [[id number]] Resulted: 01/15/24     Updated: 01/17/24 0050    SCANNED - TELEMETRY   [[id number]] Resulted: 01/15/24     Updated: 01/17/24 0054    SCANNED - TELEMETRY   [[id number]] Resulted: 01/15/24     Updated: 01/17/24 0426    SCANNED - TELEMETRY   [[id number]] Resulted: 01/15/24     Updated: 01/17/24 0746    SCANNED - TELEMETRY   [[id number]] Resulted: 01/15/24     Updated: 01/17/24 1113    ECG 12 Lead Pre-Op / Pre-Procedure [[id number]] Collected: 01/17/24 1152     Updated: 01/17/24 1152     QT Interval 496 ms      QTC Interval 433 ms     Narrative:      HEART RATE= 46  bpm  RR Interval= 1310  ms  TN Interval=   ms  P Horizontal Axis=   deg  P Front Axis=   deg  QRSD Interval= 157  ms  QT Interval= 496  ms  QTcB= 433  ms  QRS Axis= 176  deg  T Wave Axis= -28  deg  - ABNORMAL ECG -  Atrial fibrillation  Right bundle branch block  Left posterior fascicular block  When compared with ECG of 06-Apr-2022 14:57:31,  Significant rate decrease  Electronically Signed By:   Date and Time of Study: 2024-01-17 11:52:10    SCANNED - TELEMETRY   [[id number]] Resulted: 01/15/24     Updated: 01/17/24 1603    SCANNED - TELEMETRY   [[id number]] Resulted: 01/15/24     Updated: 01/17/24 2104    SCANNED - TELEMETRY   [[id number]] Resulted: 01/15/24     Updated: 01/18/24 0721    SCANNED - TELEMETRY   [[id number]] Resulted: 01/15/24     Updated: 01/18/24 0901          reviewed    Results for orders placed during the  hospital encounter of 02/13/23    Duplex aorta ivc iliac graft complete CAR    Interpretation Summary    Technically difficult study due to bowel gas but no evidence of endoleak's with patent endograft seen.  Maximum residual sac diameter approximately 4.5 cm.      Results for orders placed during the hospital encounter of 10/19/23    Adult Transthoracic Echo Complete W/ Cont if Necessary Per Protocol    Interpretation Summary    Left ventricular ejection fraction appears to be 51 - 55%.    The right ventricular cavity is mildly dilated.    The left atrial cavity is moderately dilated.    The right atrial cavity is dilated.    Moderate aortic valve regurgitation is present.    Moderate mitral valve regurgitation is present.    Estimated right ventricular systolic pressure from tricuspid regurgitation is mildly elevated (35-45 mmHg).      Microbiology Results (last 10 days)       Procedure Component Value - Date/Time    Urine Culture - Urine, Urine, Clean Catch [[id number]]  (Normal) Collected: 01/16/24 0213    Lab Status: Final result Specimen: Urine, Clean Catch Updated: 01/17/24 0845     Urine Culture No growth            Assessment & Plan     Acute UTI     Acute UTI/hematuria            Lab Results   Component Value Date     WBC 10.00 01/16/2024     AST 23 10/10/2023     ALT 25 10/10/2023     ALKPHOS 187 (H) 10/10/2023     BILITOT 0.6 10/10/2023   -CMP and CBC generally unremarkable  -UA positive for hematuria and WBC  -IV ceftriaxone empirically and urine culture pending results  -Cardoza catheter in place  -Continuous bladder irrigation  -Urology consulted and recommended monitoring patient while continuing bladder irrigation.    -cystoscopy scheduled for today  - urology has cleared for discharge- follow up in 1 week as outpt  - pt can restart coumadin tonight  -Monitor signs and symptoms while admitted      A-fib  -Coumadin currently on hold- can retsrat tonight  -PT/INR therapeutic         Hypertension  -Moderately controlled         BP Readings from Last 1 Encounters:   01/16/24 149/60   - Continue Cozaar and atenolol  - Monitor while admitted      Hyperlipidemia  -Continue Lipitor    I discussed the patients findings and my recommendations with patient and nursing staff.     Discharge Diagnosis:      Acute UTI      Hospital Course  Patient is a 73 y.o. male presented with dysuria. Er evaluated pt and admitted to observation. Labs including cbc and cmp are unremarkable. Ua is + for blood. Cardoza catheter placed and bladder irrigation performed. Urology consulted. Cystoscopy performed. Pt to follow up with urology as out pt in 1 week. Pt can restart coumadin tonight. Discharge discussed with pt and he is agreeable to plan. Instructed pt to return to er if symptoms reoccur or worsen.       Past Medical History:     Past Medical History:   Diagnosis Date    AAA (abdominal aortic aneurysm) 2006    Abnormal ECG     Aneurysm     Arthritis     Asthma     Atrial fibrillation     COUMADIN    Benign prostatic hyperplasia     Bladder cancer     CHF (congestive heart failure)     Chronic anticoagulation     COUMADIN STARTED 2006    Colon polyp     Congenital heart disease     Coronary artery disease     Diabetes mellitus     Elevated cholesterol     Erectile dysfunction     Facial basal cell cancer     Gout     Hard to intubate     History of pancreatitis 2006    History of pneumonia     History of tick-borne disease 2015    HL (hearing loss)     BILAT HEARING AIDS    Hyperlipidemia     Hypertension     Myocardial infarction     X5    Obesity     Pancreatitis     Pneumonia     Sleep apnea     CURRENTLY NOT USING CPAP D/T RECALL    Transfusion history     STATES HIS SISTER HAD A SEVERE REACTION TO BLOOD TRANSFUSION    Type 2 diabetes mellitus        Past Surgical History:     Past Surgical History:   Procedure Laterality Date    ARTERIOGRAM AORTIC Left 06/29/2022    Procedure: AORTIC STENT GRAFT PLACEMENT WITH  ILIAC COILING;  Surgeon: Eric Sainz MD;  Location: [facility] HYBRID OR ;  Service: Vascular;  Laterality: Left;    CARDIAC CATHETERIZATION      CHOLECYSTECTOMY      COLONOSCOPY      COLONOSCOPY N/A 2023    Procedure: COLONOSCOPY to cecum with cold biopsy and cold snare polypectomies and clips;  Surgeon: Vitor Haley MD;  Location: [facility] ENDOSCOPY;  Service: Gastroenterology;  Laterality: N/A;  Pre - H/O polyps.  Post - diverticulosis, polyps, hemorrhoids    CORONARY ARTERY BYPASS GRAFT  2006    X2    CYSTOSCOPY      STATES HAD BLADDER TUMORS REMOVED X2    PROSTATE SURGERY      X2- STATES PLACED COILS IN TO HELP WITH FLOW AND THEN HAD TO REMOVE A PIECE THAT BROKE OFF AND WAS IN BLADDER    UPPER GASTROINTESTINAL ENDOSCOPY         Social History:   Social History     Socioeconomic History    Marital status:      Spouse name: Nichelle    Number of children: 3    Years of education: 12   Tobacco Use    Smoking status: Former     Packs/day: 1.00     Years: 30.00     Additional pack years: 0.00     Total pack years: 30.00     Types: Cigarettes     Quit date: 2006     Years since quittin.5    Smokeless tobacco: Never   Vaping Use    Vaping Use: Never used   Substance and Sexual Activity    Alcohol use: Not Currently     Comment: one or two beers 6 times a year    Drug use: Never    Sexual activity: Not Currently     Partners: Female     Birth control/protection: None       Procedures Performed    Procedure(s):  CYSTOSCOPY WITH CLOT EVACUATION, WITH FULGRATION  -------------------       Consults:   Consults       Date and Time Order Name Status Description    2024  2:47 AM IP Consult to Urology Completed             Condition on Discharge:     Stable    Discharge Disposition      Discharge Medications     Discharge Medications        ASK your doctor about these medications        Instructions Start Date   albuterol sulfate  (90 Base) MCG/ACT inhaler  Commonly known as:  PROVENTIL HFA;VENTOLIN HFA;PROAIR HFA   2 puffs, Inhalation, Every 4 Hours PRN      allopurinol 100 MG tablet  Commonly known as: ZYLOPRIM   100 mg, Oral, Nightly      aspirin 81 MG tablet   81 mg, Oral, Daily, PT STATES WAS INSTRUCTED TO CONTINUE TO TAKE THIS PER GET ANDERSEN      atenolol 50 MG tablet  Commonly known as: TENORMIN   50 mg, Oral, Every Night at Bedtime      atorvastatin 20 MG tablet  Commonly known as: LIPITOR   TAKE 1 TABLET BY MOUTH EVERY DAY      losartan 25 MG tablet  Commonly known as: COZAAR   100 mg, Oral, Daily      warfarin 7.5 MG tablet  Commonly known as: COUMADIN  Ask about: Which instructions should I use?   11.25 mg, Oral, On Mon, Wed, Fri.      warfarin 7.5 MG tablet  Commonly known as: COUMADIN  Ask about: Which instructions should I use?   7.5 mg, Oral, TUES, THUR, SAT, SUN.               Discharge Diet:     Activity at Discharge:     Follow-up Appointments  Future Appointments   Date Time Provider Department Center   1/23/2024 10:00 AM [facility]   2/1/2024  3:00 PM [facility]   2/1/2024  3:20 PM Thai Galloway MD [facility]   2/12/2024  9:15 AM Colette Dominguez APRN [facility]   2/19/2024 10:00 AM [facility]  [facility]   2/19/2024 10:45 AM [facility]  [facility]   2/19/2024 11:30 AM ROOM 1,  [facility]  [facility] None   4/11/2024  8:30 AM Shawn Van MD [facility]   6/5/2024  9:15 AM Seipel, Joseph F, MD [facility]         Test Results Pending at Discharge       Risk for Readmission (LACE) Score: 11 (1/18/2024  6:00 AM)      Less Than 30 minutes spent in discharge activities for this patient    Signature:Electronically signed by Makayla Birmingham PA-C, 01/18/24, 9:38 AM EST.

## 2024-01-18 NOTE — PROGRESS NOTES
Urology Progress Note    Patient Identification:  Name:  Sandro Ramirez  Age:  73 y.o.  Sex:  male  :  1950  MRN:  [id number]    Chief Complaint:  Patient without complaint    History of Present Illness: Urine is clearing after cystoscopy, clot evacuation and fulguration yesterday.  Urine culture is not showing any growth.  Will plan voiding trial today.    Problem List:    Acute UTI     Past Medical History:  Past Medical History:   Diagnosis Date    AAA (abdominal aortic aneurysm) 2006    Abnormal ECG     Aneurysm     Arthritis     Asthma     Atrial fibrillation     COUMADIN    Benign prostatic hyperplasia     Bladder cancer     CHF (congestive heart failure)     Chronic anticoagulation     COUMADIN STARTED     Colon polyp     Congenital heart disease     Coronary artery disease     Diabetes mellitus     Elevated cholesterol     Erectile dysfunction     Facial basal cell cancer     Gout     Hard to intubate     History of pancreatitis     History of pneumonia     History of tick-borne disease     HL (hearing loss)     BILAT HEARING AIDS    Hyperlipidemia     Hypertension     Myocardial infarction     X5    Obesity     Pancreatitis     Pneumonia     Sleep apnea     CURRENTLY NOT USING CPAP D/T RECALL    Transfusion history     STATES HIS SISTER HAD A SEVERE REACTION TO BLOOD TRANSFUSION    Type 2 diabetes mellitus      Past Surgical History:  Past Surgical History:   Procedure Laterality Date    ARTERIOGRAM AORTIC Left 2022    Procedure: AORTIC STENT GRAFT PLACEMENT WITH ILIAC COILING;  Surgeon: Eric Sainz MD;  Location: [facility] HYBRID OR ;  Service: Vascular;  Laterality: Left;    CARDIAC CATHETERIZATION      CHOLECYSTECTOMY      COLONOSCOPY      COLONOSCOPY N/A 2023    Procedure: COLONOSCOPY to cecum with cold biopsy and cold snare polypectomies and clips;  Surgeon: Vitor Haley MD;  Location: [facility] ENDOSCOPY;  Service: Gastroenterology;  Laterality: N/A;  Pre  - H/O polyps.  Post - diverticulosis, polyps, hemorrhoids    CORONARY ARTERY BYPASS GRAFT  2006    X2    CYSTOSCOPY      STATES HAD BLADDER TUMORS REMOVED X2    PROSTATE SURGERY      X2- STATES PLACED COILS IN TO HELP WITH FLOW AND THEN HAD TO REMOVE A PIECE THAT BROKE OFF AND WAS IN BLADDER    UPPER GASTROINTESTINAL ENDOSCOPY       Home Meds:  Medications Prior to Admission   Medication Sig Dispense Refill Last Dose    allopurinol (ZYLOPRIM) 100 MG tablet Take 1 tablet by mouth Every Night.   1/15/2024    aspirin 81 MG tablet Take 1 tablet by mouth Daily. PT STATES WAS INSTRUCTED TO CONTINUE TO TAKE THIS PER GET ANDERSEN   1/15/2024    atenolol (TENORMIN) 50 MG tablet TAKE 1 TABLET BY MOUTH EVERY DAY AT NIGHT 90 tablet 0 1/15/2024    atorvastatin (LIPITOR) 20 MG tablet TAKE 1 TABLET BY MOUTH EVERY DAY 90 tablet 1 1/15/2024    losartan (COZAAR) 25 MG tablet Take 4 tablets by mouth Daily.   1/15/2024    albuterol sulfate  (90 Base) MCG/ACT inhaler Inhale 2 puffs Every 4 (Four) Hours As Needed for Wheezing.       warfarin (COUMADIN) 7.5 MG tablet Take 1.5 tablets by mouth. On Mon, Wed, Fri.       warfarin (COUMADIN) 7.5 MG tablet Take 1 tablet by mouth. TONY WEISS, SAT, SUN.        Current Meds:    Current Facility-Administered Medications:     acetaminophen (TYLENOL) tablet 650 mg, 650 mg, Oral, Q4H PRN **OR** acetaminophen (TYLENOL) 160 MG/5ML oral solution 650 mg, 650 mg, Oral, Q4H PRN **OR** acetaminophen (TYLENOL) suppository 650 mg, 650 mg, Rectal, Q4H PRN, Daniel Hamm MD    [MAR Hold] albuterol (PROVENTIL) nebulizer solution 0.083% 2.5 mg/3mL, 2.5 mg, Nebulization, Q6H PRN, Toya Rodgers APRN    [MAR Hold] allopurinol (ZYLOPRIM) tablet 100 mg, 100 mg, Oral, Nightly, Toya Rodgers APRN, 100 mg at 01/16/24 2126    aluminum-magnesium hydroxide-simethicone (MAALOX MAX) 400-400-40 MG/5ML suspension 15 mL, 15 mL, Oral, Q6H PRN, Daniel Hamm MD, 15 mL at 01/18/24 0405    [MAR Hold]  aspirin EC tablet 81 mg, 81 mg, Oral, Daily, Tripure, Toya S, APRN    atenolol (TENORMIN) tablet 50 mg, 50 mg, Oral, Q24H, Tripure, Toya S, APRN, 50 mg at 01/17/24 0819    [MAR Hold] atorvastatin (LIPITOR) tablet 20 mg, 20 mg, Oral, Nightly, Tripure, Toya S, APRN, 20 mg at 01/16/24 2126    sennosides-docusate (PERICOLACE) 8.6-50 MG per tablet 2 tablet, 2 tablet, Oral, BID **AND** polyethylene glycol (MIRALAX) packet 17 g, 17 g, Oral, Daily PRN **AND** bisacodyl (DULCOLAX) EC tablet 5 mg, 5 mg, Oral, Daily PRN **AND** bisacodyl (DULCOLAX) suppository 10 mg, 10 mg, Rectal, Daily PRN, Daniel Hamm MD    Calcium Replacement - Follow Nurse / BPA Driven Protocol, , Does not apply, PRN, Daniel Hamm MD    cefTRIAXone (ROCEPHIN) 1,000 mg in sodium chloride 0.9 % 100 mL IVPB, 1,000 mg, Intravenous, Q24H, Daniel Hamm MD, Last Rate: 200 mL/hr at 01/18/24 0332, 1,000 mg at 01/18/24 0332    dextrose (D50W) (25 g/50 mL) IV injection 25 g, 25 g, Intravenous, Q15 Min PRN, Daniel Hamm MD    dextrose (GLUTOSE) oral gel 15 g, 15 g, Oral, Q15 Min PRN, Daniel Hamm MD    glucagon (GLUCAGEN) injection 1 mg, 1 mg, Intramuscular, Q15 Min PRN, Daniel Hamm MD    insulin lispro (HUMALOG/ADMELOG) injection 2-7 Units, 2-7 Units, Subcutaneous, 4x Daily AC & at Bedtime, Daniel Hamm MD, 2 Units at 01/16/24 6249    lactated ringers infusion 1,000 mL, 1,000 mL, Intravenous, Continuous, Link Rivera MD, Stopped at 01/17/24 1350    losartan (COZAAR) tablet 100 mg, 100 mg, Oral, Daily, Toya Rodgers, APRN, 100 mg at 01/17/24 0819    Magnesium Standard Dose Replacement - Follow Nurse / BPA Driven Protocol, , Does not apply, PRN, Daniel Hamm MD    melatonin tablet 5 mg, 5 mg, Oral, Nightly PRN, Daniel Hamm MD    ondansetron ODT (ZOFRAN-ODT) disintegrating tablet 4 mg, 4 mg, Oral, Q6H PRN **OR** ondansetron (ZOFRAN) injection 4 mg, 4 mg, Intravenous, Q6H PRN, Daniel Hamm MD    " Phosphorus Replacement - Follow Nurse / BPA Driven Protocol, , Does not apply, Hansel MONAHAN Dennis N., MD    Potassium Replacement - Follow Nurse / BPA Driven Protocol, , Does not apply, Hansel MONAHAN Dennis N., MD    sodium chloride 0.9 % flush 10 mL, 10 mL, Intravenous, Q12H, Daniel Hamm MD, 10 mL at 24 0821    sodium chloride 0.9 % flush 10 mL, 10 mL, Intravenous, PRHansel ROBINS Dennis N., MD    sodium chloride 0.9 % infusion 40 mL, 40 mL, Intravenous, PRHansel ROBINS Dennis N., MD  Allergies:  Bee venom, Meperidine, Midazolam, Propofol, Sulfa antibiotics, Hydrocodone, and Simvastatin    Review of Systems     Objective:  tMax 24 hours:  Temp (24hrs), Av.7 °F (36.5 °C), Min:97.3 °F (36.3 °C), Max:98.2 °F (36.8 °C)    Vital Sign Ranges:  Temp:  [97.3 °F (36.3 °C)-98.2 °F (36.8 °C)] 97.9 °F (36.6 °C)  Heart Rate:  [] 75  Resp:  [11-24] 22  BP: (122-206)/() 122/59  Intake and Output Last 3 Shifts:  I/O last 3 completed shifts:  In: 74532 [I.V.:600; Other:28917]  Out: 52303 [Urine:80464]    Exam:  /59 (BP Location: Left arm, Patient Position: Lying)   Pulse 75   Temp 97.9 °F (36.6 °C) (Oral)   Resp 22   Ht 180.3 cm (71\")   Wt 90 kg (198 lb 6.6 oz)   SpO2 91%   BMI 27.67 kg/m²    General Appearance:    Alert, cooperative, no acute distress, general         appearance is normal   Head:    Normocephalic, without obvious abnormality, atraumatic   Eyes:            Pupils/Irises normal. Exterior inspection conjunctivae       and lids normal.   Ears:    Normal external inspection   Nose:   Exterior inspection of nose is normal   Throat:   Lips, mucosa, and tongue normal   Lungs:     Respirations unlabored; normal effort, no audible     abnormality   CV:   Regular rhythm and normal rate, no edema   Abdomen:     examination of the abdomen is normal with     no masses, tenderness, or distension    : Cardoza with pink-tinged urine urine on slow to moderate CBI     Data Review:  All labs (24hrs):  "   Lab Results (last 24 hours)       Procedure Component Value Units Date/Time    POC Glucose 4x Daily Before Meals & at Bedtime [[id number]]  (Abnormal) Collected: 01/18/24 0714    Specimen: Blood Updated: 01/18/24 0715     Glucose 152 mg/dL      Comment: Serial Number: [id number]:  [id number]       CBC & Differential [[id number]]  (Abnormal) Collected: 01/18/24 0348    Specimen: Blood Updated: 01/18/24 0459    Narrative:      The following orders were created for panel order CBC & Differential.  Procedure                               Abnormality         Status                     ---------                               -----------         ------                     CBC Auto Differential[[id number]]        Abnormal            Final result                 Please view results for these tests on the individual orders.    CBC Auto Differential [[id number]]  (Abnormal) Collected: 01/18/24 0348    Specimen: Blood Updated: 01/18/24 0459     WBC 20.10 10*3/mm3      RBC 5.71 10*6/mm3      Hemoglobin 15.7 g/dL      Hematocrit 47.9 %      MCV 84.0 fL      MCH 27.5 pg      MCHC 32.7 g/dL      RDW 16.3 %      RDW-SD 47.3 fl      MPV 7.7 fL      Platelets 258 10*3/mm3      Neutrophil % 86.7 %      Lymphocyte % 7.9 %      Monocyte % 5.1 %      Eosinophil % 0.0 %      Basophil % 0.3 %      Neutrophils, Absolute 17.50 10*3/mm3      Lymphocytes, Absolute 1.60 10*3/mm3      Monocytes, Absolute 1.00 10*3/mm3      Eosinophils, Absolute 0.00 10*3/mm3      Basophils, Absolute 0.10 10*3/mm3      nRBC 0.0 /100 WBC     Basic Metabolic Panel [[id number]]  (Abnormal) Collected: 01/18/24 0348    Specimen: Blood Updated: 01/18/24 0459     Glucose 142 mg/dL      BUN 18 mg/dL      Creatinine 0.67 mg/dL      Sodium 129 mmol/L      Potassium 4.5 mmol/L      Chloride 97 mmol/L      CO2 22.0 mmol/L      Calcium 8.6 mg/dL      BUN/Creatinine Ratio 26.9     Anion Gap 10.0 mmol/L      eGFR 99.8 mL/min/1.73     Narrative:      GFR Normal >60  Chronic  Kidney Disease <60  Kidney Failure <15    The GFR formula is only valid for adults with stable renal function between ages 18 and 70.    Protime-INR [[id number]]  (Abnormal) Collected: 01/18/24 0348    Specimen: Blood Updated: 01/18/24 0449     Protime 12.7 Seconds      INR 1.18    POC Glucose Once [[id number]]  (Abnormal) Collected: 01/17/24 2057    Specimen: Blood Updated: 01/17/24 2059     Glucose 137 mg/dL      Comment: Serial Number: [id number]:  [id number]       POC Glucose Once [[id number]]  (Abnormal) Collected: 01/17/24 1634    Specimen: Blood Updated: 01/17/24 1637     Glucose 190 mg/dL      Comment: Serial Number: [id number]:  [id number]       POC Glucose Once [[id number]]  (Abnormal) Collected: 01/17/24 1411    Specimen: Blood Updated: 01/17/24 1412     Glucose 136 mg/dL      Comment: Serial Number: [id number]:  [id number]       POC Glucose 4x Daily Before Meals & at Bedtime [[id number]]  (Abnormal) Collected: 01/17/24 1116    Specimen: Blood Updated: 01/17/24 1118     Glucose 119 mg/dL      Comment: Serial Number: [id number]:  [id number]       Urine Culture - Urine, Urine, Clean Catch [[id number]]  (Normal) Collected: 01/16/24 0213    Specimen: Urine, Clean Catch Updated: 01/17/24 0845     Urine Culture No growth          Radiology:   Imaging Results (Last 72 Hours)       ** No results found for the last 72 hours. **            Assessment/Plan:    Principal Problem:    Acute UTI    Gross hematuria due to bleeding following greenlight TURP  Hematuria clearing after cystoscopy, clot evacuation, fulguration    Plan  Voiding trial today  If patient able to empty and urine remains relatively clear that he is cleared for discharge from urology standpoint  Patient could then restart his Coumadin tonight  Follow-up with me next week      Daniel Hamm MD  1/18/2024  07:35 EST

## 2024-01-18 NOTE — PLAN OF CARE
Goal Outcome Evaluation:  Plan of Care Reviewed With: patient        Progress: improving  Outcome Evaluation: Patient's 3 way catheter removed at 8am. Patient voided quickly after with no pain. Patient and spouse educated. Receiving new medications to go home on and will follow up with Dr Hamm outpatient in a week. Discharging home.

## 2024-01-18 NOTE — PLAN OF CARE
Goal Outcome Evaluation:  Plan of Care Reviewed With: patient, spouse        Progress: no change  Outcome Evaluation: new admit  will have a urology consult in the morning

## 2024-01-19 ENCOUNTER — APPOINTMENT (OUTPATIENT)
Dept: GENERAL RADIOLOGY | Facility: HOSPITAL | Age: 74
DRG: 988 | End: 2024-01-19
Payer: MEDICARE

## 2024-01-19 ENCOUNTER — APPOINTMENT (OUTPATIENT)
Dept: CT IMAGING | Facility: HOSPITAL | Age: 74
DRG: 988 | End: 2024-01-19
Payer: MEDICARE

## 2024-01-19 ENCOUNTER — TRANSITIONAL CARE MANAGEMENT TELEPHONE ENCOUNTER (OUTPATIENT)
Dept: CALL CENTER | Facility: HOSPITAL | Age: 74
End: 2024-01-19
Payer: MEDICARE

## 2024-01-19 ENCOUNTER — HOSPITAL ENCOUNTER (INPATIENT)
Facility: HOSPITAL | Age: 74
LOS: 3 days | Discharge: HOME | DRG: 988 | End: 2024-01-22
Attending: EMERGENCY MEDICINE | Admitting: HOSPITALIST
Payer: MEDICARE

## 2024-01-19 DIAGNOSIS — R32 BLADDER LEAK: ICD-10-CM

## 2024-01-19 DIAGNOSIS — R10.9 ABDOMINAL PAIN, UNSPECIFIED ABDOMINAL LOCATION: Primary | ICD-10-CM

## 2024-01-19 DIAGNOSIS — R31.9 HEMATURIA, UNSPECIFIED TYPE: ICD-10-CM

## 2024-01-19 DIAGNOSIS — N17.9 AKI (ACUTE KIDNEY INJURY): ICD-10-CM

## 2024-01-19 LAB
ALBUMIN SERPL-MCNC: 3.3 G/DL (ref 3.5–5.2)
ALBUMIN/GLOB SERPL: 1.1 G/DL
ALP SERPL-CCNC: 193 U/L (ref 39–117)
ALT SERPL W P-5'-P-CCNC: 24 U/L (ref 1–41)
ANION GAP SERPL CALCULATED.3IONS-SCNC: 9 MMOL/L (ref 5–15)
AST SERPL-CCNC: 35 U/L (ref 1–40)
BACTERIA UR QL AUTO: ABNORMAL /HPF
BASOPHILS # BLD AUTO: 0.2 10*3/MM3 (ref 0–0.2)
BASOPHILS NFR BLD AUTO: 1 % (ref 0–1.5)
BILIRUB SERPL-MCNC: 1.4 MG/DL (ref 0–1.2)
BILIRUB UR QL STRIP: ABNORMAL
BUN SERPL-MCNC: 44 MG/DL (ref 8–23)
BUN/CREAT SERPL: 25.7 (ref 7–25)
CALCIUM SPEC-SCNC: 8.6 MG/DL (ref 8.6–10.5)
CHLORIDE SERPL-SCNC: 100 MMOL/L (ref 98–107)
CLARITY UR: ABNORMAL
CO2 SERPL-SCNC: 24 MMOL/L (ref 22–29)
COLOR UR: ABNORMAL
CREAT SERPL-MCNC: 1.71 MG/DL (ref 0.76–1.27)
D-LACTATE SERPL-SCNC: 1.8 MMOL/L (ref 0.3–2)
DEPRECATED RDW RBC AUTO: 47.7 FL (ref 37–54)
EGFRCR SERPLBLD CKD-EPI 2021: 42.3 ML/MIN/1.73
EOSINOPHIL # BLD AUTO: 0.1 10*3/MM3 (ref 0–0.4)
EOSINOPHIL NFR BLD AUTO: 0.4 % (ref 0.3–6.2)
ERYTHROCYTE [DISTWIDTH] IN BLOOD BY AUTOMATED COUNT: 16.4 % (ref 12.3–15.4)
GLOBULIN UR ELPH-MCNC: 3.1 GM/DL
GLUCOSE SERPL-MCNC: 150 MG/DL (ref 65–99)
GLUCOSE UR STRIP-MCNC: ABNORMAL MG/DL
HCT VFR BLD AUTO: 42.4 % (ref 37.5–51)
HGB BLD-MCNC: 13.7 G/DL (ref 13–17.7)
HGB UR QL STRIP.AUTO: ABNORMAL
HOLD SPECIMEN: NORMAL
HYALINE CASTS UR QL AUTO: ABNORMAL /LPF
INR PPP: 1.3 (ref 2–3)
KETONES UR QL STRIP: ABNORMAL
LEUKOCYTE ESTERASE UR QL STRIP.AUTO: ABNORMAL
LIPASE SERPL-CCNC: 20 U/L (ref 13–60)
LYMPHOCYTES # BLD AUTO: 1.6 10*3/MM3 (ref 0.7–3.1)
LYMPHOCYTES NFR BLD AUTO: 8.1 % (ref 19.6–45.3)
MCH RBC QN AUTO: 27.2 PG (ref 26.6–33)
MCHC RBC AUTO-ENTMCNC: 32.3 G/DL (ref 31.5–35.7)
MCV RBC AUTO: 84.1 FL (ref 79–97)
MONOCYTES # BLD AUTO: 1.3 10*3/MM3 (ref 0.1–0.9)
MONOCYTES NFR BLD AUTO: 6.3 % (ref 5–12)
NEUTROPHILS NFR BLD AUTO: 16.6 10*3/MM3 (ref 1.7–7)
NEUTROPHILS NFR BLD AUTO: 84.2 % (ref 42.7–76)
NITRITE UR QL STRIP: POSITIVE
NRBC BLD AUTO-RTO: 0 /100 WBC (ref 0–0.2)
NT-PROBNP SERPL-MCNC: 447.3 PG/ML (ref 0–900)
PH UR STRIP.AUTO: <=5 [PH] (ref 5–8)
PLATELET # BLD AUTO: 249 10*3/MM3 (ref 140–450)
PMV BLD AUTO: 7.6 FL (ref 6–12)
POTASSIUM SERPL-SCNC: 5 MMOL/L (ref 3.5–5.2)
PROT SERPL-MCNC: 6.4 G/DL (ref 6–8.5)
PROT UR QL STRIP: ABNORMAL
PROTHROMBIN TIME: 13.9 SECONDS (ref 19.4–28.5)
RBC # BLD AUTO: 5.04 10*6/MM3 (ref 4.14–5.8)
RBC # UR STRIP: ABNORMAL /HPF
REF LAB TEST METHOD: ABNORMAL
SODIUM SERPL-SCNC: 133 MMOL/L (ref 136–145)
SP GR UR STRIP: 1.01 (ref 1–1.03)
SQUAMOUS #/AREA URNS HPF: ABNORMAL /HPF
UROBILINOGEN UR QL STRIP: ABNORMAL
WBC # UR STRIP: ABNORMAL /HPF
WBC NRBC COR # BLD AUTO: 19.8 10*3/MM3 (ref 3.4–10.8)

## 2024-01-19 PROCEDURE — 74176 CT ABD & PELVIS W/O CONTRAST: CPT

## 2024-01-19 PROCEDURE — 25810000003 SODIUM CHLORIDE 0.9 % SOLUTION: Performed by: PHYSICIAN ASSISTANT

## 2024-01-19 PROCEDURE — 83880 ASSAY OF NATRIURETIC PEPTIDE: CPT | Performed by: PHYSICIAN ASSISTANT

## 2024-01-19 PROCEDURE — 99285 EMERGENCY DEPT VISIT HI MDM: CPT

## 2024-01-19 PROCEDURE — 25010000002 ONDANSETRON PER 1 MG: Performed by: PHYSICIAN ASSISTANT

## 2024-01-19 PROCEDURE — 25010000002 MORPHINE PER 10 MG: Performed by: HOSPITALIST

## 2024-01-19 PROCEDURE — 70450 CT HEAD/BRAIN W/O DYE: CPT

## 2024-01-19 PROCEDURE — 83690 ASSAY OF LIPASE: CPT | Performed by: PHYSICIAN ASSISTANT

## 2024-01-19 PROCEDURE — 25810000003 SODIUM CHLORIDE 0.9 % SOLUTION: Performed by: HOSPITALIST

## 2024-01-19 PROCEDURE — 25510000001 IOPAMIDOL PER 1 ML: Performed by: EMERGENCY MEDICINE

## 2024-01-19 PROCEDURE — 87086 URINE CULTURE/COLONY COUNT: CPT | Performed by: PHYSICIAN ASSISTANT

## 2024-01-19 PROCEDURE — 83605 ASSAY OF LACTIC ACID: CPT

## 2024-01-19 PROCEDURE — 81001 URINALYSIS AUTO W/SCOPE: CPT | Performed by: PHYSICIAN ASSISTANT

## 2024-01-19 PROCEDURE — 51702 INSERT TEMP BLADDER CATH: CPT

## 2024-01-19 PROCEDURE — 25010000002 HYDROMORPHONE 1 MG/ML SOLUTION: Performed by: PHYSICIAN ASSISTANT

## 2024-01-19 PROCEDURE — 85025 COMPLETE CBC W/AUTO DIFF WBC: CPT | Performed by: PHYSICIAN ASSISTANT

## 2024-01-19 PROCEDURE — 71045 X-RAY EXAM CHEST 1 VIEW: CPT

## 2024-01-19 PROCEDURE — 25010000002 CEFTRIAXONE PER 250 MG: Performed by: PHYSICIAN ASSISTANT

## 2024-01-19 PROCEDURE — 85610 PROTHROMBIN TIME: CPT | Performed by: PHYSICIAN ASSISTANT

## 2024-01-19 PROCEDURE — 80053 COMPREHEN METABOLIC PANEL: CPT | Performed by: PHYSICIAN ASSISTANT

## 2024-01-19 PROCEDURE — 87040 BLOOD CULTURE FOR BACTERIA: CPT | Performed by: PHYSICIAN ASSISTANT

## 2024-01-19 PROCEDURE — 25010000002 MORPHINE PER 10 MG: Performed by: PHYSICIAN ASSISTANT

## 2024-01-19 PROCEDURE — 74177 CT ABD & PELVIS W/CONTRAST: CPT

## 2024-01-19 PROCEDURE — 36415 COLL VENOUS BLD VENIPUNCTURE: CPT | Performed by: PHYSICIAN ASSISTANT

## 2024-01-19 RX ORDER — SODIUM CHLORIDE 0.9 % (FLUSH) 0.9 %
10 SYRINGE (ML) INJECTION AS NEEDED
Status: DISCONTINUED | OUTPATIENT
Start: 2024-01-19 | End: 2024-01-22 | Stop reason: HOSPADM

## 2024-01-19 RX ORDER — BISACODYL 5 MG/1
5 TABLET, DELAYED RELEASE ORAL DAILY PRN
Status: DISCONTINUED | OUTPATIENT
Start: 2024-01-19 | End: 2024-01-22 | Stop reason: HOSPADM

## 2024-01-19 RX ORDER — MORPHINE SULFATE 2 MG/ML
2 INJECTION, SOLUTION INTRAMUSCULAR; INTRAVENOUS ONCE
Status: COMPLETED | OUTPATIENT
Start: 2024-01-19 | End: 2024-01-19

## 2024-01-19 RX ORDER — BISACODYL 10 MG
10 SUPPOSITORY, RECTAL RECTAL DAILY PRN
Status: DISCONTINUED | OUTPATIENT
Start: 2024-01-19 | End: 2024-01-22 | Stop reason: HOSPADM

## 2024-01-19 RX ORDER — SODIUM CHLORIDE 9 MG/ML
100 INJECTION, SOLUTION INTRAVENOUS CONTINUOUS
Status: DISCONTINUED | OUTPATIENT
Start: 2024-01-19 | End: 2024-01-22 | Stop reason: HOSPADM

## 2024-01-19 RX ORDER — SODIUM CHLORIDE 9 MG/ML
40 INJECTION, SOLUTION INTRAVENOUS AS NEEDED
Status: DISCONTINUED | OUTPATIENT
Start: 2024-01-19 | End: 2024-01-22 | Stop reason: HOSPADM

## 2024-01-19 RX ORDER — ONDANSETRON 2 MG/ML
4 INJECTION INTRAMUSCULAR; INTRAVENOUS EVERY 6 HOURS PRN
Status: DISCONTINUED | OUTPATIENT
Start: 2024-01-19 | End: 2024-01-22 | Stop reason: HOSPADM

## 2024-01-19 RX ORDER — POLYETHYLENE GLYCOL 3350 17 G/17G
17 POWDER, FOR SOLUTION ORAL DAILY PRN
Status: DISCONTINUED | OUTPATIENT
Start: 2024-01-19 | End: 2024-01-22 | Stop reason: HOSPADM

## 2024-01-19 RX ORDER — ONDANSETRON 2 MG/ML
4 INJECTION INTRAMUSCULAR; INTRAVENOUS ONCE
Status: COMPLETED | OUTPATIENT
Start: 2024-01-19 | End: 2024-01-19

## 2024-01-19 RX ORDER — AMOXICILLIN 250 MG
2 CAPSULE ORAL 2 TIMES DAILY
Status: DISCONTINUED | OUTPATIENT
Start: 2024-01-19 | End: 2024-01-22 | Stop reason: HOSPADM

## 2024-01-19 RX ORDER — SODIUM CHLORIDE 0.9 % (FLUSH) 0.9 %
10 SYRINGE (ML) INJECTION EVERY 12 HOURS SCHEDULED
Status: DISCONTINUED | OUTPATIENT
Start: 2024-01-19 | End: 2024-01-22 | Stop reason: HOSPADM

## 2024-01-19 RX ADMIN — CEFTRIAXONE 1000 MG: 1 INJECTION, POWDER, FOR SOLUTION INTRAMUSCULAR; INTRAVENOUS at 16:33

## 2024-01-19 RX ADMIN — MORPHINE SULFATE 2 MG: 2 INJECTION, SOLUTION INTRAMUSCULAR; INTRAVENOUS at 16:07

## 2024-01-19 RX ADMIN — MORPHINE SULFATE 4 MG: 4 INJECTION, SOLUTION INTRAMUSCULAR; INTRAVENOUS at 20:26

## 2024-01-19 RX ADMIN — SODIUM CHLORIDE 500 ML: 0.9 INJECTION, SOLUTION INTRAVENOUS at 14:25

## 2024-01-19 RX ADMIN — DOCUSATE SODIUM AND SENNOSIDES 2 TABLET: 8.6; 5 TABLET, FILM COATED ORAL at 20:26

## 2024-01-19 RX ADMIN — IOPAMIDOL 100 ML: 755 INJECTION, SOLUTION INTRAVENOUS at 15:15

## 2024-01-19 RX ADMIN — ONDANSETRON 4 MG: 2 INJECTION INTRAMUSCULAR; INTRAVENOUS at 16:07

## 2024-01-19 RX ADMIN — Medication 10 ML: at 20:43

## 2024-01-19 RX ADMIN — HYDROMORPHONE HYDROCHLORIDE 1 MG: 1 INJECTION, SOLUTION INTRAMUSCULAR; INTRAVENOUS; SUBCUTANEOUS at 16:31

## 2024-01-19 RX ADMIN — ONDANSETRON 4 MG: 2 INJECTION INTRAMUSCULAR; INTRAVENOUS at 13:43

## 2024-01-19 RX ADMIN — SODIUM CHLORIDE 100 ML/HR: 9 INJECTION, SOLUTION INTRAVENOUS at 20:26

## 2024-01-19 NOTE — CONSULTS
Urology Consult Note    Patient:Sandro Ramirez :1950  Room:10/10  Admit Date2024  Age:73 y.o.     SEX:male     DOS:2024     MR:[id number]     Visit:[id number]       Attending: Griffin Wiley MD  Referring Provider: Dr. Wiley  Reason for Consultation: Gross hematuria    Patient Care Team:  Colette Dominguez APRN as PCP - General (Nurse Practitioner)  Thai Galloway MD as Consulting Physician (Interventional Cardiology)  Seipel, Joseph F, MD as Consulting Physician (Neurology)  Daniel Hamm MD as Consulting Physician (Urology)  Abbey Mary APRN as Nurse Practitioner (Nurse Practitioner)    Chief complaint blood in urine    Subjective .     History of present illness: 73-year-old gentleman with history of BPH with lower urinary tract symptoms.  Patient underwent a greenlight TURP on .  Patient did well initially but then developed significant gross hematuria and went into clot retention.  Patient was admitted to the hospital and underwent cystoscopy with clot evacuation and fulguration 2 days ago.  His catheter was removed yesterday and he is able to void so there was still some hematuria.  However today he developed increasing abdominal pain and swelling.  He presented to the hospital and a CT scan was performed which reveals a few clots in the bladder but also there is a question of a bladder perforation.  I placed a three-way Cardoza catheter at the bedside and the urine is a dark cola color which actually cleared fairly rapidly on slow to moderate CBI.  Patient is having some improvement in his abdominal symptoms.    Review of Systems  10 point review of systems were reviewed and are negative except for:  Constitution:  positive for See HPI    History  Past Medical History:   Diagnosis Date    AAA (abdominal aortic aneurysm) 2006    Abnormal ECG     Aneurysm     Arthritis     Asthma     Atrial fibrillation     COUMADIN    Benign prostatic hyperplasia     Bladder cancer      CHF (congestive heart failure)     Chronic anticoagulation     COUMADIN STARTED 2006    Colon polyp     Congenital heart disease     Coronary artery disease     Diabetes mellitus     Elevated cholesterol     Erectile dysfunction     Facial basal cell cancer     Gout     Hard to intubate     History of pancreatitis 2006    History of pneumonia     History of tick-borne disease 2015    HL (hearing loss)     BILAT HEARING AIDS    Hyperlipidemia     Hypertension     Myocardial infarction     X5    Obesity     Pancreatitis     Pneumonia     Sleep apnea     CURRENTLY NOT USING CPAP D/T RECALL    Transfusion history     STATES HIS SISTER HAD A SEVERE REACTION TO BLOOD TRANSFUSION    Type 2 diabetes mellitus      Past Surgical History:   Procedure Laterality Date    ARTERIOGRAM AORTIC Left 06/29/2022    Procedure: AORTIC STENT GRAFT PLACEMENT WITH ILIAC COILING;  Surgeon: Eric Sainz MD;  Location: [facility] HYBRID OR 18/19;  Service: Vascular;  Laterality: Left;    CARDIAC CATHETERIZATION      CHOLECYSTECTOMY      COLONOSCOPY      COLONOSCOPY N/A 7/27/2023    Procedure: COLONOSCOPY to cecum with cold biopsy and cold snare polypectomies and clips;  Surgeon: Vitor Haley MD;  Location: [facility] ENDOSCOPY;  Service: Gastroenterology;  Laterality: N/A;  Pre - H/O polyps.  Post - diverticulosis, polyps, hemorrhoids    CORONARY ARTERY BYPASS GRAFT  2006    X2    CYSTOSCOPY      STATES HAD BLADDER TUMORS REMOVED X2    CYSTOSCOPY WITH CLOT EVACUATION N/A 1/17/2024    Procedure: CYSTOSCOPY WITH CLOT EVACUATION, WITH FULGRATION;  Surgeon: Daniel Hamm MD;  Location: [facility] [place] OR;  Service: Urology;  Laterality: N/A;    PROSTATE SURGERY      X2- STATES PLACED COILS IN TO HELP WITH FLOW AND THEN HAD TO REMOVE A PIECE THAT BROKE OFF AND WAS IN BLADDER    UPPER GASTROINTESTINAL ENDOSCOPY       Social History     Socioeconomic History    Marital status:      Spouse name: Nichelle    Number of children: 3    Years of  education: 12   Tobacco Use    Smoking status: Former     Packs/day: 1.00     Years: 30.00     Additional pack years: 0.00     Total pack years: 30.00     Types: Cigarettes     Quit date: 2006     Years since quittin.6    Smokeless tobacco: Never   Vaping Use    Vaping Use: Never used   Substance and Sexual Activity    Alcohol use: Not Currently     Comment: one or two beers 6 times a year    Drug use: Never    Sexual activity: Not Currently     Partners: Female     Birth control/protection: None     Family History   Problem Relation Age of Onset    Hypertension Mother     Colon cancer Mother     Cancer Mother     Diabetes Mother     Cancer Father     Heart attack Father     Hearing loss Father     Heart disease Father     Hypertension Sister     Hypertension Brother     Colon cancer Brother     Cancer Brother     Heart disease Paternal Grandfather     Heart attack Paternal Grandfather     Malig Hyperthermia Neg Hx     Colon polyps Neg Hx     Crohn's disease Neg Hx     Irritable bowel syndrome Neg Hx     Ulcerative colitis Neg Hx      Allergy  Allergies   Allergen Reactions    Bee Venom Anaphylaxis     HIVES-SWOLLEN THROAT    Meperidine Hives    Midazolam Hives    Propofol Other (See Comments)     UNCLEAR-SVT, HYPOTENSION-STATES SEVERE ALMOST CODED    Sulfa Antibiotics Hives    Hydrocodone Nausea And Vomiting    Simvastatin Myalgia     Prior to Admission medications    Medication Sig Start Date End Date Taking? Authorizing Provider   albuterol sulfate  (90 Base) MCG/ACT inhaler Inhale 2 puffs Every 4 (Four) Hours As Needed for Wheezing.    ProviderKelby MD   allopurinol (ZYLOPRIM) 100 MG tablet Take 1 tablet by mouth Every Night. 19   Kelby Aguilar MD   aspirin 81 MG tablet Take 1 tablet by mouth Daily. PT STATES WAS INSTRUCTED TO CONTINUE TO TAKE THIS PER GET ANDERSEN 11   ProviderKelby MD   atenolol (TENORMIN) 50 MG tablet TAKE 1 TABLET BY MOUTH EVERY DAY AT  NIGHT 23   Thai Galloway MD   atorvastatin (LIPITOR) 20 MG tablet TAKE 1 TABLET BY MOUTH EVERY DAY 23   Thai Galloway MD   doxycycline (VIBRAMYCIN) 100 MG capsule Take 1 capsule by mouth 2 (Two) Times a Day for 10 days. 24  Makayla Birmingham PA-C   losartan (COZAAR) 25 MG tablet Take 4 tablets by mouth Daily.    Kelby Aguilar MD   ondansetron ODT (ZOFRAN-ODT) 4 MG disintegrating tablet Place 1 tablet on the tongue Every 8 (Eight) Hours As Needed for Nausea or Vomiting. 24   Makayla Birmingham PA-C   warfarin (COUMADIN) 7.5 MG tablet Take 1.5 tablets by mouth. On Mon, Wed, Fri.    Kelby Aguilar MD   warfarin (COUMADIN) 7.5 MG tablet Take 1 tablet by mouth. TUES, THUR, SAT, SUN.    Kelby Aguilar MD         Objective     tMax 24 hours:  Temp (24hrs), Av.1 °F (36.7 °C), Min:98.1 °F (36.7 °C), Max:98.1 °F (36.7 °C)    Vital Sign Ranges:  Temp:  [98.1 °F (36.7 °C)] 98.1 °F (36.7 °C)  Heart Rate:  [71-97] 81  Resp:  [18] 18  BP: ()/(40-65) 122/64  Intake and Output Last 3 Shifts:  No intake/output data recorded.      Physical Exam:   General Appearance alert, appears stated age, and cooperative  Head normocephalic, without obvious abnormality and atraumatic  Abdomen no guarding, no rebound tenderness, and moderate distention  Male Genitalia Cardoza placed with cola colored urine which cleared Medley moderate to slow CBI  Skin no bleeding, bruising or rash  Neurologic Mental Status orientated to person, place, time and situation    Results Review:     Lab Results (last 24 hours)       Procedure Component Value Units Date/Time    Urinalysis With Culture If Indicated - Urine, Clean Catch [[id number]]  (Abnormal) Collected: 24 1542    Specimen: Urine, Clean Catch Updated: 24 1602     Color, UA Red     Comment: Any Substance that causes an abnormal urine color can alter the accuracy of the chemical reactions.        Appearance, UA Cloudy     pH, UA <=5.0      Specific Gravity, UA 1.010     Glucose,  mg/dL (Trace)     Ketones, UA 15 mg/dL (1+)     Bilirubin, UA Large (3+)     Comment: Confirmation testing is unavailable.  A serum bilirubin is recommended for further assessment.        Blood, UA Large (3+)     Protein, UA >=300 mg/dL (3+)     Leuk Esterase, UA Large (3+)     Nitrite, UA Positive     Urobilinogen, UA 2.0 E.U./dL    Narrative:      In absence of clinical symptoms, the presence of pyuria, bacteria, and/or nitrites on the urinalysis result does not correlate with infection.    Urinalysis, Microscopic Only - Urine, Clean Catch [[id number]]  (Abnormal) Collected: 01/19/24 1542    Specimen: Urine, Clean Catch Updated: 01/19/24 1602     RBC, UA Too Numerous to Count /HPF      WBC, UA 6-10 /HPF      Bacteria, UA 1+ /HPF      Squamous Epithelial Cells, UA None Seen /HPF      Hyaline Casts, UA None Seen /LPF      Methodology Automated Microscopy    Urine Culture - Urine, Urine, Clean Catch [[id number]] Collected: 01/19/24 1542    Specimen: Urine, Clean Catch Updated: 01/19/24 1601    POC Lactate [[id number]]  (Normal) Collected: 01/19/24 1412    Specimen: Blood Updated: 01/19/24 1417     Lactate 1.8 mmol/L      Comment: Serial Number: [id number]:  [id number]       Extra Tubes [[id number]] Collected: 01/19/24 1314    Specimen: Blood, Venous Line Updated: 01/19/24 1415    Narrative:      The following orders were created for panel order Extra Tubes.  Procedure                               Abnormality         Status                     ---------                               -----------         ------                     Gold Top - SST[[id number]]                                   Final result                 Please view results for these tests on the individual orders.    Gold Top - SST [[id number]] Collected: 01/19/24 1314    Specimen: Blood Updated: 01/19/24 1415     Extra Tube Hold for add-ons.     Comment: Auto resulted.       Blood Culture - Blood, Arm,  Left [[id number]] Collected: 01/19/24 1405    Specimen: Blood from Arm, Left Updated: 01/19/24 1409    Blood Culture - Blood, Hand, Left [[id number]] Collected: 01/19/24 1405    Specimen: Blood from Hand, Left Updated: 01/19/24 1409    BNP [[id number]]  (Normal) Collected: 01/19/24 1314    Specimen: Blood Updated: 01/19/24 1401     proBNP 447.3 pg/mL     Narrative:      This assay is used as an aid in the diagnosis of individuals suspected of having heart failure. It can be used as an aid in the diagnosis of acute decompensated heart failure (ADHF) in patients presenting with signs and symptoms of ADHF to the emergency department (ED). In addition, NT-proBNP of <300 pg/mL indicates ADHF is not likely.    Age Range Result Interpretation  NT-proBNP Concentration (pg/mL:      <50             Positive            >450                   Gray                 300-450                    Negative             <300    50-75           Positive            >900                  Gray                300-900                  Negative            <300      >75             Positive            >1800                  Gray                300-1800                  Negative            <300    Comprehensive Metabolic Panel [[id number]]  (Abnormal) Collected: 01/19/24 1314    Specimen: Blood Updated: 01/19/24 1340     Glucose 150 mg/dL      BUN 44 mg/dL      Creatinine 1.71 mg/dL      Sodium 133 mmol/L      Potassium 5.0 mmol/L      Comment: Slight hemolysis detected by analyzer. Result may be falsely elevated.        Chloride 100 mmol/L      CO2 24.0 mmol/L      Calcium 8.6 mg/dL      Total Protein 6.4 g/dL      Albumin 3.3 g/dL      ALT (SGPT) 24 U/L      AST (SGOT) 35 U/L      Comment: Slight hemolysis detected by analyzer. Result may be falsely elevated.        Alkaline Phosphatase 193 U/L      Total Bilirubin 1.4 mg/dL      Globulin 3.1 gm/dL      A/G Ratio 1.1 g/dL      BUN/Creatinine Ratio 25.7     Anion Gap 9.0 mmol/L      eGFR 42.3  "mL/min/1.73     Narrative:      GFR Normal >60  Chronic Kidney Disease <60  Kidney Failure <15    The GFR formula is only valid for adults with stable renal function between ages 18 and 70.    Lipase [[id number]]  (Normal) Collected: 01/19/24 1314    Specimen: Blood Updated: 01/19/24 1338     Lipase 20 U/L     Protime-INR [[id number]]  (Abnormal) Collected: 01/19/24 1314    Specimen: Blood Updated: 01/19/24 1333     Protime 13.9 Seconds      INR 1.30    CBC & Differential [[id number]]  (Abnormal) Collected: 01/19/24 1314    Specimen: Blood Updated: 01/19/24 1331    Narrative:      The following orders were created for panel order CBC & Differential.  Procedure                               Abnormality         Status                     ---------                               -----------         ------                     CBC Auto Differential[[id number]]        Abnormal            Final result                 Please view results for these tests on the individual orders.    CBC Auto Differential [[id number]]  (Abnormal) Collected: 01/19/24 1314    Specimen: Blood Updated: 01/19/24 1331     WBC 19.80 10*3/mm3      RBC 5.04 10*6/mm3      Hemoglobin 13.7 g/dL      Comment: Result checked          Hematocrit 42.4 %      MCV 84.1 fL      MCH 27.2 pg      MCHC 32.3 g/dL      RDW 16.4 %      RDW-SD 47.7 fl      MPV 7.6 fL      Platelets 249 10*3/mm3      Neutrophil % 84.2 %      Lymphocyte % 8.1 %      Monocyte % 6.3 %      Eosinophil % 0.4 %      Basophil % 1.0 %      Neutrophils, Absolute 16.60 10*3/mm3      Lymphocytes, Absolute 1.60 10*3/mm3      Monocytes, Absolute 1.30 10*3/mm3      Eosinophils, Absolute 0.10 10*3/mm3      Basophils, Absolute 0.20 10*3/mm3      nRBC 0.0 /100 WBC            No results found for: \"URINECX\"     Imaging Results (Last 7 Days)       Procedure Component Value Units Date/Time    CT Abdomen Pelvis With Contrast [[id number]] Collected: 01/19/24 1518     Updated: 01/19/24 1533    Narrative:      CT " ABDOMEN PELVIS W CONTRAST    Date of Exam: 1/19/2024 2:06 PM CST    Indication: abd pain, recent prostate surgery and cystoscopy.    Comparison: CT angiogram abdomen pelvis 8/1/2022    Technique: Axial CT images were obtained of the abdomen and pelvis following the uneventful intravenous administration of 100 cc Isovue-370. Sagittal and coronal reconstructions were performed.  Automated exposure control and iterative reconstruction   methods were used.        Findings:  LUNG BASES:  Unremarkable without mass or infiltrate. Heart is enlarged.    LIVER:  Unremarkable parenchyma without focal lesion.  BILIARY/GALLBLADDER: Cholecystectomy.  SPLEEN:  Unremarkable  PANCREAS:  Unremarkable  ADRENAL:  Unremarkable  KIDNEYS:  Unremarkable parenchyma with no solid mass identified. There is a simple right renal cyst. There is advanced atherosclerotic disease involving the origin of the left renal artery with likely greater than 70% stenosis, similar to previous exam.   There is mild left hydroureter to the level of the ureterovesical junction. No calculus identified.  GASTROINTESTINAL/MESENTERY:  No evidence of obstruction nor inflammation.    MESENTERIC VESSELS:  Patent.  AORTA/IVC: There is a bifurcated abdominal aortic stent graft, similar in appearance. Infrarenal abdominal aortic aneurysm sac measures 4.5 x 4.4 cm, unchanged when measured at similar level. Excluded distal left common iliac artery aneurysm sac is   unchanged as are left internal iliac embolization coils.    RETROPERITONEUM/LYMPH NODES:  Unremarkable    REPRODUCTIVE: There is been at least partial prostatectomy, likely transurethral. Correlate with history and symptoms.  BLADDER: The most part there is generalized urinary bladder wall thickening and enhancement. Along the right central margin of the bladder dome there is both intraluminal and extraluminal gas and fluid with what appears to be mural discontinuity   suggesting potential bladder defect/leak.  Correlate with patient history. There is moderate free fluid within the pelvis in the lower abdomen with presacral fluid/edema. CT cystogram might be useful to further assess.    There are areas of irregular intraluminal density within the bladder, presumably clot. There are numerous surgical clips within the bladder base.    OSSEUS STRUCTURES:  Typical for age with no acute process identified.        Impression:      Impression:  1.Findings suspicious for bladder leak/defect involving the right central bladder dome. CT cystogram might be useful if important to further assess radiographically.  2.Irregular areas of increased intraluminal density within the bladder, presumably clot, with numerous surgical clips in the bladder base.  3.At least partial prostatectomy.  4.Other stable findings within the abdomen and pelvis.            Electronically Signed: Lorenzo Williamson MD    1/19/2024 2:31 PM CST    Workstation ID: [id number]    CT Head Without Contrast [[id number]] Collected: 01/19/24 1512     Updated: 01/19/24 1516    Narrative:      CT HEAD WO CONTRAST    Date of Exam: 1/19/2024 3:05 PM EST    Indication: headache.    Comparison: None available.    Technique: Axial CT images were obtained of the head without contrast administration.  Coronal reconstructions were performed.  Automated exposure control and iterative reconstruction methods were used.      Findings:  There is no evidence of hemorrhage. There is no mass effect or midline shift.    There is no extra-axial collections.    Ventricles are normal in size and configuration for patient's stated age.    Posterior fossa is within normal limits.    Calvarium and skull base appear intact. Visualized sinuses show no air fluid levels. The mastoid air cells are clear. Visualized orbits are unremarkable.        Impression:      Impression:  No acute cranial process evident.      Electronically Signed: Vance Coulter MD    1/19/2024 3:14 PM EST    Workstation ID: [id number]     XR Chest 1 View [[id number]] Collected: 01/19/24 1350     Updated: 01/19/24 1352    Narrative:      XR CHEST 1 VW    Date of Exam: 1/19/2024 12:45 PM CST    Indication: sob    Comparison: 10/10/2023    Findings:  Cardiomediastinal silhouette is prominent, similar prior examination. There is pulmonary vascular congestion with mild interstitial coarsening/edema. No airspace disease, pneumothorax, nor pleural effusion. No acute osseous abnormality identified.      Impression:      Impression:  Mild CHF/volume overload features.      Electronically Signed: Lorenzo Williamson MD    1/19/2024 12:50 PM CST    Workstation ID: [id number]            Inpatient Meds:   Scheduled Meds:cefTRIAXone, 1,000 mg, Intravenous, Once       Continuous Infusions:    PRN Meds:.  [COMPLETED] Insert Peripheral IV **AND** sodium chloride      Assessment & Plan     Active Problems:    * No active hospital problems. *    Gross hematuria due to recent greenlight TURP and anticoagulation  Abdominal pain with possible bladder perforation    Plan  CT cystogram  Will likely treat conservatively unless there is a large defect in which case he might require exploration with closure of the defect.      I discussed the patient's findings and my recommendations with patient and family    Thank you for this  consult    Daniel Hamm MD  01/19/24  17:01 EST

## 2024-01-19 NOTE — Clinical Note
Level of Care: Telemetry [5]   Admitting Physician: MJ PENNINGTON [[id number]]   Attending Physician: MJ PENNINGTON [[id number]]

## 2024-01-19 NOTE — H&P
[facility] Medicine Services  History & Physical    Patient Name: Sandro Ramirez  : 1950  MRN: [id number]  Primary Care Physician:  Colette Dominguez APRN  Date of admission: 2024  Date and Time of Service: 2024 at 1840    Subjective      Chief Complaint: Abdominal pain    History of Present Illness: Sandro Ramirez is a 73 y.o. male with a PMH of A-fib on warfarin, CHF, CAD, hypertension and BPH who presented to [facility] on 2024 with complaints of abdominal pain, nausea, hematuria and generalized weakness.  Patient states that he had TURP by Dr Hamm on 2024.  Patient was just discharged from observation on the  after being admitted for urinary retention.  He underwent cystoscopy as well as bladder irrigation prior to being discharged. Patient states today he developed severe abdominal pain with nausea and vomiting and lightheadedness.  He reports that he has passed a few bloody clots.  His urine is still bloody. He states he is still having difficulty urinating.  Denies any subjective fever and chills.      Review of Systems  As stated above  Personal History     Past Medical History:   Diagnosis Date    AAA (abdominal aortic aneurysm) 2006    Abnormal ECG     Aneurysm     Arthritis     Asthma     Atrial fibrillation     COUMADIN    Benign prostatic hyperplasia     Bladder cancer     CHF (congestive heart failure)     Chronic anticoagulation     COUMADIN STARTED     Colon polyp     Congenital heart disease     Coronary artery disease     Diabetes mellitus     Elevated cholesterol     Erectile dysfunction     Facial basal cell cancer     Gout     Hard to intubate     History of pancreatitis 2006    History of pneumonia     History of tick-borne disease     HL (hearing loss)     BILAT HEARING AIDS    Hyperlipidemia     Hypertension     Myocardial infarction     X5    Obesity     Pancreatitis     Pneumonia     Sleep apnea     CURRENTLY NOT USING CPAP D/T  RECALL    Transfusion history     STATES HIS SISTER HAD A SEVERE REACTION TO BLOOD TRANSFUSION    Type 2 diabetes mellitus        Past Surgical History:   Procedure Laterality Date    ARTERIOGRAM AORTIC Left 06/29/2022    Procedure: AORTIC STENT GRAFT PLACEMENT WITH ILIAC COILING;  Surgeon: Eric Sainz MD;  Location: [facility] OR 18/19;  Service: Vascular;  Laterality: Left;    CARDIAC CATHETERIZATION      CHOLECYSTECTOMY      COLONOSCOPY      COLONOSCOPY N/A 7/27/2023    Procedure: COLONOSCOPY to cecum with cold biopsy and cold snare polypectomies and clips;  Surgeon: Vitor Haley MD;  Location: [facility] ENDOSCOPY;  Service: Gastroenterology;  Laterality: N/A;  Pre - H/O polyps.  Post - diverticulosis, polyps, hemorrhoids    CORONARY ARTERY BYPASS GRAFT  2006    X2    CYSTOSCOPY      STATES HAD BLADDER TUMORS REMOVED X2    CYSTOSCOPY WITH CLOT EVACUATION N/A 1/17/2024    Procedure: CYSTOSCOPY WITH CLOT EVACUATION, WITH FULGRATION;  Surgeon: Daniel Hamm MD;  Location: [facility] OR;  Service: Urology;  Laterality: N/A;    PROSTATE SURGERY      X2- STATES PLACED COILS IN TO HELP WITH FLOW AND THEN HAD TO REMOVE A PIECE THAT BROKE OFF AND WAS IN BLADDER    UPPER GASTROINTESTINAL ENDOSCOPY         Family History: family history includes Cancer in his brother, father, and mother; Colon cancer in his brother and mother; Diabetes in his mother; Hearing loss in his father; Heart attack in his father and paternal grandfather; Heart disease in his father and paternal grandfather; Hypertension in his brother, mother, and sister. Otherwise pertinent FHx was reviewed and not pertinent to current issue.    Social History:  reports that he quit smoking about 17 years ago. His smoking use included cigarettes. He has a 30.00 pack-year smoking history. He has never used smokeless tobacco. He reports that he does not currently use alcohol. He reports that he does not use drugs.    Home Medications:  Prior to  Admission Medications       Prescriptions Last Dose Informant Patient Reported? Taking?    allopurinol (ZYLOPRIM) 100 MG tablet  Self Yes No    Take 1 tablet by mouth Every Night.    aspirin 81 MG tablet  Self Yes No    Take 1 tablet by mouth Daily. PT STATES WAS INSTRUCTED TO CONTINUE TO TAKE THIS PER GET ANDERSEN    atenolol (TENORMIN) 50 MG tablet   No No    TAKE 1 TABLET BY MOUTH EVERY DAY AT NIGHT    atorvastatin (LIPITOR) 20 MG tablet   No No    TAKE 1 TABLET BY MOUTH EVERY DAY    losartan (COZAAR) 25 MG tablet   Yes No    Take 4 tablets by mouth Daily.    albuterol sulfate  (90 Base) MCG/ACT inhaler   Yes No    Inhale 2 puffs Every 4 (Four) Hours As Needed for Wheezing.    doxycycline (VIBRAMYCIN) 100 MG capsule   No No    Take 1 capsule by mouth 2 (Two) Times a Day for 10 days.    ondansetron ODT (ZOFRAN-ODT) 4 MG disintegrating tablet   No No    Place 1 tablet on the tongue Every 8 (Eight) Hours As Needed for Nausea or Vomiting.    warfarin (COUMADIN) 7.5 MG tablet   Yes No    Take 1.5 tablets by mouth. On Mon, Wed, Fri.    warfarin (COUMADIN) 7.5 MG tablet   Yes No    Take 1 tablet by mouth. TUES, THUR, SAT, SUN.              Allergies:  Allergies   Allergen Reactions    Bee Venom Anaphylaxis     HIVES-SWOLLEN THROAT    Meperidine Hives    Midazolam Hives    Propofol Other (See Comments)     UNCLEAR-SVT, HYPOTENSION-STATES SEVERE ALMOST CODED    Sulfa Antibiotics Hives    Hydrocodone Nausea And Vomiting    Simvastatin Myalgia       Objective      Vitals:   Temp:  [98.1 °F (36.7 °C)] 98.1 °F (36.7 °C)  Heart Rate:  [] 103  Resp:  [18] 18  BP: ()/(40-65) 101/47  Body mass index is 25.8 kg/m².    Physical Exam  General Appearance: AOO x 4, cooperative, no distress, appropriate for age  Head:  Normocephalic, without obvious abnormality  Eyes:  PERRL, EOM's intact, conjunctivae and cornea clear  Nose:  Nares symmetrical, septum midline, mucosa pink  Throat:  Lips, tongue, and mucosa are  moist, pink, and intact  Neck:  Supple; symmetrical, trachea midline, no adenopathy  Back:  Symmetrical, ROM normal, no CVA tenderness  Lungs: Respirations unlabored, no audible wheeze  Heart: Regular rate & rhythm, S1 and S2 normal  Abdomen:  Soft, nontender, bowel sounds active all four quadrants  Musculoskeletal: Tone and strength strong and symmetrical, all extremities; no joint pain or edema         Skin/Hair/Nails:  Skin warm, dry and intact, no rashes or abnormal dyspigmentation       Diagnostic Data:  Lab Results (last 24 hours)       Procedure Component Value Units Date/Time    Urinalysis With Culture If Indicated - Urine, Clean Catch [[id number]]  (Abnormal) Collected: 01/19/24 1542    Specimen: Urine, Clean Catch Updated: 01/19/24 1602     Color, UA Red     Comment: Any Substance that causes an abnormal urine color can alter the accuracy of the chemical reactions.        Appearance, UA Cloudy     pH, UA <=5.0     Specific Gravity, UA 1.010     Glucose,  mg/dL (Trace)     Ketones, UA 15 mg/dL (1+)     Bilirubin, UA Large (3+)     Comment: Confirmation testing is unavailable.  A serum bilirubin is recommended for further assessment.        Blood, UA Large (3+)     Protein, UA >=300 mg/dL (3+)     Leuk Esterase, UA Large (3+)     Nitrite, UA Positive     Urobilinogen, UA 2.0 E.U./dL    Narrative:      In absence of clinical symptoms, the presence of pyuria, bacteria, and/or nitrites on the urinalysis result does not correlate with infection.    Urinalysis, Microscopic Only - Urine, Clean Catch [[id number]]  (Abnormal) Collected: 01/19/24 1542    Specimen: Urine, Clean Catch Updated: 01/19/24 1602     RBC, UA Too Numerous to Count /HPF      WBC, UA 6-10 /HPF      Bacteria, UA 1+ /HPF      Squamous Epithelial Cells, UA None Seen /HPF      Hyaline Casts, UA None Seen /LPF      Methodology Automated Microscopy    Urine Culture - Urine, Urine, Clean Catch [[id number]] Collected: 01/19/24 1542    Specimen:  Urine, Clean Catch Updated: 01/19/24 1601    POC Lactate [[id number]]  (Normal) Collected: 01/19/24 1412    Specimen: Blood Updated: 01/19/24 1417     Lactate 1.8 mmol/L      Comment: Serial Number: [id number]:  [id number]       Extra Tubes [[id number]] Collected: 01/19/24 1314    Specimen: Blood, Venous Line Updated: 01/19/24 1415    Narrative:      The following orders were created for panel order Extra Tubes.  Procedure                               Abnormality         Status                     ---------                               -----------         ------                     Gold Top - SST[[id number]]                                   Final result                 Please view results for these tests on the individual orders.    Gold Top - SST [[id number]] Collected: 01/19/24 1314    Specimen: Blood Updated: 01/19/24 1415     Extra Tube Hold for add-ons.     Comment: Auto resulted.       Blood Culture - Blood, Arm, Left [[id number]] Collected: 01/19/24 1405    Specimen: Blood from Arm, Left Updated: 01/19/24 1409    Blood Culture - Blood, Hand, Left [[id number]] Collected: 01/19/24 1405    Specimen: Blood from Hand, Left Updated: 01/19/24 1409    BNP [[id number]]  (Normal) Collected: 01/19/24 1314    Specimen: Blood Updated: 01/19/24 1401     proBNP 447.3 pg/mL     Narrative:      This assay is used as an aid in the diagnosis of individuals suspected of having heart failure. It can be used as an aid in the diagnosis of acute decompensated heart failure (ADHF) in patients presenting with signs and symptoms of ADHF to the emergency department (ED). In addition, NT-proBNP of <300 pg/mL indicates ADHF is not likely.    Age Range Result Interpretation  NT-proBNP Concentration (pg/mL:      <50             Positive            >450                   Gray                 300-450                    Negative             <300    50-75           Positive            >900                  Gray                300-900                   Negative            <300      >75             Positive            >1800                  Gray                300-1800                  Negative            <300    Comprehensive Metabolic Panel [[id number]]  (Abnormal) Collected: 01/19/24 1314    Specimen: Blood Updated: 01/19/24 1340     Glucose 150 mg/dL      BUN 44 mg/dL      Creatinine 1.71 mg/dL      Sodium 133 mmol/L      Potassium 5.0 mmol/L      Comment: Slight hemolysis detected by analyzer. Result may be falsely elevated.        Chloride 100 mmol/L      CO2 24.0 mmol/L      Calcium 8.6 mg/dL      Total Protein 6.4 g/dL      Albumin 3.3 g/dL      ALT (SGPT) 24 U/L      AST (SGOT) 35 U/L      Comment: Slight hemolysis detected by analyzer. Result may be falsely elevated.        Alkaline Phosphatase 193 U/L      Total Bilirubin 1.4 mg/dL      Globulin 3.1 gm/dL      A/G Ratio 1.1 g/dL      BUN/Creatinine Ratio 25.7     Anion Gap 9.0 mmol/L      eGFR 42.3 mL/min/1.73     Narrative:      GFR Normal >60  Chronic Kidney Disease <60  Kidney Failure <15    The GFR formula is only valid for adults with stable renal function between ages 18 and 70.    Lipase [[id number]]  (Normal) Collected: 01/19/24 1314    Specimen: Blood Updated: 01/19/24 1338     Lipase 20 U/L     Protime-INR [[id number]]  (Abnormal) Collected: 01/19/24 1314    Specimen: Blood Updated: 01/19/24 1333     Protime 13.9 Seconds      INR 1.30    CBC & Differential [[id number]]  (Abnormal) Collected: 01/19/24 1314    Specimen: Blood Updated: 01/19/24 1331    Narrative:      The following orders were created for panel order CBC & Differential.  Procedure                               Abnormality         Status                     ---------                               -----------         ------                     CBC Auto Differential[[id number]]        Abnormal            Final result                 Please view results for these tests on the individual orders.    CBC Auto Differential [[id number]]   (Abnormal) Collected: 01/19/24 1314    Specimen: Blood Updated: 01/19/24 1331     WBC 19.80 10*3/mm3      RBC 5.04 10*6/mm3      Hemoglobin 13.7 g/dL      Comment: Result checked          Hematocrit 42.4 %      MCV 84.1 fL      MCH 27.2 pg      MCHC 32.3 g/dL      RDW 16.4 %      RDW-SD 47.7 fl      MPV 7.6 fL      Platelets 249 10*3/mm3      Neutrophil % 84.2 %      Lymphocyte % 8.1 %      Monocyte % 6.3 %      Eosinophil % 0.4 %      Basophil % 1.0 %      Neutrophils, Absolute 16.60 10*3/mm3      Lymphocytes, Absolute 1.60 10*3/mm3      Monocytes, Absolute 1.30 10*3/mm3      Eosinophils, Absolute 0.10 10*3/mm3      Basophils, Absolute 0.20 10*3/mm3      nRBC 0.0 /100 WBC              Imaging Results (Last 24 Hours)       Procedure Component Value Units Date/Time    CT Abdomen Pelvis With Contrast [[id number]] Collected: 01/19/24 1518     Updated: 01/19/24 1533    Narrative:      CT ABDOMEN PELVIS W CONTRAST    Date of Exam: 1/19/2024 2:06 PM CST    Indication: abd pain, recent prostate surgery and cystoscopy.    Comparison: CT angiogram abdomen pelvis 8/1/2022    Technique: Axial CT images were obtained of the abdomen and pelvis following the uneventful intravenous administration of 100 cc Isovue-370. Sagittal and coronal reconstructions were performed.  Automated exposure control and iterative reconstruction   methods were used.        Findings:  LUNG BASES:  Unremarkable without mass or infiltrate. Heart is enlarged.    LIVER:  Unremarkable parenchyma without focal lesion.  BILIARY/GALLBLADDER: Cholecystectomy.  SPLEEN:  Unremarkable  PANCREAS:  Unremarkable  ADRENAL:  Unremarkable  KIDNEYS:  Unremarkable parenchyma with no solid mass identified. There is a simple right renal cyst. There is advanced atherosclerotic disease involving the origin of the left renal artery with likely greater than 70% stenosis, similar to previous exam.   There is mild left hydroureter to the level of the ureterovesical junction. No  calculus identified.  GASTROINTESTINAL/MESENTERY:  No evidence of obstruction nor inflammation.    MESENTERIC VESSELS:  Patent.  AORTA/IVC: There is a bifurcated abdominal aortic stent graft, similar in appearance. Infrarenal abdominal aortic aneurysm sac measures 4.5 x 4.4 cm, unchanged when measured at similar level. Excluded distal left common iliac artery aneurysm sac is   unchanged as are left internal iliac embolization coils.    RETROPERITONEUM/LYMPH NODES:  Unremarkable    REPRODUCTIVE: There is been at least partial prostatectomy, likely transurethral. Correlate with history and symptoms.  BLADDER: The most part there is generalized urinary bladder wall thickening and enhancement. Along the right central margin of the bladder dome there is both intraluminal and extraluminal gas and fluid with what appears to be mural discontinuity   suggesting potential bladder defect/leak. Correlate with patient history. There is moderate free fluid within the pelvis in the lower abdomen with presacral fluid/edema. CT cystogram might be useful to further assess.    There are areas of irregular intraluminal density within the bladder, presumably clot. There are numerous surgical clips within the bladder base.    OSSEUS STRUCTURES:  Typical for age with no acute process identified.        Impression:      Impression:  1.Findings suspicious for bladder leak/defect involving the right central bladder dome. CT cystogram might be useful if important to further assess radiographically.  2.Irregular areas of increased intraluminal density within the bladder, presumably clot, with numerous surgical clips in the bladder base.  3.At least partial prostatectomy.  4.Other stable findings within the abdomen and pelvis.            Electronically Signed: Lorenzo Williamson MD    1/19/2024 2:31 PM CST    Workstation ID: [id number]    CT Head Without Contrast [[id number]] Collected: 01/19/24 1512     Updated: 01/19/24 1516    Narrative:      CT  HEAD WO CONTRAST    Date of Exam: 1/19/2024 3:05 PM EST    Indication: headache.    Comparison: None available.    Technique: Axial CT images were obtained of the head without contrast administration.  Coronal reconstructions were performed.  Automated exposure control and iterative reconstruction methods were used.      Findings:  There is no evidence of hemorrhage. There is no mass effect or midline shift.    There is no extra-axial collections.    Ventricles are normal in size and configuration for patient's stated age.    Posterior fossa is within normal limits.    Calvarium and skull base appear intact. Visualized sinuses show no air fluid levels. The mastoid air cells are clear. Visualized orbits are unremarkable.        Impression:      Impression:  No acute cranial process evident.      Electronically Signed: Vance Coulter MD    1/19/2024 3:14 PM EST    Workstation ID: [id number]    XR Chest 1 View [[id number]] Collected: 01/19/24 1350     Updated: 01/19/24 1352    Narrative:      XR CHEST 1 VW    Date of Exam: 1/19/2024 12:45 PM CST    Indication: sob    Comparison: 10/10/2023    Findings:  Cardiomediastinal silhouette is prominent, similar prior examination. There is pulmonary vascular congestion with mild interstitial coarsening/edema. No airspace disease, pneumothorax, nor pleural effusion. No acute osseous abnormality identified.      Impression:      Impression:  Mild CHF/volume overload features.      Electronically Signed: Lorenzo Williamson MD    1/19/2024 12:50 PM CST    Workstation ID: [id number]              Assessment & Plan        This is a 73 y.o. male with PMH of A-fib on warfarin, CHF, CAD, hypertension and BPH who presented to [facility] on 1/19/2024 with complaints of abdominal pain, nausea, hematuria and generalized weakness.    Active and Resolved Problems  Active Hospital Problems    Diagnosis  POA    **Hematuria [R31.9]  Yes      Resolved Hospital Problems   No resolved problems to  display.     UTI  Hematuria  BPH  Status post TURP on 01/05/24  Consult urology  N.p.o. at midnight  Start Rocephin  Follow-up urine culture    Acute kidney injury  Creatinine 1.71  Start normal saline at 100 cc/h  Repeat labs in the morning    Leukocytosis  WBC 19.8  Continue with Rocephin  Repeat CBC in the morning    A-fib  Hold warfarin due to hematuria    Hypertension  Hyperlipidemia  CHF  Coronary artery disease  Gout  Resume home meds when verified    DVT prophylaxis:  Mechanical DVT prophylaxis orders are present.      CODE STATUS:           Admission Status:  I believe this patient meets inpatient status.      I discussed the patient's findings and my recommendations with patient and family.      Signature:     This document has been electronically signed by Silvia Stevens MD on January 19, 2024 17:09 CASS   [facility] Hospitalist Team

## 2024-01-19 NOTE — OUTREACH NOTE
Call Center TCM Note      Flowsheet Row Responses   [facility] patient discharged from? [facility]   Does the patient have one of the following disease processes/diagnoses(primary or secondary)? Other   TCM attempt successful? Yes  [No VR, per CM wife invovled in care]   Call start time 0810   Call end time 0812   Discharge diagnosis acute UTI   Person spoke with today (if not patient) and relationship Wife   Meds reviewed with patient/caregiver? Yes   Is the patient having any side effects they believe may be caused by any medication additions or changes? No   Does the patient have all medications ordered at discharge? Yes   Is the patient taking all medications as directed (includes completed medication regime)? Yes   Comments HOSP DC FU appt 1/29/24 1130 am   Does the patient have an appointment with their PCP within 7-14 days of discharge? Yes   Has home health visited the patient within 72 hours of discharge? N/A   Psychosocial issues? No   Did the patient receive a copy of their discharge instructions? Yes   Nursing interventions Reviewed instructions with patient   What is the patient's perception of their health status since discharge? Improving   Is the patient/caregiver able to teach back signs and symptoms related to disease process for when to call PCP? Yes   Is the patient/caregiver able to teach back signs and symptoms related to disease process for when to call 911? Yes   Is the patient/caregiver able to teach back the hierarchy of who to call/visit for symptoms/problems? PCP, Specialist, Home health nurse, Urgent Care, ED, 911 Yes   TCM call completed? Yes   Wrap up additional comments Per wife Pt is doing well at this time. No needs.   Call end time 0812            Sheri Freeman RN    1/19/2024, 08:12 EST

## 2024-01-20 LAB
ALBUMIN SERPL-MCNC: 3.1 G/DL (ref 3.5–5.2)
ALBUMIN/GLOB SERPL: 1.1 G/DL
ALP SERPL-CCNC: 186 U/L (ref 39–117)
ALT SERPL W P-5'-P-CCNC: 24 U/L (ref 1–41)
ANION GAP SERPL CALCULATED.3IONS-SCNC: 10 MMOL/L (ref 5–15)
AST SERPL-CCNC: 25 U/L (ref 1–40)
BACTERIA SPEC AEROBE CULT: NORMAL
BILIRUB SERPL-MCNC: 1 MG/DL (ref 0–1.2)
BUN SERPL-MCNC: 34 MG/DL (ref 8–23)
BUN/CREAT SERPL: 39.5 (ref 7–25)
CALCIUM SPEC-SCNC: 8.1 MG/DL (ref 8.6–10.5)
CHLORIDE SERPL-SCNC: 101 MMOL/L (ref 98–107)
CO2 SERPL-SCNC: 24 MMOL/L (ref 22–29)
CREAT SERPL-MCNC: 0.86 MG/DL (ref 0.76–1.27)
DEPRECATED RDW RBC AUTO: 48.1 FL (ref 37–54)
EGFRCR SERPLBLD CKD-EPI 2021: 92.6 ML/MIN/1.73
ERYTHROCYTE [DISTWIDTH] IN BLOOD BY AUTOMATED COUNT: 16.4 % (ref 12.3–15.4)
GLOBULIN UR ELPH-MCNC: 2.8 GM/DL
GLUCOSE SERPL-MCNC: 107 MG/DL (ref 65–99)
HCT VFR BLD AUTO: 38.6 % (ref 37.5–51)
HGB BLD-MCNC: 12.4 G/DL (ref 13–17.7)
MCH RBC QN AUTO: 27.3 PG (ref 26.6–33)
MCHC RBC AUTO-ENTMCNC: 32.1 G/DL (ref 31.5–35.7)
MCV RBC AUTO: 85.1 FL (ref 79–97)
PLATELET # BLD AUTO: 234 10*3/MM3 (ref 140–450)
PMV BLD AUTO: 7.8 FL (ref 6–12)
POTASSIUM SERPL-SCNC: 4.1 MMOL/L (ref 3.5–5.2)
PROT SERPL-MCNC: 5.9 G/DL (ref 6–8.5)
RBC # BLD AUTO: 4.54 10*6/MM3 (ref 4.14–5.8)
SODIUM SERPL-SCNC: 135 MMOL/L (ref 136–145)
WBC NRBC COR # BLD AUTO: 19.1 10*3/MM3 (ref 3.4–10.8)

## 2024-01-20 PROCEDURE — 25010000002 CEFTRIAXONE PER 250 MG: Performed by: HOSPITALIST

## 2024-01-20 PROCEDURE — 25010000002 MORPHINE PER 10 MG: Performed by: HOSPITALIST

## 2024-01-20 PROCEDURE — 80053 COMPREHEN METABOLIC PANEL: CPT | Performed by: HOSPITALIST

## 2024-01-20 PROCEDURE — 85027 COMPLETE CBC AUTOMATED: CPT | Performed by: HOSPITALIST

## 2024-01-20 RX ORDER — ALLOPURINOL 100 MG/1
100 TABLET ORAL NIGHTLY
Status: DISCONTINUED | OUTPATIENT
Start: 2024-01-20 | End: 2024-01-22 | Stop reason: HOSPADM

## 2024-01-20 RX ORDER — ONDANSETRON 4 MG/1
4 TABLET, ORALLY DISINTEGRATING ORAL EVERY 8 HOURS PRN
Status: DISCONTINUED | OUTPATIENT
Start: 2024-01-20 | End: 2024-01-22 | Stop reason: HOSPADM

## 2024-01-20 RX ORDER — LOSARTAN POTASSIUM 50 MG/1
100 TABLET ORAL DAILY
Status: DISCONTINUED | OUTPATIENT
Start: 2024-01-20 | End: 2024-01-22 | Stop reason: HOSPADM

## 2024-01-20 RX ORDER — ATENOLOL 50 MG/1
50 TABLET ORAL NIGHTLY
Status: DISCONTINUED | OUTPATIENT
Start: 2024-01-20 | End: 2024-01-22 | Stop reason: HOSPADM

## 2024-01-20 RX ORDER — SIMETHICONE 80 MG
80 TABLET,CHEWABLE ORAL 4 TIMES DAILY PRN
Status: DISCONTINUED | OUTPATIENT
Start: 2024-01-20 | End: 2024-01-22 | Stop reason: HOSPADM

## 2024-01-20 RX ORDER — ATORVASTATIN CALCIUM 20 MG/1
20 TABLET, FILM COATED ORAL DAILY
Status: DISCONTINUED | OUTPATIENT
Start: 2024-01-20 | End: 2024-01-22 | Stop reason: HOSPADM

## 2024-01-20 RX ORDER — ALBUTEROL SULFATE 2.5 MG/3ML
2.5 SOLUTION RESPIRATORY (INHALATION) EVERY 6 HOURS PRN
Status: DISCONTINUED | OUTPATIENT
Start: 2024-01-20 | End: 2024-01-22 | Stop reason: HOSPADM

## 2024-01-20 RX ADMIN — SIMETHICONE 80 MG: 80 TABLET, CHEWABLE ORAL at 22:15

## 2024-01-20 RX ADMIN — MORPHINE SULFATE 4 MG: 4 INJECTION, SOLUTION INTRAMUSCULAR; INTRAVENOUS at 05:18

## 2024-01-20 RX ADMIN — ALLOPURINOL 100 MG: 100 TABLET ORAL at 20:45

## 2024-01-20 RX ADMIN — ATENOLOL 50 MG: 50 TABLET ORAL at 20:45

## 2024-01-20 RX ADMIN — DOCUSATE SODIUM AND SENNOSIDES 2 TABLET: 8.6; 5 TABLET, FILM COATED ORAL at 20:45

## 2024-01-20 RX ADMIN — ATORVASTATIN CALCIUM 20 MG: 20 TABLET, FILM COATED ORAL at 14:16

## 2024-01-20 RX ADMIN — CEFTRIAXONE 1000 MG: 1 INJECTION, POWDER, FOR SOLUTION INTRAMUSCULAR; INTRAVENOUS at 16:13

## 2024-01-20 RX ADMIN — LOSARTAN POTASSIUM 100 MG: 50 TABLET, FILM COATED ORAL at 14:16

## 2024-01-20 RX ADMIN — Medication 10 ML: at 20:46

## 2024-01-20 NOTE — PLAN OF CARE
Goal Outcome Evaluation:         S.O. at bedside. TURP continues as ordered. Urine red. Resting with eyes closed after pain medication. Call light in reach.

## 2024-01-20 NOTE — PROGRESS NOTES
[facility] MEDICINE SERVICE  DAILY PROGRESS NOTE    Patient Name: Sandro Ramirez  : 1950  MRN: [id number]  Primary Care Physician:  Colette Dominguez APRN  Date of admission: 2024  Date of service: 24      Subjective      Chief Complaint: Abdominal pain.    Patient Reports abdominal pain is better.  Has mild nausea.  No vomiting.  He had a TURP on 2024.  He has history of passing bloody clots..  No fever no chills.    ROS A 12 point review of system was done and was negative except as mentioned above      Objective      Vitals:   Temp:  [97.7 °F (36.5 °C)-98.2 °F (36.8 °C)] 97.7 °F (36.5 °C)  Heart Rate:  [] 94  Resp:  [20-26] 26  BP: ()/(40-64) 116/60  Flow (L/min):  [2] 2    Physical Exam  Constitutional:       Appearance: Normal appearance.   HENT:      Head: Normocephalic and atraumatic.      Nose: Nose normal.   Cardiovascular:      Rate and Rhythm: Normal rate.      Heart sounds: Normal heart sounds.   Pulmonary:      Effort: Pulmonary effort is normal. No respiratory distress.      Breath sounds: Normal breath sounds. No stridor. No wheezing, rhonchi or rales.   Chest:      Chest wall: No tenderness.   Abdominal:      General: Abdomen is flat. There is distension.      Palpations: Abdomen is soft. There is no mass.      Tenderness: There is no abdominal tenderness. There is no right CVA tenderness, left CVA tenderness, guarding or rebound.   Musculoskeletal:      Cervical back: Normal range of motion.   Skin:     General: Skin is warm and dry.   Neurological:      General: No focal deficit present.      Mental Status: He is alert.   Psychiatric:         Mood and Affect: Mood normal.         Behavior: Behavior normal.             Result Review    Result Review:  I have personally reviewed the results from the time of this admission to 2024 12:41 EST and agree with these findings:  [x]  Laboratory  []  Microbiology  [x]  Radiology  []  EKG/Telemetry   []   Cardiology/Vascular   []  Pathology  []  Old records  []  Other:            Assessment & Plan      Brief Patient Summary:  Sandro Ramirez is a 73 y.o. male with a PMH of A-fib on warfarin, CHF, CAD, hypertension and BPH who presented to [facility] on 1/19/2024 with complaints of abdominal pain, nausea, hematuria and generalized weakness.  Patient states that he had TURP by Dr Hamm on 1/5/2024.  Patient was just discharged from observation on the 17th after being admitted for urinary retention.  He underwent cystoscopy as well as bladder irrigation prior to being discharged. Patient states today he developed severe abdominal pain with nausea and vomiting and lightheadedness.  He reports that he has passed a few bloody clots.  His urine is still bloody. He states he is still having difficulty urinating.  Denies any subjective fever and chills.       cefTRIAXone, 1,000 mg, Intravenous, Q24H  senna-docusate sodium, 2 tablet, Oral, BID  sodium chloride, 10 mL, Intravenous, Q12H       sodium chloride, 100 mL/hr, Last Rate: 100 mL/hr (01/19/24 2026)         Active Hospital Problems:  Active Hospital Problems    Diagnosis     **Hematuria      Plan:   UTI  Hematuria  BPH  Status post TURP on 01/05/24  CT- Extraperitoneal bladder leak   Urology consulted.  Started Rocephin  Follow-up urine culture  As per urology     Acute kidney injury  Creatinine 1.71  Start normal saline at 100 cc/h  Repeat labs 0.86     Leukocytosis  WBC 19.1  Continue with Rocephin  Repeat CBC in the morning     A-fib  Hold warfarin due to hematuria     Hypertension  Resume home meds when verified    DVT prophylaxis:  Mechanical DVT prophylaxis orders are present.    CODE STATUS:         Disposition:  I expect patient to be discharged in 2 days    I discussed the patient's findings and my recommendations with the patient.    Electronically signed by Francisco Miller MD, 01/20/24, 12:41 EST.  [facility] Hospitalist Team

## 2024-01-20 NOTE — PROGRESS NOTES
CC: I feel ok    Patient OOB in chair  Wife at bedside    3 way kim anchored with CBI running, blood tinged urine  Discussed titrating to keep urine clear  Soft Abdomen  AVSS    Cr 0.86  WBC 19.1  Hgb 12.4    UCS mixed dede    CT Abd/Pelvis w/o 1/19 -   1. Extraperitoneal bladder leak with an at least 1 cm wide defect along superolateral aspect of the right side of the bladder as described above. There are postsurgical changes in the prostate gland.  2. Other stable chronic ancillary findings are detailed above and are unchanged from earlier in the day.    Plan:  Ok to resume diet  Continue to titrate CBI to keep urine clear  Discussed CT imaging with Dr. Daniels - no acute surgical intervention indicated at this time  Recommend continuing Rocephin

## 2024-01-21 LAB
ANION GAP SERPL CALCULATED.3IONS-SCNC: 8 MMOL/L (ref 5–15)
BASOPHILS # BLD AUTO: 0.1 10*3/MM3 (ref 0–0.2)
BASOPHILS NFR BLD AUTO: 0.5 % (ref 0–1.5)
BUN SERPL-MCNC: 15 MG/DL (ref 8–23)
BUN/CREAT SERPL: 25 (ref 7–25)
CALCIUM SPEC-SCNC: 8 MG/DL (ref 8.6–10.5)
CHLORIDE SERPL-SCNC: 105 MMOL/L (ref 98–107)
CO2 SERPL-SCNC: 24 MMOL/L (ref 22–29)
CREAT SERPL-MCNC: 0.6 MG/DL (ref 0.76–1.27)
DEPRECATED RDW RBC AUTO: 47.7 FL (ref 37–54)
EGFRCR SERPLBLD CKD-EPI 2021: 103.2 ML/MIN/1.73
EOSINOPHIL # BLD AUTO: 0.2 10*3/MM3 (ref 0–0.4)
EOSINOPHIL NFR BLD AUTO: 1.8 % (ref 0.3–6.2)
ERYTHROCYTE [DISTWIDTH] IN BLOOD BY AUTOMATED COUNT: 16.3 % (ref 12.3–15.4)
GLUCOSE SERPL-MCNC: 98 MG/DL (ref 65–99)
HCT VFR BLD AUTO: 34 % (ref 37.5–51)
HGB BLD-MCNC: 11 G/DL (ref 13–17.7)
LYMPHOCYTES # BLD AUTO: 1.4 10*3/MM3 (ref 0.7–3.1)
LYMPHOCYTES NFR BLD AUTO: 11.5 % (ref 19.6–45.3)
MCH RBC QN AUTO: 27.5 PG (ref 26.6–33)
MCHC RBC AUTO-ENTMCNC: 32.5 G/DL (ref 31.5–35.7)
MCV RBC AUTO: 84.7 FL (ref 79–97)
MONOCYTES # BLD AUTO: 0.9 10*3/MM3 (ref 0.1–0.9)
MONOCYTES NFR BLD AUTO: 7.5 % (ref 5–12)
NEUTROPHILS NFR BLD AUTO: 78.7 % (ref 42.7–76)
NEUTROPHILS NFR BLD AUTO: 9.3 10*3/MM3 (ref 1.7–7)
NRBC BLD AUTO-RTO: 0 /100 WBC (ref 0–0.2)
PLATELET # BLD AUTO: 234 10*3/MM3 (ref 140–450)
PMV BLD AUTO: 7.4 FL (ref 6–12)
POTASSIUM SERPL-SCNC: 3.9 MMOL/L (ref 3.5–5.2)
RBC # BLD AUTO: 4.01 10*6/MM3 (ref 4.14–5.8)
SODIUM SERPL-SCNC: 137 MMOL/L (ref 136–145)
WBC NRBC COR # BLD AUTO: 11.8 10*3/MM3 (ref 3.4–10.8)

## 2024-01-21 PROCEDURE — 80048 BASIC METABOLIC PNL TOTAL CA: CPT | Performed by: INTERNAL MEDICINE

## 2024-01-21 PROCEDURE — 85025 COMPLETE CBC W/AUTO DIFF WBC: CPT | Performed by: INTERNAL MEDICINE

## 2024-01-21 PROCEDURE — 25010000002 MORPHINE PER 10 MG: Performed by: HOSPITALIST

## 2024-01-21 PROCEDURE — 25010000002 CEFTRIAXONE PER 250 MG: Performed by: INTERNAL MEDICINE

## 2024-01-21 PROCEDURE — 25810000003 SODIUM CHLORIDE 0.9 % SOLUTION: Performed by: HOSPITALIST

## 2024-01-21 PROCEDURE — 25010000002 ONDANSETRON PER 1 MG: Performed by: HOSPITALIST

## 2024-01-21 RX ADMIN — Medication 10 ML: at 20:11

## 2024-01-21 RX ADMIN — ALLOPURINOL 100 MG: 100 TABLET ORAL at 20:11

## 2024-01-21 RX ADMIN — DOCUSATE SODIUM AND SENNOSIDES 2 TABLET: 8.6; 5 TABLET, FILM COATED ORAL at 09:28

## 2024-01-21 RX ADMIN — ATORVASTATIN CALCIUM 20 MG: 20 TABLET, FILM COATED ORAL at 09:28

## 2024-01-21 RX ADMIN — ONDANSETRON 4 MG: 2 INJECTION INTRAMUSCULAR; INTRAVENOUS at 12:24

## 2024-01-21 RX ADMIN — CEFTRIAXONE 1000 MG: 1 INJECTION, POWDER, FOR SOLUTION INTRAMUSCULAR; INTRAVENOUS at 16:42

## 2024-01-21 RX ADMIN — SODIUM CHLORIDE 100 ML/HR: 9 INJECTION, SOLUTION INTRAVENOUS at 20:12

## 2024-01-21 RX ADMIN — SIMETHICONE 80 MG: 80 TABLET, CHEWABLE ORAL at 12:24

## 2024-01-21 RX ADMIN — MORPHINE SULFATE 4 MG: 4 INJECTION, SOLUTION INTRAMUSCULAR; INTRAVENOUS at 12:24

## 2024-01-21 RX ADMIN — LOSARTAN POTASSIUM 100 MG: 50 TABLET, FILM COATED ORAL at 09:28

## 2024-01-21 RX ADMIN — Medication 10 ML: at 09:28

## 2024-01-21 RX ADMIN — ATENOLOL 50 MG: 50 TABLET ORAL at 20:11

## 2024-01-21 NOTE — PLAN OF CARE
Goal Outcome Evaluation:         Pt currently resting abed. CBI in place output Bright red in color no clots noted. Pt c/o gas early in the shift see mar. No distress noted. VSS cont to monitor.

## 2024-01-21 NOTE — PROGRESS NOTES
CC: I had a clot    Patient back in bed, NAD  Wife at bedside    States he is concerned because he is off his coumadin  Discussed we need to continue to hold anticoagulation until his hematuria resolves  Discussed will have hospitalist discuss options with him    Gross hematuria is improving  CBI with moderate flow  Had clot hand irrigated this am  Phallus and scrotum wnl now    Cr 0.6  Hgb 11  WBC 11.8  AVSS    PT 15.8 - 1/17  INR - 1.49 1/17    Plan:  Continue to wean CBI, no acute surgical intervention indicated at this time   Will follow   Nursing to reach out to hospitalist regarding anticoagulation

## 2024-01-21 NOTE — PROGRESS NOTES
[facility] MEDICINE SERVICE  DAILY PROGRESS NOTE    Patient Name: Sandro Ramirez  : 1950  MRN: [id number]  Primary Care Physician:  Colette Dominguez APRN  Date of admission: 2024  Date of service: 24      Subjective      Chief Complaint: Abdominal pain.    Patient had a TURP on 2024.  He was discharged recently following a cystoscopy and bladder irrigation.  Patient was back in the emergency room with abdominal pain nausea and vomiting.  Abdominal pain is improved.  No abdominal pain currently.  r.  Has mild nausea.  No vomiting.  He had a TURP on 2024.  He has history of passing bloody clots..  No fever no chills.    ROS A 12 point review of system was done and was negative except as mentioned above      Objective      Vitals:   Temp:  [97.8 °F (36.6 °C)-98.4 °F (36.9 °C)] 98.2 °F (36.8 °C)  Heart Rate:  [63-92] 63  Resp:  [15-18] 16  BP: (110-144)/(54-69) 133/55  Flow (L/min):  [2] 2    Physical Exam  Constitutional:       Appearance: Normal appearance.   HENT:      Head: Normocephalic and atraumatic.      Nose: Nose normal.   Cardiovascular:      Rate and Rhythm: Normal rate.      Heart sounds: Normal heart sounds.   Pulmonary:      Effort: Pulmonary effort is normal. No respiratory distress.      Breath sounds: Normal breath sounds. No stridor. No wheezing, rhonchi or rales.   Chest:      Chest wall: No tenderness.   Abdominal:      General: Abdomen is flat. There is distension.      Palpations: Abdomen is soft. There is no mass.      Tenderness: There is no abdominal tenderness. There is no right CVA tenderness, left CVA tenderness, guarding or rebound.   Musculoskeletal:      Cervical back: Normal range of motion.   Skin:     General: Skin is warm and dry.   Neurological:      General: No focal deficit present.      Mental Status: He is alert.   Psychiatric:         Mood and Affect: Mood normal.         Behavior: Behavior normal.             Result Review    Result  Review:  I have personally reviewed the results from the time of this admission to 1/21/2024 13:40 EST and agree with these findings:  [x]  Laboratory  []  Microbiology  [x]  Radiology  []  EKG/Telemetry   []  Cardiology/Vascular   []  Pathology  []  Old records  []  Other:            Assessment & Plan      Brief Patient Summary:  Sandro Ramirez is a 73 y.o. male with a PMH of A-fib on warfarin, CHF, CAD, hypertension and BPH who presented to [facility] on 1/19/2024 with complaints of abdominal pain, nausea, hematuria and generalized weakness.  Patient states that he had TURP by Dr Hamm on 1/5/2024.  Patient was just discharged from observation on the 17th after being admitted for urinary retention.  He underwent cystoscopy as well as bladder irrigation prior to being discharged. Patient states today he developed severe abdominal pain with nausea and vomiting and lightheadedness.  He reports that he has passed a few bloody clots.  His urine is still bloody. He states he is still having difficulty urinating.  Denies any subjective fever and chills.       allopurinol, 100 mg, Oral, Nightly  atenolol, 50 mg, Oral, Nightly  atorvastatin, 20 mg, Oral, Daily  cefTRIAXone, 1,000 mg, Intravenous, Q24H  losartan, 100 mg, Oral, Daily  senna-docusate sodium, 2 tablet, Oral, BID  sodium chloride, 10 mL, Intravenous, Q12H       sodium chloride, 100 mL/hr, Last Rate: 100 mL/hr (01/19/24 2026)         Active Hospital Problems:  Active Hospital Problems    Diagnosis     **Hematuria      Plan:   UTI  Hematuria  BPH  Status post TURP on 01/05/24  CT-  Extraperitoneal bladder leak with an at least 1 cm wide defect along superolateral aspect of the right side of the bladder   Urology consulted.  Patient on CBI  Started Rocephin  Follow-up urine culture  Plan As per urology     Acute kidney injury  Creatinine 1.71  Start normal saline at 100 cc/h  Repeat labs 0. 6  Follow-up labs.     Leukocytosis  WBC was 19.1 now  11.8  Continue with Rocephin  Repeat CBC in the morning     A-fib  Warfarin resumed as  cleared by urology     Hypertension  Resume home meds when verified    DVT prophylaxis:  Mechanical DVT prophylaxis orders are present.    CODE STATUS:         Disposition:  I expect patient to be discharged in 2 days    I discussed the patient's findings and my recommendations with the patient.    Electronically signed by Francisco Miller MD, 01/21/24, 13:40 EST.  [facility]

## 2024-01-22 ENCOUNTER — READMISSION MANAGEMENT (OUTPATIENT)
Dept: CALL CENTER | Facility: HOSPITAL | Age: 74
End: 2024-01-22
Payer: MEDICARE

## 2024-01-22 ENCOUNTER — TELEPHONE (OUTPATIENT)
Dept: CARDIOLOGY | Facility: CLINIC | Age: 74
End: 2024-01-22
Payer: MEDICARE

## 2024-01-22 VITALS
DIASTOLIC BLOOD PRESSURE: 74 MMHG | WEIGHT: 185 LBS | TEMPERATURE: 97.6 F | RESPIRATION RATE: 20 BRPM | HEIGHT: 71 IN | OXYGEN SATURATION: 94 % | HEART RATE: 72 BPM | SYSTOLIC BLOOD PRESSURE: 128 MMHG | BODY MASS INDEX: 25.9 KG/M2

## 2024-01-22 LAB
ANISOCYTOSIS BLD QL: NORMAL
BASOPHILS # BLD AUTO: 0 10*3/MM3 (ref 0–0.2)
BASOPHILS NFR BLD AUTO: 0.2 % (ref 0–1.5)
DEPRECATED RDW RBC AUTO: 49.4 FL (ref 37–54)
EOSINOPHIL # BLD AUTO: 0.3 10*3/MM3 (ref 0–0.4)
EOSINOPHIL NFR BLD AUTO: 3.6 % (ref 0.3–6.2)
ERYTHROCYTE [DISTWIDTH] IN BLOOD BY AUTOMATED COUNT: 16.2 % (ref 12.3–15.4)
HCT VFR BLD AUTO: 32.4 % (ref 37.5–51)
HGB BLD-MCNC: 10.5 G/DL (ref 13–17.7)
LYMPHOCYTES # BLD AUTO: 1.7 10*3/MM3 (ref 0.7–3.1)
LYMPHOCYTES NFR BLD AUTO: 17.7 % (ref 19.6–45.3)
MCH RBC QN AUTO: 27.1 PG (ref 26.6–33)
MCHC RBC AUTO-ENTMCNC: 32.5 G/DL (ref 31.5–35.7)
MCV RBC AUTO: 83.3 FL (ref 79–97)
MONOCYTES # BLD AUTO: 0.6 10*3/MM3 (ref 0.1–0.9)
MONOCYTES NFR BLD AUTO: 6.8 % (ref 5–12)
NEUTROPHILS NFR BLD AUTO: 6.9 10*3/MM3 (ref 1.7–7)
NEUTROPHILS NFR BLD AUTO: 71.7 % (ref 42.7–76)
NRBC BLD AUTO-RTO: 0.3 /100 WBC (ref 0–0.2)
PLATELET # BLD AUTO: 230 10*3/MM3 (ref 140–450)
PMV BLD AUTO: 7.8 FL (ref 6–12)
RBC # BLD AUTO: 3.89 10*6/MM3 (ref 4.14–5.8)
SMALL PLATELETS BLD QL SMEAR: ADEQUATE
WBC MORPH BLD: NORMAL
WBC NRBC COR # BLD AUTO: 9.6 10*3/MM3 (ref 3.4–10.8)

## 2024-01-22 PROCEDURE — 85007 BL SMEAR W/DIFF WBC COUNT: CPT | Performed by: INTERNAL MEDICINE

## 2024-01-22 PROCEDURE — 85025 COMPLETE CBC W/AUTO DIFF WBC: CPT | Performed by: INTERNAL MEDICINE

## 2024-01-22 RX ORDER — CEFPODOXIME PROXETIL 200 MG/1
200 TABLET, FILM COATED ORAL EVERY 12 HOURS
Qty: 6 TABLET | Refills: 0 | Status: SHIPPED | OUTPATIENT
Start: 2024-01-22 | End: 2024-01-25

## 2024-01-22 RX ADMIN — ATORVASTATIN CALCIUM 20 MG: 20 TABLET, FILM COATED ORAL at 08:39

## 2024-01-22 RX ADMIN — LOSARTAN POTASSIUM 100 MG: 50 TABLET, FILM COATED ORAL at 08:39

## 2024-01-22 NOTE — OUTREACH NOTE
Medical Week 2 Survey      Flowsheet Row Responses   [facility] facility patient discharged from? [facility]   Does the patient have one of the following disease processes/diagnoses(primary or secondary)? Other   Week 2 attempt successful? No   Unsuccessful attempts Attempt 1   Revoke Readmitted            Syl FRANCISCO - Registered Nurse

## 2024-01-22 NOTE — TELEPHONE ENCOUNTER
"  Caller: Sandro Ramirez \"Manish\"    Relationship: Self    Best call back number: [phone]    What is the best time to reach you: ANY    Who are you requesting to speak with (clinical staff, provider,  specific staff member): CLINICAL         What was the call regarding: PATIENT STATED THAT HE WAS DISCHARGED FROM [facility] TODAY AND THEY HAD GIVEN HIM DIFFERENT INSTRUCTIONS ON HIS WARFARIN MEDICATION. PATIENT WOULD LIKE FOR SOMEONE TO CONTACT HIM TO INSTRUCT HIM ON HOW HE NEEDS TO TAKE THE WARFARIN 7.5 MG. THANK YOU.     Is it okay if the provider responds through MyChart: CALL.           "

## 2024-01-22 NOTE — PROGRESS NOTES
Urology Progress Note    Patient Identification:  Name:  Sandro Ramirez  Age:  73 y.o.  Sex:  male  :  1950  MRN:  [id number]    Chief Complaint:  Patient feels better    History of Present Illness: Cardoza catheter draining clear yellow urine on essentially 0 CBI.  Cystogram did reveal a extraperitoneal bladder perforation.  This should seal up on its own.    Problem List:    Hematuria     Past Medical History:  Past Medical History:   Diagnosis Date    AAA (abdominal aortic aneurysm) 2006    Abnormal ECG     Aneurysm     Arthritis     Asthma     Atrial fibrillation     COUMADIN    Benign prostatic hyperplasia     Bladder cancer     CHF (congestive heart failure)     Chronic anticoagulation     COUMADIN STARTED 2006    Colon polyp     Congenital heart disease     Coronary artery disease     Diabetes mellitus     Elevated cholesterol     Erectile dysfunction     Facial basal cell cancer     Gout     Hard to intubate     History of pancreatitis     History of pneumonia     History of tick-borne disease     HL (hearing loss)     BILAT HEARING AIDS    Hyperlipidemia     Hypertension     Myocardial infarction     X5    Obesity     Pancreatitis     Pneumonia     Sleep apnea     CURRENTLY NOT USING CPAP D/T RECALL    Transfusion history     STATES HIS SISTER HAD A SEVERE REACTION TO BLOOD TRANSFUSION    Type 2 diabetes mellitus      Past Surgical History:  Past Surgical History:   Procedure Laterality Date    ARTERIOGRAM AORTIC Left 2022    Procedure: AORTIC STENT GRAFT PLACEMENT WITH ILIAC COILING;  Surgeon: Eric Sainz MD;  Location: [facility] HYBRID OR ;  Service: Vascular;  Laterality: Left;    CARDIAC CATHETERIZATION      CHOLECYSTECTOMY      COLONOSCOPY      COLONOSCOPY N/A 2023    Procedure: COLONOSCOPY to cecum with cold biopsy and cold snare polypectomies and clips;  Surgeon: Vitor Haley MD;  Location: [facility] ENDOSCOPY;  Service: Gastroenterology;  Laterality: N/A;   Pre - H/O polyps.  Post - diverticulosis, polyps, hemorrhoids    CORONARY ARTERY BYPASS GRAFT  2006    X2    CYSTOSCOPY      STATES HAD BLADDER TUMORS REMOVED X2    CYSTOSCOPY WITH CLOT EVACUATION N/A 1/17/2024    Procedure: CYSTOSCOPY WITH CLOT EVACUATION, WITH FULGRATION;  Surgeon: Daniel Hamm MD;  Location: [facility] MAIN OR;  Service: Urology;  Laterality: N/A;    PROSTATE SURGERY      X2- STATES PLACED COILS IN TO HELP WITH FLOW AND THEN HAD TO REMOVE A PIECE THAT BROKE OFF AND WAS IN BLADDER    UPPER GASTROINTESTINAL ENDOSCOPY       Home Meds:  Medications Prior to Admission   Medication Sig Dispense Refill Last Dose    allopurinol (ZYLOPRIM) 100 MG tablet Take 1 tablet by mouth Every Night.       atenolol (TENORMIN) 50 MG tablet TAKE 1 TABLET BY MOUTH EVERY DAY AT NIGHT 90 tablet 0     atorvastatin (LIPITOR) 20 MG tablet TAKE 1 TABLET BY MOUTH EVERY DAY 90 tablet 1     doxycycline (VIBRAMYCIN) 100 MG capsule Take 1 capsule by mouth 2 (Two) Times a Day for 10 days. 20 capsule 0     losartan (COZAAR) 25 MG tablet Take 4 tablets by mouth Daily.       warfarin (COUMADIN) 7.5 MG tablet Take 1.5 tablets by mouth. On Mon, Wed, Fri.       warfarin (COUMADIN) 7.5 MG tablet Take 1 tablet by mouth. TUES, THUR, SAT, SUN.       albuterol sulfate  (90 Base) MCG/ACT inhaler Inhale 2 puffs Every 4 (Four) Hours As Needed for Wheezing.       aspirin 81 MG tablet Take 1 tablet by mouth Daily. PT STATES WAS INSTRUCTED TO CONTINUE TO TAKE THIS PER GET ANDERSEN       ondansetron ODT (ZOFRAN-ODT) 4 MG disintegrating tablet Place 1 tablet on the tongue Every 8 (Eight) Hours As Needed for Nausea or Vomiting. 12 tablet 0      Current Meds:    Current Facility-Administered Medications:     albuterol (PROVENTIL) nebulizer solution 0.083% 2.5 mg/3mL, 2.5 mg, Nebulization, Q6H PRN, Francisco Miller MD    allopurinol (ZYLOPRIM) tablet 100 mg, 100 mg, Oral, Nightly, Francisco Miller MD, 100 mg at 01/21/24 2011    atenolol (TENORMIN)  tablet 50 mg, 50 mg, Oral, Nightly, Francisco Miller MD, 50 mg at 01/21/24 2011    atorvastatin (LIPITOR) tablet 20 mg, 20 mg, Oral, Daily, Francisco Miller MD, 20 mg at 01/21/24 0928    sennosides-docusate (PERICOLACE) 8.6-50 MG per tablet 2 tablet, 2 tablet, Oral, BID, 2 tablet at 01/21/24 0928 **AND** polyethylene glycol (MIRALAX) packet 17 g, 17 g, Oral, Daily PRN **AND** bisacodyl (DULCOLAX) EC tablet 5 mg, 5 mg, Oral, Daily PRN **AND** bisacodyl (DULCOLAX) suppository 10 mg, 10 mg, Rectal, Daily PRN, Silvia Stevens MD    cefTRIAXone (ROCEPHIN) 1,000 mg in sodium chloride 0.9 % 100 mL IVPB, 1,000 mg, Intravenous, Q24H, Francisco Miller MD, Last Rate: 200 mL/hr at 01/21/24 1642, 1,000 mg at 01/21/24 1642    losartan (COZAAR) tablet 100 mg, 100 mg, Oral, Daily, Francisco Miller MD, 100 mg at 01/21/24 0928    morphine injection 4 mg, 4 mg, Intravenous, Q4H PRN, Silvia Stevens MD, 4 mg at 01/21/24 1224    ondansetron (ZOFRAN) injection 4 mg, 4 mg, Intravenous, Q6H PRN, Silvia Stevens MD, 4 mg at 01/21/24 1224    ondansetron ODT (ZOFRAN-ODT) disintegrating tablet 4 mg, 4 mg, Oral, Q8H PRN, Francisco Miller MD    simethicone (MYLICON) chewable tablet 80 mg, 80 mg, Oral, 4x Daily PRN, Laura Fajardo, APRN, 80 mg at 01/21/24 1224    [COMPLETED] Insert Peripheral IV, , , Once **AND** sodium chloride 0.9 % flush 10 mL, 10 mL, Intravenous, PRN, Nichelle Chacon PA    sodium chloride 0.9 % flush 10 mL, 10 mL, Intravenous, Q12H, Silvia Stevens MD, 10 mL at 01/21/24 2011    sodium chloride 0.9 % flush 10 mL, 10 mL, Intravenous, PRN, Silvia Stevens MD    sodium chloride 0.9 % infusion 40 mL, 40 mL, Intravenous, PRN, Silvia Stevens MD    sodium chloride 0.9 % infusion, 100 mL/hr, Intravenous, Continuous, Silvia Stevens MD, Last Rate: 100 mL/hr at 01/21/24 2012, 100 mL/hr at 01/21/24 2012  Allergies:  Bee venom, Meperidine, Midazolam, Propofol, Sulfa antibiotics, Hydrocodone, and Simvastatin    Review of  "Systems     Objective:  tMax 24 hours:  Temp (24hrs), Av.1 °F (36.7 °C), Min:97.7 °F (36.5 °C), Max:98.3 °F (36.8 °C)    Vital Sign Ranges:  Temp:  [97.7 °F (36.5 °C)-98.3 °F (36.8 °C)] 97.7 °F (36.5 °C)  Heart Rate:  [63-82] 69  Resp:  [16-18] 16  BP: (110-134)/(54-69) 134/69  Intake and Output Last 3 Shifts:  I/O last 3 completed shifts:  In: 07915 [P.O.:240; Other:84031]  Out: 32711 [Urine:63209]    Exam:  /69 (BP Location: Left arm, Patient Position: Lying)   Pulse 69   Temp 97.7 °F (36.5 °C) (Oral)   Resp 16   Ht 180.3 cm (71\")   Wt 83.9 kg (185 lb)   SpO2 95%   BMI 25.80 kg/m²    General Appearance:    Alert, cooperative, no acute distress, general         appearance is normal   Head:    Normocephalic, without obvious abnormality, atraumatic   Eyes:            Pupils/Irises normal. Exterior inspection conjunctivae       and lids normal.   Ears:    Normal external inspection   Nose:   Exterior inspection of nose is normal   Throat:   Lips, mucosa, and tongue normal   Lungs:     Respirations unlabored; normal effort, no audible     abnormality   CV:   Regular rhythm and normal rate, no edema   Abdomen:     examination of the abdomen is normal with     no masses, tenderness, or distension    : Cardoza with yellow urine     Data Review:  All labs (24hrs):    Lab Results (last 24 hours)       Procedure Component Value Units Date/Time    CBC & Differential [[id number]]  (Abnormal) Collected: 24 0355    Specimen: Blood from Arm, Left Updated: 24 0548    Narrative:      The following orders were created for panel order CBC & Differential.  Procedure                               Abnormality         Status                     ---------                               -----------         ------                     CBC Auto Differential[[id number]]        Abnormal            Final result               Scan Slide[[id number]]                                       Final result                 Please " view results for these tests on the individual orders.    CBC Auto Differential [[id number]]  (Abnormal) Collected: 01/22/24 0355    Specimen: Blood from Arm, Left Updated: 01/22/24 0538     WBC 9.60 10*3/mm3      RBC 3.89 10*6/mm3      Hemoglobin 10.5 g/dL      Hematocrit 32.4 %      MCV 83.3 fL      MCH 27.1 pg      MCHC 32.5 g/dL      RDW 16.2 %      RDW-SD 49.4 fl      MPV 7.8 fL      Platelets 230 10*3/mm3      Neutrophil % 71.7 %      Lymphocyte % 17.7 %      Monocyte % 6.8 %      Eosinophil % 3.6 %      Basophil % 0.2 %      Neutrophils, Absolute 6.90 10*3/mm3      Lymphocytes, Absolute 1.70 10*3/mm3      Monocytes, Absolute 0.60 10*3/mm3      Eosinophils, Absolute 0.30 10*3/mm3      Basophils, Absolute 0.00 10*3/mm3      nRBC 0.3 /100 WBC     Scan Slide [[id number]] Collected: 01/22/24 0355    Specimen: Blood from Arm, Left Updated: 01/22/24 0538     Anisocytosis Slight/1+     WBC Morphology Normal     Platelet Estimate Adequate    Blood Culture - Blood, Arm, Left [[id number]]  (Normal) Collected: 01/19/24 1405    Specimen: Blood from Arm, Left Updated: 01/21/24 1415     Blood Culture No growth at 2 days    Blood Culture - Blood, Hand, Left [[id number]]  (Normal) Collected: 01/19/24 1405    Specimen: Blood from Hand, Left Updated: 01/21/24 1415     Blood Culture No growth at 2 days          Radiology:   Imaging Results (Last 72 Hours)       Procedure Component Value Units Date/Time    CT Abdomen Pelvis Without Contrast [[id number]] Collected: 01/19/24 1816     Updated: 01/19/24 1834    Narrative:      CT ABDOMEN PELVIS WO CONTRAST    Date of Exam: 1/19/2024 6:11 PM EST    Indication: abd pain. Hematuria, evaluate for bladder leak    Comparison: CT abdomen and pelvis with contrast from the same day (1/19/2024    Technique: Axial CT images were obtained of the abdomen and pelvis without the administration of contrast. Sagittal and coronal reconstructions were performed.  Automated exposure control and iterative  reconstruction methods were used.      Findings:  There is iodinated contrast opacified leaking from the right superior aspect of the bladder into the extraperitoneal space consistent with extraperitoneal bladder leak. On the coronal images (image 58) there is an approximate 1 cm wide defect in the   bladder wall. No contrast opacified fluid is seen surrounding bowel loops or within the peritoneum. Cardoza catheter is present in the bladder. There is extensive surrounding fat stranding and fluid around the bladder and edema within the presacral space.   No intraperitoneal contrast is seen. Prostate gland is enlarged and there is a central defect likely from transurethral procedure. There are also multiple surgical clips within and around the prostate gland. No free intraperitoneal air.      The liver, spleen, pancreas and adrenal glands are overall unremarkable. Cholecystectomy clips are present. Extensive sigmoid diverticulosis and a redundancy in the sigmoid colon is again noted. No CT signs of bowel obstruction. No definitive adenopathy   is seen. There is a small fat-containing umbilical hernia. There is evidence of abdominal aorta by iliac endograft traversing a 4.5 cm thrombosed aneurysm sac. The excluded distal left common iliac artery aneurysm sac is unchanged and there are   embolization coils in the left internal iliac artery. No acute bony abnormality.      Impression:      Impression:    1. Extraperitoneal bladder leak with an at least 1 cm wide defect along superolateral aspect of the right side of the bladder as described above. There are postsurgical changes in the prostate gland.  2. Other stable chronic ancillary findings are detailed above and are unchanged from earlier in the day.          Electronically Signed: Liban Honeycutt DO    1/19/2024 6:32 PM EST    Workstation ID: [id number]    CT Abdomen Pelvis With Contrast [[id number]] Collected: 01/19/24 1518     Updated: 01/19/24 1533    Narrative:       CT ABDOMEN PELVIS W CONTRAST    Date of Exam: 1/19/2024 2:06 PM CST    Indication: abd pain, recent prostate surgery and cystoscopy.    Comparison: CT angiogram abdomen pelvis 8/1/2022    Technique: Axial CT images were obtained of the abdomen and pelvis following the uneventful intravenous administration of 100 cc Isovue-370. Sagittal and coronal reconstructions were performed.  Automated exposure control and iterative reconstruction   methods were used.        Findings:  LUNG BASES:  Unremarkable without mass or infiltrate. Heart is enlarged.    LIVER:  Unremarkable parenchyma without focal lesion.  BILIARY/GALLBLADDER: Cholecystectomy.  SPLEEN:  Unremarkable  PANCREAS:  Unremarkable  ADRENAL:  Unremarkable  KIDNEYS:  Unremarkable parenchyma with no solid mass identified. There is a simple right renal cyst. There is advanced atherosclerotic disease involving the origin of the left renal artery with likely greater than 70% stenosis, similar to previous exam.   There is mild left hydroureter to the level of the ureterovesical junction. No calculus identified.  GASTROINTESTINAL/MESENTERY:  No evidence of obstruction nor inflammation.    MESENTERIC VESSELS:  Patent.  AORTA/IVC: There is a bifurcated abdominal aortic stent graft, similar in appearance. Infrarenal abdominal aortic aneurysm sac measures 4.5 x 4.4 cm, unchanged when measured at similar level. Excluded distal left common iliac artery aneurysm sac is   unchanged as are left internal iliac embolization coils.    RETROPERITONEUM/LYMPH NODES:  Unremarkable    REPRODUCTIVE: There is been at least partial prostatectomy, likely transurethral. Correlate with history and symptoms.  BLADDER: The most part there is generalized urinary bladder wall thickening and enhancement. Along the right central margin of the bladder dome there is both intraluminal and extraluminal gas and fluid with what appears to be mural discontinuity   suggesting potential bladder  defect/leak. Correlate with patient history. There is moderate free fluid within the pelvis in the lower abdomen with presacral fluid/edema. CT cystogram might be useful to further assess.    There are areas of irregular intraluminal density within the bladder, presumably clot. There are numerous surgical clips within the bladder base.    OSSEUS STRUCTURES:  Typical for age with no acute process identified.        Impression:      Impression:  1.Findings suspicious for bladder leak/defect involving the right central bladder dome. CT cystogram might be useful if important to further assess radiographically.  2.Irregular areas of increased intraluminal density within the bladder, presumably clot, with numerous surgical clips in the bladder base.  3.At least partial prostatectomy.  4.Other stable findings within the abdomen and pelvis.            Electronically Signed: Lorenzo Williamson MD    1/19/2024 2:31 PM CST    Workstation ID: [id number]    CT Head Without Contrast [[id number]] Collected: 01/19/24 1512     Updated: 01/19/24 1516    Narrative:      CT HEAD WO CONTRAST    Date of Exam: 1/19/2024 3:05 PM EST    Indication: headache.    Comparison: None available.    Technique: Axial CT images were obtained of the head without contrast administration.  Coronal reconstructions were performed.  Automated exposure control and iterative reconstruction methods were used.      Findings:  There is no evidence of hemorrhage. There is no mass effect or midline shift.    There is no extra-axial collections.    Ventricles are normal in size and configuration for patient's stated age.    Posterior fossa is within normal limits.    Calvarium and skull base appear intact. Visualized sinuses show no air fluid levels. The mastoid air cells are clear. Visualized orbits are unremarkable.        Impression:      Impression:  No acute cranial process evident.      Electronically Signed: Vance Coulter MD    1/19/2024 3:14 PM EST    Workstation  ID: [id number]    XR Chest 1 View [[id number]] Collected: 01/19/24 1350     Updated: 01/19/24 1352    Narrative:      XR CHEST 1 VW    Date of Exam: 1/19/2024 12:45 PM CST    Indication: sob    Comparison: 10/10/2023    Findings:  Cardiomediastinal silhouette is prominent, similar prior examination. There is pulmonary vascular congestion with mild interstitial coarsening/edema. No airspace disease, pneumothorax, nor pleural effusion. No acute osseous abnormality identified.      Impression:      Impression:  Mild CHF/volume overload features.      Electronically Signed: Lorenzo Williamson MD    1/19/2024 12:50 PM CST    Workstation ID: [id number]            Assessment/Plan:    Principal Problem:    Hematuria    Bladder perforation which should seal on its own  Gross hematuria secondary to recent greenlight TURP.  This is resolving.  BPH with lower urinary tract symptoms status post greenlight TURP    Plan  Discontinue TUR drip  Continue Cardoza catheter  Okay to restart Coumadin  Okay for discharge from urology standpoint with the Cardoza catheter in place  My office will arrange follow-up in 1 week with a CT cystogram      Daniel Hamm MD  1/22/2024  07:27 EST

## 2024-01-22 NOTE — PLAN OF CARE
Goal Outcome Evaluation:         Pt currently awake abed. Pt cont CBI output has now become yellow in color at 0400 no clots noted. Pt still has concerns of not restarting his warfarin. This nurse has explain that due to bleeding this med was not reordered. Urologist has explained to pt as well per notes. Pt voiced his concerns throughout the shift and is requesting to see his cardiologist Dr. Galloway. Consult was placed. VSS cont to monitor.

## 2024-01-22 NOTE — CASE MANAGEMENT/SOCIAL WORK
Discharge Planning Assessment  [facility]     Patient Name: Sandro Ramirez  MRN: [id number]  Today's Date: 1/22/2024    Admit Date: 1/19/2024    Plan: Return home with family   Discharge Needs Assessment       Row Name 01/22/24 1601       Living Environment    People in Home spouse    Name(s) of People in Home vu Steward    Current Living Arrangements home    Potentially Unsafe Housing Conditions none    In the past 12 months has the electric, gas, oil, or water company threatened to shut off services in your home? No    Primary Care Provided by self    Provides Primary Care For no one    Family Caregiver if Needed spouse    Family Caregiver Names vu Steward    Quality of Family Relationships helpful;involved;supportive    Able to Return to Prior Arrangements yes       Resource/Environmental Concerns    Resource/Environmental Concerns none    Transportation Concerns none       Transition Planning    Patient/Family Anticipates Transition to home;home with family    Patient/Family Anticipated Services at Transition none    Transportation Anticipated car, drives self       Discharge Needs Assessment    Readmission Within the Last 30 Days no previous admission in last 30 days    Equipment Currently Used at Home cpap  [facility] [employer]    Concerns to be Addressed no discharge needs identified;denies needs/concerns at this time    Anticipated Changes Related to Illness none    Equipment Needed After Discharge none                   Discharge Plan       Row Name 01/22/24 1602       Plan    Plan Return home with family    Plan Comments CM met with pt at bedside to discuss discharge needs. Pt lives at home with his wife herson Steward and is IADLs. PCP and pharmacy verified- pt enrolled in [facility] as requested. Current DME: CPAP ([facility] No current HHC and no additional DME/HHC anticipated on dc. Family will provide transport home.                  Demographic Summary       Row Name 01/22/24 1600       General Information    Admission Type  inpatient    Arrived From emergency department    Referral Source admission list    Reason for Consult care coordination/care conference;discharge planning    Preferred Language English       Contact Information    Permission Granted to Share Info With                    Functional Status       Row Name 01/22/24 1600       Functional Status    Usual Activity Tolerance good    Current Activity Tolerance moderate       Functional Status, IADL    Medications independent    Meal Preparation independent    Housekeeping independent    Laundry independent    Shopping independent       Mental Status Summary    Recent Changes in Mental Status/Cognitive Functioning no changes       Employment/    Current or Previous Occupation not applicable               Betty Mensah RN      Office phone: [phone]  Office fax: [phone]

## 2024-01-22 NOTE — TELEPHONE ENCOUNTER
Spoke with pt and wife and Dr. Galloway. Pt discharged from hospital today. Pt was sent home on decreased dose of warfarin 7.5mg daily because of ABX. Pt last INR was 3 days ago. Will take 7.5mg today and recheck INR tomorrow Liz

## 2024-01-22 NOTE — CASE MANAGEMENT/SOCIAL WORK
Case Management Discharge Note      Final Note: Routine home.         Selected Continued Care - Discharged on 1/22/2024 Admission date: 1/19/2024 - Discharge disposition: Home or Self Care         Transportation Services  Private: Car    Final Discharge Disposition Code: 01 - home or self-care

## 2024-01-22 NOTE — DISCHARGE SUMMARY
[facility] Medicine Services  Discharge Summary    Date of Service: 24  Patient Name: Sandro Ramirez  : 1950  MRN: [id number]    Date of Admission: 2024  Date of Discharge:  24  Primary Care Physician: Colette Dominguez APRN    Discharge Diagnosis: Gross hematuria secondary to recent greenlight TURP    Presenting Problem:   Hematuria [R31.9]  Bladder leak [R32]  JORDAN (acute kidney injury) [N17.9]  Abdominal pain, unspecified abdominal location [R10.9]  Hematuria, unspecified type [R31.9]    Active and Resolved Hospital Problems:  Active Hospital Problems    Diagnosis POA    **Hematuria [R31.9] Yes      Resolved Hospital Problems   No resolved problems to display.            As per the HPI of the H&P:   Sandro Ramirez is a 73 y.o. male with a PMH of A-fib on warfarin, CHF, CAD, hypertension and BPH who presented to [facility] on 2024 with complaints of abdominal pain, nausea, hematuria and generalized weakness.  Patient states that he had TURP by Dr Hamm on 2024.  Patient was just discharged from observation on the  after being admitted for urinary retention.  He underwent cystoscopy as well as bladder irrigation prior to being discharged. Patient states today he developed severe abdominal pain with nausea and vomiting and lightheadedness.  He reports that he has passed a few bloody clots.  His urine is still bloody. He states he is still having difficulty urinating.  Denies any subjective fever and chills.       Hospital Course:   The patient was admitted for hematuria secondary to bladder perforation, which should seal on its own as per urology.  He was evaluated by urology on this admission.  The gross hematuria was secondary to recent greenlight TURP.  This is resolving.  Recommendations by urology are to discharge the patient with Cardoza catheter in place and okay to restart Coumadin.  Follow-up with urology in 1 week with a CT cystogram.   "Patient seen and examined at bedside.  Family present.  Patient states he feels okay and would like to go home.  Patient denies fever, chills, cough, nausea, vomiting, chest pain, shortness of breath, and abdominal pain.  Follow-up with urology and primary care physician within 7 days of discharge.  Vantin prescribed for 3 days twice daily on discharge.        Discharge Exam    /74 (BP Location: Left arm, Patient Position: Sitting)   Pulse 72   Temp 97.6 °F (36.4 °C) (Oral)   Resp 20   Ht 180.3 cm (71\")   Wt 83.9 kg (185 lb)   SpO2 94%   BMI 25.80 kg/m²     General: Not in any acute distress  HEENT: Normocephalic, atraumatic  Neck: Supple, No JVD  CV: irregular rhythm, S1 and S2 are normal, no murmurs/rubs/or gallops  Lungs: Clear to auscultation bilaterally, no rales/rhonchi/wheezes  Abdomen: Soft, non-distended, non-tender, bowel sounds present  Urogenital: Cardoza catheter in place and blood-tinged urine in bag.  EXT: No edema of lower extremities  Neuro: Cranial nerves II through XII intact  Skin: Intact, no rashes, no lesions, no erythema    Discharge Medications:     Discharge Medications        New Medications        Instructions Start Date   cefpodoxime 200 MG tablet  Commonly known as: VANTIN   200 mg, Oral, Every 12 Hours             Continue These Medications        Instructions Start Date   albuterol sulfate  (90 Base) MCG/ACT inhaler  Commonly known as: PROVENTIL HFA;VENTOLIN HFA;PROAIR HFA   2 puffs, Inhalation, Every 4 Hours PRN      allopurinol 100 MG tablet  Commonly known as: ZYLOPRIM   100 mg, Oral, Nightly      aspirin 81 MG tablet   81 mg, Oral, Daily, PT STATES WAS INSTRUCTED TO CONTINUE TO TAKE THIS PER GET ANDERSEN      atenolol 50 MG tablet  Commonly known as: TENORMIN   50 mg, Oral, Every Night at Bedtime      atorvastatin 20 MG tablet  Commonly known as: LIPITOR   TAKE 1 TABLET BY MOUTH EVERY DAY      losartan 25 MG tablet  Commonly known as: COZAAR   100 mg, Oral, " Daily      ondansetron ODT 4 MG disintegrating tablet  Commonly known as: ZOFRAN-ODT   4 mg, Translingual, Every 8 Hours PRN      warfarin 7.5 MG tablet  Commonly known as: COUMADIN   11.25 mg, Oral, On Mon, Wed, Fri.      warfarin 7.5 MG tablet  Commonly known as: COUMADIN   7.5 mg, Oral, TUES, THUR, SAT, SUN.             Stop These Medications      doxycycline 100 MG capsule  Commonly known as: VIBRAMYCIN                 Diet:  Hospital:  Diet Order   Procedures    Diet: Cardiac Diets; Healthy Heart (2-3 Na+); Texture: Regular Texture (IDDSI 7); Fluid Consistency: Thin (IDDSI 0)       Discharge Disposition:  home      Discharge Activity:   As tolerated    CODE STATUS:  There are no questions and answers to display.         Future Appointments   Date Time Provider Department Center   1/23/2024 10:00 AM [facility]   1/29/2024 11:30 AM Colette Dominguez APRN [facility]   2/1/2024  3:00 PM [facility] NA   2/1/2024  3:20 PM Thai Galloway MD [facility] NA   2/12/2024  9:15 AM Colette Dominguez APRN [facility]   2/19/2024 10:00 AM [facility] M[facility] 2  [facility]   2/19/2024 10:45 AM [facility] 1  [facility]   2/19/2024 11:30 AM ROOM 1,  [facility] V[facility]  [facility] V [facility] None   4/11/2024  8:30 AM Shawn Van MD [facility]   6/5/2024  9:15 AM Seipel, Joseph F, MD [facility]O [facility]         Time spent on Discharge including face to face service:  >30 minutes    Signature: Electronically signed by Ceasar Hansen MD, 01/22/24, 12:53 EST.  [facility]ist Team

## 2024-01-22 NOTE — OUTREACH NOTE
Prep Survey      Flowsheet Row Responses   [facility] patient discharged from? [facility]   Is LACE score < 7 ? No   Eligibility [facility]   Date of Admission 01/19/24   Date of Discharge 01/22/24   Discharge Disposition Home or Self Care   Discharge diagnosis Gross hematuria secondary to recent greenlight TURP   Does the patient have one of the following disease processes/diagnoses(primary or secondary)? Other   Does the patient have Home health ordered? No   Is there a DME ordered? No   Prep survey completed? Yes            Syl FRANCISCO - Registered Nurse

## 2024-01-23 ENCOUNTER — ANTICOAGULATION VISIT (OUTPATIENT)
Dept: CARDIOLOGY | Facility: CLINIC | Age: 74
End: 2024-01-23
Payer: MEDICARE

## 2024-01-23 ENCOUNTER — TRANSITIONAL CARE MANAGEMENT TELEPHONE ENCOUNTER (OUTPATIENT)
Dept: CALL CENTER | Facility: HOSPITAL | Age: 74
End: 2024-01-23
Payer: MEDICARE

## 2024-01-23 ENCOUNTER — OFFICE VISIT (OUTPATIENT)
Dept: CARDIOLOGY | Facility: CLINIC | Age: 74
End: 2024-01-23
Payer: MEDICARE

## 2024-01-23 VITALS
HEART RATE: 73 BPM | DIASTOLIC BLOOD PRESSURE: 75 MMHG | HEIGHT: 71 IN | BODY MASS INDEX: 27.3 KG/M2 | SYSTOLIC BLOOD PRESSURE: 164 MMHG | WEIGHT: 195 LBS | OXYGEN SATURATION: 97 %

## 2024-01-23 VITALS — SYSTOLIC BLOOD PRESSURE: 174 MMHG | DIASTOLIC BLOOD PRESSURE: 66 MMHG | HEART RATE: 72 BPM

## 2024-01-23 DIAGNOSIS — I48.11 LONGSTANDING PERSISTENT ATRIAL FIBRILLATION: Primary | ICD-10-CM

## 2024-01-23 DIAGNOSIS — E11.9 TYPE 2 DIABETES MELLITUS WITHOUT COMPLICATION, WITHOUT LONG-TERM CURRENT USE OF INSULIN: ICD-10-CM

## 2024-01-23 DIAGNOSIS — E78.00 PURE HYPERCHOLESTEROLEMIA: ICD-10-CM

## 2024-01-23 DIAGNOSIS — I10 ESSENTIAL HYPERTENSION: ICD-10-CM

## 2024-01-23 DIAGNOSIS — G47.33 OBSTRUCTIVE SLEEP APNEA SYNDROME: ICD-10-CM

## 2024-01-23 DIAGNOSIS — I25.10 CORONARY ARTERY DISEASE INVOLVING NATIVE CORONARY ARTERY OF NATIVE HEART WITHOUT ANGINA PECTORIS: ICD-10-CM

## 2024-01-23 DIAGNOSIS — I48.21 PERMANENT ATRIAL FIBRILLATION: Primary | Chronic | ICD-10-CM

## 2024-01-23 LAB — INR PPP: 1.1 (ref 2–3)

## 2024-01-23 PROCEDURE — 3078F DIAST BP <80 MM HG: CPT | Performed by: INTERNAL MEDICINE

## 2024-01-23 PROCEDURE — 1160F RVW MEDS BY RX/DR IN RCRD: CPT | Performed by: INTERNAL MEDICINE

## 2024-01-23 PROCEDURE — 1159F MED LIST DOCD IN RCRD: CPT | Performed by: INTERNAL MEDICINE

## 2024-01-23 PROCEDURE — 36416 COLLJ CAPILLARY BLOOD SPEC: CPT | Performed by: INTERNAL MEDICINE

## 2024-01-23 PROCEDURE — 85610 PROTHROMBIN TIME: CPT | Performed by: INTERNAL MEDICINE

## 2024-01-23 PROCEDURE — 99214 OFFICE O/P EST MOD 30 MIN: CPT | Performed by: INTERNAL MEDICINE

## 2024-01-23 PROCEDURE — 3077F SYST BP >= 140 MM HG: CPT | Performed by: INTERNAL MEDICINE

## 2024-01-23 NOTE — OUTREACH NOTE
Call Center TCM Note      Flowsheet Row Responses   [facility] patient discharged from? [facility]   Does the patient have one of the following disease processes/diagnoses(primary or secondary)? Other   TCM attempt successful? Yes   Call start time 1050   Call end time 1057   Discharge diagnosis Gross hematuria secondary to recent greenlight TURP   Person spoke with today (if not patient) and relationship Wife   Meds reviewed with patient/caregiver? Yes   Is the patient having any side effects they believe may be caused by any medication additions or changes? No   Does the patient have all medications ordered at discharge? Yes   Is the patient taking all medications as directed (includes completed medication regime)? Yes   Medication comments INR today was 1.1, taking 7.5mg of Warfarin, waiting on Cardiology to tell him what to do with dosing.   Comments HOSP DC FU appt 1/29/24 1130 am   Does the patient have an appointment with their PCP within 7-14 days of discharge? Yes   Psychosocial issues? No   Comments Cardoza catheter in place, hematuria remains bright red per wife, clot present this morning. Pt remains weak, denies abd pain or nausea. Appetite and BM WNL.   Did the patient receive a copy of their discharge instructions? Yes   Nursing interventions Reviewed instructions with patient   What is the patient's perception of their health status since discharge? Improving   Is the patient/caregiver able to teach back signs and symptoms related to disease process for when to call PCP? Yes   Is the patient/caregiver able to teach back signs and symptoms related to disease process for when to call 911? Yes   Is the patient/caregiver able to teach back the hierarchy of who to call/visit for symptoms/problems? PCP, Specialist, Home health nurse, Urgent Care, ED, 911 Yes   If the patient is a current smoker, are they able to teach back resources for cessation? Not a smoker   TCM call completed? Yes   Wrap up additional  comments Dtr is RN   Call end time [phone]            Cyndy Ocampo RN    1/23/2024, 10:58 EST

## 2024-01-23 NOTE — PROGRESS NOTES
Subjective:     Encounter Date:01/23/2024      Patient ID: Sandro Ramirez is a 73 y.o. male.    Chief Complaint:  History of Present Illness 73-year-old white male with history of coronary disease atrial fibrillation from sleep apnea diabetes hypertension hyperlipidemia presents to office for follow-up.  Patient is currently stable without any symptoms of chest pain but has some shortness of breath with exertion but no complains any PND orthopnea.  No palpitations dizziness syncope he has some swelling of the feet but is taking his medicines regularly.  He does not smoke.  He recently was in the hospital with some hematuria and was seen by urologist and is restarted on warfarin.    The following portions of the patient's history were reviewed and updated as appropriate: allergies, current medications, past family history, past medical history, past social history, past surgical history, and problem list.  Past Medical History:   Diagnosis Date    AAA (abdominal aortic aneurysm) 2006    Abnormal ECG     Aneurysm     Arthritis     Asthma     Atrial fibrillation     COUMADIN    Benign prostatic hyperplasia     Bladder cancer     CHF (congestive heart failure)     Chronic anticoagulation     COUMADIN STARTED 2006    Colon polyp     Congenital heart disease     Coronary artery disease     Diabetes mellitus     Elevated cholesterol     Erectile dysfunction     Facial basal cell cancer     Gout     Hard to intubate     History of pancreatitis 2006    History of pneumonia     History of tick-borne disease 2015    HL (hearing loss)     BILAT HEARING AIDS    Hyperlipidemia     Hypertension     Myocardial infarction     X5    Obesity     Pancreatitis     Pneumonia     Sleep apnea     CURRENTLY NOT USING CPAP D/T RECALL    Transfusion history     STATES HIS SISTER HAD A SEVERE REACTION TO BLOOD TRANSFUSION    Type 2 diabetes mellitus      Past Surgical History:   Procedure Laterality Date    ARTERIOGRAM AORTIC Left  "06/29/2022    Procedure: AORTIC STENT GRAFT PLACEMENT WITH ILIAC COILING;  Surgeon: Eric Sainz MD;  Location:  [facility] 18/19;  Service: Vascular;  Laterality: Left;    CARDIAC CATHETERIZATION      CHOLECYSTECTOMY      COLONOSCOPY      COLONOSCOPY N/A 7/27/2023    Procedure: COLONOSCOPY to cecum with cold biopsy and cold snare polypectomies and clips;  Surgeon: Vitor Haley MD;  [facility];  Service: Gastroenterology;  Laterality: N/A;  Pre - H/O polyps.  Post - diverticulosis, polyps, hemorrhoids    CORONARY ARTERY BYPASS GRAFT  2006    X2    CYSTOSCOPY      STATES HAD BLADDER TUMORS REMOVED X2    CYSTOSCOPY WITH CLOT EVACUATION N/A 1/17/2024    Procedure: CYSTOSCOPY WITH CLOT EVACUATION, WITH FULGRATION;  Surgeon: Daniel Hamm MD;  Location: [facility] MAIN OR;  Service: Urology;  Laterality: N/A;    PROSTATE SURGERY      X2- STATES PLACED COILS IN TO HELP WITH FLOW AND THEN HAD TO REMOVE A PIECE THAT BROKE OFF AND WAS IN BLADDER    UPPER GASTROINTESTINAL ENDOSCOPY       /75 (BP Location: Right arm, Patient Position: Sitting, Cuff Size: Adult)   Pulse 73   Ht 180.3 cm (71\")   Wt 88.5 kg (195 lb)   SpO2 97%   BMI 27.20 kg/m²   Family History   Problem Relation Age of Onset    Hypertension Mother     Colon cancer Mother     Cancer Mother     Diabetes Mother     Cancer Father     Heart attack Father     Hearing loss Father     Heart disease Father     Hypertension Sister     Hypertension Brother     Colon cancer Brother     Cancer Brother     Heart disease Paternal Grandfather     Heart attack Paternal Grandfather     Malig Hyperthermia Neg Hx     Colon polyps Neg Hx     Crohn's disease Neg Hx     Irritable bowel syndrome Neg Hx     Ulcerative colitis Neg Hx        Current Outpatient Medications:     albuterol sulfate  (90 Base) MCG/ACT inhaler, Inhale 2 puffs Every 4 (Four) Hours As Needed for Wheezing., Disp: , Rfl:     allopurinol (ZYLOPRIM) 100 MG tablet, " Take 1 tablet by mouth Every Night., Disp: , Rfl:     aspirin 81 MG tablet, Take 1 tablet by mouth Daily. PT STATES WAS INSTRUCTED TO CONTINUE TO TAKE THIS PER GET ANDERSEN, Disp: , Rfl:     atenolol (TENORMIN) 50 MG tablet, TAKE 1 TABLET BY MOUTH EVERY DAY AT NIGHT, Disp: 90 tablet, Rfl: 0    atorvastatin (LIPITOR) 20 MG tablet, TAKE 1 TABLET BY MOUTH EVERY DAY, Disp: 90 tablet, Rfl: 1    cefpodoxime (VANTIN) 200 MG tablet, Take 1 tablet by mouth Every 12 (Twelve) Hours for 3 days., Disp: 6 tablet, Rfl: 0    losartan (COZAAR) 25 MG tablet, Take 4 tablets by mouth Daily., Disp: , Rfl:     warfarin (COUMADIN) 7.5 MG tablet, Take 1.5 tablets by mouth. On Mon, Wed, Fri., Disp: , Rfl:     warfarin (COUMADIN) 7.5 MG tablet, Take 1 tablet by mouth. TONY WEISS SAT, SUN., Disp: , Rfl:     ondansetron ODT (ZOFRAN-ODT) 4 MG disintegrating tablet, Place 1 tablet on the tongue Every 8 (Eight) Hours As Needed for Nausea or Vomiting. (Patient not taking: Reported on 2024), Disp: 12 tablet, Rfl: 0  No current facility-administered medications for this visit.  Allergies   Allergen Reactions    Bee Venom Anaphylaxis     HIVES-SWOLLEN THROAT    Meperidine Hives    Midazolam Hives    Propofol Other (See Comments)     UNCLEAR-SVT, HYPOTENSION-STATES SEVERE ALMOST CODED    Sulfa Antibiotics Hives    Hydrocodone Nausea And Vomiting    Simvastatin Myalgia     Social History     Socioeconomic History    Marital status:      Spouse name: Nichelle    Number of children: 3    Years of education: 12   Tobacco Use    Smoking status: Former     Packs/day: 1.00     Years: 30.00     Additional pack years: 0.00     Total pack years: 30.00     Types: Cigarettes     Quit date: 2006     Years since quittin.6     Passive exposure: Past    Smokeless tobacco: Never   Vaping Use    Vaping Use: Never used   Substance and Sexual Activity    Alcohol use: Not Currently     Comment: one or two beers 6 times a year    Drug use: Never     Sexual activity: Not Currently     Partners: Female     Birth control/protection: None     Review of Systems   Constitutional: Positive for malaise/fatigue.   Cardiovascular:  Positive for leg swelling. Negative for chest pain, dyspnea on exertion and palpitations.   Respiratory:  Positive for shortness of breath. Negative for cough.    Gastrointestinal:  Negative for abdominal pain, nausea and vomiting.   Neurological:  Negative for dizziness, focal weakness, headaches, light-headedness and numbness.   All other systems reviewed and are negative.             Objective:     Constitutional:       Appearance: Well-developed.   Eyes:      General: No scleral icterus.     Conjunctiva/sclera: Conjunctivae normal.   HENT:      Head: Normocephalic and atraumatic.   Neck:      Vascular: No carotid bruit or JVD.   Pulmonary:      Effort: Pulmonary effort is normal.      Breath sounds: Normal breath sounds. No wheezing. No rales.   Cardiovascular:      Normal rate. Irregularly irregular rhythm.   Pulses:     Intact distal pulses.   Abdominal:      General: Bowel sounds are normal.      Palpations: Abdomen is soft.   Musculoskeletal:      Cervical back: Normal range of motion and neck supple. Skin:     General: Skin is warm and dry.      Findings: No rash.   Neurological:      Mental Status: Alert.       Procedures    Lab Review:         MDM    #1 coronary disease  Patient had coronary bypass 3 x 2 vessels with a LIMA to LAD and saphenous graft to the marginal branch of the circumflex artery and has normal function is currently stable on medications  2.  Atrial fibrillation  Patient has history of atrial fibrillation is currently on warfarin and atenolol  3.  Hyperlipidemia  Patient is currently stable on atorvastatin  4.  Hypertension  Patient blood pressure currently stable on losartan and atenolol  5.  Sleep apnea  Patient is sleep apnea and uses a CPAP patient.    Patient's previous medical records, labs, and EKG were  reviewed and discussed with the patient at today's visit.

## 2024-01-24 LAB
BACTERIA SPEC AEROBE CULT: NORMAL
BACTERIA SPEC AEROBE CULT: NORMAL
QT INTERVAL: 496 MS
QTC INTERVAL: 433 MS

## 2024-01-26 ENCOUNTER — ANTICOAGULATION VISIT (OUTPATIENT)
Dept: CARDIOLOGY | Facility: CLINIC | Age: 74
End: 2024-01-26
Payer: MEDICARE

## 2024-01-26 VITALS
DIASTOLIC BLOOD PRESSURE: 64 MMHG | HEART RATE: 77 BPM | SYSTOLIC BLOOD PRESSURE: 140 MMHG | BODY MASS INDEX: 26.78 KG/M2 | WEIGHT: 192 LBS

## 2024-01-26 DIAGNOSIS — I48.21 PERMANENT ATRIAL FIBRILLATION: Primary | Chronic | ICD-10-CM

## 2024-01-26 LAB — INR PPP: 1.6 (ref 0.9–1.1)

## 2024-01-26 PROCEDURE — 85610 PROTHROMBIN TIME: CPT | Performed by: INTERNAL MEDICINE

## 2024-01-26 PROCEDURE — 36416 COLLJ CAPILLARY BLOOD SPEC: CPT | Performed by: INTERNAL MEDICINE

## 2024-01-29 ENCOUNTER — LAB (OUTPATIENT)
Dept: LAB | Facility: HOSPITAL | Age: 74
End: 2024-01-29
Payer: MEDICARE

## 2024-01-29 ENCOUNTER — OFFICE VISIT (OUTPATIENT)
Dept: FAMILY MEDICINE CLINIC | Facility: CLINIC | Age: 74
End: 2024-01-29
Payer: MEDICARE

## 2024-01-29 VITALS
BODY MASS INDEX: 26.32 KG/M2 | WEIGHT: 188 LBS | TEMPERATURE: 96.8 F | HEIGHT: 71 IN | RESPIRATION RATE: 18 BRPM | OXYGEN SATURATION: 96 % | DIASTOLIC BLOOD PRESSURE: 68 MMHG | SYSTOLIC BLOOD PRESSURE: 138 MMHG | HEART RATE: 84 BPM

## 2024-01-29 DIAGNOSIS — Z09 HOSPITAL DISCHARGE FOLLOW-UP: Primary | ICD-10-CM

## 2024-01-29 DIAGNOSIS — R93.5 ABNORMAL CT SCAN, PELVIS: ICD-10-CM

## 2024-01-29 DIAGNOSIS — Z79.01 WARFARIN ANTICOAGULATION: ICD-10-CM

## 2024-01-29 DIAGNOSIS — R31.0 GROSS HEMATURIA: ICD-10-CM

## 2024-01-29 DIAGNOSIS — D50.0 BLOOD LOSS ANEMIA: ICD-10-CM

## 2024-01-29 DIAGNOSIS — N40.1 BENIGN PROSTATIC HYPERPLASIA WITH LOWER URINARY TRACT SYMPTOMS, SYMPTOM DETAILS UNSPECIFIED: ICD-10-CM

## 2024-01-29 LAB
BASOPHILS # BLD AUTO: 0.07 10*3/MM3 (ref 0–0.2)
BASOPHILS NFR BLD AUTO: 0.7 % (ref 0–1.5)
DEPRECATED RDW RBC AUTO: 43.3 FL (ref 37–54)
EOSINOPHIL # BLD AUTO: 0.26 10*3/MM3 (ref 0–0.4)
EOSINOPHIL NFR BLD AUTO: 2.5 % (ref 0.3–6.2)
ERYTHROCYTE [DISTWIDTH] IN BLOOD BY AUTOMATED COUNT: 14.3 % (ref 12.3–15.4)
HCT VFR BLD AUTO: 37.2 % (ref 37.5–51)
HGB BLD-MCNC: 11.5 G/DL (ref 13–17.7)
IMM GRANULOCYTES # BLD AUTO: 0.05 10*3/MM3 (ref 0–0.05)
IMM GRANULOCYTES NFR BLD AUTO: 0.5 % (ref 0–0.5)
LYMPHOCYTES # BLD AUTO: 1.86 10*3/MM3 (ref 0.7–3.1)
LYMPHOCYTES NFR BLD AUTO: 17.8 % (ref 19.6–45.3)
MCH RBC QN AUTO: 25.8 PG (ref 26.6–33)
MCHC RBC AUTO-ENTMCNC: 30.9 G/DL (ref 31.5–35.7)
MCV RBC AUTO: 83.4 FL (ref 79–97)
MONOCYTES # BLD AUTO: 0.62 10*3/MM3 (ref 0.1–0.9)
MONOCYTES NFR BLD AUTO: 5.9 % (ref 5–12)
NEUTROPHILS NFR BLD AUTO: 7.57 10*3/MM3 (ref 1.7–7)
NEUTROPHILS NFR BLD AUTO: 72.6 % (ref 42.7–76)
NRBC BLD AUTO-RTO: 0 /100 WBC (ref 0–0.2)
PLATELET # BLD AUTO: 482 10*3/MM3 (ref 140–450)
PMV BLD AUTO: 8.6 FL (ref 6–12)
RBC # BLD AUTO: 4.46 10*6/MM3 (ref 4.14–5.8)
WBC NRBC COR # BLD AUTO: 10.43 10*3/MM3 (ref 3.4–10.8)

## 2024-01-29 PROCEDURE — 1111F DSCHRG MED/CURRENT MED MERGE: CPT | Performed by: NURSE PRACTITIONER

## 2024-01-29 PROCEDURE — 1160F RVW MEDS BY RX/DR IN RCRD: CPT | Performed by: NURSE PRACTITIONER

## 2024-01-29 PROCEDURE — 36415 COLL VENOUS BLD VENIPUNCTURE: CPT

## 2024-01-29 PROCEDURE — 85025 COMPLETE CBC W/AUTO DIFF WBC: CPT

## 2024-01-29 PROCEDURE — 3044F HG A1C LEVEL LT 7.0%: CPT | Performed by: NURSE PRACTITIONER

## 2024-01-29 PROCEDURE — 1159F MED LIST DOCD IN RCRD: CPT | Performed by: NURSE PRACTITIONER

## 2024-01-29 PROCEDURE — 99495 TRANSJ CARE MGMT MOD F2F 14D: CPT | Performed by: NURSE PRACTITIONER

## 2024-01-29 PROCEDURE — 3078F DIAST BP <80 MM HG: CPT | Performed by: NURSE PRACTITIONER

## 2024-01-29 PROCEDURE — 3075F SYST BP GE 130 - 139MM HG: CPT | Performed by: NURSE PRACTITIONER

## 2024-01-29 NOTE — PATIENT INSTRUCTIONS
Call office for lab results if you have not received a call from our office or [employer] message in 1-2 days.    Continue current medications and treatment.   Follow up with urology this week per Dr. Hamm's recommendations.

## 2024-01-29 NOTE — PROGRESS NOTES
Subjective        Sandro Ramirez is a 73 y.o. male who presents to [facility].     Chief Complaint   Patient presents with    Transitional Care Management     Hospital follow up        History of Present Illness    Patient presents for follow-up after hospital discharge.  History is gleaned from chart and patient report.  Patient had greenlight TURP on 1/5/2024 at [facility] by Dr. Carr with [facility].  He was admitted for observation at [facility] 1/15/24-1/17/24 with urinary retention and lower abdominal pain.  Cardoza catheter was placed, Bladder irrigation was performed, he had cystoscopy performed on 1/17/24 with clot evacuation.  He was advised to restart coumadin and follow up with urology in 1 week.  Patient was readmitted at [facility] 1/19/2024 through 1/22/2024 with abdominal pain, nausea, hematuria, and generalized weakness.  He was admitted with hematuria secondary to bladder perforation which should seal on its own per urology.  Urology did see him during admission.  Urology recommended to discharge patient with Cardoza catheter in place and restart warfarin, follow-up with urology in 1 week with a CT cystogram, take vantin bid x 3 days post discharge.  Hemoglobin was 10.5 on 1/22/2024.    Since discharge - He still has catheter in place, is seeing Dr. Hamm on Friday.  His urine is yellow, no blood in urine.  No fevers, nausea, vomiting, abdominal pain, dizziness.  He is taking warfarin as prescribed.       Patient's chronical medical conditions include atrial fibrillation on warfarin, CHF, coronary artery disease, hypertension, and BPH.    Current outpatient and discharge medications have been reconciled for the patient.  Reviewed by: ALICJA Reyes       The following portions of the patient's history were reviewed and updated as appropriate: allergies, current medications, past family history, past medical history, past social history, past surgical  history and problem list.    Allergies   Allergen Reactions    Bee Venom Anaphylaxis     HIVES-SWOLLEN THROAT    Meperidine Hives    Midazolam Hives    Propofol Other (See Comments)     UNCLEAR-SVT, HYPOTENSION-STATES SEVERE ALMOST CODED    Sulfa Antibiotics Hives    Hydrocodone Nausea And Vomiting    Simvastatin Myalgia          Current Outpatient Medications:     albuterol sulfate  (90 Base) MCG/ACT inhaler, Inhale 2 puffs Every 4 (Four) Hours As Needed for Wheezing., Disp: , Rfl:     allopurinol (ZYLOPRIM) 100 MG tablet, Take 1 tablet by mouth Every Night., Disp: , Rfl:     aspirin 81 MG tablet, Take 1 tablet by mouth Daily. PT STATES WAS INSTRUCTED TO CONTINUE TO TAKE THIS PER GET ANDERSEN, Disp: , Rfl:     atenolol (TENORMIN) 50 MG tablet, TAKE 1 TABLET BY MOUTH EVERY DAY AT NIGHT, Disp: 90 tablet, Rfl: 0    atorvastatin (LIPITOR) 20 MG tablet, TAKE 1 TABLET BY MOUTH EVERY DAY, Disp: 90 tablet, Rfl: 1    losartan (COZAAR) 25 MG tablet, Take 4 tablets by mouth Daily., Disp: , Rfl:     warfarin (COUMADIN) 7.5 MG tablet, Take 1.5 tablets by mouth. On Mon, Wed, Fri., Disp: , Rfl:     warfarin (COUMADIN) 7.5 MG tablet, Take 1 tablet by mouth. TONY WEISS, SAT, SUN., Disp: , Rfl:     Review of Systems   Constitutional:  Positive for fatigue. Negative for fever and unexpected weight change.   HENT:  Positive for hearing loss. Negative for dental problem and trouble swallowing.    Respiratory:  Negative for cough and wheezing.    Cardiovascular:  Negative for chest pain, palpitations and leg swelling.   Gastrointestinal:  Negative for abdominal pain, constipation, diarrhea, nausea and vomiting.   Genitourinary:         Still has kim catheter in place   Skin:  Positive for pallor. Negative for rash.   Neurological:  Negative for dizziness, tremors, seizures and numbness.        Objective     /68 (BP Location: Left arm, Patient Position: Sitting) Comment: manual  Pulse 84   Temp 96.8 °F (36 °C) (Infrared)  "  Resp 18   Ht 180.3 cm (71\")   Wt 85.3 kg (188 lb)   SpO2 96%   BMI 26.22 kg/m²         Physical Exam  Vitals and nursing note reviewed.   Constitutional:       Appearance: Normal appearance. He is not ill-appearing or diaphoretic.   HENT:      Head: Normocephalic and atraumatic.      Ears:      Comments: Hard of hearing despite wearing bilateral hearing aids     Nose: Nose normal.      Mouth/Throat:      Mouth: Mucous membranes are moist.   Eyes:      General: No scleral icterus.     Pupils: Pupils are equal, round, and reactive to light.   Cardiovascular:      Rate and Rhythm: Normal rate. Rhythm irregularly irregular.      Heart sounds: Murmur heard.      Systolic murmur is present with a grade of 2/6.   Pulmonary:      Effort: Pulmonary effort is normal.      Breath sounds: Normal breath sounds.   Abdominal:      General: Bowel sounds are normal.      Palpations: Abdomen is soft. There is no mass.      Tenderness: There is no abdominal tenderness. There is no guarding.      Hernia: A hernia (soft reducible umbilical hernia) is present.   Genitourinary:     Comments: Cardoza catheter in place, drainage bag with clear yellow urine  Musculoskeletal:      Right lower leg: No edema.      Left lower leg: No edema.   Skin:     General: Skin is warm and dry.      Capillary Refill: Capillary refill takes less than 2 seconds.   Neurological:      Mental Status: He is alert and oriented to person, place, and time.   Psychiatric:         Mood and Affect: Mood normal.         Behavior: Behavior normal.              Result Review    The following data was reviewed by: ALICJA Connor on 01/29/2024:  Common labs          1/20/2024    03:40 1/21/2024    03:58 1/22/2024    03:55   Common Labs   Glucose 107  98     BUN 34  15     Creatinine 0.86  0.60     Sodium 135  137     Potassium 4.1  3.9     Chloride 101  105     Calcium 8.1  8.0     Albumin 3.1      Total Bilirubin 1.0      Alkaline Phosphatase 186      AST " (SGOT) 25      ALT (SGPT) 24      WBC 19.10  11.80  9.60    Hemoglobin 12.4  11.0  10.5    Hematocrit 38.6  34.0  32.4    Platelets 234  234  230      CMP          1/19/2024    13:14 1/20/2024    03:40 1/21/2024    03:58   CMP   Glucose 150  107  98    BUN 44  34  15    Creatinine 1.71  0.86  0.60    EGFR 42.3  92.6  103.2    Sodium 133  135  137    Potassium 5.0  4.1  3.9    Chloride 100  101  105    Calcium 8.6  8.1  8.0    Total Protein 6.4  5.9     Albumin 3.3  3.1     Globulin 3.1  2.8     Total Bilirubin 1.4  1.0     Alkaline Phosphatase 193  186     AST (SGOT) 35  25     ALT (SGPT) 24  24     Albumin/Globulin Ratio 1.1  1.1     BUN/Creatinine Ratio 25.7  39.5  25.0    Anion Gap 9.0  10.0  8.0      CBC          1/20/2024    03:40 1/21/2024    03:58 1/22/2024    03:55   CBC   WBC 19.10  11.80  9.60    RBC 4.54  4.01  3.89    Hemoglobin 12.4  11.0  10.5    Hematocrit 38.6  34.0  32.4    MCV 85.1  84.7  83.3    MCH 27.3  27.5  27.1    MCHC 32.1  32.5  32.5    RDW 16.4  16.3  16.2    Platelets 234  234  230      CBC w/diff          1/20/2024    03:40 1/21/2024    03:58 1/22/2024    03:55   CBC w/Diff   WBC 19.10  11.80  9.60    RBC 4.54  4.01  3.89    Hemoglobin 12.4  11.0  10.5    Hematocrit 38.6  34.0  32.4    MCV 85.1  84.7  83.3    MCH 27.3  27.5  27.1    MCHC 32.1  32.5  32.5    RDW 16.4  16.3  16.2    Platelets 234  234  230    Neutrophil Rel %  78.7  71.7    Lymphocyte Rel %  11.5  17.7    Monocyte Rel %  7.5  6.8    Eosinophil Rel %  1.8  3.6    Basophil Rel %  0.5  0.2      Lipid Panel          7/20/2023    09:33   Lipid Panel   Total Cholesterol 117    Triglycerides 52    HDL Cholesterol 40    VLDL Cholesterol 12    LDL Cholesterol  65    LDL/HDL Ratio 1.67      TSH          7/20/2023    09:33   TSH   TSH 2.610      BMP          1/19/2024    13:14 1/20/2024    03:40 1/21/2024    03:58   BMP   BUN 44  34  15    Creatinine 1.71  0.86  0.60    Sodium 133  135  137    Potassium 5.0  4.1  3.9    Chloride 100   101  105    CO2 24.0  24.0  24.0    Calcium 8.6  8.1  8.0      A1C Last 3 Results          7/20/2023    09:33 1/16/2024    04:03   HGBA1C Last 3 Results   Hemoglobin A1C 6.50  6.60      Microalbumin          7/20/2023    09:33   Microalbumin   Microalbumin, Urine 123.2      UA          1/16/2024    02:13 1/19/2024    15:42   Urinalysis   Squamous Epithelial Cells, UA 0-2  None Seen    Specific [place], UA 1.025  1.010    Ketones, UA Negative  15 mg/dL (1+)    Blood, UA Large (3+)  Large (3+)    Leukocytes, UA  Large (3+)    Nitrite, UA Negative  Positive    RBC, UA Too Numerous to Count  Too Numerous to Count    WBC, UA 6-10  6-10    Bacteria, UA None Seen  1+      Urine Culture          1/16/2024    02:13 1/19/2024    15:42   Urine Culture   Urine Culture No growth  <25,000 CFU/mL Normal Urogenital Michaela      PSA          3/15/2023    09:20   PSA   PSA 2.900      CT ABDOMEN PELVIS WO CONTRAST     Date of Exam: 1/19/2024 6:11 PM EST     Indication: abd pain. Hematuria, evaluate for bladder leak     Comparison: CT abdomen and pelvis with contrast from the same day (1/19/2024     Technique: Axial CT images were obtained of the abdomen and pelvis without the administration of contrast. Sagittal and coronal reconstructions were performed.  Automated exposure control and iterative reconstruction methods were used.        Findings:  There is iodinated contrast opacified leaking from the right superior aspect of the bladder into the extraperitoneal space consistent with extraperitoneal bladder leak. On the coronal images (image 58) there is an approximate 1 cm wide defect in the   bladder wall. No contrast opacified fluid is seen surrounding bowel loops or within the peritoneum. Cardoza catheter is present in the bladder. There is extensive surrounding fat stranding and fluid around the bladder and edema within the presacral space.   No intraperitoneal contrast is seen. Prostate gland is enlarged and there is a central  defect likely from transurethral procedure. There are also multiple surgical clips within and around the prostate gland. No free intraperitoneal air.        The liver, spleen, pancreas and adrenal glands are overall unremarkable. Cholecystectomy clips are present. Extensive sigmoid diverticulosis and a redundancy in the sigmoid colon is again noted. No CT signs of bowel obstruction. No definitive adenopathy   is seen. There is a small fat-containing umbilical hernia. There is evidence of abdominal aorta by iliac endograft traversing a 4.5 cm thrombosed aneurysm sac. The excluded distal left common iliac artery aneurysm sac is unchanged and there are   embolization coils in the left internal iliac artery. No acute bony abnormality.     IMPRESSION:  Impression:     1. Extraperitoneal bladder leak with an at least 1 cm wide defect along superolateral aspect of the right side of the bladder as described above. There are postsurgical changes in the prostate gland.  2. Other stable chronic ancillary findings are detailed above and are unchanged from earlier in the day.              Electronically Signed: Liban Honeycutt DO    1/19/2024 6:32 PM EST      CT ABDOMEN PELVIS W CONTRAST     Date of Exam: 1/19/2024 2:06 PM CST     Indication: abd pain, recent prostate surgery and cystoscopy.     Comparison: CT angiogram abdomen pelvis 8/1/2022     Technique: Axial CT images were obtained of the abdomen and pelvis following the uneventful intravenous administration of 100 cc Isovue-370. Sagittal and coronal reconstructions were performed.  Automated exposure control and iterative reconstruction   methods were used.           Findings:  LUNG BASES:  Unremarkable without mass or infiltrate. Heart is enlarged.     LIVER:  Unremarkable parenchyma without focal lesion.  BILIARY/GALLBLADDER: Cholecystectomy.  SPLEEN:  Unremarkable  PANCREAS:  Unremarkable  ADRENAL:  Unremarkable  KIDNEYS:  Unremarkable parenchyma with no solid mass  identified. There is a simple right renal cyst. There is advanced atherosclerotic disease involving the origin of the left renal artery with likely greater than 70% stenosis, similar to previous exam.   There is mild left hydroureter to the level of the ureterovesical junction. No calculus identified.  GASTROINTESTINAL/MESENTERY:  No evidence of obstruction nor inflammation.    MESENTERIC VESSELS:  Patent.  AORTA/IVC: There is a bifurcated abdominal aortic stent graft, similar in appearance. Infrarenal abdominal aortic aneurysm sac measures 4.5 x 4.4 cm, unchanged when measured at similar level. Excluded distal left common iliac artery aneurysm sac is   unchanged as are left internal iliac embolization coils.     RETROPERITONEUM/LYMPH NODES:  Unremarkable     REPRODUCTIVE: There is been at least partial prostatectomy, likely transurethral. Correlate with history and symptoms.  BLADDER: The most part there is generalized urinary bladder wall thickening and enhancement. Along the right central margin of the bladder dome there is both intraluminal and extraluminal gas and fluid with what appears to be mural discontinuity   suggesting potential bladder defect/leak. Correlate with patient history. There is moderate free fluid within the pelvis in the lower abdomen with presacral fluid/edema. CT cystogram might be useful to further assess.     There are areas of irregular intraluminal density within the bladder, presumably clot. There are numerous surgical clips within the bladder base.     OSSEUS STRUCTURES:  Typical for age with no acute process identified.        IMPRESSION:  Impression:  1.Findings suspicious for bladder leak/defect involving the right central bladder dome. CT cystogram might be useful if important to further assess radiographically.  2.Irregular areas of increased intraluminal density within the bladder, presumably clot, with numerous surgical clips in the bladder base.  3.At least partial  prostatectomy.  4.Other stable findings within the abdomen and pelvis.                 Electronically Signed: Lorenzo Williamson MD    1/19/2024 2:31 PM CST     CT HEAD WO CONTRAST     Date of Exam: 1/19/2024 3:05 PM EST     Indication: headache.     Comparison: None available.     Technique: Axial CT images were obtained of the head without contrast administration.  Coronal reconstructions were performed.  Automated exposure control and iterative reconstruction methods were used.        Findings:  There is no evidence of hemorrhage. There is no mass effect or midline shift.     There is no extra-axial collections.     Ventricles are normal in size and configuration for patient's stated age.     Posterior fossa is within normal limits.     Calvarium and skull base appear intact. Visualized sinuses show no air fluid levels. The mastoid air cells are clear. Visualized orbits are unremarkable.        IMPRESSION:  Impression:  No acute cranial process evident.        Electronically Signed: Vance Coulter MD    1/19/2024 3:14 PM EST     XR CHEST 1 VW     Date of Exam: 1/19/2024 12:45 PM CST     Indication: sob     Comparison: 10/10/2023     Findings:  Cardiomediastinal silhouette is prominent, similar prior examination. There is pulmonary vascular congestion with mild interstitial coarsening/edema. No airspace disease, pneumothorax, nor pleural effusion. No acute osseous abnormality identified.     IMPRESSION:  Impression:  Mild CHF/volume overload features.        Electronically Signed: Lorenzo Williamson MD    1/19/2024 12:50 PM CST         Assessment & Plan    Diagnoses and all orders for this visit:    1. Hospital discharge follow-up (Primary)    2. Blood loss anemia  -     CBC & Differential; Future    3. Warfarin anticoagulation  -     CBC & Differential; Future    4. Abnormal CT scan, pelvis  Comments:  perforated bladder    5. Benign prostatic hyperplasia with lower urinary tract symptoms, symptom details unspecified    6.  Gross hematuria       Patient Instructions   Call office for lab results if you have not received a call from our office or [facility] message in 1-2 days.    Continue current medications and treatment.   Follow up with urology this week per Dr. Hamm's recommendations.         Follow Up   Return for Next scheduled follow up.    Patient was given instructions and counseling regarding his condition or for health maintenance advice. Please see specific information pulled into the AVS if appropriate.     Colette Dominguez, APRN     01/29/24

## 2024-02-01 ENCOUNTER — OFFICE VISIT (OUTPATIENT)
Dept: CARDIOLOGY | Facility: CLINIC | Age: 74
End: 2024-02-01
Payer: MEDICARE

## 2024-02-01 ENCOUNTER — READMISSION MANAGEMENT (OUTPATIENT)
Dept: CALL CENTER | Facility: HOSPITAL | Age: 74
End: 2024-02-01
Payer: MEDICARE

## 2024-02-01 ENCOUNTER — ANTICOAGULATION VISIT (OUTPATIENT)
Dept: CARDIOLOGY | Facility: CLINIC | Age: 74
End: 2024-02-01
Payer: MEDICARE

## 2024-02-01 VITALS
SYSTOLIC BLOOD PRESSURE: 137 MMHG | WEIGHT: 184 LBS | DIASTOLIC BLOOD PRESSURE: 61 MMHG | BODY MASS INDEX: 25.66 KG/M2 | HEART RATE: 84 BPM

## 2024-02-01 VITALS
WEIGHT: 184 LBS | OXYGEN SATURATION: 99 % | BODY MASS INDEX: 25.76 KG/M2 | SYSTOLIC BLOOD PRESSURE: 137 MMHG | DIASTOLIC BLOOD PRESSURE: 61 MMHG | HEIGHT: 71 IN | HEART RATE: 84 BPM

## 2024-02-01 DIAGNOSIS — I35.1 NONRHEUMATIC AORTIC VALVE INSUFFICIENCY: ICD-10-CM

## 2024-02-01 DIAGNOSIS — I48.21 PERMANENT ATRIAL FIBRILLATION: Primary | Chronic | ICD-10-CM

## 2024-02-01 DIAGNOSIS — I48.11 LONGSTANDING PERSISTENT ATRIAL FIBRILLATION: Primary | ICD-10-CM

## 2024-02-01 DIAGNOSIS — I34.0 NONRHEUMATIC MITRAL VALVE REGURGITATION: ICD-10-CM

## 2024-02-01 DIAGNOSIS — I25.10 CORONARY ARTERY DISEASE INVOLVING NATIVE CORONARY ARTERY OF NATIVE HEART WITHOUT ANGINA PECTORIS: ICD-10-CM

## 2024-02-01 DIAGNOSIS — G47.33 OBSTRUCTIVE SLEEP APNEA SYNDROME: ICD-10-CM

## 2024-02-01 DIAGNOSIS — E78.00 PURE HYPERCHOLESTEROLEMIA: ICD-10-CM

## 2024-02-01 DIAGNOSIS — I10 ESSENTIAL HYPERTENSION: ICD-10-CM

## 2024-02-01 LAB — INR PPP: 2 (ref 0.9–1.1)

## 2024-02-01 PROCEDURE — 1159F MED LIST DOCD IN RCRD: CPT | Performed by: INTERNAL MEDICINE

## 2024-02-01 PROCEDURE — 1160F RVW MEDS BY RX/DR IN RCRD: CPT | Performed by: INTERNAL MEDICINE

## 2024-02-01 PROCEDURE — 36416 COLLJ CAPILLARY BLOOD SPEC: CPT | Performed by: INTERNAL MEDICINE

## 2024-02-01 PROCEDURE — 3075F SYST BP GE 130 - 139MM HG: CPT | Performed by: INTERNAL MEDICINE

## 2024-02-01 PROCEDURE — 3078F DIAST BP <80 MM HG: CPT | Performed by: INTERNAL MEDICINE

## 2024-02-01 PROCEDURE — 85610 PROTHROMBIN TIME: CPT | Performed by: INTERNAL MEDICINE

## 2024-02-01 PROCEDURE — 99214 OFFICE O/P EST MOD 30 MIN: CPT | Performed by: INTERNAL MEDICINE

## 2024-02-01 RX ORDER — LOSARTAN POTASSIUM 100 MG/1
100 TABLET ORAL DAILY
Qty: 90 TABLET | Refills: 3 | Status: SHIPPED | OUTPATIENT
Start: 2024-02-01

## 2024-02-01 NOTE — PROGRESS NOTES
Subjective:     Encounter Date:02/01/2024      Patient ID: Sandro Ramirez is a 73 y.o. male.    Chief Complaint:  Atrial Fibrillation  Symptoms are negative for chest pain, dizziness, palpitations and shortness of breath. Past medical history includes atrial fibrillation.     {Common H&P Review Areas:30462}  Past Medical History:   Diagnosis Date   • AAA (abdominal aortic aneurysm) 2006   • Abnormal ECG    • Aneurysm    • Arthritis    • Asthma    • Atrial fibrillation     COUMADIN   • Benign prostatic hyperplasia    • Bladder cancer    • CHF (congestive heart failure)    • Chronic anticoagulation     COUMADIN STARTED 2006   • Clotting disorder    • Colon polyp    • Congenital heart disease    • COPD (chronic obstructive pulmonary disease)    • Coronary artery disease    • Diabetes mellitus    • Elevated cholesterol    • Erectile dysfunction    • Facial basal cell cancer    • Gout    • Hard to intubate    • History of pancreatitis 2006   • History of pneumonia    • History of tick-borne disease 2015   • HL (hearing loss)     BILAT HEARING AIDS   • Hyperlipidemia    • Hypertension    • Myocardial infarction     X5   • Obesity    • Pancreatitis    • Pneumonia    • Sleep apnea     CURRENTLY NOT USING CPAP D/T RECALL   • Transfusion history     STATES HIS SISTER HAD A SEVERE REACTION TO BLOOD TRANSFUSION   • Type 2 diabetes mellitus      Past Surgical History:   Procedure Laterality Date   • ARTERIOGRAM AORTIC Left 06/29/2022    Procedure: AORTIC STENT GRAFT PLACEMENT WITH ILIAC COILING;  Surgeon: Eric Sainz MD;  Location: [facility] OR 18/19;  Service: Vascular;  Laterality: Left;   • CARDIAC CATHETERIZATION     • CARDIAC SURGERY     • CHOLECYSTECTOMY     • COLONOSCOPY     • COLONOSCOPY N/A 07/27/2023    Procedure: COLONOSCOPY to cecum with cold biopsy and cold snare polypectomies and clips;  Surgeon: Vitor Haley MD;  Location: [facility] ENDOSCOPY;  Service: Gastroenterology;  Laterality: N/A;   Pre - H/O polyps.  Post - diverticulosis, polyps, hemorrhoids   • CORONARY ARTERY BYPASS GRAFT  2006    X2   • CYSTOSCOPY      STATES HAD BLADDER TUMORS REMOVED X2   • CYSTOSCOPY WITH CLOT EVACUATION N/A 01/17/2024    Procedure: CYSTOSCOPY WITH CLOT EVACUATION, WITH FULGRATION;  Surgeon: Daniel Hamm MD;  Location: [facility] MAIN OR;  Service: Urology;  Laterality: N/A;   • PROSTATE SURGERY      X2- STATES PLACED COILS IN TO HELP WITH FLOW AND THEN HAD TO REMOVE A PIECE THAT BROKE OFF AND WAS IN BLADDER   • UPPER GASTROINTESTINAL ENDOSCOPY       There were no vitals taken for this visit.  Family History   Problem Relation Age of Onset   • Hypertension Mother    • Colon cancer Mother    • Cancer Mother    • Diabetes Mother    • Arthritis Mother    • Cancer Father    • Heart attack Father    • Hearing loss Father    • Heart disease Father    • Hypertension Sister    • Hypertension Brother    • Colon cancer Brother    • Cancer Brother    • Heart disease Paternal Grandfather    • Heart attack Paternal Grandfather    • Malig Hyperthermia Neg Hx    • Colon polyps Neg Hx    • Crohn's disease Neg Hx    • Irritable bowel syndrome Neg Hx    • Ulcerative colitis Neg Hx        Current Outpatient Medications:   •  albuterol sulfate  (90 Base) MCG/ACT inhaler, Inhale 2 puffs Every 4 (Four) Hours As Needed for Wheezing., Disp: , Rfl:   •  allopurinol (ZYLOPRIM) 100 MG tablet, Take 1 tablet by mouth Every Night., Disp: , Rfl:   •  aspirin 81 MG tablet, Take 1 tablet by mouth Daily. PT STATES WAS INSTRUCTED TO CONTINUE TO TAKE THIS PER GET ANDERSEN, Disp: , Rfl:   •  atenolol (TENORMIN) 50 MG tablet, TAKE 1 TABLET BY MOUTH EVERY DAY AT NIGHT, Disp: 90 tablet, Rfl: 0  •  atorvastatin (LIPITOR) 20 MG tablet, TAKE 1 TABLET BY MOUTH EVERY DAY, Disp: 90 tablet, Rfl: 1  •  losartan (COZAAR) 25 MG tablet, Take 4 tablets by mouth Daily., Disp: , Rfl:   •  warfarin (COUMADIN) 7.5 MG tablet, Take 1.5 tablets by mouth. On Mon, Wed,  Fri., Disp: , Rfl:   •  warfarin (COUMADIN) 7.5 MG tablet, Take 1 tablet by mouth. TONY WEISS SAT, SUN., Disp: , Rfl:   Allergies   Allergen Reactions   • Bee Venom Anaphylaxis     HIVES-SWOLLEN THROAT   • Meperidine Hives   • Midazolam Hives   • Propofol Other (See Comments)     UNCLEAR-SVT, HYPOTENSION-STATES SEVERE ALMOST CODED   • Sulfa Antibiotics Hives   • Hydrocodone Nausea And Vomiting   • Simvastatin Myalgia     Social History     Socioeconomic History   • Marital status:      Spouse name: Nichelle   • Number of children: 3   • Years of education: 12   Tobacco Use   • Smoking status: Former     Packs/day: 1.00     Years: 30.00     Additional pack years: 0.00     Total pack years: 30.00     Types: Cigarettes     Quit date: 2006     Years since quittin.6     Passive exposure: Past   • Smokeless tobacco: Never   Vaping Use   • Vaping Use: Never used   Substance and Sexual Activity   • Alcohol use: Not Currently     Comment: one or two beers 6 times a year   • Drug use: Never   • Sexual activity: Not Currently     Partners: Female     Birth control/protection: None     Review of Systems   Constitutional: Positive for malaise/fatigue.   Cardiovascular:  Negative for chest pain, dyspnea on exertion, leg swelling and palpitations.   Respiratory:  Negative for cough and shortness of breath.    Gastrointestinal:  Negative for abdominal pain, nausea and vomiting.   Neurological:  Negative for dizziness, focal weakness, headaches, light-headedness and numbness.   All other systems reviewed and are negative.           Objective:     Physical Exam  Procedures    Lab Review:         MDM        Patient's previous medical records, labs, and EKG were reviewed and discussed with the patient at today's visit.

## 2024-02-01 NOTE — TELEPHONE ENCOUNTER
Rx Refill Note  Requested Prescriptions     Pending Prescriptions Disp Refills    losartan (COZAAR) 100 MG tablet 90 tablet 3     Sig: Take 1 tablet by mouth Daily.      Last office visit with prescribing clinician: 2/1/2024   Last telemedicine visit with prescribing clinician: Visit date not found   Next office visit with prescribing clinician: 7/30/2024                         Would you like a call back once the refill request has been completed: [] Yes [] No    If the office needs to give you a call back, can they leave a voicemail: [] Yes [] No    Stacey Martínez MA  02/01/24, 15:44 EST

## 2024-02-01 NOTE — OUTREACH NOTE
Medical Week 2 Survey      Flowsheet Row Responses   [facility] patient discharged from? [facility]   Does the patient have one of the following disease processes/diagnoses(primary or secondary)? Other   Week 2 attempt successful? Yes   Call start time 0845   Discharge diagnosis Gross hematuria secondary to recent greenlight TURP   Call end time 0847   Person spoke with today (if not patient) and relationship Wife   Meds reviewed with patient/caregiver? Yes   Is the patient taking all medications as directed (includes completed medication regime)? Yes   Does the patient have a primary care provider?  Yes   Does the patient have an appointment with their PCP within 7 days of discharge? Yes   Has the patient kept scheduled appointments due by today? Yes   Has home health visited the patient within 72 hours of discharge? N/A   Psychosocial issues? No   Did the patient receive a copy of their discharge instructions? Yes   Nursing interventions Reviewed instructions with patient   What is the patient's perception of their health status since discharge? Improving   Is the patient/caregiver able to teach back signs and symptoms related to disease process for when to call PCP? Yes   Is the patient/caregiver able to teach back signs and symptoms related to disease process for when to call 911? Yes   Is the patient/caregiver able to teach back the hierarchy of who to call/visit for symptoms/problems? PCP, Specialist, Home health nurse, Urgent Care, ED, 911 Yes   Week 2 Call Completed? Yes   Wrap up additional comments Wife reports Pt improving but still a little weak. Pt has been going to FU appts. Pt still has a Cardoza cath at this time. NO needs.   Call end time 0847            ASHLEY PEREZ - Registered Nurse

## 2024-02-05 NOTE — PROGRESS NOTES
Subjective:     Encounter Date:02/01/2024      Patient ID: Sandro Ramirez is a 73 y.o. male.    Chief Complaint:  History of Present Illness 73-year-old white male with history of coronary disease history of COPD obstructive sleep apnea atrial fibrillation hypertension hyperlipidemia and mitral regurgitation and aortic valve insufficiency presented to my office for a follow-up.  Patient is currently stable without any symptoms of chest pain or shortness of breath at rest on exertion but no complaint of any PND orthopnea.  No palpitation dizziness syncope or swelling of the feet but he is taking his medicines regular.  He does not smoke but    The following portions of the patient's history were reviewed and updated as appropriate: allergies, current medications, past family history, past medical history, past social history, past surgical history, and problem list.  Past Medical History:   Diagnosis Date    AAA (abdominal aortic aneurysm) 2006    Abnormal ECG     Aneurysm     Arthritis     Asthma     Atrial fibrillation     COUMADIN    Benign prostatic hyperplasia     Bladder cancer     CHF (congestive heart failure)     Chronic anticoagulation     COUMADIN STARTED 2006    Clotting disorder     Colon polyp     Congenital heart disease     COPD (chronic obstructive pulmonary disease)     Coronary artery disease     Diabetes mellitus     Elevated cholesterol     Erectile dysfunction     Facial basal cell cancer     Gout     Hard to intubate     History of pancreatitis 2006    History of pneumonia     History of tick-borne disease 2015    HL (hearing loss)     BILAT HEARING AIDS    Hyperlipidemia     Hypertension     Myocardial infarction     X5    Obesity     Pancreatitis     Pneumonia     Sleep apnea     CURRENTLY NOT USING CPAP D/T RECALL    Transfusion history     STATES HIS SISTER HAD A SEVERE REACTION TO BLOOD TRANSFUSION    Type 2 diabetes mellitus      Past Surgical History:   Procedure Laterality Date  "   ARTERIOGRAM AORTIC Left 06/29/2022    Procedure: AORTIC STENT GRAFT PLACEMENT WITH ILIAC COILING;  Surgeon: Eric Sainz MD;  Location: [facility] HYBRID OR 18/19;  Service: Vascular;  Laterality: Left;    CARDIAC CATHETERIZATION      CARDIAC SURGERY      CHOLECYSTECTOMY      COLONOSCOPY      COLONOSCOPY N/A 07/27/2023    Procedure: COLONOSCOPY to cecum with cold biopsy and cold snare polypectomies and clips;  Surgeon: Vitor Haley MD;  Location: [facility] ENDOSCOPY;  Service: Gastroenterology;  Laterality: N/A;  Pre - H/O polyps.  Post - diverticulosis, polyps, hemorrhoids    CORONARY ARTERY BYPASS GRAFT  2006    X2    CYSTOSCOPY      STATES HAD BLADDER TUMORS REMOVED X2    CYSTOSCOPY WITH CLOT EVACUATION N/A 01/17/2024    Procedure: CYSTOSCOPY WITH CLOT EVACUATION, WITH FULGRATION;  Surgeon: Daniel Hamm MD;  Location: [facility] OR;  Service: Urology;  Laterality: N/A;    PROSTATE SURGERY      X2- STATES PLACED COILS IN TO HELP WITH FLOW AND THEN HAD TO REMOVE A PIECE THAT BROKE OFF AND WAS IN BLADDER    UPPER GASTROINTESTINAL ENDOSCOPY       /61   Pulse 84   Ht 180.3 cm (70.98\")   Wt 83.5 kg (184 lb)   SpO2 99%   BMI 25.67 kg/m²   Family History   Problem Relation Age of Onset    Hypertension Mother     Colon cancer Mother     Cancer Mother     Diabetes Mother     Arthritis Mother     Cancer Father     Heart attack Father     Hearing loss Father     Heart disease Father     Hypertension Sister     Hypertension Brother     Colon cancer Brother     Cancer Brother     Heart disease Paternal Grandfather     Heart attack Paternal Grandfather     Malig Hyperthermia Neg Hx     Colon polyps Neg Hx     Crohn's disease Neg Hx     Irritable bowel syndrome Neg Hx     Ulcerative colitis Neg Hx        Current Outpatient Medications:     albuterol sulfate  (90 Base) MCG/ACT inhaler, Inhale 2 puffs Every 4 (Four) Hours As Needed for Wheezing., Disp: , Rfl:     allopurinol (ZYLOPRIM) 100 MG " tablet, Take 1 tablet by mouth Every Night., Disp: , Rfl:     aspirin 81 MG tablet, Take 1 tablet by mouth Daily. PT STATES WAS INSTRUCTED TO CONTINUE TO TAKE THIS PER GET ANDERSEN, Disp: , Rfl:     atenolol (TENORMIN) 50 MG tablet, TAKE 1 TABLET BY MOUTH EVERY DAY AT NIGHT, Disp: 90 tablet, Rfl: 0    atorvastatin (LIPITOR) 20 MG tablet, TAKE 1 TABLET BY MOUTH EVERY DAY, Disp: 90 tablet, Rfl: 1    warfarin (COUMADIN) 7.5 MG tablet, Take 1.5 tablets by mouth. On Mon, Wed, Fri., Disp: , Rfl:     warfarin (COUMADIN) 7.5 MG tablet, Take 1 tablet by mouth. TUES, THUR, SAT, SUN., Disp: , Rfl:     losartan (COZAAR) 100 MG tablet, Take 1 tablet by mouth Daily., Disp: 90 tablet, Rfl: 3  Allergies   Allergen Reactions    Bee Venom Anaphylaxis     HIVES-SWOLLEN THROAT    Meperidine Hives    Midazolam Hives    Propofol Other (See Comments)     UNCLEAR-SVT, HYPOTENSION-STATES SEVERE ALMOST CODED    Sulfa Antibiotics Hives    Hydrocodone Nausea And Vomiting    Simvastatin Myalgia     Social History     Socioeconomic History    Marital status:      Spouse name: Nichelle    Number of children: 3    Years of education: 12   Tobacco Use    Smoking status: Former     Packs/day: 1.00     Years: 30.00     Additional pack years: 0.00     Total pack years: 30.00     Types: Cigarettes     Quit date: 2006     Years since quittin.6     Passive exposure: Past    Smokeless tobacco: Never   Vaping Use    Vaping Use: Never used   Substance and Sexual Activity    Alcohol use: Not Currently     Comment: one or two beers 6 times a year    Drug use: Never    Sexual activity: Not Currently     Partners: Female     Birth control/protection: None     Review of Systems   Constitutional: Negative for malaise/fatigue.   Cardiovascular:  Negative for chest pain, dyspnea on exertion, leg swelling and palpitations.   Respiratory:  Negative for cough and shortness of breath.    Gastrointestinal:  Negative for abdominal pain, nausea and vomiting.    Neurological:  Negative for dizziness, focal weakness, headaches, light-headedness and numbness.   All other systems reviewed and are negative.             Objective:     Constitutional:       Appearance: Well-developed.   Eyes:      General: No scleral icterus.     Conjunctiva/sclera: Conjunctivae normal.   HENT:      Head: Normocephalic and atraumatic.   Neck:      Vascular: No carotid bruit or JVD.   Pulmonary:      Effort: Pulmonary effort is normal.      Breath sounds: Normal breath sounds. No wheezing. No rales.   Cardiovascular:      Normal rate. Regular rhythm.   Pulses:     Intact distal pulses.   Abdominal:      General: Bowel sounds are normal.      Palpations: Abdomen is soft.   Musculoskeletal:      Cervical back: Normal range of motion and neck supple. Skin:     General: Skin is warm and dry.      Findings: No rash.   Neurological:      Mental Status: Alert.       Procedures    Lab Review:         MDM    #1 coronary disease  Patient has nonobstructive disease in the past and is currently stable on medications with normal LV function  2.  Valvular heart disease  Patient has history of mitral valve regurgitation and aortic valve insufficiency and is status post mitral valve repair and aortic valve replacement with bioprosthetic valve and is currently stable on medications  3.  Atrial fibrillation  Patient has persistent atrial fibrillation is currently on warfarin and keeps the INR around 2-2.5  4.  Hypertension  Patient blood pressure currently stable on atenolol and losartan  5.  Hyperlipidemia  Patient is on atorvastatin the lipid levels are well within normal limits.  6.  COPD and obstructive sleep apnea  Patient uses a CPAP machine.    Patient's previous medical records, labs, and EKG were reviewed and discussed with the patient at today's visit.

## 2024-02-12 ENCOUNTER — LAB (OUTPATIENT)
Dept: LAB | Facility: HOSPITAL | Age: 74
End: 2024-02-12
Payer: MEDICARE

## 2024-02-12 ENCOUNTER — OFFICE VISIT (OUTPATIENT)
Dept: FAMILY MEDICINE CLINIC | Facility: CLINIC | Age: 74
End: 2024-02-12
Payer: MEDICARE

## 2024-02-12 VITALS
RESPIRATION RATE: 20 BRPM | SYSTOLIC BLOOD PRESSURE: 131 MMHG | BODY MASS INDEX: 25.06 KG/M2 | TEMPERATURE: 98.5 F | HEART RATE: 83 BPM | DIASTOLIC BLOOD PRESSURE: 79 MMHG | HEIGHT: 71 IN | OXYGEN SATURATION: 95 % | WEIGHT: 179 LBS

## 2024-02-12 DIAGNOSIS — Z85.51 HISTORY OF BLADDER CANCER: ICD-10-CM

## 2024-02-12 DIAGNOSIS — E79.0 HYPERURICEMIA: ICD-10-CM

## 2024-02-12 DIAGNOSIS — I48.21 PERMANENT ATRIAL FIBRILLATION: ICD-10-CM

## 2024-02-12 DIAGNOSIS — D50.0 BLOOD LOSS ANEMIA: ICD-10-CM

## 2024-02-12 DIAGNOSIS — N40.1 BENIGN PROSTATIC HYPERPLASIA WITH LOWER URINARY TRACT SYMPTOMS, SYMPTOM DETAILS UNSPECIFIED: ICD-10-CM

## 2024-02-12 DIAGNOSIS — I10 PRIMARY HYPERTENSION: Primary | ICD-10-CM

## 2024-02-12 DIAGNOSIS — G47.33 OBSTRUCTIVE SLEEP APNEA SYNDROME: Chronic | ICD-10-CM

## 2024-02-12 DIAGNOSIS — E78.2 MIXED HYPERLIPIDEMIA: ICD-10-CM

## 2024-02-12 DIAGNOSIS — E11.65 TYPE 2 DIABETES MELLITUS WITH HYPERGLYCEMIA, WITHOUT LONG-TERM CURRENT USE OF INSULIN: ICD-10-CM

## 2024-02-12 DIAGNOSIS — J44.9 CHRONIC OBSTRUCTIVE PULMONARY DISEASE, UNSPECIFIED COPD TYPE: ICD-10-CM

## 2024-02-12 LAB
BASOPHILS # BLD AUTO: 0.07 10*3/MM3 (ref 0–0.2)
BASOPHILS NFR BLD AUTO: 0.9 % (ref 0–1.5)
DEPRECATED RDW RBC AUTO: 44.5 FL (ref 37–54)
EOSINOPHIL # BLD AUTO: 0.16 10*3/MM3 (ref 0–0.4)
EOSINOPHIL NFR BLD AUTO: 2 % (ref 0.3–6.2)
ERYTHROCYTE [DISTWIDTH] IN BLOOD BY AUTOMATED COUNT: 14.4 % (ref 12.3–15.4)
HCT VFR BLD AUTO: 43.3 % (ref 37.5–51)
HGB BLD-MCNC: 13.8 G/DL (ref 13–17.7)
IMM GRANULOCYTES # BLD AUTO: 0.02 10*3/MM3 (ref 0–0.05)
IMM GRANULOCYTES NFR BLD AUTO: 0.3 % (ref 0–0.5)
LYMPHOCYTES # BLD AUTO: 1.83 10*3/MM3 (ref 0.7–3.1)
LYMPHOCYTES NFR BLD AUTO: 23.3 % (ref 19.6–45.3)
MCH RBC QN AUTO: 27.1 PG (ref 26.6–33)
MCHC RBC AUTO-ENTMCNC: 31.9 G/DL (ref 31.5–35.7)
MCV RBC AUTO: 84.9 FL (ref 79–97)
MONOCYTES # BLD AUTO: 0.57 10*3/MM3 (ref 0.1–0.9)
MONOCYTES NFR BLD AUTO: 7.3 % (ref 5–12)
NEUTROPHILS NFR BLD AUTO: 5.2 10*3/MM3 (ref 1.7–7)
NEUTROPHILS NFR BLD AUTO: 66.2 % (ref 42.7–76)
NRBC BLD AUTO-RTO: 0 /100 WBC (ref 0–0.2)
PLATELET # BLD AUTO: 333 10*3/MM3 (ref 140–450)
PMV BLD AUTO: 9.4 FL (ref 6–12)
RBC # BLD AUTO: 5.1 10*6/MM3 (ref 4.14–5.8)
URATE SERPL-MCNC: 3.2 MG/DL (ref 3.4–7)
WBC NRBC COR # BLD AUTO: 7.85 10*3/MM3 (ref 3.4–10.8)

## 2024-02-12 PROCEDURE — 3044F HG A1C LEVEL LT 7.0%: CPT | Performed by: NURSE PRACTITIONER

## 2024-02-12 PROCEDURE — 85025 COMPLETE CBC W/AUTO DIFF WBC: CPT

## 2024-02-12 PROCEDURE — 1159F MED LIST DOCD IN RCRD: CPT | Performed by: NURSE PRACTITIONER

## 2024-02-12 PROCEDURE — 3078F DIAST BP <80 MM HG: CPT | Performed by: NURSE PRACTITIONER

## 2024-02-12 PROCEDURE — 36415 COLL VENOUS BLD VENIPUNCTURE: CPT

## 2024-02-12 PROCEDURE — 84550 ASSAY OF BLOOD/URIC ACID: CPT

## 2024-02-12 PROCEDURE — 3075F SYST BP GE 130 - 139MM HG: CPT | Performed by: NURSE PRACTITIONER

## 2024-02-12 PROCEDURE — 99214 OFFICE O/P EST MOD 30 MIN: CPT | Performed by: NURSE PRACTITIONER

## 2024-02-12 PROCEDURE — 1160F RVW MEDS BY RX/DR IN RCRD: CPT | Performed by: NURSE PRACTITIONER

## 2024-02-12 RX ORDER — CEFDINIR 300 MG/1
1 CAPSULE ORAL EVERY 12 HOURS SCHEDULED
COMMUNITY
Start: 2024-02-09

## 2024-02-15 ENCOUNTER — ANTICOAGULATION VISIT (OUTPATIENT)
Dept: CARDIOLOGY | Facility: CLINIC | Age: 74
End: 2024-02-15
Payer: MEDICARE

## 2024-02-15 VITALS
WEIGHT: 181 LBS | SYSTOLIC BLOOD PRESSURE: 160 MMHG | DIASTOLIC BLOOD PRESSURE: 79 MMHG | HEART RATE: 83 BPM | BODY MASS INDEX: 25.26 KG/M2

## 2024-02-15 DIAGNOSIS — I48.21 PERMANENT ATRIAL FIBRILLATION: Primary | Chronic | ICD-10-CM

## 2024-02-15 LAB — INR PPP: 2.5 (ref 2–3)

## 2024-02-15 PROCEDURE — 36416 COLLJ CAPILLARY BLOOD SPEC: CPT | Performed by: INTERNAL MEDICINE

## 2024-02-15 PROCEDURE — 85610 PROTHROMBIN TIME: CPT | Performed by: INTERNAL MEDICINE

## 2024-02-19 ENCOUNTER — HOSPITAL ENCOUNTER (OUTPATIENT)
Dept: CARDIOLOGY | Facility: HOSPITAL | Age: 74
Discharge: HOME | End: 2024-02-19
Payer: MEDICARE

## 2024-02-19 ENCOUNTER — APPOINTMENT (OUTPATIENT)
Dept: VASCULAR SURGERY | Facility: HOSPITAL | Age: 74
End: 2024-02-19
Payer: MEDICARE

## 2024-02-19 DIAGNOSIS — I48.21 PERMANENT ATRIAL FIBRILLATION: ICD-10-CM

## 2024-02-19 DIAGNOSIS — I65.23 BILATERAL CAROTID ARTERY OCCLUSION: ICD-10-CM

## 2024-02-19 DIAGNOSIS — I71.40 ABDOMINAL AORTIC ANEURYSM (AAA) WITHOUT RUPTURE, UNSPECIFIED PART: ICD-10-CM

## 2024-02-19 LAB
ABDOMINAL AORTA AORTIC ANASTOMOSIS PSV: 66.3 CM/S
ABDOMINAL AORTA LEFT DIST ANASTOMOSIS PSV: 76.9 CM/S
ABDOMINAL AORTA LEFT LIMB GRAFT PSV: 71.9 CM/S
ABDOMINAL AORTA RIGHT DIST ANASTOMOSIS PSV: 121 CM/S
ABDOMINAL AORTA RIGHT LIMB GRAFT PSV: 57.8 CM/S
ABDOMINAL LT EXT ILIAC VEL: 114 CM/S
ABDOMINAL PROX AORTA AP: 2.13 CM
ABDOMINAL PROX AORTA TRANS: 2.06 CM
ABDOMINAL PROX AORTA VEL: 71.6 CM/S
ABDOMINAL RT EXT ILIAC VEL: 127 CM/S
BH CV VAS ABDOMINAL AO MID GRAFT BODY PSV: 46.2 CM/S
BH CV VAS ABDOMINAL AO RESIDUAL LUMEN AP MEASURE: 4.5 CM
BH CV VAS ABDOMINAL AORTA DISTAL LEFT LIMB GRAFT PSV: 63.1 CM/S
BH CV VAS ABDOMINAL AORTA DISTAL RIGHT LIMB GRAFT PSV: 143 CM/S
BH CV XLRA MEAS LEFT DIST CCA EDV: -9.8 CM/SEC
BH CV XLRA MEAS LEFT DIST CCA PSV: -68.7 CM/SEC
BH CV XLRA MEAS LEFT DIST ICA EDV: -16.2 CM/SEC
BH CV XLRA MEAS LEFT DIST ICA PSV: -59.5 CM/SEC
BH CV XLRA MEAS LEFT ICA/CCA RATIO: 0.89
BH CV XLRA MEAS LEFT PROX CCA EDV: 8.4 CM/SEC
BH CV XLRA MEAS LEFT PROX CCA PSV: 79.9 CM/SEC
BH CV XLRA MEAS LEFT PROX ECA PSV: -77.7 CM/SEC
BH CV XLRA MEAS LEFT PROX ICA EDV: 19.6 CM/SEC
BH CV XLRA MEAS LEFT PROX ICA PSV: 70.8 CM/SEC
BH CV XLRA MEAS LEFT PROX SCLA PSV: 156 CM/SEC
BH CV XLRA MEAS LEFT VERTEBRAL A PSV: 47.7 CM/SEC
BH CV XLRA MEAS RIGHT DIST CCA EDV: -8.2 CM/SEC
BH CV XLRA MEAS RIGHT DIST CCA PSV: -60.9 CM/SEC
BH CV XLRA MEAS RIGHT DIST ICA EDV: -18.6 CM/SEC
BH CV XLRA MEAS RIGHT DIST ICA PSV: -72.1 CM/SEC
BH CV XLRA MEAS RIGHT ICA/CCA RATIO: -0.61
BH CV XLRA MEAS RIGHT PROX CCA EDV: 10.2 CM/SEC
BH CV XLRA MEAS RIGHT PROX CCA PSV: 119 CM/SEC
BH CV XLRA MEAS RIGHT PROX ECA PSV: -103 CM/SEC
BH CV XLRA MEAS RIGHT PROX ICA EDV: 16.7 CM/SEC
BH CV XLRA MEAS RIGHT PROX ICA PSV: 53.6 CM/SEC
BH CV XLRA MEAS RIGHT PROX SCLA PSV: 91 CM/SEC
BH CV XLRA MEAS RIGHT VERTEBRAL A PSV: -34.8 CM/SEC

## 2024-02-19 PROCEDURE — G0463 HOSPITAL OUTPT CLINIC VISIT: HCPCS

## 2024-02-19 PROCEDURE — 93880 EXTRACRANIAL BILAT STUDY: CPT

## 2024-02-19 PROCEDURE — 93978 VASCULAR STUDY: CPT

## 2024-02-19 RX ORDER — WARFARIN SODIUM 7.5 MG/1
TABLET ORAL
Qty: 110 TABLET | Refills: 0 | Status: SHIPPED | OUTPATIENT
Start: 2024-02-19

## 2024-02-19 NOTE — TELEPHONE ENCOUNTER
Rx Refill Note  Requested Prescriptions     Pending Prescriptions Disp Refills    warfarin (COUMADIN) 7.5 MG tablet [Pharmacy Med Name: WARFARIN SODIUM 7.5 MG TABLET] 110 tablet 0     Sig: Take 1 tab, by mouth, daily except for 1 1/2 tabs on Mon, Weds and Fri or as directed.    Last INR 2/15/24  Last office visit with prescribing clinician: 2/1/2024   Last telemedicine visit with prescribing clinician: Visit date not found   Next office visit with prescribing clinician: 7/30/2024                         Would you like a call back once the refill request has been completed: [] Yes [] No    If the office needs to give you a call back, can they leave a voicemail: [] Yes [] No    Karol Ramirez RN  02/19/24, 10:48 EST

## 2024-02-20 ENCOUNTER — HOSPITAL ENCOUNTER (OUTPATIENT)
Dept: RESPIRATORY THERAPY | Facility: HOSPITAL | Age: 74
Discharge: HOME | End: 2024-02-20
Admitting: NURSE PRACTITIONER
Payer: MEDICARE

## 2024-02-20 ENCOUNTER — TRANSCRIBE ORDERS (OUTPATIENT)
Dept: ADMINISTRATIVE | Facility: HOSPITAL | Age: 74
End: 2024-02-20
Payer: MEDICARE

## 2024-02-20 VITALS — HEART RATE: 88 BPM | RESPIRATION RATE: 16 BRPM | OXYGEN SATURATION: 94 %

## 2024-02-20 DIAGNOSIS — J44.9 CHRONIC OBSTRUCTIVE PULMONARY DISEASE, UNSPECIFIED COPD TYPE: ICD-10-CM

## 2024-02-20 DIAGNOSIS — I70.0 ATHEROSCLEROSIS OF AORTA: ICD-10-CM

## 2024-02-20 DIAGNOSIS — I71.43 INFRARENAL ABDOMINAL AORTIC ANEURYSM (AAA) WITHOUT RUPTURE: Primary | ICD-10-CM

## 2024-02-20 DIAGNOSIS — I73.9 PERIPHERAL VASCULAR DISEASE, UNSPECIFIED: ICD-10-CM

## 2024-02-20 DIAGNOSIS — I72.3 ANEURYSM OF ILIAC ARTERY: ICD-10-CM

## 2024-02-20 PROCEDURE — 94664 DEMO&/EVAL PT USE INHALER: CPT

## 2024-02-20 PROCEDURE — 94060 EVALUATION OF WHEEZING: CPT

## 2024-02-20 PROCEDURE — 63710000001 ALBUTEROL SULFATE HFA 108 (90 BASE) MCG/ACT AEROSOL SOLUTION 6.7 G INHALER: Performed by: NURSE PRACTITIONER

## 2024-02-20 PROCEDURE — A9270 NON-COVERED ITEM OR SERVICE: HCPCS | Performed by: NURSE PRACTITIONER

## 2024-02-20 PROCEDURE — 94726 PLETHYSMOGRAPHY LUNG VOLUMES: CPT

## 2024-02-20 PROCEDURE — 94729 DIFFUSING CAPACITY: CPT

## 2024-02-20 PROCEDURE — 94799 UNLISTED PULMONARY SVC/PX: CPT

## 2024-02-20 RX ORDER — ALBUTEROL SULFATE 90 UG/1
2 AEROSOL, METERED RESPIRATORY (INHALATION) ONCE
Status: COMPLETED | OUTPATIENT
Start: 2024-02-20 | End: 2024-02-20

## 2024-02-20 RX ADMIN — ALBUTEROL SULFATE 2 PUFF: 108 AEROSOL, METERED RESPIRATORY (INHALATION) at 15:38

## 2024-02-23 RX ORDER — ATORVASTATIN CALCIUM 20 MG/1
TABLET, FILM COATED ORAL
Qty: 90 TABLET | Refills: 1 | Status: SHIPPED | OUTPATIENT
Start: 2024-02-23

## 2024-02-23 NOTE — TELEPHONE ENCOUNTER
Rx Refill Note  Requested Prescriptions     Pending Prescriptions Disp Refills    atorvastatin (LIPITOR) 20 MG tablet [Pharmacy Med Name: ATORVASTATIN 20 MG TABLET] 90 tablet 1     Sig: TAKE 1 TABLET BY MOUTH EVERY DAY      Last office visit with prescribing clinician: 2/1/2024   Last telemedicine visit with prescribing clinician: Visit date not found   Next office visit with prescribing clinician: 7/30/2024                         Would you like a call back once the refill request has been completed: [] Yes [] No    If the office needs to give you a call back, can they leave a voicemail: [] Yes [] No    Stacey Martínez MA  02/23/24, 08:37 EST

## 2024-03-11 RX ORDER — ATENOLOL 50 MG/1
50 TABLET ORAL
Qty: 90 TABLET | Refills: 0 | Status: SHIPPED | OUTPATIENT
Start: 2024-03-11

## 2024-03-11 NOTE — TELEPHONE ENCOUNTER
Rx Refill Note  Requested Prescriptions     Pending Prescriptions Disp Refills    atenolol (TENORMIN) 50 MG tablet [Pharmacy Med Name: ATENOLOL 50 MG TABLET] 90 tablet 0     Sig: TAKE 1 TABLET BY MOUTH EVERY DAY AT NIGHT      Last office visit with prescribing clinician: 2/1/2024   Last telemedicine visit with prescribing clinician: Visit date not found   Next office visit with prescribing clinician: 7/30/2024                         Would you like a call back once the refill request has been completed: [] Yes [] No    If the office needs to give you a call back, can they leave a voicemail: [] Yes [] No    Stacey Martínez MA  03/11/24, 08:36 EDT

## 2024-03-14 ENCOUNTER — ANTICOAGULATION VISIT (OUTPATIENT)
Dept: CARDIOLOGY | Facility: CLINIC | Age: 74
End: 2024-03-14
Payer: MEDICARE

## 2024-03-14 VITALS
HEART RATE: 67 BPM | SYSTOLIC BLOOD PRESSURE: 170 MMHG | WEIGHT: 182 LBS | DIASTOLIC BLOOD PRESSURE: 81 MMHG | BODY MASS INDEX: 25.4 KG/M2

## 2024-03-14 DIAGNOSIS — I48.21 PERMANENT ATRIAL FIBRILLATION: Primary | Chronic | ICD-10-CM

## 2024-03-14 LAB — INR PPP: 2.3 (ref 2–3)

## 2024-03-14 PROCEDURE — 85610 PROTHROMBIN TIME: CPT | Performed by: INTERNAL MEDICINE

## 2024-03-14 PROCEDURE — 36416 COLLJ CAPILLARY BLOOD SPEC: CPT | Performed by: INTERNAL MEDICINE

## 2024-03-27 ENCOUNTER — TELEPHONE (OUTPATIENT)
Dept: CARDIOLOGY | Facility: CLINIC | Age: 74
End: 2024-03-27
Payer: MEDICARE

## 2024-03-27 RX ORDER — AMLODIPINE BESYLATE 5 MG/1
5 TABLET ORAL DAILY
Qty: 90 TABLET | Refills: 3 | Status: SHIPPED | OUTPATIENT
Start: 2024-03-27

## 2024-03-27 NOTE — TELEPHONE ENCOUNTER
Rx Refill Note  Requested Prescriptions     Pending Prescriptions Disp Refills    amLODIPine (NORVASC) 5 MG tablet 90 tablet 3     Sig: Take 1 tablet by mouth Daily.      Last office visit with prescribing clinician: 2/1/2024   Last telemedicine visit with prescribing clinician: Visit date not found   Next office visit with prescribing clinician: 7/30/2024                         Would you like a call back once the refill request has been completed: [] Yes [] No    If the office needs to give you a call back, can they leave a voicemail: [] Yes [] No    Kaleb Tamez MA  03/27/24, 11:44 EDT

## 2024-03-27 NOTE — TELEPHONE ENCOUNTER
Called patient, spoke to patients spouse (she is on patients HIPAA), patient dropped off blood pressure readings per Dr. Galloway he is adding amlodipine 5 mg daily.  She understood and sent RX to patients preferred pharmacy.

## 2024-03-28 NOTE — TELEPHONE ENCOUNTER
Called patient, spouse answered she is on HIPAA, she understood and will have patient call back if he has any other questions.

## 2024-04-10 RX ORDER — CIPROFLOXACIN 500 MG/1
1 TABLET, FILM COATED ORAL EVERY 12 HOURS SCHEDULED
COMMUNITY
Start: 2024-02-14

## 2024-04-10 RX ORDER — NITROFURANTOIN 25; 75 MG/1; MG/1
1 CAPSULE ORAL EVERY 12 HOURS SCHEDULED
COMMUNITY
Start: 2024-02-20

## 2024-04-11 ENCOUNTER — OFFICE VISIT (OUTPATIENT)
Dept: ENDOCRINOLOGY | Facility: CLINIC | Age: 74
End: 2024-04-11
Payer: MEDICARE

## 2024-04-11 VITALS
SYSTOLIC BLOOD PRESSURE: 120 MMHG | WEIGHT: 186.6 LBS | DIASTOLIC BLOOD PRESSURE: 62 MMHG | HEIGHT: 71 IN | BODY MASS INDEX: 26.12 KG/M2 | HEART RATE: 96 BPM

## 2024-04-11 DIAGNOSIS — E78.2 MIXED HYPERLIPIDEMIA: ICD-10-CM

## 2024-04-11 DIAGNOSIS — E11.65 TYPE 2 DIABETES MELLITUS WITH HYPERGLYCEMIA, WITHOUT LONG-TERM CURRENT USE OF INSULIN: Primary | ICD-10-CM

## 2024-04-11 DIAGNOSIS — I10 PRIMARY HYPERTENSION: ICD-10-CM

## 2024-04-11 RX ORDER — LOSARTAN POTASSIUM 25 MG/1
1 TABLET ORAL DAILY
COMMUNITY
Start: 2024-03-30 | End: 2024-04-11

## 2024-04-11 NOTE — PATIENT INSTRUCTIONS
Continue to work on diet and activity  You can decrease your blood sugar checks to just 1-2 times per week  Labs before follow-up.

## 2024-04-11 NOTE — PROGRESS NOTES
Endocrine Progress Note Outpatient     Patient Care Team:  Colette Dominguez APRN as PCP - General (Nurse Practitioner)  Thai Galloway MD as Consulting Physician (Interventional Cardiology)  Seipel, Joseph F, MD as Consulting Physician (Neurology)  Daniel Hamm MD as Consulting Physician (Urology)  Abbey Mary APRN as Nurse Practitioner (Nurse Practitioner)    Chief Complaint: Uncontrolled type 2 diabetes    HPI: This is a 73-year-old male with history of type 2 diabetes, hypertension, hyperlipidemia, CAD with CABG done in 2006 is here for follow up.     For type 2 diabetes: Initial diagnosis was in January 2021.  He has done diabetes education.  He checks his blood sugars on a daily basis which he brought for review and most of them are running below 140.  He is currently not taking any medications for diabetes.  He was on Jardiance which he stopped about a year ago because of the cost.  He has been working on his diet and activity.    Hypertension: Currently on losartan 100 mg once a day.    Hyperlipidemia: Currently on atorvastatin.    Past Medical History:   Diagnosis Date    AAA (abdominal aortic aneurysm) 2006    Abnormal ECG     Aneurysm     Arthritis     Asthma     Atrial fibrillation     COUMADIN    Benign prostatic hyperplasia     Bladder cancer     CHF (congestive heart failure)     Chronic anticoagulation     COUMADIN STARTED 2006    Clotting disorder     Colon polyp     Congenital heart disease     COPD (chronic obstructive pulmonary disease)     Coronary artery disease     Diabetes mellitus     Elevated cholesterol     Erectile dysfunction     Facial basal cell cancer     Gout     Hard to intubate     History of pancreatitis 2006    History of pneumonia     History of tick-borne disease 2015    HL (hearing loss)     BILAT HEARING AIDS    Hyperlipidemia     Hypertension     Myocardial infarction     X5    Obesity     Pancreatitis     Pneumonia     Sleep apnea     CURRENTLY NOT USING CPAP D/T  RECALL    Transfusion history     STATES HIS SISTER HAD A SEVERE REACTION TO BLOOD TRANSFUSION    Type 2 diabetes mellitus        Social History     Socioeconomic History    Marital status:      Spouse name: Nichelle    Number of children: 3    Years of education: 12   Tobacco Use    Smoking status: Former     Current packs/day: 0.00     Average packs/day: 1 pack/day for 30.0 years (30.0 ttl pk-yrs)     Types: Cigarettes     Start date: 1976     Quit date: 2006     Years since quittin.8     Passive exposure: Past    Smokeless tobacco: Never   Vaping Use    Vaping status: Never Used   Substance and Sexual Activity    Alcohol use: Not Currently     Comment: one or two beers 6 times a year    Drug use: Never    Sexual activity: Not Currently     Partners: Female     Birth control/protection: None       Family History   Problem Relation Age of Onset    Hypertension Mother     Colon cancer Mother     Cancer Mother     Diabetes Mother     Arthritis Mother     Cancer Father     Heart attack Father     Hearing loss Father     Heart disease Father     Hypertension Sister     Hypertension Brother     Colon cancer Brother     Cancer Brother     Heart disease Paternal Grandfather     Heart attack Paternal Grandfather     Malig Hyperthermia Neg Hx     Colon polyps Neg Hx     Crohn's disease Neg Hx     Irritable bowel syndrome Neg Hx     Ulcerative colitis Neg Hx        Allergies   Allergen Reactions    Bee Venom Anaphylaxis     HIVES-SWOLLEN THROAT    Meperidine Hives    Midazolam Hives    Propofol Other (See Comments)     UNCLEAR-SVT, HYPOTENSION-STATES SEVERE ALMOST CODED    Sulfa Antibiotics Hives    Hydrocodone Nausea And Vomiting    Simvastatin Myalgia       ROS:   Constitutional:  Denies fatigue, tiredness.    Eyes:  Denies change in visual acuity   HENT:  Denies nasal congestion or sore throat   Respiratory: denies cough, shortness of breath.   Cardiovascular:  denies chest pain, edema   GI:  Denies  abdominal pain, nausea, vomiting.   Musculoskeletal:  Denies back pain or joint pain   Integument:  Denies dry skin and rash   Neurologic:  Denies headache, focal weakness or sensory changes   Endocrine:  Denies polyuria or polydipsia   Psychiatric:  Denies depression or anxiety      Vitals:    04/11/24 0820   BP: 120/62   Pulse: 96     Body mass index is 26.03 kg/m².     Physical Exam:  GEN: NAD, conversant  EYES: EOMI, PERRL,  NECK: no thyromegaly,   CV: RRR  LUNG: CTA  SKIN: no rashes, no acanthosis  MSK: no deformities,   NEURO: no tremors, DTR normal  PSYCH: Awake and coherent      Results Review:     I reviewed the patient's new clinical results.    Lab Results   Component Value Date    HGBA1C 6.60 (H) 01/16/2024    HGBA1C 6.50 (H) 07/20/2023    HGBA1C 6.8 (H) 11/30/2022      Lab Results   Component Value Date    GLUCOSE 98 01/21/2024    BUN 15 01/21/2024    CREATININE 0.60 (L) 01/21/2024    EGFRIFNONA 97 12/02/2021    BCR 25.0 01/21/2024    K 3.9 01/21/2024    CO2 24.0 01/21/2024    CALCIUM 8.0 (L) 01/21/2024    ALBUMIN 3.1 (L) 01/20/2024    LABIL2 1.2 02/25/2019    AST 25 01/20/2024    ALT 24 01/20/2024    CHOL 117 07/20/2023    TRIG 52 07/20/2023    LDL 65 07/20/2023    HDL 40 07/20/2023     Lab Results   Component Value Date    TSH 2.610 07/20/2023         Medication Review: Reviewed.       Current Outpatient Medications:     albuterol sulfate  (90 Base) MCG/ACT inhaler, Inhale 2 puffs Every 4 (Four) Hours As Needed for Wheezing., Disp: , Rfl:     allopurinol (ZYLOPRIM) 100 MG tablet, Take 1 tablet by mouth Every Night., Disp: , Rfl:     amLODIPine (NORVASC) 5 MG tablet, Take 1 tablet by mouth Daily., Disp: 90 tablet, Rfl: 3    aspirin 81 MG tablet, Take 1 tablet by mouth Daily. PT STATES WAS INSTRUCTED TO CONTINUE TO TAKE THIS PER GET ANDERSEN, Disp: , Rfl:     atenolol (TENORMIN) 50 MG tablet, TAKE 1 TABLET BY MOUTH EVERY DAY AT NIGHT, Disp: 90 tablet, Rfl: 0    atorvastatin (LIPITOR) 20 MG  tablet, TAKE 1 TABLET BY MOUTH EVERY DAY, Disp: 90 tablet, Rfl: 1    ciprofloxacin (CIPRO) 500 MG tablet, Take 1 tablet by mouth Every 12 (Twelve) Hours., Disp: , Rfl:     losartan (COZAAR) 100 MG tablet, Take 1 tablet by mouth Daily., Disp: 90 tablet, Rfl: 3    nitrofurantoin, macrocrystal-monohydrate, (MACROBID) 100 MG capsule, Take 1 capsule by mouth Every 12 (Twelve) Hours., Disp: , Rfl:     warfarin (COUMADIN) 7.5 MG tablet, Take 1 tab, by mouth, daily except for 1 1/2 tabs on Mon, Weds and Fri or as directed., Disp: 110 tablet, Rfl: 0          Assessment and plan:  Diabetes mellitus type 2 with hyperglycemia: Overall doing very well.  He is currently diet controlled.  We talked about Jardiance but unfortunately cannot afford it.  At this time we will continue to work on diet and activity.    Hypertension: Well-controlled    Hyperlipidemia: Currently on atorvastatin, will follow lipid panel    CAD: Status post CABG in the past.    Assessment and plan from July 20, 2023 reviewed and updated.           Shawn Van MD FACE.

## 2024-04-18 ENCOUNTER — ANTICOAGULATION VISIT (OUTPATIENT)
Dept: CARDIOLOGY | Facility: CLINIC | Age: 74
End: 2024-04-18
Payer: MEDICARE

## 2024-04-18 VITALS
SYSTOLIC BLOOD PRESSURE: 122 MMHG | HEART RATE: 62 BPM | BODY MASS INDEX: 26.08 KG/M2 | DIASTOLIC BLOOD PRESSURE: 56 MMHG | WEIGHT: 187 LBS

## 2024-04-18 DIAGNOSIS — I48.21 PERMANENT ATRIAL FIBRILLATION: Primary | Chronic | ICD-10-CM

## 2024-04-18 LAB — INR PPP: 2.2 (ref 2–3)

## 2024-04-18 PROCEDURE — 36416 COLLJ CAPILLARY BLOOD SPEC: CPT | Performed by: INTERNAL MEDICINE

## 2024-04-18 PROCEDURE — 85610 PROTHROMBIN TIME: CPT | Performed by: INTERNAL MEDICINE

## 2024-05-17 DIAGNOSIS — I48.21 PERMANENT ATRIAL FIBRILLATION: ICD-10-CM

## 2024-05-17 RX ORDER — WARFARIN SODIUM 7.5 MG/1
TABLET ORAL
Qty: 110 TABLET | Refills: 0 | Status: SHIPPED | OUTPATIENT
Start: 2024-05-17

## 2024-05-17 NOTE — TELEPHONE ENCOUNTER
Rx Refill Note  Requested Prescriptions     Pending Prescriptions Disp Refills    warfarin (COUMADIN) 7.5 MG tablet [Pharmacy Med Name: WARFARIN SODIUM 7.5 MG TABLET] 110 tablet 0     Sig: TAKE 1 TAB, BY MOUTH, DAILY EXCEPT FOR 1 1/2 TABS ON MON, WEDS AND FRI OR AS DIRECTED.      Last office visit with prescribing clinician: 2/1/2024   Last telemedicine visit with prescribing clinician: Visit date not found   Next office visit with prescribing clinician: 7/30/2024   Anticoagulation Visit (04/18/2024)     Samantha Santizo LPN  05/17/24, 08:50 EDT

## 2024-05-23 ENCOUNTER — ANTICOAGULATION VISIT (OUTPATIENT)
Dept: CARDIOLOGY | Facility: CLINIC | Age: 74
End: 2024-05-23
Payer: MEDICARE

## 2024-05-23 VITALS
WEIGHT: 190 LBS | BODY MASS INDEX: 26.5 KG/M2 | HEART RATE: 67 BPM | SYSTOLIC BLOOD PRESSURE: 168 MMHG | DIASTOLIC BLOOD PRESSURE: 79 MMHG

## 2024-05-23 DIAGNOSIS — I48.21 PERMANENT ATRIAL FIBRILLATION: Primary | Chronic | ICD-10-CM

## 2024-05-23 LAB — INR PPP: 1.9 (ref 2–3)

## 2024-05-23 PROCEDURE — 36416 COLLJ CAPILLARY BLOOD SPEC: CPT | Performed by: INTERNAL MEDICINE

## 2024-05-23 PROCEDURE — 85610 PROTHROMBIN TIME: CPT | Performed by: INTERNAL MEDICINE

## 2024-05-30 ENCOUNTER — OFFICE VISIT (OUTPATIENT)
Dept: FAMILY MEDICINE CLINIC | Facility: CLINIC | Age: 74
End: 2024-05-30
Payer: MEDICARE

## 2024-05-30 VITALS
SYSTOLIC BLOOD PRESSURE: 132 MMHG | BODY MASS INDEX: 27.05 KG/M2 | TEMPERATURE: 98.1 F | HEIGHT: 71 IN | HEART RATE: 59 BPM | RESPIRATION RATE: 17 BRPM | OXYGEN SATURATION: 93 % | DIASTOLIC BLOOD PRESSURE: 70 MMHG | WEIGHT: 193.2 LBS

## 2024-05-30 DIAGNOSIS — I10 PRIMARY HYPERTENSION: Primary | ICD-10-CM

## 2024-05-30 DIAGNOSIS — N40.1 BENIGN PROSTATIC HYPERPLASIA WITH LOWER URINARY TRACT SYMPTOMS, SYMPTOM DETAILS UNSPECIFIED: ICD-10-CM

## 2024-05-30 DIAGNOSIS — D50.0 BLOOD LOSS ANEMIA: ICD-10-CM

## 2024-05-30 DIAGNOSIS — E11.9 TYPE 2 DIABETES MELLITUS WITHOUT COMPLICATION, WITHOUT LONG-TERM CURRENT USE OF INSULIN: ICD-10-CM

## 2024-05-30 PROCEDURE — 3044F HG A1C LEVEL LT 7.0%: CPT | Performed by: NURSE PRACTITIONER

## 2024-05-30 PROCEDURE — 3078F DIAST BP <80 MM HG: CPT | Performed by: NURSE PRACTITIONER

## 2024-05-30 PROCEDURE — 1126F AMNT PAIN NOTED NONE PRSNT: CPT | Performed by: NURSE PRACTITIONER

## 2024-05-30 PROCEDURE — 99214 OFFICE O/P EST MOD 30 MIN: CPT | Performed by: NURSE PRACTITIONER

## 2024-05-30 PROCEDURE — 1160F RVW MEDS BY RX/DR IN RCRD: CPT | Performed by: NURSE PRACTITIONER

## 2024-05-30 PROCEDURE — 3075F SYST BP GE 130 - 139MM HG: CPT | Performed by: NURSE PRACTITIONER

## 2024-05-30 PROCEDURE — 1159F MED LIST DOCD IN RCRD: CPT | Performed by: NURSE PRACTITIONER

## 2024-05-30 NOTE — PROGRESS NOTES
Subjective        Sandro Ramirez is a 73 y.o. male who presents to [facility].     Chief Complaint   Patient presents with    Hypertension     3 month f/u       History of Present Illness    Patient presents for follow up of hypertension, anemia, diabetes.    Hypertension/mitral regurgitation/aortic valve insufficiency status post mitral valve repair and aortic valve replacement with bioprosthetic valve- stable -followed by cardiology Dr. Galloway.  Did not bring blood pressure record with him.  Taking Norvasc 5 mg daily, atenolol 50 mg at bedtime, losartan 100 mg daily, aspirin 81 mg daily, warfarin per warfarin clinic.  Denies chest pain or leg swelling.  Blood loss anemia - resolved - hgb normalized at 13.8 in 2/2024.   BPH/history of bladder cancer-stable-status post greenlight TURP 1/24.  He did have some bleeding complications and bladder perforation afterward which required repeat hospitalization and cystoscopy.  [facility]  Denies any recent urinary tract infection, states he is not having any urinary issues at the moment.  Diabetes mellitus - most recent hemoglobin A1c was 6.6 in 1/2024. He checks his blood glucose daily, follows diabetic diet, is not taking medication.  Previously patient took Jardiance but stopped this over a year ago secondary to cost of medication.  He is followed by endocrinology Dr. Van.    Patient's chronic medical conditions also include atrial fibrillation on warfarin therapy, COPD, CAD status post CABG, sleep apnea, hyperlipidemia, hyperuricemia.      The following portions of the patient's history were reviewed and updated as appropriate: allergies, current medications, past family history, past medical history, past social history, past surgical history and problem list.    Allergies   Allergen Reactions    Bee Venom Anaphylaxis     HIVES-SWOLLEN THROAT    Meperidine Hives    Midazolam Hives    Propofol Other (See  Comments)     UNCLEAR-SVT, HYPOTENSION-STATES SEVERE ALMOST CODED    Sulfa Antibiotics Hives    Hydrocodone Nausea And Vomiting    Simvastatin Myalgia          Current Outpatient Medications:     albuterol sulfate  (90 Base) MCG/ACT inhaler, Inhale 2 puffs Every 4 (Four) Hours As Needed for Wheezing., Disp: , Rfl:     allopurinol (ZYLOPRIM) 100 MG tablet, Take 1 tablet by mouth Every Night., Disp: , Rfl:     amLODIPine (NORVASC) 5 MG tablet, Take 1 tablet by mouth Daily., Disp: 90 tablet, Rfl: 3    aspirin 81 MG tablet, Take 1 tablet by mouth Daily. PT STATES WAS INSTRUCTED TO CONTINUE TO TAKE THIS PER GET ANDERSEN, Disp: , Rfl:     atenolol (TENORMIN) 50 MG tablet, TAKE 1 TABLET BY MOUTH EVERY DAY AT NIGHT, Disp: 90 tablet, Rfl: 0    atorvastatin (LIPITOR) 20 MG tablet, TAKE 1 TABLET BY MOUTH EVERY DAY, Disp: 90 tablet, Rfl: 1    losartan (COZAAR) 100 MG tablet, Take 1 tablet by mouth Daily., Disp: 90 tablet, Rfl: 3    warfarin (COUMADIN) 7.5 MG tablet, TAKE 1 TAB, BY MOUTH, DAILY EXCEPT FOR 1 1/2 TABS ON MON, WEDS AND FRI OR AS DIRECTED., Disp: 110 tablet, Rfl: 0    Review of Systems   Constitutional:  Negative for fatigue, fever and unexpected weight change.   HENT:  Negative for dental problem and trouble swallowing.    Respiratory:  Positive for shortness of breath (chronic). Negative for wheezing and stridor.    Cardiovascular:  Negative for chest pain and leg swelling.   Gastrointestinal:  Negative for abdominal pain, blood in stool, constipation, diarrhea, nausea and vomiting.   Musculoskeletal:  Positive for arthralgias. Negative for myalgias.   Skin:  Negative for color change and pallor.   Neurological:  Negative for dizziness and seizures.   Psychiatric/Behavioral:  Negative for self-injury, sleep disturbance and suicidal ideas.         Objective     /70 (BP Location: Left arm, Patient Position: Sitting) Comment: manual  Pulse 59   Temp 98.1 °F (36.7 °C) (Oral)   Resp 17   Ht 180.3 cm  "(71\")   Wt 87.6 kg (193 lb 3.2 oz)   SpO2 93%   BMI 26.95 kg/m²         Physical Exam  Vitals and nursing note reviewed.   Constitutional:       General: He is not in acute distress.     Appearance: Normal appearance. He is not ill-appearing or diaphoretic.   HENT:      Head: Normocephalic and atraumatic.      Nose: Nose normal.      Mouth/Throat:      Mouth: Mucous membranes are moist.   Eyes:      General: No scleral icterus.     Conjunctiva/sclera: Conjunctivae normal.   Cardiovascular:      Rate and Rhythm: Regular rhythm. Bradycardia present.   Pulmonary:      Effort: Pulmonary effort is normal.      Breath sounds: No wheezing or rales.      Comments: Breath sounds slightly decreased throughout  Abdominal:      General: Bowel sounds are normal. There is no distension.      Palpations: Abdomen is soft.      Tenderness: There is no abdominal tenderness. There is no guarding.      Hernia: A hernia (soft, reducible umbilical) is present.   Musculoskeletal:      Right lower leg: No edema.      Left lower leg: No edema.   Lymphadenopathy:      Cervical: No cervical adenopathy.   Skin:     General: Skin is warm and dry.      Capillary Refill: Capillary refill takes less than 2 seconds.      Coloration: Skin is not jaundiced.      Findings: Bruising (scattered bruising bilateral upper extremities) present.   Neurological:      Mental Status: He is alert and oriented to person, place, and time.      Gait: Gait abnormal (slow).   Psychiatric:         Mood and Affect: Mood normal.         Behavior: Behavior normal.         Thought Content: Thought content normal.         Judgment: Judgment normal.           Result Review    The following data was reviewed by: ALICJA Connor on 05/30/2024:  Common labs          1/29/2024    13:04 2/12/2024    10:17 5/31/2024    10:17   Common Labs   Glucose   115    BUN   16    Creatinine   0.74    Sodium   138    Potassium   4.4    Chloride   103    Calcium   9.4    WBC 10.43  " 7.85     Hemoglobin 11.5  13.8     Hematocrit 37.2  43.3     Platelets 482  333     Uric Acid  3.2       CMP          1/20/2024    03:40 1/21/2024    03:58 5/31/2024    10:17   CMP   Glucose 107  98  115    BUN 34  15  16    Creatinine 0.86  0.60  0.74    EGFR 92.6  103.2  96.9    Sodium 135  137  138    Potassium 4.1  3.9  4.4    Chloride 101  105  103    Calcium 8.1  8.0  9.4    Total Protein 5.9      Albumin 3.1      Globulin 2.8      Total Bilirubin 1.0      Alkaline Phosphatase 186      AST (SGOT) 25      ALT (SGPT) 24      Albumin/Globulin Ratio 1.1      BUN/Creatinine Ratio 39.5  25.0  21.6    Anion Gap 10.0  8.0  6.3      CBC          1/22/2024    03:55 1/29/2024    13:04 2/12/2024    10:17   CBC   WBC 9.60  10.43  7.85    RBC 3.89  4.46  5.10    Hemoglobin 10.5  11.5  13.8    Hematocrit 32.4  37.2  43.3    MCV 83.3  83.4  84.9    MCH 27.1  25.8  27.1    MCHC 32.5  30.9  31.9    RDW 16.2  14.3  14.4    Platelets 230  482  333      CBC w/diff          1/22/2024    03:55 1/29/2024    13:04 2/12/2024    10:17   CBC w/Diff   WBC 9.60  10.43  7.85    RBC 3.89  4.46  5.10    Hemoglobin 10.5  11.5  13.8    Hematocrit 32.4  37.2  43.3    MCV 83.3  83.4  84.9    MCH 27.1  25.8  27.1    MCHC 32.5  30.9  31.9    RDW 16.2  14.3  14.4    Platelets 230  482  333    Neutrophil Rel % 71.7  72.6  66.2    Immature Granulocyte Rel %  0.5  0.3    Lymphocyte Rel % 17.7  17.8  23.3    Monocyte Rel % 6.8  5.9  7.3    Eosinophil Rel % 3.6  2.5  2.0    Basophil Rel % 0.2  0.7  0.9      Lipid Panel          7/20/2023    09:33   Lipid Panel   Total Cholesterol 117    Triglycerides 52    HDL Cholesterol 40    VLDL Cholesterol 12    LDL Cholesterol  65    LDL/HDL Ratio 1.67      TSH          7/20/2023    09:33   TSH   TSH 2.610      BMP          1/20/2024    03:40 1/21/2024    03:58 5/31/2024    10:17   BMP   BUN 34  15  16    Creatinine 0.86  0.60  0.74    Sodium 135  137  138    Potassium 4.1  3.9  4.4    Chloride 101  105  103    CO2  24.0  24.0  28.7    Calcium 8.1  8.0  9.4      A1C Last 3 Results          7/20/2023    09:33 1/16/2024    04:03   HGBA1C Last 3 Results   Hemoglobin A1C 6.50  6.60      UA          1/16/2024    02:13 1/19/2024    15:42   Urinalysis   Squamous Epithelial Cells, UA 0-2  None Seen    Specific [place], UA 1.025  1.010    Ketones, UA Negative  15 mg/dL (1+)    Blood, UA Large (3+)  Large (3+)    Leukocytes, UA  Large (3+)    Nitrite, UA Negative  Positive    RBC, UA Too Numerous to Count  Too Numerous to Count    WBC, UA 6-10  6-10    Bacteria, UA None Seen  1+      Urine Culture          1/16/2024    02:13 1/19/2024    15:42   Urine Culture   Urine Culture No growth  <25,000 CFU/mL Normal Urogenital Michaela                   Assessment & Plan    Diagnoses and all orders for this visit:    1. Primary hypertension (Primary)  -     Basic Metabolic Panel; Future    2. Blood loss anemia    3. Benign prostatic hyperplasia with lower urinary tract symptoms, symptom details unspecified    4. Type 2 diabetes mellitus without complication, without long-term current use of insulin  -     Basic Metabolic Panel; Future       Patient Instructions   Go to pharmacy for RSV vaccine, send record to clinic when available.  Call office for lab results if you have not received a call from our office or [employer] message in 1-2 days.    Continue current medications and treatment.   Anemia resolved.  Patient denies further bleeding.  Follow-up with urology, cardiology, podiatry per their recommendations.      Follow Up   Return for Medicare Wellness, Recheck, Hypertension, Diabetes, Next scheduled follow up.    Patient was given instructions and counseling regarding his condition or for health maintenance advice. Please see specific information pulled into the AVS if appropriate.     Colette Dominguez, APRN     06/01/24

## 2024-05-30 NOTE — PATIENT INSTRUCTIONS
Go to pharmacy for RSV vaccine, send record to clinic when available.  Call office for lab results if you have not received a call from our office or [employer] message in 1-2 days.    Continue current medications and treatment.     
Length Of Therapy: 3+ years
Add High Risk Medication Management Associated Diagnosis?: No
Detail Level: Zone

## 2024-05-31 ENCOUNTER — LAB (OUTPATIENT)
Dept: LAB | Facility: HOSPITAL | Age: 74
End: 2024-05-31
Payer: MEDICARE

## 2024-05-31 DIAGNOSIS — I71.43 INFRARENAL ABDOMINAL AORTIC ANEURYSM (AAA) WITHOUT RUPTURE: Primary | ICD-10-CM

## 2024-05-31 DIAGNOSIS — I10 PRIMARY HYPERTENSION: ICD-10-CM

## 2024-05-31 DIAGNOSIS — E11.65 TYPE 2 DIABETES MELLITUS WITH HYPERGLYCEMIA, WITHOUT LONG-TERM CURRENT USE OF INSULIN: ICD-10-CM

## 2024-05-31 LAB
ANION GAP SERPL CALCULATED.3IONS-SCNC: 6.3 MMOL/L (ref 5–15)
BUN SERPL-MCNC: 16 MG/DL (ref 8–23)
BUN/CREAT SERPL: 21.6 (ref 7–25)
CALCIUM SPEC-SCNC: 9.4 MG/DL (ref 8.6–10.5)
CHLORIDE SERPL-SCNC: 103 MMOL/L (ref 98–107)
CO2 SERPL-SCNC: 28.7 MMOL/L (ref 22–29)
CREAT SERPL-MCNC: 0.74 MG/DL (ref 0.76–1.27)
EGFRCR SERPLBLD CKD-EPI 2021: 96.9 ML/MIN/1.73
GLUCOSE SERPL-MCNC: 115 MG/DL (ref 65–99)
POTASSIUM SERPL-SCNC: 4.4 MMOL/L (ref 3.5–5.2)
SODIUM SERPL-SCNC: 138 MMOL/L (ref 136–145)

## 2024-05-31 PROCEDURE — 36415 COLL VENOUS BLD VENIPUNCTURE: CPT

## 2024-05-31 PROCEDURE — 80048 BASIC METABOLIC PNL TOTAL CA: CPT

## 2024-06-03 NOTE — PROGRESS NOTES
"Chief Complaint  Sleep Apnea    Subjective          Sandro Ramirez presents to [facility] NEUROLOGY  History of Present Illness  SUPA F/U patient states he is benefiting from pap therapy, he uses a nasal mask and gets supplies from [place].      SLEEP TESTING HISTORY:    On NPSG at [facility] , 09/12/2017 patient had Mild obstructive sleep apnea syndrome with apnea-hypopnea index of 6.3 per sleep hour, minimum SpO2 of 88%    PAP download:  The patient is on CPAP therapy at 10-20 cm/H2O.   Data indicates Excellent compliance. With 100% usage for more than 4 hours with an average usage of 7 hours 46 minutes. AHI down to 1.9 .  Average pressures 9.2.  Average large leak 0.     The patient's hypersomnia has resolved       [place] Sleepiness Scale:  Sitting and reading 0 WatchingTV 2  Sitting, inactive, in a public place 0  As a passenger in a car for 1 hour w/o a break  0  Lying down to rest in the afternoon  1  Sitting and talking to someone  0  Sitting quietly after a lunch  1  In a car, while stopped for traffic or a light  0  Total 4    Review of Systems   Constitutional:  Positive for appetite change and fatigue.   HENT:  Positive for hearing loss.    Respiratory:  Negative for apnea, chest tightness and shortness of breath.    Psychiatric/Behavioral:  Negative for sleep disturbance.    All other systems reviewed and are negative.        Objective   Vital Signs:   /76   Pulse 75   Ht 180.3 cm (71\")   Wt 87.1 kg (192 lb)   BMI 26.78 kg/m²     Physical Exam  Vitals reviewed.   Cardiovascular:      Rate and Rhythm: Normal rate.   Pulmonary:      Effort: Pulmonary effort is normal. No respiratory distress.   Neurological:      Mental Status: He is alert and oriented to person, place, and time.        Result Review :                 Assessment and Plan    Diagnoses and all orders for this visit:    1. Obstructive sleep apnea syndrome (Primary)      Continue CPAP 10-20    The patient is compliant " with and benefiting from PAP therapy.      Follow Up   Return in about 1 year (around 6/5/2025).    Patient was given instructions and counseling regarding his condition or for health maintenance advice. Please see specific information pulled into the AVS if appropriate.       This document has been electronically signed by Joseph Seipel, MD on June 5, 2024 09:26 EDT

## 2024-06-05 ENCOUNTER — OFFICE VISIT (OUTPATIENT)
Dept: NEUROLOGY | Facility: CLINIC | Age: 74
End: 2024-06-05
Payer: MEDICARE

## 2024-06-05 VITALS
BODY MASS INDEX: 26.88 KG/M2 | HEART RATE: 75 BPM | SYSTOLIC BLOOD PRESSURE: 157 MMHG | WEIGHT: 192 LBS | DIASTOLIC BLOOD PRESSURE: 76 MMHG | HEIGHT: 71 IN

## 2024-06-05 DIAGNOSIS — G47.33 OBSTRUCTIVE SLEEP APNEA SYNDROME: Primary | Chronic | ICD-10-CM

## 2024-06-05 PROCEDURE — 3078F DIAST BP <80 MM HG: CPT | Performed by: PSYCHIATRY & NEUROLOGY

## 2024-06-05 PROCEDURE — 1159F MED LIST DOCD IN RCRD: CPT | Performed by: PSYCHIATRY & NEUROLOGY

## 2024-06-05 PROCEDURE — 99213 OFFICE O/P EST LOW 20 MIN: CPT | Performed by: PSYCHIATRY & NEUROLOGY

## 2024-06-05 PROCEDURE — 3077F SYST BP >= 140 MM HG: CPT | Performed by: PSYCHIATRY & NEUROLOGY

## 2024-06-05 PROCEDURE — 1160F RVW MEDS BY RX/DR IN RCRD: CPT | Performed by: PSYCHIATRY & NEUROLOGY

## 2024-06-06 RX ORDER — ATENOLOL 50 MG/1
50 TABLET ORAL
Qty: 90 TABLET | Refills: 3 | Status: SHIPPED | OUTPATIENT
Start: 2024-06-06

## 2024-06-06 NOTE — TELEPHONE ENCOUNTER
Rx Refill Note  Requested Prescriptions     Pending Prescriptions Disp Refills    atenolol (TENORMIN) 50 MG tablet [Pharmacy Med Name: ATENOLOL 50 MG TABLET] 90 tablet 3     Sig: TAKE 1 TABLET BY MOUTH EVERY DAY AT NIGHT      Last office visit with prescribing clinician: 2/1/2024   Last telemedicine visit with prescribing clinician: Visit date not found   Next office visit with prescribing clinician: 7/30/2024                         Would you like a call back once the refill request has been completed: [] Yes [] No    If the office needs to give you a call back, can they leave a voicemail: [] Yes [] No    Stacey Martínez MA  06/06/24, 08:16 EDT

## 2024-06-17 DIAGNOSIS — I48.21 PERMANENT ATRIAL FIBRILLATION: ICD-10-CM

## 2024-06-17 RX ORDER — ATORVASTATIN CALCIUM 20 MG/1
TABLET, FILM COATED ORAL
Qty: 90 TABLET | Refills: 1 | Status: SHIPPED | OUTPATIENT
Start: 2024-06-17

## 2024-06-17 RX ORDER — WARFARIN SODIUM 7.5 MG/1
TABLET ORAL
Qty: 110 TABLET | Refills: 0 | Status: SHIPPED | OUTPATIENT
Start: 2024-06-17

## 2024-06-17 NOTE — TELEPHONE ENCOUNTER
Rx Refill Note  Requested Prescriptions     Pending Prescriptions Disp Refills    warfarin (COUMADIN) 7.5 MG tablet [Pharmacy Med Name: WARFARIN SODIUM 7.5 MG TABLET] 110 tablet 0     Sig: TAKE 1 TABLET BY MOUTH DAILY. TAKE 1.5 TABLETS ON MON, WEDS AND FRI OR AS DIRECTED.     Signed Prescriptions Disp Refills    atorvastatin (LIPITOR) 20 MG tablet 90 tablet 1     Sig: TAKE 1 TABLET BY MOUTH EVERY DAY     Authorizing Provider: MEGAN DUMONT     Ordering User: KATHY DODSON      Last office visit with prescribing clinician: 2/1/2024   Last telemedicine visit with prescribing clinician  Next office visit with prescribing clinician: 7/30/2024   Anticoagulation Visit 5/23/24  INR 1.9                        Would you like a call back once the refill request has been completed: [] Yes [] No    If the office needs to give you a call back, can they leave a voicemail: [] Yes [] No    Ainsley Chavez RN  06/17/24, 15:38 EDT

## 2024-06-27 ENCOUNTER — ANTICOAGULATION VISIT (OUTPATIENT)
Dept: CARDIOLOGY | Facility: CLINIC | Age: 74
End: 2024-06-27
Payer: MEDICARE

## 2024-06-27 VITALS
WEIGHT: 192 LBS | DIASTOLIC BLOOD PRESSURE: 65 MMHG | HEART RATE: 66 BPM | SYSTOLIC BLOOD PRESSURE: 154 MMHG | BODY MASS INDEX: 26.78 KG/M2

## 2024-06-27 DIAGNOSIS — I48.21 PERMANENT ATRIAL FIBRILLATION: Primary | Chronic | ICD-10-CM

## 2024-06-27 LAB — INR PPP: 2.7 (ref 2–3)

## 2024-06-27 PROCEDURE — 85610 PROTHROMBIN TIME: CPT | Performed by: INTERNAL MEDICINE

## 2024-06-27 PROCEDURE — 36416 COLLJ CAPILLARY BLOOD SPEC: CPT | Performed by: INTERNAL MEDICINE

## 2024-07-23 ENCOUNTER — ANESTHESIA EVENT (OUTPATIENT)
Dept: PERIOP | Facility: HOSPITAL | Age: 74
End: 2024-07-23
Payer: MEDICARE

## 2024-07-24 ENCOUNTER — ANESTHESIA (OUTPATIENT)
Dept: PERIOP | Facility: HOSPITAL | Age: 74
End: 2024-07-24
Payer: MEDICARE

## 2024-07-29 ENCOUNTER — TELEPHONE (OUTPATIENT)
Dept: CARDIOLOGY | Facility: CLINIC | Age: 74
End: 2024-07-29
Payer: MEDICARE

## 2024-07-29 NOTE — TELEPHONE ENCOUNTER
[place] [facility] [place]: Sudha  PHONE: [phone]   FAX: [phone]  PROCEDURE: Colonoscopy  SCHEDULED: 08/28/24  MEDS TO HOLD: warfarin

## 2024-07-30 ENCOUNTER — ANTICOAGULATION VISIT (OUTPATIENT)
Dept: CARDIOLOGY | Facility: CLINIC | Age: 74
End: 2024-07-30
Payer: MEDICARE

## 2024-07-30 ENCOUNTER — OFFICE VISIT (OUTPATIENT)
Dept: CARDIOLOGY | Facility: CLINIC | Age: 74
End: 2024-07-30
Payer: MEDICARE

## 2024-07-30 VITALS
WEIGHT: 194 LBS | SYSTOLIC BLOOD PRESSURE: 122 MMHG | HEART RATE: 67 BPM | BODY MASS INDEX: 27.06 KG/M2 | DIASTOLIC BLOOD PRESSURE: 75 MMHG

## 2024-07-30 VITALS
DIASTOLIC BLOOD PRESSURE: 75 MMHG | BODY MASS INDEX: 27.16 KG/M2 | HEIGHT: 71 IN | HEART RATE: 67 BPM | WEIGHT: 194 LBS | SYSTOLIC BLOOD PRESSURE: 122 MMHG

## 2024-07-30 DIAGNOSIS — I48.21 PERMANENT ATRIAL FIBRILLATION: Primary | Chronic | ICD-10-CM

## 2024-07-30 DIAGNOSIS — G47.33 OBSTRUCTIVE SLEEP APNEA SYNDROME: Primary | ICD-10-CM

## 2024-07-30 DIAGNOSIS — I34.0 NONRHEUMATIC MITRAL VALVE REGURGITATION: ICD-10-CM

## 2024-07-30 DIAGNOSIS — I25.10 CORONARY ARTERY DISEASE INVOLVING NATIVE CORONARY ARTERY OF NATIVE HEART WITHOUT ANGINA PECTORIS: ICD-10-CM

## 2024-07-30 DIAGNOSIS — I48.11 LONGSTANDING PERSISTENT ATRIAL FIBRILLATION: ICD-10-CM

## 2024-07-30 DIAGNOSIS — E11.9 TYPE 2 DIABETES MELLITUS WITHOUT COMPLICATION, WITHOUT LONG-TERM CURRENT USE OF INSULIN: ICD-10-CM

## 2024-07-30 DIAGNOSIS — E78.00 PURE HYPERCHOLESTEROLEMIA: ICD-10-CM

## 2024-07-30 DIAGNOSIS — I10 ESSENTIAL HYPERTENSION: ICD-10-CM

## 2024-07-30 DIAGNOSIS — I35.1 NONRHEUMATIC AORTIC VALVE INSUFFICIENCY: ICD-10-CM

## 2024-07-30 DIAGNOSIS — Z01.810 PREOP CARDIOVASCULAR EXAM: ICD-10-CM

## 2024-07-30 LAB — INR PPP: 2.8 (ref 2–3)

## 2024-07-30 PROCEDURE — 1160F RVW MEDS BY RX/DR IN RCRD: CPT | Performed by: INTERNAL MEDICINE

## 2024-07-30 PROCEDURE — 36416 COLLJ CAPILLARY BLOOD SPEC: CPT | Performed by: INTERNAL MEDICINE

## 2024-07-30 PROCEDURE — 3078F DIAST BP <80 MM HG: CPT | Performed by: INTERNAL MEDICINE

## 2024-07-30 PROCEDURE — 1159F MED LIST DOCD IN RCRD: CPT | Performed by: INTERNAL MEDICINE

## 2024-07-30 PROCEDURE — 99214 OFFICE O/P EST MOD 30 MIN: CPT | Performed by: INTERNAL MEDICINE

## 2024-07-30 PROCEDURE — 85610 PROTHROMBIN TIME: CPT | Performed by: INTERNAL MEDICINE

## 2024-07-30 PROCEDURE — 3074F SYST BP LT 130 MM HG: CPT | Performed by: INTERNAL MEDICINE

## 2024-07-30 RX ORDER — CETIRIZINE HYDROCHLORIDE 10 MG/1
10 TABLET ORAL DAILY
COMMUNITY

## 2024-07-30 NOTE — PROGRESS NOTES
Subjective:     Encounter Date:07/30/2024      Patient ID: Sandro Ramirez is a 74 y.o. male.    Chief Complaint:  Follow-upCurrent symptoms include no chest pain, no dizziness, no nausea, no palpitations, no shortness of breath, no headaches, no focal weakness, no abdominal pain and no cough.   74-year-old white male with history of coronary disease status post coronary bypass surgery history of valvular heart disease status post surgery atrial fibrillation sleep apnea hypertension hyperlipidemia diabetes presents to the office for a follow-up.  Patient is currently stable without any symptoms of chest pain or shortness of breath at rest or exertion.  No complaint of any PND orthopnea.  No palpitation dizziness syncope or swelling of the feet.  Patient is taking all the medicines regular.  Patient does not smoke.    The following portions of the patient's history were reviewed and updated as appropriate: allergies, current medications, past family history, past medical history, past social history, past surgical history, and problem list.  Past Medical History:   Diagnosis Date    AAA (abdominal aortic aneurysm) 2006    Abnormal ECG     Allergic     Aneurysm     Arthritis     Asthma     Atrial fibrillation     COUMADIN    Benign prostatic hyperplasia     Bladder cancer     CHF (congestive heart failure)     Chronic anticoagulation     COUMADIN STARTED 2006    Clotting disorder     Colon polyp     Congenital heart disease     COPD (chronic obstructive pulmonary disease)     Coronary artery disease     Diabetes mellitus     Elevated cholesterol     Erectile dysfunction     Facial basal cell cancer     Gout     Hard to intubate     History of pancreatitis 2006    History of pneumonia     History of tick-borne disease 2015    HL (hearing loss)     BILAT HEARING AIDS    Hyperlipidemia     Hypertension     Myocardial infarction     X5    Obesity     Pancreatitis     Pneumonia     Sleep apnea     CURRENTLY NOT USING  "CPAP D/T RECALL    Transfusion history     STATES HIS SISTER HAD A SEVERE REACTION TO BLOOD TRANSFUSION    Type 2 diabetes mellitus      Past Surgical History:   Procedure Laterality Date    ARTERIOGRAM AORTIC Left 06/29/2022    Procedure: AORTIC STENT GRAFT PLACEMENT WITH ILIAC COILING;  Surgeon: Eric Sainz MD;  Location: [facility] OR 18/19;  Service: Vascular;  Laterality: Left;    CARDIAC CATHETERIZATION      CARDIAC SURGERY      CHOLECYSTECTOMY      COLONOSCOPY      COLONOSCOPY N/A 07/27/2023    Procedure: COLONOSCOPY to cecum with cold biopsy and cold snare polypectomies and clips;  Surgeon: Vitor Haley MD;  Location: [facility] ENDOSCOPY;  Service: Gastroenterology;  Laterality: N/A;  Pre - H/O polyps.  Post - diverticulosis, polyps, hemorrhoids    CORONARY ARTERY BYPASS GRAFT  2006    X2    CYSTOSCOPY      STATES HAD BLADDER TUMORS REMOVED X2    CYSTOSCOPY WITH CLOT EVACUATION N/A 01/17/2024    Procedure: CYSTOSCOPY WITH CLOT EVACUATION, WITH FULGRATION;  Surgeon: Daniel Hamm MD;  Location: [facility] OR;  Service: Urology;  Laterality: N/A;    PROSTATE SURGERY      X2- STATES PLACED COILS IN TO HELP WITH FLOW AND THEN HAD TO REMOVE A PIECE THAT BROKE OFF AND WAS IN BLADDER    UPPER GASTROINTESTINAL ENDOSCOPY       /75   Pulse 67   Ht 180.3 cm (71\")   Wt 88 kg (194 lb)   BMI 27.06 kg/m²   Family History   Problem Relation Age of Onset    Hypertension Mother     Colon cancer Mother     Cancer Mother     Diabetes Mother     Arthritis Mother     Cancer Father     Heart attack Father     Hearing loss Father     Heart disease Father     Hypertension Sister     Hypertension Brother     Colon cancer Brother     Cancer Brother     Heart disease Paternal Grandfather     Heart attack Paternal Grandfather     Malig Hyperthermia Neg Hx     Colon polyps Neg Hx     Crohn's disease Neg Hx     Irritable bowel syndrome Neg Hx     Ulcerative colitis Neg Hx        Current Outpatient Medications: "     albuterol sulfate  (90 Base) MCG/ACT inhaler, Inhale 2 puffs Every 4 (Four) Hours As Needed for Wheezing., Disp: , Rfl:     allopurinol (ZYLOPRIM) 100 MG tablet, Take 3 tablets by mouth Every Night., Disp: , Rfl:     amLODIPine (NORVASC) 5 MG tablet, Take 1 tablet by mouth Daily., Disp: 90 tablet, Rfl: 3    aspirin 81 MG tablet, Take 1 tablet by mouth Daily. PT STATES WAS INSTRUCTED TO CONTINUE TO TAKE THIS PER GET ANDERSEN, Disp: , Rfl:     atenolol (TENORMIN) 50 MG tablet, TAKE 1 TABLET BY MOUTH EVERY DAY AT NIGHT, Disp: 90 tablet, Rfl: 3    atorvastatin (LIPITOR) 20 MG tablet, TAKE 1 TABLET BY MOUTH EVERY DAY, Disp: 90 tablet, Rfl: 1    cetirizine (zyrTEC) 10 MG tablet, Take 1 tablet by mouth Daily., Disp: , Rfl:     FLUTICASONE FUROATE IN, Inhale., Disp: , Rfl:     losartan (COZAAR) 100 MG tablet, Take 1 tablet by mouth Daily., Disp: 90 tablet, Rfl: 3    warfarin (COUMADIN) 7.5 MG tablet, TAKE 1 TABLET BY MOUTH DAILY. TAKE 1.5 TABLETS ON MON, WEDS AND FRI OR AS DIRECTED., Disp: 110 tablet, Rfl: 0  Allergies   Allergen Reactions    Bee Venom Anaphylaxis     HIVES-SWOLLEN THROAT    Meperidine Hives    Midazolam Hives    Propofol Other (See Comments)     UNCLEAR-SVT, HYPOTENSION-STATES SEVERE ALMOST CODED    Sulfa Antibiotics Hives    Hydrocodone Nausea And Vomiting    Simvastatin Myalgia     Social History     Socioeconomic History    Marital status:      Spouse name: Nichelle    Number of children: 3    Years of education: 12   Tobacco Use    Smoking status: Former     Current packs/day: 0.00     Average packs/day: 1 pack/day for 30.0 years (30.0 ttl pk-yrs)     Types: Cigarettes     Start date: 1976     Quit date: 2006     Years since quittin.1     Passive exposure: Past    Smokeless tobacco: Never   Vaping Use    Vaping status: Never Used   Substance and Sexual Activity    Alcohol use: Not Currently     Comment: one or two beers 6 times a year    Drug use: Never    Sexual activity:  Not Currently     Partners: Female     Birth control/protection: None     Review of Systems   Constitutional: Negative for malaise/fatigue.   Cardiovascular:  Negative for chest pain, dyspnea on exertion, leg swelling and palpitations.   Respiratory:  Negative for cough and shortness of breath.    Gastrointestinal:  Negative for abdominal pain, nausea and vomiting.   Neurological:  Negative for dizziness, focal weakness, headaches, light-headedness and numbness.   All other systems reviewed and are negative.             Objective:     Constitutional:       Appearance: Well-developed.   Eyes:      General: No scleral icterus.     Conjunctiva/sclera: Conjunctivae normal.   HENT:      Head: Normocephalic and atraumatic.   Neck:      Vascular: No carotid bruit or JVD.   Pulmonary:      Effort: Pulmonary effort is normal.      Breath sounds: Normal breath sounds. No wheezing. No rales.   Cardiovascular:      Normal rate. Regular rhythm.   Pulses:     Intact distal pulses.   Abdominal:      General: Bowel sounds are normal.      Palpations: Abdomen is soft.   Musculoskeletal:      Cervical back: Normal range of motion and neck supple. Skin:     General: Skin is warm and dry.      Findings: No rash.   Neurological:      Mental Status: Alert.       Procedures    Lab Review:         MDM    #1 coronary artery disease  Patient had coronary bypass surgery x 2 vessels with a LIMA to LAD and SVG to the marginal branch and is currently stable without any angina  2.  Valvular heart disease  Patient had mitral valve repair and aortic valve replacement with a bioprosthetic valve and is currently stable on medications  3.  Atrial fibrillation  Patient is currently on warfarin and keep the INR around 2-2.5 but also on atenolol for rate control.  4.  Hypertension  Patient blood pressure currently stable on atenolol losartan and amlodipine  5.  Hyperlipidemia  Patient on atorvastatin and the lipid levels are well within normal  limits.  6.  Obstructive sleep apnea  Venous sleep apnea and uses a CPAP machine  7.  Preop evaluation  Patient needs warfarin held for 5 days but he will have bridging with Lovenox after his colonoscopy and continue the Lovenox and warfarin until INR is 2.0 at which time we will stop the Lovenox.  I will send a prescription for 1 mg/kg Subcu twice a day.    Patient's previous medical records, labs, and EKG were reviewed and discussed with the patient at today's visit.

## 2024-08-13 ENCOUNTER — OFFICE VISIT (OUTPATIENT)
Dept: FAMILY MEDICINE CLINIC | Facility: CLINIC | Age: 74
End: 2024-08-13
Payer: MEDICARE

## 2024-08-13 ENCOUNTER — TELEPHONE (OUTPATIENT)
Dept: GASTROENTEROLOGY | Facility: CLINIC | Age: 74
End: 2024-08-13
Payer: MEDICARE

## 2024-08-13 VITALS
SYSTOLIC BLOOD PRESSURE: 140 MMHG | OXYGEN SATURATION: 95 % | RESPIRATION RATE: 17 BRPM | HEIGHT: 71 IN | HEART RATE: 63 BPM | BODY MASS INDEX: 27.38 KG/M2 | WEIGHT: 195.6 LBS | TEMPERATURE: 97.6 F | DIASTOLIC BLOOD PRESSURE: 68 MMHG

## 2024-08-13 DIAGNOSIS — G47.33 OBSTRUCTIVE SLEEP APNEA SYNDROME: ICD-10-CM

## 2024-08-13 DIAGNOSIS — I38 VALVULAR HEART DISEASE: ICD-10-CM

## 2024-08-13 DIAGNOSIS — I48.21 PERMANENT ATRIAL FIBRILLATION: Chronic | ICD-10-CM

## 2024-08-13 DIAGNOSIS — Z85.51 HISTORY OF BLADDER CANCER: ICD-10-CM

## 2024-08-13 DIAGNOSIS — Z87.891 HISTORY OF TOBACCO USE: ICD-10-CM

## 2024-08-13 DIAGNOSIS — E11.9 TYPE 2 DIABETES MELLITUS WITHOUT COMPLICATION, WITHOUT LONG-TERM CURRENT USE OF INSULIN: ICD-10-CM

## 2024-08-13 DIAGNOSIS — K13.0 LESION OF LIP: ICD-10-CM

## 2024-08-13 DIAGNOSIS — N40.1 BENIGN PROSTATIC HYPERPLASIA WITH LOWER URINARY TRACT SYMPTOMS, SYMPTOM DETAILS UNSPECIFIED: ICD-10-CM

## 2024-08-13 DIAGNOSIS — Z79.01 WARFARIN ANTICOAGULATION: ICD-10-CM

## 2024-08-13 DIAGNOSIS — J44.9 CHRONIC OBSTRUCTIVE PULMONARY DISEASE, UNSPECIFIED COPD TYPE: ICD-10-CM

## 2024-08-13 DIAGNOSIS — Z00.00 ENCOUNTER FOR SUBSEQUENT ANNUAL WELLNESS VISIT IN MEDICARE PATIENT: Primary | ICD-10-CM

## 2024-08-13 DIAGNOSIS — I10 PRIMARY HYPERTENSION: ICD-10-CM

## 2024-08-13 DIAGNOSIS — E79.0 HYPERURICEMIA: ICD-10-CM

## 2024-08-13 DIAGNOSIS — E78.2 MIXED HYPERLIPIDEMIA: ICD-10-CM

## 2024-08-13 NOTE — ASSESSMENT & PLAN NOTE
BMI is >= 25 and <30. (Overweight) The following options were offered after discussion;: information on healthy weight added to patient's after visit summary

## 2024-08-13 NOTE — PATIENT INSTRUCTIONS
Have colonoscopy later this month as scheduled.  Call office for lab/test results if you have not received a call from our office or [employer] message in 1-2 days.    Continue current medications and treatment.   Follow up with cardiology, gastroenterology, urology, podiatry, endocrinology, sleep medicine per their recommendations.   Recommend influenza vaccine when available.  Follow up with [facility] for hearing problems.   Have labs drawn 3-4 days prior to f/u appointment.

## 2024-08-13 NOTE — PROGRESS NOTES
Subjective   The ABCs of the Annual Wellness Visit  Medicare Wellness Visit      Sandro Ramirez is a 74 y.o. patient who presents for a Medicare Wellness Visit.    The following portions of the patient's history were reviewed and   updated as appropriate: allergies, current medications, past family history, past medical history, past social history, past surgical history, and problem list.    Compared to one year ago, the patient's physical   health is the same.  Compared to one year ago, the patient's mental   health is the same.    Recent Hospitalizations:  This patient has had a [facility] admission record on file within the last 365 days.  Current Medical Providers:  Patient Care Team:  Colette Dominguez APRN as PCP - General (Nurse Practitioner)  Thia Galloway MD as Consulting Physician (Interventional Cardiology)  Seipel, Joseph F, MD as Consulting Physician (Neurology)  Daniel Hamm MD as Consulting Physician (Urology)  Abbey Mary APRN as Nurse Practitioner (Nurse Practitioner)  Shawn Van MD as Consulting Physician (Endocrinology)  Giovanny Lan Jr., DPM as Consulting Physician (Podiatry)    Outpatient Medications Prior to Visit   Medication Sig Dispense Refill    albuterol sulfate  (90 Base) MCG/ACT inhaler Inhale 2 puffs Every 4 (Four) Hours As Needed for Wheezing.      allopurinol (ZYLOPRIM) 100 MG tablet Take 3 tablets by mouth Every Night.      amLODIPine (NORVASC) 5 MG tablet Take 1 tablet by mouth Daily. 90 tablet 3    aspirin 81 MG tablet Take 1 tablet by mouth Daily. PT STATES WAS INSTRUCTED TO CONTINUE TO TAKE THIS PER GET ANDERSEN      atenolol (TENORMIN) 50 MG tablet TAKE 1 TABLET BY MOUTH EVERY DAY AT NIGHT 90 tablet 3    atorvastatin (LIPITOR) 20 MG tablet TAKE 1 TABLET BY MOUTH EVERY DAY 90 tablet 1    cetirizine (zyrTEC) 10 MG tablet Take 1 tablet by mouth Daily.      FLUTICASONE FUROATE IN Inhale.      losartan (COZAAR) 100 MG tablet Take 1 tablet by  "mouth Daily. 90 tablet 3    warfarin (COUMADIN) 7.5 MG tablet TAKE 1 TABLET BY MOUTH DAILY. TAKE 1.5 TABLETS ON MON, WEDS AND FRI OR AS DIRECTED. 110 tablet 0     No facility-administered medications prior to visit.     No opioid medication identified on active medication list. I have reviewed chart for other potential  high risk medication/s and harmful drug interactions in the elderly.      Aspirin is on active medication list. Aspirin use is indicated based on review of current medical condition/s. Pros and cons of this therapy have been discussed today. Benefits of this medication outweigh potential harm.  Patient has been encouraged to continue taking this medication.  .      Patient Active Problem List   Diagnosis    Atrial fibrillation    Abdominal aortic aneurysm    Bladder cancer    Atherosclerosis of native coronary artery of native heart with angina pectoris    Mixed hyperlipidemia    Primary hypertension    Sleep apnea    Status post coronary artery bypass graft    Hyperuricemia    Type 2 diabetes mellitus with hyperglycemia, without long-term current use of insulin    Prostatism    COPD (chronic obstructive pulmonary disease)    AAA (abdominal aortic aneurysm) without rupture    History of colon polyps    Encounter for screening for malignant neoplasm of colon    Acute UTI    Hematuria    BMI 27.0-27.9,adult     Advance Care Planning Advance Directive is not on file.  ACP discussion was held with the patient during this visit. Patient does not have an advance directive, declines further assistance.            Objective   Vitals:    08/13/24 0906 08/13/24 0937   BP: 156/76 140/68  Comment: left arm   BP Location: Left arm Left arm   Patient Position: Sitting Sitting   Cuff Size: Adult    Pulse: 63    Resp: 17    Temp: 97.6 °F (36.4 °C)    TempSrc: Oral    SpO2: 95%    Weight: 88.7 kg (195 lb 9.6 oz)    Height: 180.3 cm (71\")        Estimated body mass index is 27.28 kg/m² as calculated from the " "following:    Height as of this encounter: 180.3 cm (71\").    Weight as of this encounter: 88.7 kg (195 lb 9.6 oz).    BMI is >= 25 and <30. (Overweight) The following options were offered after discussion;: information on healthy weight added to patient's after visit summary        Does the patient have evidence of cognitive impairment? No   ACE III MINI    ATTENTION  What is the year: correct  What is the month of the year: correct  What is the day of the week?: correct  What is the date?: correct  MEMORY  Repeat address three times, only score third attempt: [address] [place], [place]: 6  HOW MANY ANIMALS DID THE PATIENT NAME  Verbal Fluency -- Animal Names (0-25): 22+  CLOCK DRAWING  Clock Drawing: All Correct  MEMORY RECALL  Tell me what you remember about that name and address we were repeating at the beginnin  ACE TOTAL SCORE  Total ACE Score - <25/30 strongly suggests cognitive impairment; <21/30 almost certainly shows dementia: 27                                                                                               Health  Risk Assessment    Smoking Status:  Social History     Tobacco Use   Smoking Status Former    Current packs/day: 0.00    Average packs/day: 1 pack/day for 30.0 years (30.0 ttl pk-yrs)    Types: Cigarettes    Start date: 1976    Quit date: 2006    Years since quittin.1    Passive exposure: Past   Smokeless Tobacco Never     Alcohol Consumption:  Social History     Substance and Sexual Activity   Alcohol Use Not Currently    Comment: one or two beers 6 times a year       Fall Risk Screen  STEADI Fall Risk Assessment was completed, and patient is at LOW risk for falls.Assessment completed on:2024    Depression Screenin/13/2024     9:05 AM   PHQ-2/PHQ-9 Depression Screening   Little Interest or Pleasure in Doing Things 0-->not at all   Feeling Down, Depressed or Hopeless 0-->not at all   PHQ-9: Brief Depression Severity Measure " Score 0     Health Habits and Functional and Cognitive Screenin/7/2024     7:45 AM   Functional & Cognitive Status   Do you have difficulty preparing food and eating? No   Do you have difficulty bathing yourself, getting dressed or grooming yourself? No   Do you have difficulty using the toilet? No   Do you have difficulty moving around from place to place? No   Do you have trouble with steps or getting out of a bed or a chair? No   Current Diet Diabetic Diet   Dental Exam Not up to date   Eye Exam Up to date   Exercise (times per week) 0 times per week   Current Exercises Include No Regular Exercise   Do you need help using the phone?  No   Are you deaf or do you have serious difficulty hearing?  Yes   Do you need help to go to places out of walking distance? No   Do you need help shopping? No   Do you need help preparing meals?  No   Do you need help with housework?  No   Do you need help with laundry? No   Do you need help taking your medications? No   Do you need help managing money? No   Do you ever drive or ride in a car without wearing a seat belt? No   Have you felt unusual stress, anger or loneliness in the last month? No   Who do you live with? Spouse   If you need help, do you have trouble finding someone available to you? No   Have you been bothered in the last four weeks by sexual problems? No   Do you have difficulty concentrating, remembering or making decisions? No           Age-appropriate Screening Schedule:  Refer to the list below for future screening recommendations based on patient's age, sex and/or medical conditions. Orders for these recommended tests are listed in the plan section. The patient has been provided with a written plan.    Health Maintenance List  Health Maintenance   Topic Date Due    HEMOGLOBIN A1C  2024    LIPID PANEL  2024    COLORECTAL CANCER SCREENING  2024    INFLUENZA VACCINE  2024    DIABETIC EYE EXAM  2024    COVID-19 Vaccine (4 -  2023-24 season) 04/29/2025 (Originally 9/1/2023)    ANNUAL WELLNESS VISIT  08/13/2025    BMI FOLLOWUP  08/13/2025    HEPATITIS C SCREENING  Completed    Pneumococcal Vaccine 65+  Completed    AAA SCREEN (ONE-TIME)  Completed    DIABETIC FOOT EXAM  Discontinued    URINE MICROALBUMIN  Discontinued    TDAP/TD VACCINES  Discontinued    ZOSTER VACCINE  Discontinued    LUNG CANCER SCREENING  Discontinued                                                                                                                                                CMS Preventative Services Quick Reference  Risk Factors Identified During Encounter  Hearing Problem:  has hearing aids, followed by [facility]  Immunizations Discussed/Encouraged: Influenza  Vision Screening Recommended    The above risks/problems have been discussed with the patient.  Pertinent information has been shared with the patient in the After Visit Summary.  An After Visit Summary and PPPS were made available to the patient.    Follow Up:   Next Medicare Wellness visit to be scheduled in 1 year.         Additional E&M Note during same encounter follows:  Patient has additional, significant, and separately identifiable condition(s)/problem(s) that require work above and beyond the Medicare Wellness Visit     Chief Complaint  Medicare Wellness-subsequent and Hypertension (3 month f/u)    Subjective   HPI  Manish is also being seen today for additional medical problem/s.        Hypertension/mitral regurgitation/aortic valve insufficiency s/p mitral valve pair and aortic valve replacement with bioprosthetic valve/atrial fibrillation/CAD -stable-followed by cardiology Dr. Galloway.  Denies chest pain, dizziness, palpitations, lower extremity swelling.   Blood pressure at home is around 140/80's. He no longer smokes. He is taking asa 81mg daily, atenolol 50mg nightly, norvasc 5mg daily, losartan 100mg daily, warfarin per Dr. Galloway.   BPH/history of bladder cancer-stable-[facility]  [facility]  Had greenlight TURP 1/2024 with some bleeding complications and bladder perforation afterward.  He feels like he is doing well, denies urinary complaints.   Diabetes iwurrcka-huomyv-rtxsobe diabetic diet.  Does not take medication.  Was on Jardiance in the past but stopped this secondary to cost.  He is followed by endocrinology Dr. Van.  Most recent hemoglobin A1c was 6.6 in 1/2024. Labs were ordered 1/2024 but patient has not had performed yet - lipid pane/A1c/cmp/microalbumin/creatinine orders pending.  His fasting blood glucose at home is usually around 100.   Sleep apnea - stable - wears cpap nightly, feels sleep is restorative.  Followed by Dr. Seipel.   Gout - stable - no recent episodes of gout.  Followed by podiatry Dr. Lan. Takes allopurinol 100mg daily.  Uric acid was low 2/2024 at 3.2.   Hyperlipidemia - stable - last lipid panel 7/2023 wnl, takes atorvastatin 20mg daily.   COPD - stable - not followed by pulmonology.  PFT 2/2024 with findings of COPD.  He has albuterol hfa 2 puffs every 4-6 hours as needed for shortness of breath/wheezing, states he rarely uses this. No recent ct chest to screen for lung cancer.   Seasonal/environmental allergies - stable - rhinorrhea is worse in summer with mowing grass, takes zyrtec 10mg daily and fluticasone nasal spray daily.    He is having colonoscopy performed 8/2024.    Objective   Vital Signs:  /68 (BP Location: Left arm, Patient Position: Sitting) Comment: left arm  Pulse 63   Temp 97.6 °F (36.4 °C) (Oral)   Resp 17   Ht 180.3 cm (71\")   Wt 88.7 kg (195 lb 9.6 oz)   SpO2 95%   BMI 27.28 kg/m²   Physical Exam  Vitals and nursing note reviewed.   Constitutional:       General: He is not in acute distress.     Appearance: Normal appearance. He is not ill-appearing or diaphoretic.   HENT:      Head: Normocephalic and atraumatic.      Nose: Nose normal.      Mouth/Throat:      Mouth: Mucous membranes are moist.   Eyes:      " General: No scleral icterus.     Conjunctiva/sclera: Conjunctivae normal.   Cardiovascular:      Rate and Rhythm: Rhythm irregularly irregular.      Heart sounds: Murmur heard.      Systolic murmur is present with a grade of 2/6.   Pulmonary:      Effort: Pulmonary effort is normal.   Abdominal:      General: Bowel sounds are normal. There is no distension.      Palpations: Abdomen is soft.      Tenderness: There is no abdominal tenderness. There is no guarding.   Musculoskeletal:      Cervical back: Normal range of motion and neck supple.   Skin:     General: Skin is warm and dry.      Capillary Refill: Capillary refill takes less than 2 seconds.      Comments: Left lower lip - purplish raised round lesion, slight scaling around border of lesion, no pain with palpation, no drainage   Neurological:      Mental Status: He is alert and oriented to person, place, and time.   Psychiatric:         Mood and Affect: Mood normal.         Behavior: Behavior normal.         Thought Content: Thought content normal.         Judgment: Judgment normal.                 Assessment and Plan               Encounter for subsequent annual wellness visit in Medicare patient    BMI 27.0-27.9,adult  BMI is >= 25 and <30. (Overweight) The following options were offered after discussion;: information on healthy weight added to patient's after visit summary    Primary hypertension    Permanent atrial fibrillation    Valvular heart disease    Warfarin anticoagulation    Benign prostatic hyperplasia with lower urinary tract symptoms, symptom details unspecified    History of bladder cancer    Type 2 diabetes mellitus without complication, without long-term current use of insulin    Obstructive sleep apnea syndrome    Hyperuricemia    Mixed hyperlipidemia     Chronic obstructive pulmonary disease, unspecified COPD type      History of tobacco use    Lesion of lip      Orders Placed This Encounter   Procedures     CT Chest Low Dose Cancer  Screening WO     Standing Status:   Future     Standing Expiration Date:   8/13/2025     Order Specific Question:   The patient is age 50-80 (Medicare coverage 50-77)     Answer:   74     Order Specific Question:   The patient is a current smoker?     Answer:   No     Order Specific Question:   Is the patient a former smoker who has quit within the last 15 years? (If the answer to this is no they do not meet criteria for this exam)     Answer:   Yes     Order Specific Question:   The number of years since quitting smoking.     Answer:   18     Order Specific Question:   Does the patient have a smoking history of 20 pack-years or greater? (If the answer to this is no they do not meet criteria for this exam)     Answer:   Yes     Order Specific Question:   Actual pack - year smoking history (number):     Answer:   30     Order Specific Question:   Does the patient have any clinical signs/symptoms of lung cancer?     Answer:   No     Order Specific Question:   The patient was engaged in shared decision-making for this test:     Answer:   Yes     Order Specific Question:   Release to patient     Answer:   Routine Release [[id number]]    CBC Auto Differential     Standing Status:   Future     Standing Expiration Date:   8/13/2025     Order Specific Question:   Release to patient     Answer:   Routine Release [[id number]]    Comprehensive Metabolic Panel     Standing Status:   Future     Standing Expiration Date:   8/13/2025     Order Specific Question:   Release to patient     Answer:   Routine Release [[id number]]    Hemoglobin A1c     Standing Status:   Future     Standing Expiration Date:   8/13/2025     Order Specific Question:   Release to patient     Answer:   Routine Release [[id number]]    Uric Acid     Standing Status:   Future     Standing Expiration Date:   8/13/2025     Order Specific Question:   Release to patient     Answer:   Routine Release [[id number]]    Ambulatory Referral to Dermatology     Referral  Priority:   Routine     Referral Type:   Consultation     Referral Reason:   Specialty Services Required     Requested Specialty:   Dermatology     Number of Visits Requested:   1     Patient Instructions   Have colonoscopy later this month as scheduled.  Call office for lab/test results if you have not received a call from our office or [facility] message in 1-2 days.    Continue current medications and treatment.   Follow up with cardiology, gastroenterology, urology, podiatry, endocrinology, sleep medicine per their recommendations.   Recommend influenza vaccine when available.  Follow up with [facility] for hearing problems.   Have labs drawn 3-4 days prior to f/u appointment.    Patient would like to see dermatology for lip lesion.          Follow Up   Return in about 6 months (around 2/13/2025) for Recheck, Diabetes, Hypertension, Hyperlipidemia.  Patient was given instructions and counseling regarding his condition or for health maintenance advice. Please see specific information pulled into the AVS if appropriate.

## 2024-08-13 NOTE — TELEPHONE ENCOUNTER
"        Hub staff attempted to follow warm transfer process and was unsuccessful     Caller: Sandro Ramirez \"Manish\"    Relationship to patient: Self    Best call back number: [phone] (home)       Patient is needing: PT RETURNING PHONE CALL FROM APRIL IN REGARD TO STOPPING COUMADIN BEFORE PROCEDURE ON 08/28/24.          "

## 2024-08-14 ENCOUNTER — LAB (OUTPATIENT)
Dept: LAB | Facility: HOSPITAL | Age: 74
End: 2024-08-14
Payer: MEDICARE

## 2024-08-14 DIAGNOSIS — N40.1 BENIGN PROSTATIC HYPERPLASIA WITH LOWER URINARY TRACT SYMPTOMS, SYMPTOM DETAILS UNSPECIFIED: ICD-10-CM

## 2024-08-14 DIAGNOSIS — E79.0 HYPERURICEMIA: ICD-10-CM

## 2024-08-14 DIAGNOSIS — E11.9 TYPE 2 DIABETES MELLITUS WITHOUT COMPLICATION, WITHOUT LONG-TERM CURRENT USE OF INSULIN: ICD-10-CM

## 2024-08-14 DIAGNOSIS — I10 PRIMARY HYPERTENSION: ICD-10-CM

## 2024-08-14 LAB
ALBUMIN SERPL-MCNC: 4.1 G/DL (ref 3.5–5.2)
ALBUMIN/GLOB SERPL: 1.6 G/DL
ALP SERPL-CCNC: 95 U/L (ref 39–117)
ALT SERPL W P-5'-P-CCNC: 20 U/L (ref 1–41)
ANION GAP SERPL CALCULATED.3IONS-SCNC: 9 MMOL/L (ref 5–15)
AST SERPL-CCNC: 22 U/L (ref 1–40)
BASOPHILS # BLD AUTO: 0.06 10*3/MM3 (ref 0–0.2)
BASOPHILS NFR BLD AUTO: 0.8 % (ref 0–1.5)
BILIRUB SERPL-MCNC: 0.6 MG/DL (ref 0–1.2)
BUN SERPL-MCNC: 13 MG/DL (ref 8–23)
BUN/CREAT SERPL: 15.9 (ref 7–25)
CALCIUM SPEC-SCNC: 9.2 MG/DL (ref 8.6–10.5)
CHLORIDE SERPL-SCNC: 104 MMOL/L (ref 98–107)
CO2 SERPL-SCNC: 27 MMOL/L (ref 22–29)
CREAT SERPL-MCNC: 0.82 MG/DL (ref 0.76–1.27)
DEPRECATED RDW RBC AUTO: 44.9 FL (ref 37–54)
EGFRCR SERPLBLD CKD-EPI 2021: 93.3 ML/MIN/1.73
EOSINOPHIL # BLD AUTO: 0.14 10*3/MM3 (ref 0–0.4)
EOSINOPHIL NFR BLD AUTO: 1.8 % (ref 0.3–6.2)
ERYTHROCYTE [DISTWIDTH] IN BLOOD BY AUTOMATED COUNT: 13.7 % (ref 12.3–15.4)
GLOBULIN UR ELPH-MCNC: 2.6 GM/DL
GLUCOSE SERPL-MCNC: 119 MG/DL (ref 65–99)
HBA1C MFR BLD: 7.6 % (ref 4.8–5.6)
HCT VFR BLD AUTO: 47.3 % (ref 37.5–51)
HGB BLD-MCNC: 15.1 G/DL (ref 13–17.7)
IMM GRANULOCYTES # BLD AUTO: 0.03 10*3/MM3 (ref 0–0.05)
IMM GRANULOCYTES NFR BLD AUTO: 0.4 % (ref 0–0.5)
LYMPHOCYTES # BLD AUTO: 1.86 10*3/MM3 (ref 0.7–3.1)
LYMPHOCYTES NFR BLD AUTO: 23.9 % (ref 19.6–45.3)
MCH RBC QN AUTO: 28.6 PG (ref 26.6–33)
MCHC RBC AUTO-ENTMCNC: 31.9 G/DL (ref 31.5–35.7)
MCV RBC AUTO: 89.6 FL (ref 79–97)
MONOCYTES # BLD AUTO: 0.69 10*3/MM3 (ref 0.1–0.9)
MONOCYTES NFR BLD AUTO: 8.9 % (ref 5–12)
NEUTROPHILS NFR BLD AUTO: 5 10*3/MM3 (ref 1.7–7)
NEUTROPHILS NFR BLD AUTO: 64.2 % (ref 42.7–76)
NRBC BLD AUTO-RTO: 0 /100 WBC (ref 0–0.2)
PLATELET # BLD AUTO: 189 10*3/MM3 (ref 140–450)
PMV BLD AUTO: 8.9 FL (ref 6–12)
POTASSIUM SERPL-SCNC: 4.4 MMOL/L (ref 3.5–5.2)
PROT SERPL-MCNC: 6.7 G/DL (ref 6–8.5)
RBC # BLD AUTO: 5.28 10*6/MM3 (ref 4.14–5.8)
SODIUM SERPL-SCNC: 140 MMOL/L (ref 136–145)
URATE SERPL-MCNC: 3.3 MG/DL (ref 3.4–7)
WBC NRBC COR # BLD AUTO: 7.78 10*3/MM3 (ref 3.4–10.8)

## 2024-08-14 PROCEDURE — 36415 COLL VENOUS BLD VENIPUNCTURE: CPT

## 2024-08-14 PROCEDURE — 83036 HEMOGLOBIN GLYCOSYLATED A1C: CPT

## 2024-08-14 PROCEDURE — 80053 COMPREHEN METABOLIC PANEL: CPT

## 2024-08-14 PROCEDURE — 84550 ASSAY OF BLOOD/URIC ACID: CPT

## 2024-08-14 PROCEDURE — 85025 COMPLETE CBC W/AUTO DIFF WBC: CPT

## 2024-08-19 ENCOUNTER — TELEPHONE (OUTPATIENT)
Dept: CARDIOLOGY | Facility: CLINIC | Age: 74
End: 2024-08-19
Payer: MEDICARE

## 2024-08-19 DIAGNOSIS — Z79.01 LONG TERM CURRENT USE OF ANTICOAGULANT: ICD-10-CM

## 2024-08-19 DIAGNOSIS — I48.11 LONGSTANDING PERSISTENT ATRIAL FIBRILLATION: Primary | ICD-10-CM

## 2024-08-19 NOTE — TELEPHONE ENCOUNTER
If patient holds warfarin for 5 days before colonoscopy then he should take warfarin and Lovenox 1 mg/kg SQ twice daily after colonoscopy until INR is 2.0 or more

## 2024-08-19 NOTE — TELEPHONE ENCOUNTER
Pt was given clearance to hold Warfarin for 5 days for colonoscopy. He is asking if he needs bridging with Lovenox? Please advise. Maribel

## 2024-08-19 NOTE — TELEPHONE ENCOUNTER
Asking if he can take blood thinner shots? If so he needs rx  Also has questions about surgery clearance for his warfarin, something about an additional day to hold warfarin?

## 2024-08-20 RX ORDER — ENOXAPARIN SODIUM 100 MG/ML
1 INJECTION SUBCUTANEOUS EVERY 12 HOURS
Status: DISCONTINUED | OUTPATIENT
Start: 2024-08-29 | End: 2024-08-22

## 2024-08-20 NOTE — TELEPHONE ENCOUNTER
Left voicemail for pt to call me back about his preferred pharmacy. He will need Lovenox 80 mg injections, bid for at least 3 days, then we will recheck his INR. Maribel

## 2024-08-20 NOTE — TELEPHONE ENCOUNTER
Patient came in about his he should take warfarin and Lovenox 1 mg/kg SQ twice daily after colonoscopy until INR is 2.0 or more. Gave as much as I could and advised him I will send his message over to the nurses

## 2024-08-22 RX ORDER — ENOXAPARIN SODIUM 100 MG/ML
80 INJECTION SUBCUTANEOUS EVERY 12 HOURS SCHEDULED
Qty: 4.8 ML | Refills: 0 | Status: SHIPPED | OUTPATIENT
Start: 2024-08-22 | End: 2024-08-22

## 2024-08-22 RX ORDER — ENOXAPARIN SODIUM 100 MG/ML
80 INJECTION SUBCUTANEOUS EVERY 12 HOURS SCHEDULED
Qty: 8 ML | Refills: 0 | Status: SHIPPED | OUTPATIENT
Start: 2024-08-22 | End: 2024-08-27

## 2024-08-22 NOTE — TELEPHONE ENCOUNTER
RX was sent as a clinic med not a med for the pharmacy. Spoke with Dr Rollins covering for Dr Galloway. Jian with 80mg vs 100mg every 12 hours. Sent to pharmacy. For 5 days Advised pt to take post procedure and pt is scheduled for INR in office to check INR

## 2024-08-28 ENCOUNTER — HOSPITAL ENCOUNTER (OUTPATIENT)
Facility: HOSPITAL | Age: 74
Setting detail: HOSPITAL OUTPATIENT SURGERY
Discharge: HOME | End: 2024-08-28
Attending: INTERNAL MEDICINE | Admitting: INTERNAL MEDICINE
Payer: MEDICARE

## 2024-08-28 ENCOUNTER — TELEPHONE (OUTPATIENT)
Dept: CARDIOLOGY | Facility: CLINIC | Age: 74
End: 2024-08-28
Payer: MEDICARE

## 2024-08-28 VITALS
BODY MASS INDEX: 26.83 KG/M2 | WEIGHT: 192.4 LBS | RESPIRATION RATE: 18 BRPM | SYSTOLIC BLOOD PRESSURE: 129 MMHG | TEMPERATURE: 97.8 F | OXYGEN SATURATION: 97 % | HEART RATE: 54 BPM | DIASTOLIC BLOOD PRESSURE: 64 MMHG

## 2024-08-28 DIAGNOSIS — Z86.010 HISTORY OF COLON POLYPS: ICD-10-CM

## 2024-08-28 DIAGNOSIS — Z12.11 ENCOUNTER FOR SCREENING FOR MALIGNANT NEOPLASM OF COLON: ICD-10-CM

## 2024-08-28 LAB
GLUCOSE BLDC GLUCOMTR-MCNC: 140 MG/DL (ref 70–130)
INR PPP: 1 (ref 0.9–1.1)
PROTHROMBIN TIME: 13.6 SECONDS (ref 12.1–15)

## 2024-08-28 PROCEDURE — 85610 PROTHROMBIN TIME: CPT

## 2024-08-28 PROCEDURE — 25810000003 LACTATED RINGERS PER 1000 ML

## 2024-08-28 PROCEDURE — 82948 REAGENT STRIP/BLOOD GLUCOSE: CPT

## 2024-08-28 PROCEDURE — 88305 TISSUE EXAM BY PATHOLOGIST: CPT | Performed by: INTERNAL MEDICINE

## 2024-08-28 PROCEDURE — 25010000002 GLYCOPYRROLATE 0.2 MG/ML SOLUTION

## 2024-08-28 PROCEDURE — 25010000002 FENTANYL CITRATE (PF) 50 MCG/ML SOLUTION

## 2024-08-28 PROCEDURE — 45385 COLONOSCOPY W/LESION REMOVAL: CPT | Performed by: INTERNAL MEDICINE

## 2024-08-28 RX ORDER — KETAMINE HYDROCHLORIDE 10 MG/ML
INJECTION, SOLUTION INTRAMUSCULAR; INTRAVENOUS AS NEEDED
Status: DISCONTINUED | OUTPATIENT
Start: 2024-08-28 | End: 2024-08-28 | Stop reason: SURG

## 2024-08-28 RX ORDER — SODIUM CHLORIDE, SODIUM LACTATE, POTASSIUM CHLORIDE, CALCIUM CHLORIDE 600; 310; 30; 20 MG/100ML; MG/100ML; MG/100ML; MG/100ML
100 INJECTION, SOLUTION INTRAVENOUS ONCE
Status: DISCONTINUED | OUTPATIENT
Start: 2024-08-28 | End: 2024-08-28 | Stop reason: HOSPADM

## 2024-08-28 RX ORDER — GLYCOPYRROLATE 0.2 MG/ML
INJECTION INTRAMUSCULAR; INTRAVENOUS AS NEEDED
Status: DISCONTINUED | OUTPATIENT
Start: 2024-08-28 | End: 2024-08-28 | Stop reason: SURG

## 2024-08-28 RX ORDER — LIDOCAINE HYDROCHLORIDE 10 MG/ML
0.5 INJECTION, SOLUTION INFILTRATION; PERINEURAL ONCE AS NEEDED
Status: DISCONTINUED | OUTPATIENT
Start: 2024-08-28 | End: 2024-08-28 | Stop reason: HOSPADM

## 2024-08-28 RX ORDER — ONDANSETRON 2 MG/ML
4 INJECTION INTRAMUSCULAR; INTRAVENOUS ONCE AS NEEDED
Status: DISCONTINUED | OUTPATIENT
Start: 2024-08-28 | End: 2024-08-28 | Stop reason: HOSPADM

## 2024-08-28 RX ORDER — DEXMEDETOMIDINE HYDROCHLORIDE 100 UG/ML
INJECTION, SOLUTION INTRAVENOUS AS NEEDED
Status: DISCONTINUED | OUTPATIENT
Start: 2024-08-28 | End: 2024-08-28 | Stop reason: SURG

## 2024-08-28 RX ORDER — SODIUM CHLORIDE 9 MG/ML
40 INJECTION, SOLUTION INTRAVENOUS AS NEEDED
Status: DISCONTINUED | OUTPATIENT
Start: 2024-08-28 | End: 2024-08-28 | Stop reason: HOSPADM

## 2024-08-28 RX ORDER — SODIUM CHLORIDE 0.9 % (FLUSH) 0.9 %
10 SYRINGE (ML) INJECTION AS NEEDED
Status: DISCONTINUED | OUTPATIENT
Start: 2024-08-28 | End: 2024-08-28 | Stop reason: HOSPADM

## 2024-08-28 RX ORDER — SODIUM CHLORIDE 0.9 % (FLUSH) 0.9 %
10 SYRINGE (ML) INJECTION EVERY 12 HOURS SCHEDULED
Status: DISCONTINUED | OUTPATIENT
Start: 2024-08-28 | End: 2024-08-28 | Stop reason: HOSPADM

## 2024-08-28 RX ORDER — FENTANYL CITRATE 50 UG/ML
INJECTION, SOLUTION INTRAMUSCULAR; INTRAVENOUS AS NEEDED
Status: DISCONTINUED | OUTPATIENT
Start: 2024-08-28 | End: 2024-08-28 | Stop reason: SURG

## 2024-08-28 RX ORDER — SODIUM CHLORIDE, SODIUM LACTATE, POTASSIUM CHLORIDE, CALCIUM CHLORIDE 600; 310; 30; 20 MG/100ML; MG/100ML; MG/100ML; MG/100ML
9 INJECTION, SOLUTION INTRAVENOUS CONTINUOUS
Status: DISCONTINUED | OUTPATIENT
Start: 2024-08-28 | End: 2024-08-28 | Stop reason: HOSPADM

## 2024-08-28 RX ADMIN — KETAMINE HYDROCHLORIDE 10 MG: 10 INJECTION INTRAMUSCULAR; INTRAVENOUS at 10:11

## 2024-08-28 RX ADMIN — GLYCOPYRROLATE 0.1 MG: 0.2 INJECTION INTRAMUSCULAR; INTRAVENOUS at 10:03

## 2024-08-28 RX ADMIN — FENTANYL CITRATE 50 MCG: 50 INJECTION, SOLUTION INTRAMUSCULAR; INTRAVENOUS at 10:03

## 2024-08-28 RX ADMIN — DEXMEDETOMIDINE HYDROCHLORIDE 8 MCG: 100 INJECTION, SOLUTION, CONCENTRATE INTRAVENOUS at 10:03

## 2024-08-28 RX ADMIN — SODIUM CHLORIDE, POTASSIUM CHLORIDE, SODIUM LACTATE AND CALCIUM CHLORIDE 9 ML/HR: 600; 310; 30; 20 INJECTION, SOLUTION INTRAVENOUS at 09:05

## 2024-08-28 RX ADMIN — KETAMINE HYDROCHLORIDE 30 MG: 10 INJECTION INTRAMUSCULAR; INTRAVENOUS at 10:03

## 2024-08-28 NOTE — TELEPHONE ENCOUNTER
"Korin With Dr Villeda's office called requesting pt hold warfarin and lovenox until Friday so the pt can have a  Excision of the lip. INR today was 1.0 pt has lovenox on hand. Advised Resume with lovenox until 12 hours preprocedure. Then resume warfarin ASAP. Advised pt per Dr Galloway he does not generally like to \"piggy back procedures\" and he would not suggest continuing to hold warfarin and lovenox until Friday. Pt verbalizes understanding and will take lovenox pre procedure and resume both thinners post procedure. apLPN   "

## 2024-08-28 NOTE — ANESTHESIA PREPROCEDURE EVALUATION
" Anesthesia Evaluation     Patient summary reviewed and Nursing notes reviewed   history of anesthetic complications (AAA repair airway note: Smooth induction, able to 2 hand mask with 100mm oral airway, limited view of cords with CMAC but able to easily pass ETT. (Gr IIb, CMAC-D)):  difficult airway  NPO Solid Status: > 8 hours  NPO Liquid Status: > 8 hours           Airway   Mallampati: III  TM distance: >3 FB  Neck ROM: full  No difficulty expected  Dental    (+) poor dentition    Comment: \"Partially broken\" tooth on upper right/back. Reports it is loose    Pulmonary     breath sounds clear to auscultation  (+) pneumonia (after cabg procedure) , COPD, asthma,sleep apnea on CPAP, decreased breath sounds  Cardiovascular   Exercise tolerance: poor (<4 METS)    ECG reviewed  PT is on anticoagulation therapy  Patient on routine beta blocker and Beta blocker given within 24 hours of surgery  Rhythm: irregular  Rate: normal    (+) hypertension, valvular problems/murmurs (MV repair, bioprosthetic aortic valve replacement), past MI (x5) , CAD, CABG, dysrhythmias Atrial Fib, CHF , hyperlipidemia    ROS comment: H/o AAA with repair 2022    ECHO 10/2023:   ·  Left ventricular ejection fraction appears to be 51 - 55%.  ·  The right ventricular cavity is mildly dilated.  ·  The left atrial cavity is moderately dilated.  ·  The right atrial cavity is dilated.  ·  Moderate aortic valve regurgitation is present.  ·  Moderate mitral valve regurgitation is present.  ·  Estimated right ventricular systolic pressure from tricuspid regurgitation is mildly elevated (35-45 mmHg).    EKG Jan 2024:  HEART RATE= 46  bpm  RR Interval= 1310  ms  LA Interval=   ms  P Horizontal Axis=   deg  P Front Axis=   deg  QRSD Interval= 157  ms  QT Interval= 496  ms  QTcB= 433  ms  QRS Axis= 176  deg  T Wave Axis= -28  deg  - ABNORMAL ECG -  Atrial fibrillation  slow venticular repol/ bradycardia  Right bundle branch block  Left posterior fascicular " "block  When compared with ECG of 06-Apr-2022 14:57:31,  Significant rate decrease  Electronically Signed By: Mingo Blank ([facility]) 24-Jan-2024 06:25:16  Date and Time of Study: 2024-01-17 11:52:10    Note from cardiology 7/2024:   #1 coronary artery disease  Patient had coronary bypass surgery x 2 vessels with a LIMA to LAD and SVG to the marginal branch and is currently stable without any angina  2.  Valvular heart disease  Patient had mitral valve repair and aortic valve replacement with a bioprosthetic valve and is currently stable on medications  3.  Atrial fibrillation  Patient is currently on warfarin and keep the INR around 2-2.5 but also on atenolol for rate control.  4.  Hypertension  Patient blood pressure currently stable on atenolol losartan and amlodipine  5.  Hyperlipidemia  Patient on atorvastatin and the lipid levels are well within normal limits.  6.  Obstructive sleep apnea  Venous sleep apnea and uses a CPAP machine  7.  Preop evaluation  Patient needs warfarin held for 5 days but he will have bridging with Lovenox after his colonoscopy and continue the Lovenox and warfarin until INR is 2.0 at which time we will stop the Lovenox.  I will send a prescription for 1 mg/kg Subcu twice a day.              Neuro/Psych  GI/Hepatic/Renal/Endo    (+) diabetes mellitus (a1c 7.6) type 2 poorly controlled    ROS Comment: BPH  H/o pancreatitis     Musculoskeletal     Abdominal    Substance History      OB/GYN          Other   arthritis,   history of cancer (BCC face, h/o bladder cancer)    ROS/Med Hx Other: B hearing aids (did not bring)      **propofol allergy** -- heart surgery 2016. Reports that \"lungs were filling up with water\" and needed emergent intubation. Last 3 anesthetics do not have propofol on MAR.                 Anesthesia Plan    ASA 4     MAC       Anesthetic plan, risks, benefits, and alternatives have been provided, discussed and informed consent has been obtained with: " patient.  Pre-procedure education provided  Use of blood products discussed with patient  Consented to blood products.    Plan discussed with CRNA.      CODE STATUS:

## 2024-08-28 NOTE — ANESTHESIA POSTPROCEDURE EVALUATION
Patient: Sandro Ramirez    Procedure Summary       Date: 08/28/24 Room / Location: [facility] ENDOSCOPY 2 /  [facility] OR    Anesthesia Start: 1001 Anesthesia Stop: 1023    Procedure: COLONOSCOPY WITH POLYPECTOMY Diagnosis:       History of colon polyps      Encounter for screening for malignant neoplasm of colon      (History of colon polyps [Z86.010])      (Encounter for screening for malignant neoplasm of colon [Z12.11])    Surgeons: Vitor Haley MD Provider: Colette Larson CRNA    Anesthesia Type: MAC ASA Status: 4            Anesthesia Type: MAC    Vitals  Vitals Value Taken Time   /65 08/28/24 1040   Temp 97.8 °F (36.6 °C) 08/28/24 1025   Pulse 54 08/28/24 1040   Resp 21 08/28/24 1040   SpO2 97 % 08/28/24 1040           Post Anesthesia Care and Evaluation    Patient location during evaluation: PHASE II  Patient participation: complete - patient participated  Level of consciousness: awake  Pain score: 0  Pain management: adequate    Airway patency: patent  Anesthetic complications: No anesthetic complications  PONV Status: none  Cardiovascular status: acceptable  Respiratory status: acceptable  Hydration status: acceptable

## 2024-08-28 NOTE — H&P
No chief complaint on file.      HPI  Patient here today for screening colonoscopy.  He has a history of multiple colon polyps.         Problem List:    Patient Active Problem List   Diagnosis    Atrial fibrillation    Abdominal aortic aneurysm    Bladder cancer    Atherosclerosis of native coronary artery of native heart with angina pectoris    Mixed hyperlipidemia    Primary hypertension    Sleep apnea    Status post coronary artery bypass graft    Hyperuricemia    Type 2 diabetes mellitus with hyperglycemia, without long-term current use of insulin    Prostatism    COPD (chronic obstructive pulmonary disease)    AAA (abdominal aortic aneurysm) without rupture    History of colon polyps    Encounter for screening for malignant neoplasm of colon    Acute UTI    Hematuria    BMI 27.0-27.9,adult       Medical History:    Past Medical History:   Diagnosis Date    AAA (abdominal aortic aneurysm) 2006    Abnormal ECG     Allergic     Aneurysm     Arthritis     Asthma     Atrial fibrillation     COUMADIN    Benign prostatic hyperplasia     Bladder cancer     CHF (congestive heart failure)     Chronic anticoagulation     COUMADIN STARTED 2006    Clotting disorder     Colon polyp     Congenital heart disease     COPD (chronic obstructive pulmonary disease)     Coronary artery disease     Diabetes mellitus     treat with diet    Ehrlichiosis     Elevated cholesterol     Erectile dysfunction     Facial basal cell cancer     Gout     Hard to intubate     History of pancreatitis 2006    History of pneumonia     History of tick-borne disease 2015    HL (hearing loss)     BILAT HEARING AIDS    Hyperlipidemia     Hypertension     Myocardial infarction     X5    Obesity     Pancreatitis     Pneumonia     Sleep apnea     CURRENTLY NOT USING CPAP D/T RECALL    Transfusion history     STATES HIS SISTER HAD A SEVERE REACTION TO BLOOD TRANSFUSION    Type 2 diabetes mellitus         Social History:    Social History     Socioeconomic  History    Marital status:      Spouse name: Nichelle    Number of children: 3    Years of education: 12   Tobacco Use    Smoking status: Former     Current packs/day: 0.00     Average packs/day: 1 pack/day for 30.0 years (30.0 ttl pk-yrs)     Types: Cigarettes     Start date: 1976     Quit date: 2006     Years since quittin.2     Passive exposure: Past    Smokeless tobacco: Never   Vaping Use    Vaping status: Never Used   Substance and Sexual Activity    Alcohol use: Not Currently     Comment: one or two beers 6 times a year    Drug use: Never    Sexual activity: Not Currently     Partners: Female     Birth control/protection: None       Family History:   Family History   Problem Relation Age of Onset    Hypertension Mother     Colon cancer Mother     Cancer Mother     Diabetes Mother     Arthritis Mother     Cancer Father     Heart attack Father     Hearing loss Father     Heart disease Father     Hypertension Sister     Hypertension Brother     Colon cancer Brother     Cancer Brother     Heart disease Paternal Grandfather     Heart attack Paternal Grandfather     Malig Hyperthermia Neg Hx     Colon polyps Neg Hx     Crohn's disease Neg Hx     Irritable bowel syndrome Neg Hx     Ulcerative colitis Neg Hx        Surgical History:   Past Surgical History:   Procedure Laterality Date    ARTERIOGRAM AORTIC Left 2022    Procedure: AORTIC STENT GRAFT PLACEMENT WITH ILIAC COILING;  Surgeon: Eric Sainz MD;  Location: [facility] OR ;  Service: Vascular;  Laterality: Left;    CARDIAC CATHETERIZATION      CARDIAC SURGERY      CHOLECYSTECTOMY      COLONOSCOPY      COLONOSCOPY N/A 2023    Procedure: COLONOSCOPY to cecum with cold biopsy and cold snare polypectomies and clips;  Surgeon: Vitor Haley MD;  Location: [facility] ENDOSCOPY;  Service: Gastroenterology;  Laterality: N/A;  Pre - H/O polyps.  Post - diverticulosis, polyps, hemorrhoids    CORONARY ARTERY BYPASS GRAFT   2006    X2    CYSTOSCOPY      STATES HAD BLADDER TUMORS REMOVED X2    CYSTOSCOPY WITH CLOT EVACUATION N/A 01/17/2024    Procedure: CYSTOSCOPY WITH CLOT EVACUATION, WITH FULGRATION;  Surgeon: Daniel Hamm MD;  Location: [facility] MAIN OR;  Service: Urology;  Laterality: N/A;    PROSTATE SURGERY      X2- STATES PLACED COILS IN TO HELP WITH FLOW AND THEN HAD TO REMOVE A PIECE THAT BROKE OFF AND WAS IN BLADDER    UPPER GASTROINTESTINAL ENDOSCOPY           Current Facility-Administered Medications:     lactated ringers infusion, 9 mL/hr, Intravenous, Continuous, [place], Den, CRNA, Last Rate: 9 mL/hr at 08/28/24 0905, 9 mL/hr at 08/28/24 0905    lidocaine (XYLOCAINE) 1 % injection 0.5 mL, 0.5 mL, Intradermal, Once PRN, [place], Den, CRNA    sodium chloride 0.9 % flush 10 mL, 10 mL, Intravenous, Q12H, [place], Den, CRNA    sodium chloride 0.9 % flush 10 mL, 10 mL, Intravenous, PRN, [place], Den, CRNA    sodium chloride 0.9 % infusion 40 mL, 40 mL, Intravenous, PRN, [place], Den, CRNA    sterile water irrigation solution, , , PRN, Vitor Haley MD, 100 mL at 08/28/24 0854    Allergies:   Allergies   Allergen Reactions    Bee Venom Anaphylaxis     HIVES-SWOLLEN THROAT    Meperidine Hives    Midazolam Hives    Propofol Other (See Comments)     UNCLEAR-SVT, HYPOTENSION-STATES SEVERE ALMOST CODED    Sulfa Antibiotics Hives    Hydrocodone Nausea And Vomiting    Simvastatin Myalgia          Review of Systems   Pertinent items are noted in HPI      The following portions of the patient's history were reviewed by me and updated as appropriate: review of systems, allergies, current medications, past family history, past medical history, past social history, past surgical history and problem list.    /68 (BP Location: Left arm, Patient Position: Lying)   Pulse 76   Temp 97.7 °F (36.5 °C) (Oral)   Resp 16   Wt 87.3 kg (192 lb 6.4 oz)   SpO2 95%   BMI 26.83 kg/m²     PHYSICAL EXAM:    CONSTITUTIONAL:  today's  vital signs reviewed by me  GASTROINTESTINAL: abdomen is soft nontender nondistended with normal active bowel sounds, no masses are appreciated    Debilities: None  Emotional Behavior: Appropriate    Assessment/ Plan  We will proceed today with colonoscopy.    Risks and benefits as well as alternatives to endoscopic evaluation were explained to the patient and they voiced understanding and wish to proceed.  These risks include but are not limited to the risk of bleeding, perforation, adverse reaction to sedation, and missed lesions.  The patient was given the opportunity to ask questions prior to the endoscopic procedure.

## 2024-08-30 LAB
CYTO UR: NORMAL
LAB AP CASE REPORT: NORMAL
PATH REPORT.ADDENDUM SPEC: NORMAL
PATH REPORT.FINAL DX SPEC: NORMAL
PATH REPORT.GROSS SPEC: NORMAL

## 2024-09-03 ENCOUNTER — ANTICOAGULATION VISIT (OUTPATIENT)
Dept: CARDIOLOGY | Facility: CLINIC | Age: 74
End: 2024-09-03
Payer: MEDICARE

## 2024-09-03 VITALS
HEART RATE: 75 BPM | DIASTOLIC BLOOD PRESSURE: 78 MMHG | BODY MASS INDEX: 26.92 KG/M2 | SYSTOLIC BLOOD PRESSURE: 155 MMHG | WEIGHT: 193 LBS

## 2024-09-03 DIAGNOSIS — I48.21 PERMANENT ATRIAL FIBRILLATION: Primary | Chronic | ICD-10-CM

## 2024-09-03 LAB — INR PPP: 1.3 (ref 2–3)

## 2024-09-03 PROCEDURE — 36416 COLLJ CAPILLARY BLOOD SPEC: CPT | Performed by: INTERNAL MEDICINE

## 2024-09-03 PROCEDURE — 85610 PROTHROMBIN TIME: CPT | Performed by: INTERNAL MEDICINE

## 2024-09-04 NOTE — PROGRESS NOTES
LOS: 0 days   Patient Care Team:  James Tirado Jr., MD as PCP - General  Giovanny Lan Jr., DPM as PCP - Claims Attributed  Thai Galloway MD as Consulting Physician (Interventional Cardiology)    Chief Complaint: Cough and fever    Subjective     Interval History:     Patient states he feels much better.  States his cough shortness breath wheezing and fever improved.    Review of Systems   Constitutional: Negative for chills, fatigue and fever.   Respiratory: Negative for cough and shortness of breath.    Cardiovascular: Negative for chest pain and leg swelling.   Gastrointestinal: Negative for abdominal pain, diarrhea and nausea.   Skin: Negative for pallor and rash.         Objective     Vital Signs  Temp:  [97.6 °F (36.4 °C)-97.9 °F (36.6 °C)] 97.8 °F (36.6 °C)  Heart Rate:  [] 95  Resp:  [16-28] 20  BP: (131-180)/(55-87) 146/73    Physical Exam   Cardiovascular: Normal rate, regular rhythm, normal heart sounds and intact distal pulses.   Pulmonary/Chest: Effort normal and breath sounds normal.   Abdominal: Soft. Bowel sounds are normal.   Musculoskeletal: Normal range of motion.   Skin: Skin is warm and dry.   Nursing note and vitals reviewed.        Results Review:    Lab Results (last 24 hours)     Procedure Component Value Units Date/Time    POC Glucose Once [[id number]]  (Abnormal) Collected:  11/15/19 0441    Specimen:  Blood Updated:  11/15/19 0443     Glucose 371 mg/dL      Comment: Serial Number: [id number]:  [id number]       Comprehensive Metabolic Panel [[id number]]  (Abnormal) Collected:  11/15/19 0322    Specimen:  Blood Updated:  11/15/19 0433     Glucose 426 mg/dL      BUN 15 mg/dL      Creatinine 0.90 mg/dL      Sodium 139 mmol/L      Potassium 4.3 mmol/L      Chloride 101 mmol/L      CO2 25.0 mmol/L      Calcium 8.6 mg/dL      Total Protein 6.7 g/dL      Albumin 4.10 g/dL      ALT (SGPT) 49 U/L      AST (SGOT) 30 U/L      Alkaline Phosphatase 87 U/L      Total Bilirubin  0.4 mg/dL      eGFR Non African Amer 84 mL/min/1.73      Globulin 2.6 gm/dL      A/G Ratio 1.6 g/dL      BUN/Creatinine Ratio 16.7     Anion Gap 13.0 mmol/L     Narrative:       GFR Normal >60  Chronic Kidney Disease <60  Kidney Failure <15    Protime-INR [[id number]]  (Abnormal) Collected:  11/15/19 0322    Specimen:  Blood Updated:  11/15/19 0420     Protime 16.3 Seconds      INR 1.67    CBC & Differential [[id number]] Collected:  11/15/19 0322    Specimen:  Blood Updated:  11/15/19 0407    Narrative:       The following orders were created for panel order CBC & Differential.  Procedure                               Abnormality         Status                     ---------                               -----------         ------                     CBC Auto Differential[[id number]]        Abnormal            Final result                 Please view results for these tests on the individual orders.    CBC Auto Differential [[id number]]  (Abnormal) Collected:  11/15/19 0322    Specimen:  Blood Updated:  11/15/19 0407     WBC 12.70 10*3/mm3      RBC 4.50 10*6/mm3      Hemoglobin 13.5 g/dL      Hematocrit 38.7 %      MCV 86.2 fL      MCH 30.1 pg      MCHC 34.9 g/dL      RDW 13.7 %      RDW-SD 41.6 fl      MPV 8.1 fL      Platelets 202 10*3/mm3      Neutrophil % 95.4 %      Lymphocyte % 3.4 %      Monocyte % 1.0 %      Eosinophil % 0.0 %      Basophil % 0.2 %      Neutrophils, Absolute 12.10 10*3/mm3      Lymphocytes, Absolute 0.40 10*3/mm3      Monocytes, Absolute 0.10 10*3/mm3      Eosinophils, Absolute 0.00 10*3/mm3      Basophils, Absolute 0.00 10*3/mm3      nRBC 0.1 /100 WBC     Respiratory Panel, PCR - Wash, Nasopharynx [[id number]]  (Abnormal) Collected:  11/14/19 1939    Specimen:  Wash from Nasopharynx Updated:  11/15/19 0120     ADENOVIRUS, PCR Not Detected     Coronavirus 229E Not Detected     Coronavirus HKU1 Not Detected     Coronavirus NL63 Not Detected     Coronavirus OC43 Not Detected     Human  Metapneumovirus Not Detected     Human Rhinovirus/Enterovirus Not Detected     Influenza B PCR Not Detected     Parainfluenza Virus 1 Detected     Parainfluenza Virus 2 Not Detected     Parainfluenza Virus 3 Not Detected     Parainfluenza Virus 4 Not Detected     Bordetella pertussis pcr Not Detected     Influenza A H1 2009 PCR Not Detected     Chlamydophila pneumoniae PCR Not Detected     Mycoplasma pneumo by PCR Not Detected     Influenza A PCR Not Detected     Influenza A H3 Not Detected     Influenza A H1 Not Detected     RSV, PCR Not Detected    Lactic Acid, Reflex [[id number]]  (Normal) Collected:  11/15/19 0024    Specimen:  Blood Updated:  11/15/19 0056     Lactate 1.5 mmol/L     [place] Draw [[id number]] Collected:  11/14/19 1846    Specimen:  Blood Updated:  11/15/19 0021    Narrative:       The following orders were created for panel order [place] Draw.  Procedure                               Abnormality         Status                     ---------                               -----------         ------                     Light Blue Top[[id number]]                                   Final result               Green Top (Gel)[[id number]]                                  Final result               Lavender Top[[id number]]                                     Final result               Gold Top - SST[[id number]]                                                                Please view results for these tests on the individual orders.    S. Pneumo Ag Urine or CSF - Urine, Urine, Clean Catch [[id number]]  (Normal) Collected:  11/14/19 2302    Specimen:  Urine, Clean Catch Updated:  11/14/19 2329     Strep Pneumo Ag Negative    Legionella Antigen, Urine - Urine, Urine, Clean Catch [[id number]]  (Normal) Collected:  11/14/19 2302    Specimen:  Urine, Clean Catch Updated:  11/14/19 2328     LEGIONELLA ANTIGEN, URINE Negative    Urinalysis With Microscopic If Indicated (No Culture) - Urine, Clean Catch [[id number]]   (Abnormal) Collected:  11/14/19 2302    Specimen:  Urine, Clean Catch Updated:  11/14/19 2315     Color, UA Yellow     Appearance, UA Clear     pH, UA 6.0     Specific Gravity, UA 1.020     Glucose, UA Negative     Ketones, UA 15 mg/dL (1+)     Bilirubin, UA Negative     Blood, UA Negative     Protein, UA Negative     Leuk Esterase, UA Negative     Nitrite, UA Negative     Urobilinogen, UA 1.0 E.U./dL    Narrative:       Urine microscopic not indicated.    Lactic Acid, Reflex Timer (This will reflex a repeat order 3-3:15 hours after ordered.) [[id number]] Collected:  11/14/19 1852    Specimen:  Blood Updated:  11/14/19 2240     Extra Tube Hold for add-ons.     Comment: Auto resulted.       RSV Screen - Wash, Nasopharynx [[id number]]  (Normal) Collected:  11/14/19 1939    Specimen:  Wash from Nasopharynx Updated:  11/14/19 2007     RSV Rapid Ag Negative    Influenza Antigen, Rapid - Swab, Nasopharynx [[id number]]  (Normal) Collected:  11/14/19 1933    Specimen:  Swab from Nasopharynx Updated:  11/14/19 2004     Influenza A PCR Not Detected     Influenza B PCR Not Detected    Light Blue Top [[id number]] Collected:  11/14/19 1846    Specimen:  Blood Updated:  11/14/19 2002     Extra Tube hold for add-on     Comment: Auto resulted       Green Top (Gel) [[id number]] Collected:  11/14/19 1846    Specimen:  Blood Updated:  11/14/19 2002     Extra Tube Hold for add-ons.     Comment: Auto resulted.       Lavender Top [[id number]] Collected:  11/14/19 1846    Specimen:  Blood Updated:  11/14/19 2002     Extra Tube hold for add-on     Comment: Auto resulted       Calcium, Ionized [[id number]]  (Abnormal) Collected:  11/14/19 1938    Specimen:  Blood from Arm, Left Updated:  11/14/19 1959     Ionized Calcium 1.14 mmol/L     Blood Culture - Blood, Arm, Left [[id number]] Collected:  11/14/19 1935    Specimen:  Blood from Arm, Left Updated:  11/14/19 1945    C-reactive Protein [[id number]]  (Abnormal) Collected:  11/14/19 1846    Specimen:   Blood Updated:  11/14/19 1945     C-Reactive Protein 6.90 mg/dL     Magnesium [[id number]]  (Normal) Collected:  11/14/19 1846    Specimen:  Blood Updated:  11/14/19 1928     Magnesium 2.0 mg/dL     Phosphorus [[id number]]  (Normal) Collected:  11/14/19 1846    Specimen:  Blood Updated:  11/14/19 1928     Phosphorus 3.1 mg/dL     Protime-INR [[id number]]  (Abnormal) Collected:  11/14/19 1846    Specimen:  Blood Updated:  11/14/19 1927     Protime 16.4 Seconds      INR 1.69    aPTT [[id number]]  (Abnormal) Collected:  11/14/19 1846    Specimen:  Blood Updated:  11/14/19 1927     PTT 33.4 seconds     Troponin [[id number]]  (Normal) Collected:  11/14/19 1846    Specimen:  Blood Updated:  11/14/19 1927     Troponin T <0.010 ng/mL     Narrative:       Troponin T Reference Range:  <= 0.03 ng/mL-   Negative for AMI  >0.03 ng/mL-     Abnormal for myocardial necrosis.  Clinicians would have to utilize clinical acumen, EKG, Troponin and serial changes to determine if it is an Acute Myocardial Infarction or myocardial injury due to an underlying chronic condition.     Comprehensive Metabolic Panel [[id number]]  (Abnormal) Collected:  11/14/19 1846    Specimen:  Blood Updated:  11/14/19 1920     Glucose 133 mg/dL      BUN 15 mg/dL      Creatinine 0.90 mg/dL      Sodium 140 mmol/L      Potassium 4.4 mmol/L      Chloride 102 mmol/L      CO2 27.0 mmol/L      Calcium 9.0 mg/dL      Total Protein 7.6 g/dL      Albumin 4.50 g/dL      ALT (SGPT) 59 U/L      AST (SGOT) 42 U/L      Alkaline Phosphatase 98 U/L      Total Bilirubin 0.6 mg/dL      eGFR Non African Amer 84 mL/min/1.73      Globulin 3.1 gm/dL      A/G Ratio 1.5 g/dL      BUN/Creatinine Ratio 16.7     Anion Gap 11.0 mmol/L     Narrative:       GFR Normal >60  Chronic Kidney Disease <60  Kidney Failure <15    BNP [[id number]]  (Normal) Collected:  11/14/19 1846    Specimen:  Blood Updated:  11/14/19 1916     proBNP 505.3 pg/mL     Narrative:       Among patients with dyspnea,  NT-proBNP is highly sensitive for the detection of acute congestive heart failure. In addition NT-proBNP of <300 pg/ml effectively rules out acute congestive heart failure with 99% negative predictive value.    CBC & Differential [[id number]] Collected:  11/14/19 1846    Specimen:  Blood Updated:  11/14/19 1856    Narrative:       The following orders were created for panel order CBC & Differential.  Procedure                               Abnormality         Status                     ---------                               -----------         ------                     CBC Auto Differential[[id number]]        Abnormal            Final result                 Please view results for these tests on the individual orders.    CBC Auto Differential [[id number]]  (Abnormal) Collected:  11/14/19 1846    Specimen:  Blood Updated:  11/14/19 1856     WBC 13.90 10*3/mm3      RBC 4.90 10*6/mm3      Hemoglobin 14.4 g/dL      Hematocrit 41.8 %      MCV 85.2 fL      MCH 29.3 pg      MCHC 34.4 g/dL      RDW 13.9 %      RDW-SD 42.0 fl      MPV 7.7 fL      Platelets 225 10*3/mm3      Neutrophil % 82.5 %      Lymphocyte % 9.6 %      Monocyte % 6.8 %      Eosinophil % 0.5 %      Basophil % 0.6 %      Neutrophils, Absolute 11.50 10*3/mm3      Lymphocytes, Absolute 1.30 10*3/mm3      Monocytes, Absolute 0.90 10*3/mm3      Eosinophils, Absolute 0.10 10*3/mm3      Basophils, Absolute 0.10 10*3/mm3      nRBC 0.0 /100 WBC     POC Lactate [[id number]]  (Abnormal) Collected:  11/14/19 1852    Specimen:  Blood Updated:  11/14/19 1853     Lactate 2.6 mmol/L      Comment: Serial Number: [id number]:  [id number]       Blood Culture - Blood, Blood, Venous Line [[id number]] Collected:  11/14/19 1846    Specimen:  Blood, Venous Line Updated:  11/14/19 1853         Imaging Results (Last 24 Hours)     Procedure Component Value Units Date/Time    XR Chest 1 View [[id number]] Collected:  11/14/19 2209     Updated:  11/14/19 2214    Narrative:        Examination: XR CHEST 1 VW-     Date of Exam: 11/14/2019 9:46 PM     Indication: soa; R50.9-Fever, unspecified; A41.9-Sepsis, unspecified  organism; J98.01-Acute bronchospasm.     Comparison: Radiograph 11/14/2019, 01/29/2018     Technique: 1 view of the chest      Findings:  New mild patchy airspace changes are present within the left upper lobe.  Interstitial opacities are present bilaterally. No pleural effusions. No  pneumothorax. The heart size is mildly enlarged. Median sternotomy wires  are present. The pulmonary vasculature appears within normal limits. No  acute osseous abnormality identified.       Impression:       Mild patchy airspace changes within the left upper lobe likely related  to a mild pneumonia or atelectasis. Follow-up to resolution recommended.     Electronically Signed By-Pauline Infante On:11/14/2019 10:11 PM  This report was finalized on [id number] by  Pauline Infante, .         ECG/EMG Results (last 24 hours)     Procedure Component Value Units Date/Time    ECG 12 Lead [[id number]] Collected:  11/14/19 1841     Updated:  11/14/19 1842    Narrative:       HEART RATE= 81  bpm  RR Interval= 738  ms  PA Interval=   ms  P Horizontal Axis=   deg  P Front Axis=   deg  QRSD Interval= 151  ms  QT Interval= 409  ms  QRS Axis= 116  deg  T Wave Axis= 21  deg  - ABNORMAL ECG -  Atrial fibrillation  Ventricular premature complex  When compared with ECG of 11-Feb-2019 7:48:23,  Significant change in rhythm  Electronically Signed By:   Date and Time of Study: 2019-11-14 18:41:18           I reviewed the patient's new clinical results.    Medication Review: I have reviewed the medications    Current Facility-Administered Medications:   •  acetaminophen (TYLENOL) tablet 650 mg, 650 mg, Oral, Q4H PRN **OR** acetaminophen (TYLENOL) 160 MG/5ML solution 650 mg, 650 mg, Oral, Q4H PRN **OR** acetaminophen (TYLENOL) suppository 650 mg, 650 mg, Rectal, Q4H PRN, James Tirado Jr., MD  •  albuterol sulfate HFA  (PROVENTIL HFA;VENTOLIN HFA;PROAIR HFA) inhaler 2 puff, 2 puff, Inhalation, Q4H PRN, James Tirado Jr., MD  •  allopurinol (ZYLOPRIM) tablet 300 mg, 300 mg, Oral, Daily, James Tirado Jr., MD  •  aspirin chewable tablet 81 mg, 81 mg, Oral, Daily, James Tirado Jr., MD  •  atenolol (TENORMIN) tablet 50 mg, 50 mg, Oral, Daily, James Tirado Jr., MD  •  azithromycin (ZITHROMAX) 500 mg in 250mL NS IVPB, 500 mg, Intravenous, Q24H, James Tirado Jr., MD  •  calcium carbonate (TUMS) chewable tablet 500 mg (200 mg elemental), 2 tablet, Oral, BID PRN, James Tirado Jr., MD  •  cefTRIAXone (ROCEPHIN) in SWFI 1 gram/10ml IV PUSH syringe, 1 g, Intravenous, Q24H, James Tirado Jr., MD  •  dextrose (D50W) 25 g/ 50mL Intravenous Solution 25 g, 25 g, Intravenous, Q15 Min PRN, James Tirado Jr., MD  •  dextrose (GLUTOSE) oral gel 15 g, 15 g, Oral, Q15 Min PRN, James Tirado Jr., MD  •  famotidine (PEPCID) tablet 40 mg, 40 mg, Oral, Daily, James Tirado Jr., MD  •  furosemide (LASIX) tablet 20 mg, 20 mg, Oral, Daily, James Tirado Jr., MD  •  glucagon (human recombinant) (GLUCAGEN DIAGNOSTIC) injection 1 mg, 1 mg, Subcutaneous, Q15 Min PRN, James Tirado Jr., MD  •  insulin lispro (humaLOG) injection 0-9 Units, 0-9 Units, Subcutaneous, 4x Daily With Meals & Nightly **AND** insulin lispro (humaLOG) injection 0-9 Units, 0-9 Units, Subcutaneous, PRN, James Tirado Jr., MD  •  ipratropium-albuterol (DUO-NEB) nebulizer solution 3 mL, 3 mL, Nebulization, 4x Daily - RT, Trinidad Yu, APRN, 3 mL at 11/14/19 1955  •  losartan (COZAAR) tablet 50 mg, 50 mg, Oral, Daily, James Tirado Jr., MD  •  magnesium hydroxide (MILK OF MAGNESIA) suspension 2400 mg/10mL 10 mL, 10 mL, Oral, Daily PRN, James Tirado Jr., MD  •  melatonin tablet 5 mg, 5 mg, Oral, Nightly PRN, James Tirado Jr., MD  •  nitroglycerin (NITROSTAT) SL tablet 0.4 mg, 0.4 mg, Sublingual, Q5 Min PRN, James Tirado  CAMRON Gonzalez MD  •  ondansetron (ZOFRAN) injection 4 mg, 4 mg, Intravenous, Q6H PRN, James Tirado Jr., MD  •  Pharmacy to dose warfarin, , Does not apply, Continuous PRN, James Tirado Jr., MD  •  predniSONE (DELTASONE) tablet 20 mg, 20 mg, Oral, BID With Meals, James Tiardo Jr., MD  •  sodium chloride 0.9 % flush 10 mL, 10 mL, Intravenous, PRN, Nilesh Maki MD  •  sodium chloride 0.9 % flush 10 mL, 10 mL, Intravenous, Q12H, James Tirado Jr., MD, 10 mL at 11/15/19 0221  •  sodium chloride 0.9 % flush 10 mL, 10 mL, Intravenous, PRN, James Tirado Jr., MD  •  warfarin (COUMADIN) tablet 7.5 mg, 7.5 mg, Oral, Daily, James Tirado Jr., MD     Blood sugars are elevated    Principal Problem:    Pneumonia due to infectious nyjvgbrn-jshoyqni-awygruvz antibiotics and corticosteroids and bronchodilators    Active Problems:    Fever and chills-improved      Type 2 diabetes mellitus without complication, without long-term current use of insulin (CMS/[facility])-new-we will start sliding scale insulin       Continue current treatment.  Await results of studies.    Assessment & Plan     Plan for disposition:Where: home    James Tirado Jr., MD  11/15/19  7:02 AM     Performing Laboratory: [phone] Billing Type: Third-Party Bill Expected Date Of Service: 09/04/2024 Bill For Surgical Tray: no

## 2024-09-06 ENCOUNTER — ANTICOAGULATION VISIT (OUTPATIENT)
Dept: CARDIOLOGY | Facility: CLINIC | Age: 74
End: 2024-09-06
Payer: MEDICARE

## 2024-09-06 VITALS
WEIGHT: 195 LBS | HEART RATE: 64 BPM | BODY MASS INDEX: 27.2 KG/M2 | DIASTOLIC BLOOD PRESSURE: 71 MMHG | SYSTOLIC BLOOD PRESSURE: 115 MMHG

## 2024-09-06 DIAGNOSIS — I48.21 PERMANENT ATRIAL FIBRILLATION: Primary | Chronic | ICD-10-CM

## 2024-09-06 LAB — INR PPP: 1.8 (ref 2–3)

## 2024-09-06 PROCEDURE — 85610 PROTHROMBIN TIME: CPT | Performed by: INTERNAL MEDICINE

## 2024-09-06 PROCEDURE — 36416 COLLJ CAPILLARY BLOOD SPEC: CPT | Performed by: INTERNAL MEDICINE

## 2024-09-20 ENCOUNTER — ANTICOAGULATION VISIT (OUTPATIENT)
Dept: CARDIOLOGY | Facility: CLINIC | Age: 74
End: 2024-09-20
Payer: MEDICARE

## 2024-09-20 VITALS
SYSTOLIC BLOOD PRESSURE: 136 MMHG | BODY MASS INDEX: 27.34 KG/M2 | WEIGHT: 196 LBS | DIASTOLIC BLOOD PRESSURE: 69 MMHG | HEART RATE: 66 BPM

## 2024-09-20 DIAGNOSIS — I48.21 PERMANENT ATRIAL FIBRILLATION: Primary | Chronic | ICD-10-CM

## 2024-09-20 DIAGNOSIS — I48.11 LONGSTANDING PERSISTENT ATRIAL FIBRILLATION: ICD-10-CM

## 2024-09-20 LAB — INR PPP: 2.8 (ref 2–3)

## 2024-09-20 PROCEDURE — 85610 PROTHROMBIN TIME: CPT | Performed by: INTERNAL MEDICINE

## 2024-09-20 PROCEDURE — 36416 COLLJ CAPILLARY BLOOD SPEC: CPT | Performed by: INTERNAL MEDICINE

## 2024-09-20 RX ORDER — LOSARTAN POTASSIUM 25 MG/1
TABLET ORAL
Qty: 90 TABLET | Refills: 3 | Status: SHIPPED | OUTPATIENT
Start: 2024-09-20

## 2024-09-25 ENCOUNTER — HOSPITAL ENCOUNTER (OUTPATIENT)
Dept: CT IMAGING | Facility: HOSPITAL | Age: 74
Discharge: HOME | End: 2024-09-25
Admitting: NURSE PRACTITIONER
Payer: MEDICARE

## 2024-09-25 DIAGNOSIS — Z87.891 HISTORY OF TOBACCO USE: ICD-10-CM

## 2024-09-25 DIAGNOSIS — J44.9 CHRONIC OBSTRUCTIVE PULMONARY DISEASE, UNSPECIFIED COPD TYPE: ICD-10-CM

## 2024-09-25 PROCEDURE — 71271 CT THORAX LUNG CANCER SCR C-: CPT

## 2024-10-22 ENCOUNTER — LAB (OUTPATIENT)
Dept: LAB | Facility: HOSPITAL | Age: 74
End: 2024-10-22
Payer: MEDICARE

## 2024-10-22 DIAGNOSIS — E11.65 TYPE 2 DIABETES MELLITUS WITH HYPERGLYCEMIA, WITHOUT LONG-TERM CURRENT USE OF INSULIN: ICD-10-CM

## 2024-10-22 LAB
ALBUMIN SERPL-MCNC: 4.4 G/DL (ref 3.5–5.2)
ALBUMIN UR-MCNC: 143.5 MG/DL
ALBUMIN/GLOB SERPL: 1.6 G/DL
ALP SERPL-CCNC: 86 U/L (ref 39–117)
ALT SERPL W P-5'-P-CCNC: 23 U/L (ref 1–41)
ANION GAP SERPL CALCULATED.3IONS-SCNC: 8 MMOL/L (ref 5–15)
AST SERPL-CCNC: 29 U/L (ref 1–40)
BILIRUB SERPL-MCNC: 0.7 MG/DL (ref 0–1.2)
BUN SERPL-MCNC: 19 MG/DL (ref 8–23)
BUN/CREAT SERPL: 26 (ref 7–25)
CALCIUM SPEC-SCNC: 9.4 MG/DL (ref 8.6–10.5)
CHLORIDE SERPL-SCNC: 104 MMOL/L (ref 98–107)
CHOLEST SERPL-MCNC: 132 MG/DL (ref 0–200)
CO2 SERPL-SCNC: 27 MMOL/L (ref 22–29)
CREAT SERPL-MCNC: 0.73 MG/DL (ref 0.76–1.27)
CREAT UR-MCNC: 117.1 MG/DL
EGFRCR SERPLBLD CKD-EPI 2021: 96.7 ML/MIN/1.73
GLOBULIN UR ELPH-MCNC: 2.7 GM/DL
GLUCOSE SERPL-MCNC: 117 MG/DL (ref 65–99)
HBA1C MFR BLD: 7.09 % (ref 4.8–5.6)
HDLC SERPL-MCNC: 39 MG/DL (ref 40–60)
LDLC SERPL CALC-MCNC: 77 MG/DL (ref 0–100)
LDLC/HDLC SERPL: 1.95 {RATIO}
MICROALBUMIN/CREAT UR: 1225.4 MG/G (ref 0–29)
POTASSIUM SERPL-SCNC: 4.6 MMOL/L (ref 3.5–5.2)
PROT SERPL-MCNC: 7.1 G/DL (ref 6–8.5)
SODIUM SERPL-SCNC: 139 MMOL/L (ref 136–145)
TRIGL SERPL-MCNC: 84 MG/DL (ref 0–150)
VLDLC SERPL-MCNC: 16 MG/DL (ref 5–40)

## 2024-10-22 PROCEDURE — 80061 LIPID PANEL: CPT

## 2024-10-22 PROCEDURE — 83036 HEMOGLOBIN GLYCOSYLATED A1C: CPT

## 2024-10-22 PROCEDURE — 36415 COLL VENOUS BLD VENIPUNCTURE: CPT

## 2024-10-22 PROCEDURE — 82570 ASSAY OF URINE CREATININE: CPT

## 2024-10-22 PROCEDURE — 80053 COMPREHEN METABOLIC PANEL: CPT

## 2024-10-22 PROCEDURE — 82043 UR ALBUMIN QUANTITATIVE: CPT

## 2024-10-22 RX ORDER — ALLOPURINOL 300 MG/1
TABLET ORAL
COMMUNITY
Start: 2024-10-22

## 2024-10-22 RX ORDER — AMOXICILLIN 500 MG/1
CAPSULE ORAL
COMMUNITY
Start: 2024-08-28

## 2024-10-25 ENCOUNTER — ANTICOAGULATION VISIT (OUTPATIENT)
Dept: CARDIOLOGY | Facility: CLINIC | Age: 74
End: 2024-10-25
Payer: MEDICARE

## 2024-10-25 VITALS
DIASTOLIC BLOOD PRESSURE: 72 MMHG | HEART RATE: 63 BPM | WEIGHT: 192 LBS | SYSTOLIC BLOOD PRESSURE: 161 MMHG | BODY MASS INDEX: 26.78 KG/M2

## 2024-10-25 DIAGNOSIS — I48.21 PERMANENT ATRIAL FIBRILLATION: Primary | Chronic | ICD-10-CM

## 2024-10-25 LAB — INR PPP: 3.2 (ref 0.9–1.1)

## 2024-10-25 PROCEDURE — 36416 COLLJ CAPILLARY BLOOD SPEC: CPT | Performed by: INTERNAL MEDICINE

## 2024-10-25 PROCEDURE — 85610 PROTHROMBIN TIME: CPT | Performed by: INTERNAL MEDICINE

## 2024-11-01 ENCOUNTER — TELEPHONE (OUTPATIENT)
Dept: CARDIOLOGY | Facility: CLINIC | Age: 74
End: 2024-11-01
Payer: MEDICARE

## 2024-11-01 ENCOUNTER — OFFICE VISIT (OUTPATIENT)
Dept: ENDOCRINOLOGY | Facility: CLINIC | Age: 74
End: 2024-11-01
Payer: MEDICARE

## 2024-11-01 VITALS
HEART RATE: 76 BPM | SYSTOLIC BLOOD PRESSURE: 132 MMHG | WEIGHT: 192 LBS | DIASTOLIC BLOOD PRESSURE: 56 MMHG | OXYGEN SATURATION: 97 % | BODY MASS INDEX: 26.88 KG/M2 | HEIGHT: 71 IN

## 2024-11-01 DIAGNOSIS — I10 PRIMARY HYPERTENSION: ICD-10-CM

## 2024-11-01 DIAGNOSIS — E78.2 MIXED HYPERLIPIDEMIA: ICD-10-CM

## 2024-11-01 DIAGNOSIS — E11.65 TYPE 2 DIABETES MELLITUS WITH HYPERGLYCEMIA, WITHOUT LONG-TERM CURRENT USE OF INSULIN: Primary | ICD-10-CM

## 2024-11-01 DIAGNOSIS — E11.21 DIABETIC NEPHROPATHY ASSOCIATED WITH TYPE 2 DIABETES MELLITUS: ICD-10-CM

## 2024-11-01 LAB — GLUCOSE BLDC GLUCOMTR-MCNC: 123 MG/DL (ref 70–105)

## 2024-11-01 PROCEDURE — 82948 REAGENT STRIP/BLOOD GLUCOSE: CPT | Performed by: INTERNAL MEDICINE

## 2024-11-01 RX ORDER — AMLODIPINE BESYLATE 10 MG/1
TABLET ORAL
Qty: 90 TABLET | Refills: 3 | Status: SHIPPED | OUTPATIENT
Start: 2024-11-01

## 2024-11-01 NOTE — TELEPHONE ENCOUNTER
Patient came into the office and needed to verify that he is supposed to be taking losartan 100 mg daily, atenolol 50 mg daily and then the amlodipine 10 mg daily.    He verbally understood and had no further questions or concerns at this time.    Medication list is current.

## 2024-11-01 NOTE — PROGRESS NOTES
Endocrine Progress Note Outpatient     Patient Care Team:  Colette Dominguez APRN as PCP - General (Nurse Practitioner)  Thai Galloway MD as Consulting Physician (Interventional Cardiology)  Seipel, Joseph F, MD as Consulting Physician (Neurology)  Daniel Hamm MD as Consulting Physician (Urology)  Abbey Mary APRN as Nurse Practitioner (Nurse Practitioner)  Shawn Van MD as Consulting Physician (Endocrinology)  Giovanny Lan Jr., DPM as Consulting Physician (Podiatry)    Chief Complaint: Uncontrolled type 2 diabetes    HPI: This is a 74-year-old male with history of type 2 diabetes, hypertension, hyperlipidemia, CAD with CABG done in 2006 is here for follow up.     For type 2 diabetes: Initial diagnosis was in January 2021.  He has done diabetes education.  He checks his blood sugars on a daily basis which he brought for review and most of them are running below 140.  He is currently not taking any medications for diabetes.  He was on Jardiance which he stopped because of the cost.  He has been working on his diet and activity.    Hypertension: Currently on losartan 100 mg once a day.    Hyperlipidemia: Currently on atorvastatin.    Diabetic nephropathy: His urine microalbumin creatinine ratio is rising, he is currently on losartan and diabetes is fair control.    Past Medical History:   Diagnosis Date    AAA (abdominal aortic aneurysm) 2006    Abnormal ECG     Allergic     Aneurysm     Arthritis     Asthma     Atrial fibrillation     COUMADIN    Benign prostatic hyperplasia     Bladder cancer     CHF (congestive heart failure)     Chronic anticoagulation     COUMADIN STARTED 2006    Clotting disorder     Colon polyp     Congenital heart disease     COPD (chronic obstructive pulmonary disease)     Coronary artery disease     Diabetes mellitus     treat with diet    Ehrlichiosis     Elevated cholesterol     Erectile dysfunction     Facial basal cell cancer     Gout     Hard to intubate      History of pancreatitis 2006    History of pneumonia     History of tick-borne disease     HL (hearing loss)     BILAT HEARING AIDS    Hyperlipidemia     Hypertension     Myocardial infarction     X5    Obesity     Pancreatitis     Pneumonia     Sleep apnea     CURRENTLY NOT USING CPAP D/T RECALL    Transfusion history     STATES HIS SISTER HAD A SEVERE REACTION TO BLOOD TRANSFUSION    Type 2 diabetes mellitus        Social History     Socioeconomic History    Marital status:      Spouse name: Nichelle    Number of children: 3    Years of education: 12   Tobacco Use    Smoking status: Former     Current packs/day: 0.00     Average packs/day: 1 pack/day for 30.0 years (30.0 ttl pk-yrs)     Types: Cigarettes     Start date: 1976     Quit date: 2006     Years since quittin.3     Passive exposure: Past    Smokeless tobacco: Never   Vaping Use    Vaping status: Never Used   Substance and Sexual Activity    Alcohol use: Not Currently     Comment: one or two beers 6 times a year    Drug use: Never    Sexual activity: Not Currently     Partners: Female     Birth control/protection: None       Family History   Problem Relation Age of Onset    Hypertension Mother     Colon cancer Mother     Cancer Mother     Diabetes Mother     Arthritis Mother     Cancer Father     Heart attack Father     Hearing loss Father     Heart disease Father     Hypertension Sister     Hypertension Brother     Colon cancer Brother     Cancer Brother     Heart disease Paternal Grandfather     Heart attack Paternal Grandfather     Malig Hyperthermia Neg Hx     Colon polyps Neg Hx     Crohn's disease Neg Hx     Irritable bowel syndrome Neg Hx     Ulcerative colitis Neg Hx        Allergies   Allergen Reactions    Bee Venom Anaphylaxis     HIVES-SWOLLEN THROAT    Meperidine Hives    Midazolam Hives    Propofol Other (See Comments)     UNCLEAR-SVT, HYPOTENSION-STATES SEVERE ALMOST CODED    Sulfa Antibiotics Hives    Hydrocodone Nausea  And Vomiting    Simvastatin Myalgia       ROS:   Constitutional:  Denies fatigue, tiredness.    Eyes:  Denies change in visual acuity   HENT:  Denies nasal congestion or sore throat   Respiratory: denies cough, shortness of breath.   Cardiovascular:  denies chest pain, edema   GI:  Denies abdominal pain, nausea, vomiting.   Musculoskeletal:  Denies back pain or joint pain   Integument:  Denies dry skin and rash   Neurologic:  Denies headache, focal weakness or sensory changes   Endocrine:  Denies polyuria or polydipsia   Psychiatric:  Denies depression or anxiety      Vitals:    11/01/24 0857   BP: 132/56   Pulse: 76   SpO2: 97%     Body mass index is 26.78 kg/m².     Physical Exam:  GEN: NAD, conversant  EYES: EOMI, PERRL,  NECK: no thyromegaly,   CV: RRR  LUNG: CTA  PSYCH: Awake and coherent      Results Review:     I reviewed the patient's new clinical results.    Lab Results   Component Value Date    HGBA1C 7.09 (H) 10/22/2024    HGBA1C 7.60 (H) 08/14/2024    HGBA1C 6.60 (H) 01/16/2024      Lab Results   Component Value Date    GLUCOSE 117 (H) 10/22/2024    BUN 19 10/22/2024    CREATININE 0.73 (L) 10/22/2024    EGFRIFNONA 97 12/02/2021    BCR 26.0 (H) 10/22/2024    K 4.6 10/22/2024    CO2 27.0 10/22/2024    CALCIUM 9.4 10/22/2024    ALBUMIN 4.4 10/22/2024    LABIL2 1.2 02/25/2019    AST 29 10/22/2024    ALT 23 10/22/2024    CHOL 132 10/22/2024    TRIG 84 10/22/2024    LDL 77 10/22/2024    HDL 39 (L) 10/22/2024     Lab Results   Component Value Date    TSH 2.610 07/20/2023         Medication Review: Reviewed.       Current Outpatient Medications:     albuterol sulfate  (90 Base) MCG/ACT inhaler, Inhale 2 puffs Every 4 (Four) Hours As Needed for Wheezing., Disp: , Rfl:     allopurinol (ZYLOPRIM) 300 MG tablet, , Disp: , Rfl:     amLODIPine (NORVASC) 5 MG tablet, Take 1 tablet by mouth Daily., Disp: 90 tablet, Rfl: 3    aspirin 81 MG tablet, Take 1 tablet by mouth Daily. PT STATES WAS INSTRUCTED TO CONTINUE  TO TAKE THIS PER GET ANDERSEN, Disp: , Rfl:     atenolol (TENORMIN) 50 MG tablet, TAKE 1 TABLET BY MOUTH EVERY DAY AT NIGHT, Disp: 90 tablet, Rfl: 3    atorvastatin (LIPITOR) 20 MG tablet, TAKE 1 TABLET BY MOUTH EVERY DAY, Disp: 90 tablet, Rfl: 1    cetirizine (zyrTEC) 10 MG tablet, Take 1 tablet by mouth Daily., Disp: , Rfl:     FLUTICASONE FUROATE IN, Inhale 2 Applications Daily., Disp: , Rfl:     losartan (COZAAR) 100 MG tablet, Take 1 tablet by mouth Daily., Disp: 90 tablet, Rfl: 3    losartan (COZAAR) 25 MG tablet, TAKE 1 TABLET BY MOUTH EVERY DAY, Disp: 90 tablet, Rfl: 3    warfarin (COUMADIN) 7.5 MG tablet, TAKE 1 TABLET BY MOUTH DAILY. TAKE 1.5 TABLETS ON MON, WEDS AND FRI OR AS DIRECTED., Disp: 110 tablet, Rfl: 0          Assessment and plan:  Diabetes mellitus type 2 with hyperglycemia: Overall doing very well.  A1c is at 7.0%.  He is currently diet controlled.  We talked about Jardiance but unfortunately cannot afford it.  At this time we will continue to work on diet and activity.    Hypertension: I have reviewed his blood pressures from his home they have been running high, I will increase amlodipine to 10 mg p.o. daily and continue losartan 100 mg p.o. daily.  He is telling me that his cardiologist have asked him to add in losartan of 25 mg in addition to 100 mg, I have questioned that and ask him to clarify that because I think losartan 100 mg is the max dose.  He will discuss with his cardiologist.    Diabetic nephropathy: His urine microalbumin creatinine ratio is rising, his diabetes is fair control.  His blood pressure needs better control he is on losartan.  Unfortunately cannot afford Jardiance.    Hyperlipidemia: Currently on atorvastatin, will follow lipid panel    CAD: Status post CABG in the past.    Assessment and plan from April 11, 2024 reviewed and updated.           Shawn Van MD FACE.

## 2024-11-01 NOTE — PATIENT INSTRUCTIONS
Increase amlodipine to 10 mg p.o. daily  Please clarify with your cardiologist regarding the dose of losartan, in my opinion losartan should be a max dose of 100 mg p.o. daily  Continue to work on your diet and activity  Labs before follow-up.

## 2024-11-07 DIAGNOSIS — I48.21 PERMANENT ATRIAL FIBRILLATION: ICD-10-CM

## 2024-11-07 RX ORDER — WARFARIN SODIUM 7.5 MG/1
TABLET ORAL
Qty: 105 TABLET | Refills: 0 | Status: SHIPPED | OUTPATIENT
Start: 2024-11-07

## 2024-11-07 NOTE — TELEPHONE ENCOUNTER
Rx Refill Note  Requested Prescriptions     Pending Prescriptions Disp Refills    warfarin (COUMADIN) 7.5 MG tablet [Pharmacy Med Name: WARFARIN SODIUM 7.5 MG TABLET] 105 tablet 0     Sig: Take 1 tab, by mouth, daily except 1 1/2 tabs on Mon and Fri or as directed.    Last INR 10/25/24  Last office visit with prescribing clinician: 7/30/2024   Last telemedicine visit with prescribing clinician: Visit date not found   Next office visit with prescribing clinician: 2/3/2025                         Would you like a call back once the refill request has been completed: [] Yes [] No    If the office needs to give you a call back, can they leave a voicemail: [] Yes [] No    Karol Ramirez RN  11/07/24, 09:22 EST

## 2024-11-27 ENCOUNTER — ANTICOAGULATION VISIT (OUTPATIENT)
Dept: CARDIOLOGY | Facility: CLINIC | Age: 74
End: 2024-11-27
Payer: MEDICARE

## 2024-11-27 VITALS
WEIGHT: 195 LBS | SYSTOLIC BLOOD PRESSURE: 155 MMHG | DIASTOLIC BLOOD PRESSURE: 72 MMHG | BODY MASS INDEX: 27.2 KG/M2 | HEART RATE: 73 BPM

## 2024-11-27 DIAGNOSIS — I48.21 PERMANENT ATRIAL FIBRILLATION: Primary | Chronic | ICD-10-CM

## 2024-11-27 LAB — INR PPP: 2.6 (ref 2–3)

## 2024-11-27 PROCEDURE — 85610 PROTHROMBIN TIME: CPT | Performed by: INTERNAL MEDICINE

## 2024-11-27 PROCEDURE — 36416 COLLJ CAPILLARY BLOOD SPEC: CPT | Performed by: INTERNAL MEDICINE

## 2024-12-02 ENCOUNTER — HOSPITAL ENCOUNTER (INPATIENT)
Facility: HOSPITAL | Age: 74
LOS: 3 days | Discharge: HOME | End: 2024-12-05
Attending: EMERGENCY MEDICINE | Admitting: STUDENT IN AN ORGANIZED HEALTH CARE EDUCATION/TRAINING PROGRAM
Payer: MEDICARE

## 2024-12-02 ENCOUNTER — APPOINTMENT (OUTPATIENT)
Dept: GENERAL RADIOLOGY | Facility: HOSPITAL | Age: 74
End: 2024-12-02
Payer: MEDICARE

## 2024-12-02 DIAGNOSIS — J18.9 PNEUMONIA DUE TO INFECTIOUS ORGANISM, UNSPECIFIED LATERALITY, UNSPECIFIED PART OF LUNG: Primary | ICD-10-CM

## 2024-12-02 DIAGNOSIS — R09.02 HYPOXIA: ICD-10-CM

## 2024-12-02 LAB
ALBUMIN SERPL-MCNC: 4.3 G/DL (ref 3.5–5.2)
ALBUMIN/GLOB SERPL: 1.3 G/DL
ALP SERPL-CCNC: 110 U/L (ref 39–117)
ALT SERPL W P-5'-P-CCNC: 20 U/L (ref 1–41)
ANION GAP SERPL CALCULATED.3IONS-SCNC: 13 MMOL/L (ref 5–15)
AST SERPL-CCNC: 26 U/L (ref 1–40)
B PARAPERT DNA SPEC QL NAA+PROBE: NOT DETECTED
B PERT DNA SPEC QL NAA+PROBE: NOT DETECTED
BASOPHILS # BLD AUTO: 0.08 10*3/MM3 (ref 0–0.2)
BASOPHILS NFR BLD AUTO: 0.4 % (ref 0–1.5)
BILIRUB SERPL-MCNC: 1.5 MG/DL (ref 0–1.2)
BUN SERPL-MCNC: 16 MG/DL (ref 8–23)
BUN/CREAT SERPL: 17.4 (ref 7–25)
C PNEUM DNA NPH QL NAA+NON-PROBE: NOT DETECTED
CALCIUM SPEC-SCNC: 9.6 MG/DL (ref 8.6–10.5)
CHLORIDE SERPL-SCNC: 101 MMOL/L (ref 98–107)
CO2 SERPL-SCNC: 25 MMOL/L (ref 22–29)
CREAT SERPL-MCNC: 0.92 MG/DL (ref 0.76–1.27)
D-LACTATE SERPL-SCNC: 1.2 MMOL/L (ref 0.3–2)
DEPRECATED RDW RBC AUTO: 42.4 FL (ref 37–54)
EGFRCR SERPLBLD CKD-EPI 2021: 88.4 ML/MIN/1.73
EOSINOPHIL # BLD AUTO: 0.02 10*3/MM3 (ref 0–0.4)
EOSINOPHIL NFR BLD AUTO: 0.1 % (ref 0.3–6.2)
ERYTHROCYTE [DISTWIDTH] IN BLOOD BY AUTOMATED COUNT: 12.9 % (ref 12.3–15.4)
FLUAV SUBTYP SPEC NAA+PROBE: NOT DETECTED
FLUBV RNA ISLT QL NAA+PROBE: NOT DETECTED
GLOBULIN UR ELPH-MCNC: 3.2 GM/DL
GLUCOSE SERPL-MCNC: 137 MG/DL (ref 65–99)
HADV DNA SPEC NAA+PROBE: NOT DETECTED
HCOV 229E RNA SPEC QL NAA+PROBE: NOT DETECTED
HCOV HKU1 RNA SPEC QL NAA+PROBE: NOT DETECTED
HCOV NL63 RNA SPEC QL NAA+PROBE: NOT DETECTED
HCOV OC43 RNA SPEC QL NAA+PROBE: NOT DETECTED
HCT VFR BLD AUTO: 48.1 % (ref 37.5–51)
HGB BLD-MCNC: 15.3 G/DL (ref 13–17.7)
HMPV RNA NPH QL NAA+NON-PROBE: NOT DETECTED
HOLD SPECIMEN: NORMAL
HOLD SPECIMEN: NORMAL
HPIV1 RNA ISLT QL NAA+PROBE: NOT DETECTED
HPIV2 RNA SPEC QL NAA+PROBE: NOT DETECTED
HPIV3 RNA NPH QL NAA+PROBE: NOT DETECTED
HPIV4 P GENE NPH QL NAA+PROBE: NOT DETECTED
IMM GRANULOCYTES # BLD AUTO: 0.1 10*3/MM3 (ref 0–0.05)
IMM GRANULOCYTES NFR BLD AUTO: 0.5 % (ref 0–0.5)
INR PPP: 2.24 (ref 2–3)
LYMPHOCYTES # BLD AUTO: 1.3 10*3/MM3 (ref 0.7–3.1)
LYMPHOCYTES NFR BLD AUTO: 7 % (ref 19.6–45.3)
M PNEUMO IGG SER IA-ACNC: NOT DETECTED
MCH RBC QN AUTO: 28.7 PG (ref 26.6–33)
MCHC RBC AUTO-ENTMCNC: 31.8 G/DL (ref 31.5–35.7)
MCV RBC AUTO: 90.1 FL (ref 79–97)
MONOCYTES # BLD AUTO: 1.23 10*3/MM3 (ref 0.1–0.9)
MONOCYTES NFR BLD AUTO: 6.6 % (ref 5–12)
NEUTROPHILS NFR BLD AUTO: 15.83 10*3/MM3 (ref 1.7–7)
NEUTROPHILS NFR BLD AUTO: 85.4 % (ref 42.7–76)
NRBC BLD AUTO-RTO: 0 /100 WBC (ref 0–0.2)
PLATELET # BLD AUTO: 223 10*3/MM3 (ref 140–450)
PMV BLD AUTO: 9.6 FL (ref 6–12)
POTASSIUM SERPL-SCNC: 4.2 MMOL/L (ref 3.5–5.2)
PROCALCITONIN SERPL-MCNC: 0.17 NG/ML (ref 0–0.25)
PROT SERPL-MCNC: 7.5 G/DL (ref 6–8.5)
PROTHROMBIN TIME: 24.7 SECONDS (ref 19.4–28.5)
RBC # BLD AUTO: 5.34 10*6/MM3 (ref 4.14–5.8)
RHINOVIRUS RNA SPEC NAA+PROBE: NOT DETECTED
RSV RNA NPH QL NAA+NON-PROBE: NOT DETECTED
SARS-COV-2 RNA NPH QL NAA+NON-PROBE: NOT DETECTED
SODIUM SERPL-SCNC: 139 MMOL/L (ref 136–145)
WBC NRBC COR # BLD AUTO: 18.56 10*3/MM3 (ref 3.4–10.8)
WHOLE BLOOD HOLD COAG: NORMAL
WHOLE BLOOD HOLD SPECIMEN: NORMAL

## 2024-12-02 PROCEDURE — 85025 COMPLETE CBC W/AUTO DIFF WBC: CPT | Performed by: EMERGENCY MEDICINE

## 2024-12-02 PROCEDURE — 94761 N-INVAS EAR/PLS OXIMETRY MLT: CPT

## 2024-12-02 PROCEDURE — 84145 PROCALCITONIN (PCT): CPT

## 2024-12-02 PROCEDURE — 94640 AIRWAY INHALATION TREATMENT: CPT

## 2024-12-02 PROCEDURE — 25010000002 AMPICILLIN-SULBACTAM PER 1.5 G: Performed by: EMERGENCY MEDICINE

## 2024-12-02 PROCEDURE — 99285 EMERGENCY DEPT VISIT HI MDM: CPT

## 2024-12-02 PROCEDURE — 87040 BLOOD CULTURE FOR BACTERIA: CPT | Performed by: EMERGENCY MEDICINE

## 2024-12-02 PROCEDURE — 93005 ELECTROCARDIOGRAM TRACING: CPT

## 2024-12-02 PROCEDURE — 71045 X-RAY EXAM CHEST 1 VIEW: CPT

## 2024-12-02 PROCEDURE — 80053 COMPREHEN METABOLIC PANEL: CPT | Performed by: EMERGENCY MEDICINE

## 2024-12-02 PROCEDURE — 93005 ELECTROCARDIOGRAM TRACING: CPT | Performed by: EMERGENCY MEDICINE

## 2024-12-02 PROCEDURE — 85610 PROTHROMBIN TIME: CPT | Performed by: EMERGENCY MEDICINE

## 2024-12-02 PROCEDURE — 36415 COLL VENOUS BLD VENIPUNCTURE: CPT

## 2024-12-02 PROCEDURE — 25010000002 AMPICILLIN-SULBACTAM PER 1.5 G

## 2024-12-02 PROCEDURE — 94799 UNLISTED PULMONARY SVC/PX: CPT

## 2024-12-02 PROCEDURE — 83605 ASSAY OF LACTIC ACID: CPT | Performed by: EMERGENCY MEDICINE

## 2024-12-02 PROCEDURE — 0202U NFCT DS 22 TRGT SARS-COV-2: CPT | Performed by: EMERGENCY MEDICINE

## 2024-12-02 RX ORDER — BISACODYL 10 MG
10 SUPPOSITORY, RECTAL RECTAL DAILY PRN
Status: DISCONTINUED | OUTPATIENT
Start: 2024-12-02 | End: 2024-12-05 | Stop reason: HOSPADM

## 2024-12-02 RX ORDER — ONDANSETRON 4 MG/1
4 TABLET, ORALLY DISINTEGRATING ORAL EVERY 6 HOURS PRN
Status: DISCONTINUED | OUTPATIENT
Start: 2024-12-02 | End: 2024-12-05 | Stop reason: HOSPADM

## 2024-12-02 RX ORDER — SODIUM CHLORIDE 0.9 % (FLUSH) 0.9 %
10 SYRINGE (ML) INJECTION AS NEEDED
Status: DISCONTINUED | OUTPATIENT
Start: 2024-12-02 | End: 2024-12-05 | Stop reason: HOSPADM

## 2024-12-02 RX ORDER — DOXYCYCLINE 100 MG/1
100 CAPSULE ORAL EVERY 12 HOURS SCHEDULED
Status: DISCONTINUED | OUTPATIENT
Start: 2024-12-02 | End: 2024-12-02

## 2024-12-02 RX ORDER — NITROGLYCERIN 0.4 MG/1
0.4 TABLET SUBLINGUAL
Status: DISCONTINUED | OUTPATIENT
Start: 2024-12-02 | End: 2024-12-05 | Stop reason: HOSPADM

## 2024-12-02 RX ORDER — ALBUTEROL SULFATE 0.83 MG/ML
2.5 SOLUTION RESPIRATORY (INHALATION) ONCE
Status: COMPLETED | OUTPATIENT
Start: 2024-12-02 | End: 2024-12-02

## 2024-12-02 RX ORDER — ACETAMINOPHEN 650 MG/1
650 SUPPOSITORY RECTAL EVERY 4 HOURS PRN
Status: DISCONTINUED | OUTPATIENT
Start: 2024-12-02 | End: 2024-12-05 | Stop reason: HOSPADM

## 2024-12-02 RX ORDER — WARFARIN SODIUM 7.5 MG/1
TABLET ORAL SEE ADMIN INSTRUCTIONS
COMMUNITY

## 2024-12-02 RX ORDER — AMOXICILLIN 250 MG
2 CAPSULE ORAL 2 TIMES DAILY PRN
Status: DISCONTINUED | OUTPATIENT
Start: 2024-12-02 | End: 2024-12-05 | Stop reason: HOSPADM

## 2024-12-02 RX ORDER — ONDANSETRON 2 MG/ML
4 INJECTION INTRAMUSCULAR; INTRAVENOUS EVERY 6 HOURS PRN
Status: DISCONTINUED | OUTPATIENT
Start: 2024-12-02 | End: 2024-12-05 | Stop reason: HOSPADM

## 2024-12-02 RX ORDER — ACETAMINOPHEN 160 MG/5ML
650 SOLUTION ORAL EVERY 4 HOURS PRN
Status: DISCONTINUED | OUTPATIENT
Start: 2024-12-02 | End: 2024-12-05 | Stop reason: HOSPADM

## 2024-12-02 RX ORDER — DOXYCYCLINE 100 MG/1
100 CAPSULE ORAL ONCE
Status: COMPLETED | OUTPATIENT
Start: 2024-12-02 | End: 2024-12-02

## 2024-12-02 RX ORDER — ACETAMINOPHEN 325 MG/1
650 TABLET ORAL EVERY 4 HOURS PRN
Status: DISCONTINUED | OUTPATIENT
Start: 2024-12-02 | End: 2024-12-05 | Stop reason: HOSPADM

## 2024-12-02 RX ORDER — IPRATROPIUM BROMIDE AND ALBUTEROL SULFATE 2.5; .5 MG/3ML; MG/3ML
3 SOLUTION RESPIRATORY (INHALATION) EVERY 4 HOURS PRN
Status: DISCONTINUED | OUTPATIENT
Start: 2024-12-02 | End: 2024-12-05 | Stop reason: HOSPADM

## 2024-12-02 RX ORDER — POLYETHYLENE GLYCOL 3350 17 G/17G
17 POWDER, FOR SOLUTION ORAL DAILY PRN
Status: DISCONTINUED | OUTPATIENT
Start: 2024-12-02 | End: 2024-12-05 | Stop reason: HOSPADM

## 2024-12-02 RX ORDER — BISACODYL 5 MG/1
5 TABLET, DELAYED RELEASE ORAL DAILY PRN
Status: DISCONTINUED | OUTPATIENT
Start: 2024-12-02 | End: 2024-12-05 | Stop reason: HOSPADM

## 2024-12-02 RX ADMIN — DOXYCYCLINE 100 MG: 100 CAPSULE ORAL at 14:09

## 2024-12-02 RX ADMIN — ALBUTEROL SULFATE 2.5 MG: 2.5 SOLUTION RESPIRATORY (INHALATION) at 12:55

## 2024-12-02 RX ADMIN — AMPICILLIN SODIUM AND SULBACTAM SODIUM 3 G: 2; 1 INJECTION, POWDER, FOR SOLUTION INTRAMUSCULAR; INTRAVENOUS at 20:50

## 2024-12-02 RX ADMIN — AMPICILLIN SODIUM AND SULBACTAM SODIUM 3 G: 2; 1 INJECTION, POWDER, FOR SOLUTION INTRAMUSCULAR; INTRAVENOUS at 14:09

## 2024-12-02 NOTE — Clinical Note
Level of Care: Med/Surg [1]   Diagnosis: Community acquired pneumonia [162583]   Admitting Physician: TRACY PORTER [[id number]]   Certification: I Certify That Inpatient Hospital Services Are Medically Necessary For Greater Than 2 Midnights

## 2024-12-02 NOTE — CASE MANAGEMENT/SOCIAL WORK
Discharge Planning Assessment   Keshav     Patient Name: Sandro Ramirez  MRN: [id number]  Today's Date: 12/2/2024    Admit Date: 12/2/2024    Plan: D/C Plan: Return home with spouse. Will drive self or family transport.   Discharge Needs Assessment       Row Name 12/02/24 1557       Living Environment    People in Home spouse    Name(s) of People in Home Nichelle    Current Living Arrangements home    Potentially Unsafe Housing Conditions none    In the past 12 months has the electric, gas, oil, or water company threatened to shut off services in your home? No    Primary Care Provided by self    Provides Primary Care For no one    Family Caregiver if Needed spouse    Family Caregiver Names Nichelle    Quality of Family Relationships helpful;involved;supportive    Able to Return to Prior Arrangements yes       Resource/Environmental Concerns    Resource/Environmental Concerns none    Transportation Concerns none       Transportation Needs    In the past 12 months, has lack of transportation kept you from medical appointments or from getting medications? no    In the past 12 months, has lack of transportation kept you from meetings, work, or from getting things needed for daily living? No       Food Insecurity    Within the past 12 months, you worried that your food would run out before you got the money to buy more. Never true    Within the past 12 months, the food you bought just didn't last and you didn't have money to get more. Never true       Transition Planning    Patient/Family Anticipates Transition to home with family    Patient/Family Anticipated Services at Transition none    Transportation Anticipated car, drives self;family or friend will provide       Discharge Needs Assessment    Readmission Within the Last 30 Days no previous admission in last 30 days    Equipment Currently Used at Home other (see comments)  sleep apnea machine    Concerns to be Addressed denies needs/concerns at this time    Anticipated  Changes Related to Illness none    Equipment Needed After Discharge none    Provided Post Acute Provider List? N/A    Provided Post Acute Provider Quality & Resource List? N/A                   Discharge Plan       Row Name 12/02/24 3215       Plan    Plan D/C Plan: Return home with spouse. Will drive self or family transport.    Provided Post Acute Provider List? N/A    Provided Post Acute Provider Quality & Resource List? N/A    Plan Comments CM spoke with patient's wife, Nichelle, on the phone to discuss admission assessment and discharge planning. Nichelle confirms PCP and pharmacy. Nichelle has declined meds to bed program at this time. Nichelle denies any difficulty affording medications at this time. Nichelle denies any additional needs for services or DME at this time. Patient can drive himself or family can transport at d/c. D/C barriers: [place] 3L O2, Pulmonology consult pending.             Expected Discharge Date and Time       Expected Discharge Date Expected Discharge Time    Dec 3, 2024            Demographic Summary       Row Name 12/02/24 1488       General Information    Admission Type inpatient    Arrived From emergency department    Required Notices Provided Important Message from Medicare    Referral Source admission list    Reason for Consult discharge planning    Preferred Language English       Contact Information    Permission Granted to Share Info With                    Functional Status       Row Name 12/02/24 155       Functional Status    Usual Activity Tolerance good    Current Activity Tolerance good       Functional Status, IADL    Medications independent    Meal Preparation independent    Housekeeping independent    Laundry independent    Shopping independent       Mental Status    General Appearance WDL WDL       Mental Status Summary    Recent Changes in Mental Status/Cognitive Functioning no changes       Employment/    Employment Status , previous service            Current or Previous  Service none              Patient Forms       Row Name 12/02/24 1553       Patient Forms    Important Message from Medicare (IMM) Delivered  IMM delivered by CM    Delivered to Support person    Method of delivery Telephone                  Monica Liriano RN     [address] [address]  Phone: [phone]  Fax: [phone]

## 2024-12-02 NOTE — ED PROVIDER NOTES
Subjective   History of Present Illness  74-year-old male states he had some cough and low-grade fever and shortness of breath over the last few days.  States his son had respiratory symptoms last week.  He reports no chest pain or abdominal pain or vomiting or diarrhea.  No hemoptysis  Review of Systems    Past Medical History:   Diagnosis Date    AAA (abdominal aortic aneurysm) 2006    Abnormal ECG     Allergic     Aneurysm     Arthritis     Asthma     Atrial fibrillation     COUMADIN    Benign prostatic hyperplasia     Bladder cancer     CHF (congestive heart failure)     Chronic anticoagulation     COUMADIN STARTED 2006    Clotting disorder     Colon polyp     Congenital heart disease     COPD (chronic obstructive pulmonary disease)     Coronary artery disease     Diabetes mellitus     treat with diet    Ehrlichiosis     Elevated cholesterol     Erectile dysfunction     Facial basal cell cancer     Gout     Hard to intubate     History of pancreatitis 2006    History of pneumonia     History of tick-borne disease 2015    HL (hearing loss)     BILAT HEARING AIDS    Hyperlipidemia     Hypertension     Myocardial infarction     X5    Obesity     Pancreatitis     Pneumonia     Sleep apnea     CURRENTLY NOT USING CPAP D/T RECALL    Transfusion history     STATES HIS SISTER HAD A SEVERE REACTION TO BLOOD TRANSFUSION    Type 2 diabetes mellitus        Allergies   Allergen Reactions    Bee Venom Anaphylaxis     HIVES-SWOLLEN THROAT    Meperidine Hives    Midazolam Hives    Propofol Other (See Comments)     UNCLEAR-SVT, HYPOTENSION-STATES SEVERE ALMOST CODED    Sulfa Antibiotics Hives    Hydrocodone Nausea And Vomiting    Simvastatin Myalgia       Past Surgical History:   Procedure Laterality Date    ARTERIOGRAM AORTIC Left 06/29/2022    Procedure: AORTIC STENT GRAFT PLACEMENT WITH ILIAC COILING;  Surgeon: Eric Sainz MD;  Location: [facility] 18/19;  Service: Vascular;  Laterality: Left;    CARDIAC  CATHETERIZATION      CARDIAC SURGERY      CHOLECYSTECTOMY      COLONOSCOPY      COLONOSCOPY N/A 07/27/2023    Procedure: COLONOSCOPY to cecum with cold biopsy and cold snare polypectomies and clips;  Surgeon: Vitor Haley MD;  Location:  [facility];  Service: Gastroenterology;  Laterality: N/A;  Pre - H/O polyps.  Post - diverticulosis, polyps, hemorrhoids    COLONOSCOPY W/ POLYPECTOMY N/A 08/28/2024    Procedure: COLONOSCOPY WITH POLYPECTOMY;  Surgeon: Vitor Haley MD;  Location:  [facility];  Service: Gastroenterology;  Laterality: N/A;  diverticulosis, sigmoid polyp x2    CORONARY ARTERY BYPASS GRAFT  2006    X2    CYSTOSCOPY      STATES HAD BLADDER TUMORS REMOVED X2    CYSTOSCOPY WITH CLOT EVACUATION N/A 01/17/2024    Procedure: CYSTOSCOPY WITH CLOT EVACUATION, WITH FULGRATION;  Surgeon: Daniel Hamm MD;  Location: [facility];  Service: Urology;  Laterality: N/A;    PANCREAS SURGERY      PROSTATE SURGERY      X2- STATES PLACED COILS IN TO HELP WITH FLOW AND THEN HAD TO REMOVE A PIECE THAT BROKE OFF AND WAS IN BLADDER    UPPER GASTROINTESTINAL ENDOSCOPY         Family History   Problem Relation Age of Onset    Hypertension Mother     Colon cancer Mother     Cancer Mother     Diabetes Mother     Arthritis Mother     Cancer Father     Heart attack Father     Hearing loss Father     Heart disease Father     Hypertension Sister     Hypertension Brother     Colon cancer Brother     Cancer Brother     Heart disease Paternal Grandfather     Heart attack Paternal Grandfather     Malig Hyperthermia Neg Hx     Colon polyps Neg Hx     Crohn's disease Neg Hx     Irritable bowel syndrome Neg Hx     Ulcerative colitis Neg Hx        Social History     Socioeconomic History    Marital status:      Spouse name: Nichelle    Number of children: 3    Years of education: 12   Tobacco Use    Smoking status: Former     Current packs/day: 0.00     Average packs/day: 1 pack/day for 30.0 years (30.0 ttl  "pk-yrs)     Types: Cigarettes     Start date: 1976     Quit date: 2006     Years since quittin.4     Passive exposure: Past    Smokeless tobacco: Never   Vaping Use    Vaping status: Never Used   Substance and Sexual Activity    Alcohol use: Not Currently     Comment: one or two beers 6 times a year    Drug use: Never    Sexual activity: Not Currently     Partners: Female     Birth control/protection: None       Prior to Admission medications    Medication Sig Start Date End Date Taking? Authorizing Provider   albuterol sulfate  (90 Base) MCG/ACT inhaler Inhale 2 puffs Every 4 (Four) Hours As Needed for Wheezing.    Kelby Aguilar MD   allopurinol (ZYLOPRIM) 300 MG tablet  10/22/24   Kelby Aguilar MD   amLODIPine (NORVASC) 10 MG tablet Take 1 tablet p.o. daily. 24   Shawn Van MD   aspirin 81 MG tablet Take 1 tablet by mouth Daily. PT STATES WAS INSTRUCTED TO CONTINUE TO TAKE THIS PER GET ANDERSEN 11   Kelby Aguilar MD   atenolol (TENORMIN) 50 MG tablet TAKE 1 TABLET BY MOUTH EVERY DAY AT NIGHT 24   Thai Galloway MD   atorvastatin (LIPITOR) 20 MG tablet TAKE 1 TABLET BY MOUTH EVERY DAY 24   Thai Galloway MD   cetirizine (zyrTEC) 10 MG tablet Take 1 tablet by mouth Daily.    Kelby Aguilar MD   FLUTICASONE FUROATE IN Inhale 2 Applications Daily.    Kelby Aguilar MD   losartan (COZAAR) 100 MG tablet Take 1 tablet by mouth Daily. 24   Thai Galloway MD   warfarin (COUMADIN) 7.5 MG tablet Take 1 tab, by mouth, daily except 1 1/2 tabs on Mon and Fri or as directed. 24   Thai Galloway MD     /83   Pulse 80   Temp 98.2 °F (36.8 °C) (Oral)   Resp 27   Ht 180.3 cm (71\")   Wt 87 kg (191 lb 12.8 oz)   SpO2 92%   BMI 26.75 kg/m²       Objective   Physical Exam  General: Well-developed , no acute distress, alert and appropriate  Eyes: Pupils round and reactive, sclera nonicteric  HEENT: Mucous membranes moist, no mucosal " swelling  Neck: Supple, no nuchal rigidity, no JVD  Respirations: Diminished breath sounds bilaterally, respirations nonlabored at rest  Heart regular rate and rhythm, no murmurs rubs or gallops,   Abdomen soft nontender nondistended, no hepatosplenomegaly, no hernia, no mass, normal bowel sounds, no CVA tenderness  Extremities no clubbing cyanosis or edema, calves are symmetric and nontender  Neuro cranial nerves grossly intact, no focal limb deficits  Psych oriented, pleasant affect  Skin no rash, brisk cap refill Procedures           ED Course  Results for orders placed or performed during the hospital encounter of 12/02/24   ECG 12 Lead Dyspnea    Collection Time: 12/02/24 10:57 AM   Result Value Ref Range    QT Interval 402 ms    QTC Interval 469 ms   Comprehensive Metabolic Panel    Collection Time: 12/02/24 11:15 AM    Specimen: Blood   Result Value Ref Range    Glucose 137 (H) 65 - 99 mg/dL    BUN 16 8 - 23 mg/dL    Creatinine 0.92 0.76 - 1.27 mg/dL    Sodium 139 136 - 145 mmol/L    Potassium 4.2 3.5 - 5.2 mmol/L    Chloride 101 98 - 107 mmol/L    CO2 25.0 22.0 - 29.0 mmol/L    Calcium 9.6 8.6 - 10.5 mg/dL    Total Protein 7.5 6.0 - 8.5 g/dL    Albumin 4.3 3.5 - 5.2 g/dL    ALT (SGPT) 20 1 - 41 U/L    AST (SGOT) 26 1 - 40 U/L    Alkaline Phosphatase 110 39 - 117 U/L    Total Bilirubin 1.5 (H) 0.0 - 1.2 mg/dL    Globulin 3.2 gm/dL    A/G Ratio 1.3 g/dL    BUN/Creatinine Ratio 17.4 7.0 - 25.0    Anion Gap 13.0 5.0 - 15.0 mmol/L    eGFR 88.4 >60.0 mL/min/1.73   Protime-INR    Collection Time: 12/02/24 11:15 AM    Specimen: Blood   Result Value Ref Range    Protime 24.7 19.4 - 28.5 Seconds    INR 2.24 2.00 - 3.00   CBC Auto Differential    Collection Time: 12/02/24 11:15 AM    Specimen: Blood   Result Value Ref Range    WBC 18.56 (H) 3.40 - 10.80 10*3/mm3    RBC 5.34 4.14 - 5.80 10*6/mm3    Hemoglobin 15.3 13.0 - 17.7 g/dL    Hematocrit 48.1 37.5 - 51.0 %    MCV 90.1 79.0 - 97.0 fL    MCH 28.7 26.6 - 33.0 pg     MCHC 31.8 31.5 - 35.7 g/dL    RDW 12.9 12.3 - 15.4 %    RDW-SD 42.4 37.0 - 54.0 fl    MPV 9.6 6.0 - 12.0 fL    Platelets 223 140 - 450 10*3/mm3    Neutrophil % 85.4 (H) 42.7 - 76.0 %    Lymphocyte % 7.0 (L) 19.6 - 45.3 %    Monocyte % 6.6 5.0 - 12.0 %    Eosinophil % 0.1 (L) 0.3 - 6.2 %    Basophil % 0.4 0.0 - 1.5 %    Immature Grans % 0.5 0.0 - 0.5 %    Neutrophils, Absolute 15.83 (H) 1.70 - 7.00 10*3/mm3    Lymphocytes, Absolute 1.30 0.70 - 3.10 10*3/mm3    Monocytes, Absolute 1.23 (H) 0.10 - 0.90 10*3/mm3    Eosinophils, Absolute 0.02 0.00 - 0.40 10*3/mm3    Basophils, Absolute 0.08 0.00 - 0.20 10*3/mm3    Immature Grans, Absolute 0.10 (H) 0.00 - 0.05 10*3/mm3    nRBC 0.0 0.0 - 0.2 /100 WBC   Green Top (Gel)    Collection Time: 12/02/24 11:15 AM   Result Value Ref Range    Extra Tube Hold for add-ons.    Lavender Top    Collection Time: 12/02/24 11:15 AM   Result Value Ref Range    Extra Tube hold for add-on    Gold Top - SST    Collection Time: 12/02/24 11:15 AM   Result Value Ref Range    Extra Tube Hold for add-ons.    Light Blue Top    Collection Time: 12/02/24 11:15 AM   Result Value Ref Range    Extra Tube Hold for add-ons.    Respiratory Panel PCR w/COVID-19(SARS-CoV-2) [facility]-House, NP Swab in [facility], 2 HR TAT - Swab, Nasopharynx    Collection Time: 12/02/24 11:20 AM    Specimen: Nasopharynx; Swab   Result Value Ref Range    ADENOVIRUS, PCR Not Detected Not Detected    Coronavirus 229E Not Detected Not Detected    Coronavirus HKU1 Not Detected Not Detected    Coronavirus NL63 Not Detected Not Detected    Coronavirus OC43 Not Detected Not Detected    COVID19 Not Detected Not Detected - Ref. Range    Human Metapneumovirus Not Detected Not Detected    Human Rhinovirus/Enterovirus Not Detected Not Detected    Influenza A PCR Not Detected Not Detected    Influenza B PCR Not Detected Not Detected    Parainfluenza Virus 1 Not Detected Not Detected    Parainfluenza Virus 2 Not Detected Not  Detected    Parainfluenza Virus 3 Not Detected Not Detected    Parainfluenza Virus 4 Not Detected Not Detected    RSV, PCR Not Detected Not Detected    Bordetella pertussis pcr Not Detected Not Detected    Bordetella parapertussis PCR Not Detected Not Detected    Chlamydophila pneumoniae PCR Not Detected Not Detected    Mycoplasma pneumo by PCR Not Detected Not Detected   POC Lactate    Collection Time: 12/02/24  2:13 PM    Specimen: Blood   Result Value Ref Range    Lactate 1.2 0.3 - 2.0 mmol/L     XR Chest 1 View    Result Date: 12/2/2024  Impression: Slight increased interstitial prominence bilaterally since 1/9/2024 chest x-ray, raises the possibility of mild atypical interstitial pneumonia or mild edema. No focal lung consolidation. Stable mild cardiac enlargement. Electronically Signed: Anahi Valladares MD  12/2/2024 12:38 PM EST  Workstation ID: [id number]     ED Course as of 12/02/24 1532   Mon Dec 02, 2024   1529 Case and findings discussed with Amanda with the hospitalist service who is agreeable plan of admission [SH]      ED Course User Index  [SH] David Wiley MD                                                       Medical Decision Making  Differential diagnosis including COVID, pneumonia, sepsis, respiratory failure    Patient had some borderline hypoxia with saturations in the upper 80s on room air.  He was placed on 2 L nasal cannula.  He has no signs of hypercapnia.  His repeat saturations were in the low to mid 90s.  He is in no acute respiratory distress.  He was ordered bronchodilator treatment IV antibiotics with cultures pending.  Lactate normal.  X-ray suggesting pneumonia.  Patient was advised of findings and agreeable plan of admission.      Problems Addressed:  Hypoxia: complicated acute illness or injury  Pneumonia due to infectious organism, unspecified laterality, unspecified part of lung: complicated acute illness or injury    Amount and/or Complexity of Data Reviewed  Labs: ordered.  Decision-making details documented in ED Course.     Details: Respiratory panel negative, CBC shows leukocytosis, comprehensive metabolic panel mild elevated bilirubin, INR therapeutic range  Radiology: ordered and independent interpretation performed.     Details: My interpretation of chest x-ray image some interstitial infiltrate bilateral  ECG/medicine tests: ordered and independent interpretation performed.     Details: My EKG interpretation atrial fibrillation, rate of 82, right bundle branch block, not similarly changed from previous comparison    Risk  Prescription drug management.  Decision regarding hospitalization.        Final diagnoses:   Pneumonia due to infectious organism, unspecified laterality, unspecified part of lung   Hypoxia       ED Disposition  ED Disposition       ED Disposition   Decision to Admit    Condition   --    Comment   Level of Care: Telemetry [5]   Admitting Physician: TRACY PORTER [[id number]]   Attending Physician: TRACY PORTER [[id number]]                 No follow-up provider specified.       Medication List      No changes were made to your prescriptions during this visit.            David Wiley MD  12/02/24 [id number]

## 2024-12-02 NOTE — ED NOTES
Patient states that he has had a cough and chest congestion since Friday that has gotten worse over the weekend. Cough is productive, with yellow sputum. He believes he had a fever last night but never checked temp. He denies taking any OTC medication for upper resp. Symptoms. Went to  this morning and was advised to come to ER

## 2024-12-02 NOTE — H&P
[facility] Medicine Services  History & Physical    Patient Name: Sandro Ramirez  : 1950  MRN: [id number]  Primary Care Physician:  Colette Dominguez APRN  Date of admission: 2024  Date and Time of Service: 2024 at 1530    Subjective      Chief Complaint: shortness of breath, cough     History of Present Illness: Sandro Ramirez is a 74 y.o. male with a CMH of atrial fibrillation, CHF, HLD, HTN who presented to [facility] from Urgent Care on 2024 with cough and shortness of breath that has been ongoing for the last 2- 3 days. He originally presented to urgent care, where referral to ER was made due to new oxygen requirement. His oxygen saturations were 89%, and he was placed on 2L.     On ED evaluation, glucose was 137, lactic 1.2, WBC 18.56. CXR shows slight increased interstitial prominence bilaterally with possible atypical pneumonia or edema. EKG shows atrial fibrillation. Respiratory pathogen panel negative. Pulmonology consulted. Hospitalist team to admit for further medical management.         Review of Systems   Constitutional:  Positive for chills, fatigue and fever.   Respiratory:  Positive for cough and shortness of breath.    Cardiovascular:  Negative for chest pain and leg swelling.       Personal History     Past Medical History:   Diagnosis Date    AAA (abdominal aortic aneurysm) 2006    Abnormal ECG     Allergic     Aneurysm     Arthritis     Asthma     Atrial fibrillation     COUMADIN    Benign prostatic hyperplasia     Bladder cancer     CHF (congestive heart failure)     Chronic anticoagulation     COUMADIN STARTED     Clotting disorder     Colon polyp     Congenital heart disease     COPD (chronic obstructive pulmonary disease)     Coronary artery disease     Diabetes mellitus     treat with diet    Ehrlichiosis     Elevated cholesterol     Erectile dysfunction     Facial basal cell cancer     Gout     Hard to intubate     History of pancreatitis  2006    History of pneumonia     History of tick-borne disease 2015    HL (hearing loss)     BILAT HEARING AIDS    Hyperlipidemia     Hypertension     Myocardial infarction     X5    Obesity     Pancreatitis     Pneumonia     Sleep apnea     CURRENTLY NOT USING CPAP D/T RECALL    Transfusion history     STATES HIS SISTER HAD A SEVERE REACTION TO BLOOD TRANSFUSION    Type 2 diabetes mellitus        Past Surgical History:   Procedure Laterality Date    ARTERIOGRAM AORTIC Left 06/29/2022    Procedure: AORTIC STENT GRAFT PLACEMENT WITH ILIAC COILING;  Surgeon: Eric Sainz MD;  Location: [facility] OR 18/19;  Service: Vascular;  Laterality: Left;    CARDIAC CATHETERIZATION      CARDIAC SURGERY      CHOLECYSTECTOMY      COLONOSCOPY      COLONOSCOPY N/A 07/27/2023    Procedure: COLONOSCOPY to cecum with cold biopsy and cold snare polypectomies and clips;  Surgeon: Vitor Haley MD;  Location: [facility] ENDOSCOPY;  Service: Gastroenterology;  Laterality: N/A;  Pre - H/O polyps.  Post - diverticulosis, polyps, hemorrhoids    COLONOSCOPY W/ POLYPECTOMY N/A 08/28/2024    Procedure: COLONOSCOPY WITH POLYPECTOMY;  Surgeon: Vitor Haley MD;  Location: [facility] OR;  Service: Gastroenterology;  Laterality: N/A;  diverticulosis, sigmoid polyp x2    CORONARY ARTERY BYPASS GRAFT  2006    X2    CYSTOSCOPY      STATES HAD BLADDER TUMORS REMOVED X2    CYSTOSCOPY WITH CLOT EVACUATION N/A 01/17/2024    Procedure: CYSTOSCOPY WITH CLOT EVACUATION, WITH FULGRATION;  Surgeon: Daniel Hamm MD;  Location: [facility] OR;  Service: Urology;  Laterality: N/A;    PANCREAS SURGERY      PROSTATE SURGERY      X2- STATES PLACED COILS IN TO HELP WITH FLOW AND THEN HAD TO REMOVE A PIECE THAT BROKE OFF AND WAS IN BLADDER    UPPER GASTROINTESTINAL ENDOSCOPY         Family History: family history includes Arthritis in his mother; Cancer in his brother, father, and mother; Colon cancer in his brother and mother; Diabetes in his  mother; Hearing loss in his father; Heart attack in his father and paternal grandfather; Heart disease in his father and paternal grandfather; Hypertension in his brother, mother, and sister. Otherwise pertinent FHx was reviewed and not pertinent to current issue.    Social History:  reports that he quit smoking about 18 years ago. His smoking use included cigarettes. He started smoking about 48 years ago. He has a 30 pack-year smoking history. He has been exposed to tobacco smoke. He has never used smokeless tobacco. He reports that he does not currently use alcohol. He reports that he does not use drugs.    Home Medications:  Prior to Admission Medications       Prescriptions Last Dose Informant Patient Reported? Taking?    albuterol sulfate  (90 Base) MCG/ACT inhaler   Yes No    Inhale 2 puffs Every 4 (Four) Hours As Needed for Wheezing.    allopurinol (ZYLOPRIM) 300 MG tablet   Yes No    amLODIPine (NORVASC) 10 MG tablet   No No    Take 1 tablet p.o. daily.    aspirin 81 MG tablet  Self Yes No    Take 1 tablet by mouth Daily. PT STATES WAS INSTRUCTED TO CONTINUE TO TAKE THIS PER GET ANDERSEN    atenolol (TENORMIN) 50 MG tablet   No No    TAKE 1 TABLET BY MOUTH EVERY DAY AT NIGHT    atorvastatin (LIPITOR) 20 MG tablet   No No    TAKE 1 TABLET BY MOUTH EVERY DAY    cetirizine (zyrTEC) 10 MG tablet   Yes No    Take 1 tablet by mouth Daily.    FLUTICASONE FUROATE IN   Yes No    Inhale 2 Applications Daily.    losartan (COZAAR) 100 MG tablet   No No    Take 1 tablet by mouth Daily.    warfarin (COUMADIN) 7.5 MG tablet   No No    Take 1 tab, by mouth, daily except 1 1/2 tabs on Mon and Fri or as directed.              Allergies:  Allergies   Allergen Reactions    Bee Venom Anaphylaxis     HIVES-SWOLLEN THROAT    Meperidine Hives    Midazolam Hives    Propofol Other (See Comments)     UNCLEAR-SVT, HYPOTENSION-STATES SEVERE ALMOST CODED    Sulfa Antibiotics Hives    Hydrocodone Nausea And Vomiting    Simvastatin  Myalgia       Objective      Vitals:   Temp:  [98.2 °F (36.8 °C)-98.4 °F (36.9 °C)] 98.2 °F (36.8 °C)  Heart Rate:  [65-93] 80  Resp:  [20-33] 27  BP: (118-142)/(60-83) 140/83  Body mass index is 26.75 kg/m².  Physical Exam  Vitals and nursing note reviewed.   Constitutional:       Appearance: He is ill-appearing.   HENT:      Head: Normocephalic and atraumatic.      Nose: Nose normal.      Mouth/Throat:      Mouth: Mucous membranes are moist.   Eyes:      Extraocular Movements: Extraocular movements intact.      Pupils: Pupils are equal, round, and reactive to light.   Cardiovascular:      Rate and Rhythm: Normal rate and regular rhythm.      Pulses: Normal pulses.   Pulmonary:      Effort: Pulmonary effort is normal.      Breath sounds: Rhonchi and rales present.   Abdominal:      General: Bowel sounds are normal.      Palpations: Abdomen is soft.   Musculoskeletal:         General: Normal range of motion.      Cervical back: Normal range of motion and neck supple.   Skin:     General: Skin is warm.      Capillary Refill: Capillary refill takes less than 2 seconds.      Coloration: Skin is pale.   Neurological:      Mental Status: He is alert and oriented to person, place, and time.         Diagnostic Data:  Lab Results (last 24 hours)       Procedure Component Value Units Date/Time    Blood Culture - Blood, Arm, Left [[id number]] Collected: 12/02/24 1116    Specimen: Blood from Arm, Left Updated: 12/02/24 1415    Blood Culture - Blood, Arm, Right [[id number]] Collected: 12/02/24 1408    Specimen: Blood from Arm, Right Updated: 12/02/24 1415    POC Lactate [[id number]]  (Normal) Collected: 12/02/24 1413    Specimen: Blood Updated: 12/02/24 1415     Lactate 1.2 mmol/L      Comment: Serial Number: [id number]:  [id number]       Respiratory Panel PCR w/COVID-19(SARS-CoV-2) SAVITA/SAMARIA/MICHAEL/PAD/COR/COLT In-House, NP Swab in UTM/VTM, 2 HR TAT - Swab, Nasopharynx [[id number]]  (Normal) Collected: 12/02/24 1120    Specimen:  Swab from Nasopharynx Updated: 12/02/24 1213     ADENOVIRUS, PCR Not Detected     Coronavirus 229E Not Detected     Coronavirus HKU1 Not Detected     Coronavirus NL63 Not Detected     Coronavirus OC43 Not Detected     COVID19 Not Detected     Human Metapneumovirus Not Detected     Human Rhinovirus/Enterovirus Not Detected     Influenza A PCR Not Detected     Influenza B PCR Not Detected     Parainfluenza Virus 1 Not Detected     Parainfluenza Virus 2 Not Detected     Parainfluenza Virus 3 Not Detected     Parainfluenza Virus 4 Not Detected     RSV, PCR Not Detected     Bordetella pertussis pcr Not Detected     Bordetella parapertussis PCR Not Detected     Chlamydophila pneumoniae PCR Not Detected     Mycoplasma pneumo by PCR Not Detected    Narrative:      In the setting of a positive respiratory panel with a viral infection PLUS a negative procalcitonin without other underlying concern for bacterial infection, consider observing off antibiotics or discontinuation of antibiotics and continue supportive care. If the respiratory panel is positive for atypical bacterial infection (Bordetella pertussis, Chlamydophila pneumoniae, or Mycoplasma pneumoniae), consider antibiotic de-escalation to target atypical bacterial infection.    Protime-INR [[id number]]  (Normal) Collected: 12/02/24 1115    Specimen: Blood Updated: 12/02/24 1211     Protime 24.7 Seconds      INR 2.24    CBC & Differential [[id number]]  (Abnormal) Collected: 12/02/24 1115    Specimen: Blood Updated: 12/02/24 1208    Narrative:      The following orders were created for panel order CBC & Differential.  Procedure                               Abnormality         Status                     ---------                               -----------         ------                     CBC Auto Differential[[id number]]        Abnormal            Final result                 Please view results for these tests on the individual orders.    CBC Auto Differential  [[id number]]  (Abnormal) Collected: 12/02/24 1115    Specimen: Blood Updated: 12/02/24 1208     WBC 18.56 10*3/mm3      RBC 5.34 10*6/mm3      Hemoglobin 15.3 g/dL      Hematocrit 48.1 %      MCV 90.1 fL      MCH 28.7 pg      MCHC 31.8 g/dL      RDW 12.9 %      RDW-SD 42.4 fl      MPV 9.6 fL      Platelets 223 10*3/mm3      Neutrophil % 85.4 %      Lymphocyte % 7.0 %      Monocyte % 6.6 %      Eosinophil % 0.1 %      Basophil % 0.4 %      Immature Grans % 0.5 %      Neutrophils, Absolute 15.83 10*3/mm3      Lymphocytes, Absolute 1.30 10*3/mm3      Monocytes, Absolute 1.23 10*3/mm3      Eosinophils, Absolute 0.02 10*3/mm3      Basophils, Absolute 0.08 10*3/mm3      Immature Grans, Absolute 0.10 10*3/mm3      nRBC 0.0 /100 WBC     Comprehensive Metabolic Panel [[id number]]  (Abnormal) Collected: 12/02/24 1115    Specimen: Blood Updated: 12/02/24 1157     Glucose 137 mg/dL      BUN 16 mg/dL      Creatinine 0.92 mg/dL      Sodium 139 mmol/L      Potassium 4.2 mmol/L      Comment: Slight hemolysis detected by analyzer. Result may be falsely elevated.        Chloride 101 mmol/L      CO2 25.0 mmol/L      Calcium 9.6 mg/dL      Total Protein 7.5 g/dL      Albumin 4.3 g/dL      ALT (SGPT) 20 U/L      AST (SGOT) 26 U/L      Alkaline Phosphatase 110 U/L      Total Bilirubin 1.5 mg/dL      Globulin 3.2 gm/dL      A/G Ratio 1.3 g/dL      BUN/Creatinine Ratio 17.4     Anion Gap 13.0 mmol/L      eGFR 88.4 mL/min/1.73     Narrative:      GFR Normal >60  Chronic Kidney Disease <60  Kidney Failure <15    The GFR formula is only valid for adults with stable renal function between ages 18 and 70.    [place] Draw [[id number]] Collected: 12/02/24 1115    Specimen: Blood Updated: 12/02/24 1130    Narrative:      The following orders were created for panel order [place] Draw.  Procedure                               Abnormality         Status                     ---------                               -----------         ------                      Green Top (Gel)[[id number]]                                  Final result               Lavender Top[[id number]]                                     Final result               Gold Top - SST[[id number]]                                   Final result               Light Blue Top[[id number]]                                   Final result                 Please view results for these tests on the individual orders.    Green Top (Gel) [[id number]] Collected: 12/02/24 1115    Specimen: Blood Updated: 12/02/24 1130     Extra Tube Hold for add-ons.     Comment: Auto resulted.       Lavender Top [[id number]] Collected: 12/02/24 1115    Specimen: Blood Updated: 12/02/24 1130     Extra Tube hold for add-on     Comment: Auto resulted       Gold Top - SST [[id number]] Collected: 12/02/24 1115    Specimen: Blood Updated: 12/02/24 1130     Extra Tube Hold for add-ons.     Comment: Auto resulted.       Light Blue Top [[id number]] Collected: 12/02/24 1115    Specimen: Blood Updated: 12/02/24 1130     Extra Tube Hold for add-ons.     Comment: Auto resulted                Imaging Results (Last 24 Hours)       Procedure Component Value Units Date/Time    XR Chest 1 View [[id number]] Collected: 12/02/24 1236     Updated: 12/02/24 1240    Narrative:      XR CHEST 1 VW    Date of Exam: 12/2/2024 12:11 PM EST    Indication: soa, cough     Comparison: CT chest 9/25/2024. AP chest 1/19/2024    Findings:  Emphysematous changes are demonstrated better advantage on prior CT chest imaging. The interstitial appears slightly thicker bilaterally. Heart size is mildly enlarged but stable. Median sternotomy changes are present. There is mild thoracic aortic   calcific atherosclerosis. No pleural effusion, pneumothorax or acute osseous abnormality is identified.      Impression:      Impression:  Slight increased interstitial prominence bilaterally since 1/9/2024 chest x-ray, raises the possibility of mild atypical interstitial pneumonia or mild  edema.  No focal lung consolidation.  Stable mild cardiac enlargement.      Electronically Signed: Anahi Valladares MD    12/2/2024 12:38 PM EST    Workstation ID: [id number]              Assessment & Plan        This is a 74 y.o. male with:    Active and Resolved Problems  Active Hospital Problems    Diagnosis  POA    **Community acquired pneumonia [J18.9]  Yes      Resolved Hospital Problems   No resolved problems to display.     Atypical pneumonia   - CXR: atypical pneumonia  - WBC 18.56, PCT pending  - Respiratory pathogen panel negative  - Continue doxycycline and unasyn  - Blood cultures drawn in the ED, pending  - Duoneb q4h prn  - Symptomatic treatment  - continue oxygen, wean as tolerated  - pulmonology consulted     Atrial fibrillation   - Currently rate controlled AFib  - Continue AC and BB once reconciled   - Telemetry  - INR 2.24, PT 24.7    Hyperlipidemia  - continue home statin     Hypertension   - BP stable  - Resume home medications once reconciled       VTE Prophylaxis:  Mechanical VTE prophylaxis orders are present.        The patient desires to be as follows:    CODE STATUS:    Code Status (Patient has no pulse and is not breathing): CPR (Attempt to Resuscitate)  Medical Interventions (Patient has pulse or is breathing): Full Support        Nichelle Ramirez, who can be contacted at [phone], is the designated person to make medical decisions on the patient's behalf if He is incapable of doing so. This was clarified with patient and/or next of kin on 12/2/2024 during the course of this H&P.    Admission Status:  I believe this patient meets inpatient status.    Expected Length of Stay: > 2 midnights     PDMP and Medication Dispenses via Sidebar reviewed and consistent with patient reported medications.    I discussed the patient's findings and my recommendations with patient.      Signature:     This document has been electronically signed by ALICJA Yang on December 2, 2024 15:32 EST   [facility] Hospitalist Team

## 2024-12-03 ENCOUNTER — APPOINTMENT (OUTPATIENT)
Dept: CT IMAGING | Facility: HOSPITAL | Age: 74
End: 2024-12-03
Payer: MEDICARE

## 2024-12-03 LAB
ANION GAP SERPL CALCULATED.3IONS-SCNC: 7.5 MMOL/L (ref 5–15)
ANION GAP SERPL CALCULATED.3IONS-SCNC: 7.9 MMOL/L (ref 5–15)
BASOPHILS # BLD AUTO: 0.04 10*3/MM3 (ref 0–0.2)
BASOPHILS # BLD AUTO: 0.05 10*3/MM3 (ref 0–0.2)
BASOPHILS NFR BLD AUTO: 0.3 % (ref 0–1.5)
BASOPHILS NFR BLD AUTO: 0.3 % (ref 0–1.5)
BUN SERPL-MCNC: 16 MG/DL (ref 8–23)
BUN SERPL-MCNC: 20 MG/DL (ref 8–23)
BUN/CREAT SERPL: 19.5 (ref 7–25)
BUN/CREAT SERPL: 29.4 (ref 7–25)
CALCIUM SPEC-SCNC: 9 MG/DL (ref 8.6–10.5)
CALCIUM SPEC-SCNC: 9 MG/DL (ref 8.6–10.5)
CHLORIDE SERPL-SCNC: 102 MMOL/L (ref 98–107)
CHLORIDE SERPL-SCNC: 104 MMOL/L (ref 98–107)
CO2 SERPL-SCNC: 26.1 MMOL/L (ref 22–29)
CO2 SERPL-SCNC: 26.5 MMOL/L (ref 22–29)
CREAT SERPL-MCNC: 0.68 MG/DL (ref 0.76–1.27)
CREAT SERPL-MCNC: 0.82 MG/DL (ref 0.76–1.27)
DEPRECATED RDW RBC AUTO: 42.3 FL (ref 37–54)
DEPRECATED RDW RBC AUTO: 42.9 FL (ref 37–54)
EGFRCR SERPLBLD CKD-EPI 2021: 93.3 ML/MIN/1.73
EGFRCR SERPLBLD CKD-EPI 2021: 98.8 ML/MIN/1.73
EOSINOPHIL # BLD AUTO: 0.02 10*3/MM3 (ref 0–0.4)
EOSINOPHIL # BLD AUTO: 0.05 10*3/MM3 (ref 0–0.4)
EOSINOPHIL NFR BLD AUTO: 0.1 % (ref 0.3–6.2)
EOSINOPHIL NFR BLD AUTO: 0.3 % (ref 0.3–6.2)
ERYTHROCYTE [DISTWIDTH] IN BLOOD BY AUTOMATED COUNT: 12.7 % (ref 12.3–15.4)
ERYTHROCYTE [DISTWIDTH] IN BLOOD BY AUTOMATED COUNT: 12.9 % (ref 12.3–15.4)
GLUCOSE BLDC GLUCOMTR-MCNC: 170 MG/DL (ref 70–105)
GLUCOSE SERPL-MCNC: 135 MG/DL (ref 65–99)
GLUCOSE SERPL-MCNC: 172 MG/DL (ref 65–99)
HCT VFR BLD AUTO: 40.5 % (ref 37.5–51)
HCT VFR BLD AUTO: 42.8 % (ref 37.5–51)
HGB BLD-MCNC: 13.5 G/DL (ref 13–17.7)
HGB BLD-MCNC: 13.7 G/DL (ref 13–17.7)
IMM GRANULOCYTES # BLD AUTO: 0.09 10*3/MM3 (ref 0–0.05)
IMM GRANULOCYTES # BLD AUTO: 0.1 10*3/MM3 (ref 0–0.05)
IMM GRANULOCYTES NFR BLD AUTO: 0.6 % (ref 0–0.5)
IMM GRANULOCYTES NFR BLD AUTO: 0.7 % (ref 0–0.5)
INR PPP: 1.75 (ref 2–3)
LYMPHOCYTES # BLD AUTO: 1.1 10*3/MM3 (ref 0.7–3.1)
LYMPHOCYTES # BLD AUTO: 1.23 10*3/MM3 (ref 0.7–3.1)
LYMPHOCYTES NFR BLD AUTO: 7.4 % (ref 19.6–45.3)
LYMPHOCYTES NFR BLD AUTO: 7.6 % (ref 19.6–45.3)
MCH RBC QN AUTO: 29.1 PG (ref 26.6–33)
MCH RBC QN AUTO: 30.1 PG (ref 26.6–33)
MCHC RBC AUTO-ENTMCNC: 32 G/DL (ref 31.5–35.7)
MCHC RBC AUTO-ENTMCNC: 33.3 G/DL (ref 31.5–35.7)
MCV RBC AUTO: 90.4 FL (ref 79–97)
MCV RBC AUTO: 90.9 FL (ref 79–97)
MONOCYTES # BLD AUTO: 0.81 10*3/MM3 (ref 0.1–0.9)
MONOCYTES # BLD AUTO: 1.18 10*3/MM3 (ref 0.1–0.9)
MONOCYTES NFR BLD AUTO: 5.5 % (ref 5–12)
MONOCYTES NFR BLD AUTO: 7.3 % (ref 5–12)
NEUTROPHILS NFR BLD AUTO: 12.71 10*3/MM3 (ref 1.7–7)
NEUTROPHILS NFR BLD AUTO: 13.53 10*3/MM3 (ref 1.7–7)
NEUTROPHILS NFR BLD AUTO: 83.9 % (ref 42.7–76)
NEUTROPHILS NFR BLD AUTO: 86 % (ref 42.7–76)
NRBC BLD AUTO-RTO: 0 /100 WBC (ref 0–0.2)
NRBC BLD AUTO-RTO: 0 /100 WBC (ref 0–0.2)
PLATELET # BLD AUTO: 178 10*3/MM3 (ref 140–450)
PLATELET # BLD AUTO: 195 10*3/MM3 (ref 140–450)
PMV BLD AUTO: 9.4 FL (ref 6–12)
PMV BLD AUTO: 9.5 FL (ref 6–12)
POTASSIUM SERPL-SCNC: 3.9 MMOL/L (ref 3.5–5.2)
POTASSIUM SERPL-SCNC: 4.5 MMOL/L (ref 3.5–5.2)
PROCALCITONIN SERPL-MCNC: 0.3 NG/ML (ref 0–0.25)
PROTHROMBIN TIME: 20.4 SECONDS (ref 19.4–28.5)
QT INTERVAL: 402 MS
QTC INTERVAL: 469 MS
RBC # BLD AUTO: 4.48 10*6/MM3 (ref 4.14–5.8)
RBC # BLD AUTO: 4.71 10*6/MM3 (ref 4.14–5.8)
SODIUM SERPL-SCNC: 136 MMOL/L (ref 136–145)
SODIUM SERPL-SCNC: 138 MMOL/L (ref 136–145)
WBC NRBC COR # BLD AUTO: 14.78 10*3/MM3 (ref 3.4–10.8)
WBC NRBC COR # BLD AUTO: 16.13 10*3/MM3 (ref 3.4–10.8)

## 2024-12-03 PROCEDURE — 80048 BASIC METABOLIC PNL TOTAL CA: CPT

## 2024-12-03 PROCEDURE — 85025 COMPLETE CBC W/AUTO DIFF WBC: CPT

## 2024-12-03 PROCEDURE — 84145 PROCALCITONIN (PCT): CPT

## 2024-12-03 PROCEDURE — 85610 PROTHROMBIN TIME: CPT

## 2024-12-03 PROCEDURE — 71250 CT THORAX DX C-: CPT

## 2024-12-03 PROCEDURE — 82948 REAGENT STRIP/BLOOD GLUCOSE: CPT

## 2024-12-03 PROCEDURE — 25010000002 AMPICILLIN-SULBACTAM PER 1.5 G

## 2024-12-03 PROCEDURE — 63710000001 PREDNISONE PER 1 MG: Performed by: INTERNAL MEDICINE

## 2024-12-03 RX ORDER — AZITHROMYCIN 250 MG/1
500 TABLET, FILM COATED ORAL
Status: COMPLETED | OUTPATIENT
Start: 2024-12-03 | End: 2024-12-05

## 2024-12-03 RX ORDER — LOSARTAN POTASSIUM 50 MG/1
100 TABLET ORAL
Status: DISCONTINUED | OUTPATIENT
Start: 2024-12-03 | End: 2024-12-05 | Stop reason: HOSPADM

## 2024-12-03 RX ORDER — ALLOPURINOL 300 MG/1
300 TABLET ORAL DAILY
Status: DISCONTINUED | OUTPATIENT
Start: 2024-12-03 | End: 2024-12-05 | Stop reason: HOSPADM

## 2024-12-03 RX ORDER — PREDNISONE 20 MG/1
20 TABLET ORAL
Status: DISCONTINUED | OUTPATIENT
Start: 2024-12-03 | End: 2024-12-05

## 2024-12-03 RX ORDER — ATENOLOL 50 MG/1
50 TABLET ORAL NIGHTLY
Status: DISCONTINUED | OUTPATIENT
Start: 2024-12-03 | End: 2024-12-05 | Stop reason: HOSPADM

## 2024-12-03 RX ORDER — ASPIRIN 81 MG/1
81 TABLET, CHEWABLE ORAL DAILY
Status: DISCONTINUED | OUTPATIENT
Start: 2024-12-03 | End: 2024-12-05 | Stop reason: HOSPADM

## 2024-12-03 RX ORDER — GUAIFENESIN AND DEXTROMETHORPHAN HYDROBROMIDE 600; 30 MG/1; MG/1
1 TABLET, EXTENDED RELEASE ORAL 2 TIMES DAILY
Status: DISCONTINUED | OUTPATIENT
Start: 2024-12-03 | End: 2024-12-05 | Stop reason: HOSPADM

## 2024-12-03 RX ORDER — AMLODIPINE BESYLATE 5 MG/1
5 TABLET ORAL
Status: DISCONTINUED | OUTPATIENT
Start: 2024-12-03 | End: 2024-12-05 | Stop reason: HOSPADM

## 2024-12-03 RX ORDER — ATORVASTATIN CALCIUM 20 MG/1
20 TABLET, FILM COATED ORAL NIGHTLY
Status: DISCONTINUED | OUTPATIENT
Start: 2024-12-03 | End: 2024-12-05 | Stop reason: HOSPADM

## 2024-12-03 RX ADMIN — AMPICILLIN SODIUM AND SULBACTAM SODIUM 3 G: 2; 1 INJECTION, POWDER, FOR SOLUTION INTRAMUSCULAR; INTRAVENOUS at 16:14

## 2024-12-03 RX ADMIN — WARFARIN SODIUM 7.5 MG: 5 TABLET ORAL at 18:13

## 2024-12-03 RX ADMIN — GUAIFENESIN, DEXTROMETHORPHAN HBR 1 TABLET: 600; 30 TABLET ORAL at 13:37

## 2024-12-03 RX ADMIN — AMPICILLIN SODIUM AND SULBACTAM SODIUM 3 G: 2; 1 INJECTION, POWDER, FOR SOLUTION INTRAMUSCULAR; INTRAVENOUS at 03:18

## 2024-12-03 RX ADMIN — ASPIRIN 81 MG CHEWABLE TABLET 81 MG: 81 TABLET CHEWABLE at 13:37

## 2024-12-03 RX ADMIN — AMPICILLIN SODIUM AND SULBACTAM SODIUM 3 G: 2; 1 INJECTION, POWDER, FOR SOLUTION INTRAMUSCULAR; INTRAVENOUS at 20:31

## 2024-12-03 RX ADMIN — AMLODIPINE BESYLATE 5 MG: 5 TABLET ORAL at 13:37

## 2024-12-03 RX ADMIN — PREDNISONE 20 MG: 20 TABLET ORAL at 10:16

## 2024-12-03 RX ADMIN — AZITHROMYCIN DIHYDRATE 500 MG: 250 TABLET ORAL at 10:16

## 2024-12-03 RX ADMIN — ALLOPURINOL 300 MG: 300 TABLET ORAL at 18:13

## 2024-12-03 RX ADMIN — ATENOLOL 50 MG: 50 TABLET ORAL at 20:31

## 2024-12-03 RX ADMIN — AMPICILLIN SODIUM AND SULBACTAM SODIUM 3 G: 2; 1 INJECTION, POWDER, FOR SOLUTION INTRAMUSCULAR; INTRAVENOUS at 10:16

## 2024-12-03 RX ADMIN — ATORVASTATIN CALCIUM 20 MG: 20 TABLET, FILM COATED ORAL at 20:31

## 2024-12-03 RX ADMIN — GUAIFENESIN, DEXTROMETHORPHAN HBR 1 TABLET: 600; 30 TABLET ORAL at 22:02

## 2024-12-03 RX ADMIN — LOSARTAN POTASSIUM 100 MG: 50 TABLET, FILM COATED ORAL at 13:37

## 2024-12-03 NOTE — PLAN OF CARE
Goal Outcome Evaluation:      Pt resting thru  out the shift,  no issues at this time,

## 2024-12-03 NOTE — PLAN OF CARE
Goal Outcome Evaluation:  Plan of Care Reviewed With: patient        Progress: improving  Outcome Evaluation: Pt stable all shift. On IV ABX for community acquired pneumonia. Pt pleasant throughout shift

## 2024-12-03 NOTE — PROGRESS NOTES
"Pharmacy dosing service  Anticoagulant  Warfarin     Subjective:    Sandro Ramirez is a 74 y.o.male being continued on warfarin for Atrial Fibrillation / Flutter.    INR Goal: 2 - 3  Home medication?: warfarin 7.5 mg PO daily, except warfarin 11.25 mg PO on Monday and Friday.   Bridge Therapy Present?:  No  Interacting Medications Evaluation (New/Present/Discontinued):   Ampicillin-Sulbactam (12/2 - 12/5): may enhance the anticoagulant effect of warfarin  Aspirin (pta): may enhance the anticoagulant effect of warfarin  Azithromycin (12/3 - 12/5): may enhance the anticoagulant effect of warfarin  Doxycycline (one dose on 12/2): may enhance the anticoagulant effect of warfarin  Prednisone (12/2 - ): may enhance the anticoagulant effect of warfarin  Additional Contributing Factors: Patient ate % of documented meals in last 24 hours      Assessment/Plan:    Patient's INR was therapeutic yesterday at 2.24. INR level today not drawn yet (patient requested RN to do it later due to a coughing fit)  Patient did not take their home dose yesterday.  Will give a dose of warfarin 7.5 mg this evening. INR trend tomorrow can help determine if we will be able to continue home dose regimen while inpatient.     Continue to monitor and adjust based on INR.       Date 11/27 12/2 12/3         INR 2.60 2.24 ?         Dose  held 7.5 mg           Objective:  [Ht: 180.3 cm (71\"); Wt: 87 kg (191 lb 12.8 oz); BMI: Body mass index is 26.75 kg/m².]    Lab Results   Component Value Date    ALBUMIN 4.3 12/02/2024     Lab Results   Component Value Date    INR 2.24 12/02/2024    INR 2.60 11/27/2024    INR 3.20 (A) 10/25/2024    PROTIME 24.7 12/02/2024    PROTIME 13.6 08/28/2024    PROTIME 13.9 (L) 01/19/2024     Lab Results   Component Value Date    HGB 13.5 12/03/2024    HGB 15.3 12/02/2024    HGB 15.1 08/14/2024     Lab Results   Component Value Date    HCT 40.5 12/03/2024    HCT 48.1 12/02/2024    HCT 47.3 08/14/2024     Lawrence" [facility]  12/03/24 15:44 EST

## 2024-12-03 NOTE — PROGRESS NOTES
[facility] MEDICINE SERVICE  DAILY PROGRESS NOTE    NAME: Sandro Ramirez  : 1950  MRN: [id number]      LOS: 1 day     PROVIDER OF SERVICE: Reynaldo Kumar MD    Chief Complaint: Community acquired pneumonia    Subjective:     Interval History:  History taken from: patient    Patient seen and examined at bedside this morning.  No acute complaints overnight.  States that he has been feeling short of breath for a while but worse in the last week or so and has been coughing more.        Review of Systems: Negative except described above  Review of Systems    Objective:     Vital Signs  Temp:  [98 °F (36.7 °C)-98.8 °F (37.1 °C)] 98 °F (36.7 °C)  Heart Rate:  [76-96] 93  Resp:  [20-33] 27  BP: (109-142)/(59-83) 135/67  Flow (L/min) (Oxygen Therapy):  [2-3.5] 3.5   Body mass index is 26.75 kg/m².    Physical Exam  Physical Exam  Constitutional:       Comments: NAD    Cardiovascular:      Comments:  RRR, S1 & S2   Pulmonary:      Comments:  Lungs CTA   Abdominal:      Comments:  ABD soft, NT            Diagnostic Data    Results from last 7 days   Lab Units 24  0558 24  1115   WBC 10*3/mm3 16.13* 18.56*   HEMOGLOBIN g/dL 13.5 15.3   HEMATOCRIT % 40.5 48.1   PLATELETS 10*3/mm3 178 223   GLUCOSE mg/dL 172* 137*   CREATININE mg/dL 0.82 0.92   BUN mg/dL 16 16   SODIUM mmol/L 136 139   POTASSIUM mmol/L 3.9 4.2   AST (SGOT) U/L  --  26   ALT (SGPT) U/L  --  20   ALK PHOS U/L  --  110   BILIRUBIN mg/dL  --  1.5*   ANION GAP mmol/L 7.5 13.0       CT Chest Without Contrast Diagnostic    Result Date: 12/3/2024  Impression: 1. New diffuse tree-in-bud nodules throughout the left lung and involving right lower lobe most consistent with multifocal endobronchial infection / inflammation. 2. Mildly enlarged low paratracheal and AP window nodes likely reactive adenopathy, recommend attention on follow-up. 3. Mild emphysema. 4. Subacute fracture of the left anterior sixth rib. 5. Additional chronic  findings above. Electronically Signed: Harvey Khoury MD  12/3/2024 11:31 AM EST  Workstation ID: [id number]    XR Chest 1 View    Result Date: 12/2/2024  Impression: Slight increased interstitial prominence bilaterally since 1/9/2024 chest x-ray, raises the possibility of mild atypical interstitial pneumonia or mild edema. No focal lung consolidation. Stable mild cardiac enlargement. Electronically Signed: Anahi Valladares MD  12/2/2024 12:38 PM EST  Workstation ID: [id number]       I reviewed the patient's new clinical results.    Assessment/Plan:     Active and Resolved Problems  Active Hospital Problems    Diagnosis  POA    **Community acquired pneumonia [J18.9]  Yes      Resolved Hospital Problems   No resolved problems to display.       Atypical pneumonia with likely COPD exacerbation  - CXR: atypical pneumonia  - WBC 18.56, PCT pending  - Respiratory pathogen panel negative  - Continue azithromycin, Unasyn and prednisone  -CT chest showed new diffuse tree-in-bud nodules throughout the left lung and right lower lobe  - Blood cultures drawn in the ED, pending  - Duoneb q4h prn  - Symptomatic treatment  - continue oxygen, wean as tolerated  - pulmonology consulted      Atrial fibrillation   - Currently rate controlled AFib  - Continue atenolol  - Telemetry  - Daily INR  -Resume warfarin, pharmacy to dose     Hyperlipidemia  - continue home statin      Hypertension   - BP stable  - Resume home medications      VTE Prophylaxis:  Pharmacologic & mechanical VTE prophylaxis orders are present.                Time: 35 minutes     Code Status and Medical Interventions: CPR (Attempt to Resuscitate); Full Support   Ordered at: 12/02/24 1532     Code Status (Patient has no pulse and is not breathing):    CPR (Attempt to Resuscitate)     Medical Interventions (Patient has pulse or is breathing):    Full Support       Signature: Electronically signed by Reynaldo Kumar MD, 12/03/24, 12:42 EST.  [facility]ist  Team

## 2024-12-03 NOTE — CONSULTS
Group: [facility]         CONSULT NOTE    Patient Identification:  Sandro Ramirez  74 y.o.  male  1950  [id number]            Requesting physician: Attending physician    Reason for Consultation: Hypoxia      History of Present Illness:  74-year-old male admitted on 12/2/2024 from urgent care with cough and shortness of breath O2 sats were 89% on room air he was placed on 2 L      Assessment:    Hypoxic respiratory insufficiency  Respiratory viral panel negative on 12-24    COPD  PFTs 2/20/2024  FEV1/FVC 68%, FVC 52%, FEV1 48%  %, % DLCO 46%      Atrial fibrillation  CHF  Obstructive sleep apnea  Hypertension  Hyperlipidemia    2D echo 10/19/2023    Left ventricular ejection fraction appears to be 51 - 55%.    The right ventricular cavity is mildly dilated.    The left atrial cavity is moderately dilated.    The right atrial cavity is dilated.    Moderate aortic valve regurgitation is present.    Moderate mitral valve regurgitation is present.    Estimated right ventricular systolic pressure from tricuspid regurgitation is mildly elevated (35-45 mmHg).    Recommendations:    Chest CT without contrast  Check troponin and proBNP  Antibiotics currently on IV Unasyn  P.o. azithromycin for 3 days as anti-inflammatory medication  P.o. prednisone 6-day taper  Bronchodilators            Review of Sytems:  Review of Systems   Respiratory:  Positive for cough and shortness of breath. Negative for wheezing and stridor.    Cardiovascular:  Positive for palpitations. Negative for chest pain and leg swelling.       Past Medical History:  Past Medical History:   Diagnosis Date    AAA (abdominal aortic aneurysm) 2006    Abnormal ECG     Allergic     Aneurysm     Arthritis     Asthma     Atrial fibrillation     COUMADIN    Benign prostatic hyperplasia     Bladder cancer     CHF (congestive heart failure)     Chronic anticoagulation     COUMADIN STARTED 2006    Clotting disorder     Colon polyp      Congenital heart disease     COPD (chronic obstructive pulmonary disease)     Coronary artery disease     Diabetes mellitus     treat with diet    Ehrlichiosis     Elevated cholesterol     Erectile dysfunction     Facial basal cell cancer     Gout     Hard to intubate     History of pancreatitis 2006    History of pneumonia     History of tick-borne disease 2015    HL (hearing loss)     BILAT HEARING AIDS    Hyperlipidemia     Hypertension     Myocardial infarction     X5    Obesity     Pancreatitis     Pneumonia     Sleep apnea     CURRENTLY NOT USING CPAP D/T RECALL    Transfusion history     STATES HIS SISTER HAD A SEVERE REACTION TO BLOOD TRANSFUSION    Type 2 diabetes mellitus        Past Surgical History:  Past Surgical History:   Procedure Laterality Date    ARTERIOGRAM AORTIC Left 06/29/2022    Procedure: AORTIC STENT GRAFT PLACEMENT WITH ILIAC COILING;  Surgeon: Eric Sainz MD;  Location: [facility] OR 18/19;  Service: Vascular;  Laterality: Left;    CARDIAC CATHETERIZATION      CARDIAC SURGERY      CHOLECYSTECTOMY      COLONOSCOPY      COLONOSCOPY N/A 07/27/2023    Procedure: COLONOSCOPY to cecum with cold biopsy and cold snare polypectomies and clips;  Surgeon: Vitor Haley MD;  Location: [facility] ENDOSCOPY;  Service: Gastroenterology;  Laterality: N/A;  Pre - H/O polyps.  Post - diverticulosis, polyps, hemorrhoids    COLONOSCOPY W/ POLYPECTOMY N/A 08/28/2024    Procedure: COLONOSCOPY WITH POLYPECTOMY;  Surgeon: Vitor Haley MD;  Location: [facility] OR;  Service: Gastroenterology;  Laterality: N/A;  diverticulosis, sigmoid polyp x2    CORONARY ARTERY BYPASS GRAFT  2006    X2    CYSTOSCOPY      STATES HAD BLADDER TUMORS REMOVED X2    CYSTOSCOPY WITH CLOT EVACUATION N/A 01/17/2024    Procedure: CYSTOSCOPY WITH CLOT EVACUATION, WITH FULGRATION;  Surgeon: Daniel Hamm MD;  Location: [facility] OR;  Service: Urology;  Laterality: N/A;    PANCREAS SURGERY      PROSTATE SURGERY      X2-  STATES PLACED COILS IN TO HELP WITH FLOW AND THEN HAD TO REMOVE A PIECE THAT BROKE OFF AND WAS IN BLADDER    UPPER GASTROINTESTINAL ENDOSCOPY          Home Meds:  Medications Prior to Admission   Medication Sig Dispense Refill Last Dose/Taking    amLODIPine (NORVASC) 10 MG tablet Take 1 tablet p.o. daily. 90 tablet 3 12/2/2024    aspirin 81 MG tablet Take 1 tablet by mouth Daily.   12/2/2024    warfarin (COUMADIN) 7.5 MG tablet See Admin Instructions. Take 1 tablet by mouth daily on Saturday, Sunday, Tuesday, Wednesday, and Thursday 12/1/2024    warfarin (COUMADIN) 7.5 MG tablet See Admin Instructions. Take 1.5 tablets by mouth on Monday and Friday   Past Week    albuterol sulfate  (90 Base) MCG/ACT inhaler Inhale 2 puffs Every 4 (Four) Hours As Needed for Wheezing.       allopurinol (ZYLOPRIM) 300 MG tablet Take 1 tablet by mouth Daily.       atenolol (TENORMIN) 50 MG tablet TAKE 1 TABLET BY MOUTH EVERY DAY AT NIGHT 90 tablet 3     atorvastatin (LIPITOR) 20 MG tablet TAKE 1 TABLET BY MOUTH EVERY DAY 90 tablet 1     cetirizine (zyrTEC) 10 MG tablet Take 1 tablet by mouth Daily.       FLUTICASONE FUROATE IN Inhale 2 Applications Daily.       losartan (COZAAR) 100 MG tablet Take 1 tablet by mouth Daily. 90 tablet 3        Allergies:  Allergies   Allergen Reactions    Bee Venom Anaphylaxis     HIVES-SWOLLEN THROAT    Meperidine Hives    Midazolam Hives    Propofol Other (See Comments)     UNCLEAR-SVT, HYPOTENSION-STATES SEVERE ALMOST CODED    Sulfa Antibiotics Hives    Hydrocodone Nausea And Vomiting    Simvastatin Myalgia       Social History:   Social History     Socioeconomic History    Marital status:      Spouse name: Nichelle    Number of children: 3    Years of education: 12   Tobacco Use    Smoking status: Former     Current packs/day: 0.00     Average packs/day: 1 pack/day for 30.0 years (30.0 ttl pk-yrs)     Types: Cigarettes     Start date: 6/18/1976     Quit date: 6/18/2006     Years since  "quittin.4     Passive exposure: Past    Smokeless tobacco: Never   Vaping Use    Vaping status: Never Used   Substance and Sexual Activity    Alcohol use: Not Currently     Comment: one or two beers 6 times a year    Drug use: Never    Sexual activity: Not Currently     Partners: Female     Birth control/protection: None       Family History:  Family History   Problem Relation Age of Onset    Hypertension Mother     Colon cancer Mother     Cancer Mother     Diabetes Mother     Arthritis Mother     Cancer Father     Heart attack Father     Hearing loss Father     Heart disease Father     Hypertension Sister     Hypertension Brother     Colon cancer Brother     Cancer Brother     Heart disease Paternal Grandfather     Heart attack Paternal Grandfather     Malig Hyperthermia Neg Hx     Colon polyps Neg Hx     Crohn's disease Neg Hx     Irritable bowel syndrome Neg Hx     Ulcerative colitis Neg Hx        Physical Exam:  /69 (BP Location: Right arm, Patient Position: Lying)   Pulse 83   Temp 98.3 °F (36.8 °C) (Oral)   Resp 25   Ht 180.3 cm (71\")   Wt 87 kg (191 lb 12.8 oz)   SpO2 95%   BMI 26.75 kg/m²  Body mass index is 26.75 kg/m². 95% 87 kg (191 lb 12.8 oz)  Physical Exam  Cardiovascular:      Heart sounds: No murmur heard.     No gallop.   Pulmonary:      Effort: No respiratory distress.      Breath sounds: No stridor. Rhonchi present. No wheezing or rales.   Chest:      Chest wall: No tenderness.         LABS:  Lab Results   Component Value Date    CALCIUM 9.0 2024    PHOS 3.1 2019     Results from last 7 days   Lab Units 24  0558 24  1115   SODIUM mmol/L 136 139   POTASSIUM mmol/L 3.9 4.2   CHLORIDE mmol/L 102 101   CO2 mmol/L 26.5 25.0   BUN mg/dL 16 16   CREATININE mg/dL 0.82 0.92   GLUCOSE mg/dL 172* 137*   CALCIUM mg/dL 9.0 9.6   WBC 10*3/mm3 16.13* 18.56*   HEMOGLOBIN g/dL 13.5 15.3   PLATELETS 10*3/mm3 178 223   ALT (SGPT) U/L  --  20   AST (SGOT) U/L  --  26 "   PROCALCITONIN ng/mL  --  0.17     Lab Results   Component Value Date    TROPONINT <0.010 11/24/2021             Results from last 7 days   Lab Units 12/02/24  1413 12/02/24  1115   PROCALCITONIN ng/mL  --  0.17   LACTATE mmol/L 1.2  --          Results from last 7 days   Lab Units 12/02/24  1120   ADENOVIRUS DETECTION BY PCR  Not Detected   CORONAVIRUS 229E  Not Detected   CORONAVIRUS HKU1  Not Detected   CORONAVIRUS NL63  Not Detected   CORONAVIRUS OC43  Not Detected   HUMAN METAPNEUMOVIRUS  Not Detected   HUMAN RHINOVIRUS/ENTEROVIRUS  Not Detected   INFLUENZA B PCR  Not Detected   PARAINFLUENZA 1  Not Detected   PARAINFLUENZA VIRUS 2  Not Detected   PARAINFLUENZA VIRUS 3  Not Detected   PARAINFLUENZA VIRUS 4  Not Detected   BORDETELLA PERTUSSIS PCR  Not Detected   CHLAMYDOPHILA PNEUMONIAE PCR  Not Detected   MYCOPLAMA PNEUMO PCR  Not Detected   INFLUENZA A PCR  Not Detected   RSV, PCR  Not Detected     Results from last 7 days   Lab Units 12/02/24  1115 11/27/24  0914   INR  2.24 2.60         Lab Results   Component Value Date    TSH 2.610 07/20/2023     Estimated Creatinine Clearance: 100.2 mL/min (by C-G formula based on SCr of 0.82 mg/dL).         Imaging:  Imaging Results (Last 24 Hours)       Procedure Component Value Units Date/Time    XR Chest 1 View [[id number]] Collected: 12/02/24 1236     Updated: 12/02/24 1240    Narrative:      XR CHEST 1 VW    Date of Exam: 12/2/2024 12:11 PM EST    Indication: soa, cough     Comparison: CT chest 9/25/2024. AP chest 1/19/2024    Findings:  Emphysematous changes are demonstrated better advantage on prior CT chest imaging. The interstitial appears slightly thicker bilaterally. Heart size is mildly enlarged but stable. Median sternotomy changes are present. There is mild thoracic aortic   calcific atherosclerosis. No pleural effusion, pneumothorax or acute osseous abnormality is identified.      Impression:      Impression:  Slight increased interstitial prominence  bilaterally since 1/9/2024 chest x-ray, raises the possibility of mild atypical interstitial pneumonia or mild edema.  No focal lung consolidation.  Stable mild cardiac enlargement.      Electronically Signed: Anahi Valladares MD    12/2/2024 12:38 PM EST    Workstation ID: [id number]              Current Meds:   SCHEDULE  ampicillin-sulbactam, 3 g, Intravenous, Q6H      Infusions     PRNs    acetaminophen **OR** acetaminophen **OR** acetaminophen    senna-docusate sodium **AND** polyethylene glycol **AND** bisacodyl **AND** bisacodyl    Calcium Replacement - Follow Nurse / BPA Driven Protocol    ipratropium-albuterol    Magnesium Standard Dose Replacement - Follow Nurse / BPA Driven Protocol    melatonin    nitroglycerin    ondansetron ODT **OR** ondansetron    Phosphorus Replacement - Follow Nurse / BPA Driven Protocol    Potassium Replacement - Follow Nurse / BPA Driven Protocol    sodium chloride    [COMPLETED] Insert Peripheral IV **AND** sodium chloride        Jaylan Phillips MD  12/3/2024  08:19 EST      Much of this encounter note is an electronic transcription/translation of spoken language to printed text using Dragon Software.

## 2024-12-04 LAB
ALBUMIN SERPL-MCNC: 3.7 G/DL (ref 3.5–5.2)
ALBUMIN/GLOB SERPL: 1.3 G/DL
ALP SERPL-CCNC: 113 U/L (ref 39–117)
ALT SERPL W P-5'-P-CCNC: 24 U/L (ref 1–41)
ANION GAP SERPL CALCULATED.3IONS-SCNC: 8.7 MMOL/L (ref 5–15)
AST SERPL-CCNC: 27 U/L (ref 1–40)
BILIRUB SERPL-MCNC: 0.6 MG/DL (ref 0–1.2)
BUN SERPL-MCNC: 20 MG/DL (ref 8–23)
BUN/CREAT SERPL: 29 (ref 7–25)
CALCIUM SPEC-SCNC: 9.1 MG/DL (ref 8.6–10.5)
CHLORIDE SERPL-SCNC: 103 MMOL/L (ref 98–107)
CO2 SERPL-SCNC: 25.3 MMOL/L (ref 22–29)
CREAT SERPL-MCNC: 0.69 MG/DL (ref 0.76–1.27)
DEPRECATED RDW RBC AUTO: 42.6 FL (ref 37–54)
EGFRCR SERPLBLD CKD-EPI 2021: 98.3 ML/MIN/1.73
ERYTHROCYTE [DISTWIDTH] IN BLOOD BY AUTOMATED COUNT: 13 % (ref 12.3–15.4)
GLOBULIN UR ELPH-MCNC: 2.9 GM/DL
GLUCOSE BLDC GLUCOMTR-MCNC: 121 MG/DL (ref 70–105)
GLUCOSE BLDC GLUCOMTR-MCNC: 177 MG/DL (ref 70–105)
GLUCOSE BLDC GLUCOMTR-MCNC: 199 MG/DL (ref 70–105)
GLUCOSE BLDC GLUCOMTR-MCNC: 263 MG/DL (ref 70–105)
GLUCOSE SERPL-MCNC: 123 MG/DL (ref 65–99)
HCT VFR BLD AUTO: 42.6 % (ref 37.5–51)
HGB BLD-MCNC: 13.5 G/DL (ref 13–17.7)
INR PPP: 1.6 (ref 2–3)
MCH RBC QN AUTO: 28.7 PG (ref 26.6–33)
MCHC RBC AUTO-ENTMCNC: 31.7 G/DL (ref 31.5–35.7)
MCV RBC AUTO: 90.6 FL (ref 79–97)
PLATELET # BLD AUTO: 197 10*3/MM3 (ref 140–450)
PMV BLD AUTO: 9.4 FL (ref 6–12)
POTASSIUM SERPL-SCNC: 4.4 MMOL/L (ref 3.5–5.2)
PROT SERPL-MCNC: 6.6 G/DL (ref 6–8.5)
PROTHROMBIN TIME: 19 SECONDS (ref 19.4–28.5)
RBC # BLD AUTO: 4.7 10*6/MM3 (ref 4.14–5.8)
SODIUM SERPL-SCNC: 137 MMOL/L (ref 136–145)
WBC NRBC COR # BLD AUTO: 13.96 10*3/MM3 (ref 3.4–10.8)

## 2024-12-04 PROCEDURE — 85610 PROTHROMBIN TIME: CPT

## 2024-12-04 PROCEDURE — 80053 COMPREHEN METABOLIC PANEL: CPT

## 2024-12-04 PROCEDURE — 63710000001 PREDNISONE PER 1 MG: Performed by: INTERNAL MEDICINE

## 2024-12-04 PROCEDURE — 82948 REAGENT STRIP/BLOOD GLUCOSE: CPT

## 2024-12-04 PROCEDURE — 63710000001 PREDNISONE PER 5 MG

## 2024-12-04 PROCEDURE — 85027 COMPLETE CBC AUTOMATED: CPT

## 2024-12-04 PROCEDURE — 25010000002 AMPICILLIN-SULBACTAM PER 1.5 G

## 2024-12-04 PROCEDURE — 63710000001 PREDNISONE PER 1 MG

## 2024-12-04 PROCEDURE — 94799 UNLISTED PULMONARY SVC/PX: CPT

## 2024-12-04 PROCEDURE — 94660 CPAP INITIATION&MGMT: CPT

## 2024-12-04 RX ORDER — METHYLPREDNISOLONE 4 MG/1
4 TABLET ORAL
Status: DISCONTINUED | OUTPATIENT
Start: 2024-12-10 | End: 2024-12-05 | Stop reason: HOSPADM

## 2024-12-04 RX ORDER — METHYLPREDNISOLONE 4 MG/1
4 TABLET ORAL
Status: DISCONTINUED | OUTPATIENT
Start: 2024-12-08 | End: 2024-12-05 | Stop reason: HOSPADM

## 2024-12-04 RX ORDER — METHYLPREDNISOLONE 4 MG/1
8 TABLET ORAL
Status: COMPLETED | OUTPATIENT
Start: 2024-12-05 | End: 2024-12-05

## 2024-12-04 RX ORDER — METHYLPREDNISOLONE 4 MG/1
4 TABLET ORAL
Status: DISCONTINUED | OUTPATIENT
Start: 2024-12-07 | End: 2024-12-05 | Stop reason: HOSPADM

## 2024-12-04 RX ORDER — METHYLPREDNISOLONE 4 MG/1
4 TABLET ORAL
Status: DISCONTINUED | OUTPATIENT
Start: 2024-12-06 | End: 2024-12-05 | Stop reason: HOSPADM

## 2024-12-04 RX ORDER — METHYLPREDNISOLONE 4 MG/1
4 TABLET ORAL
Status: DISCONTINUED | OUTPATIENT
Start: 2024-12-05 | End: 2024-12-05 | Stop reason: HOSPADM

## 2024-12-04 RX ORDER — METHYLPREDNISOLONE 4 MG/1
8 TABLET ORAL
Status: DISCONTINUED | OUTPATIENT
Start: 2024-12-06 | End: 2024-12-05 | Stop reason: HOSPADM

## 2024-12-04 RX ORDER — METHYLPREDNISOLONE 4 MG/1
8 TABLET ORAL
Status: DISCONTINUED | OUTPATIENT
Start: 2024-12-05 | End: 2024-12-05 | Stop reason: HOSPADM

## 2024-12-04 RX ORDER — METHYLPREDNISOLONE 4 MG/1
4 TABLET ORAL
Status: DISCONTINUED | OUTPATIENT
Start: 2024-12-09 | End: 2024-12-05 | Stop reason: HOSPADM

## 2024-12-04 RX ADMIN — GUAIFENESIN, DEXTROMETHORPHAN HBR 1 TABLET: 600; 30 TABLET ORAL at 21:13

## 2024-12-04 RX ADMIN — LOSARTAN POTASSIUM 100 MG: 50 TABLET, FILM COATED ORAL at 09:08

## 2024-12-04 RX ADMIN — ATORVASTATIN CALCIUM 20 MG: 20 TABLET, FILM COATED ORAL at 21:13

## 2024-12-04 RX ADMIN — ATENOLOL 50 MG: 50 TABLET ORAL at 21:13

## 2024-12-04 RX ADMIN — AMPICILLIN SODIUM AND SULBACTAM SODIUM 3 G: 2; 1 INJECTION, POWDER, FOR SOLUTION INTRAMUSCULAR; INTRAVENOUS at 09:08

## 2024-12-04 RX ADMIN — GUAIFENESIN, DEXTROMETHORPHAN HBR 1 TABLET: 600; 30 TABLET ORAL at 09:52

## 2024-12-04 RX ADMIN — AMPICILLIN SODIUM AND SULBACTAM SODIUM 3 G: 2; 1 INJECTION, POWDER, FOR SOLUTION INTRAMUSCULAR; INTRAVENOUS at 14:08

## 2024-12-04 RX ADMIN — AMPICILLIN SODIUM AND SULBACTAM SODIUM 3 G: 2; 1 INJECTION, POWDER, FOR SOLUTION INTRAMUSCULAR; INTRAVENOUS at 03:31

## 2024-12-04 RX ADMIN — PREDNISONE 20 MG: 20 TABLET ORAL at 09:08

## 2024-12-04 RX ADMIN — ASPIRIN 81 MG CHEWABLE TABLET 81 MG: 81 TABLET CHEWABLE at 09:09

## 2024-12-04 RX ADMIN — PREDNISONE 30 MG: 10 TABLET ORAL at 14:08

## 2024-12-04 RX ADMIN — Medication 10 ML: at 09:08

## 2024-12-04 RX ADMIN — WARFARIN SODIUM 11.25 MG: 2.5 TABLET ORAL at 18:24

## 2024-12-04 RX ADMIN — ALLOPURINOL 300 MG: 300 TABLET ORAL at 09:08

## 2024-12-04 RX ADMIN — AZITHROMYCIN DIHYDRATE 500 MG: 250 TABLET ORAL at 09:08

## 2024-12-04 RX ADMIN — AMPICILLIN SODIUM AND SULBACTAM SODIUM 3 G: 2; 1 INJECTION, POWDER, FOR SOLUTION INTRAMUSCULAR; INTRAVENOUS at 21:13

## 2024-12-04 RX ADMIN — AMLODIPINE BESYLATE 5 MG: 5 TABLET ORAL at 09:08

## 2024-12-04 NOTE — PLAN OF CARE
Goal Outcome Evaluation:              Outcome Evaluation: pt has had low heart rate of 46 while awake, pt normally uses a Cpap at night , will ask wife to bring home unit          Pt placed on cpap at this time, no other issues

## 2024-12-04 NOTE — CASE MANAGEMENT/SOCIAL WORK
Continued Stay Note   Keshav     Patient Name: Sandro Ramirez  MRN: [id number]  Today's Date: 12/4/2024    Admit Date: 12/2/2024    Plan: D/C Plan: Return home with spouse. Will drive self or family transport.   Discharge Plan       Row Name 12/04/24 0562       Plan    Plan Comments DC barriers: IV abx, nebs, cardiac monitoring. Pulm following.             Betty Mensah RN     Office phone: [phone]  Office fax: [phone]

## 2024-12-04 NOTE — PLAN OF CARE
Goal Outcome Evaluation:  Plan of Care Reviewed With: patient        Progress: improving  Outcome Evaluation: Pt has been stable all day. Angie, A & O x4.

## 2024-12-04 NOTE — PROGRESS NOTES
[facility] MEDICINE SERVICE  DAILY PROGRESS NOTE    NAME: Sandro Ramirez  : 1950  MRN: [id number]      LOS: 2 days     PROVIDER OF SERVICE: Reynaldo Kumar MD    Chief Complaint: Community acquired pneumonia    Subjective:     Interval History:  History taken from: patient    Patient seen and examined at bedside this morning.  No acute complaints overnight.  Breathing is getting better        Review of Systems: Negative except described above  Review of Systems    Objective:     Vital Signs  Temp:  [97 °F (36.1 °C)-97.6 °F (36.4 °C)] 97.3 °F (36.3 °C)  Heart Rate:  [60-80] 69  Resp:  [14-24] 16  BP: (106-148)/(57-96) 124/63  Flow (L/min) (Oxygen Therapy):  [2] 2   Body mass index is 26.75 kg/m².    Physical Exam  Physical Exam  Constitutional:       Comments: NAD    Cardiovascular:      Comments:  RRR, S1 & S2   Pulmonary:      Comments:  Lungs CTA   Abdominal:      Comments:  ABD soft, NT            Diagnostic Data    Results from last 7 days   Lab Units 24  2243 24  0558 24  1115   WBC 10*3/mm3 14.78*   < > 18.56*   HEMOGLOBIN g/dL 13.7   < > 15.3   HEMATOCRIT % 42.8   < > 48.1   PLATELETS 10*3/mm3 195   < > 223   GLUCOSE mg/dL 135*   < > 137*   CREATININE mg/dL 0.68*   < > 0.92   BUN mg/dL 20   < > 16   SODIUM mmol/L 138   < > 139   POTASSIUM mmol/L 4.5   < > 4.2   AST (SGOT) U/L  --   --  26   ALT (SGPT) U/L  --   --  20   ALK PHOS U/L  --   --  110   BILIRUBIN mg/dL  --   --  1.5*   ANION GAP mmol/L 7.9   < > 13.0    < > = values in this interval not displayed.       CT Chest Without Contrast Diagnostic    Result Date: 12/3/2024  Impression: 1. New diffuse tree-in-bud nodules throughout the left lung and involving right lower lobe most consistent with multifocal endobronchial infection / inflammation. 2. Mildly enlarged low paratracheal and AP window nodes likely reactive adenopathy, recommend attention on follow-up. 3. Mild emphysema. 4. Subacute fracture of the  left anterior sixth rib. 5. Additional chronic findings above. Electronically Signed: Harvey Khoury MD  12/3/2024 11:31 AM EST  Workstation ID: [id number]    XR Chest 1 View    Result Date: 12/2/2024  Impression: Slight increased interstitial prominence bilaterally since 1/9/2024 chest x-ray, raises the possibility of mild atypical interstitial pneumonia or mild edema. No focal lung consolidation. Stable mild cardiac enlargement. Electronically Signed: Anahi Valladares MD  12/2/2024 12:38 PM EST  Workstation ID: [id number]       I reviewed the patient's new clinical results.    Assessment/Plan:     Active and Resolved Problems  Active Hospital Problems    Diagnosis  POA    **Community acquired pneumonia [J18.9]  Yes      Resolved Hospital Problems   No resolved problems to display.       Atypical pneumonia with likely COPD exacerbation  - CXR: atypical pneumonia  - WBC trending down  - Respiratory pathogen panel negative  - Continue azithromycin, Unasyn and prednisone.  Will switch to Augmentin and Medrol Dosepak on discharge  -CT chest showed new diffuse tree-in-bud nodules throughout the left lung and right lower lobe  - Blood cultures drawn in the ED, show no growth to date  - Duoneb q4h prn  - Symptomatic treatment  - continue oxygen, wean as tolerated  - pulmonology consulted      Atrial fibrillation   - Currently rate controlled AFib  - Continue atenolol  - Telemetry  - Daily INR  -Resume warfarin, pharmacy to dose     Hyperlipidemia  - continue home statin      Hypertension   - BP stable  - Resume home medications      VTE Prophylaxis:  Pharmacologic & mechanical VTE prophylaxis orders are present.              Time: 35 minutes     Code Status and Medical Interventions: CPR (Attempt to Resuscitate); Full Support   Ordered at: 12/02/24 1532     Code Status (Patient has no pulse and is not breathing):    CPR (Attempt to Resuscitate)     Medical Interventions (Patient has pulse or is breathing):    Full Support        Signature: Electronically signed by Reynaldo Kumar MD, 12/04/24, 12:04 EST.  [facility]

## 2024-12-04 NOTE — PROGRESS NOTES
"Pharmacy dosing service  Anticoagulant  Warfarin     Subjective:    Sandro Ramirez is a 74 y.o.male being continued on warfarin for Atrial Fibrillation / Flutter.    INR Goal: 2 - 3  Home medication?: warfarin 7.5 mg PO daily, except warfarin 11.25 mg PO on Monday and Friday.   Bridge Therapy Present?:  No  Interacting Medications Evaluation (New/Present/Discontinued):   Ampicillin-Sulbactam (12/2 - 12/5): may enhance the anticoagulant effect of warfarin  Aspirin (pta): may enhance the anticoagulant effect of warfarin  Azithromycin (12/3 - 12/5): may enhance the anticoagulant effect of warfarin  Doxycycline (one dose on 12/2): may enhance the anticoagulant effect of warfarin  Prednisone (12/2 - ): may enhance the anticoagulant effect of warfarin  Additional Contributing Factors: Patient ate % of documented meals in last 24 hours      Assessment/Plan:    Patient's INR is subtherapeutic today at 1.60.   Patient did not take their home dose prior to admission on 12/2 and received lower warfarin 7.5 mg on 12/3.   Will give a dose of warfarin 11.25 mg today to boost INR and be closer to TWD after missed dose on 12/2 and then plan to resume home schedule tomorrow 12/5.    Continue to monitor and adjust based on INR.       Date 12/2 12/3 12/4        INR 2.24 1.75 1.60        Dose held 7.5 mg 11.25mg          Objective:  [Ht: 180.3 cm (71\"); Wt: 87 kg (191 lb 12.8 oz); BMI: Body mass index is 26.75 kg/m².]    Lab Results   Component Value Date    ALBUMIN 4.3 12/02/2024     Lab Results   Component Value Date    INR 1.60 (L) 12/04/2024    INR 1.75 (L) 12/03/2024    INR 2.24 12/02/2024    PROTIME 19.0 (L) 12/04/2024    PROTIME 20.4 12/03/2024    PROTIME 24.7 12/02/2024     Lab Results   Component Value Date    HGB 13.7 12/03/2024    HGB 13.5 12/03/2024    HGB 15.3 12/02/2024     Lab Results   Component Value Date    HCT 42.8 12/03/2024    HCT 40.5 12/03/2024    HCT 48.1 12/02/2024     Matilde Parkinson, [facility]  12/04/24 " 08:57 EST

## 2024-12-04 NOTE — PROGRESS NOTES
Daily Progress Note        Community acquired pneumonia      Assessment:     Hypoxic respiratory insufficiency  Respiratory viral panel negative on 12/2/2024    Micro nodular infiltrates on CT chest 12/3/2024  Left sixth rib subacute fracture     COPD  PFTs 2/20/2024  FEV1/FVC 68%, FVC 52%, FEV1 48%  %, % DLCO 46%        Atrial fibrillation  CHF  Obstructive sleep apnea  Hypertension  Hyperlipidemia     2D echo 10/19/2023    Left ventricular ejection fraction appears to be 51 - 55%.    The right ventricular cavity is mildly dilated.    The left atrial cavity is moderately dilated.    The right atrial cavity is dilated.    Moderate aortic valve regurgitation is present.    Moderate mitral valve regurgitation is present.    Estimated right ventricular systolic pressure from tricuspid regurgitation is mildly elevated (35-45 mmHg).     Recommendations:    On RA    Antibiotics currently on IV Unasyn for 3 days    P.o. azithromycin for 3 days as anti-inflammatory medication    P.o. prednisone 6-day taper    Bronchodilators       CT chest 12/3/2024                  LOS: 2 days     Subjective         Objective     Vital signs for last 24 hours:  Vitals:    12/03/24 1916 12/03/24 2328 12/04/24 0415 12/04/24 0722   BP: 148/96 128/65 129/63 106/57   BP Location: Right arm Right arm Right arm Right arm   Patient Position: Lying Lying Lying Lying   Pulse: 80 63 60 69   Resp: 24 14 19 15   Temp: 97.4 °F (36.3 °C) 97.5 °F (36.4 °C) 97.6 °F (36.4 °C) 97 °F (36.1 °C)   TempSrc: Oral Oral Oral Oral   SpO2: 93% 95%  90%   Weight:       Height:           Intake/Output last 3 shifts:  I/O last 3 completed shifts:  In: 800 [P.O.:600; IV Piggyback:200]  Out: -   Intake/Output this shift:  No intake/output data recorded.      Radiology  Imaging Results (Last 24 Hours)       Procedure Component Value Units Date/Time    CT Chest Without Contrast Diagnostic [[id number]] Collected: 12/03/24 1111     Updated: 12/03/24 1134     Narrative:      CT CHEST WO CONTRAST DIAGNOSTIC    Date of Exam: 12/3/2024 10:38 AM EST    Indication: dyspnea.    Comparison: Low-dose chest CT 9/25/2024    Technique: Axial CT images were obtained of the chest without contrast administration.  Sagittal and coronal reconstructions were performed.  Automated exposure control and iterative reconstruction methods were used.    Findings:  The noncontrast visualized soft tissues of the lower neck are without acute abnormality. Carotid calcifications partially imaged. Extensive atherosclerotic calcifications of the aortic arch and coronary arteries. Postsurgical changes of prior sternotomy.   Mild cardiomegaly. No pericardial effusion. No pleural effusion. AP window lymph node measures 11 mm, previously 8 mm. Low right paratracheal lymph node measures 13 mm in short axis, previously 10 mm. This may relate to reactive lymph nodes.    Trachea and mainstem bronchi are patent. Mild apical emphysema. Diffuse bronchial wall thickening. New tree-in-bud nodules throughout the left lung consistent with diffuse endobronchial infection/inflammation. Small tree-in-bud nodules also noted in the   right lower lobe. Chronic linear atelectasis/scarring in the lingula unchanged. No discrete consolidation. No pneumothorax.    Upper abdomen demonstrates normal noncontrast visualized portions of the liver, spleen, adrenal glands, and pancreas. The gallbladder is absent. Extensive upper abdominal atherosclerotic calcifications with aortic stent partially imaged. No free fluid in   the upper abdomen. Subacute fractures of the left sixth rib.      Impression:      Impression:  1. New diffuse tree-in-bud nodules throughout the left lung and involving right lower lobe most consistent with multifocal endobronchial infection / inflammation.  2. Mildly enlarged low paratracheal and AP window nodes likely reactive adenopathy, recommend attention on follow-up.  3. Mild emphysema.  4. Subacute  fracture of the left anterior sixth rib.  5. Additional chronic findings above.      Electronically Signed: Harvey Khoury MD    12/3/2024 11:31 AM EST    Workstation ID: [id number]            Labs:  Results from last 7 days   Lab Units 12/03/24  2243   WBC 10*3/mm3 14.78*   HEMOGLOBIN g/dL 13.7   HEMATOCRIT % 42.8   PLATELETS 10*3/mm3 195     Results from last 7 days   Lab Units 12/03/24  2243 12/03/24  0558 12/02/24  1115   SODIUM mmol/L 138   < > 139   POTASSIUM mmol/L 4.5   < > 4.2   CHLORIDE mmol/L 104   < > 101   CO2 mmol/L 26.1   < > 25.0   BUN mg/dL 20   < > 16   CREATININE mg/dL 0.68*   < > 0.92   CALCIUM mg/dL 9.0   < > 9.6   BILIRUBIN mg/dL  --   --  1.5*   ALK PHOS U/L  --   --  110   ALT (SGPT) U/L  --   --  20   AST (SGOT) U/L  --   --  26   GLUCOSE mg/dL 135*   < > 137*    < > = values in this interval not displayed.         Results from last 7 days   Lab Units 12/02/24  1115   ALBUMIN g/dL 4.3                 Results from last 7 days   Lab Units 12/04/24  0606 12/03/24 2243 12/02/24  1115   INR  1.60* 1.75* 2.24               Meds:   SCHEDULE  allopurinol, 300 mg, Oral, Daily  amLODIPine, 5 mg, Oral, Q24H  ampicillin-sulbactam, 3 g, Intravenous, Q6H  aspirin, 81 mg, Oral, Daily  atenolol, 50 mg, Oral, Nightly  atorvastatin, 20 mg, Oral, Nightly  azithromycin, 500 mg, Oral, Q24H  guaifenesin-dextromethorphan 600-30 mg, 1 tablet, Oral, BID  losartan, 100 mg, Oral, Q24H  predniSONE, 20 mg, Oral, Daily With Breakfast  warfarin, 7.5 mg, Oral, Daily      Infusions  Pharmacy to dose warfarin,       PRNs    acetaminophen **OR** acetaminophen **OR** acetaminophen    senna-docusate sodium **AND** polyethylene glycol **AND** bisacodyl **AND** bisacodyl    Calcium Replacement - Follow Nurse / BPA Driven Protocol    ipratropium-albuterol    Magnesium Standard Dose Replacement - Follow Nurse / BPA Driven Protocol    melatonin    nitroglycerin    ondansetron ODT **OR** ondansetron    Pharmacy to dose warfarin     Phosphorus Replacement - Follow Nurse / BPA Driven Protocol    Potassium Replacement - Follow Nurse / BPA Driven Protocol    sodium chloride    [COMPLETED] Insert Peripheral IV **AND** sodium chloride    Physical Exam:  Physical Exam  Cardiovascular:      Heart sounds: Murmur heard.      No gallop.   Pulmonary:      Effort: No respiratory distress.      Breath sounds: No stridor. Rhonchi and rales present. No wheezing.   Chest:      Chest wall: No tenderness.         ROS  Review of Systems   Respiratory:  Positive for cough and shortness of breath. Negative for wheezing and stridor.    Cardiovascular:  Positive for chest pain. Negative for palpitations and leg swelling.             Total time spent with patient greater than: 45 Minutes

## 2024-12-05 ENCOUNTER — READMISSION MANAGEMENT (OUTPATIENT)
Dept: CALL CENTER | Facility: HOSPITAL | Age: 74
End: 2024-12-05
Payer: MEDICARE

## 2024-12-05 VITALS
BODY MASS INDEX: 26.85 KG/M2 | RESPIRATION RATE: 22 BRPM | TEMPERATURE: 97.7 F | HEIGHT: 71 IN | HEART RATE: 58 BPM | WEIGHT: 191.8 LBS | SYSTOLIC BLOOD PRESSURE: 137 MMHG | DIASTOLIC BLOOD PRESSURE: 64 MMHG | OXYGEN SATURATION: 92 %

## 2024-12-05 PROBLEM — J18.9 PNEUMONIA, UNSPECIFIED ORGANISM: Status: ACTIVE | Noted: 2024-12-05

## 2024-12-05 LAB
ALBUMIN SERPL-MCNC: 3.7 G/DL (ref 3.5–5.2)
ALBUMIN/GLOB SERPL: 1.2 G/DL
ALP SERPL-CCNC: 127 U/L (ref 39–117)
ALT SERPL W P-5'-P-CCNC: 28 U/L (ref 1–41)
ANION GAP SERPL CALCULATED.3IONS-SCNC: 7.7 MMOL/L (ref 5–15)
AST SERPL-CCNC: 27 U/L (ref 1–40)
BASOPHILS # BLD AUTO: 0.03 10*3/MM3 (ref 0–0.2)
BASOPHILS NFR BLD AUTO: 0.3 % (ref 0–1.5)
BILIRUB SERPL-MCNC: 0.4 MG/DL (ref 0–1.2)
BUN SERPL-MCNC: 18 MG/DL (ref 8–23)
BUN/CREAT SERPL: 22.5 (ref 7–25)
CALCIUM SPEC-SCNC: 9.2 MG/DL (ref 8.6–10.5)
CHLORIDE SERPL-SCNC: 105 MMOL/L (ref 98–107)
CO2 SERPL-SCNC: 27.3 MMOL/L (ref 22–29)
CREAT SERPL-MCNC: 0.8 MG/DL (ref 0.76–1.27)
DEPRECATED RDW RBC AUTO: 42.7 FL (ref 37–54)
EGFRCR SERPLBLD CKD-EPI 2021: 94 ML/MIN/1.73
EOSINOPHIL # BLD AUTO: 0.01 10*3/MM3 (ref 0–0.4)
EOSINOPHIL NFR BLD AUTO: 0.1 % (ref 0.3–6.2)
ERYTHROCYTE [DISTWIDTH] IN BLOOD BY AUTOMATED COUNT: 12.6 % (ref 12.3–15.4)
GLOBULIN UR ELPH-MCNC: 3.2 GM/DL
GLUCOSE BLDC GLUCOMTR-MCNC: 94 MG/DL (ref 70–105)
GLUCOSE SERPL-MCNC: 95 MG/DL (ref 65–99)
HCT VFR BLD AUTO: 43.5 % (ref 37.5–51)
HGB BLD-MCNC: 14.1 G/DL (ref 13–17.7)
IMM GRANULOCYTES # BLD AUTO: 0.05 10*3/MM3 (ref 0–0.05)
IMM GRANULOCYTES NFR BLD AUTO: 0.4 % (ref 0–0.5)
INR PPP: 1.6 (ref 2–3)
LYMPHOCYTES # BLD AUTO: 1.35 10*3/MM3 (ref 0.7–3.1)
LYMPHOCYTES NFR BLD AUTO: 11.8 % (ref 19.6–45.3)
MCH RBC QN AUTO: 29.7 PG (ref 26.6–33)
MCHC RBC AUTO-ENTMCNC: 32.4 G/DL (ref 31.5–35.7)
MCV RBC AUTO: 91.8 FL (ref 79–97)
MONOCYTES # BLD AUTO: 0.81 10*3/MM3 (ref 0.1–0.9)
MONOCYTES NFR BLD AUTO: 7.1 % (ref 5–12)
NEUTROPHILS NFR BLD AUTO: 80.3 % (ref 42.7–76)
NEUTROPHILS NFR BLD AUTO: 9.2 10*3/MM3 (ref 1.7–7)
NRBC BLD AUTO-RTO: 0 /100 WBC (ref 0–0.2)
PLATELET # BLD AUTO: 210 10*3/MM3 (ref 140–450)
PMV BLD AUTO: 9.6 FL (ref 6–12)
POTASSIUM SERPL-SCNC: 4.4 MMOL/L (ref 3.5–5.2)
PROT SERPL-MCNC: 6.9 G/DL (ref 6–8.5)
PROTHROMBIN TIME: 19 SECONDS (ref 19.4–28.5)
RBC # BLD AUTO: 4.74 10*6/MM3 (ref 4.14–5.8)
SODIUM SERPL-SCNC: 140 MMOL/L (ref 136–145)
WBC NRBC COR # BLD AUTO: 11.45 10*3/MM3 (ref 3.4–10.8)

## 2024-12-05 PROCEDURE — 85025 COMPLETE CBC W/AUTO DIFF WBC: CPT

## 2024-12-05 PROCEDURE — 85610 PROTHROMBIN TIME: CPT

## 2024-12-05 PROCEDURE — 25010000002 AMPICILLIN-SULBACTAM PER 1.5 G

## 2024-12-05 PROCEDURE — 80053 COMPREHEN METABOLIC PANEL: CPT

## 2024-12-05 PROCEDURE — 63710000001 PREDNISONE PER 1 MG: Performed by: INTERNAL MEDICINE

## 2024-12-05 PROCEDURE — 94799 UNLISTED PULMONARY SVC/PX: CPT

## 2024-12-05 PROCEDURE — 94660 CPAP INITIATION&MGMT: CPT

## 2024-12-05 PROCEDURE — 63710000001 METHYLPREDNISOLONE PER 4 MG

## 2024-12-05 PROCEDURE — 82948 REAGENT STRIP/BLOOD GLUCOSE: CPT

## 2024-12-05 RX ORDER — AMOXICILLIN AND CLAVULANATE POTASSIUM 500; 125 MG/1; MG/1
1 TABLET, FILM COATED ORAL 2 TIMES DAILY
Qty: 6 TABLET | Refills: 0 | Status: SHIPPED | OUTPATIENT
Start: 2024-12-05 | End: 2024-12-08

## 2024-12-05 RX ORDER — GUAIFENESIN AND DEXTROMETHORPHAN HYDROBROMIDE 600; 30 MG/1; MG/1
1 TABLET, EXTENDED RELEASE ORAL 2 TIMES DAILY
Qty: 8 TABLET | Refills: 0 | Status: SHIPPED | OUTPATIENT
Start: 2024-12-05 | End: 2024-12-09

## 2024-12-05 RX ORDER — METHYLPREDNISOLONE 4 MG/1
TABLET ORAL
Qty: 21 TABLET | Refills: 0 | Status: SHIPPED | OUTPATIENT
Start: 2024-12-05

## 2024-12-05 RX ADMIN — ASPIRIN 81 MG CHEWABLE TABLET 81 MG: 81 TABLET CHEWABLE at 08:45

## 2024-12-05 RX ADMIN — AMLODIPINE BESYLATE 5 MG: 5 TABLET ORAL at 08:45

## 2024-12-05 RX ADMIN — AMPICILLIN SODIUM AND SULBACTAM SODIUM 3 G: 2; 1 INJECTION, POWDER, FOR SOLUTION INTRAMUSCULAR; INTRAVENOUS at 08:42

## 2024-12-05 RX ADMIN — ALLOPURINOL 300 MG: 300 TABLET ORAL at 08:45

## 2024-12-05 RX ADMIN — AMPICILLIN SODIUM AND SULBACTAM SODIUM 3 G: 2; 1 INJECTION, POWDER, FOR SOLUTION INTRAMUSCULAR; INTRAVENOUS at 03:09

## 2024-12-05 RX ADMIN — METHYLPREDNISOLONE 8 MG: 4 TABLET ORAL at 08:45

## 2024-12-05 RX ADMIN — LOSARTAN POTASSIUM 100 MG: 50 TABLET, FILM COATED ORAL at 08:45

## 2024-12-05 RX ADMIN — Medication 10 ML: at 08:45

## 2024-12-05 RX ADMIN — PREDNISONE 20 MG: 20 TABLET ORAL at 08:45

## 2024-12-05 RX ADMIN — AZITHROMYCIN DIHYDRATE 500 MG: 250 TABLET ORAL at 08:45

## 2024-12-05 NOTE — PLAN OF CARE
Goal Outcome Evaluation:              Outcome Evaluation: pt resting thru out the shift, on bipap at night, low heart rate at times when sleeping, no other issues at this time

## 2024-12-05 NOTE — PROGRESS NOTES
Daily Progress Note        Community acquired pneumonia    Pneumonia, unspecified organism      Assessment:     Hypoxic respiratory insufficiency  Respiratory viral panel negative on 12/2/2024    Micro nodular infiltrates on CT chest 12/3/2024  Left sixth rib subacute fracture     COPD  PFTs 2/20/2024  FEV1/FVC 68%, FVC 52%, FEV1 48%  %, % DLCO 46%        Atrial fibrillation  CHF  Obstructive sleep apnea  Hypertension  Hyperlipidemia     2D echo 10/19/2023    Left ventricular ejection fraction appears to be 51 - 55%.    The right ventricular cavity is mildly dilated.    The left atrial cavity is moderately dilated.    The right atrial cavity is dilated.    Moderate aortic valve regurgitation is present.    Moderate mitral valve regurgitation is present.    Estimated right ventricular systolic pressure from tricuspid regurgitation is mildly elevated (35-45 mmHg).     Recommendations:    On RA    Antibiotics currently on IV Unasyn for 3 days    P.o. azithromycin for 3 days as anti-inflammatory medication    P.o. prednisone 6-day taper    Bronchodilators       CT chest 12/3/2024                  LOS: 3 days     Subjective         Objective     Vital signs for last 24 hours:  Vitals:    12/04/24 2017 12/04/24 2352 12/05/24 0515 12/05/24 0729   BP: 173/70 135/51 139/64 137/64   BP Location: Right arm Right arm Right arm Right arm   Patient Position: Lying Lying Lying Lying   Pulse: 72 60 51 58   Resp: 22 22 18 22   Temp: 97.2 °F (36.2 °C) 97.4 °F (36.3 °C) 97.6 °F (36.4 °C) 97.7 °F (36.5 °C)   TempSrc: Oral Oral Oral Oral   SpO2: 91% 91%  92%   Weight:       Height:           Intake/Output last 3 shifts:  I/O last 3 completed shifts:  In: 820 [P.O.:720; IV Piggyback:100]  Out: -   Intake/Output this shift:  No intake/output data recorded.      Radiology  Imaging Results (Last 24 Hours)       ** No results found for the last 24 hours. **            Labs:  Results from last 7 days   Lab Units 12/05/24  0516    WBC 10*3/mm3 11.45*   HEMOGLOBIN g/dL 14.1   HEMATOCRIT % 43.5   PLATELETS 10*3/mm3 210     Results from last 7 days   Lab Units 12/05/24  0516   SODIUM mmol/L 140   POTASSIUM mmol/L 4.4   CHLORIDE mmol/L 105   CO2 mmol/L 27.3   BUN mg/dL 18   CREATININE mg/dL 0.80   CALCIUM mg/dL 9.2   BILIRUBIN mg/dL 0.4   ALK PHOS U/L 127*   ALT (SGPT) U/L 28   AST (SGOT) U/L 27   GLUCOSE mg/dL 95         Results from last 7 days   Lab Units 12/05/24  0516 12/04/24  1414 12/02/24  1115   ALBUMIN g/dL 3.7 3.7 4.3                 Results from last 7 days   Lab Units 12/05/24  0516 12/04/24  0606 12/03/24  2243   INR  1.60* 1.60* 1.75*               Meds:   SCHEDULE  allopurinol, 300 mg, Oral, Daily  amLODIPine, 5 mg, Oral, Q24H  ampicillin-sulbactam, 3 g, Intravenous, Q6H  aspirin, 81 mg, Oral, Daily  atenolol, 50 mg, Oral, Nightly  atorvastatin, 20 mg, Oral, Nightly  guaifenesin-dextromethorphan 600-30 mg, 1 tablet, Oral, BID  losartan, 100 mg, Oral, Q24H  methylPREDNISolone, 4 mg, Oral, After Lunch  methylPREDNISolone, 4 mg, Oral, After Dinner  [START ON 12/6/2024] methylPREDNISolone, 4 mg, Oral, TID Around Food  [START ON 12/7/2024] methylPREDNISolone, 4 mg, Oral, 4x Daily Taper  [START ON 12/8/2024] methylPREDNISolone, 4 mg, Oral, TID Around Food  [START ON 12/9/2024] methylPREDNISolone, 4 mg, Oral, Before Breakfast  [START ON 12/9/2024] methylPREDNISolone, 4 mg, Oral, Tonight  [START ON 12/10/2024] methylPREDNISolone, 4 mg, Oral, Before Breakfast  methylPREDNISolone, 8 mg, Oral, Tonight  [START ON 12/6/2024] methylPREDNISolone, 8 mg, Oral, Tonight  warfarin, 11.25 mg, Oral, Once per day on Monday Friday  warfarin, 7.5 mg, Oral, Once per day on Sunday Tuesday Wednesday Thursday Saturday      Infusions  Pharmacy to dose warfarin,       PRNs    acetaminophen **OR** acetaminophen **OR** acetaminophen    senna-docusate sodium **AND** polyethylene glycol **AND** bisacodyl **AND** bisacodyl    Calcium Replacement - Follow Nurse /  BPA Driven Protocol    ipratropium-albuterol    Magnesium Standard Dose Replacement - Follow Nurse / BPA Driven Protocol    melatonin    nitroglycerin    ondansetron ODT **OR** ondansetron    Pharmacy to dose warfarin    Phosphorus Replacement - Follow Nurse / BPA Driven Protocol    Potassium Replacement - Follow Nurse / BPA Driven Protocol    sodium chloride    [COMPLETED] Insert Peripheral IV **AND** sodium chloride    Physical Exam:  Physical Exam  Cardiovascular:      Heart sounds: Murmur heard.      No gallop.   Pulmonary:      Effort: No respiratory distress.      Breath sounds: No stridor. Rhonchi and rales present. No wheezing.   Chest:      Chest wall: No tenderness.         ROS  Review of Systems   Respiratory:  Positive for cough and shortness of breath. Negative for wheezing and stridor.    Cardiovascular:  Positive for chest pain. Negative for palpitations and leg swelling.             Total time spent with patient greater than: 45 Minutes

## 2024-12-05 NOTE — PROGRESS NOTES
"Pharmacy dosing service  Anticoagulant  Warfarin     Subjective:    Sandro Ramirez is a 74 y.o.male being continued on warfarin for Atrial Fibrillation / Flutter.    INR Goal: 2 - 3  Home medication?: warfarin 7.5 mg PO daily, except warfarin 11.25 mg PO on Monday and Friday.   Bridge Therapy Present?:  No  Interacting Medications Evaluation (New/Present/Discontinued):   Ampicillin-Sulbactam (12/2 - 12/5): may enhance the anticoagulant effect of warfarin  Aspirin (pta): may enhance the anticoagulant effect of warfarin  Azithromycin (12/3 - 12/5): may enhance the anticoagulant effect of warfarin  Doxycycline (one dose on 12/2): may enhance the anticoagulant effect of warfarin  Methylprednisolone (12/5-12/11): may enhance the anticoagulant effect of warfarin  Additional Contributing Factors: Patient ate % of documented meals in last 24 hours      Assessment/Plan:    Patient's INR is subtherapeutic today at 1.60.   Patient received bolus dose of warfarin 11.25 mg on 12/4. Expect INR to begin to increase after bolus dose yesterday.   Will resume home dosing and give warfarin 7.5 mg today.    Continue to monitor and adjust based on INR.       Date 12/2 12/3 12/4 12/5       INR 2.24 1.75 1.60 1.60       Dose held 7.5 mg 11.25mg 7.5 mg         Objective:  [Ht: 180.3 cm (71\"); Wt: 87 kg (191 lb 12.8 oz); BMI: Body mass index is 26.75 kg/m².]    Lab Results   Component Value Date    ALBUMIN 3.7 12/05/2024     Lab Results   Component Value Date    INR 1.60 (L) 12/05/2024    INR 1.60 (L) 12/04/2024    INR 1.75 (L) 12/03/2024    PROTIME 19.0 (L) 12/05/2024    PROTIME 19.0 (L) 12/04/2024    PROTIME 20.4 12/03/2024     Lab Results   Component Value Date    HGB 14.1 12/05/2024    HGB 13.5 12/04/2024    HGB 13.7 12/03/2024     Lab Results   Component Value Date    HCT 43.5 12/05/2024    HCT 42.6 12/04/2024    HCT 42.8 12/03/2024     Matilde Parkinson [facility]  12/05/24 08:47 EST         "

## 2024-12-05 NOTE — DISCHARGE SUMMARY
"             [facility] Medicine Services  Discharge Summary    Date of Service: 2024  Patient Name: Sandro Ramirez  : 1950  MRN: [id number]    Date of Admission: 2024  Discharge Diagnosis: Community acquired pneumonia  Date of Discharge: 2024  Primary Care Physician: Colette Dominguez APRN      Presenting Problem:   Community acquired pneumonia [J18.9]  Hypoxia [R09.02]  Pneumonia due to infectious organism, unspecified laterality, unspecified part of lung [J18.9]    Active and Resolved Hospital Problems:  Active Hospital Problems    Diagnosis POA    **Community acquired pneumonia [J18.9] Yes    Pneumonia, unspecified organism [J18.9] Yes      Resolved Hospital Problems   No resolved problems to display.         Hospital Course     HPI:    \"Sandro Ramirez is a 74 y.o. male with a CMH of atrial fibrillation, CHF, HLD, HTN who presented to [facility] from Urgent Care on 2024 with cough and shortness of breath that has been ongoing for the last 2- 3 days. He originally presented to urgent care, where referral to ER was made due to new oxygen requirement. His oxygen saturations were 89%, and he was placed on 2L.      On ED evaluation, glucose was 137, lactic 1.2, WBC 18.56. CXR shows slight increased interstitial prominence bilaterally with possible atypical pneumonia or edema. EKG shows atrial fibrillation. Respiratory pathogen panel negative. Pulmonology consulted. Hospitalist team to admit for further medical management. \"    Hospital Course:  Atypical pneumonia with likely COPD exacerbation  Atrial fibrillation   Hyperlipidemia  Hypertension     Patient is a 74-year-old male who presented to the ED on  for complaints of cough and shortness of breath that was going on over the last few days.  In the ED he was seen to have elevated white count and a chest x-ray was done that showed increased interstitial prominence bilaterally suggesting possible atypical pneumonia.  " He had respiratory panel done which was negative.  He was started on azithromycin and Unasyn along with prednisone.  Completed course of azithromycin and will send in prescription for Augmentin to complete course.  He was also started on breathing treatments while he was in the hospital and pulmonology was also consulted.  Will also send in prescription for Mucinex and Medrol Dosepak to his pharmacy.  He has extensive history of smoking and his CT chest showed mild emphysema along with diffuse tree-in-bud nodules throughout the left lung and involving right lower lobe consistent with multifocal infection so we will go ahead and have him follow-up with pulmonology to have PFTs done.  He was also seen to have subtherapeutic INR so he was told that he needs to follow-up with his PCP within the next few days for his INR monitoring.    DISCHARGE Follow Up Recommendations for labs and diagnostics: Follow-up with PCP within 1 week and with pulmonology in 1 to 2 weeks        Day of Discharge     Vital Signs:  Temp:  [97.2 °F (36.2 °C)-97.7 °F (36.5 °C)] 97.7 °F (36.5 °C)  Heart Rate:  [51-72] 58  Resp:  [16-22] 22  BP: (124-173)/(51-70) 137/64    Physical Exam:  Physical Exam  Constitutional:       Comments: NAD    Cardiovascular:      Comments:  RRR, S1 & S2   Pulmonary:      Comments:  Lungs CTA   Abdominal:      Comments:  ABD soft, NT             Pertinent  and/or Most Recent Results     LAB RESULTS:      Lab 12/05/24  0516 12/04/24  1414 12/04/24  0606 12/03/24  2243 12/03/24  0558 12/02/24  1413 12/02/24  1115   WBC 11.45* 13.96*  --  14.78* 16.13*  --  18.56*   HEMOGLOBIN 14.1 13.5  --  13.7 13.5  --  15.3   HEMATOCRIT 43.5 42.6  --  42.8 40.5  --  48.1   PLATELETS 210 197  --  195 178  --  223   NEUTROS ABS 9.20*  --   --  12.71* 13.53*  --  15.83*   IMMATURE GRANS (ABS) 0.05  --   --  0.10* 0.09*  --  0.10*   LYMPHS ABS 1.35  --   --  1.10 1.23  --  1.30   MONOS ABS 0.81  --   --  0.81 1.18*  --  1.23*   EOS ABS  0.01  --   --  0.02 0.05  --  0.02   MCV 91.8 90.6  --  90.9 90.4  --  90.1   PROCALCITONIN  --   --   --   --  0.30*  --  0.17   LACTATE  --   --   --   --   --  1.2  --    PROTIME 19.0*  --  19.0* 20.4  --   --  24.7         Lab 12/05/24  0516 12/04/24  1414 12/03/24  2243 12/03/24  0558 12/02/24  1115   SODIUM 140 137 138 136 139   POTASSIUM 4.4 4.4 4.5 3.9 4.2   CHLORIDE 105 103 104 102 101   CO2 27.3 25.3 26.1 26.5 25.0   ANION GAP 7.7 8.7 7.9 7.5 13.0   BUN 18 20 20 16 16   CREATININE 0.80 0.69* 0.68* 0.82 0.92   EGFR 94.0 98.3 98.8 93.3 88.4   GLUCOSE 95 123* 135* 172* 137*   CALCIUM 9.2 9.1 9.0 9.0 9.6         Lab 12/05/24  0516 12/04/24  1414 12/02/24  1115   TOTAL PROTEIN 6.9 6.6 7.5   ALBUMIN 3.7 3.7 4.3   GLOBULIN 3.2 2.9 3.2   ALT (SGPT) 28 24 20   AST (SGOT) 27 27 26   BILIRUBIN 0.4 0.6 1.5*   ALK PHOS 127* 113 110         Lab 12/05/24  0516 12/04/24  0606 12/03/24  2243 12/02/24  1115   PROTIME 19.0* 19.0* 20.4 24.7   INR 1.60* 1.60* 1.75* 2.24                 Brief Urine Lab Results  (Last result in the past 365 days)        Color   Clarity   Blood   Leuk Est   Nitrite   Protein   CREAT   Urine HCG        10/22/24 0900             117.1               Microbiology Results (last 10 days)       Procedure Component Value - Date/Time    Blood Culture - Blood, Arm, Right [[id number]]  (Normal) Collected: 12/02/24 1408    Lab Status: Preliminary result Specimen: Blood from Arm, Right Updated: 12/04/24 1430     Blood Culture No growth at 2 days    Narrative:      Less than seven (7) mL's of blood was collected.  Insufficient quantity may yield false negative results.    Respiratory Panel PCR w/COVID-19(SARS-CoV-2) [facility] In-House, NP Swab in UTM/VTM, 2 HR TAT - Swab, Nasopharynx [[id number]]  (Normal) Collected: 12/02/24 1120    Lab Status: Final result Specimen: Swab from Nasopharynx Updated: 12/02/24 1213     ADENOVIRUS, PCR Not Detected     Coronavirus 229E Not Detected     Coronavirus HKU1  Not Detected     Coronavirus NL63 Not Detected     Coronavirus OC43 Not Detected     COVID19 Not Detected     Human Metapneumovirus Not Detected     Human Rhinovirus/Enterovirus Not Detected     Influenza A PCR Not Detected     Influenza B PCR Not Detected     Parainfluenza Virus 1 Not Detected     Parainfluenza Virus 2 Not Detected     Parainfluenza Virus 3 Not Detected     Parainfluenza Virus 4 Not Detected     RSV, PCR Not Detected     Bordetella pertussis pcr Not Detected     Bordetella parapertussis PCR Not Detected     Chlamydophila pneumoniae PCR Not Detected     Mycoplasma pneumo by PCR Not Detected    Narrative:      In the setting of a positive respiratory panel with a viral infection PLUS a negative procalcitonin without other underlying concern for bacterial infection, consider observing off antibiotics or discontinuation of antibiotics and continue supportive care. If the respiratory panel is positive for atypical bacterial infection (Bordetella pertussis, Chlamydophila pneumoniae, or Mycoplasma pneumoniae), consider antibiotic de-escalation to target atypical bacterial infection.    Blood Culture - Blood, Arm, Left [[id number]]  (Normal) Collected: 12/02/24 1116    Lab Status: Preliminary result Specimen: Blood from Arm, Left Updated: 12/04/24 1430     Blood Culture No growth at 2 days    Narrative:      Less than seven (7) mL's of blood was collected.  Insufficient quantity may yield false negative results.            CT Chest Without Contrast Diagnostic    Result Date: 12/3/2024  Impression: Impression: 1. New diffuse tree-in-bud nodules throughout the left lung and involving right lower lobe most consistent with multifocal endobronchial infection / inflammation. 2. Mildly enlarged low paratracheal and AP window nodes likely reactive adenopathy, recommend attention on follow-up. 3. Mild emphysema. 4. Subacute fracture of the left anterior sixth rib. 5. Additional chronic findings above. Electronically  Signed: Harvey Khoury MD  12/3/2024 11:31 AM EST  Workstation ID: [id number]    XR Chest 1 View    Result Date: 12/2/2024  Impression: Impression: Slight increased interstitial prominence bilaterally since 1/9/2024 chest x-ray, raises the possibility of mild atypical interstitial pneumonia or mild edema. No focal lung consolidation. Stable mild cardiac enlargement. Electronically Signed: Anahi Valladares MD  12/2/2024 12:38 PM EST  Workstation ID: [id number]     Results for orders placed during the hospital encounter of 02/19/24    Duplex aorta ivc iliac graft complete CAR    Interpretation Summary    Patent aortobiiliac stent graft with no evidence of endoleak, graft stenosis or occlusion.      Results for orders placed during the hospital encounter of 02/19/24    Duplex aorta ivc iliac graft complete CAR    Interpretation Summary    Patent aortobiiliac stent graft with no evidence of endoleak, graft stenosis or occlusion.      Results for orders placed during the hospital encounter of 10/19/23    Adult Transthoracic Echo Complete W/ Cont if Necessary Per Protocol    Interpretation Summary    Left ventricular ejection fraction appears to be 51 - 55%.    The right ventricular cavity is mildly dilated.    The left atrial cavity is moderately dilated.    The right atrial cavity is dilated.    Moderate aortic valve regurgitation is present.    Moderate mitral valve regurgitation is present.    Estimated right ventricular systolic pressure from tricuspid regurgitation is mildly elevated (35-45 mmHg).      Labs Pending at Discharge:  Pending Results       None            Procedures Performed           Consults:   Consults       Date and Time Order Name Status Description    12/2/2024  3:32 PM Inpatient Pulmonology Consult Completed     12/2/2024  3:08 PM Hospitalist (on-call MD unless specified)                Discharge Details        Discharge Medications        New Medications        Instructions Start Date    amoxicillin-clavulanate 500-125 MG per tablet  Commonly known as: Augmentin   500 mg, Oral, 2 Times Daily      guaifenesin-dextromethorphan 600-30 mg  MG tablet sustained-release 12 hour   1 tablet, Oral, 2 Times Daily      methylPREDNISolone 4 MG dose pack  Commonly known as: MEDROL   Take as directed on package instructions.             Continue These Medications        Instructions Start Date   albuterol sulfate  (90 Base) MCG/ACT inhaler  Commonly known as: PROVENTIL HFA;VENTOLIN HFA;PROAIR HFA   2 puffs, Every 4 Hours PRN      allopurinol 300 MG tablet  Commonly known as: ZYLOPRIM   Take 1 tablet by mouth Daily.      aspirin 81 MG tablet   81 mg, Oral, Daily      atenolol 50 MG tablet  Commonly known as: TENORMIN   50 mg, Oral, Every Night at Bedtime      atorvastatin 20 MG tablet  Commonly known as: LIPITOR   TAKE 1 TABLET BY MOUTH EVERY DAY      cetirizine 10 MG tablet  Commonly known as: zyrTEC   10 mg, Daily      FLUTICASONE FUROATE IN   2 Applications, Daily      losartan 100 MG tablet  Commonly known as: COZAAR   100 mg, Oral, Daily      warfarin 7.5 MG tablet  Commonly known as: COUMADIN   See Admin Instructions      warfarin 7.5 MG tablet  Commonly known as: COUMADIN   See Admin Instructions             Stop These Medications      amLODIPine 10 MG tablet  Commonly known as: NORVASC              Allergies   Allergen Reactions    Bee Venom Anaphylaxis     HIVES-SWOLLEN THROAT    Meperidine Hives    Midazolam Hives    Propofol Other (See Comments)     UNCLEAR-SVT, HYPOTENSION-STATES SEVERE ALMOST CODED    Sulfa Antibiotics Hives    Hydrocodone Nausea And Vomiting    Simvastatin Myalgia         Discharge Disposition:    Home or Self Care    Diet:  Hospital:  Diet Order   Procedures    Diet: Cardiac; Healthy Heart (2-3 Na+); Fluid Consistency: Thin (IDDSI 0)         Discharge Activity:         CODE STATUS:  Code Status and Medical Interventions: CPR (Attempt to Resuscitate); Full Support    Ordered at: 12/02/24 1532     Code Status (Patient has no pulse and is not breathing):    CPR (Attempt to Resuscitate)     Medical Interventions (Patient has pulse or is breathing):    Full Support         Future Appointments   Date Time Provider Department Center   1/8/2025  9:30 AM MAGDALENA DENTON [facility]   2/3/2025  2:30 PM Thai Galloway MD [facility]   2/10/2025  9:45 AM Colette Dominguez APRN [facility]   2/24/2025  8:30 AM [facility]   2/24/2025  9:15 AM Eric Sainz MD [facility]   6/10/2025  8:45 AM Seipel, Joseph F, MD [facility]   6/12/2025  8:30 AM Shawn Van MD [facility]   8/25/2025 10:00 AM Colette Dominguez APRN [facility]           Time spent on Discharge including face to face service:  35 minutes    Signature: Electronically signed by Reynaldo Kumar MD, 12/05/24, 08:34 EST.  [facility] Hospitalist Team

## 2024-12-05 NOTE — PLAN OF CARE
Goal Outcome Evaluation:              Outcome Evaluation: patient plan is to go home today

## 2024-12-05 NOTE — OUTREACH NOTE
Prep Survey      Flowsheet Row Responses   [facility] patient discharged from? [facility]   Is LACE score < 7 ? No   Eligibility [facility]   Date of Admission 12/02/24   Date of Discharge 12/05/24   Discharge Disposition Home or Self Care   Discharge diagnosis Community acquired pneumonia   Does the patient have one of the following disease processes/diagnoses(primary or secondary)? Pneumonia   Does the patient have Home health ordered? No   Is there a DME ordered? No   Medication alerts for this patient see AVS   Prep survey completed? Yes            Betty ARAUJO - Registered Nurse

## 2024-12-06 ENCOUNTER — TRANSITIONAL CARE MANAGEMENT TELEPHONE ENCOUNTER (OUTPATIENT)
Dept: CALL CENTER | Facility: HOSPITAL | Age: 74
End: 2024-12-06
Payer: MEDICARE

## 2024-12-06 ENCOUNTER — TELEPHONE (OUTPATIENT)
Dept: CARDIOLOGY | Facility: CLINIC | Age: 74
End: 2024-12-06
Payer: MEDICARE

## 2024-12-06 NOTE — CASE MANAGEMENT/SOCIAL WORK
Case Management Discharge Note      Final Note: Routine home    Provided Post Acute Provider List?: N/A  Provided Post Acute Provider Quality & Resource List?: N/A    Selected Continued Care - Discharged on 12/5/2024 Admission date: 12/2/2024 - Discharge disposition: Home or Self Care         Transportation Services  Private: Car    Final Discharge Disposition Code: 01 - home or self-care

## 2024-12-06 NOTE — TELEPHONE ENCOUNTER
Called pt and made INR appt for next week 12/6/24. Pt is currently on steroids, so need to watch INR level. Maribel

## 2024-12-06 NOTE — TELEPHONE ENCOUNTER
"    Caller: Sandro Ramirez \"Manish\"    Relationship: Self    Best call back number: [phone]    Which medication are you concerned about: WARFARIN     Who prescribed you this medication: DR DUMONT    When did you start taking this medication: PATIENT STOPPED TAKING MEDICATION FOR 5 DAYS AND NEEDS TO KNOW TO DO WEDNESDAY HIS INR WAS 7.6 STARTED WARFRAIN AGAIN YESTERDAY AND WANTS TO KNOW IF HE NEEDS TO COME GET HIS INR CHECKED TODAY OR IF HE IS OK TO CONTINUE TAKING MEDICATION AND WAITING UNTIL THE NEXT INR APPOINTMENT. PATIENT WAS ON STEROIDS HE IS CONCERNED WOULD AFFECT THE LEVEL AND ACCURACY OF THE TEST.     What are your concerns: PATIENT     How long have you had these concerns: 12/01/2024      "

## 2024-12-06 NOTE — OUTREACH NOTE
Call Center TCM Note      Flowsheet Row Responses   [facility] patient discharged from? [facility]   Does the patient have one of the following disease processes/diagnoses(primary or secondary)? Pneumonia   TCM attempt successful? Yes   Call start time 1008   Call end time 1010   Discharge diagnosis Community acquired pneumonia   Meds reviewed with patient/caregiver? Yes   Is the patient having any side effects they believe may be caused by any medication additions or changes? No   Does the patient have all medications ordered at discharge? Yes   Is the patient taking all medications as directed (includes completed medication regime)? Yes   Comments HOSP DC FU appt 12/9/24 1130 am   Does the patient have an appointment with their PCP within 7-14 days of discharge? Yes   Has home health visited the patient within 72 hours of discharge? N/A   Psychosocial issues? No   Did the patient receive a copy of their discharge instructions? Yes   Nursing interventions Reviewed instructions with patient   What is the patient's perception of their health status since discharge? Improving   Nursing Interventions Nurse provided patient education   If the patient is a current smoker, are they able to teach back resources for cessation? Not a smoker   Is the patient/caregiver able to teach back the hierarchy of who to call/visit for symptoms/problems? PCP, Specialist, Home health nurse, Urgent Care, ED, 911 Yes   Is the patient/caregiver able to teach back signs and symptoms of worsening condition: Fever/chills, Shortness of breath, Chest pain   Is the patient/caregiver able to teach back importance of completing antibiotic course of treatment? Yes   TCM call completed? Yes   Wrap up additional comments Pt reports he is doing much better.   Call end time 1010            Sheri Freeman RN    12/6/2024, 10:11 EST

## 2024-12-07 LAB
BACTERIA SPEC AEROBE CULT: NORMAL
BACTERIA SPEC AEROBE CULT: NORMAL

## 2024-12-09 ENCOUNTER — OFFICE VISIT (OUTPATIENT)
Dept: FAMILY MEDICINE CLINIC | Facility: CLINIC | Age: 74
End: 2024-12-09
Payer: MEDICARE

## 2024-12-09 ENCOUNTER — ANTICOAGULATION VISIT (OUTPATIENT)
Dept: CARDIOLOGY | Facility: CLINIC | Age: 74
End: 2024-12-09
Payer: MEDICARE

## 2024-12-09 VITALS
BODY MASS INDEX: 26.78 KG/M2 | SYSTOLIC BLOOD PRESSURE: 137 MMHG | DIASTOLIC BLOOD PRESSURE: 69 MMHG | WEIGHT: 192 LBS | HEART RATE: 53 BPM

## 2024-12-09 VITALS
DIASTOLIC BLOOD PRESSURE: 56 MMHG | SYSTOLIC BLOOD PRESSURE: 116 MMHG | RESPIRATION RATE: 18 BRPM | HEIGHT: 71 IN | TEMPERATURE: 97.6 F | WEIGHT: 192.2 LBS | OXYGEN SATURATION: 92 % | HEART RATE: 59 BPM | BODY MASS INDEX: 26.91 KG/M2

## 2024-12-09 DIAGNOSIS — J18.9 PNEUMONIA OF BOTH LUNGS DUE TO INFECTIOUS ORGANISM, UNSPECIFIED PART OF LUNG: Primary | ICD-10-CM

## 2024-12-09 DIAGNOSIS — I48.21 PERMANENT ATRIAL FIBRILLATION: Primary | Chronic | ICD-10-CM

## 2024-12-09 DIAGNOSIS — Z09 HOSPITAL DISCHARGE FOLLOW-UP: ICD-10-CM

## 2024-12-09 LAB — INR PPP: 2 (ref 2–3)

## 2024-12-09 PROCEDURE — 85610 PROTHROMBIN TIME: CPT | Performed by: INTERNAL MEDICINE

## 2024-12-09 PROCEDURE — 36416 COLLJ CAPILLARY BLOOD SPEC: CPT | Performed by: INTERNAL MEDICINE

## 2024-12-09 RX ORDER — ALBUTEROL SULFATE 90 UG/1
2 INHALANT RESPIRATORY (INHALATION) EVERY 4 HOURS PRN
Qty: 18 G | Refills: 2 | Status: SHIPPED | OUTPATIENT
Start: 2024-12-09

## 2024-12-09 NOTE — PROGRESS NOTES
Subjective        Sandro Ramirez is a 74 y.o. male.     Chief Complaint   Patient presents with    Hospital Follow Up Visit     Pneumonia       History of Present Illness  Patient is here for hospital follow up for pneumonia.  Notes reviewed    Admitted on 12/2thru 12/5   Community acquired pneumonia, hypoxia  Presented to urgent care on 12/2 with cough and short of air referred to ED due to low oxygen.   Chest xray shows slight increased interstitial prominence bilaterally with possible atypical pneumonia or edema. EKG shows atrial fib. This is chronic.   Lactic acid 1.2 WBC 8.56 .   Sent home with azithromycin, and Unasyn with prednisone.   Wife reports last steroid tonight, and antibiotics completed.   He had subtherapeutic inr. Followed by Dr Galloway level today 2.0 will have recheck 2 weeks.   CT chest 12/3 : new diffuse tree in bud nodules throughout the left lung involving right lower lobe . Subacute fracture of left anterior sixth rib.   He said he fell while hunting may have cause fell over on leaf blower.   Current outpatient and discharge medications have been reconciled for the patient.  Reviewed by: ALICJA Miranda     Diabetes he is not medicated using diet control . Last A1C 10/22 7.09.     Pneumonia: reports he feels weak but overall better. He is taking medicaitons   Discussed use of his inhaler . He needs to schedule with pulmonology. Stressed need to follow up. If fever or symptoms worsen get seen.             The following portions of the patient's history were reviewed and updated as appropriate: allergies, current medications, past family history, past medical history, past social history, past surgical history and problem list.      Current Outpatient Medications:     albuterol sulfate  (90 Base) MCG/ACT inhaler, Inhale 2 puffs Every 4 (Four) Hours As Needed for Wheezing., Disp: 18 g, Rfl: 2    allopurinol (ZYLOPRIM) 300 MG tablet, Take 1 tablet by mouth Daily., Disp: , Rfl:      aspirin 81 MG tablet, Take 1 tablet by mouth Daily., Disp: , Rfl:     atenolol (TENORMIN) 50 MG tablet, TAKE 1 TABLET BY MOUTH EVERY DAY AT NIGHT, Disp: 90 tablet, Rfl: 3    atorvastatin (LIPITOR) 20 MG tablet, TAKE 1 TABLET BY MOUTH EVERY DAY, Disp: 90 tablet, Rfl: 1    cetirizine (zyrTEC) 10 MG tablet, Take 1 tablet by mouth Daily., Disp: , Rfl:     FLUTICASONE FUROATE IN, Inhale 2 Applications Daily., Disp: , Rfl:     guaifenesin-dextromethorphan 600-30 mg (MUCINEX DM)  MG tablet sustained-release 12 hour, Take 1 tablet by mouth 2 (Two) Times a Day for 4 days., Disp: 8 tablet, Rfl: 0    losartan (COZAAR) 100 MG tablet, Take 1 tablet by mouth Daily., Disp: 90 tablet, Rfl: 3    methylPREDNISolone (MEDROL) 4 MG dose pack, Take as directed on package instructions., Disp: 21 tablet, Rfl: 0    warfarin (COUMADIN) 7.5 MG tablet, See Admin Instructions. Take 1 tablet by mouth daily on Saturday, Sunday, Tuesday, Wednesday, and Thursday, Disp: , Rfl:     warfarin (COUMADIN) 7.5 MG tablet, See Admin Instructions. Take 1.5 tablets by mouth on Monday and Friday, Disp: , Rfl:     Recent Results (from the past 24 weeks)   POC INR    Collection Time: 06/27/24 10:28 AM    Specimen: Blood   Result Value Ref Range    INR 2.70 2.00 - 3.00   POC INR    Collection Time: 07/30/24 11:12 AM    Specimen: Blood   Result Value Ref Range    INR 2.80 2.00 - 3.00   Hemoglobin A1c    Collection Time: 08/14/24  8:47 AM    Specimen: Blood   Result Value Ref Range    Hemoglobin A1C 7.60 (H) 4.80 - 5.60 %   CBC Auto Differential    Collection Time: 08/14/24  8:47 AM    Specimen: Blood   Result Value Ref Range    WBC 7.78 3.40 - 10.80 10*3/mm3    RBC 5.28 4.14 - 5.80 10*6/mm3    Hemoglobin 15.1 13.0 - 17.7 g/dL    Hematocrit 47.3 37.5 - 51.0 %    MCV 89.6 79.0 - 97.0 fL    MCH 28.6 26.6 - 33.0 pg    MCHC 31.9 31.5 - 35.7 g/dL    RDW 13.7 12.3 - 15.4 %    RDW-SD 44.9 37.0 - 54.0 fl    MPV 8.9 6.0 - 12.0 fL    Platelets 189 140 - 450 10*3/mm3     Neutrophil % 64.2 42.7 - 76.0 %    Lymphocyte % 23.9 19.6 - 45.3 %    Monocyte % 8.9 5.0 - 12.0 %    Eosinophil % 1.8 0.3 - 6.2 %    Basophil % 0.8 0.0 - 1.5 %    Immature Grans % 0.4 0.0 - 0.5 %    Neutrophils, Absolute 5.00 1.70 - 7.00 10*3/mm3    Lymphocytes, Absolute 1.86 0.70 - 3.10 10*3/mm3    Monocytes, Absolute 0.69 0.10 - 0.90 10*3/mm3    Eosinophils, Absolute 0.14 0.00 - 0.40 10*3/mm3    Basophils, Absolute 0.06 0.00 - 0.20 10*3/mm3    Immature Grans, Absolute 0.03 0.00 - 0.05 10*3/mm3    nRBC 0.0 0.0 - 0.2 /100 WBC   Comprehensive Metabolic Panel    Collection Time: 08/14/24  8:47 AM    Specimen: Blood   Result Value Ref Range    Glucose 119 (H) 65 - 99 mg/dL    BUN 13 8 - 23 mg/dL    Creatinine 0.82 0.76 - 1.27 mg/dL    Sodium 140 136 - 145 mmol/L    Potassium 4.4 3.5 - 5.2 mmol/L    Chloride 104 98 - 107 mmol/L    CO2 27.0 22.0 - 29.0 mmol/L    Calcium 9.2 8.6 - 10.5 mg/dL    Total Protein 6.7 6.0 - 8.5 g/dL    Albumin 4.1 3.5 - 5.2 g/dL    ALT (SGPT) 20 1 - 41 U/L    AST (SGOT) 22 1 - 40 U/L    Alkaline Phosphatase 95 39 - 117 U/L    Total Bilirubin 0.6 0.0 - 1.2 mg/dL    Globulin 2.6 gm/dL    A/G Ratio 1.6 g/dL    BUN/Creatinine Ratio 15.9 7.0 - 25.0    Anion Gap 9.0 5.0 - 15.0 mmol/L    eGFR 93.3 >60.0 mL/min/1.73   Uric Acid    Collection Time: 08/14/24  8:47 AM    Specimen: Blood   Result Value Ref Range    Uric Acid 3.3 (L) 3.4 - 7.0 mg/dL   POC Glucose Once    Collection Time: 08/28/24  8:53 AM    Specimen: Blood   Result Value Ref Range    Glucose 140 (H) 70 - 130 mg/dL   Protime-INR    Collection Time: 08/28/24  9:01 AM    Specimen: Blood   Result Value Ref Range    Protime 13.6 12.1 - 15.0 Seconds    INR 1.00 0.90 - 1.10   Tissue Pathology Exam    Collection Time: 08/28/24 10:18 AM    Specimen: Large Intestine, Sigmoid Colon; Polyp   Result Value Ref Range    Addendum       I performed the intradepartmental review of this case.  I agree with the diagnosis rendered by Dr. States. Cervantes  "Report       Surgical Pathology Report                         Case: [id number]                                  Authorizing Provider:  Vitor Haley MD    Collected:           08/28/2024 10:18 AM          Ordering Location:     [facility]   Received:            08/28/2024 10:55 AM                                 OR                                                                           Pathologist:           Laura Noel MD                                                        Specimen:    Large Intestine, Sigmoid Colon, x2                                                         Final Diagnosis       1.  Sigmoid colon, polypectomies:         A.  Tubular adenomas.      Gross Description       1. Large Intestine, Sigmoid Colon.   Received in formalin, labeled with the patient's name, and designated \" sigmoid colon biopsies\", are 2, irregular, pink-tan, smooth soft tissue fragments measuring 0.3 x 0.2 x 0.1 cm and 0.7 x 0.5 x 0.2 cm.  The smaller fragment is submitted as received.  The larger fragment is trisected.  All tissue is submitted in 1A.      mb/uso/vas          Microscopic Description       Unless \"gross only\" is specified, the final diagnosis for each specimen is based on microscopic examination of tissue.     POC INR    Collection Time: 09/03/24  9:47 AM    Specimen: Blood   Result Value Ref Range    INR 1.30 2.00 - 3.00   POC INR    Collection Time: 09/06/24 11:04 AM    Specimen: Blood   Result Value Ref Range    INR 1.80 2.00 - 3.00   POC INR    Collection Time: 09/20/24 10:11 AM    Specimen: Blood   Result Value Ref Range    INR 2.80 2.00 - 3.00   Lipid Panel    Collection Time: 10/22/24  9:00 AM    Specimen: Blood   Result Value Ref Range    Total Cholesterol 132 0 - 200 mg/dL    Triglycerides 84 0 - 150 mg/dL    HDL Cholesterol 39 (L) 40 - 60 mg/dL    LDL Cholesterol  77 0 - 100 mg/dL    VLDL Cholesterol 16 5 - 40 mg/dL    LDL/HDL Ratio 1.95    Hemoglobin A1c    " Collection Time: 10/22/24  9:00 AM    Specimen: Blood   Result Value Ref Range    Hemoglobin A1C 7.09 (H) 4.80 - 5.60 %   Comprehensive Metabolic Panel    Collection Time: 10/22/24  9:00 AM    Specimen: Blood   Result Value Ref Range    Glucose 117 (H) 65 - 99 mg/dL    BUN 19 8 - 23 mg/dL    Creatinine 0.73 (L) 0.76 - 1.27 mg/dL    Sodium 139 136 - 145 mmol/L    Potassium 4.6 3.5 - 5.2 mmol/L    Chloride 104 98 - 107 mmol/L    CO2 27.0 22.0 - 29.0 mmol/L    Calcium 9.4 8.6 - 10.5 mg/dL    Total Protein 7.1 6.0 - 8.5 g/dL    Albumin 4.4 3.5 - 5.2 g/dL    ALT (SGPT) 23 1 - 41 U/L    AST (SGOT) 29 1 - 40 U/L    Alkaline Phosphatase 86 39 - 117 U/L    Total Bilirubin 0.7 0.0 - 1.2 mg/dL    Globulin 2.7 gm/dL    A/G Ratio 1.6 g/dL    BUN/Creatinine Ratio 26.0 (H) 7.0 - 25.0    Anion Gap 8.0 5.0 - 15.0 mmol/L    eGFR 96.7 >60.0 mL/min/1.73   Microalbumin / Creatinine Urine Ratio - Urine, Clean Catch    Collection Time: 10/22/24  9:00 AM    Specimen: Urine, Clean Catch   Result Value Ref Range    Microalbumin/Creatinine Ratio 1,225.4 (H) 0.0 - 29.0 mg/g    Creatinine, Urine 117.1 mg/dL    Microalbumin, Urine 143.5 mg/dL   POC INR    Collection Time: 10/25/24  9:26 AM    Specimen: Blood   Result Value Ref Range    INR 3.20 (A) 0.90 - 1.10   POC Glucose    Collection Time: 11/01/24  9:01 AM    Specimen: Blood   Result Value Ref Range    Glucose 123 (H) 70 - 105 mg/dL   POC INR    Collection Time: 11/27/24  9:14 AM    Specimen: Blood   Result Value Ref Range    INR 2.60 2.00 - 3.00   ECG 12 Lead Dyspnea    Collection Time: 12/02/24 10:57 AM   Result Value Ref Range    QT Interval 402 ms    QTC Interval 469 ms   Green Top (Gel)    Collection Time: 12/02/24 11:15 AM   Result Value Ref Range    Extra Tube Hold for add-ons.    Lavender Top    Collection Time: 12/02/24 11:15 AM   Result Value Ref Range    Extra Tube hold for add-on    Gold Top - SST    Collection Time: 12/02/24 11:15 AM   Result Value Ref Range    Extra Tube Hold  for add-ons.    Light Blue Top    Collection Time: 12/02/24 11:15 AM   Result Value Ref Range    Extra Tube Hold for add-ons.    Comprehensive Metabolic Panel    Collection Time: 12/02/24 11:15 AM    Specimen: Blood   Result Value Ref Range    Glucose 137 (H) 65 - 99 mg/dL    BUN 16 8 - 23 mg/dL    Creatinine 0.92 0.76 - 1.27 mg/dL    Sodium 139 136 - 145 mmol/L    Potassium 4.2 3.5 - 5.2 mmol/L    Chloride 101 98 - 107 mmol/L    CO2 25.0 22.0 - 29.0 mmol/L    Calcium 9.6 8.6 - 10.5 mg/dL    Total Protein 7.5 6.0 - 8.5 g/dL    Albumin 4.3 3.5 - 5.2 g/dL    ALT (SGPT) 20 1 - 41 U/L    AST (SGOT) 26 1 - 40 U/L    Alkaline Phosphatase 110 39 - 117 U/L    Total Bilirubin 1.5 (H) 0.0 - 1.2 mg/dL    Globulin 3.2 gm/dL    A/G Ratio 1.3 g/dL    BUN/Creatinine Ratio 17.4 7.0 - 25.0    Anion Gap 13.0 5.0 - 15.0 mmol/L    eGFR 88.4 >60.0 mL/min/1.73   Protime-INR    Collection Time: 12/02/24 11:15 AM    Specimen: Blood   Result Value Ref Range    Protime 24.7 19.4 - 28.5 Seconds    INR 2.24 2.00 - 3.00   CBC Auto Differential    Collection Time: 12/02/24 11:15 AM    Specimen: Blood   Result Value Ref Range    WBC 18.56 (H) 3.40 - 10.80 10*3/mm3    RBC 5.34 4.14 - 5.80 10*6/mm3    Hemoglobin 15.3 13.0 - 17.7 g/dL    Hematocrit 48.1 37.5 - 51.0 %    MCV 90.1 79.0 - 97.0 fL    MCH 28.7 26.6 - 33.0 pg    MCHC 31.8 31.5 - 35.7 g/dL    RDW 12.9 12.3 - 15.4 %    RDW-SD 42.4 37.0 - 54.0 fl    MPV 9.6 6.0 - 12.0 fL    Platelets 223 140 - 450 10*3/mm3    Neutrophil % 85.4 (H) 42.7 - 76.0 %    Lymphocyte % 7.0 (L) 19.6 - 45.3 %    Monocyte % 6.6 5.0 - 12.0 %    Eosinophil % 0.1 (L) 0.3 - 6.2 %    Basophil % 0.4 0.0 - 1.5 %    Immature Grans % 0.5 0.0 - 0.5 %    Neutrophils, Absolute 15.83 (H) 1.70 - 7.00 10*3/mm3    Lymphocytes, Absolute 1.30 0.70 - 3.10 10*3/mm3    Monocytes, Absolute 1.23 (H) 0.10 - 0.90 10*3/mm3    Eosinophils, Absolute 0.02 0.00 - 0.40 10*3/mm3    Basophils, Absolute 0.08 0.00 - 0.20 10*3/mm3    Immature Grans,  Absolute 0.10 (H) 0.00 - 0.05 10*3/mm3    nRBC 0.0 0.0 - 0.2 /100 WBC   Procalcitonin    Collection Time: 12/02/24 11:15 AM    Specimen: Blood   Result Value Ref Range    Procalcitonin 0.17 0.00 - 0.25 ng/mL   Blood Culture - Blood, Arm, Left    Collection Time: 12/02/24 11:16 AM    Specimen: Arm, Left; Blood   Result Value Ref Range    Blood Culture No growth at 5 days    Respiratory Panel PCR w/COVID-19(SARS-CoV-2) [facility]IA/MICHAEL/[facility] In-House, NP Swab in UTM/VTM, 2 HR TAT - Swab, Nasopharynx    Collection Time: 12/02/24 11:20 AM    Specimen: Nasopharynx; Swab   Result Value Ref Range    ADENOVIRUS, PCR Not Detected Not Detected    Coronavirus 229E Not Detected Not Detected    Coronavirus HKU1 Not Detected Not Detected    Coronavirus NL63 Not Detected Not Detected    Coronavirus OC43 Not Detected Not Detected    COVID19 Not Detected Not Detected - Ref. Range    Human Metapneumovirus Not Detected Not Detected    Human Rhinovirus/Enterovirus Not Detected Not Detected    Influenza A PCR Not Detected Not Detected    Influenza B PCR Not Detected Not Detected    Parainfluenza Virus 1 Not Detected Not Detected    Parainfluenza Virus 2 Not Detected Not Detected    Parainfluenza Virus 3 Not Detected Not Detected    Parainfluenza Virus 4 Not Detected Not Detected    RSV, PCR Not Detected Not Detected    Bordetella pertussis pcr Not Detected Not Detected    Bordetella parapertussis PCR Not Detected Not Detected    Chlamydophila pneumoniae PCR Not Detected Not Detected    Mycoplasma pneumo by PCR Not Detected Not Detected   Blood Culture - Blood, Arm, Right    Collection Time: 12/02/24  2:08 PM    Specimen: Arm, Right; Blood   Result Value Ref Range    Blood Culture No growth at 5 days    POC Lactate    Collection Time: 12/02/24  2:13 PM    Specimen: Blood   Result Value Ref Range    Lactate 1.2 0.3 - 2.0 mmol/L   Basic Metabolic Panel    Collection Time: 12/03/24  5:58 AM    Specimen: Arm, Right; Blood   Result Value Ref  Range    Glucose 172 (H) 65 - 99 mg/dL    BUN 16 8 - 23 mg/dL    Creatinine 0.82 0.76 - 1.27 mg/dL    Sodium 136 136 - 145 mmol/L    Potassium 3.9 3.5 - 5.2 mmol/L    Chloride 102 98 - 107 mmol/L    CO2 26.5 22.0 - 29.0 mmol/L    Calcium 9.0 8.6 - 10.5 mg/dL    BUN/Creatinine Ratio 19.5 7.0 - 25.0    Anion Gap 7.5 5.0 - 15.0 mmol/L    eGFR 93.3 >60.0 mL/min/1.73   CBC Auto Differential    Collection Time: 12/03/24  5:58 AM    Specimen: Arm, Right; Blood   Result Value Ref Range    WBC 16.13 (H) 3.40 - 10.80 10*3/mm3    RBC 4.48 4.14 - 5.80 10*6/mm3    Hemoglobin 13.5 13.0 - 17.7 g/dL    Hematocrit 40.5 37.5 - 51.0 %    MCV 90.4 79.0 - 97.0 fL    MCH 30.1 26.6 - 33.0 pg    MCHC 33.3 31.5 - 35.7 g/dL    RDW 12.9 12.3 - 15.4 %    RDW-SD 42.9 37.0 - 54.0 fl    MPV 9.4 6.0 - 12.0 fL    Platelets 178 140 - 450 10*3/mm3    Neutrophil % 83.9 (H) 42.7 - 76.0 %    Lymphocyte % 7.6 (L) 19.6 - 45.3 %    Monocyte % 7.3 5.0 - 12.0 %    Eosinophil % 0.3 0.3 - 6.2 %    Basophil % 0.3 0.0 - 1.5 %    Immature Grans % 0.6 (H) 0.0 - 0.5 %    Neutrophils, Absolute 13.53 (H) 1.70 - 7.00 10*3/mm3    Lymphocytes, Absolute 1.23 0.70 - 3.10 10*3/mm3    Monocytes, Absolute 1.18 (H) 0.10 - 0.90 10*3/mm3    Eosinophils, Absolute 0.05 0.00 - 0.40 10*3/mm3    Basophils, Absolute 0.05 0.00 - 0.20 10*3/mm3    Immature Grans, Absolute 0.09 (H) 0.00 - 0.05 10*3/mm3    nRBC 0.0 0.0 - 0.2 /100 WBC   Procalcitonin    Collection Time: 12/03/24  5:58 AM    Specimen: Arm, Right; Blood   Result Value Ref Range    Procalcitonin 0.30 (H) 0.00 - 0.25 ng/mL   POC Glucose Once    Collection Time: 12/03/24  8:30 PM    Specimen: Blood   Result Value Ref Range    Glucose 170 (H) 70 - 105 mg/dL   Protime-INR    Collection Time: 12/03/24 10:43 PM    Specimen: Arm, Right; Blood   Result Value Ref Range    Protime 20.4 19.4 - 28.5 Seconds    INR 1.75 (L) 2.00 - 3.00   Basic Metabolic Panel    Collection Time: 12/03/24 10:43 PM    Specimen: Arm, Right; Blood   Result  Value Ref Range    Glucose 135 (H) 65 - 99 mg/dL    BUN 20 8 - 23 mg/dL    Creatinine 0.68 (L) 0.76 - 1.27 mg/dL    Sodium 138 136 - 145 mmol/L    Potassium 4.5 3.5 - 5.2 mmol/L    Chloride 104 98 - 107 mmol/L    CO2 26.1 22.0 - 29.0 mmol/L    Calcium 9.0 8.6 - 10.5 mg/dL    BUN/Creatinine Ratio 29.4 (H) 7.0 - 25.0    Anion Gap 7.9 5.0 - 15.0 mmol/L    eGFR 98.8 >60.0 mL/min/1.73   CBC Auto Differential    Collection Time: 12/03/24 10:43 PM    Specimen: Arm, Right; Blood   Result Value Ref Range    WBC 14.78 (H) 3.40 - 10.80 10*3/mm3    RBC 4.71 4.14 - 5.80 10*6/mm3    Hemoglobin 13.7 13.0 - 17.7 g/dL    Hematocrit 42.8 37.5 - 51.0 %    MCV 90.9 79.0 - 97.0 fL    MCH 29.1 26.6 - 33.0 pg    MCHC 32.0 31.5 - 35.7 g/dL    RDW 12.7 12.3 - 15.4 %    RDW-SD 42.3 37.0 - 54.0 fl    MPV 9.5 6.0 - 12.0 fL    Platelets 195 140 - 450 10*3/mm3    Neutrophil % 86.0 (H) 42.7 - 76.0 %    Lymphocyte % 7.4 (L) 19.6 - 45.3 %    Monocyte % 5.5 5.0 - 12.0 %    Eosinophil % 0.1 (L) 0.3 - 6.2 %    Basophil % 0.3 0.0 - 1.5 %    Immature Grans % 0.7 (H) 0.0 - 0.5 %    Neutrophils, Absolute 12.71 (H) 1.70 - 7.00 10*3/mm3    Lymphocytes, Absolute 1.10 0.70 - 3.10 10*3/mm3    Monocytes, Absolute 0.81 0.10 - 0.90 10*3/mm3    Eosinophils, Absolute 0.02 0.00 - 0.40 10*3/mm3    Basophils, Absolute 0.04 0.00 - 0.20 10*3/mm3    Immature Grans, Absolute 0.10 (H) 0.00 - 0.05 10*3/mm3    nRBC 0.0 0.0 - 0.2 /100 WBC   Protime-INR    Collection Time: 12/04/24  6:06 AM    Specimen: Blood   Result Value Ref Range    Protime 19.0 (L) 19.4 - 28.5 Seconds    INR 1.60 (L) 2.00 - 3.00   POC Glucose Finger 4x Daily Before Meals & at Bedtime    Collection Time: 12/04/24  7:27 AM    Specimen: Finger; Blood   Result Value Ref Range    Glucose 121 (H) 70 - 105 mg/dL   POC Glucose Finger 4x Daily Before Meals & at Bedtime    Collection Time: 12/04/24 11:10 AM    Specimen: Finger; Blood   Result Value Ref Range    Glucose 199 (H) 70 - 105 mg/dL   CBC (No Diff)     Collection Time: 12/04/24  2:14 PM    Specimen: Blood   Result Value Ref Range    WBC 13.96 (H) 3.40 - 10.80 10*3/mm3    RBC 4.70 4.14 - 5.80 10*6/mm3    Hemoglobin 13.5 13.0 - 17.7 g/dL    Hematocrit 42.6 37.5 - 51.0 %    MCV 90.6 79.0 - 97.0 fL    MCH 28.7 26.6 - 33.0 pg    MCHC 31.7 31.5 - 35.7 g/dL    RDW 13.0 12.3 - 15.4 %    RDW-SD 42.6 37.0 - 54.0 fl    MPV 9.4 6.0 - 12.0 fL    Platelets 197 140 - 450 10*3/mm3   Comprehensive Metabolic Panel    Collection Time: 12/04/24  2:14 PM    Specimen: Blood   Result Value Ref Range    Glucose 123 (H) 65 - 99 mg/dL    BUN 20 8 - 23 mg/dL    Creatinine 0.69 (L) 0.76 - 1.27 mg/dL    Sodium 137 136 - 145 mmol/L    Potassium 4.4 3.5 - 5.2 mmol/L    Chloride 103 98 - 107 mmol/L    CO2 25.3 22.0 - 29.0 mmol/L    Calcium 9.1 8.6 - 10.5 mg/dL    Total Protein 6.6 6.0 - 8.5 g/dL    Albumin 3.7 3.5 - 5.2 g/dL    ALT (SGPT) 24 1 - 41 U/L    AST (SGOT) 27 1 - 40 U/L    Alkaline Phosphatase 113 39 - 117 U/L    Total Bilirubin 0.6 0.0 - 1.2 mg/dL    Globulin 2.9 gm/dL    A/G Ratio 1.3 g/dL    BUN/Creatinine Ratio 29.0 (H) 7.0 - 25.0    Anion Gap 8.7 5.0 - 15.0 mmol/L    eGFR 98.3 >60.0 mL/min/1.73   POC Glucose Once    Collection Time: 12/04/24  4:10 PM    Specimen: Blood   Result Value Ref Range    Glucose 177 (H) 70 - 105 mg/dL   POC Glucose Once    Collection Time: 12/04/24  8:18 PM    Specimen: Blood   Result Value Ref Range    Glucose 263 (H) 70 - 105 mg/dL   Protime-INR    Collection Time: 12/05/24  5:16 AM    Specimen: Blood   Result Value Ref Range    Protime 19.0 (L) 19.4 - 28.5 Seconds    INR 1.60 (L) 2.00 - 3.00   Comprehensive Metabolic Panel    Collection Time: 12/05/24  5:16 AM    Specimen: Blood   Result Value Ref Range    Glucose 95 65 - 99 mg/dL    BUN 18 8 - 23 mg/dL    Creatinine 0.80 0.76 - 1.27 mg/dL    Sodium 140 136 - 145 mmol/L    Potassium 4.4 3.5 - 5.2 mmol/L    Chloride 105 98 - 107 mmol/L    CO2 27.3 22.0 - 29.0 mmol/L    Calcium 9.2 8.6 - 10.5 mg/dL     "Total Protein 6.9 6.0 - 8.5 g/dL    Albumin 3.7 3.5 - 5.2 g/dL    ALT (SGPT) 28 1 - 41 U/L    AST (SGOT) 27 1 - 40 U/L    Alkaline Phosphatase 127 (H) 39 - 117 U/L    Total Bilirubin 0.4 0.0 - 1.2 mg/dL    Globulin 3.2 gm/dL    A/G Ratio 1.2 g/dL    BUN/Creatinine Ratio 22.5 7.0 - 25.0    Anion Gap 7.7 5.0 - 15.0 mmol/L    eGFR 94.0 >60.0 mL/min/1.73   CBC Auto Differential    Collection Time: 12/05/24  5:16 AM    Specimen: Blood   Result Value Ref Range    WBC 11.45 (H) 3.40 - 10.80 10*3/mm3    RBC 4.74 4.14 - 5.80 10*6/mm3    Hemoglobin 14.1 13.0 - 17.7 g/dL    Hematocrit 43.5 37.5 - 51.0 %    MCV 91.8 79.0 - 97.0 fL    MCH 29.7 26.6 - 33.0 pg    MCHC 32.4 31.5 - 35.7 g/dL    RDW 12.6 12.3 - 15.4 %    RDW-SD 42.7 37.0 - 54.0 fl    MPV 9.6 6.0 - 12.0 fL    Platelets 210 140 - 450 10*3/mm3    Neutrophil % 80.3 (H) 42.7 - 76.0 %    Lymphocyte % 11.8 (L) 19.6 - 45.3 %    Monocyte % 7.1 5.0 - 12.0 %    Eosinophil % 0.1 (L) 0.3 - 6.2 %    Basophil % 0.3 0.0 - 1.5 %    Immature Grans % 0.4 0.0 - 0.5 %    Neutrophils, Absolute 9.20 (H) 1.70 - 7.00 10*3/mm3    Lymphocytes, Absolute 1.35 0.70 - 3.10 10*3/mm3    Monocytes, Absolute 0.81 0.10 - 0.90 10*3/mm3    Eosinophils, Absolute 0.01 0.00 - 0.40 10*3/mm3    Basophils, Absolute 0.03 0.00 - 0.20 10*3/mm3    Immature Grans, Absolute 0.05 0.00 - 0.05 10*3/mm3    nRBC 0.0 0.0 - 0.2 /100 WBC   POC Glucose Finger 4x Daily Before Meals & at Bedtime    Collection Time: 12/05/24  7:31 AM    Specimen: Finger; Blood   Result Value Ref Range    Glucose 94 70 - 105 mg/dL   POC INR    Collection Time: 12/09/24 10:35 AM    Specimen: Blood   Result Value Ref Range    INR 2.00 2.00 - 3.00         Review of Systems    Objective     /56 (BP Location: Left arm, Patient Position: Sitting, Cuff Size: Adult)   Pulse 59   Temp 97.6 °F (36.4 °C) (Oral)   Resp 18   Ht 180.3 cm (70.98\")   Wt 87.2 kg (192 lb 3.2 oz)   SpO2 92%   BMI 26.82 kg/m²     Physical Exam  Vitals and nursing note " reviewed.   Constitutional:       Appearance: Normal appearance.   Cardiovascular:      Rate and Rhythm: Normal rate and regular rhythm.   Pulmonary:      Effort: Pulmonary effort is normal. No tachypnea, accessory muscle usage, prolonged expiration or respiratory distress.      Breath sounds: Wheezing and rales present.       Musculoskeletal:      Right lower leg: No edema.      Left lower leg: No edema.   Neurological:      Mental Status: He is alert.         Result Review :                Assessment & Plan    Diagnoses and all orders for this visit:    1. Pneumonia of both lungs due to infectious organism, unspecified part of lung (Primary)  Comments:  stable needs schedule with pulmnology    2. Hospital discharge follow-up  Comments:  notes reviewed. stable patient    Other orders  -     albuterol sulfate  (90 Base) MCG/ACT inhaler; Inhale 2 puffs Every 4 (Four) Hours As Needed for Wheezing.  Dispense: 18 g; Refill: 2      Patient Instructions   Continue muccinex.     Follow Up   Return in about 1 month (around 1/9/2025).    Patient was given instructions and counseling regarding his condition or for health maintenance advice. Please see specific information pulled into the AVS if appropriate.     Josey Mace, APRN    12/09/24

## 2024-12-19 RX ORDER — LOSARTAN POTASSIUM 100 MG/1
100 TABLET ORAL DAILY
Qty: 90 TABLET | Refills: 3 | Status: SHIPPED | OUTPATIENT
Start: 2024-12-19

## 2024-12-19 NOTE — TELEPHONE ENCOUNTER
Rx Refill Note  Requested Prescriptions     Pending Prescriptions Disp Refills    losartan (COZAAR) 100 MG tablet [Pharmacy Med Name: LOSARTAN POTASSIUM 100 MG TAB] 90 tablet 3     Sig: TAKE 1 TABLET BY MOUTH EVERY DAY      Last office visit with prescribing clinician: 7/30/2024   Last telemedicine visit with prescribing clinician: Visit date not found   Next office visit with prescribing clinician: 2/3/2025                         Would you like a call back once the refill request has been completed: [] Yes [] No    If the office needs to give you a call back, can they leave a voicemail: [] Yes [] No    Stacey Martínez MA  12/19/24, 14:42 EST

## 2024-12-24 ENCOUNTER — TRANSCRIBE ORDERS (OUTPATIENT)
Dept: ADMINISTRATIVE | Facility: HOSPITAL | Age: 74
End: 2024-12-24
Payer: MEDICARE

## 2024-12-24 DIAGNOSIS — R91.8 LUNG NODULES: Primary | ICD-10-CM

## 2024-12-31 ENCOUNTER — ANTICOAGULATION VISIT (OUTPATIENT)
Dept: CARDIOLOGY | Facility: CLINIC | Age: 74
End: 2024-12-31
Payer: MEDICARE

## 2024-12-31 VITALS
HEART RATE: 63 BPM | WEIGHT: 190 LBS | SYSTOLIC BLOOD PRESSURE: 150 MMHG | BODY MASS INDEX: 26.51 KG/M2 | DIASTOLIC BLOOD PRESSURE: 83 MMHG

## 2024-12-31 DIAGNOSIS — I48.21 PERMANENT ATRIAL FIBRILLATION: Primary | Chronic | ICD-10-CM

## 2024-12-31 LAB — INR PPP: 2.8 (ref 2–3)

## 2024-12-31 PROCEDURE — 85610 PROTHROMBIN TIME: CPT | Performed by: INTERNAL MEDICINE

## 2024-12-31 PROCEDURE — 36416 COLLJ CAPILLARY BLOOD SPEC: CPT | Performed by: INTERNAL MEDICINE

## 2025-01-26 NOTE — OUTREACH NOTE
Prep Survey    Flowsheet Row Responses   [facility] patient discharged from? [facility]   Is LACE score < 7 ? No   Emergency Room discharge w/ pulse ox? No   Eligibility [facility]   Date of Admission 06/29/22   Date of Discharge 06/30/22   Discharge Disposition Home or Self Care   Discharge diagnosis Abdominal Aortic Aneurysm-repaired   Does the patient have one of the following disease processes/diagnoses(primary or secondary)? General Surgery   Does the patient have Home health ordered? No   Is there a DME ordered? No   Prep survey completed? Yes          LEONILA GONZALEZ - Registered Nurse         Yes

## 2025-02-03 ENCOUNTER — OFFICE VISIT (OUTPATIENT)
Dept: CARDIOLOGY | Facility: CLINIC | Age: 75
End: 2025-02-03
Payer: MEDICARE

## 2025-02-03 ENCOUNTER — ANTICOAGULATION VISIT (OUTPATIENT)
Dept: CARDIOLOGY | Facility: CLINIC | Age: 75
End: 2025-02-03
Payer: MEDICARE

## 2025-02-03 VITALS
SYSTOLIC BLOOD PRESSURE: 123 MMHG | BODY MASS INDEX: 26.32 KG/M2 | DIASTOLIC BLOOD PRESSURE: 72 MMHG | OXYGEN SATURATION: 92 % | WEIGHT: 188 LBS | HEIGHT: 71 IN | HEART RATE: 88 BPM

## 2025-02-03 VITALS
WEIGHT: 188 LBS | BODY MASS INDEX: 26.23 KG/M2 | HEART RATE: 88 BPM | SYSTOLIC BLOOD PRESSURE: 158 MMHG | DIASTOLIC BLOOD PRESSURE: 68 MMHG

## 2025-02-03 DIAGNOSIS — I48.11 LONGSTANDING PERSISTENT ATRIAL FIBRILLATION: Primary | ICD-10-CM

## 2025-02-03 DIAGNOSIS — I25.10 CORONARY ARTERY DISEASE INVOLVING NATIVE CORONARY ARTERY OF NATIVE HEART WITHOUT ANGINA PECTORIS: ICD-10-CM

## 2025-02-03 DIAGNOSIS — I34.0 NONRHEUMATIC MITRAL VALVE REGURGITATION: ICD-10-CM

## 2025-02-03 DIAGNOSIS — I35.1 NONRHEUMATIC AORTIC VALVE INSUFFICIENCY: ICD-10-CM

## 2025-02-03 DIAGNOSIS — E11.9 TYPE 2 DIABETES MELLITUS WITHOUT COMPLICATION, WITHOUT LONG-TERM CURRENT USE OF INSULIN: ICD-10-CM

## 2025-02-03 DIAGNOSIS — I10 ESSENTIAL HYPERTENSION: ICD-10-CM

## 2025-02-03 DIAGNOSIS — I48.21 PERMANENT ATRIAL FIBRILLATION: ICD-10-CM

## 2025-02-03 DIAGNOSIS — I48.21 PERMANENT ATRIAL FIBRILLATION: Primary | Chronic | ICD-10-CM

## 2025-02-03 DIAGNOSIS — G47.33 OBSTRUCTIVE SLEEP APNEA SYNDROME: ICD-10-CM

## 2025-02-03 DIAGNOSIS — E78.00 PURE HYPERCHOLESTEROLEMIA: ICD-10-CM

## 2025-02-03 LAB — INR PPP: 2.7 (ref 0.9–1.1)

## 2025-02-03 PROCEDURE — 36416 COLLJ CAPILLARY BLOOD SPEC: CPT | Performed by: INTERNAL MEDICINE

## 2025-02-03 PROCEDURE — 85610 PROTHROMBIN TIME: CPT | Performed by: INTERNAL MEDICINE

## 2025-02-03 RX ORDER — WARFARIN SODIUM 7.5 MG/1
TABLET ORAL
Qty: 105 TABLET | Refills: 0 | Status: SHIPPED | OUTPATIENT
Start: 2025-02-03

## 2025-02-03 NOTE — TELEPHONE ENCOUNTER
Rx Refill Note  Requested Prescriptions     Pending Prescriptions Disp Refills    warfarin (COUMADIN) 7.5 MG tablet [Pharmacy Med Name: WARFARIN SODIUM 7.5 MG TABLET] 105 tablet 0     Sig: TAKE 1 TAB, BY MOUTH, DAILY EXCEPT 1 1/2 TABS ON MON AND FRI OR AS DIRECTED.    Last INR 12/31/24  Last office visit with prescribing clinician: 7/30/2024   Last telemedicine visit with prescribing clinician: Visit date not found   Next office visit with prescribing clinician: 2/3/2025                         Would you like a call back once the refill request has been completed: [] Yes [] No    If the office needs to give you a call back, can they leave a voicemail: [] Yes [] No    Karol Ramirez RN  02/03/25, 12:11 EST

## 2025-02-03 NOTE — PROGRESS NOTES
Subjective:     Encounter Date:02/03/2025      Patient ID: Sandro Ramirez is a 74 y.o. male.    Chief Complaint:  History of Present Illness 72-year-old white male with history of coronary disease status post carotid bypass surgery history of atrial fibrillation sleep apnea diabetes hypertension hyperlipidemia and valvular heart disease presents to my office for a follow-up.  Patient is currently stable without any symptoms of chest pain but has some shortness of breath with exertion.  No complaint any PND orthopnea.  No palpitations dizziness syncope or swelling of the feet.  She is taking her medicines regular.  He is not smoking    The following portions of the patient's history were reviewed and updated as appropriate: allergies, current medications, past family history, past medical history, past social history, past surgical history, and problem list.  Past Medical History:   Diagnosis Date    AAA (abdominal aortic aneurysm) 2006    Abnormal ECG     Allergic     Aneurysm     Arthritis     Asthma     Atrial fibrillation     COUMADIN    Benign prostatic hyperplasia     Bladder cancer     CHF (congestive heart failure)     Chronic anticoagulation     COUMADIN STARTED 2006    Clotting disorder     Colon polyp     Congenital heart disease     COPD (chronic obstructive pulmonary disease)     Coronary artery disease     Diabetes mellitus     treat with diet    Ehrlichiosis     Elevated cholesterol     Erectile dysfunction     Facial basal cell cancer     Gout     Hard to intubate     History of pancreatitis 2006    History of pneumonia     History of tick-borne disease 2015    HL (hearing loss)     BILAT HEARING AIDS    Hyperlipidemia     Hypertension     Myocardial infarction     X5    Obesity     Pancreatitis     Pneumonia     Sleep apnea     CURRENTLY NOT USING CPAP D/T RECALL    Transfusion history     STATES HIS SISTER HAD A SEVERE REACTION TO BLOOD TRANSFUSION    Type 2 diabetes mellitus      Past  "Surgical History:   Procedure Laterality Date    ARTERIOGRAM AORTIC Left 06/29/2022    Procedure: AORTIC STENT GRAFT PLACEMENT WITH ILIAC COILING;  Surgeon: Eric Sainz MD;  Location: [facility] OR 18/19;  Service: Vascular;  Laterality: Left;    CARDIAC CATHETERIZATION      CARDIAC SURGERY      CHOLECYSTECTOMY      COLONOSCOPY      COLONOSCOPY N/A 07/27/2023    Procedure: COLONOSCOPY to cecum with cold biopsy and cold snare polypectomies and clips;  Surgeon: Vitor Haley MD;  Location: [facility] ENDOSCOPY;  Service: Gastroenterology;  Laterality: N/A;  Pre - H/O polyps.  Post - diverticulosis, polyps, hemorrhoids    COLONOSCOPY W/ POLYPECTOMY N/A 08/28/2024    Procedure: COLONOSCOPY WITH POLYPECTOMY;  Surgeon: Vitor Haley MD;  Location: [facility] OR;  Service: Gastroenterology;  Laterality: N/A;  diverticulosis, sigmoid polyp x2    CORONARY ARTERY BYPASS GRAFT  2006    X2    CYSTOSCOPY      STATES HAD BLADDER TUMORS REMOVED X2    CYSTOSCOPY WITH CLOT EVACUATION N/A 01/17/2024    Procedure: CYSTOSCOPY WITH CLOT EVACUATION, WITH FULGRATION;  Surgeon: Daniel Hamm MD;  Location: [facility] OR;  Service: Urology;  Laterality: N/A;    PANCREAS SURGERY      PROSTATE SURGERY      X2- STATES PLACED COILS IN TO HELP WITH FLOW AND THEN HAD TO REMOVE A PIECE THAT BROKE OFF AND WAS IN BLADDER    UPPER GASTROINTESTINAL ENDOSCOPY       /72 Comment: RECHECK  Pulse 88   Ht 180.3 cm (71\")   Wt 85.3 kg (188 lb)   SpO2 92%   BMI 26.22 kg/m²   Family History   Problem Relation Age of Onset    Hypertension Mother     Colon cancer Mother     Cancer Mother     Diabetes Mother     Arthritis Mother     Cancer Father     Heart attack Father     Hearing loss Father     Heart disease Father     Hypertension Sister     Hypertension Brother     Colon cancer Brother     Cancer Brother     Heart disease Paternal Grandfather     Heart attack Paternal Grandfather     Malig Hyperthermia Neg Hx     Colon polyps " Neg Hx     Crohn's disease Neg Hx     Irritable bowel syndrome Neg Hx     Ulcerative colitis Neg Hx        Current Outpatient Medications:     albuterol sulfate  (90 Base) MCG/ACT inhaler, Inhale 2 puffs Every 4 (Four) Hours As Needed for Wheezing., Disp: 18 g, Rfl: 2    allopurinol (ZYLOPRIM) 300 MG tablet, Take 1 tablet by mouth Daily., Disp: , Rfl:     aspirin 81 MG tablet, Take 1 tablet by mouth Daily., Disp: , Rfl:     atenolol (TENORMIN) 50 MG tablet, TAKE 1 TABLET BY MOUTH EVERY DAY AT NIGHT, Disp: 90 tablet, Rfl: 3    atorvastatin (LIPITOR) 20 MG tablet, TAKE 1 TABLET BY MOUTH EVERY DAY, Disp: 90 tablet, Rfl: 1    cetirizine (zyrTEC) 10 MG tablet, Take 1 tablet by mouth Daily., Disp: , Rfl:     FLUTICASONE FUROATE IN, Inhale 2 Applications Daily., Disp: , Rfl:     losartan (COZAAR) 100 MG tablet, TAKE 1 TABLET BY MOUTH EVERY DAY, Disp: 90 tablet, Rfl: 3    methylPREDNISolone (MEDROL) 4 MG dose pack, Take as directed on package instructions., Disp: 21 tablet, Rfl: 0    warfarin (COUMADIN) 7.5 MG tablet, See Admin Instructions. Take 1 tablet by mouth daily on Saturday, Sunday, Tuesday, Wednesday, and Thursday, Disp: , Rfl:     warfarin (COUMADIN) 7.5 MG tablet, See Admin Instructions. Take 1.5 tablets by mouth on Monday and Friday, Disp: , Rfl:     warfarin (COUMADIN) 7.5 MG tablet, TAKE 1 TAB, BY MOUTH, DAILY EXCEPT 1 1/2 TABS ON MON AND FRI OR AS DIRECTED., Disp: 105 tablet, Rfl: 0  Allergies   Allergen Reactions    Bee Venom Anaphylaxis     HIVES-SWOLLEN THROAT    Meperidine Hives    Midazolam Hives    Propofol Other (See Comments)     UNCLEAR-SVT, HYPOTENSION-STATES SEVERE ALMOST CODED    Sulfa Antibiotics Hives    Hydrocodone Nausea And Vomiting    Simvastatin Myalgia     Social History     Socioeconomic History    Marital status:      Spouse name: Nichelle    Number of children: 3    Years of education: 12   Tobacco Use    Smoking status: Former     Current packs/day: 0.00     Average  packs/day: 1 pack/day for 30.0 years (30.0 ttl pk-yrs)     Types: Cigarettes     Start date: 1976     Quit date: 2006     Years since quittin.6     Passive exposure: Past    Smokeless tobacco: Never   Vaping Use    Vaping status: Never Used   Substance and Sexual Activity    Alcohol use: Not Currently     Comment: one or two beers 6 times a year    Drug use: Never    Sexual activity: Not Currently     Partners: Female     Birth control/protection: None     Review of Systems   Constitutional: Negative for malaise/fatigue.   Cardiovascular:  Negative for chest pain, dyspnea on exertion, leg swelling and palpitations.   Respiratory:  Negative for cough and shortness of breath.    Gastrointestinal:  Negative for abdominal pain, nausea and vomiting.   Neurological:  Negative for dizziness, focal weakness, headaches, light-headedness and numbness.   All other systems reviewed and are negative.             Objective:     Constitutional:       Appearance: Well-developed.   Eyes:      General: No scleral icterus.     Conjunctiva/sclera: Conjunctivae normal.   HENT:      Head: Normocephalic and atraumatic.   Neck:      Vascular: No carotid bruit or JVD.   Pulmonary:      Effort: Pulmonary effort is normal.      Breath sounds: Normal breath sounds. No wheezing. No rales.   Cardiovascular:      Normal rate. Regular rhythm.      Murmurs: There is a systolic murmur.   Pulses:     Intact distal pulses.   Abdominal:      General: Bowel sounds are normal.      Palpations: Abdomen is soft.   Musculoskeletal:      Cervical back: Normal range of motion and neck supple. Skin:     General: Skin is warm and dry.      Findings: No rash.   Neurological:      Mental Status: Alert.       Procedures    Lab Review:         MDM    #1 coronary disease  Patient had coronary bypass 3 x 2 vessels with a LIMA to LAD and SVG to the marginal branch and is currently stable without any angina  2.  Valvular heart disease  Patient has  moderate mitral gestation moderate aortic insufficiency and is currently stable with normal function on medical therapy  3.  Hypertension  Patient blood pressure currently stable on losartan and nadolol  4.  Hyperlipidemia  Patient is currently on atorvastatin the lipid levels are well within normal limits  5.  Atrial fibrillation  Patient has persistent afibrillation is currently on warfarin for anticoagulation keep his INR around 2-2.5    Patient's previous medical records, labs, and EKG were reviewed and discussed with the patient at today's visit.

## 2025-02-04 ENCOUNTER — OFFICE VISIT (OUTPATIENT)
Dept: FAMILY MEDICINE CLINIC | Facility: CLINIC | Age: 75
End: 2025-02-04
Payer: MEDICARE

## 2025-02-04 ENCOUNTER — HOSPITAL ENCOUNTER (OUTPATIENT)
Dept: GENERAL RADIOLOGY | Facility: HOSPITAL | Age: 75
Discharge: HOME | End: 2025-02-04
Admitting: REGISTERED NURSE
Payer: MEDICARE

## 2025-02-04 VITALS
WEIGHT: 189 LBS | HEIGHT: 71 IN | RESPIRATION RATE: 17 BRPM | DIASTOLIC BLOOD PRESSURE: 80 MMHG | SYSTOLIC BLOOD PRESSURE: 142 MMHG | OXYGEN SATURATION: 95 % | TEMPERATURE: 97.5 F | HEART RATE: 74 BPM | BODY MASS INDEX: 26.46 KG/M2

## 2025-02-04 DIAGNOSIS — R06.2 WHEEZING: ICD-10-CM

## 2025-02-04 DIAGNOSIS — J01.90 ACUTE BACTERIAL SINUSITIS: Primary | ICD-10-CM

## 2025-02-04 DIAGNOSIS — B96.89 ACUTE BACTERIAL SINUSITIS: Primary | ICD-10-CM

## 2025-02-04 PROCEDURE — 1160F RVW MEDS BY RX/DR IN RCRD: CPT | Performed by: REGISTERED NURSE

## 2025-02-04 PROCEDURE — 99214 OFFICE O/P EST MOD 30 MIN: CPT | Performed by: REGISTERED NURSE

## 2025-02-04 PROCEDURE — 1126F AMNT PAIN NOTED NONE PRSNT: CPT | Performed by: REGISTERED NURSE

## 2025-02-04 PROCEDURE — 1159F MED LIST DOCD IN RCRD: CPT | Performed by: REGISTERED NURSE

## 2025-02-04 PROCEDURE — 71046 X-RAY EXAM CHEST 2 VIEWS: CPT

## 2025-02-04 PROCEDURE — 3077F SYST BP >= 140 MM HG: CPT | Performed by: REGISTERED NURSE

## 2025-02-04 PROCEDURE — 3079F DIAST BP 80-89 MM HG: CPT | Performed by: REGISTERED NURSE

## 2025-02-04 RX ORDER — FLUTICASONE PROPIONATE 50 MCG
2 SPRAY, SUSPENSION (ML) NASAL DAILY
Qty: 11.1 G | Refills: 2 | Status: SHIPPED | OUTPATIENT
Start: 2025-02-04

## 2025-02-04 RX ORDER — ALBUTEROL SULFATE 90 UG/1
2 INHALANT RESPIRATORY (INHALATION) EVERY 4 HOURS PRN
Qty: 18 G | Refills: 2 | Status: SHIPPED | OUTPATIENT
Start: 2025-02-04

## 2025-02-04 RX ORDER — GUAIFENESIN, PSEUDOEPHEDRINE HYDROCHLORIDE 600; 60 MG/1; MG/1
1 TABLET, EXTENDED RELEASE ORAL EVERY 12 HOURS
COMMUNITY
End: 2025-02-10

## 2025-02-04 RX ORDER — DOXYCYCLINE 100 MG/1
100 CAPSULE ORAL 2 TIMES DAILY
Qty: 20 CAPSULE | Refills: 0 | Status: SHIPPED | OUTPATIENT
Start: 2025-02-04

## 2025-02-04 NOTE — PROGRESS NOTES
"Subjective        Sandro Ramirez is a 74 y.o. male who presents to [facility].     Chief Complaint   Patient presents with    Pneumonia     Was told by Dr. Galloway he could hear something        Pneumonia  He complains of cough and shortness of breath. There is no wheezing. Associated symptoms include a fever, headaches and postnasal drip. Pertinent negatives include no chest pain, ear pain, sore throat or trouble swallowing.       Pt says that Dr. Galloway heard something in his left lung.    Sinus congestion - \"Since I was in the hospital\".  Wheezing -     The following portions of the patient's history were reviewed and updated as appropriate: allergies, current medications, past family history, past medical history, past social history, past surgical history and problem list.    Allergies   Allergen Reactions    Bee Venom Anaphylaxis     HIVES-SWOLLEN THROAT    Meperidine Hives    Midazolam Hives    Propofol Other (See Comments)     UNCLEAR-SVT, HYPOTENSION-STATES SEVERE ALMOST CODED    Sulfa Antibiotics Hives    Hydrocodone Nausea And Vomiting    Simvastatin Myalgia          Current Outpatient Medications:     albuterol sulfate  (90 Base) MCG/ACT inhaler, Inhale 2 puffs Every 4 (Four) Hours As Needed for Wheezing., Disp: 18 g, Rfl: 2    allopurinol (ZYLOPRIM) 300 MG tablet, Take 1 tablet by mouth Daily., Disp: , Rfl:     aspirin 81 MG tablet, Take 1 tablet by mouth Daily., Disp: , Rfl:     atenolol (TENORMIN) 50 MG tablet, TAKE 1 TABLET BY MOUTH EVERY DAY AT NIGHT, Disp: 90 tablet, Rfl: 3    atorvastatin (LIPITOR) 20 MG tablet, TAKE 1 TABLET BY MOUTH EVERY DAY, Disp: 90 tablet, Rfl: 1    cetirizine (zyrTEC) 10 MG tablet, Take 1 tablet by mouth Daily., Disp: , Rfl:     FLUTICASONE FUROATE IN, Inhale 2 Applications Daily., Disp: , Rfl:     losartan (COZAAR) 100 MG tablet, TAKE 1 TABLET BY MOUTH EVERY DAY, Disp: 90 tablet, Rfl: 3    pseudoephedrine-guaifenesin (MUCINEX D) " " MG per 12 hr tablet, Take 1 tablet by mouth Every 12 (Twelve) Hours., Disp: , Rfl:     warfarin (COUMADIN) 7.5 MG tablet, See Admin Instructions. Take 1 tablet by mouth daily on Saturday, Sunday, Tuesday, Wednesday, and Thursday, Disp: , Rfl:     warfarin (COUMADIN) 7.5 MG tablet, See Admin Instructions. Take 1.5 tablets by mouth on Monday and Friday, Disp: , Rfl:     warfarin (COUMADIN) 7.5 MG tablet, TAKE 1 TAB, BY MOUTH, DAILY EXCEPT 1 1/2 TABS ON MON AND FRI OR AS DIRECTED., Disp: 105 tablet, Rfl: 0    doxycycline (VIBRAMYCIN) 100 MG capsule, Take 1 capsule by mouth 2 (Two) Times a Day., Disp: 20 capsule, Rfl: 0    fluticasone (FLONASE) 50 MCG/ACT nasal spray, Administer 2 sprays into the nostril(s) as directed by provider Daily., Disp: 11.1 g, Rfl: 2    Review of Systems   Constitutional:  Positive for chills and fever.   HENT:  Positive for congestion, postnasal drip and sinus pressure. Negative for ear pain, sore throat and trouble swallowing.    Respiratory:  Positive for cough and shortness of breath. Negative for chest tightness and wheezing.    Cardiovascular:  Negative for chest pain.   Gastrointestinal:  Negative for nausea and vomiting.   Genitourinary:  Negative for difficulty urinating.   Neurological:  Positive for headaches. Negative for light-headedness.        Objective     /80 (BP Location: Left arm, Patient Position: Sitting, Cuff Size: Large Adult)   Pulse 74   Temp 97.5 °F (36.4 °C) (Oral)   Resp 17   Ht 180.3 cm (71\")   Wt 85.7 kg (189 lb)   SpO2 95%   BMI 26.36 kg/m²         Physical Exam  Vitals reviewed.   Constitutional:       Appearance: Normal appearance. He is normal weight. He is ill-appearing.   HENT:      Head: Normocephalic.      Ears:      Comments: Mild bilateral CATHERINE     Nose: Congestion and rhinorrhea present.      Right Turbinates: Swollen.      Left Turbinates: Swollen.      Right Sinus: Frontal sinus tenderness present. No maxillary sinus tenderness.      " Left Sinus: Frontal sinus tenderness present. No maxillary sinus tenderness.      Comments: Green mucus in both nostrils.     Mouth/Throat:      Mouth: Mucous membranes are moist.      Pharynx: Uvula midline. Posterior oropharyngeal erythema and postnasal drip present. No oropharyngeal exudate.      Comments: Cobblestoning noted.  Cardiovascular:      Rate and Rhythm: Normal rate and regular rhythm.      Pulses: Normal pulses.      Heart sounds: Normal heart sounds.   Pulmonary:      Effort: No respiratory distress.      Breath sounds: Wheezing present. No rhonchi or rales.      Comments: SOB. Wheezing in left lung base which clears with cough.  Musculoskeletal:      Cervical back: Normal range of motion and neck supple. No rigidity or tenderness.   Lymphadenopathy:      Cervical: No cervical adenopathy.   Skin:     General: Skin is warm and dry.   Neurological:      General: No focal deficit present.      Mental Status: He is alert and oriented to person, place, and time.      Sensory: No sensory deficit.      Motor: No weakness.      Gait: Gait normal.   Psychiatric:         Mood and Affect: Mood normal.         Behavior: Behavior normal.         Thought Content: Thought content normal.         Judgment: Judgment normal.                 Result Review    The following data was reviewed by: ALICJA Mack on 02/04/2025:  Common labs          12/3/2024    05:58 12/3/2024    22:43 12/4/2024    14:14 12/5/2024    05:16   Common Labs   Glucose 172  135  123  95    BUN 16  20  20  18    Creatinine 0.82  0.68  0.69  0.80    Sodium 136  138  137  140    Potassium 3.9  4.5  4.4  4.4    Chloride 102  104  103  105    Calcium 9.0  9.0  9.1  9.2    Albumin   3.7  3.7    Total Bilirubin   0.6  0.4    Alkaline Phosphatase   113  127    AST (SGOT)   27  27    ALT (SGPT)   24  28    WBC 16.13  14.78  13.96  11.45    Hemoglobin 13.5  13.7  13.5  14.1    Hematocrit 40.5  42.8  42.6  43.5    Platelets 178  195  197  210                  Assessment & Plan    Diagnoses and all orders for this visit:    1. Acute bacterial sinusitis (Primary)  -     doxycycline (VIBRAMYCIN) 100 MG capsule; Take 1 capsule by mouth 2 (Two) Times a Day.  Dispense: 20 capsule; Refill: 0  -     fluticasone (FLONASE) 50 MCG/ACT nasal spray; Administer 2 sprays into the nostril(s) as directed by provider Daily.  Dispense: 11.1 g; Refill: 2    2. Wheezing  -     XR Chest PA & Lateral; Future  -     albuterol sulfate  (90 Base) MCG/ACT inhaler; Inhale 2 puffs Every 4 (Four) Hours As Needed for Wheezing.  Dispense: 18 g; Refill: 2       There are no Patient Instructions on file for this visit.      Follow Up   Return for Next scheduled follow up.    Patient was given instructions and counseling regarding his condition or for health maintenance advice. Please see specific information pulled into the AVS if appropriate.     Sasha Lema, APRN     02/04/25

## 2025-02-10 ENCOUNTER — OFFICE VISIT (OUTPATIENT)
Dept: FAMILY MEDICINE CLINIC | Facility: CLINIC | Age: 75
End: 2025-02-10
Payer: MEDICARE

## 2025-02-10 VITALS
RESPIRATION RATE: 16 BRPM | BODY MASS INDEX: 27.5 KG/M2 | WEIGHT: 196.4 LBS | DIASTOLIC BLOOD PRESSURE: 70 MMHG | SYSTOLIC BLOOD PRESSURE: 146 MMHG | TEMPERATURE: 98 F | HEART RATE: 56 BPM | HEIGHT: 71 IN | OXYGEN SATURATION: 95 %

## 2025-02-10 DIAGNOSIS — Z85.51 HISTORY OF BLADDER CANCER: ICD-10-CM

## 2025-02-10 DIAGNOSIS — J44.9 CHRONIC OBSTRUCTIVE PULMONARY DISEASE, UNSPECIFIED COPD TYPE: ICD-10-CM

## 2025-02-10 DIAGNOSIS — N40.1 BENIGN PROSTATIC HYPERPLASIA WITH LOWER URINARY TRACT SYMPTOMS, SYMPTOM DETAILS UNSPECIFIED: ICD-10-CM

## 2025-02-10 DIAGNOSIS — E78.2 MIXED HYPERLIPIDEMIA: ICD-10-CM

## 2025-02-10 DIAGNOSIS — Z79.01 WARFARIN ANTICOAGULATION: ICD-10-CM

## 2025-02-10 DIAGNOSIS — I48.21 PERMANENT ATRIAL FIBRILLATION: ICD-10-CM

## 2025-02-10 DIAGNOSIS — I38 VALVULAR HEART DISEASE: ICD-10-CM

## 2025-02-10 DIAGNOSIS — E11.65 TYPE 2 DIABETES MELLITUS WITH HYPERGLYCEMIA, WITHOUT LONG-TERM CURRENT USE OF INSULIN: ICD-10-CM

## 2025-02-10 DIAGNOSIS — J01.10 ACUTE FRONTAL SINUSITIS, RECURRENCE NOT SPECIFIED: ICD-10-CM

## 2025-02-10 DIAGNOSIS — I25.119 ATHEROSCLEROSIS OF NATIVE CORONARY ARTERY OF NATIVE HEART WITH ANGINA PECTORIS: Chronic | ICD-10-CM

## 2025-02-10 DIAGNOSIS — G47.33 OBSTRUCTIVE SLEEP APNEA SYNDROME: ICD-10-CM

## 2025-02-10 DIAGNOSIS — E79.0 HYPERURICEMIA: Chronic | ICD-10-CM

## 2025-02-10 DIAGNOSIS — I10 PRIMARY HYPERTENSION: Primary | ICD-10-CM

## 2025-02-10 PROCEDURE — 1160F RVW MEDS BY RX/DR IN RCRD: CPT | Performed by: NURSE PRACTITIONER

## 2025-02-10 PROCEDURE — 3078F DIAST BP <80 MM HG: CPT | Performed by: NURSE PRACTITIONER

## 2025-02-10 PROCEDURE — 99214 OFFICE O/P EST MOD 30 MIN: CPT | Performed by: NURSE PRACTITIONER

## 2025-02-10 PROCEDURE — 1159F MED LIST DOCD IN RCRD: CPT | Performed by: NURSE PRACTITIONER

## 2025-02-10 PROCEDURE — 3077F SYST BP >= 140 MM HG: CPT | Performed by: NURSE PRACTITIONER

## 2025-02-10 PROCEDURE — 1126F AMNT PAIN NOTED NONE PRSNT: CPT | Performed by: NURSE PRACTITIONER

## 2025-02-10 RX ORDER — AMLODIPINE BESYLATE 5 MG/1
5 TABLET ORAL DAILY
Qty: 90 TABLET | Refills: 0 | Status: SHIPPED | OUTPATIENT
Start: 2025-02-10

## 2025-02-10 RX ORDER — ATORVASTATIN CALCIUM 20 MG/1
TABLET, FILM COATED ORAL
Qty: 90 TABLET | Refills: 1 | Status: SHIPPED | OUTPATIENT
Start: 2025-02-10

## 2025-02-10 NOTE — PROGRESS NOTES
Subjective        Sandro Ramirez is a 74 y.o. male who presents to [facility].     Chief Complaint   Patient presents with    Hypertension       Hypertension  This is a chronic problem. The current episode started more than 1 year ago. The problem has been improved since onset. Associated symptoms include malaise/fatigue, orthopnea and shortness of breath. Pertinent negatives include no anxiety, blurred vision, chest pain, headaches, palpitations or peripheral edema. There are no compliance problems.      Patient presents for follow up of hypertension.    Hypertension/CAD s/p cabg/valvular heart disease/atrial fibrillation on warfarin - stable - followed by cardiology Dr. Galloway.  Denies chest pain, dizziness, palpitations, ankle swelling.  He was taking mucinex dm.  Blood pressure at home has been 150's/80 at home recently.  Taking asa 81mg daily, losartan 100mg daily, warfarin per Dr. Galloway.  He was taking amlodipine 10mg in past, states this was stopped when he was discharged from hospital 12/2024.  I reviewed hospital discharge summary but I am not clear about why this was stopped.  Frontal sinusitis - was seen 2/4/25 by Sasha HELM, diagnosed with bacterial sinusitis.  Is taking 10 day course of doxycycline 100mg po bid and flonase 2 sprays daily, feels much better.  He is using saline nasal rinses.  No fever since starting abx.  COPD - has appt to see pulmonology Dr. Phillips in 2 days.  He was hospitalized with PNA 12/2024.   CXR 2/4/25 showed increased patchy left lower lobe opacities concerning for pna and/or atelectasis.  He was started on doxycycline that day, feels breathing has returned to baseline.  He has chronic cough, is productive at times of clear or yellow sputum.  Rarely he c/o wheezing.  Uses albuterol hfa as needed, has another inhaler Dr. Phillips gave him but can't recall the name. PFT 2/2024 showed obstructive respiratory defect.  Gout - stable - followed by  Dr. Lan, denies flares in past 2 years.  Taking allopurinol 100mg daily.  Hyperlipidemia - stable - taking lipitor 20mg daily, no myalgia or jaundice. Labs 10/24 with hdl low otw lipid panel wnl.  Sleep apnea - stable - wears cpap nightly, followed by Dr. Seipel, feels sleep is restorative.  Diabetes mellitus - not on medication - states controls with diet.  Fasting glucose at home .  Followed by endocrinology Dr. Van.  Previously couldn't afford jardiance.  HGB A1c was 7.09 in 10/2024.  BPH s/p TURP/hx bladder cancer - followed by urology Dr. Daniel Hamm.  Denies hematuria or urinary problems.        The following portions of the patient's history were reviewed and updated as appropriate: allergies, current medications, past family history, past medical history, past social history, past surgical history and problem list.    Allergies   Allergen Reactions    Bee Venom Anaphylaxis     HIVES-SWOLLEN THROAT    Meperidine Hives    Midazolam Hives    Propofol Other (See Comments)     UNCLEAR-SVT, HYPOTENSION-STATES SEVERE ALMOST CODED    Sulfa Antibiotics Hives    Hydrocodone Nausea And Vomiting    Simvastatin Myalgia          Current Outpatient Medications:     albuterol sulfate  (90 Base) MCG/ACT inhaler, Inhale 2 puffs Every 4 (Four) Hours As Needed for Wheezing., Disp: 18 g, Rfl: 2    allopurinol (ZYLOPRIM) 300 MG tablet, Take 1 tablet by mouth Daily., Disp: , Rfl:     aspirin 81 MG tablet, Take 1 tablet by mouth Daily., Disp: , Rfl:     atenolol (TENORMIN) 50 MG tablet, TAKE 1 TABLET BY MOUTH EVERY DAY AT NIGHT, Disp: 90 tablet, Rfl: 3    atorvastatin (LIPITOR) 20 MG tablet, TAKE 1 TABLET BY MOUTH EVERY DAY, Disp: 90 tablet, Rfl: 1    cetirizine (zyrTEC) 10 MG tablet, Take 1 tablet by mouth Daily., Disp: , Rfl:     doxycycline (VIBRAMYCIN) 100 MG capsule, Take 1 capsule by mouth 2 (Two) Times a Day., Disp: 20 capsule, Rfl: 0    fluticasone (FLONASE) 50 MCG/ACT nasal spray, Administer 2 sprays  "into the nostril(s) as directed by provider Daily., Disp: 11.1 g, Rfl: 2    FLUTICASONE FUROATE IN, Inhale 2 Applications Daily., Disp: , Rfl:     losartan (COZAAR) 100 MG tablet, TAKE 1 TABLET BY MOUTH EVERY DAY, Disp: 90 tablet, Rfl: 3    warfarin (COUMADIN) 7.5 MG tablet, See Admin Instructions. Take 1 tablet by mouth daily on Saturday, Sunday, Tuesday, Wednesday, and Thursday, Disp: , Rfl:     warfarin (COUMADIN) 7.5 MG tablet, See Admin Instructions. Take 1.5 tablets by mouth on Monday and Friday, Disp: , Rfl:     warfarin (COUMADIN) 7.5 MG tablet, TAKE 1 TAB, BY MOUTH, DAILY EXCEPT 1 1/2 TABS ON MON AND FRI OR AS DIRECTED., Disp: 105 tablet, Rfl: 0    amLODIPine (NORVASC) 5 MG tablet, Take 1 tablet by mouth Daily., Disp: 90 tablet, Rfl: 0    Review of Systems   Constitutional:  Positive for malaise/fatigue.   Eyes:  Negative for blurred vision.   Respiratory:  Positive for shortness of breath.    Cardiovascular:  Positive for orthopnea. Negative for chest pain and palpitations.   Neurological:  Negative for headaches.        Objective     /70 (BP Location: Left arm, Patient Position: Sitting, Cuff Size: Large Adult)   Pulse 56   Temp 98 °F (36.7 °C) (Oral)   Resp 16   Ht 180.3 cm (71\")   Wt 89.1 kg (196 lb 6.4 oz)   SpO2 95%   BMI 27.39 kg/m²         Physical Exam  Vitals and nursing note reviewed.   Constitutional:       Appearance: Normal appearance.   HENT:      Head: Normocephalic and atraumatic.      Nose: Nose normal.      Mouth/Throat:      Mouth: Mucous membranes are moist.   Eyes:      General: No scleral icterus.     Conjunctiva/sclera: Conjunctivae normal.   Cardiovascular:      Rate and Rhythm: Bradycardia present. Rhythm irregularly irregular.      Heart sounds: Murmur (systolic) heard.   Pulmonary:      Effort: Pulmonary effort is normal. No respiratory distress.      Breath sounds: No wheezing.   Abdominal:      General: Bowel sounds are normal. There is no distension.      " Palpations: Abdomen is soft.      Tenderness: There is no abdominal tenderness. There is no guarding.   Musculoskeletal:      Cervical back: Normal range of motion and neck supple. No rigidity.      Right lower leg: No edema.      Left lower leg: No edema.   Skin:     General: Skin is warm and dry.      Capillary Refill: Capillary refill takes less than 2 seconds.      Coloration: Skin is not jaundiced.   Neurological:      Mental Status: He is alert and oriented to person, place, and time.      Gait: Gait normal.   Psychiatric:         Mood and Affect: Mood normal.         Behavior: Behavior normal.         Thought Content: Thought content normal.         Judgment: Judgment normal.         PHQ-2 Depression Screening  Little interest or pleasure in doing things? Not at all   Feeling down, depressed, or hopeless? Not at all   PHQ-2 Total Score 0         Result Review    The following data was reviewed by: ALICJA Connor on 02/10/2025:    Data reviewed : Radiologic studies      XR CHEST PA AND LATERAL     Date of Exam: 2/4/2025 4:28 PM EST     Indication: cough, wheezing     Comparison: CT chest 12/3/2024, AP chest x-ray 12/2/2024     Findings:  Patchy opacities in the left lower lobe have increased compared to 12/2/2024 chest x-ray. Right lung appears clear. No pleural effusion is seen. Cardiomediastinal contours are stable.     IMPRESSION:  Impression:  Increased patchy left lower lobe opacities, concerning for pneumonia and/or atelectasis.           Electronically Signed: Rere De Leon    2/5/2025 1:34 PM EST         Assessment & Plan    Diagnoses and all orders for this visit:    1. Primary hypertension (Primary)  -     amLODIPine (NORVASC) 5 MG tablet; Take 1 tablet by mouth Daily.  Dispense: 90 tablet; Refill: 0    2. Valvular heart disease    3. Permanent atrial fibrillation    4. Warfarin anticoagulation    5. Atherosclerosis of native coronary artery of native heart with angina pectoris    6. Acute  frontal sinusitis, recurrence not specified    7. Chronic obstructive pulmonary disease, unspecified COPD type    8. Hyperuricemia    9. Mixed hyperlipidemia    10. Obstructive sleep apnea syndrome    11. Type 2 diabetes mellitus with hyperglycemia, without long-term current use of insulin    12. Benign prostatic hyperplasia with lower urinary tract symptoms, symptom details unspecified    13. History of bladder cancer       Patient Instructions   Continue current medications and treatment.   Follow up with Dr. Phillips this week.   Restart amlodipine 5mg daily.  Rx sent.  I discussed this with Dr. Galloway who is in agreement.   Please take blood pressure daily, keep record, bring with you to follow up.   Return to clinic if blood pressure consistently >140 systolic or >90 diastolic.  Follow up with specialists per their recommendations.  Record of diabetic eye exam has been requested.         Follow Up   Return in about 2 weeks (around 2/24/2025) for Hypertension.    Patient was given instructions and counseling regarding his condition or for health maintenance advice. Please see specific information pulled into the AVS if appropriate.     Colette Dominguez, APRN     02/11/25

## 2025-02-10 NOTE — TELEPHONE ENCOUNTER
Rx Refill Note  Requested Prescriptions     Pending Prescriptions Disp Refills    atorvastatin (LIPITOR) 20 MG tablet [Pharmacy Med Name: ATORVASTATIN 20 MG TABLET] 90 tablet 1     Sig: TAKE 1 TABLET BY MOUTH EVERY DAY      Last office visit with prescribing clinician: 2/3/2025   Last telemedicine visit with prescribing clinician: Visit date not found   Next office visit with prescribing clinician: 8/4/2025                         Would you like a call back once the refill request has been completed: [] Yes [] No    If the office needs to give you a call back, can they leave a voicemail: [] Yes [] No    Kaleb Tamez MA  02/10/25, 07:33 EST

## 2025-02-10 NOTE — PATIENT INSTRUCTIONS
Continue current medications and treatment.   Follow up with Dr. Phillips this week.   Restart amlodipine 5mg daily.  Rx sent.  I discussed this with Dr. Galloway who is in agreement.   Please take blood pressure daily, keep record, bring with you to follow up.   Return to clinic if blood pressure consistently >140 systolic or >90 diastolic.  Follow up with specialists per their recommendations.  Record of diabetic eye exam has been requested.

## 2025-02-24 ENCOUNTER — OFFICE VISIT (OUTPATIENT)
Age: 75
End: 2025-02-24
Payer: MEDICARE

## 2025-02-24 ENCOUNTER — HOSPITAL ENCOUNTER (OUTPATIENT)
Dept: CARDIOLOGY | Facility: HOSPITAL | Age: 75
Discharge: HOME | End: 2025-02-24
Admitting: SURGERY
Payer: MEDICARE

## 2025-02-24 VITALS
HEIGHT: 71 IN | BODY MASS INDEX: 26.74 KG/M2 | WEIGHT: 191 LBS | DIASTOLIC BLOOD PRESSURE: 63 MMHG | SYSTOLIC BLOOD PRESSURE: 139 MMHG

## 2025-02-24 DIAGNOSIS — I72.3 ILIAC ANEURYSM: ICD-10-CM

## 2025-02-24 DIAGNOSIS — Z98.890 STATUS POST ENDOVASCULAR ANEURYSM REPAIR (EVAR): ICD-10-CM

## 2025-02-24 DIAGNOSIS — I71.43 INFRARENAL ABDOMINAL AORTIC ANEURYSM (AAA) WITHOUT RUPTURE: ICD-10-CM

## 2025-02-24 DIAGNOSIS — Z86.79 STATUS POST ENDOVASCULAR ANEURYSM REPAIR (EVAR): ICD-10-CM

## 2025-02-24 DIAGNOSIS — I71.43 INFRARENAL ABDOMINAL AORTIC ANEURYSM (AAA) WITHOUT RUPTURE: Primary | ICD-10-CM

## 2025-02-24 LAB
ABDOMINAL AORTA AORTIC ANASTOMOSIS PSV: 55 CM/S
ABDOMINAL AORTA LEFT DIST ANASTOMOSIS PSV: 116 CM/S
ABDOMINAL AORTA LEFT LIMB GRAFT PSV: 80 CM/S
ABDOMINAL AORTA RIGHT DIST ANASTOMOSIS PSV: 119 CM/S
ABDOMINAL AORTA RIGHT LIMB GRAFT PSV: 74 CM/S
ABDOMINAL LT EXT ILIAC VEL: 140 CM/S
ABDOMINAL MID AORTA AP: 5 CM
ABDOMINAL MID AORTA TRANS: 4.8 CM
ABDOMINAL PROX AORTA AP: 3.8 CM
ABDOMINAL PROX AORTA TRANS: 3.3 CM
ABDOMINAL PROX AORTA VEL: 63 CM/S
ABDOMINAL RT EXT ILIAC VEL: 136 CM/S
BH CV VAS ABDOMINAL AO MID GRAFT BODY PSV: 56 CM/S
BH CV VAS ABDOMINAL AORTA DISTAL LEFT LIMB GRAFT PSV: 86 CM/S
BH CV VAS ABDOMINAL AORTA DISTAL RIGHT LIMB GRAFT PSV: 156 CM/S
BH CV VAS ABDOMINAL AORTA DISTAL RIGHT LIMB GRAFT RATIO: 2.11

## 2025-02-24 PROCEDURE — 1160F RVW MEDS BY RX/DR IN RCRD: CPT | Performed by: SURGERY

## 2025-02-24 PROCEDURE — 3075F SYST BP GE 130 - 139MM HG: CPT | Performed by: SURGERY

## 2025-02-24 PROCEDURE — 93978 VASCULAR STUDY: CPT | Performed by: SURGERY

## 2025-02-24 PROCEDURE — 1159F MED LIST DOCD IN RCRD: CPT | Performed by: SURGERY

## 2025-02-24 PROCEDURE — 93978 VASCULAR STUDY: CPT

## 2025-02-24 PROCEDURE — 3078F DIAST BP <80 MM HG: CPT | Performed by: SURGERY

## 2025-02-24 PROCEDURE — 99214 OFFICE O/P EST MOD 30 MIN: CPT | Performed by: SURGERY

## 2025-02-24 NOTE — PROGRESS NOTES
"Chief Complaint  Aortic Aneurysm    Subjective        Sandro Ramirez presents to [facility] VASCULAR SURGERY  Aortic Aneurysm      Patient overall doing well.  Denies abdominal pain or flank pain.    Objective   Vital Signs:  /63 (BP Location: Left arm)   Ht 180.3 cm (70.98\")   Wt 86.6 kg (191 lb)   BMI 26.65 kg/m²   Estimated body mass index is 26.65 kg/m² as calculated from the following:    Height as of this encounter: 180.3 cm (70.98\").    Weight as of this encounter: 86.6 kg (191 lb).           Sandro Ramirez  reports that he quit smoking about 18 years ago. His smoking use included cigarettes. He started smoking about 48 years ago. He has a 30 pack-year smoking history. He has been exposed to tobacco smoke. He has never used smokeless tobacco.      Physical Exam  Constitutional:       Appearance: He is well-developed.   Pulmonary:      Effort: Pulmonary effort is normal. No respiratory distress.   Abdominal:      General: There is no distension.      Palpations: Abdomen is soft.   Neurological:      Mental Status: He is alert and oriented to person, place, and time.     Prominent popliteal pulses.    Result Review :    The following data was reviewed by: Eric Sainz MD on 02/24/25  CBC          12/3/2024    05:58 12/3/2024    22:43 12/4/2024    14:14 12/5/2024    05:16   CBC   WBC 16.13  14.78  13.96  11.45    RBC 4.48  4.71  4.70  4.74    Hemoglobin 13.5  13.7  13.5  14.1    Hematocrit 40.5  42.8  42.6  43.5    MCV 90.4  90.9  90.6  91.8    MCH 30.1  29.1  28.7  29.7    MCHC 33.3  32.0  31.7  32.4    RDW 12.9  12.7  13.0  12.6    Platelets 178  195  197  210       BMP          12/3/2024    05:58 12/3/2024    22:43 12/4/2024    14:14 12/5/2024    05:16   BMP   BUN 16  20  20  18    Creatinine 0.82  0.68  0.69  0.80    Sodium 136  138  137  140    Potassium 3.9  4.5  4.4  4.4    Chloride 102  104  103  105    CO2 26.5  26.1  25.3  27.3    Calcium 9.0  9.0  9.1  9.2     "   A1C Last 3 Results          8/14/2024    08:47 10/22/2024    09:00   HGBA1C Last 3 Results   Hemoglobin A1C 7.60  7.09        Data reviewed : Radiologic studies as below and Cardiology studies as below  Vascular Surgical History:  6/29/2022: 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  2. 2.2 cm saccular aneurysm or pseudoaneurysm again noted arising from  the distal left common iliac artery.  3. Additional vascular and nonvascular findings as given above.    Aortoiliac duplex:  2/24/2025:  Patent abdominal aortic stent graft without obvious endoleak identified. Aneurysm today measures 5 cm with slight interval growth from prior study.     (Text in bold font has been individually reviewed by myself and confirmed)         Assessment and Plan     Vascular surgery following for:  Aortoiliac aneurysms.  History of endovascular repair.    Diagnoses and all orders for this visit:    1. Infrarenal abdominal aortic aneurysm (AAA) without rupture (Primary)  -     Duplex Aorta IVC Iliac Graft Complete CAR; Future    2. Status post endovascular aneurysm repair (EVAR)    3. Iliac aneurysm    Patient's status post endovascular repair of aortoiliac aneurysm back in 2022.  There is questionable equivocal growth on duplex.  Today measuring 5 cm.  No evidence of endoleak is seen on the study.  Given the relatively small size, no endoleak, and minimal growth I have recommended a 1 year interval duplex follow-up.  If that duplex shows growth would likely need a triple phase CT angiogram of the abdomen and pelvis.      Vascular medical management:Patient should continue aspirin 81 mg daily, Lipitor 20 mg daily, and warfarin(Afib).  Return in  about 1 year (around 2/24/2026), or if symptoms worsen or fail to improve, for Follow up with vascular ultrasound.  Patient was given instructions and counseling regarding his condition or for health maintenance advice. Please see specific information pulled into the AVS if appropriate.

## 2025-02-26 ENCOUNTER — OFFICE VISIT (OUTPATIENT)
Dept: FAMILY MEDICINE CLINIC | Facility: CLINIC | Age: 75
End: 2025-02-26
Payer: MEDICARE

## 2025-02-26 VITALS
HEART RATE: 63 BPM | RESPIRATION RATE: 18 BRPM | DIASTOLIC BLOOD PRESSURE: 71 MMHG | TEMPERATURE: 97.5 F | WEIGHT: 193 LBS | HEIGHT: 71 IN | OXYGEN SATURATION: 95 % | SYSTOLIC BLOOD PRESSURE: 151 MMHG | BODY MASS INDEX: 27.02 KG/M2

## 2025-02-26 DIAGNOSIS — J01.90 ACUTE BACTERIAL SINUSITIS: ICD-10-CM

## 2025-02-26 DIAGNOSIS — I10 PRIMARY HYPERTENSION: Primary | ICD-10-CM

## 2025-02-26 DIAGNOSIS — I71.43 INFRARENAL ABDOMINAL AORTIC ANEURYSM (AAA) WITHOUT RUPTURE: ICD-10-CM

## 2025-02-26 DIAGNOSIS — B96.89 ACUTE BACTERIAL SINUSITIS: ICD-10-CM

## 2025-02-26 PROCEDURE — 1126F AMNT PAIN NOTED NONE PRSNT: CPT | Performed by: NURSE PRACTITIONER

## 2025-02-26 PROCEDURE — 99214 OFFICE O/P EST MOD 30 MIN: CPT | Performed by: NURSE PRACTITIONER

## 2025-02-26 PROCEDURE — 1159F MED LIST DOCD IN RCRD: CPT | Performed by: NURSE PRACTITIONER

## 2025-02-26 PROCEDURE — 3078F DIAST BP <80 MM HG: CPT | Performed by: NURSE PRACTITIONER

## 2025-02-26 PROCEDURE — 3077F SYST BP >= 140 MM HG: CPT | Performed by: NURSE PRACTITIONER

## 2025-02-26 PROCEDURE — 1160F RVW MEDS BY RX/DR IN RCRD: CPT | Performed by: NURSE PRACTITIONER

## 2025-02-26 RX ORDER — AMLODIPINE BESYLATE 10 MG/1
10 TABLET ORAL DAILY
Qty: 90 TABLET | Refills: 0 | Status: SHIPPED | OUTPATIENT
Start: 2025-02-26

## 2025-02-26 RX ORDER — FLUTICASONE PROPIONATE 50 MCG
2 SPRAY, SUSPENSION (ML) NASAL DAILY
Qty: 48 G | Refills: 0 | Status: SHIPPED | OUTPATIENT
Start: 2025-02-26 | End: 2025-05-27

## 2025-02-26 NOTE — PROGRESS NOTES
Subjective        Sandro Ramirez is a 74 y.o. male who presents to [facility].     Chief Complaint   Patient presents with    Hypertension     2 weeks fl/u         check up  Pertinent negative symptoms include no abdominal pain, no anorexia, no joint pain, no change in stool, no chest pain, no chills, no congestion, no cough, no diaphoresis, no fatigue, no fever, no headaches, no joint swelling, no myalgias, no nausea, no neck pain, no numbness, no rash, no sore throat, no swollen glands, no dysuria, no vertigo, no visual change, no vomiting and no weakness.     Hypertension/CAD s/p cabg/valvular heart disease/afib on warfarin - followed by Dr. Galloway cardiology.  Per chart notes, Norvasc was stopped when he was discharged from hospital 12/2024 and was not restarted.  He was recently restarted on norvasc 5mg daily in pm, still taking asa 81mg daily in am, losartan 100mg daily in am.  Dr. Galloway cardiology is aware that Norvasc has been restarted.  Patient checks his blood pressure daily at home, blood pressures have been 140's-150's/80's-90s. Denies chest pain, dizziness, palpitations, ankle swelling.   Infrarenal abdominal aortic aneurysm without rupture-s/p EVAR in 2022-followed by vascular surgery Dr. Sainz, was seen in office 2/24/25.  Dr. Nguyen notes that there is questionable equivocal growth on duplex, currently measuring 5 cm with no evidence of endoleak.  Radiologist's final interpretation of study is pending.  He plans 1 year interval duplex follow-up with triple phase CTA A/P if duplex shows growth.      The following portions of the patient's history were reviewed and updated as appropriate: allergies, current medications, past family history, past medical history, past social history, past surgical history and problem list.    Allergies   Allergen Reactions    Bee Venom Anaphylaxis     HIVES-SWOLLEN THROAT    Meperidine Hives    Midazolam Hives    Propofol Other  (See Comments)     UNCLEAR-SVT, HYPOTENSION-STATES SEVERE ALMOST CODED    Sulfa Antibiotics Hives    Hydrocodone Nausea And Vomiting    Simvastatin Myalgia          Current Outpatient Medications:     albuterol sulfate  (90 Base) MCG/ACT inhaler, Inhale 2 puffs Every 4 (Four) Hours As Needed for Wheezing., Disp: 18 g, Rfl: 2    allopurinol (ZYLOPRIM) 300 MG tablet, Take 1 tablet by mouth Daily., Disp: , Rfl:     amLODIPine (NORVASC) 10 MG tablet, Take 1 tablet by mouth Daily., Disp: 90 tablet, Rfl: 0    aspirin 81 MG tablet, Take 1 tablet by mouth Daily., Disp: , Rfl:     atenolol (TENORMIN) 50 MG tablet, TAKE 1 TABLET BY MOUTH EVERY DAY AT NIGHT, Disp: 90 tablet, Rfl: 3    atorvastatin (LIPITOR) 20 MG tablet, TAKE 1 TABLET BY MOUTH EVERY DAY, Disp: 90 tablet, Rfl: 1    cetirizine (zyrTEC) 10 MG tablet, Take 1 tablet by mouth Daily., Disp: , Rfl:     FLUTICASONE FUROATE IN, Inhale 2 Applications Daily., Disp: , Rfl:     losartan (COZAAR) 100 MG tablet, TAKE 1 TABLET BY MOUTH EVERY DAY, Disp: 90 tablet, Rfl: 3    warfarin (COUMADIN) 7.5 MG tablet, See Admin Instructions. Take 1 tablet by mouth daily on Saturday, Sunday, Tuesday, Wednesday, and Thursday, Disp: , Rfl:     warfarin (COUMADIN) 7.5 MG tablet, See Admin Instructions. Take 1.5 tablets by mouth on Monday and Friday, Disp: , Rfl:     warfarin (COUMADIN) 7.5 MG tablet, TAKE 1 TAB, BY MOUTH, DAILY EXCEPT 1 1/2 TABS ON MON AND FRI OR AS DIRECTED., Disp: 105 tablet, Rfl: 0    fluticasone (FLONASE) 50 MCG/ACT nasal spray, Administer 2 sprays into the nostril(s) as directed by provider Daily for 90 days., Disp: 48 g, Rfl: 0    Review of Systems   Constitutional:  Negative for chills, diaphoresis, fatigue and fever.   HENT:  Negative for congestion and sore throat.    Respiratory:  Negative for cough.    Cardiovascular:  Negative for chest pain.   Gastrointestinal:  Negative for abdominal pain, anorexia, nausea and vomiting.   Genitourinary:  Negative for  "dysuria.   Musculoskeletal:  Negative for joint pain, myalgias and neck pain.   Skin:  Negative for rash.   Neurological:  Negative for vertigo, weakness, numbness and headaches.        Objective     /71 (BP Location: Left arm, Patient Position: Sitting, Cuff Size: Adult)   Pulse 63   Temp 97.5 °F (36.4 °C) (Oral)   Resp 18   Ht 180.3 cm (70.98\")   Wt 87.5 kg (193 lb)   SpO2 95%   BMI 26.93 kg/m²         Physical Exam  Vitals and nursing note reviewed.   Constitutional:       General: He is not in acute distress.     Appearance: Normal appearance. He is not ill-appearing or diaphoretic.   HENT:      Head: Normocephalic and atraumatic.      Nose: Nose normal.      Mouth/Throat:      Mouth: Mucous membranes are moist.   Eyes:      General: No scleral icterus.     Conjunctiva/sclera: Conjunctivae normal.   Cardiovascular:      Rate and Rhythm: Normal rate. Rhythm irregularly irregular.   Pulmonary:      Effort: Pulmonary effort is normal. No respiratory distress.      Breath sounds: No wheezing or rales.      Comments: Slightly decreased breath sounds bilateral posterior lower lobes  Abdominal:      General: Bowel sounds are normal. There is no distension.      Palpations: Abdomen is soft. There is no mass.      Tenderness: There is no abdominal tenderness. There is no guarding.   Musculoskeletal:      Cervical back: Normal range of motion and neck supple. No rigidity.      Right lower leg: No edema.      Left lower leg: No edema.   Skin:     General: Skin is warm and dry.      Capillary Refill: Capillary refill takes less than 2 seconds.      Coloration: Skin is not jaundiced.   Neurological:      Mental Status: He is alert and oriented to person, place, and time.   Psychiatric:         Mood and Affect: Mood normal.         Behavior: Behavior normal.         Thought Content: Thought content normal.         Judgment: Judgment normal.              Result Review                   Assessment & Plan  "   Diagnoses and all orders for this visit:    1. Primary hypertension (Primary)  -     amLODIPine (NORVASC) 10 MG tablet; Take 1 tablet by mouth Daily.  Dispense: 90 tablet; Refill: 0    2. Infrarenal abdominal aortic aneurysm (AAA) without rupture       Patient Instructions   Please take blood pressure daily, keep record, bring with you to follow up.   Increase norvasc to 10mg daily, otherwise continue current medications and treatment.   Follow-up with cardiology and vascular per their recommendations.      Follow Up   Return in about 2 weeks (around 3/12/2025) for Recheck, Hypertension.    Patient was given instructions and counseling regarding his condition or for health maintenance advice. Please see specific information pulled into the AVS if appropriate.     Colette Dominguez, APRN     02/27/25

## 2025-02-26 NOTE — PATIENT INSTRUCTIONS
Please take blood pressure daily, keep record, bring with you to follow up.   Increase norvasc to 10mg daily, otherwise continue current medications and treatment.

## 2025-03-03 ENCOUNTER — HOSPITAL ENCOUNTER (OUTPATIENT)
Dept: CT IMAGING | Facility: HOSPITAL | Age: 75
Discharge: HOME | End: 2025-03-03
Admitting: INTERNAL MEDICINE
Payer: MEDICARE

## 2025-03-03 DIAGNOSIS — R91.8 LUNG NODULES: ICD-10-CM

## 2025-03-03 PROCEDURE — 71250 CT THORAX DX C-: CPT

## 2025-03-12 ENCOUNTER — OFFICE VISIT (OUTPATIENT)
Dept: FAMILY MEDICINE CLINIC | Facility: CLINIC | Age: 75
End: 2025-03-12
Payer: MEDICARE

## 2025-03-12 ENCOUNTER — ANTICOAGULATION VISIT (OUTPATIENT)
Dept: CARDIOLOGY | Facility: CLINIC | Age: 75
End: 2025-03-12
Payer: MEDICARE

## 2025-03-12 VITALS
DIASTOLIC BLOOD PRESSURE: 70 MMHG | WEIGHT: 192.6 LBS | OXYGEN SATURATION: 94 % | TEMPERATURE: 97.7 F | HEIGHT: 71 IN | RESPIRATION RATE: 18 BRPM | HEART RATE: 60 BPM | BODY MASS INDEX: 26.96 KG/M2 | SYSTOLIC BLOOD PRESSURE: 136 MMHG

## 2025-03-12 VITALS
BODY MASS INDEX: 26.79 KG/M2 | DIASTOLIC BLOOD PRESSURE: 63 MMHG | SYSTOLIC BLOOD PRESSURE: 140 MMHG | HEART RATE: 57 BPM | WEIGHT: 192 LBS

## 2025-03-12 DIAGNOSIS — H53.8 BLURRED VISION, BILATERAL: ICD-10-CM

## 2025-03-12 DIAGNOSIS — E11.65 TYPE 2 DIABETES MELLITUS WITH HYPERGLYCEMIA, WITHOUT LONG-TERM CURRENT USE OF INSULIN: ICD-10-CM

## 2025-03-12 DIAGNOSIS — I10 PRIMARY HYPERTENSION: Primary | ICD-10-CM

## 2025-03-12 DIAGNOSIS — I48.21 PERMANENT ATRIAL FIBRILLATION: Primary | Chronic | ICD-10-CM

## 2025-03-12 LAB — INR PPP: 2.2 (ref 0.9–1.1)

## 2025-03-12 PROCEDURE — 36416 COLLJ CAPILLARY BLOOD SPEC: CPT | Performed by: INTERNAL MEDICINE

## 2025-03-12 PROCEDURE — 1160F RVW MEDS BY RX/DR IN RCRD: CPT | Performed by: NURSE PRACTITIONER

## 2025-03-12 PROCEDURE — 1159F MED LIST DOCD IN RCRD: CPT | Performed by: NURSE PRACTITIONER

## 2025-03-12 PROCEDURE — 85610 PROTHROMBIN TIME: CPT | Performed by: INTERNAL MEDICINE

## 2025-03-12 PROCEDURE — 99214 OFFICE O/P EST MOD 30 MIN: CPT | Performed by: NURSE PRACTITIONER

## 2025-03-12 PROCEDURE — 1126F AMNT PAIN NOTED NONE PRSNT: CPT | Performed by: NURSE PRACTITIONER

## 2025-03-12 PROCEDURE — 3075F SYST BP GE 130 - 139MM HG: CPT | Performed by: NURSE PRACTITIONER

## 2025-03-12 PROCEDURE — 3078F DIAST BP <80 MM HG: CPT | Performed by: NURSE PRACTITIONER

## 2025-03-12 NOTE — PROGRESS NOTES
Subjective        Sandro Ramirez is a 74 y.o. male who presents to [facility].     Chief Complaint   Patient presents with    Hypertension     2 weeks fl/u       Hypertension  This is a chronic problem. The current episode started more than 1 year ago. The problem has been worse since onset. Associated symptoms include blurred vision. Pertinent negatives include no anxiety, headaches, malaise/fatigue, palpitations or peripheral edema. There are no associated agents to hypertension. Compliance problems include exercise.      Patient presents for follow-up of hypertension.    Hypertension/CAD s/p CABG/valvular heart disease/afib on warfarin - Followed by Dr. Glaloway.  Taking norvasc 10mg daily, asa 81 mg daily, metoprolol 50mg daily, losartan 100mg daily.  He is checking blood pressure at home has been 140-160 systolic, avg 80 systolic.  He states is blood pressure cuff at home is very old.  Denies chest pain, dizziness, ankle swelling. No recent palpitations.   Diabetes mellitus - not on medication - HGB A1c was elevated 7.09 in 10/2024.  Blood glucose levels have been 120-150 at home.  He is followed by endocrinology Dr. Van.  Jardiance was stopped in past 2/2 cost. He reports in past 2 weeks slightly blurred vision, feels like he can see floaters.  Denies eye pain.  He is followed by [facility] in [place], has not notified them of this symptom.       The following portions of the patient's history were reviewed and updated as appropriate: allergies, current medications, past family history, past medical history, past social history, past surgical history and problem list.    Allergies   Allergen Reactions    Bee Venom Anaphylaxis     HIVES-SWOLLEN THROAT    Meperidine Hives    Midazolam Hives    Propofol Other (See Comments)     UNCLEAR-SVT, HYPOTENSION-STATES SEVERE ALMOST CODED    Sulfa Antibiotics Hives    Hydrocodone Nausea And Vomiting    Simvastatin Myalgia     "      Current Outpatient Medications:     albuterol sulfate  (90 Base) MCG/ACT inhaler, Inhale 2 puffs Every 4 (Four) Hours As Needed for Wheezing., Disp: 18 g, Rfl: 2    allopurinol (ZYLOPRIM) 300 MG tablet, Take 1 tablet by mouth Daily., Disp: , Rfl:     amLODIPine (NORVASC) 10 MG tablet, Take 1 tablet by mouth Daily., Disp: 90 tablet, Rfl: 0    aspirin 81 MG tablet, Take 1 tablet by mouth Daily., Disp: , Rfl:     atenolol (TENORMIN) 50 MG tablet, TAKE 1 TABLET BY MOUTH EVERY DAY AT NIGHT, Disp: 90 tablet, Rfl: 3    atorvastatin (LIPITOR) 20 MG tablet, TAKE 1 TABLET BY MOUTH EVERY DAY, Disp: 90 tablet, Rfl: 1    cetirizine (zyrTEC) 10 MG tablet, Take 1 tablet by mouth Daily., Disp: , Rfl:     fluticasone (FLONASE) 50 MCG/ACT nasal spray, Administer 2 sprays into the nostril(s) as directed by provider Daily for 90 days., Disp: 48 g, Rfl: 0    FLUTICASONE FUROATE IN, Inhale 2 Applications Daily., Disp: , Rfl:     losartan (COZAAR) 100 MG tablet, TAKE 1 TABLET BY MOUTH EVERY DAY, Disp: 90 tablet, Rfl: 3    warfarin (COUMADIN) 7.5 MG tablet, See Admin Instructions. Take 1 tablet by mouth daily on Saturday, Sunday, Tuesday, Wednesday, and Thursday, Disp: , Rfl:     warfarin (COUMADIN) 7.5 MG tablet, See Admin Instructions. Take 1.5 tablets by mouth on Monday and Friday, Disp: , Rfl:     warfarin (COUMADIN) 7.5 MG tablet, TAKE 1 TAB, BY MOUTH, DAILY EXCEPT 1 1/2 TABS ON MON AND FRI OR AS DIRECTED., Disp: 105 tablet, Rfl: 0    Review of Systems   Constitutional:  Negative for malaise/fatigue.   Eyes:  Positive for blurred vision.   Cardiovascular:  Negative for palpitations.   Neurological:  Negative for headaches.        Objective     /70 (BP Location: Left arm, Patient Position: Sitting, Cuff Size: Adult)   Pulse 60   Temp 97.7 °F (36.5 °C) (Oral)   Resp 18   Ht 180.3 cm (70.98\")   Wt 87.4 kg (192 lb 9.6 oz)   SpO2 94%   BMI 26.87 kg/m²         Physical Exam  Vitals and nursing note reviewed. "   Constitutional:       General: He is not in acute distress.     Appearance: Normal appearance. He is not ill-appearing or diaphoretic.   HENT:      Head: Normocephalic and atraumatic.      Nose: Nose normal.      Mouth/Throat:      Mouth: Mucous membranes are moist.   Eyes:      General: No scleral icterus.     Pupils: Pupils are equal, round, and reactive to light.   Neck:      Trachea: Trachea normal.   Cardiovascular:      Rate and Rhythm: Normal rate. Rhythm irregularly irregular.      Pulses: Normal pulses.   Pulmonary:      Effort: Pulmonary effort is normal.      Breath sounds: Normal breath sounds and air entry.   Abdominal:      General: Bowel sounds are normal. There is no distension.      Palpations: Abdomen is soft.      Tenderness: There is no abdominal tenderness. There is no right CVA tenderness, left CVA tenderness, guarding or rebound.   Musculoskeletal:         General: Normal range of motion.      Cervical back: Normal range of motion and neck supple.      Right lower leg: No edema.      Left lower leg: No edema.   Skin:     General: Skin is warm and dry.      Capillary Refill: Capillary refill takes less than 2 seconds.      Coloration: Skin is not jaundiced.      Findings: Bruising (mild scattered upper extremity) present.   Neurological:      General: No focal deficit present.      Mental Status: He is alert and oriented to person, place, and time.      Sensory: Sensation is intact.      Gait: Gait normal.   Psychiatric:         Attention and Perception: Attention normal.         Mood and Affect: Mood and affect normal.         Speech: Speech normal.         Behavior: Behavior normal. Behavior is cooperative.         Thought Content: Thought content normal.         Cognition and Memory: Cognition normal.         Judgment: Judgment normal.          Result Review                   Assessment & Plan    Diagnoses and all orders for this visit:    1. Primary hypertension (Primary)  -     CBC &  Differential; Future  -     Comprehensive Metabolic Panel; Future  -     TSH Rfx On Abnormal To Free T4; Future    2. Type 2 diabetes mellitus with hyperglycemia, without long-term current use of insulin  -     CBC & Differential; Future  -     Comprehensive Metabolic Panel; Future  -     Hemoglobin A1c; Future    3. Blurred vision, bilateral  -     Comprehensive Metabolic Panel; Future       Patient Instructions   Please see [facility] - staff set up appointment 3/25/2025 at 4:30 with Dr. Cobb.  Get a new blood pressure cuff/monitor.  Please take blood pressure daily, keep record, bring with you to follow up.   Send me record of blood pressure in 2 weeks.  GO to er if loss of vision, eye pain, chest pain, difficulty breathing, dizziness.   Call office for lab results if you have not received a call from our office or [employer] message in 1-2 days.    Follow up with specialists per their recommendations.  Continue current medications and treatment.       Follow Up   Return in about 4 weeks (around 4/9/2025) for Recheck, Hypertension.    Patient was given instructions and counseling regarding his condition or for health maintenance advice. Please see specific information pulled into the AVS if appropriate.     Colette Dominguez, APRN     03/12/25

## 2025-03-12 NOTE — PATIENT INSTRUCTIONS
Please see [facility] - staff set up appointment 3/25/2025 at 4:30 with Dr. Cobb.  Get a new blood pressure cuff/monitor.  Please take blood pressure daily, keep record, bring with you to follow up.   Send me record of blood pressure in 2 weeks.  GO to er if loss of vision, eye pain, chest pain, difficulty breathing, dizziness.   Call office for lab results if you have not received a call from our office or [employer] message in 1-2 days.    Follow up with specialists per their recommendations.  Continue current medications and treatment.

## 2025-03-14 ENCOUNTER — LAB (OUTPATIENT)
Dept: LAB | Facility: HOSPITAL | Age: 75
End: 2025-03-14
Payer: MEDICARE

## 2025-03-14 DIAGNOSIS — H53.8 BLURRED VISION, BILATERAL: ICD-10-CM

## 2025-03-14 DIAGNOSIS — I10 PRIMARY HYPERTENSION: ICD-10-CM

## 2025-03-14 DIAGNOSIS — E11.65 TYPE 2 DIABETES MELLITUS WITH HYPERGLYCEMIA, WITHOUT LONG-TERM CURRENT USE OF INSULIN: ICD-10-CM

## 2025-03-14 LAB
ALBUMIN SERPL-MCNC: 4.4 G/DL (ref 3.5–5.2)
ALBUMIN/GLOB SERPL: 1.6 G/DL
ALP SERPL-CCNC: 92 U/L (ref 39–117)
ALT SERPL W P-5'-P-CCNC: 24 U/L (ref 1–41)
ANION GAP SERPL CALCULATED.3IONS-SCNC: 8.5 MMOL/L (ref 5–15)
AST SERPL-CCNC: 24 U/L (ref 1–40)
BASOPHILS # BLD AUTO: 0.05 10*3/MM3 (ref 0–0.2)
BASOPHILS NFR BLD AUTO: 0.6 % (ref 0–1.5)
BILIRUB SERPL-MCNC: 0.5 MG/DL (ref 0–1.2)
BUN SERPL-MCNC: 18 MG/DL (ref 8–23)
BUN/CREAT SERPL: 24 (ref 7–25)
CALCIUM SPEC-SCNC: 9.3 MG/DL (ref 8.6–10.5)
CHLORIDE SERPL-SCNC: 105 MMOL/L (ref 98–107)
CO2 SERPL-SCNC: 27.5 MMOL/L (ref 22–29)
CREAT SERPL-MCNC: 0.75 MG/DL (ref 0.76–1.27)
DEPRECATED RDW RBC AUTO: 44 FL (ref 37–54)
EGFRCR SERPLBLD CKD-EPI 2021: 95.9 ML/MIN/1.73
EOSINOPHIL # BLD AUTO: 0.06 10*3/MM3 (ref 0–0.4)
EOSINOPHIL NFR BLD AUTO: 0.7 % (ref 0.3–6.2)
ERYTHROCYTE [DISTWIDTH] IN BLOOD BY AUTOMATED COUNT: 13.5 % (ref 12.3–15.4)
GLOBULIN UR ELPH-MCNC: 2.8 GM/DL
GLUCOSE SERPL-MCNC: 108 MG/DL (ref 65–99)
HBA1C MFR BLD: 6.97 % (ref 4.8–5.6)
HCT VFR BLD AUTO: 46.9 % (ref 37.5–51)
HGB BLD-MCNC: 14.7 G/DL (ref 13–17.7)
IMM GRANULOCYTES # BLD AUTO: 0.03 10*3/MM3 (ref 0–0.05)
IMM GRANULOCYTES NFR BLD AUTO: 0.4 % (ref 0–0.5)
LYMPHOCYTES # BLD AUTO: 2.3 10*3/MM3 (ref 0.7–3.1)
LYMPHOCYTES NFR BLD AUTO: 27.9 % (ref 19.6–45.3)
MCH RBC QN AUTO: 28.2 PG (ref 26.6–33)
MCHC RBC AUTO-ENTMCNC: 31.3 G/DL (ref 31.5–35.7)
MCV RBC AUTO: 90 FL (ref 79–97)
MONOCYTES # BLD AUTO: 0.59 10*3/MM3 (ref 0.1–0.9)
MONOCYTES NFR BLD AUTO: 7.2 % (ref 5–12)
NEUTROPHILS NFR BLD AUTO: 5.22 10*3/MM3 (ref 1.7–7)
NEUTROPHILS NFR BLD AUTO: 63.2 % (ref 42.7–76)
NRBC BLD AUTO-RTO: 0 /100 WBC (ref 0–0.2)
PLATELET # BLD AUTO: 200 10*3/MM3 (ref 140–450)
PMV BLD AUTO: 9.7 FL (ref 6–12)
POTASSIUM SERPL-SCNC: 4.2 MMOL/L (ref 3.5–5.2)
PROT SERPL-MCNC: 7.2 G/DL (ref 6–8.5)
RBC # BLD AUTO: 5.21 10*6/MM3 (ref 4.14–5.8)
SODIUM SERPL-SCNC: 141 MMOL/L (ref 136–145)
TSH SERPL DL<=0.05 MIU/L-ACNC: 2.17 UIU/ML (ref 0.27–4.2)
WBC NRBC COR # BLD AUTO: 8.25 10*3/MM3 (ref 3.4–10.8)

## 2025-03-14 PROCEDURE — 83036 HEMOGLOBIN GLYCOSYLATED A1C: CPT

## 2025-03-14 PROCEDURE — 80053 COMPREHEN METABOLIC PANEL: CPT

## 2025-03-14 PROCEDURE — 84443 ASSAY THYROID STIM HORMONE: CPT

## 2025-03-14 PROCEDURE — 85025 COMPLETE CBC W/AUTO DIFF WBC: CPT

## 2025-03-14 PROCEDURE — 36415 COLL VENOUS BLD VENIPUNCTURE: CPT

## 2025-03-17 ENCOUNTER — TELEPHONE (OUTPATIENT)
Dept: FAMILY MEDICINE CLINIC | Facility: CLINIC | Age: 75
End: 2025-03-17
Payer: MEDICARE

## 2025-03-17 DIAGNOSIS — R06.2 WHEEZING: ICD-10-CM

## 2025-03-17 DIAGNOSIS — I10 PRIMARY HYPERTENSION: ICD-10-CM

## 2025-03-17 RX ORDER — AMLODIPINE BESYLATE 10 MG/1
10 TABLET ORAL DAILY
Qty: 90 TABLET | Refills: 0 | Status: SHIPPED | OUTPATIENT
Start: 2025-03-17

## 2025-03-17 RX ORDER — ALBUTEROL SULFATE 90 UG/1
2 INHALANT RESPIRATORY (INHALATION) EVERY 4 HOURS PRN
Qty: 18 G | Refills: 2 | Status: SHIPPED | OUTPATIENT
Start: 2025-03-17

## 2025-03-17 NOTE — TELEPHONE ENCOUNTER
"D   Caller: Sandro Ramirez \"Manish\"    Relationship: Self  Best call back number: [phone]     Which medication are you concerned about: amLODIPine (NORVASC) 10 MG tablet     Who prescribed you this medication: LUIS    When did you start taking this medication: NA    What are your concerns: PLEASE ENSURE THAT THE DOSAGE IS 10 MG 1 TIME A DAY.  IF SO PLEASE SEND A NEW PRESCRIPTION TO THE PHARMACY WITH THAT DOSAGE.  THERE IS CONFUSION ON THE DOSAGE.    PHARMACY:  [facility] #[id number] - [address] [phone] [facility] [phone] FX        "

## 2025-03-26 ENCOUNTER — TRANSCRIBE ORDERS (OUTPATIENT)
Dept: ADMINISTRATIVE | Facility: HOSPITAL | Age: 75
End: 2025-03-26
Payer: MEDICARE

## 2025-03-26 DIAGNOSIS — R91.8 LUNG NODULES: Primary | ICD-10-CM

## 2025-03-31 ENCOUNTER — TELEPHONE (OUTPATIENT)
Dept: CARDIOLOGY | Facility: CLINIC | Age: 75
End: 2025-03-31
Payer: MEDICARE

## 2025-03-31 NOTE — TELEPHONE ENCOUNTER
FACILITY: [facility] urology   DR:   PHONE: [phone]  FAX: [phone]  PROCEDURE: TURP  SCHEDULED: 5/29/2025  MEDS TO HOLD: warfarin and aspirin       Will place clearance in folder to hold until 4/29/2025

## 2025-04-08 ENCOUNTER — OFFICE VISIT (OUTPATIENT)
Dept: FAMILY MEDICINE CLINIC | Facility: CLINIC | Age: 75
End: 2025-04-08
Payer: MEDICARE

## 2025-04-08 VITALS
HEIGHT: 71 IN | TEMPERATURE: 97.8 F | RESPIRATION RATE: 18 BRPM | WEIGHT: 198 LBS | SYSTOLIC BLOOD PRESSURE: 132 MMHG | DIASTOLIC BLOOD PRESSURE: 63 MMHG | OXYGEN SATURATION: 96 % | HEART RATE: 82 BPM | BODY MASS INDEX: 27.72 KG/M2

## 2025-04-08 DIAGNOSIS — I10 PRIMARY HYPERTENSION: Primary | ICD-10-CM

## 2025-04-08 DIAGNOSIS — E79.0 HYPERURICEMIA: Chronic | ICD-10-CM

## 2025-04-08 DIAGNOSIS — Z95.1 STATUS POST CORONARY ARTERY BYPASS GRAFT: ICD-10-CM

## 2025-04-08 DIAGNOSIS — E11.65 TYPE 2 DIABETES MELLITUS WITH HYPERGLYCEMIA, WITHOUT LONG-TERM CURRENT USE OF INSULIN: ICD-10-CM

## 2025-04-08 PROCEDURE — 1126F AMNT PAIN NOTED NONE PRSNT: CPT | Performed by: NURSE PRACTITIONER

## 2025-04-08 PROCEDURE — 3044F HG A1C LEVEL LT 7.0%: CPT | Performed by: NURSE PRACTITIONER

## 2025-04-08 PROCEDURE — 99214 OFFICE O/P EST MOD 30 MIN: CPT | Performed by: NURSE PRACTITIONER

## 2025-04-08 PROCEDURE — 3078F DIAST BP <80 MM HG: CPT | Performed by: NURSE PRACTITIONER

## 2025-04-08 PROCEDURE — 3075F SYST BP GE 130 - 139MM HG: CPT | Performed by: NURSE PRACTITIONER

## 2025-04-08 PROCEDURE — 1160F RVW MEDS BY RX/DR IN RCRD: CPT | Performed by: NURSE PRACTITIONER

## 2025-04-08 PROCEDURE — 1159F MED LIST DOCD IN RCRD: CPT | Performed by: NURSE PRACTITIONER

## 2025-04-08 RX ORDER — FUROSEMIDE 20 MG/1
20 TABLET ORAL DAILY
Qty: 90 TABLET | Refills: 1 | Status: SHIPPED | OUTPATIENT
Start: 2025-04-08

## 2025-04-08 NOTE — PATIENT INSTRUCTIONS
Labs in 2 weeks.  Please take blood pressure daily, keep record, bring with you to follow up.   Add furosemide 20mg daily.  Otherwise Continue current medications and treatment.   Recommend low sodium diet.

## 2025-04-08 NOTE — PROGRESS NOTES
"Subjective        Sandro Ramirez is a 74 y.o. male who presents to [facility].     Chief Complaint   Patient presents with    Hypertension     4 weeks fl/u         follow up  Symptoms are: recurrent.   Onset was at an unknown time.   Symptoms occur: daily.  Pertinent negative symptoms include no abdominal pain, no anorexia, no joint pain, no change in stool, no chest pain, no chills, no congestion, no cough, no diaphoresis, no fatigue, no fever, no headaches, no joint swelling, no myalgias, no nausea, no neck pain, no numbness, no rash, no sore throat, no swollen glands, no dysuria, no vertigo, no visual change, no vomiting and no weakness.     Hypertension/CAD s/p CABG/valvular disease/afib on warfarin - followed by Dr. Galloway.  Taking norvasc 10mg daily, asa 81mg daily, atenolol 50mg daily, losartan 100mg daily.  Denies chest pain, dizziness, ankle swelling, palpitations. He did purchase a new blood pressure cuff after last visit, doesn't think it works well.  He brought home blood pressure record with him.  Home blood pressures avg 140's/70's with heart rate avg 60. He states he took furosemide in the past but was stopped, he is unsure why.  He does have hx of gout, needs to avoid hctz.   Diabetes mellitus - labs 3/2025 showed hgb A1c 6.97. He tries to follow diabetic diet.  Blood glucose at home has been  at home.  Does not take any medication for diabetes mellitus, followed by Dr. Van.  Blurry vision has resolved, he went to [facility], states \"everything was fine.\"  Will request record.     He has turp set up with urology Dr. Hamm 5/29/2025.     The following portions of the patient's history were reviewed and updated as appropriate: allergies, current medications, past family history, past medical history, past social history, past surgical history and problem list.    Allergies   Allergen Reactions    Bee Venom Anaphylaxis     HIVES-SWOLLEN THROAT    " Meperidine Hives    Midazolam Hives    Propofol Other (See Comments)     UNCLEAR-SVT, HYPOTENSION-STATES SEVERE ALMOST CODED    Sulfa Antibiotics Hives    Hydrocodone Nausea And Vomiting    Simvastatin Myalgia          Current Outpatient Medications:     albuterol sulfate  (90 Base) MCG/ACT inhaler, TAKE 2 PUFFS BY MOUTH EVERY 4 HOURS AS NEEDED FOR WHEEZE, Disp: 18 g, Rfl: 2    allopurinol (ZYLOPRIM) 300 MG tablet, Take 1 tablet by mouth Daily., Disp: , Rfl:     amLODIPine (NORVASC) 10 MG tablet, TAKE 1 TABLET BY MOUTH EVERY DAY, Disp: 90 tablet, Rfl: 0    aspirin 81 MG tablet, Take 1 tablet by mouth Daily., Disp: , Rfl:     atenolol (TENORMIN) 50 MG tablet, TAKE 1 TABLET BY MOUTH EVERY DAY AT NIGHT, Disp: 90 tablet, Rfl: 3    atorvastatin (LIPITOR) 20 MG tablet, TAKE 1 TABLET BY MOUTH EVERY DAY, Disp: 90 tablet, Rfl: 1    cetirizine (zyrTEC) 10 MG tablet, Take 1 tablet by mouth Daily., Disp: , Rfl:     fluticasone (FLONASE) 50 MCG/ACT nasal spray, Administer 2 sprays into the nostril(s) as directed by provider Daily for 90 days., Disp: 48 g, Rfl: 0    FLUTICASONE FUROATE IN, Inhale 2 Applications Daily., Disp: , Rfl:     losartan (COZAAR) 100 MG tablet, TAKE 1 TABLET BY MOUTH EVERY DAY, Disp: 90 tablet, Rfl: 3    warfarin (COUMADIN) 7.5 MG tablet, See Admin Instructions. Take 1 tablet by mouth daily on Saturday, Sunday, Tuesday, Wednesday, and Thursday, Disp: , Rfl:     warfarin (COUMADIN) 7.5 MG tablet, See Admin Instructions. Take 1.5 tablets by mouth on Monday and Friday, Disp: , Rfl:     warfarin (COUMADIN) 7.5 MG tablet, TAKE 1 TAB, BY MOUTH, DAILY EXCEPT 1 1/2 TABS ON MON AND FRI OR AS DIRECTED., Disp: 105 tablet, Rfl: 0    furosemide (Lasix) 20 MG tablet, Take 1 tablet by mouth Daily., Disp: 90 tablet, Rfl: 1    Review of Systems   Constitutional:  Negative for chills, diaphoresis, fatigue and fever.   HENT:  Negative for congestion and sore throat.    Respiratory:  Negative for cough.   "  Cardiovascular:  Negative for chest pain.   Gastrointestinal:  Negative for abdominal pain, anorexia, nausea and vomiting.   Genitourinary:  Negative for dysuria.   Musculoskeletal:  Negative for joint pain, myalgias and neck pain.   Skin:  Negative for rash.   Neurological:  Negative for vertigo, weakness, numbness and headaches.        Objective     /63 (BP Location: Left arm, Patient Position: Sitting, Cuff Size: Adult)   Pulse 82   Temp 97.8 °F (36.6 °C) (Oral)   Resp 18   Ht 180.3 cm (70.98\")   Wt 89.8 kg (198 lb)   SpO2 96%   BMI 27.63 kg/m²         Physical Exam  Vitals and nursing note reviewed.   Constitutional:       General: He is not in acute distress.     Appearance: Normal appearance. He is not ill-appearing or diaphoretic.   HENT:      Head: Normocephalic and atraumatic.      Nose: Nose normal.      Mouth/Throat:      Mouth: Mucous membranes are moist.   Eyes:      General: No scleral icterus.     Conjunctiva/sclera: Conjunctivae normal.      Pupils: Pupils are equal, round, and reactive to light.   Neck:      Trachea: Trachea normal.   Cardiovascular:      Rate and Rhythm: Normal rate. Rhythm regularly irregular.      Pulses: Normal pulses.   Pulmonary:      Effort: Pulmonary effort is normal. Respiratory distress (mild scattered bilateral posterior wheezing) present.      Breath sounds: Normal air entry.   Abdominal:      General: Bowel sounds are normal. There is no distension.      Palpations: Abdomen is soft.      Tenderness: There is no rebound.   Musculoskeletal:         General: Normal range of motion.      Cervical back: Normal range of motion and neck supple.      Right lower leg: No edema.      Left lower leg: No edema.   Skin:     General: Skin is warm and dry.      Capillary Refill: Capillary refill takes less than 2 seconds.      Coloration: Skin is not jaundiced.   Neurological:      General: No focal deficit present.      Mental Status: He is alert and oriented to " person, place, and time.      Sensory: Sensation is intact.      Gait: Gait abnormal (slow).   Psychiatric:         Attention and Perception: Attention normal.         Mood and Affect: Mood and affect normal.         Speech: Speech normal.         Behavior: Behavior normal. Behavior is cooperative.         Thought Content: Thought content normal.         Cognition and Memory: Cognition normal.         Judgment: Judgment normal.          Result Review    The following data was reviewed by: ALICJA Connor on 04/08/2025:  CMP          12/4/2024    14:14 12/5/2024    05:16 3/14/2025    12:49   CMP   Glucose 123  95  108    BUN 20  18  18    Creatinine 0.69  0.80  0.75    EGFR 98.3  94.0  95.9    Sodium 137  140  141    Potassium 4.4  4.4  4.2    Chloride 103  105  105    Calcium 9.1  9.2  9.3    Total Protein 6.6  6.9  7.2    Albumin 3.7  3.7  4.4    Globulin 2.9  3.2  2.8    Total Bilirubin 0.6  0.4  0.5    Alkaline Phosphatase 113  127  92    AST (SGOT) 27  27  24    ALT (SGPT) 24  28  24    Albumin/Globulin Ratio 1.3  1.2  1.6    BUN/Creatinine Ratio 29.0  22.5  24.0    Anion Gap 8.7  7.7  8.5      CBC          12/4/2024    14:14 12/5/2024    05:16 3/14/2025    12:49   CBC   WBC 13.96  11.45  8.25    RBC 4.70  4.74  5.21    Hemoglobin 13.5  14.1  14.7    Hematocrit 42.6  43.5  46.9    MCV 90.6  91.8  90.0    MCH 28.7  29.7  28.2    MCHC 31.7  32.4  31.3    RDW 13.0  12.6  13.5    Platelets 197  210  200      CBC w/diff          12/4/2024    14:14 12/5/2024    05:16 3/14/2025    12:49   CBC w/Diff   WBC 13.96  11.45  8.25    RBC 4.70  4.74  5.21    Hemoglobin 13.5  14.1  14.7    Hematocrit 42.6  43.5  46.9    MCV 90.6  91.8  90.0    MCH 28.7  29.7  28.2    MCHC 31.7  32.4  31.3    RDW 13.0  12.6  13.5    Platelets 197  210  200    Neutrophil Rel %  80.3  63.2    Immature Granulocyte Rel %  0.4  0.4    Lymphocyte Rel %  11.8  27.9    Monocyte Rel %  7.1  7.2    Eosinophil Rel %  0.1  0.7    Basophil Rel %  0.3   0.6      Lipid Panel          10/22/2024    09:00   Lipid Panel   Total Cholesterol 132    Triglycerides 84    HDL Cholesterol 39    VLDL Cholesterol 16    LDL Cholesterol  77    LDL/HDL Ratio 1.95      TSH          3/14/2025    12:49   TSH   TSH 2.170      A1C Last 3 Results          8/14/2024    08:47 10/22/2024    09:00 3/14/2025    12:49   HGBA1C Last 3 Results   Hemoglobin A1C 7.60  7.09  6.97                     Assessment & Plan    Diagnoses and all orders for this visit:    1. Primary hypertension (Primary)  -     furosemide (Lasix) 20 MG tablet; Take 1 tablet by mouth Daily.  Dispense: 90 tablet; Refill: 1  -     Basic Metabolic Panel; Future    2. Status post coronary artery bypass graft    3. Hyperuricemia    4. Type 2 diabetes mellitus with hyperglycemia, without long-term current use of insulin       Patient Instructions   Labs in 2 weeks.  Please take blood pressure daily, keep record, bring with you to follow up.   Add furosemide 20mg daily.  Otherwise Continue current medications and treatment.   Recommend low sodium diet.        Follow Up   Return in about 4 weeks (around 5/6/2025) for Recheck, Hypertension.    Patient was given instructions and counseling regarding his condition or for health maintenance advice. Please see specific information pulled into the AVS if appropriate.     Colette Dominguez, APRN     04/08/25

## 2025-04-16 ENCOUNTER — ANTICOAGULATION VISIT (OUTPATIENT)
Dept: CARDIOLOGY | Facility: CLINIC | Age: 75
End: 2025-04-16
Payer: MEDICARE

## 2025-04-16 VITALS
DIASTOLIC BLOOD PRESSURE: 69 MMHG | BODY MASS INDEX: 27.63 KG/M2 | HEART RATE: 60 BPM | SYSTOLIC BLOOD PRESSURE: 139 MMHG | WEIGHT: 198 LBS

## 2025-04-16 DIAGNOSIS — I48.21 PERMANENT ATRIAL FIBRILLATION: Primary | Chronic | ICD-10-CM

## 2025-04-16 LAB — INR PPP: 2.1 (ref 0.9–1.1)

## 2025-04-16 PROCEDURE — 85610 PROTHROMBIN TIME: CPT | Performed by: INTERNAL MEDICINE

## 2025-04-16 PROCEDURE — 36416 COLLJ CAPILLARY BLOOD SPEC: CPT | Performed by: INTERNAL MEDICINE

## 2025-05-02 DIAGNOSIS — I48.21 PERMANENT ATRIAL FIBRILLATION: ICD-10-CM

## 2025-05-02 RX ORDER — WARFARIN SODIUM 7.5 MG/1
TABLET ORAL
Qty: 105 TABLET | Refills: 0 | Status: SHIPPED | OUTPATIENT
Start: 2025-05-02

## 2025-05-02 NOTE — TELEPHONE ENCOUNTER
Warfarin 7.5mg,  TAKE 1 TAB, BY MOUTH, DAILY EXCEPT 1 1/2 TABS ON MON AND FRI OR AS DIRECTED. Script sent to [facility] for refill. [email or web address]

## 2025-05-06 ENCOUNTER — LAB (OUTPATIENT)
Dept: LAB | Facility: HOSPITAL | Age: 75
End: 2025-05-06
Payer: MEDICARE

## 2025-05-06 ENCOUNTER — OFFICE VISIT (OUTPATIENT)
Dept: FAMILY MEDICINE CLINIC | Facility: CLINIC | Age: 75
End: 2025-05-06
Payer: MEDICARE

## 2025-05-06 VITALS
SYSTOLIC BLOOD PRESSURE: 131 MMHG | RESPIRATION RATE: 15 BRPM | HEIGHT: 71 IN | OXYGEN SATURATION: 94 % | BODY MASS INDEX: 27.58 KG/M2 | WEIGHT: 197 LBS | HEART RATE: 58 BPM | TEMPERATURE: 97 F | DIASTOLIC BLOOD PRESSURE: 65 MMHG

## 2025-05-06 DIAGNOSIS — Z95.1 STATUS POST CORONARY ARTERY BYPASS GRAFT: ICD-10-CM

## 2025-05-06 DIAGNOSIS — I48.21 PERMANENT ATRIAL FIBRILLATION: Chronic | ICD-10-CM

## 2025-05-06 DIAGNOSIS — E78.2 MIXED HYPERLIPIDEMIA: ICD-10-CM

## 2025-05-06 DIAGNOSIS — G47.33 OBSTRUCTIVE SLEEP APNEA SYNDROME: Chronic | ICD-10-CM

## 2025-05-06 DIAGNOSIS — R60.0 BILATERAL LOWER EXTREMITY EDEMA: ICD-10-CM

## 2025-05-06 DIAGNOSIS — I38 VALVULAR HEART DISEASE: ICD-10-CM

## 2025-05-06 DIAGNOSIS — J44.9 CHRONIC OBSTRUCTIVE PULMONARY DISEASE, UNSPECIFIED COPD TYPE: ICD-10-CM

## 2025-05-06 DIAGNOSIS — I10 PRIMARY HYPERTENSION: ICD-10-CM

## 2025-05-06 DIAGNOSIS — I10 PRIMARY HYPERTENSION: Primary | ICD-10-CM

## 2025-05-06 DIAGNOSIS — E79.0 HYPERURICEMIA: Chronic | ICD-10-CM

## 2025-05-06 DIAGNOSIS — I71.43 INFRARENAL ABDOMINAL AORTIC ANEURYSM (AAA) WITHOUT RUPTURE: ICD-10-CM

## 2025-05-06 DIAGNOSIS — Z79.01 WARFARIN ANTICOAGULATION: ICD-10-CM

## 2025-05-06 DIAGNOSIS — N40.1 BENIGN PROSTATIC HYPERPLASIA WITH LOWER URINARY TRACT SYMPTOMS, SYMPTOM DETAILS UNSPECIFIED: ICD-10-CM

## 2025-05-06 DIAGNOSIS — E11.65 TYPE 2 DIABETES MELLITUS WITH HYPERGLYCEMIA, WITHOUT LONG-TERM CURRENT USE OF INSULIN: ICD-10-CM

## 2025-05-06 LAB
ANION GAP SERPL CALCULATED.3IONS-SCNC: 7.9 MMOL/L (ref 5–15)
BASOPHILS # BLD AUTO: 0.06 10*3/MM3 (ref 0–0.2)
BASOPHILS NFR BLD AUTO: 0.7 % (ref 0–1.5)
BUN SERPL-MCNC: 14 MG/DL (ref 8–23)
BUN/CREAT SERPL: 16.7 (ref 7–25)
CALCIUM SPEC-SCNC: 9.3 MG/DL (ref 8.6–10.5)
CHLORIDE SERPL-SCNC: 103 MMOL/L (ref 98–107)
CO2 SERPL-SCNC: 29.1 MMOL/L (ref 22–29)
CREAT SERPL-MCNC: 0.84 MG/DL (ref 0.76–1.27)
DEPRECATED RDW RBC AUTO: 43.8 FL (ref 37–54)
EGFRCR SERPLBLD CKD-EPI 2021: 92.7 ML/MIN/1.73
EOSINOPHIL # BLD AUTO: 0.1 10*3/MM3 (ref 0–0.4)
EOSINOPHIL NFR BLD AUTO: 1.1 % (ref 0.3–6.2)
ERYTHROCYTE [DISTWIDTH] IN BLOOD BY AUTOMATED COUNT: 13.5 % (ref 12.3–15.4)
GLUCOSE SERPL-MCNC: 131 MG/DL (ref 65–99)
HCT VFR BLD AUTO: 46.6 % (ref 37.5–51)
HGB BLD-MCNC: 14.5 G/DL (ref 13–17.7)
IMM GRANULOCYTES # BLD AUTO: 0.05 10*3/MM3 (ref 0–0.05)
IMM GRANULOCYTES NFR BLD AUTO: 0.5 % (ref 0–0.5)
LYMPHOCYTES # BLD AUTO: 1.83 10*3/MM3 (ref 0.7–3.1)
LYMPHOCYTES NFR BLD AUTO: 20 % (ref 19.6–45.3)
MCH RBC QN AUTO: 27.5 PG (ref 26.6–33)
MCHC RBC AUTO-ENTMCNC: 31.1 G/DL (ref 31.5–35.7)
MCV RBC AUTO: 88.4 FL (ref 79–97)
MONOCYTES # BLD AUTO: 0.63 10*3/MM3 (ref 0.1–0.9)
MONOCYTES NFR BLD AUTO: 6.9 % (ref 5–12)
NEUTROPHILS NFR BLD AUTO: 6.48 10*3/MM3 (ref 1.7–7)
NEUTROPHILS NFR BLD AUTO: 70.8 % (ref 42.7–76)
NRBC BLD AUTO-RTO: 0 /100 WBC (ref 0–0.2)
PLATELET # BLD AUTO: 236 10*3/MM3 (ref 140–450)
PMV BLD AUTO: 8.9 FL (ref 6–12)
POTASSIUM SERPL-SCNC: 4.5 MMOL/L (ref 3.5–5.2)
RBC # BLD AUTO: 5.27 10*6/MM3 (ref 4.14–5.8)
SODIUM SERPL-SCNC: 140 MMOL/L (ref 136–145)
WBC NRBC COR # BLD AUTO: 9.15 10*3/MM3 (ref 3.4–10.8)

## 2025-05-06 PROCEDURE — 80048 BASIC METABOLIC PNL TOTAL CA: CPT

## 2025-05-06 PROCEDURE — 36415 COLL VENOUS BLD VENIPUNCTURE: CPT

## 2025-05-06 PROCEDURE — 85025 COMPLETE CBC W/AUTO DIFF WBC: CPT

## 2025-05-06 NOTE — PATIENT INSTRUCTIONS
Go to lab to have labs drawn.  Call office for lab results if you have not received a call from our office or [employer] message in 1-2 days.    Wear compression hose daily.    Follow up with specialists per their recommendations.  Patient declines doppler lower extremity, call clinic if you change your mind.   Avoid exposure to second hand smoke  Recommend low sodium diet.   Please take blood pressure daily, measure blood pressure 1-2 hours after taking medication, keep record, bring with you to follow up.

## 2025-05-06 NOTE — PROGRESS NOTES
Subjective        Sandro Ramirez is a 74 y.o. male who presents to [facility].     Chief Complaint   Patient presents with    Hypertension    Primary Care Follow-Up       Hypertension  Associated symptoms: no chest pain, no headaches and no neck pain    Primary Care Follow-Up  Conditions present:  Other chronic condition  Pertinent negatives include no chest pain, no nausea, no fatigue, no weakness, no headaches, no neck pain, no myalgias, no abdominal pain, no cough, no sore throat, no diaphoresis and no visual change.     follow-up  Pertinent negative symptoms include no abdominal pain, no anorexia, no joint pain, no change in stool, no chest pain, no chills, no congestion, no cough, no diaphoresis, no fatigue, no fever, no headaches, no joint swelling, no myalgias, no nausea, no neck pain, no numbness, no rash, no sore throat, no swollen glands, no dysuria, no vertigo, no visual change, no vomiting and no weakness.     Patient presents for follow-up of hypertension and chronic conditions.    Hypertension CAD s/p CABG/valvular disease/A-fib on warfarin-was most recently seen in clinic 4/8/2025 at which time furosemide 20 mg daily was added to medication regimen as his blood pressure were been running 140s/70s with average heart rate 60 at home.  He was advised to have labs drawn within a week after starting furosemide, has not had labs drawn yet.  He has been taking furosemide 20 mg daily, Norvasc 10 mg daily, ASA 81 mg daily, atenolol 50 mg daily, losartan 100 mg daily.  Followed by Dr. Galloway cardiology.  Denies chest pain, palpitations, dizziness.  He has history of gout, needs to avoid HCTZ.  He was advised to measure his blood pressure an hour after taking medications but can't remember to do this, states he is measuring blood pressure when he takes medication.  Blood pressure at time of taking medications is 140's/70's in am.  He is followed by warfarin clinic.  Complains of  mild bilateral lower extremity swelling that he just noticed in the past week or so.  Denies leg pain, no redness, no warmth, no red streaks.  [id number] by pulmonology Dr. Phillips.  Taking albuterol HFA as needed, fluticasone furoate inhalation 2 inhalations daily.  He feels symptoms are best controlled when he is staying at his hunting camp in [place], [place].   Gout - followed by podiatry Dr. Lan, taking allopurinol 300mg daily, denies recent gout. Uric acid was low 3.3 in 8/2024.   Diabetes mellitus - stable - followed by endocrinology Dr. Van.  Following diet, not on medication.  Previously jardiance was cost prohibitive.  Blood glucose levels at home 90's-140 mostly, no blood glucose less than 70.  HGBa1c was 6.97 in 3/2025.  Hyperlipidemia - stable - taking atorvastatin 20mg daily, no myalgia or jaundice.  Lipid panel 10/2024 with hdl low 39 otw lipid panel wnl.  BPH with LUTS s/p greenlight TURP - is scheduled for cysto, removal of urolift clips, division of prostate band in 5/29/2025 at [facility] with Dr. Hamm.   Infrarenal AAA/s/p EVAR/iliac aneurysm - stable - followed by vascular Dr. Sainz.  Has f/u duplex in 2/2025.   Sleep apnea - wears cpap nightly, followed by Dr. Seipel.         The following portions of the patient's history were reviewed and updated as appropriate: allergies, current medications, past family history, past medical history, past social history, past surgical history and problem list.    Allergies   Allergen Reactions    Bee Venom Anaphylaxis     HIVES-SWOLLEN THROAT    Meperidine Hives    Midazolam Hives    Propofol Other (See Comments)     UNCLEAR-SVT, HYPOTENSION-STATES SEVERE ALMOST CODED    Sulfa Antibiotics Hives    Hydrocodone Nausea And Vomiting    Simvastatin Myalgia          Current Outpatient Medications:     albuterol sulfate  (90 Base) MCG/ACT inhaler, TAKE 2 PUFFS BY MOUTH EVERY 4 HOURS AS NEEDED FOR WHEEZE, Disp: 18 g, Rfl: 2    allopurinol  "(ZYLOPRIM) 300 MG tablet, Take 1 tablet by mouth Daily., Disp: , Rfl:     amLODIPine (NORVASC) 10 MG tablet, TAKE 1 TABLET BY MOUTH EVERY DAY, Disp: 90 tablet, Rfl: 0    aspirin 81 MG tablet, Take 1 tablet by mouth Daily., Disp: , Rfl:     atenolol (TENORMIN) 50 MG tablet, TAKE 1 TABLET BY MOUTH EVERY DAY AT NIGHT, Disp: 90 tablet, Rfl: 3    atorvastatin (LIPITOR) 20 MG tablet, TAKE 1 TABLET BY MOUTH EVERY DAY, Disp: 90 tablet, Rfl: 1    cetirizine (zyrTEC) 10 MG tablet, Take 1 tablet by mouth Daily., Disp: , Rfl:     fluticasone (FLONASE) 50 MCG/ACT nasal spray, Administer 2 sprays into the nostril(s) as directed by provider Daily for 90 days., Disp: 48 g, Rfl: 0    FLUTICASONE FUROATE IN, Inhale 2 Applications Daily., Disp: , Rfl:     furosemide (Lasix) 20 MG tablet, Take 1 tablet by mouth Daily., Disp: 90 tablet, Rfl: 1    losartan (COZAAR) 100 MG tablet, TAKE 1 TABLET BY MOUTH EVERY DAY, Disp: 90 tablet, Rfl: 3    warfarin (COUMADIN) 7.5 MG tablet, See Admin Instructions. Take 1 tablet by mouth daily on Saturday, Sunday, Tuesday, Wednesday, and Thursday, Disp: , Rfl:     warfarin (COUMADIN) 7.5 MG tablet, See Admin Instructions. Take 1.5 tablets by mouth on Monday and Friday, Disp: , Rfl:     Review of Systems   Constitutional:  Negative for chills, diaphoresis, fatigue and fever.   HENT:  Negative for congestion and sore throat.    Respiratory:  Negative for cough.    Cardiovascular:  Negative for chest pain.   Gastrointestinal:  Negative for abdominal pain, anorexia, nausea and vomiting.   Genitourinary:  Negative for dysuria.   Musculoskeletal:  Negative for joint pain, myalgias and neck pain.   Skin:  Negative for rash.   Neurological:  Negative for vertigo, weakness, numbness and headaches.        Objective     /65   Pulse 58   Temp 97 °F (36.1 °C) (Oral)   Resp 15   Ht 180.3 cm (70.98\")   Wt 89.4 kg (197 lb)   SpO2 94%   BMI 27.49 kg/m²         Physical Exam  Vitals and nursing note reviewed. "   Constitutional:       General: He is not in acute distress.     Appearance: Normal appearance. He is not ill-appearing or diaphoretic.      Comments: Hard of hearing, not wearing hearing aids today   HENT:      Head: Normocephalic and atraumatic.      Nose: Nose normal.      Mouth/Throat:      Mouth: Mucous membranes are moist.   Eyes:      General: No scleral icterus.     Conjunctiva/sclera: Conjunctivae normal.      Pupils: Pupils are equal, round, and reactive to light.   Neck:      Trachea: Trachea normal.   Cardiovascular:      Rate and Rhythm: Bradycardia present. Rhythm irregularly irregular.      Pulses: Normal pulses.   Pulmonary:      Effort: Pulmonary effort is normal. No respiratory distress.      Breath sounds: Normal breath sounds and air entry. No wheezing.   Abdominal:      General: Bowel sounds are normal. There is no distension.      Palpations: Abdomen is soft. There is no mass.      Tenderness: There is no abdominal tenderness. There is no guarding or rebound.   Musculoskeletal:         General: Normal range of motion.      Cervical back: Normal range of motion and neck supple.      Right lower leg: Edema (trace) present.      Left lower leg: Edema (trace) present.   Skin:     General: Skin is warm and dry.      Capillary Refill: Capillary refill takes less than 2 seconds.      Coloration: Skin is not jaundiced.   Neurological:      General: No focal deficit present.      Mental Status: He is alert and oriented to person, place, and time.      Sensory: Sensation is intact.      Gait: Gait normal.   Psychiatric:         Attention and Perception: Attention normal.         Mood and Affect: Mood and affect normal.         Speech: Speech normal.         Behavior: Behavior normal. Behavior is cooperative.         Thought Content: Thought content normal.         Cognition and Memory: Cognition normal.         Judgment: Judgment normal.              Result Review    The following data was reviewed by:  ALICJA Connor on 05/06/2025:  CMP          12/5/2024    05:16 3/14/2025    12:49 5/6/2025    09:39   CMP   Glucose 95  108  131    BUN 18  18  14    Creatinine 0.80  0.75  0.84    EGFR 94.0  95.9  92.7    Sodium 140  141  140    Potassium 4.4  4.2  4.5    Chloride 105  105  103    Calcium 9.2  9.3  9.3    Total Protein 6.9  7.2     Albumin 3.7  4.4     Globulin 3.2  2.8     Total Bilirubin 0.4  0.5     Alkaline Phosphatase 127  92     AST (SGOT) 27  24     ALT (SGPT) 28  24     Albumin/Globulin Ratio 1.2  1.6     BUN/Creatinine Ratio 22.5  24.0  16.7    Anion Gap 7.7  8.5  7.9      CBC          12/5/2024    05:16 3/14/2025    12:49 5/6/2025    09:39   CBC   WBC 11.45  8.25  9.15    RBC 4.74  5.21  5.27    Hemoglobin 14.1  14.7  14.5    Hematocrit 43.5  46.9  46.6    MCV 91.8  90.0  88.4    MCH 29.7  28.2  27.5    MCHC 32.4  31.3  31.1    RDW 12.6  13.5  13.5    Platelets 210  200  236      CBC w/diff          12/4/2024    14:14 12/5/2024    05:16 3/14/2025    12:49   CBC w/Diff   WBC 13.96  11.45  8.25    RBC 4.70  4.74  5.21    Hemoglobin 13.5  14.1  14.7    Hematocrit 42.6  43.5  46.9    MCV 90.6  91.8  90.0    MCH 28.7  29.7  28.2    MCHC 31.7  32.4  31.3    RDW 13.0  12.6  13.5    Platelets 197  210  200    Neutrophil Rel %  80.3  63.2    Immature Granulocyte Rel %  0.4  0.4    Lymphocyte Rel %  11.8  27.9    Monocyte Rel %  7.1  7.2    Eosinophil Rel %  0.1  0.7    Basophil Rel %  0.3  0.6      Lipid Panel          10/22/2024    09:00   Lipid Panel   Total Cholesterol 132    Triglycerides 84    HDL Cholesterol 39    VLDL Cholesterol 16    LDL Cholesterol  77    LDL/HDL Ratio 1.95      TSH          3/14/2025    12:49   TSH   TSH 2.170      A1C Last 3 Results          8/14/2024    08:47 10/22/2024    09:00 3/14/2025    12:49   HGBA1C Last 3 Results   Hemoglobin A1C 7.60  7.09  6.97                     Assessment & Plan    Diagnoses and all orders for this visit:    1. Primary hypertension (Primary)  -      Comprehensive Metabolic Panel; Future    2. Status post coronary artery bypass graft    3. Valvular heart disease    4. Permanent atrial fibrillation    5. Warfarin anticoagulation    6. Bilateral lower extremity edema  -     CBC & Differential; Future  -     Hepatic Function Panel; Future    7. Chronic obstructive pulmonary disease, unspecified COPD type  -     CBC Auto Differential; Future    8. Hyperuricemia  -     CBC Auto Differential; Future  -     Comprehensive Metabolic Panel; Future  -     Uric Acid; Future    9. Type 2 diabetes mellitus with hyperglycemia, without long-term current use of insulin  -     Hemoglobin A1c; Future    10. Mixed hyperlipidemia    11. Benign prostatic hyperplasia with lower urinary tract symptoms, symptom details unspecified    12. Infrarenal abdominal aortic aneurysm (AAA) without rupture    13. Obstructive sleep apnea syndrome       Patient Instructions   Go to lab to have labs drawn.  Call office for lab results if you have not received a call from our office or [employer] message in 1-2 days.    Wear compression hose daily.    Follow up with specialists per their recommendations.  Patient declines doppler lower extremity, call clinic if you change your mind.   Avoid exposure to second hand smoke  Recommend low sodium diet.   Please take blood pressure daily, measure blood pressure 1-2 hours after taking medication, keep record, bring with you to follow up.   Labs prior to follow-up visit.    Follow Up   Return in about 3 months (around 8/6/2025) for Recheck, Hypertension.    Patient was given instructions and counseling regarding his condition or for health maintenance advice. Please see specific information pulled into the AVS if appropriate.     Colette Dominguez, APRN     05/06/25

## 2025-05-16 ENCOUNTER — ANTICOAGULATION VISIT (OUTPATIENT)
Dept: CARDIOLOGY | Facility: CLINIC | Age: 75
End: 2025-05-16
Payer: MEDICARE

## 2025-05-16 ENCOUNTER — APPOINTMENT (OUTPATIENT)
Dept: GENERAL RADIOLOGY | Facility: HOSPITAL | Age: 75
End: 2025-05-16
Payer: MEDICARE

## 2025-05-16 ENCOUNTER — HOSPITAL ENCOUNTER (INPATIENT)
Facility: HOSPITAL | Age: 75
LOS: 1 days | Discharge: HOME | End: 2025-05-20
Attending: EMERGENCY MEDICINE | Admitting: INTERNAL MEDICINE
Payer: MEDICARE

## 2025-05-16 VITALS
DIASTOLIC BLOOD PRESSURE: 92 MMHG | HEART RATE: 75 BPM | WEIGHT: 196 LBS | SYSTOLIC BLOOD PRESSURE: 157 MMHG | BODY MASS INDEX: 27.35 KG/M2

## 2025-05-16 DIAGNOSIS — I48.21 PERMANENT ATRIAL FIBRILLATION: Primary | Chronic | ICD-10-CM

## 2025-05-16 DIAGNOSIS — R31.0 GROSS HEMATURIA: Primary | ICD-10-CM

## 2025-05-16 DIAGNOSIS — I48.0 PAROXYSMAL ATRIAL FIBRILLATION: ICD-10-CM

## 2025-05-16 DIAGNOSIS — K04.7 DENTAL INFECTION: ICD-10-CM

## 2025-05-16 LAB
ALBUMIN SERPL-MCNC: 4.4 G/DL (ref 3.5–5.2)
ALBUMIN/GLOB SERPL: 1.3 G/DL
ALP SERPL-CCNC: 107 U/L (ref 39–117)
ALT SERPL W P-5'-P-CCNC: 20 U/L (ref 1–41)
ANION GAP SERPL CALCULATED.3IONS-SCNC: 11.8 MMOL/L (ref 5–15)
AST SERPL-CCNC: 27 U/L (ref 1–40)
BACTERIA UR QL AUTO: ABNORMAL /HPF
BASOPHILS # BLD AUTO: 0.06 10*3/MM3 (ref 0–0.2)
BASOPHILS NFR BLD AUTO: 0.6 % (ref 0–1.5)
BILIRUB SERPL-MCNC: 0.4 MG/DL (ref 0–1.2)
BILIRUB UR QL STRIP: ABNORMAL
BUN SERPL-MCNC: 15 MG/DL (ref 8–23)
BUN/CREAT SERPL: 18.3 (ref 7–25)
CALCIUM SPEC-SCNC: 9.5 MG/DL (ref 8.6–10.5)
CHLORIDE SERPL-SCNC: 103 MMOL/L (ref 98–107)
CLARITY UR: ABNORMAL
CO2 SERPL-SCNC: 24.2 MMOL/L (ref 22–29)
COLOR UR: ABNORMAL
CREAT SERPL-MCNC: 0.82 MG/DL (ref 0.76–1.27)
D-LACTATE SERPL-SCNC: 0.6 MMOL/L (ref 0.3–2)
DEPRECATED RDW RBC AUTO: 43.2 FL (ref 37–54)
EGFRCR SERPLBLD CKD-EPI 2021: 93.3 ML/MIN/1.73
EOSINOPHIL # BLD AUTO: 0.12 10*3/MM3 (ref 0–0.4)
EOSINOPHIL NFR BLD AUTO: 1.3 % (ref 0.3–6.2)
ERYTHROCYTE [DISTWIDTH] IN BLOOD BY AUTOMATED COUNT: 13.5 % (ref 12.3–15.4)
GLOBULIN UR ELPH-MCNC: 3.3 GM/DL
GLUCOSE SERPL-MCNC: 138 MG/DL (ref 65–99)
GLUCOSE UR STRIP-MCNC: NEGATIVE MG/DL
HCT VFR BLD AUTO: 46.5 % (ref 37.5–51)
HGB BLD-MCNC: 15.1 G/DL (ref 13–17.7)
HGB UR QL STRIP.AUTO: ABNORMAL
HOLD SPECIMEN: NORMAL
HYALINE CASTS UR QL AUTO: ABNORMAL /LPF
IMM GRANULOCYTES # BLD AUTO: 0.04 10*3/MM3 (ref 0–0.05)
IMM GRANULOCYTES NFR BLD AUTO: 0.4 % (ref 0–0.5)
INR PPP: 2.39 (ref 2–3)
INR PPP: 2.9 (ref 0.9–1.1)
KETONES UR QL STRIP: NEGATIVE
LEUKOCYTE ESTERASE UR QL STRIP.AUTO: NEGATIVE
LYMPHOCYTES # BLD AUTO: 1.54 10*3/MM3 (ref 0.7–3.1)
LYMPHOCYTES NFR BLD AUTO: 16.2 % (ref 19.6–45.3)
MCH RBC QN AUTO: 28.5 PG (ref 26.6–33)
MCHC RBC AUTO-ENTMCNC: 32.5 G/DL (ref 31.5–35.7)
MCV RBC AUTO: 87.9 FL (ref 79–97)
MONOCYTES # BLD AUTO: 0.73 10*3/MM3 (ref 0.1–0.9)
MONOCYTES NFR BLD AUTO: 7.7 % (ref 5–12)
NEUTROPHILS NFR BLD AUTO: 7 10*3/MM3 (ref 1.7–7)
NEUTROPHILS NFR BLD AUTO: 73.8 % (ref 42.7–76)
NITRITE UR QL STRIP: NEGATIVE
NRBC BLD AUTO-RTO: 0 /100 WBC (ref 0–0.2)
PH UR STRIP.AUTO: 7 [PH] (ref 5–8)
PLATELET # BLD AUTO: 214 10*3/MM3 (ref 140–450)
PMV BLD AUTO: 9.4 FL (ref 6–12)
POTASSIUM SERPL-SCNC: 3.9 MMOL/L (ref 3.5–5.2)
PROT SERPL-MCNC: 7.7 G/DL (ref 6–8.5)
PROT UR QL STRIP: ABNORMAL
PROTHROMBIN TIME: 26.3 SECONDS (ref 19.4–28.5)
QT INTERVAL: 415 MS
QTC INTERVAL: 459 MS
RBC # BLD AUTO: 5.29 10*6/MM3 (ref 4.14–5.8)
RBC # UR STRIP: ABNORMAL /HPF
REF LAB TEST METHOD: ABNORMAL
SODIUM SERPL-SCNC: 139 MMOL/L (ref 136–145)
SP GR UR STRIP: 1.02 (ref 1–1.03)
SQUAMOUS #/AREA URNS HPF: ABNORMAL /HPF
UROBILINOGEN UR QL STRIP: ABNORMAL
WBC # UR STRIP: ABNORMAL /HPF
WBC NRBC COR # BLD AUTO: 9.49 10*3/MM3 (ref 3.4–10.8)

## 2025-05-16 PROCEDURE — 85610 PROTHROMBIN TIME: CPT | Performed by: EMERGENCY MEDICINE

## 2025-05-16 PROCEDURE — G0378 HOSPITAL OBSERVATION PER HR: HCPCS

## 2025-05-16 PROCEDURE — 93005 ELECTROCARDIOGRAM TRACING: CPT

## 2025-05-16 PROCEDURE — 93005 ELECTROCARDIOGRAM TRACING: CPT | Performed by: EMERGENCY MEDICINE

## 2025-05-16 PROCEDURE — 25810000003 SODIUM CHLORIDE 0.9 % SOLUTION: Performed by: EMERGENCY MEDICINE

## 2025-05-16 PROCEDURE — 81001 URINALYSIS AUTO W/SCOPE: CPT | Performed by: EMERGENCY MEDICINE

## 2025-05-16 PROCEDURE — 85610 PROTHROMBIN TIME: CPT | Performed by: INTERNAL MEDICINE

## 2025-05-16 PROCEDURE — 36415 COLL VENOUS BLD VENIPUNCTURE: CPT

## 2025-05-16 PROCEDURE — 71045 X-RAY EXAM CHEST 1 VIEW: CPT

## 2025-05-16 PROCEDURE — 87040 BLOOD CULTURE FOR BACTERIA: CPT | Performed by: EMERGENCY MEDICINE

## 2025-05-16 PROCEDURE — 80053 COMPREHEN METABOLIC PANEL: CPT | Performed by: EMERGENCY MEDICINE

## 2025-05-16 PROCEDURE — 83605 ASSAY OF LACTIC ACID: CPT

## 2025-05-16 PROCEDURE — 99285 EMERGENCY DEPT VISIT HI MDM: CPT

## 2025-05-16 PROCEDURE — 36416 COLLJ CAPILLARY BLOOD SPEC: CPT | Performed by: INTERNAL MEDICINE

## 2025-05-16 PROCEDURE — 85025 COMPLETE CBC W/AUTO DIFF WBC: CPT | Performed by: EMERGENCY MEDICINE

## 2025-05-16 RX ORDER — SODIUM CHLORIDE 9 MG/ML
40 INJECTION, SOLUTION INTRAVENOUS AS NEEDED
Status: DISCONTINUED | OUTPATIENT
Start: 2025-05-16 | End: 2025-05-20 | Stop reason: HOSPADM

## 2025-05-16 RX ORDER — HYDROCODONE BITARTRATE AND ACETAMINOPHEN 5; 325 MG/1; MG/1
1 TABLET ORAL EVERY 6 HOURS PRN
Status: COMPLETED | OUTPATIENT
Start: 2025-05-16 | End: 2025-05-17

## 2025-05-16 RX ORDER — BISACODYL 10 MG
10 SUPPOSITORY, RECTAL RECTAL DAILY PRN
Status: DISCONTINUED | OUTPATIENT
Start: 2025-05-16 | End: 2025-05-20 | Stop reason: HOSPADM

## 2025-05-16 RX ORDER — SODIUM CHLORIDE 9 MG/ML
75 INJECTION, SOLUTION INTRAVENOUS CONTINUOUS
Status: DISPENSED | OUTPATIENT
Start: 2025-05-17 | End: 2025-05-17

## 2025-05-16 RX ORDER — SODIUM CHLORIDE 0.9 % (FLUSH) 0.9 %
10 SYRINGE (ML) INJECTION AS NEEDED
Status: DISCONTINUED | OUTPATIENT
Start: 2025-05-16 | End: 2025-05-20 | Stop reason: HOSPADM

## 2025-05-16 RX ORDER — FLUTICASONE PROPIONATE 50 MCG
2 SPRAY, SUSPENSION (ML) NASAL DAILY PRN
COMMUNITY

## 2025-05-16 RX ORDER — AMOXICILLIN 250 MG
2 CAPSULE ORAL 2 TIMES DAILY PRN
Status: DISCONTINUED | OUTPATIENT
Start: 2025-05-16 | End: 2025-05-20 | Stop reason: HOSPADM

## 2025-05-16 RX ORDER — POLYETHYLENE GLYCOL 3350 17 G/17G
17 POWDER, FOR SOLUTION ORAL DAILY PRN
Status: DISCONTINUED | OUTPATIENT
Start: 2025-05-16 | End: 2025-05-20 | Stop reason: HOSPADM

## 2025-05-16 RX ORDER — ONDANSETRON 2 MG/ML
4 INJECTION INTRAMUSCULAR; INTRAVENOUS EVERY 6 HOURS PRN
Status: DISCONTINUED | OUTPATIENT
Start: 2025-05-16 | End: 2025-05-16 | Stop reason: SDUPTHER

## 2025-05-16 RX ORDER — HYDROCODONE BITARTRATE AND ACETAMINOPHEN 5; 325 MG/1; MG/1
1 TABLET ORAL ONCE AS NEEDED
Refills: 0 | Status: COMPLETED | OUTPATIENT
Start: 2025-05-16 | End: 2025-05-16

## 2025-05-16 RX ORDER — SODIUM CHLORIDE 0.9 % (FLUSH) 0.9 %
10 SYRINGE (ML) INJECTION EVERY 12 HOURS SCHEDULED
Status: DISCONTINUED | OUTPATIENT
Start: 2025-05-17 | End: 2025-05-20 | Stop reason: HOSPADM

## 2025-05-16 RX ORDER — BISACODYL 5 MG/1
5 TABLET, DELAYED RELEASE ORAL DAILY PRN
Status: DISCONTINUED | OUTPATIENT
Start: 2025-05-16 | End: 2025-05-20 | Stop reason: HOSPADM

## 2025-05-16 RX ORDER — ONDANSETRON 2 MG/ML
4 INJECTION INTRAMUSCULAR; INTRAVENOUS EVERY 6 HOURS PRN
Status: DISCONTINUED | OUTPATIENT
Start: 2025-05-16 | End: 2025-05-20 | Stop reason: HOSPADM

## 2025-05-16 RX ADMIN — HYDROCODONE BITARTRATE AND ACETAMINOPHEN 1 TABLET: 5; 325 TABLET ORAL at 22:04

## 2025-05-16 RX ADMIN — SODIUM CHLORIDE 75 ML/HR: 9 INJECTION, SOLUTION INTRAVENOUS at 23:46

## 2025-05-16 RX ADMIN — Medication 10 ML: at 23:48

## 2025-05-16 NOTE — PROGRESS NOTES
Patient's INR- 2.9, within therapeutic range. Continue current dosage and recheck in 1 month. dvLPN

## 2025-05-17 ENCOUNTER — APPOINTMENT (OUTPATIENT)
Dept: CARDIOLOGY | Facility: HOSPITAL | Age: 75
End: 2025-05-17
Payer: MEDICARE

## 2025-05-17 LAB
ANION GAP SERPL CALCULATED.3IONS-SCNC: 8.4 MMOL/L (ref 5–15)
AORTIC DIMENSIONLESS INDEX: 0.36 (DI)
AV MEAN PRESS GRAD SYS DOP V1V2: 16.4 MMHG
AV VMAX SYS DOP: 258.5 CM/SEC
BASOPHILS # BLD AUTO: 0.04 10*3/MM3 (ref 0–0.2)
BASOPHILS NFR BLD AUTO: 0.4 % (ref 0–1.5)
BH CV ECHO MEAS - ACS: 1.63 CM
BH CV ECHO MEAS - AI P1/2T: 519 MSEC
BH CV ECHO MEAS - AO MAX PG: 26.7 MMHG
BH CV ECHO MEAS - AO ROOT DIAM: 3.6 CM
BH CV ECHO MEAS - AO V2 VTI: 60.4 CM
BH CV ECHO MEAS - AVA(I,D): 1.19 CM2
BH CV ECHO MEAS - EDV(CUBED): 143.4 ML
BH CV ECHO MEAS - EDV(MOD-SP2): 153 ML
BH CV ECHO MEAS - EDV(MOD-SP4): 83.1 ML
BH CV ECHO MEAS - EF(MOD-SP2): 51.3 %
BH CV ECHO MEAS - EF(MOD-SP4): 58.6 %
BH CV ECHO MEAS - ESV(CUBED): 47.5 ML
BH CV ECHO MEAS - ESV(MOD-SP2): 74.5 ML
BH CV ECHO MEAS - ESV(MOD-SP4): 34.4 ML
BH CV ECHO MEAS - FS: 30.8 %
BH CV ECHO MEAS - IVS/LVPW: 0.9 CM
BH CV ECHO MEAS - IVSD: 1.06 CM
BH CV ECHO MEAS - LA DIMENSION: 5.8 CM
BH CV ECHO MEAS - LAT PEAK E' VEL: 7.8 CM/SEC
BH CV ECHO MEAS - LV DIASTOLIC VOL/BSA (35-75): 39.8 CM2
BH CV ECHO MEAS - LV MASS(C)D: 229.3 GRAMS
BH CV ECHO MEAS - LV MAX PG: 3.7 MMHG
BH CV ECHO MEAS - LV MEAN PG: 1.48 MMHG
BH CV ECHO MEAS - LV SYSTOLIC VOL/BSA (12-30): 16.5 CM2
BH CV ECHO MEAS - LV V1 MAX: 96.5 CM/SEC
BH CV ECHO MEAS - LV V1 VTI: 21.5 CM
BH CV ECHO MEAS - LVIDD: 5.2 CM
BH CV ECHO MEAS - LVIDS: 3.6 CM
BH CV ECHO MEAS - LVOT AREA: 3.3 CM2
BH CV ECHO MEAS - LVOT DIAM: 2.06 CM
BH CV ECHO MEAS - LVPWD: 1.18 CM
BH CV ECHO MEAS - MED PEAK E' VEL: 7.7 CM/SEC
BH CV ECHO MEAS - MR MAX PG: 92.6 MMHG
BH CV ECHO MEAS - MR MAX VEL: 481.2 CM/SEC
BH CV ECHO MEAS - MV A MAX VEL: 36.9 CM/SEC
BH CV ECHO MEAS - MV DEC SLOPE: 630.2 CM/SEC2
BH CV ECHO MEAS - MV DEC TIME: 0.18 SEC
BH CV ECHO MEAS - MV E MAX VEL: 138.7 CM/SEC
BH CV ECHO MEAS - MV E/A: 3.8
BH CV ECHO MEAS - MV MAX PG: 9.1 MMHG
BH CV ECHO MEAS - MV MEAN PG: 2.6 MMHG
BH CV ECHO MEAS - MV P1/2T: 68.9 MSEC
BH CV ECHO MEAS - MV V2 VTI: 30.9 CM
BH CV ECHO MEAS - MVA(P1/2T): 3.2 CM2
BH CV ECHO MEAS - MVA(VTI): 2.33 CM2
BH CV ECHO MEAS - PA ACC TIME: 0.09 SEC
BH CV ECHO MEAS - PA V2 MAX: 115.5 CM/SEC
BH CV ECHO MEAS - PI END-D VEL: 131.9 CM/SEC
BH CV ECHO MEAS - QP/QS: 1.45
BH CV ECHO MEAS - RAP SYSTOLE: 3 MMHG
BH CV ECHO MEAS - RV MAX PG: 4.2 MMHG
BH CV ECHO MEAS - RV V1 MAX: 102.2 CM/SEC
BH CV ECHO MEAS - RV V1 VTI: 14.4 CM
BH CV ECHO MEAS - RVDD: 3.5 CM
BH CV ECHO MEAS - RVOT DIAM: 3 CM
BH CV ECHO MEAS - RVSP: 45 MMHG
BH CV ECHO MEAS - SV(LVOT): 71.8 ML
BH CV ECHO MEAS - SV(MOD-SP2): 78.5 ML
BH CV ECHO MEAS - SV(MOD-SP4): 48.7 ML
BH CV ECHO MEAS - SV(RVOT): 104 ML
BH CV ECHO MEAS - SVI(LVOT): 34.4 ML/M2
BH CV ECHO MEAS - SVI(MOD-SP2): 37.6 ML/M2
BH CV ECHO MEAS - SVI(MOD-SP4): 23.3 ML/M2
BH CV ECHO MEAS - TAPSE (>1.6): 1.65 CM
BH CV ECHO MEAS - TR MAX PG: 42.4 MMHG
BH CV ECHO MEAS - TR MAX VEL: 325.6 CM/SEC
BH CV ECHO MEASUREMENTS AVERAGE E/E' RATIO: 17.9
BH CV XLRA - RV BASE: 4.3 CM
BH CV XLRA - RV MID: 3.2 CM
BH CV XLRA - TDI S': 6.7 CM/SEC
BUN SERPL-MCNC: 11 MG/DL (ref 8–23)
BUN/CREAT SERPL: 12.9 (ref 7–25)
CALCIUM SPEC-SCNC: 9.6 MG/DL (ref 8.6–10.5)
CHLORIDE SERPL-SCNC: 100 MMOL/L (ref 98–107)
CO2 SERPL-SCNC: 27.6 MMOL/L (ref 22–29)
CREAT SERPL-MCNC: 0.85 MG/DL (ref 0.76–1.27)
DEPRECATED RDW RBC AUTO: 43.1 FL (ref 37–54)
EGFRCR SERPLBLD CKD-EPI 2021: 92.3 ML/MIN/1.73
EOSINOPHIL # BLD AUTO: 0.08 10*3/MM3 (ref 0–0.4)
EOSINOPHIL NFR BLD AUTO: 0.9 % (ref 0.3–6.2)
ERYTHROCYTE [DISTWIDTH] IN BLOOD BY AUTOMATED COUNT: 13.4 % (ref 12.3–15.4)
GEN 5 1HR TROPONIN T REFLEX: 16 NG/L
GLUCOSE SERPL-MCNC: 132 MG/DL (ref 65–99)
HCT VFR BLD AUTO: 48.2 % (ref 37.5–51)
HGB BLD-MCNC: 15.6 G/DL (ref 13–17.7)
IMM GRANULOCYTES # BLD AUTO: 0.02 10*3/MM3 (ref 0–0.05)
IMM GRANULOCYTES NFR BLD AUTO: 0.2 % (ref 0–0.5)
INR PPP: 1.95 (ref 2–3)
LEFT ATRIUM VOLUME INDEX: 40.7 ML/M2
LV EF 3D SEGMENTATION: 56 %
LV EF BIPLANE MOD: 55.5 %
LYMPHOCYTES # BLD AUTO: 2.37 10*3/MM3 (ref 0.7–3.1)
LYMPHOCYTES NFR BLD AUTO: 26 % (ref 19.6–45.3)
MAGNESIUM SERPL-MCNC: 2.1 MG/DL (ref 1.6–2.4)
MCH RBC QN AUTO: 28.2 PG (ref 26.6–33)
MCHC RBC AUTO-ENTMCNC: 32.4 G/DL (ref 31.5–35.7)
MCV RBC AUTO: 87.2 FL (ref 79–97)
MONOCYTES # BLD AUTO: 0.76 10*3/MM3 (ref 0.1–0.9)
MONOCYTES NFR BLD AUTO: 8.4 % (ref 5–12)
NEUTROPHILS NFR BLD AUTO: 5.83 10*3/MM3 (ref 1.7–7)
NEUTROPHILS NFR BLD AUTO: 64.1 % (ref 42.7–76)
NRBC BLD AUTO-RTO: 0 /100 WBC (ref 0–0.2)
PLATELET # BLD AUTO: 219 10*3/MM3 (ref 140–450)
PMV BLD AUTO: 9.2 FL (ref 6–12)
POTASSIUM SERPL-SCNC: 3.9 MMOL/L (ref 3.5–5.2)
PROTHROMBIN TIME: 22.3 SECONDS (ref 19.4–28.5)
RBC # BLD AUTO: 5.53 10*6/MM3 (ref 4.14–5.8)
SINUS: 3.6 CM
SODIUM SERPL-SCNC: 136 MMOL/L (ref 136–145)
STJ: 2.47 CM
TROPONIN T NUMERIC DELTA: -1 NG/L
TROPONIN T SERPL HS-MCNC: 17 NG/L
TSH SERPL DL<=0.05 MIU/L-ACNC: 3.83 UIU/ML (ref 0.27–4.2)
WBC NRBC COR # BLD AUTO: 9.1 10*3/MM3 (ref 3.4–10.8)

## 2025-05-17 PROCEDURE — 93005 ELECTROCARDIOGRAM TRACING: CPT

## 2025-05-17 PROCEDURE — 80048 BASIC METABOLIC PNL TOTAL CA: CPT | Performed by: EMERGENCY MEDICINE

## 2025-05-17 PROCEDURE — 25810000003 SODIUM CHLORIDE 0.9 % SOLUTION: Performed by: EMERGENCY MEDICINE

## 2025-05-17 PROCEDURE — 93005 ELECTROCARDIOGRAM TRACING: CPT | Performed by: EMERGENCY MEDICINE

## 2025-05-17 PROCEDURE — 25010000002 MORPHINE PER 10 MG: Performed by: EMERGENCY MEDICINE

## 2025-05-17 PROCEDURE — 83735 ASSAY OF MAGNESIUM: CPT | Performed by: NURSE PRACTITIONER

## 2025-05-17 PROCEDURE — 93306 TTE W/DOPPLER COMPLETE: CPT | Performed by: INTERNAL MEDICINE

## 2025-05-17 PROCEDURE — 93010 ELECTROCARDIOGRAM REPORT: CPT | Performed by: INTERNAL MEDICINE

## 2025-05-17 PROCEDURE — 93356 MYOCRD STRAIN IMG SPCKL TRCK: CPT | Performed by: INTERNAL MEDICINE

## 2025-05-17 PROCEDURE — 84484 ASSAY OF TROPONIN QUANT: CPT

## 2025-05-17 PROCEDURE — 99214 OFFICE O/P EST MOD 30 MIN: CPT | Performed by: INTERNAL MEDICINE

## 2025-05-17 PROCEDURE — 25010000002 HYDRALAZINE PER 20 MG

## 2025-05-17 PROCEDURE — 93306 TTE W/DOPPLER COMPLETE: CPT

## 2025-05-17 PROCEDURE — 84443 ASSAY THYROID STIM HORMONE: CPT | Performed by: NURSE PRACTITIONER

## 2025-05-17 PROCEDURE — 85610 PROTHROMBIN TIME: CPT

## 2025-05-17 PROCEDURE — G0378 HOSPITAL OBSERVATION PER HR: HCPCS

## 2025-05-17 PROCEDURE — 85025 COMPLETE CBC W/AUTO DIFF WBC: CPT | Performed by: EMERGENCY MEDICINE

## 2025-05-17 PROCEDURE — 93356 MYOCRD STRAIN IMG SPCKL TRCK: CPT

## 2025-05-17 RX ORDER — AMLODIPINE BESYLATE 5 MG/1
10 TABLET ORAL DAILY
Status: DISCONTINUED | OUTPATIENT
Start: 2025-05-18 | End: 2025-05-17 | Stop reason: ALTCHOICE

## 2025-05-17 RX ORDER — LOSARTAN POTASSIUM 50 MG/1
100 TABLET ORAL DAILY
Status: DISCONTINUED | OUTPATIENT
Start: 2025-05-17 | End: 2025-05-20 | Stop reason: HOSPADM

## 2025-05-17 RX ORDER — AMLODIPINE BESYLATE 5 MG/1
10 TABLET ORAL NIGHTLY
Status: DISCONTINUED | OUTPATIENT
Start: 2025-05-17 | End: 2025-05-17

## 2025-05-17 RX ORDER — ATENOLOL 50 MG/1
50 TABLET ORAL NIGHTLY
Status: DISCONTINUED | OUTPATIENT
Start: 2025-05-17 | End: 2025-05-19

## 2025-05-17 RX ORDER — ALLOPURINOL 300 MG/1
300 TABLET ORAL NIGHTLY
Status: DISCONTINUED | OUTPATIENT
Start: 2025-05-18 | End: 2025-05-20 | Stop reason: HOSPADM

## 2025-05-17 RX ORDER — MORPHINE SULFATE 2 MG/ML
2 INJECTION, SOLUTION INTRAMUSCULAR; INTRAVENOUS ONCE
Status: COMPLETED | OUTPATIENT
Start: 2025-05-17 | End: 2025-05-17

## 2025-05-17 RX ORDER — HYDROCODONE BITARTRATE AND ACETAMINOPHEN 5; 325 MG/1; MG/1
1 TABLET ORAL ONCE
Refills: 0 | Status: COMPLETED | OUTPATIENT
Start: 2025-05-17 | End: 2025-05-17

## 2025-05-17 RX ORDER — ACETAMINOPHEN 325 MG/1
650 TABLET ORAL EVERY 6 HOURS PRN
Status: DISCONTINUED | OUTPATIENT
Start: 2025-05-17 | End: 2025-05-20 | Stop reason: HOSPADM

## 2025-05-17 RX ORDER — HYDRALAZINE HYDROCHLORIDE 20 MG/ML
10 INJECTION INTRAMUSCULAR; INTRAVENOUS EVERY 6 HOURS PRN
Status: DISCONTINUED | OUTPATIENT
Start: 2025-05-17 | End: 2025-05-20 | Stop reason: HOSPADM

## 2025-05-17 RX ORDER — ATORVASTATIN CALCIUM 20 MG/1
20 TABLET, FILM COATED ORAL NIGHTLY
Status: DISCONTINUED | OUTPATIENT
Start: 2025-05-17 | End: 2025-05-17

## 2025-05-17 RX ORDER — AMOXICILLIN 250 MG/1
500 CAPSULE ORAL 3 TIMES DAILY
Status: DISCONTINUED | OUTPATIENT
Start: 2025-05-17 | End: 2025-05-20 | Stop reason: HOSPADM

## 2025-05-17 RX ORDER — AMLODIPINE BESYLATE 5 MG/1
5 TABLET ORAL
Status: DISCONTINUED | OUTPATIENT
Start: 2025-05-18 | End: 2025-05-20 | Stop reason: HOSPADM

## 2025-05-17 RX ORDER — FUROSEMIDE 20 MG/1
20 TABLET ORAL DAILY
Status: DISCONTINUED | OUTPATIENT
Start: 2025-05-17 | End: 2025-05-20 | Stop reason: HOSPADM

## 2025-05-17 RX ORDER — WARFARIN SODIUM 5 MG/1
7.5 TABLET ORAL ONCE
Status: COMPLETED | OUTPATIENT
Start: 2025-05-17 | End: 2025-05-17

## 2025-05-17 RX ORDER — CETIRIZINE HYDROCHLORIDE 10 MG/1
10 TABLET ORAL DAILY PRN
Status: DISCONTINUED | OUTPATIENT
Start: 2025-05-17 | End: 2025-05-20 | Stop reason: HOSPADM

## 2025-05-17 RX ORDER — HYDROCODONE BITARTRATE AND ACETAMINOPHEN 5; 325 MG/1; MG/1
1 TABLET ORAL EVERY 6 HOURS PRN
Refills: 0 | Status: DISCONTINUED | OUTPATIENT
Start: 2025-05-17 | End: 2025-05-17

## 2025-05-17 RX ORDER — ATORVASTATIN CALCIUM 20 MG/1
20 TABLET, FILM COATED ORAL DAILY
Status: DISCONTINUED | OUTPATIENT
Start: 2025-05-18 | End: 2025-05-20 | Stop reason: HOSPADM

## 2025-05-17 RX ORDER — MORPHINE SULFATE 2 MG/ML
1 INJECTION, SOLUTION INTRAMUSCULAR; INTRAVENOUS EVERY 4 HOURS PRN
Status: DISCONTINUED | OUTPATIENT
Start: 2025-05-17 | End: 2025-05-20 | Stop reason: HOSPADM

## 2025-05-17 RX ORDER — ALLOPURINOL 300 MG/1
300 TABLET ORAL NIGHTLY
Status: DISCONTINUED | OUTPATIENT
Start: 2025-05-17 | End: 2025-05-17

## 2025-05-17 RX ORDER — ATENOLOL 50 MG/1
50 TABLET ORAL NIGHTLY
Status: DISCONTINUED | OUTPATIENT
Start: 2025-05-17 | End: 2025-05-17

## 2025-05-17 RX ORDER — ALBUTEROL SULFATE 0.83 MG/ML
2.5 SOLUTION RESPIRATORY (INHALATION) EVERY 6 HOURS PRN
Status: DISCONTINUED | OUTPATIENT
Start: 2025-05-17 | End: 2025-05-20 | Stop reason: HOSPADM

## 2025-05-17 RX ORDER — HYDROCODONE BITARTRATE AND ACETAMINOPHEN 5; 325 MG/1; MG/1
1 TABLET ORAL EVERY 6 HOURS PRN
Refills: 0 | Status: DISCONTINUED | OUTPATIENT
Start: 2025-05-17 | End: 2025-05-20 | Stop reason: HOSPADM

## 2025-05-17 RX ADMIN — AMOXICILLIN 500 MG: 250 CAPSULE ORAL at 09:19

## 2025-05-17 RX ADMIN — HYDRALAZINE HYDROCHLORIDE 10 MG: 20 INJECTION INTRAMUSCULAR; INTRAVENOUS at 20:42

## 2025-05-17 RX ADMIN — SODIUM CHLORIDE 75 ML/HR: 9 INJECTION, SOLUTION INTRAVENOUS at 13:43

## 2025-05-17 RX ADMIN — FUROSEMIDE 20 MG: 20 TABLET ORAL at 09:19

## 2025-05-17 RX ADMIN — HYDROCODONE BITARTRATE AND ACETAMINOPHEN 1 TABLET: 5; 325 TABLET ORAL at 20:32

## 2025-05-17 RX ADMIN — MORPHINE SULFATE 2 MG: 2 INJECTION, SOLUTION INTRAMUSCULAR; INTRAVENOUS at 02:19

## 2025-05-17 RX ADMIN — AMOXICILLIN 500 MG: 250 CAPSULE ORAL at 17:21

## 2025-05-17 RX ADMIN — WARFARIN SODIUM 7.5 MG: 5 TABLET ORAL at 02:02

## 2025-05-17 RX ADMIN — Medication 10 ML: at 20:43

## 2025-05-17 RX ADMIN — Medication 5 MG: at 02:02

## 2025-05-17 RX ADMIN — LOSARTAN POTASSIUM 100 MG: 50 TABLET, FILM COATED ORAL at 09:19

## 2025-05-17 RX ADMIN — ALLOPURINOL 300 MG: 300 TABLET ORAL at 02:02

## 2025-05-17 RX ADMIN — HYDROCODONE BITARTRATE AND ACETAMINOPHEN 1 TABLET: 5; 325 TABLET ORAL at 17:21

## 2025-05-17 RX ADMIN — Medication 10 ML: at 09:40

## 2025-05-17 RX ADMIN — ATORVASTATIN CALCIUM 20 MG: 20 TABLET, FILM COATED ORAL at 02:02

## 2025-05-17 RX ADMIN — Medication 5 MG: at 20:32

## 2025-05-17 RX ADMIN — AMLODIPINE BESYLATE 10 MG: 5 TABLET ORAL at 02:02

## 2025-05-17 RX ADMIN — ATENOLOL 50 MG: 50 TABLET ORAL at 02:02

## 2025-05-17 RX ADMIN — AMOXICILLIN 500 MG: 250 CAPSULE ORAL at 20:32

## 2025-05-17 NOTE — H&P
"Pending sale to [facility] H&P    Patient Name: Sandro Ramirez  : 1950  MRN: [id number]  Primary Care Physician: Colette Dominguez APRN  Date of admission: 2025     Patient Care Team:  Colette Dominguez APRN as PCP - General (Nurse Practitioner)  Thai Galloway MD as Consulting Physician (Interventional Cardiology)  Seipel, Joseph F, MD as Consulting Physician (Neurology)  Daniel Hamm MD as Consulting Physician (Urology)  Abbey Mary APRN as Nurse Practitioner (Nurse Practitioner)  Shawn Van MD as Consulting Physician (Endocrinology)  Giovanny Lan Jr., DPM as Consulting Physician (Podiatry)          Subjective   History Present Illness     Chief Complaint:   Chief Complaint   Patient presents with    Shortness of Breath         History of Present Illness  Obtained from ED provider HPI on 2025:  History of present illness this is a 74-year-old male whose got multitude of health problems and takes anticoagulants who reports a 2-day history of passing blood in his urine and blood clots he states will be a blood clot initially and then is just all blood afterwards he is able to urinate without difficulty he states no dysuria or frequency.  No trauma.  He does complain subjectively of chills although has had no fever.  He denies any recent long car ride plane or immobilization foreign travels.  Patient recently had some dental work done.  Nothing otherwise seem to trigger this or make it better or worse.    25:  Patient confirms the HPI noted above reporting sudden onset of gross hematuria on the day of presentation.  Patient initially notes the passage of bright red blood in his urine and subsequently began to experience some clots as well though did not experience any urinary retention or pain associated with the symptoms.  He has not had any fever, changes in bowel habits, nausea or vomiting.  Additionally patient reports that he has a planned procedure to remove a \"clip\" in " his prostate from a previous surgery scheduled as an outpatient.  He is compliant with all of his outpatient medical therapies including warfarin.  Patient is currently in atrial fibrillation and has been experiencing heart rates into the 30s during his admission but denies any associated symptoms.        Review of Systems   Constitutional: Negative.   HENT: Negative.     Eyes: Negative.    Cardiovascular: Negative.    Respiratory: Negative.     Skin: Negative.    Musculoskeletal: Negative.    Gastrointestinal: Negative.    Genitourinary:  Positive for hematuria.   Neurological: Negative.    Psychiatric/Behavioral: Negative.         Personal History     Past Medical History:   Past Medical History:   Diagnosis Date    AAA (abdominal aortic aneurysm) 2006    Abnormal ECG     Allergic     Aneurysm     Arthritis     Asthma     Atrial fibrillation     COUMADIN    Benign prostatic hyperplasia     Bladder cancer     CHF (congestive heart failure)     Chronic anticoagulation     COUMADIN STARTED 2006    Clotting disorder     Colon polyp     Congenital heart disease     COPD (chronic obstructive pulmonary disease)     Coronary artery disease     Diabetes mellitus     treat with diet    Ehrlichiosis     Elevated cholesterol     Erectile dysfunction     Facial basal cell cancer     Gout     Hard to intubate     History of pancreatitis 2006    History of pneumonia     History of tick-borne disease 2015    HL (hearing loss)     BILAT HEARING AIDS    Hyperlipidemia     Hypertension     Myocardial infarction     X5    Obesity     Pancreatitis     Pneumonia     Sleep apnea     CURRENTLY NOT USING CPAP D/T RECALL    Transfusion history     STATES HIS SISTER HAD A SEVERE REACTION TO BLOOD TRANSFUSION    Type 2 diabetes mellitus        Surgical History:      Past Surgical History:   Procedure Laterality Date    ARTERIAL ANEURYSM REPAIR      ARTERIOGRAM AORTIC Left 06/29/2022    Procedure: AORTIC STENT GRAFT PLACEMENT WITH ILIAC  COILING;  Surgeon: Eric Sainz MD;  Location: [facility] HYBRID OR 18/19;  Service: Vascular;  Laterality: Left;    CARDIAC CATHETERIZATION      CARDIAC SURGERY      CHOLECYSTECTOMY      COLONOSCOPY      COLONOSCOPY N/A 07/27/2023    Procedure: COLONOSCOPY to cecum with cold biopsy and cold snare polypectomies and clips;  Surgeon: Vitor Haley MD;  [facility];  Service: Gastroenterology;  Laterality: N/A;  Pre - H/O polyps.  Post - diverticulosis, polyps, hemorrhoids    COLONOSCOPY W/ POLYPECTOMY N/A 08/28/2024    Procedure: COLONOSCOPY WITH POLYPECTOMY;  Surgeon: Vitor Haley MD;  Location:  [facility] OR;  Service: Gastroenterology;  Laterality: N/A;  diverticulosis, sigmoid polyp x2    CORONARY ARTERY BYPASS GRAFT  2006    X2    CYSTOSCOPY      STATES HAD BLADDER TUMORS REMOVED X2    CYSTOSCOPY WITH CLOT EVACUATION N/A 01/17/2024    Procedure: CYSTOSCOPY WITH CLOT EVACUATION, WITH FULGRATION;  Surgeon: Daniel Hamm MD;  Location: [facility] MAIN OR;  Service: Urology;  Laterality: N/A;    PANCREAS SURGERY      PROSTATE SURGERY      X2- STATES PLACED COILS IN TO HELP WITH FLOW AND THEN HAD TO REMOVE A PIECE THAT BROKE OFF AND WAS IN BLADDER    UPPER GASTROINTESTINAL ENDOSCOPY      VEIN SURGERY             Family History: family history includes Arthritis in his mother; Cancer in his brother, father, and mother; Colon cancer in his brother and mother; Diabetes in his mother; Hearing loss in his father; Heart attack in his father and paternal grandfather; Heart disease in his father and paternal grandfather; Hypertension in his brother, mother, and sister. Otherwise pertinent FHx was reviewed and unremarkable.     Social History:  reports that he quit smoking about 18 years ago. His smoking use included cigarettes. He started smoking about 48 years ago. He has a 30 pack-year smoking history. He has been exposed to tobacco smoke. He has never used smokeless tobacco. He reports that he  does not currently use alcohol. He reports that he does not use drugs.      Medications:  Prior to Admission medications    Medication Sig Start Date End Date Taking? Authorizing Provider   albuterol sulfate  (90 Base) MCG/ACT inhaler TAKE 2 PUFFS BY MOUTH EVERY 4 HOURS AS NEEDED FOR WHEEZE 3/17/25  Yes Lyell, Reggie Duane, MD   allopurinol (ZYLOPRIM) 300 MG tablet Take 1 tablet by mouth Every Night. 10/22/24  Yes Kelby Aguilar MD   amLODIPine (NORVASC) 10 MG tablet TAKE 1 TABLET BY MOUTH EVERY DAY 3/17/25  Yes Colette Dominguez APRN   amoxicillin (AMOXIL) 500 MG capsule Take 1 capsule by mouth 3 (Three) Times a Day for 10 days. 5/16/25 5/26/25 Yes Colette Adames APRN   aspirin 81 MG tablet Take 1 tablet by mouth Daily. 12/23/11  Yes Kelby Aguilar MD   atenolol (TENORMIN) 50 MG tablet TAKE 1 TABLET BY MOUTH EVERY DAY AT NIGHT 6/6/24  Yes Thai Galloway MD   atorvastatin (LIPITOR) 20 MG tablet TAKE 1 TABLET BY MOUTH EVERY DAY 2/10/25  Yes Thai Galloway MD   cetirizine (zyrTEC) 10 MG tablet Take 1 tablet by mouth Daily As Needed for Allergies.   Yes Kelby Aguilar MD   fluticasone (FLONASE) 50 MCG/ACT nasal spray Administer 2 sprays into the nostril(s) as directed by provider Daily As Needed for Rhinitis.   Yes Kelby Aguilar MD   furosemide (Lasix) 20 MG tablet Take 1 tablet by mouth Daily. 4/8/25  Yes Colette Dominguez APRN   losartan (COZAAR) 100 MG tablet TAKE 1 TABLET BY MOUTH EVERY DAY 12/19/24  Yes Thai Galloway MD   warfarin (COUMADIN) 7.5 MG tablet See Admin Instructions. Take 1 tablet by mouth daily on Saturday, Sunday, Tuesday, Wednesday, and Thursday   Yes Kelby Aguilar MD   warfarin (COUMADIN) 7.5 MG tablet See Admin Instructions. Take 1.5 tablets by mouth on Monday and Friday   Yes Kelby Aguilar MD       Allergies:    Allergies   Allergen Reactions    Bee Venom Anaphylaxis     HIVES-SWOLLEN THROAT    Meperidine Hives    Midazolam Hives    Propofol Other  (See Comments)     UNCLEAR-SVT, HYPOTENSION-STATES SEVERE ALMOST CODED    Sulfa Antibiotics Hives    Hydrocodone Nausea And Vomiting    Simvastatin Myalgia       Objective   Objective     Vital Signs  Temp:  [97.5 °F (36.4 °C)-98.8 °F (37.1 °C)] 97.5 °F (36.4 °C)  Heart Rate:  [] 41  Resp:  [16-22] 16  BP: (130-191)/(60-89) 130/63  SpO2:  [83 %-95 %] 94 %  on   ;   Device (Oxygen Therapy): room air  Body mass index is 27.3 kg/m².    Physical Exam  Vitals reviewed.   Constitutional:       General: He is not in acute distress.     Appearance: Normal appearance. He is normal weight. He is not ill-appearing, toxic-appearing or diaphoretic.   HENT:      Head: Normocephalic.      Right Ear: External ear normal.      Left Ear: External ear normal.      Nose: Nose normal.      Mouth/Throat:      Mouth: Mucous membranes are moist.   Eyes:      Extraocular Movements: Extraocular movements intact.   Cardiovascular:      Rate and Rhythm: Bradycardia present. Rhythm irregular.      Pulses: Normal pulses.      Heart sounds: Normal heart sounds.   Pulmonary:      Effort: Pulmonary effort is normal.      Breath sounds: Normal breath sounds.   Abdominal:      General: Bowel sounds are normal.      Palpations: Abdomen is soft.      Tenderness: There is no abdominal tenderness.   Musculoskeletal:         General: Normal range of motion.      Cervical back: Normal range of motion.      Right lower leg: No edema.      Left lower leg: No edema.   Skin:     General: Skin is warm and dry.      Capillary Refill: Capillary refill takes less than 2 seconds.   Neurological:      General: No focal deficit present.      Mental Status: He is alert and oriented to person, place, and time.   Psychiatric:         Mood and Affect: Mood normal.         Behavior: Behavior normal.         Thought Content: Thought content normal.         Judgment: Judgment normal.       Results Review:  I have personally reviewed most recent cardiac tracings, lab  results, and radiology images and interpretations and agree with findings, most notably: Troponin, CMP, CBC, chest x-ray, UA and EKG.    Results from last 7 days   Lab Units 05/17/25  0340 05/17/25  0149   WBC 10*3/mm3  --  9.10   HEMOGLOBIN g/dL  --  15.6   HEMATOCRIT %  --  48.2   PLATELETS 10*3/mm3  --  219   INR  1.95*  --      Results from last 7 days   Lab Units 05/17/25  0550 05/17/25  0435 05/17/25  0149 05/16/25  1932 05/16/25  1925   SODIUM mmol/L  --   --  136  --  139   POTASSIUM mmol/L  --   --  3.9  --  3.9   CHLORIDE mmol/L  --   --  100  --  103   CO2 mmol/L  --   --  27.6  --  24.2   BUN mg/dL  --   --  11  --  15   CREATININE mg/dL  --   --  0.85  --  0.82   GLUCOSE mg/dL  --   --  132*  --  138*   CALCIUM mg/dL  --   --  9.6  --  9.5   ALK PHOS U/L  --   --   --   --  107   ALT (SGPT) U/L  --   --   --   --  20   AST (SGOT) U/L  --   --   --   --  27   HSTROP T ng/L 16 17  --   --   --    LACTATE mmol/L  --   --   --  0.6  --      Estimated Creatinine Clearance: 98.7 mL/min (by C-G formula based on SCr of 0.85 mg/dL).  Brief Urine Lab Results  (Last result in the past 365 days)        Color   Clarity   Blood   Leuk Est   Nitrite   Protein   CREAT   Urine HCG        05/16/25 1925 Red  Comment: Any Substance that causes an abnormal urine color can alter the accuracy of the chemical reactions.   Turbid  Comment: Result checked     Large (3+)   Negative   Negative   >=300 mg/dL (3+)                   Microbiology Results (last 10 days)       ** No results found for the last 240 hours. **            ECG/EMG Results (most recent)       Procedure Component Value Units Date/Time    ECG 12 Lead Dyspnea [[id number]] Collected: 05/16/25 1848     Updated: 05/16/25 1850     QT Interval 415 ms      QTC Interval 459 ms     Narrative:      HEART RATE=73  bpm  RR Rciyypgh=019  ms  NV Interval=  ms  P Horizontal Axis=  deg  P Front Axis=  deg  QRSD Xnkjobht=606  ms  QT Ezsiitob=782  ms  GTdH=830  ms  QRS Axis=-123   deg  T Wave Axis=54  deg  - ABNORMAL ECG -  Atrial fibrillation  Right bundle branch block  Date and Time of Study:2025-05-16 18:48:54    Telemetry Scan [[id number]] Resulted: 05/16/25     Updated: 05/17/25 0843            Results for orders placed during the hospital encounter of 02/24/25    Duplex Aorta IVC Iliac Graft Complete CAR    Interpretation Summary    Patent abdominal aortic stent graft without obvious endoleak identified.  Aneurysm today measures 5 cm with slight interval growth from prior study.      Results for orders placed during the hospital encounter of 10/19/23    Adult Transthoracic Echo Complete W/ Cont if Necessary Per Protocol    Interpretation Summary    Left ventricular ejection fraction appears to be 51 - 55%.    The right ventricular cavity is mildly dilated.    The left atrial cavity is moderately dilated.    The right atrial cavity is dilated.    Moderate aortic valve regurgitation is present.    Moderate mitral valve regurgitation is present.    Estimated right ventricular systolic pressure from tricuspid regurgitation is mildly elevated (35-45 mmHg).      XR Chest 1 View  Result Date: 5/16/2025  Impression: No acute process. Electronically Signed: Harvey Khoury MD  5/16/2025 8:00 PM EDT  Workstation ID: [id number]        Estimated Creatinine Clearance: 98.7 mL/min (by C-G formula based on SCr of 0.85 mg/dL).    Assessment & Plan   Assessment/Plan       Active Hospital Problems    Diagnosis  POA    **Gross hematuria [R31.0]  Yes      Resolved Hospital Problems   No resolved problems to display.     Hematuria  - Hemoglobin: 15.1, 15.6  - INR: 2.90, 2.39, 1.95  - Hold warfarin for now  - Urology consulted  - Monitor H&H while admitted    Bradycardia with a history of A-fib  - Patient having heart rates at times sustained in the 30s  - Troponin: 17, 16  - Chest x-ray showed no acute process  - EKG in the ED showed A-fib at 73 with a right bundle branch block and a QTc of 459 ms  - Cardiology  was consulted who recommended continuing to hold atenolol and warfarin for now with continued monitoring and plans for evaluation by primary cardiologist on 5/19 for watchman placement and possible pacer placement if heart rates remain bradycardic despite holding beta-blocker  - Hospitalist consulted    Hypertension  - Will controlled   BP Readings from Last 1 Encounters:   05/17/25 130/63   - Continue amlodipine, Lasix and losartan  - Monitor while admitted    Hyperlipidemia  - Statin      VTE Prophylaxis - Active VTE Prophylaxis  Mechanical:        Start        05/16/25 2330  Maintain Sequential Compression Device  Continuous                          Select Pharmacologic VTE Prophylaxis if Desired & Appropriate      CODE STATUS:    Code Status and Medical Interventions: CPR (Attempt to Resuscitate); Full Support   Ordered at: 05/17/25 0648     Code Status (Patient has no pulse and is not breathing):    CPR (Attempt to Resuscitate)     Medical Interventions (Patient has pulse or is breathing):    Full Support       This patient has been examined wearing personal protective equipment.     I discussed the patient's findings and my recommendations with patient and nursing staff.      Signature:Electronically signed by Aly Figueroa PA-C, 05/17/25, 11:26 AM EDT.

## 2025-05-17 NOTE — ED PROVIDER NOTES
Subjective   History of Present Illness  Chief complaint urinating blood and blood clots    History of present illness this is a 74-year-old male whose got multitude of health problems and takes anticoagulants who reports a 2-day history of passing blood in his urine and blood clots he states will be a blood clot initially and then is just all blood afterwards he is able to urinate without difficulty he states no dysuria or frequency.  No trauma.  He does complain subjectively of chills although has had no fever.  He denies any recent long car ride plane or immobilization foreign travels.  Patient recently had some dental work done.  Nothing otherwise seem to trigger this or make it better or worse.      Review of Systems   Constitutional:  Positive for chills. Negative for fever.   Respiratory:  Positive for shortness of breath. Negative for chest tightness.    Cardiovascular:  Negative for chest pain.   Gastrointestinal:  Negative for abdominal pain, blood in stool and vomiting.   Genitourinary:  Positive for hematuria. Negative for difficulty urinating, dysuria and testicular pain.   Musculoskeletal:  Negative for back pain.   Skin:  Negative for rash.   Neurological:  Negative for dizziness and light-headedness.   Psychiatric/Behavioral:  Negative for confusion.        Past Medical History:   Diagnosis Date    AAA (abdominal aortic aneurysm) 2006    Abnormal ECG     Allergic     Aneurysm     Arthritis     Asthma     Atrial fibrillation     COUMADIN    Benign prostatic hyperplasia     Bladder cancer     CHF (congestive heart failure)     Chronic anticoagulation     COUMADIN STARTED 2006    Clotting disorder     Colon polyp     Congenital heart disease     COPD (chronic obstructive pulmonary disease)     Coronary artery disease     Diabetes mellitus     treat with diet    Ehrlichiosis     Elevated cholesterol     Erectile dysfunction     Facial basal cell cancer     Gout     Hard to intubate     History of  pancreatitis 2006    History of pneumonia     History of tick-borne disease 2015    HL (hearing loss)     BILAT HEARING AIDS    Hyperlipidemia     Hypertension     Myocardial infarction     X5    Obesity     Pancreatitis     Pneumonia     Sleep apnea     CURRENTLY NOT USING CPAP D/T RECALL    Transfusion history     STATES HIS SISTER HAD A SEVERE REACTION TO BLOOD TRANSFUSION    Type 2 diabetes mellitus        Allergies   Allergen Reactions    Bee Venom Anaphylaxis     HIVES-SWOLLEN THROAT    Meperidine Hives    Midazolam Hives    Propofol Other (See Comments)     UNCLEAR-SVT, HYPOTENSION-STATES SEVERE ALMOST CODED    Sulfa Antibiotics Hives    Hydrocodone Nausea And Vomiting    Simvastatin Myalgia       Past Surgical History:   Procedure Laterality Date    ARTERIAL ANEURYSM REPAIR      ARTERIOGRAM AORTIC Left 06/29/2022    Procedure: AORTIC STENT GRAFT PLACEMENT WITH ILIAC COILING;  Surgeon: Eric Sainz MD;  Location: [facility] OR 18/19;  Service: Vascular;  Laterality: Left;    CARDIAC CATHETERIZATION      CARDIAC SURGERY      CHOLECYSTECTOMY      COLONOSCOPY      COLONOSCOPY N/A 07/27/2023    Procedure: COLONOSCOPY to cecum with cold biopsy and cold snare polypectomies and clips;  Surgeon: Vitor Haley MD;  Location: [facility] ENDOSCOPY;  Service: Gastroenterology;  Laterality: N/A;  Pre - H/O polyps.  Post - diverticulosis, polyps, hemorrhoids    COLONOSCOPY W/ POLYPECTOMY N/A 08/28/2024    Procedure: COLONOSCOPY WITH POLYPECTOMY;  Surgeon: Vitor Haley MD;  Location: [facility] OR;  Service: Gastroenterology;  Laterality: N/A;  diverticulosis, sigmoid polyp x2    CORONARY ARTERY BYPASS GRAFT  2006    X2    CYSTOSCOPY      STATES HAD BLADDER TUMORS REMOVED X2    CYSTOSCOPY WITH CLOT EVACUATION N/A 01/17/2024    Procedure: CYSTOSCOPY WITH CLOT EVACUATION, WITH FULGRATION;  Surgeon: Daniel Hamm MD;  [facility];  Service: Urology;  Laterality: N/A;    PANCREAS SURGERY       PROSTATE SURGERY      X2- STATES PLACED COILS IN TO HELP WITH FLOW AND THEN HAD TO REMOVE A PIECE THAT BROKE OFF AND WAS IN BLADDER    UPPER GASTROINTESTINAL ENDOSCOPY      VEIN SURGERY         Family History   Problem Relation Age of Onset    Hypertension Mother     Colon cancer Mother     Cancer Mother     Diabetes Mother     Arthritis Mother     Cancer Father     Heart attack Father     Hearing loss Father     Heart disease Father     Hypertension Sister     Hypertension Brother     Colon cancer Brother     Cancer Brother     Heart disease Paternal Grandfather     Heart attack Paternal Grandfather     Malig Hyperthermia Neg Hx     Colon polyps Neg Hx     Crohn's disease Neg Hx     Irritable bowel syndrome Neg Hx     Ulcerative colitis Neg Hx        Social History     Socioeconomic History    Marital status:      Spouse name: Nichelle    Number of children: 3    Years of education: 12   Tobacco Use    Smoking status: Former     Current packs/day: 0.00     Average packs/day: 1 pack/day for 30.0 years (30.0 ttl pk-yrs)     Types: Cigarettes     Start date: 1976     Quit date: 2006     Years since quittin.9     Passive exposure: Past    Smokeless tobacco: Never   Vaping Use    Vaping status: Never Used   Substance and Sexual Activity    Alcohol use: Not Currently     Comment: one or two beers 6 times a year    Drug use: Never    Sexual activity: Not Currently     Partners: Female     Birth control/protection: None     Prior to Admission medications    Medication Sig Start Date End Date Taking? Authorizing Provider   albuterol sulfate  (90 Base) MCG/ACT inhaler TAKE 2 PUFFS BY MOUTH EVERY 4 HOURS AS NEEDED FOR WHEEZE 3/17/25  Yes Lyell, Reggie Duane, MD   allopurinol (ZYLOPRIM) 300 MG tablet Take 1 tablet by mouth Every Night. 10/22/24  Yes ProviderKelby MD   amLODIPine (NORVASC) 10 MG tablet TAKE 1 TABLET BY MOUTH EVERY DAY 3/17/25  Yes Colette Dominguez APRN   amoxicillin (AMOXIL) 500  MG capsule Take 1 capsule by mouth 3 (Three) Times a Day for 10 days. 5/16/25 5/26/25 Yes Colette Adames APRN   aspirin 81 MG tablet Take 1 tablet by mouth Daily. 12/23/11  Yes Kelby Aguilar MD   atenolol (TENORMIN) 50 MG tablet TAKE 1 TABLET BY MOUTH EVERY DAY AT NIGHT 6/6/24  Yes Thai Galloway MD   atorvastatin (LIPITOR) 20 MG tablet TAKE 1 TABLET BY MOUTH EVERY DAY 2/10/25  Yes Thai Galloway MD   cetirizine (zyrTEC) 10 MG tablet Take 1 tablet by mouth Daily As Needed for Allergies.   Yes Kelby Aguilar MD   fluticasone (FLONASE) 50 MCG/ACT nasal spray Administer 2 sprays into the nostril(s) as directed by provider Daily As Needed for Rhinitis.   Yes Kelby Aguilar MD   furosemide (Lasix) 20 MG tablet Take 1 tablet by mouth Daily. 4/8/25  Yes Colette Dominguez APRN   losartan (COZAAR) 100 MG tablet TAKE 1 TABLET BY MOUTH EVERY DAY 12/19/24  Yes Thai Galloway MD   warfarin (COUMADIN) 7.5 MG tablet See Admin Instructions. Take 1 tablet by mouth daily on Saturday, Sunday, Tuesday, Wednesday, and Thursday   Yes Kelby Aguilar MD   warfarin (COUMADIN) 7.5 MG tablet See Admin Instructions. Take 1.5 tablets by mouth on Monday and Friday   Yes Kelby Aguilar MD   fluticasone (FLONASE) 50 MCG/ACT nasal spray Administer 2 sprays into the nostril(s) as directed by provider Daily for 90 days. 2/26/25 5/16/25  Colette Dominguez APRN   FLUTICASONE FUROATE IN Inhale 2 Applications Daily.  5/16/25  ProviderKelby MD          Objective   Physical Exam  Constitutional this is a 74-year-old gentleman awake alert no acute distress triage vital signs have been reviewed.  HEENT extraocular muscles are intact pupils equal round reactive sclera clear the mouth is clear neck supple no adenopathy no JVD no bruits no meningeal signs.  Lungs a few scattered wheezes no retraction no use of accessories.  Heart irregular without murmur rub abdomen soft nontender good bowel sounds no peritoneal findings  or pulsatile masses extremities pulses equal upper lower extremities no edema cords or Homans' sign no evidence of DVT skin is warm and dry without rashes neurologic awake alert follows commands motor strength normal without focal weak  Procedures           ED Course      Results for orders placed or performed during the hospital encounter of 05/16/25   ECG 12 Lead Dyspnea    Collection Time: 05/16/25  6:48 PM   Result Value Ref Range    QT Interval 415 ms    QTC Interval 459 ms   Urinalysis With Culture If Indicated - Urine, Clean Catch    Collection Time: 05/16/25  7:25 PM    Specimen: Urine, Clean Catch   Result Value Ref Range    Color, UA Red (A) Yellow, Straw    Appearance, UA Turbid (A) Clear    pH, UA 7.0 5.0 - 8.0    Specific Gravity, UA 1.020 1.005 - 1.030    Glucose, UA Negative Negative    Ketones, UA Negative Negative    Bilirubin, UA Small (1+) (A) Negative    Blood, UA Large (3+) (A) Negative    Protein, UA >=300 mg/dL (3+) (A) Negative    Leuk Esterase, UA Negative Negative    Nitrite, UA Negative Negative    Urobilinogen, UA 0.2 E.U./dL 0.2 - 1.0 E.U./dL   Protime-INR    Collection Time: 05/16/25  7:25 PM    Specimen: Blood   Result Value Ref Range    Protime 26.3 19.4 - 28.5 Seconds    INR 2.39 2.00 - 3.00   Comprehensive Metabolic Panel    Collection Time: 05/16/25  7:25 PM    Specimen: Blood   Result Value Ref Range    Glucose 138 (H) 65 - 99 mg/dL    BUN 15 8 - 23 mg/dL    Creatinine 0.82 0.76 - 1.27 mg/dL    Sodium 139 136 - 145 mmol/L    Potassium 3.9 3.5 - 5.2 mmol/L    Chloride 103 98 - 107 mmol/L    CO2 24.2 22.0 - 29.0 mmol/L    Calcium 9.5 8.6 - 10.5 mg/dL    Total Protein 7.7 6.0 - 8.5 g/dL    Albumin 4.4 3.5 - 5.2 g/dL    ALT (SGPT) 20 1 - 41 U/L    AST (SGOT) 27 1 - 40 U/L    Alkaline Phosphatase 107 39 - 117 U/L    Total Bilirubin 0.4 0.0 - 1.2 mg/dL    Globulin 3.3 gm/dL    A/G Ratio 1.3 g/dL    BUN/Creatinine Ratio 18.3 7.0 - 25.0    Anion Gap 11.8 5.0 - 15.0 mmol/L    eGFR 93.3  >60.0 mL/min/1.73   CBC Auto Differential    Collection Time: 05/16/25  7:25 PM    Specimen: Blood   Result Value Ref Range    WBC 9.49 3.40 - 10.80 10*3/mm3    RBC 5.29 4.14 - 5.80 10*6/mm3    Hemoglobin 15.1 13.0 - 17.7 g/dL    Hematocrit 46.5 37.5 - 51.0 %    MCV 87.9 79.0 - 97.0 fL    MCH 28.5 26.6 - 33.0 pg    MCHC 32.5 31.5 - 35.7 g/dL    RDW 13.5 12.3 - 15.4 %    RDW-SD 43.2 37.0 - 54.0 fl    MPV 9.4 6.0 - 12.0 fL    Platelets 214 140 - 450 10*3/mm3    Neutrophil % 73.8 42.7 - 76.0 %    Lymphocyte % 16.2 (L) 19.6 - 45.3 %    Monocyte % 7.7 5.0 - 12.0 %    Eosinophil % 1.3 0.3 - 6.2 %    Basophil % 0.6 0.0 - 1.5 %    Immature Grans % 0.4 0.0 - 0.5 %    Neutrophils, Absolute 7.00 1.70 - 7.00 10*3/mm3    Lymphocytes, Absolute 1.54 0.70 - 3.10 10*3/mm3    Monocytes, Absolute 0.73 0.10 - 0.90 10*3/mm3    Eosinophils, Absolute 0.12 0.00 - 0.40 10*3/mm3    Basophils, Absolute 0.06 0.00 - 0.20 10*3/mm3    Immature Grans, Absolute 0.04 0.00 - 0.05 10*3/mm3    nRBC 0.0 0.0 - 0.2 /100 WBC   Urinalysis, Microscopic Only - Urine, Clean Catch    Collection Time: 05/16/25  7:25 PM    Specimen: Urine, Clean Catch   Result Value Ref Range    RBC, UA Too Numerous to Count (A) None Seen, 0-2 /HPF    WBC, UA None Seen None Seen, 0-2 /HPF    Bacteria, UA None Seen None Seen /HPF    Squamous Epithelial Cells, UA 0-2 None Seen, 0-2 /HPF    Hyaline Casts, UA None Seen None Seen /LPF    Methodology Manual Light Microscopy    [facility]    Collection Time: 05/16/25  7:25 PM   Result Value Ref Range    Extra Tube Hold for add-ons.    POC Lactate    Collection Time: 05/16/25  7:32 PM    Specimen: Blood   Result Value Ref Range    Lactate 0.6 0.3 - 2.0 mmol/L     XR Chest 1 View  Result Date: 5/16/2025  Impression: No acute process. Electronically Signed: Harvey Khoury MD  5/16/2025 8:00 PM EDT  Workstation ID: [id number]    Medications   sodium chloride 0.9 % flush 10 mL (has no administration in time range)   HYDROcodone-acetaminophen  (NORCO) 5-325 MG per tablet 1 tablet (has no administration in time range)                                              EKG interpretation atrial fibrillation rate of 73 right bundle branch block QTc of 459 no acute ischemic changes otherwise.  Unchanged with compared to previous EKG on 12/2/2024 abnormal          Medical Decision Making  Medical decision making.  Patient had an IV established monitor placed by review A-fib with controlled rate in the 70s EKG my interpretation atrial fibrillation rate of 73 right bundle branch block no acute ischemic changes unchanged compared to previous EKG 12/2/2024.  Chest x-ray my dependent view no pneumonia pneumothorax or failure radiology review without acute finding urine was grossly bloody with large blood and too numerous to count red cells but otherwise was negative.  Labs obtained by independent review INR was 2.39 CBC normal hemoglobin is 15.1 comprehensive metabolic profile unremarkable cultures pending lactic acid was normal.  The patient did urinate more bloody.  He is urinating without difficulty.  He does pass some clots but he can go any feels quite a bit.  I do not see any evidence suggest urinary retention.  We did talk about a catheter at this time we will hold off until he runs into problems or if he cannot urinate.  He voices understanding.  I do not see any evidence of acute intra-abdominal process such as an aneurysm or dissection or acute cholecystitis or pancreatitis or ischemic bowel or sepsis based on my history and physical clinical findings although not a complete list of all possibilities.  We placed an observation consultation for his urologist Dr. Hamm in the morning.  Patient's had some complicated issues he is had previous prostate lift done he had some complication from this and has had some repeated surgeries for this.  He states he is scheduled for another because of one of the places is loose from this lift stable otherwise unremarkable ER  course.  He asked for something for pain for his tooth.  He states he been having trouble with that he is got poor dentition throughout there is no abscess laxity no stridor or drooling tongue is normal there is some tenderness to palpation throughout the upper and lower teeth.  No pain to anterior neck no evidence of Albert angina.  No redness to the neck.  Tongue normal floor of mouth normal speech normal no trismus.  Uvula midline no bulging posterior pharyngeal wall.    Problems Addressed:  Gross hematuria: complicated acute illness or injury    Amount and/or Complexity of Data Reviewed  Labs: ordered. Decision-making details documented in ED Course.  Radiology: ordered and independent interpretation performed. Decision-making details documented in ED Course.  ECG/medicine tests: ordered and independent interpretation performed. Decision-making details documented in ED Course.    Risk  Prescription drug management.  Decision regarding hospitalization.        Final diagnoses:   Gross hematuria       ED Disposition  ED Disposition       ED Disposition   Decision to Admit    Condition   --    Comment   --               No follow-up provider specified.       Medication List        ASK your doctor about these medications      fluticasone 50 MCG/ACT nasal spray  Commonly known as: FLONASE  Ask about: Which instructions should I use?                 Micheal Luna MD  05/16/25 7807

## 2025-05-17 NOTE — PROGRESS NOTES
[facility] MEDICINE SERVICE  DAILY PROGRESS NOTE    NAME: Sandro Ramirez  : 1950  MRN: [id number]      LOS: 0 days     PROVIDER OF SERVICE: Gunnar Canales MD    Chief Complaint: Gross hematuria    Subjective:     Interval History:  History taken from: patient chart    Patient continues to have hematuria at this time.  Only had 1 episode of symptomatic chest discomfort overnight with bradycardia with heart rate in the 30s.  Cardiology has thus evaluated the patient and decided on holding off on any beta-blockade at this time and anticoagulation.  Can consider Watchman device.    Review of Systems:   Review of Systems   Constitutional:  Positive for fatigue. Negative for chills, diaphoresis and fever.   HENT:  Negative for congestion, sneezing and sore throat.    Eyes:  Negative for visual disturbance.   Respiratory:  Negative for cough, chest tightness, shortness of breath and wheezing.    Cardiovascular:  Positive for chest pain. Negative for palpitations and leg swelling.   Gastrointestinal:  Negative for abdominal distention, abdominal pain, constipation, diarrhea, nausea and vomiting.   Genitourinary:  Positive for hematuria. Negative for decreased urine volume, difficulty urinating, dysuria, frequency and urgency.   Musculoskeletal:  Negative for arthralgias, gait problem and myalgias.   Skin:  Negative for color change, pallor and wound.   Neurological:  Negative for dizziness, syncope, weakness, light-headedness and headaches.   Psychiatric/Behavioral:  Negative for agitation, behavioral problems, confusion and decreased concentration.        Objective:     Vital Signs  Temp:  [97.5 °F (36.4 °C)-98.8 °F (37.1 °C)] 97.5 °F (36.4 °C)  Heart Rate:  [] 66  Resp:  [16-22] 16  BP: (130-191)/(60-89) 130/63   Body mass index is 27.2 kg/m².    Physical Exam  Physical Exam  Vitals and nursing note reviewed.   Constitutional:       General: He is not in acute distress.     Appearance: Normal  appearance. He is normal weight. He is ill-appearing. He is not toxic-appearing.   HENT:      Head: Normocephalic and atraumatic.      Nose: Nose normal.      Mouth/Throat:      Mouth: Mucous membranes are moist.      Pharynx: Oropharynx is clear.   Eyes:      General: No scleral icterus.     Extraocular Movements: Extraocular movements intact.      Conjunctiva/sclera: Conjunctivae normal.   Cardiovascular:      Rate and Rhythm: Bradycardia present. Rhythm irregular.      Pulses: Normal pulses.      Heart sounds: Murmur heard.      No friction rub. No gallop.   Pulmonary:      Effort: Pulmonary effort is normal. No respiratory distress.      Breath sounds: Normal breath sounds. No wheezing, rhonchi or rales.   Abdominal:      General: Abdomen is flat. Bowel sounds are normal. There is no distension.      Palpations: Abdomen is soft.      Tenderness: There is no abdominal tenderness. There is no guarding.   Musculoskeletal:         General: Normal range of motion.      Cervical back: Normal range of motion. No rigidity or tenderness.      Right lower leg: No edema.      Left lower leg: No edema.   Skin:     General: Skin is warm.      Coloration: Skin is not jaundiced or pale.   Neurological:      General: No focal deficit present.      Mental Status: He is alert and oriented to person, place, and time. Mental status is at baseline.   Psychiatric:         Mood and Affect: Mood normal.         Behavior: Behavior normal.         Thought Content: Thought content normal.         Judgment: Judgment normal.            Diagnostic Data    Results from last 7 days   Lab Units 05/17/25  0149 05/16/25  1925   WBC 10*3/mm3 9.10 9.49   HEMOGLOBIN g/dL 15.6 15.1   HEMATOCRIT % 48.2 46.5   PLATELETS 10*3/mm3 219 214   GLUCOSE mg/dL 132* 138*   CREATININE mg/dL 0.85 0.82   BUN mg/dL 11 15   SODIUM mmol/L 136 139   POTASSIUM mmol/L 3.9 3.9   AST (SGOT) U/L  --  27   ALT (SGPT) U/L  --  20   ALK PHOS U/L  --  107   BILIRUBIN mg/dL  --   0.4   ANION GAP mmol/L 8.4 11.8       XR Chest 1 View  Result Date: 5/16/2025  Impression: No acute process. Electronically Signed: Harvey Khoury MD  5/16/2025 8:00 PM EDT  Workstation ID: [id number]        I reviewed the patient's new clinical results.  I reviewed the patient's other test results and agree with the interpretation    Assessment/Plan:     Active and Resolved Problems  Active Hospital Problems    Diagnosis  POA    **Gross hematuria [R31.0]  Yes      Resolved Hospital Problems   No resolved problems to display.       Gross hematuria in the setting of BPH status post TURP [2024] secondary to warfarin use for atrial fibrillation  - Holding anticoagulation at this time.  - Urology was consulted of which recommending patient is scheduled for procedure 5/29 with Dr. Hamm.  Continue to IV fluid and as needed bladder scan if patient were to develop any issues with voiding/increased pain/worsening hematuria.  Signed off at this time.    Symptomatic bradycardia with slow atrial fibrillation in the setting of beta-blockade use  - Cardiology consulted; recommending monitoring the patient in the hospital until Monday.  Atenolol warfarin at this time.  Consider possible Watchman device 5/19 and possible pacemaker.  - Pending echocardiogram ordered by cardiology    HTN-continue amlodipine, Lasix, losartan    HLD-statin    CAD with history of CABG  - Continue outpatient follow-up with cardiology for management.    SUPA- home cpap    Gout-allopurinol    Abdominal aortic aneurysm    DM2-diet controlled, outpatient follow-up with primary care provider.    VTE Prophylaxis:  Mechanical VTE prophylaxis orders are present.      Disposition Planning: Cardiology evaluation for Monday    Barriers to Discharge:  Anticipated Date of Discharge: 5/19  Place of Discharge: Home      Time: 60 minutes     Code Status and Medical Interventions: CPR (Attempt to Resuscitate); Full Support   Ordered at: 05/17/25 0648     Code Status  (Patient has no pulse and is not breathing):    CPR (Attempt to Resuscitate)     Medical Interventions (Patient has pulse or is breathing):    Full Support       Signature: Electronically signed by Gunnar Canales MD, 05/17/25, 12:32 EDT.  [facility]

## 2025-05-17 NOTE — CONSULTS
"[facility] CARDIOLOGY CONSULT  [facility]        Subjective:     Encounter Date:05/16/2025      Patient ID: Sandro Ramirez is a 74 y.o. male.    Chief Complaint: consult for bradycardia  Seen and examined agree with narrative as discussed with nurse practitioner after face-to-face encounter with scribe findings below, greater than 50% total encounter time of medical decision making performed by me.    HPI:  Sandro Ramirez is a 74 y.o. male known to Dr. Galloway with a past cardiac history of CAD status post CABG and persistent atrial fibrillation (anticoagulated with Coumadin).  Last nuclear stress test in 2023 was negative for ischemia.  Last 2D echo in 2023 showed an EF of 51 to 55% with moderate AI, moderate MR, and mild to moderate TR.  PMH includes HTN, HLD, DM, COPD, SUPA, gout, AAA, bladder cancer s/p TURBT, and BPH s/p urolift in 2021 and was scheduled 5/29 for removal of urolift clips.  Patient presents with complaints of dysuria with urology consulted and do not plan any inpatient intervention.  Patient developed bradycardia during the night down to the 30s and cardiology was consulted for further evaluation and management.    Patient tells me he developed acute dyspnea accompanied by chest pain during the night in which patient appears to have desaturated and required O2 via nasal cannula and HR dropped down into the 30s following a dose of his home atenolol 50 mg PO daily at 0200.  Patient tells me he had not taken his home beta-blocker for at least 24 hours before this was given.  He is unsure of his usual beta-blocker dose stating \"I take with a give me\".  He is on room air currently with heart rate in the 50s.  He tells me he was recently treated for bronchitis by Dr. Phillips.  Prior to admission he has had no chest discomfort.  He has some exertional dyspnea if he does something unusually strenuous but can typically utilize a riding mower, weighty, and climb a " flight of stairs.  He denies any weakness dizziness or lightheadedness.    Above information verified after my encounter  Patient with a host of complaints including significant gross hematuria, his anticoagulation is not overwhelming his INR was 1.9.  He follows with urology with urologic procedures recently.  He also has some chest pain but cardiac enzymes are normal and he ruled out for ACS.  He is bradycardic with underlying atrial fibrillation likely with too much beta-blocker more than this has been held.  Blood pressures in the 130s and he is chest pain-free on room air on my encounter.    Review of systems otherwise negative/14 point review of systems except as mentioned above  Historical data copied forward from previous encounters in EMR is unchanged    Telemetry demonstrates atrial fibrillation with slow ventricular sponsor in the 40s otherwise blood pressure 130 systolic    Past Medical History:   Diagnosis Date    AAA (abdominal aortic aneurysm) 2006    Abnormal ECG     Allergic     Aneurysm     Arthritis     Asthma     Atrial fibrillation     COUMADIN    Benign prostatic hyperplasia     Bladder cancer     CHF (congestive heart failure)     Chronic anticoagulation     COUMADIN STARTED 2006    Clotting disorder     Colon polyp     Congenital heart disease     COPD (chronic obstructive pulmonary disease)     Coronary artery disease     Diabetes mellitus     treat with diet    Ehrlichiosis     Elevated cholesterol     Erectile dysfunction     Facial basal cell cancer     Gout     Hard to intubate     History of pancreatitis 2006    History of pneumonia     History of tick-borne disease 2015    HL (hearing loss)     BILAT HEARING AIDS    Hyperlipidemia     Hypertension     Myocardial infarction     X5    Obesity     Pancreatitis     Pneumonia     Sleep apnea     CURRENTLY NOT USING CPAP D/T RECALL    Transfusion history     STATES HIS SISTER HAD A SEVERE REACTION TO BLOOD TRANSFUSION    Type 2 diabetes  mellitus          Past Surgical History:   Procedure Laterality Date    ARTERIAL ANEURYSM REPAIR      ARTERIOGRAM AORTIC Left 2022    Procedure: AORTIC STENT GRAFT PLACEMENT WITH ILIAC COILING;  Surgeon: Eric Sainz MD;  Location: [facility] OR ;  Service: Vascular;  Laterality: Left;    CARDIAC CATHETERIZATION      CARDIAC SURGERY      CHOLECYSTECTOMY      COLONOSCOPY      COLONOSCOPY N/A 2023    Procedure: COLONOSCOPY to cecum with cold biopsy and cold snare polypectomies and clips;  Surgeon: Vitor Haley MD;  Location: [facility] ENDOSCOPY;  Service: Gastroenterology;  Laterality: N/A;  Pre - H/O polyps.  Post - diverticulosis, polyps, hemorrhoids    COLONOSCOPY W/ POLYPECTOMY N/A 2024    Procedure: COLONOSCOPY WITH POLYPECTOMY;  Surgeon: Vitor Haley MD;  Location: [facility] OR;  Service: Gastroenterology;  Laterality: N/A;  diverticulosis, sigmoid polyp x2    CORONARY ARTERY BYPASS GRAFT  2006    X2    CYSTOSCOPY      STATES HAD BLADDER TUMORS REMOVED X2    CYSTOSCOPY WITH CLOT EVACUATION N/A 2024    Procedure: CYSTOSCOPY WITH CLOT EVACUATION, WITH FULGRATION;  Surgeon: Daniel Hamm MD;  Location: [facility] MAIN OR;  Service: Urology;  Laterality: N/A;    PANCREAS SURGERY      PROSTATE SURGERY      X2- STATES PLACED COILS IN TO HELP WITH FLOW AND THEN HAD TO REMOVE A PIECE THAT BROKE OFF AND WAS IN BLADDER    UPPER GASTROINTESTINAL ENDOSCOPY      VEIN SURGERY           Social History     Socioeconomic History    Marital status:      Spouse name: Nichelle    Number of children: 3    Years of education: 12   Tobacco Use    Smoking status: Former     Current packs/day: 0.00     Average packs/day: 1 pack/day for 30.0 years (30.0 ttl pk-yrs)     Types: Cigarettes     Start date: 1976     Quit date: 2006     Years since quittin.9     Passive exposure: Past    Smokeless tobacco: Never   Vaping Use    Vaping status: Never Used   Substance and Sexual  "Activity    Alcohol use: Not Currently     Comment: one or two beers 6 times a year    Drug use: Never    Sexual activity: Not Currently     Partners: Female     Birth control/protection: None       Family History   Problem Relation Age of Onset    Hypertension Mother     Colon cancer Mother     Cancer Mother     Diabetes Mother     Arthritis Mother     Cancer Father     Heart attack Father     Hearing loss Father     Heart disease Father     Hypertension Sister     Hypertension Brother     Colon cancer Brother     Cancer Brother     Heart disease Paternal Grandfather     Heart attack Paternal Grandfather     Malig Hyperthermia Neg Hx     Colon polyps Neg Hx     Crohn's disease Neg Hx     Irritable bowel syndrome Neg Hx     Ulcerative colitis Neg Hx          Allergies   Allergen Reactions    Bee Venom Anaphylaxis     HIVES-SWOLLEN THROAT    Meperidine Hives    Midazolam Hives    Propofol Other (See Comments)     UNCLEAR-SVT, HYPOTENSION-STATES SEVERE ALMOST CODED    Sulfa Antibiotics Hives    Hydrocodone Nausea And Vomiting    Simvastatin Myalgia       Current Medications:   Scheduled Meds:[START ON 5/18/2025] allopurinol, 300 mg, Oral, Nightly  [START ON 5/18/2025] amLODIPine, 10 mg, Oral, Daily  amoxicillin, 500 mg, Oral, TID  [Held by provider] atenolol, 50 mg, Oral, Nightly  [START ON 5/18/2025] atorvastatin, 20 mg, Oral, Daily  furosemide, 20 mg, Oral, Daily  losartan, 100 mg, Oral, Daily  sodium chloride, 10 mL, Intravenous, Q12H      Continuous Infusions:sodium chloride, 75 mL/hr, Last Rate: 75 mL/hr (05/17/25 0920)        ROS  All other systems reviewed and are negative.       Objective:         /63 (BP Location: Right arm, Patient Position: Lying)   Pulse (!) 41   Temp 97.5 °F (36.4 °C) (Oral)   Resp 16   Ht 180.3 cm (71\")   Wt 88.8 kg (195 lb 12.3 oz)   SpO2 94%   BMI 27.30 kg/m²       General: Well-developed in NAD.  Neuro: AAOx3. No gross deficits.  HEENT: Sclerae clear, no " "xanthelasmas.  CV: S1S2, irregular and bradycardic, atrial fibrillation. Grade 2/6 systolic murmur.  Resp: Breathing is unlabored. Lungs faint wheezes throughout..  GI: BS+. Abdomen soft and NTTP.  Ext: Pedal pulses are palpable. Extremities are nonedematous.  MS: moves all extremities, no weakness.  Skin: warm, dry.  Psych: calm and cooperative.            Lab Review:     Results from last 7 days   Lab Units 05/17/25  0149 05/16/25  1925   SODIUM mmol/L 136 139   POTASSIUM mmol/L 3.9 3.9   CHLORIDE mmol/L 100 103   CO2 mmol/L 27.6 24.2   BUN mg/dL 11 15   CREATININE mg/dL 0.85 0.82   GLUCOSE mg/dL 132* 138*   CALCIUM mg/dL 9.6 9.5   AST (SGOT) U/L  --  27   ALT (SGPT) U/L  --  20     Results from last 7 days   Lab Units 05/17/25  0550 05/17/25  0435   HSTROP T ng/L 16 17     Results from last 7 days   Lab Units 05/17/25  0149 05/16/25  1925   WBC 10*3/mm3 9.10 9.49   HEMOGLOBIN g/dL 15.6 15.1   HEMATOCRIT % 48.2 46.5   PLATELETS 10*3/mm3 219 214     Results from last 7 days   Lab Units 05/17/25  0340 05/16/25  1925   INR  1.95* 2.39               Invalid input(s): \"LDLCALC\"            Recent Radiology:  Imaging Results (Most Recent)       Procedure Component Value Units Date/Time    XR Chest 1 View [[id number]] Collected: 05/16/25 2000     Updated: 05/16/25 2002    Narrative:      XR CHEST 1 VW    Date of Exam: 5/16/2025 7:46 PM EDT    Indication: Short of breath    Comparison: CT chest 3/3/2025    Findings:  Mild cardiomegaly unchanged. Prior sternotomy and CABG. Dense thoracic aortic atherosclerotic calcifications. Negative for pneumothorax or effusion. No focal consolidation. Osseous structures are grossly intact. Underlying emphysema.      Impression:      Impression:  No acute process.          Electronically Signed: Harvey Khoury MD    5/16/2025 8:00 PM EDT    Workstation ID: [id number]              ECHOCARDIOGRAM:    Results for orders placed during the hospital encounter of 10/19/23    Adult Transthoracic Echo " Complete W/ Cont if Necessary Per Protocol    Interpretation Summary    Left ventricular ejection fraction appears to be 51 - 55%.    The right ventricular cavity is mildly dilated.    The left atrial cavity is moderately dilated.    The right atrial cavity is dilated.    Moderate aortic valve regurgitation is present.    Moderate mitral valve regurgitation is present.    Estimated right ventricular systolic pressure from tricuspid regurgitation is mildly elevated (35-45 mmHg).            Assessment:         Active Hospital Problems    Diagnosis  POA    **Gross hematuria [R31.0]  Yes     1) symptomatic bradycardia  - High-sensitivity troponin negative x 2  - EKG on admit shows chronic atrial fibrillation with CVR and RBBB  - CXR shows no acute findings  - K 3.9  - home atenolol on hold    2) Hematuria  - hx bladder cancer s/p TURBT  - hx BPH s/p urolift in 2021 -->  scheduled 5/29 for removal of urolift clips.  - urology consulted --> no inpatient intervention  - Hgb 15.6, INR 2.90 on admit    3) persistent atrial fibrillation  - with SVR currently  - atenolol on hold   - anticoagulated with Coumadin   - Last 2D echo in 2023 showed an EF of 51 to 55% with moderate AI, moderate MR, and mild to moderate TR.      4) CAD status post CABG  - Last nuclear stress test in 2023 was negative for ischemia.      5) Respiratory insufficiency  - per primary team    6) HTN    7) HLD    8) DM    9) COPD    10) SUPA    11) gout    12) AAA           Plan:   Tele shows slow afib down to 30s following home atenolol dose now on hold with IVFs given.  HR in 50s currently with normotensive BP.    Will check 2D echo, TSH, and Mg.    Continue to hold home atenolol.      Follow hemodynamics  Will need to stay to see urology as well as primary cardiologist until Monday  Will need to stay potentially EP if heart rates do not improve and he has underlying AV conduction disease with bundle branch block in addition to refractory bradycardia may  need pacemaker for tachybradycardia syndrome  Hold anticoagulation with gross hematuria at this time  If this continues may be candidate for watchman  From a cardiac standpoint his chest pain was present but his cardiac enzymes are normal he is chest pain-free on my encounter.  Last nuclear evaluation was 2023 with no evidence of ischemia with prior old inferior infarct with preserved EF.  He has a repeat echo pending at this time    Continue supportive care at this time  Follow  Interrupted telemetry pulse oximetry and hemodynamic monitoring    Mingo Blank MD PhD

## 2025-05-17 NOTE — CONSULTS
[facility] Consult  Patient Identification:  NAME:  Sandro Ramirez  Age:  74 y.o.   Sex:  male   :  1950   MRN:  [id number]     Chief complaint: Gross hematuria    History of present illness:    Pt is a 74 y.o. male with a history of bladder cancer status post TURBT in , gross hematuria, BPH with LUTS, UroLift in  who presented to the ED on 2025 with complaints of gross hematuria with clots.  Hematuria started a few days ago and is not associated with pain.  Reports he has been able to urinate with no issues.  He has no complaints of pain at this time.  Patient had cystoscopy with Dr. Daniel Hamm with [facility] in 2025.  Findings showed no bladder tumors, 2 UroLift clips, band of prostate tissue.  He is scheduled for procedure on 2025 with Dr. Daniel Hamm for removal of UroLift clips and division of prostate band here at [place].  He is on Coumadin.    In hospital:  -AVSS, good UOP  -WBC -9.10  -Creat -0.85  - Hgb -15.6  -UA -3+ blood, too numerous to count RBC.  Negative nitrites and bacteria, no indication of infection.      Past medical history:  Past Medical History:   Diagnosis Date    AAA (abdominal aortic aneurysm) 2006    Abnormal ECG     Allergic     Aneurysm     Arthritis     Asthma     Atrial fibrillation     COUMADIN    Benign prostatic hyperplasia     Bladder cancer     CHF (congestive heart failure)     Chronic anticoagulation     COUMADIN STARTED     Clotting disorder     Colon polyp     Congenital heart disease     COPD (chronic obstructive pulmonary disease)     Coronary artery disease     Diabetes mellitus     treat with diet    Ehrlichiosis     Elevated cholesterol     Erectile dysfunction     Facial basal cell cancer     Gout     Hard to intubate     History of pancreatitis 2006    History of pneumonia     History of tick-borne disease     HL (hearing loss)     BILAT HEARING AIDS    Hyperlipidemia     Hypertension     Myocardial  infarction     X5    Obesity     Pancreatitis     Pneumonia     Sleep apnea     CURRENTLY NOT USING CPAP D/T RECALL    Transfusion history     STATES HIS SISTER HAD A SEVERE REACTION TO BLOOD TRANSFUSION    Type 2 diabetes mellitus        Past surgical history:  Past Surgical History:   Procedure Laterality Date    ARTERIAL ANEURYSM REPAIR      ARTERIOGRAM AORTIC Left 06/29/2022    Procedure: AORTIC STENT GRAFT PLACEMENT WITH ILIAC COILING;  Surgeon: Eric Sainz MD;  Location: [facility] OR 18/19;  Service: Vascular;  Laterality: Left;    CARDIAC CATHETERIZATION      CARDIAC SURGERY      CHOLECYSTECTOMY      COLONOSCOPY      COLONOSCOPY N/A 07/27/2023    Procedure: COLONOSCOPY to cecum with cold biopsy and cold snare polypectomies and clips;  Surgeon: Vitor Haley MD;  Location: [facility] ENDOSCOPY;  Service: Gastroenterology;  Laterality: N/A;  Pre - H/O polyps.  Post - diverticulosis, polyps, hemorrhoids    COLONOSCOPY W/ POLYPECTOMY N/A 08/28/2024    Procedure: COLONOSCOPY WITH POLYPECTOMY;  Surgeon: Vitor Haley MD;  Location: [facility] OR;  Service: Gastroenterology;  Laterality: N/A;  diverticulosis, sigmoid polyp x2    CORONARY ARTERY BYPASS GRAFT  2006    X2    CYSTOSCOPY      STATES HAD BLADDER TUMORS REMOVED X2    CYSTOSCOPY WITH CLOT EVACUATION N/A 01/17/2024    Procedure: CYSTOSCOPY WITH CLOT EVACUATION, WITH FULGRATION;  Surgeon: Daniel Hamm MD;  Location: [facility] OR;  Service: Urology;  Laterality: N/A;    PANCREAS SURGERY      PROSTATE SURGERY      X2- STATES PLACED COILS IN TO HELP WITH FLOW AND THEN HAD TO REMOVE A PIECE THAT BROKE OFF AND WAS IN BLADDER    UPPER GASTROINTESTINAL ENDOSCOPY      VEIN SURGERY         Allergies:  Bee venom, Meperidine, Midazolam, Propofol, Sulfa antibiotics, Hydrocodone, and Simvastatin    Home medications:  Medications Prior to Admission   Medication Sig Dispense Refill Last Dose/Taking    albuterol sulfate  (90 Base) MCG/ACT  inhaler TAKE 2 PUFFS BY MOUTH EVERY 4 HOURS AS NEEDED FOR WHEEZE 18 g 2 Taking As Needed    allopurinol (ZYLOPRIM) 300 MG tablet Take 1 tablet by mouth Every Night.   5/15/2025 Evening    amLODIPine (NORVASC) 10 MG tablet TAKE 1 TABLET BY MOUTH EVERY DAY 90 tablet 0 5/15/2025 Evening    amoxicillin (AMOXIL) 500 MG capsule Take 1 capsule by mouth 3 (Three) Times a Day for 10 days. 30 capsule 0 5/16/2025 Morning    aspirin 81 MG tablet Take 1 tablet by mouth Daily.   5/16/2025 Morning    atenolol (TENORMIN) 50 MG tablet TAKE 1 TABLET BY MOUTH EVERY DAY AT NIGHT 90 tablet 3 5/15/2025 Evening    atorvastatin (LIPITOR) 20 MG tablet TAKE 1 TABLET BY MOUTH EVERY DAY 90 tablet 1 5/15/2025 Evening    cetirizine (zyrTEC) 10 MG tablet Take 1 tablet by mouth Daily As Needed for Allergies.   Taking As Needed    fluticasone (FLONASE) 50 MCG/ACT nasal spray Administer 2 sprays into the nostril(s) as directed by provider Daily As Needed for Rhinitis.   Taking As Needed    furosemide (Lasix) 20 MG tablet Take 1 tablet by mouth Daily. 90 tablet 1 5/16/2025 Morning    losartan (COZAAR) 100 MG tablet TAKE 1 TABLET BY MOUTH EVERY DAY 90 tablet 3 5/16/2025 Morning    warfarin (COUMADIN) 7.5 MG tablet See Admin Instructions. Take 1 tablet by mouth daily on Saturday, Sunday, Tuesday, Wednesday, and Thursday 5/16/2025 Morning    warfarin (COUMADIN) 7.5 MG tablet See Admin Instructions. Take 1.5 tablets by mouth on Monday and Friday 5/16/2025 Evening        Hospital medications:  [START ON 5/18/2025] allopurinol, 300 mg, Oral, Nightly  [START ON 5/18/2025] amLODIPine, 10 mg, Oral, Daily  amoxicillin, 500 mg, Oral, TID  atenolol, 50 mg, Oral, Nightly  [START ON 5/18/2025] atorvastatin, 20 mg, Oral, Daily  furosemide, 20 mg, Oral, Daily  losartan, 100 mg, Oral, Daily  sodium chloride, 10 mL, Intravenous, Q12H      sodium chloride, 75 mL/hr, Last Rate: 75 mL/hr (05/17/25 0318)        albuterol    senna-docusate sodium **AND** polyethylene  glycol **AND** bisacodyl **AND** bisacodyl    cetirizine    HYDROcodone-acetaminophen    melatonin    ondansetron    [COMPLETED] Insert Peripheral IV **AND** sodium chloride    sodium chloride    sodium chloride    Family history:  Family History   Problem Relation Age of Onset    Hypertension Mother     Colon cancer Mother     Cancer Mother     Diabetes Mother     Arthritis Mother     Cancer Father     Heart attack Father     Hearing loss Father     Heart disease Father     Hypertension Sister     Hypertension Brother     Colon cancer Brother     Cancer Brother     Heart disease Paternal Grandfather     Heart attack Paternal Grandfather     Malig Hyperthermia Neg Hx     Colon polyps Neg Hx     Crohn's disease Neg Hx     Irritable bowel syndrome Neg Hx     Ulcerative colitis Neg Hx        Social history:  Social History     Tobacco Use    Smoking status: Former     Current packs/day: 0.00     Average packs/day: 1 pack/day for 30.0 years (30.0 ttl pk-yrs)     Types: Cigarettes     Start date: 1976     Quit date: 2006     Years since quittin.9     Passive exposure: Past    Smokeless tobacco: Never   Vaping Use    Vaping status: Never Used   Substance Use Topics    Alcohol use: Not Currently     Comment: one or two beers 6 times a year    Drug use: Never       Review of Systems:     12 point negative except as in HPI    Objective:  TMax 24 hours:   Temp (24hrs), Av.9 °F (36.6 °C), Min:97.2 °F (36.2 °C), Max:98.8 °F (37.1 °C)      Vitals Ranges:   Temp:  [97.2 °F (36.2 °C)-98.8 °F (37.1 °C)] 97.5 °F (36.4 °C)  Heart Rate:  [] 50  Resp:  [16-22] 18  BP: (144-191)/(60-92) 146/62    Intake/Output Last 3 shifts:  I/O last 3 completed shifts:  In: -   Out: 1000 [Urine:1000]     Physical Exam:    General Appearance:    Alert, cooperative, NAD   Abdomen:  :     Soft, nontender   Deferred   Neuro/Psych:   Orientation intact, mood/affect pleasant       Results review:   I reviewed the patient's new  clinical results.    Data review:  Lab Results (last 24 hours)       Procedure Component Value Units Date/Time    High Sensitivity Troponin T 1Hr [[id number]]  (Normal) Collected: 05/17/25 0550    Specimen: Blood from Arm, Right Updated: 05/17/25 0654     HS Troponin T 16 ng/L      Troponin T Numeric Delta -1 ng/L     Narrative:      High Sensitive Troponin T Reference Range:  <14.0 ng/L- Negative Female for AMI  <22.0 ng/L- Negative Male for AMI  >=14 - Abnormal Female indicating possible myocardial injury.  >=22 - Abnormal Male indicating possible myocardial injury.   Clinicians would have to utilize clinical acumen, EKG, Troponin, and serial changes to determine if it is an Acute Myocardial Infarction or myocardial injury due to an underlying chronic condition.         High Sensitivity Troponin T [[id number]]  (Normal) Collected: 05/17/25 0435    Specimen: Blood Updated: 05/17/25 0510     HS Troponin T 17 ng/L     Narrative:      High Sensitive Troponin T Reference Range:  <14.0 ng/L- Negative Female for AMI  <22.0 ng/L- Negative Male for AMI  >=14 - Abnormal Female indicating possible myocardial injury.  >=22 - Abnormal Male indicating possible myocardial injury.   Clinicians would have to utilize clinical acumen, EKG, Troponin, and serial changes to determine if it is an Acute Myocardial Infarction or myocardial injury due to an underlying chronic condition.         Protime-INR [[id number]]  (Abnormal) Collected: 05/17/25 0340    Specimen: Blood Updated: 05/17/25 0456     Protime 22.3 Seconds      INR 1.95    Basic Metabolic Panel [[id number]]  (Abnormal) Collected: 05/17/25 0149    Specimen: Blood Updated: 05/17/25 0226     Glucose 132 mg/dL      BUN 11 mg/dL      Creatinine 0.85 mg/dL      Sodium 136 mmol/L      Potassium 3.9 mmol/L      Chloride 100 mmol/L      CO2 27.6 mmol/L      Calcium 9.6 mg/dL      BUN/Creatinine Ratio 12.9     Anion Gap 8.4 mmol/L      eGFR 92.3 mL/min/1.73     Narrative:      GFR  Categories in Chronic Kidney Disease (CKD)              GFR Category          GFR (mL/min/1.73)    Interpretation  G1                    90 or greater        Normal or high (1)  G2                    60-89                Mild decrease (1)  G3a                   45-59                Mild to moderate decrease  G3b                   30-44                Moderate to severe decrease  G4                    15-29                Severe decrease  G5                    14 or less           Kidney failure    (1)In the absence of evidence of kidney disease, neither GFR category G1 or G2 fulfill the criteria for CKD.    eGFR calculation 2021 CKD-EPI creatinine equation, which does not include race as a factor    CBC Auto Differential [[id number]]  (Normal) Collected: 05/17/25 0149    Specimen: Blood Updated: 05/17/25 0158     WBC 9.10 10*3/mm3      RBC 5.53 10*6/mm3      Hemoglobin 15.6 g/dL      Hematocrit 48.2 %      MCV 87.2 fL      MCH 28.2 pg      MCHC 32.4 g/dL      RDW 13.4 %      RDW-SD 43.1 fl      MPV 9.2 fL      Platelets 219 10*3/mm3      Neutrophil % 64.1 %      Lymphocyte % 26.0 %      Monocyte % 8.4 %      Eosinophil % 0.9 %      Basophil % 0.4 %      Immature Grans % 0.2 %      Neutrophils, Absolute 5.83 10*3/mm3      Lymphocytes, Absolute 2.37 10*3/mm3      Monocytes, Absolute 0.76 10*3/mm3      Eosinophils, Absolute 0.08 10*3/mm3      Basophils, Absolute 0.04 10*3/mm3      Immature Grans, Absolute 0.02 10*3/mm3      nRBC 0.0 /100 WBC     Urinalysis, Microscopic Only - Urine, Clean Catch [[id number]]  (Abnormal) Collected: 05/16/25 1925    Specimen: Urine, Clean Catch Updated: 05/16/25 2022     RBC, UA Too Numerous to Count /HPF      WBC, UA None Seen /HPF      Comment: Urine culture not indicated.        Bacteria, UA None Seen /HPF      Squamous Epithelial Cells, UA 0-2 /HPF      Hyaline Casts, UA None Seen /LPF      Methodology Manual Light Microscopy    Urinalysis With Culture If Indicated - Urine, Clean Catch  [[id number]]  (Abnormal) Collected: 05/16/25 1925    Specimen: Urine, Clean Catch Updated: 05/16/25 2022     Color, UA Red     Comment: Any Substance that causes an abnormal urine color can alter the accuracy of the chemical reactions.        Appearance, UA Turbid     Comment: Result checked          pH, UA 7.0     Specific Gravity, UA 1.020     Glucose, UA Negative     Ketones, UA Negative     Bilirubin, UA Small (1+)     Comment: Confirmation testing is unavailable.  A serum bilirubin is recommended for further assessment.        Blood, UA Large (3+)     Protein, UA >=300 mg/dL (3+)     Leuk Esterase, UA Negative     Nitrite, UA Negative     Urobilinogen, UA 0.2 E.U./dL    Narrative:      In absence of clinical symptoms, the presence of pyuria, bacteria, and/or nitrites on the urinalysis result does not correlate with infection.    Comprehensive Metabolic Panel [[id number]]  (Abnormal) Collected: 05/16/25 1925    Specimen: Blood Updated: 05/16/25 2004     Glucose 138 mg/dL      BUN 15 mg/dL      Creatinine 0.82 mg/dL      Sodium 139 mmol/L      Potassium 3.9 mmol/L      Comment: Specimen hemolyzed.  Result may be falsely elevated.        Chloride 103 mmol/L      CO2 24.2 mmol/L      Calcium 9.5 mg/dL      Total Protein 7.7 g/dL      Albumin 4.4 g/dL      ALT (SGPT) 20 U/L      AST (SGOT) 27 U/L      Alkaline Phosphatase 107 U/L      Total Bilirubin 0.4 mg/dL      Globulin 3.3 gm/dL      A/G Ratio 1.3 g/dL      BUN/Creatinine Ratio 18.3     Anion Gap 11.8 mmol/L      eGFR 93.3 mL/min/1.73     Narrative:      GFR Categories in Chronic Kidney Disease (CKD)              GFR Category          GFR (mL/min/1.73)    Interpretation  G1                    90 or greater        Normal or high (1)  G2                    60-89                Mild decrease (1)  G3a                   45-59                Mild to moderate decrease  G3b                   30-44                Moderate to severe decrease  G4                    15-29                 Severe decrease  G5                    14 or less           Kidney failure    (1)In the absence of evidence of kidney disease, neither GFR category G1 or G2 fulfill the criteria for CKD.    eGFR calculation 2021 CKD-EPI creatinine equation, which does not include race as a factor    Blood Culture - Blood, Arm, Right [[id number]] Collected: 05/16/25 1950    Specimen: Blood from Arm, Right Updated: 05/16/25 1953    Protime-INR [[id number]]  (Normal) Collected: 05/16/25 1925    Specimen: Blood Updated: 05/16/25 1950     Protime 26.3 Seconds      INR 2.39    Extra Tubes [[id number]] Collected: 05/16/25 1925    Specimen: Blood, Venous Line Updated: 05/16/25 1947    Narrative:      The following orders were created for panel order Extra Tubes.  Procedure                               Abnormality         Status                     ---------                               -----------         ------                     Gold Top - SST[[id number]]                                   Final result                 Please view results for these tests on the individual orders.    Gold Top - SST [[id number]] Collected: 05/16/25 1925    Specimen: Blood Updated: 05/16/25 1947     Extra Tube Hold for add-ons.     Comment: Auto resulted.       CBC & Differential [[id number]]  (Abnormal) Collected: 05/16/25 1925    Specimen: Blood Updated: 05/16/25 1937    Narrative:      The following orders were created for panel order CBC & Differential.  Procedure                               Abnormality         Status                     ---------                               -----------         ------                     CBC Auto Differential[[id number]]        Abnormal            Final result                 Please view results for these tests on the individual orders.    CBC Auto Differential [[id number]]  (Abnormal) Collected: 05/16/25 1925    Specimen: Blood Updated: 05/16/25 1937     WBC 9.49 10*3/mm3      RBC 5.29 10*6/mm3      Hemoglobin  15.1 g/dL      Hematocrit 46.5 %      MCV 87.9 fL      MCH 28.5 pg      MCHC 32.5 g/dL      RDW 13.5 %      RDW-SD 43.2 fl      MPV 9.4 fL      Platelets 214 10*3/mm3      Neutrophil % 73.8 %      Lymphocyte % 16.2 %      Monocyte % 7.7 %      Eosinophil % 1.3 %      Basophil % 0.6 %      Immature Grans % 0.4 %      Neutrophils, Absolute 7.00 10*3/mm3      Lymphocytes, Absolute 1.54 10*3/mm3      Monocytes, Absolute 0.73 10*3/mm3      Eosinophils, Absolute 0.12 10*3/mm3      Basophils, Absolute 0.06 10*3/mm3      Immature Grans, Absolute 0.04 10*3/mm3      nRBC 0.0 /100 WBC     Blood Culture - Blood, Arm, Left [[id number]] Collected: 05/16/25 1925    Specimen: Blood from Arm, Left Updated: 05/16/25 1936    POC Lactate [[id number]]  (Normal) Collected: 05/16/25 1932    Specimen: Blood Updated: 05/16/25 1934     Lactate 0.6 mmol/L      Comment: Serial Number: [id number]:  [id number]                Imaging:  Imaging Results (Last 24 Hours)       Procedure Component Value Units Date/Time    XR Chest 1 View [[id number]] Collected: 05/16/25 2000     Updated: 05/16/25 2002    Narrative:      XR CHEST 1 VW    Date of Exam: 5/16/2025 7:46 PM EDT    Indication: Short of breath    Comparison: CT chest 3/3/2025    Findings:  Mild cardiomegaly unchanged. Prior sternotomy and CABG. Dense thoracic aortic atherosclerotic calcifications. Negative for pneumothorax or effusion. No focal consolidation. Osseous structures are grossly intact. Underlying emphysema.      Impression:      Impression:  No acute process.          Electronically Signed: Harvey Khoury MD    5/16/2025 8:00 PM EDT    Workstation ID: [id number]             Assessment:     Gross hematuria     Plan:     - No acute surgical urologic intervention at this time. Patient has been urinating well with no concerns for urinary retention.  Kidney function and hemoglobin are stable.  -  Monitor urine output closely, including color, clarity, and clots.   -  Bladder scan PRN if  patient reporting any changes including inability to void, increased pain, or worsening blood in the urine.   -  Ensure adequate hydration with IV or oral intake to help flush the urinary tract.   - Patient is to keep scheduled procedure on 05/29/2025 with Dr. Daniel Hamm.  - Urology will sign off.  Please call with any questions or concerns.      Clau Godfreyther, ALICJA  05/17/25      Plan discussion: The pertinent medical findings were discussed in detail with Dr. Carr, who actively participated in the review of available data, the direction of additional testing, and formulation of the care plan. This was discussed with the patient in detail.  The patient's questions were addressed, and they expressed understanding of the proposed management.     The pt was seen and examined  Agree with above/note edited  Plan formulated with provider    Gross hematuria.   Had recent cysto with urolift clips in bladder.   Plan is for cystoscopy under anesthesia later this month with Dr Hamm.   No acute  intervention needed.   Sign off.   Call with questions.       Dusty Carr MD   [facility]  [phone]

## 2025-05-17 NOTE — PLAN OF CARE
Problem: Adult Inpatient Plan of Care  Goal: Plan of Care Review  5/17/2025 0458 by Amelie Hilton RN  Outcome: Progressing  Flowsheets (Taken 5/17/2025 0458)  Progress: improving  Plan of Care Reviewed With: patient  5/17/2025 0057 by Amelie Hilton RN  Outcome: Progressing  Flowsheets (Taken 5/17/2025 0057)  Plan of Care Reviewed With: patient  Goal: Patient-Specific Goal (Individualized)  5/17/2025 0458 by Amelie Hilton RN  Outcome: Progressing  5/17/2025 0057 by Amelie Hilton RN  Outcome: Progressing  Goal: Absence of Hospital-Acquired Illness or Injury  5/17/2025 0458 by Amelie Hilton RN  Outcome: Progressing  5/17/2025 0057 by Aemlie Hilton RN  Outcome: Progressing  Intervention: Identify and Manage Fall Risk  Recent Flowsheet Documentation  Taken 5/17/2025 0200 by Amelie Hilton RN  Safety Promotion/Fall Prevention:   activity supervised   clutter free environment maintained   fall prevention program maintained   lighting adjusted   nonskid shoes/slippers when out of bed   room organization consistent   safety round/check completed  Intervention: Prevent Skin Injury  Recent Flowsheet Documentation  Taken 5/17/2025 0200 by Amelie Hilton RN  Body Position: position changed independently  Intervention: Prevent and Manage VTE (Venous Thromboembolism) Risk  Recent Flowsheet Documentation  Taken 5/16/2025 2330 by Amelie Hilton RN  VTE Prevention/Management: other (see comments)  Intervention: Prevent Infection  Recent Flowsheet Documentation  Taken 5/17/2025 0200 by Amelie Hilton RN  Infection Prevention:   rest/sleep promoted   single patient room provided  Goal: Optimal Comfort and Wellbeing  5/17/2025 0458 by Amelie Hilton RN  Outcome: Progressing  5/17/2025 0057 by Amelie Hilton RN  Outcome: Progressing  Goal: Readiness for Transition of Care  5/17/2025 0458 by Amelie Hilton RN  Outcome: Progressing  5/17/2025 0057 by Amelie Hilton RN  Outcome:  Progressing  Intervention: Mutually Develop Transition Plan  Recent Flowsheet Documentation  Taken 5/17/2025 0024 by Amelie Hilton RN  Transportation Anticipated: family or friend will provide  Patient/Family Anticipated Services at Transition: none  Patient/Family Anticipates Transition to: home with family  Taken 5/17/2025 0009 by Amelie Hilton RN  Equipment Currently Used at Home: cpap     Problem: Comorbidity Management  Goal: Maintenance of Asthma Control  5/17/2025 0458 by Amelie Hilton RN  Outcome: Progressing  5/17/2025 0057 by Amelie Hilton RN  Outcome: Progressing  Intervention: Maintain Asthma Symptom Control  Recent Flowsheet Documentation  Taken 5/17/2025 0200 by Amelie Hilton RN  Medication Review/Management: medications reviewed  Goal: Maintenance of Behavioral Health Symptom Control  5/17/2025 0458 by Amelie Hilton RN  Outcome: Progressing  5/17/2025 0057 by Amelie Hilton RN  Outcome: Progressing  Intervention: Maintain Behavioral Health Symptom Control  Recent Flowsheet Documentation  Taken 5/17/2025 0200 by Amelie Hilton RN  Medication Review/Management: medications reviewed  Goal: Maintenance of COPD Symptom Control  5/17/2025 0458 by Amelie Hilton RN  Outcome: Progressing  5/17/2025 0057 by Amelie Hilton RN  Outcome: Progressing  Intervention: Maintain COPD (Chronic Obstructive Pulmonary Disease) Symptom Control  Recent Flowsheet Documentation  Taken 5/17/2025 0200 by Amelie Hilton RN  Medication Review/Management: medications reviewed  Goal: Blood Glucose Level Within Target Range  5/17/2025 0458 by Amelie Hilton RN  Outcome: Progressing  5/17/2025 0057 by Amelie Hilton RN  Outcome: Progressing  Intervention: Monitor and Manage Glycemia  Recent Flowsheet Documentation  Taken 5/17/2025 0200 by Amelie Hilton RN  Medication Review/Management: medications reviewed  Goal: Maintenance of Heart Failure Symptom Control  5/17/2025 0458 by Lida  JOSE Kinsey  Outcome: Progressing  5/17/2025 0057 by Amelie Hilton RN  Outcome: Progressing  Intervention: Maintain Heart Failure Management  Recent Flowsheet Documentation  Taken 5/17/2025 0200 by Amelie Hilton RN  Medication Review/Management: medications reviewed  Goal: Blood Pressure in Desired Range  5/17/2025 0458 by Amelie Hilton RN  Outcome: Progressing  5/17/2025 0057 by Amelie Hilton RN  Outcome: Progressing  Intervention: Maintain Blood Pressure Management  Recent Flowsheet Documentation  Taken 5/17/2025 0200 by Amelie Hilton RN  Medication Review/Management: medications reviewed  Goal: Maintenance of Osteoarthritis Symptom Control  5/17/2025 0458 by Amelie Hilton RN  Outcome: Progressing  5/17/2025 0057 by Amelie Hilton RN  Outcome: Progressing  Intervention: Maintain Osteoarthritis Symptom Control  Recent Flowsheet Documentation  Taken 5/17/2025 0200 by Amelie Hilton RN  Activity Management: activity encouraged  Adaptive Equipment Use: used independently  Medication Review/Management: medications reviewed  Taken 5/17/2025 0000 by Amelie Hilton RN  Adaptive Equipment Use: used independently  Goal: Bariatric Home Regimen Maintained  5/17/2025 0458 by Amelie Hilton RN  Outcome: Progressing  5/17/2025 0057 by Amelie Hilton RN  Outcome: Progressing  Intervention: Maintain and Manage Postbariatric Surgery Care  Recent Flowsheet Documentation  Taken 5/17/2025 0200 by Amelie Hilton RN  Medication Review/Management: medications reviewed  Goal: Maintenance of Seizure Control  5/17/2025 0458 by Amelie Hilton RN  Outcome: Progressing  5/17/2025 0057 by Amelie Hilton RN  Outcome: Progressing  Intervention: Maintain Seizure Symptom Control  Recent Flowsheet Documentation  Taken 5/17/2025 0200 by Amelie Hilton RN  Medication Review/Management: medications reviewed     Problem: Fall Injury Risk  Goal: Absence of Fall and Fall-Related Injury  5/17/2025 0458 by  Amelie Hilton RN  Outcome: Progressing  5/17/2025 0057 by Amelie Hilton RN  Outcome: Progressing  Intervention: Identify and Manage Contributors  Recent Flowsheet Documentation  Taken 5/17/2025 0200 by Amelie Hilton RN  Medication Review/Management: medications reviewed  Intervention: Promote Injury-Free Environment  Recent Flowsheet Documentation  Taken 5/17/2025 0200 by Amelie Hilton RN  Safety Promotion/Fall Prevention:   activity supervised   clutter free environment maintained   fall prevention program maintained   lighting adjusted   nonskid shoes/slippers when out of bed   room organization consistent   safety round/check completed     Problem: Breathing Pattern Ineffective  Goal: Effective Breathing Pattern  5/17/2025 0458 by Amelie Hilton RN  Outcome: Progressing  5/17/2025 0057 by Amelie Hilton RN  Outcome: Progressing  Intervention: Promote Improved Breathing Pattern  Recent Flowsheet Documentation  Taken 5/17/2025 0200 by Amelie Hilton RN  Head of Bed (HOB) Positioning: HOB at 30-45 degrees     Problem: Pain Acute  Goal: Optimal Pain Control and Function  5/17/2025 0458 by Amelie Hilton RN  Outcome: Progressing  5/17/2025 0057 by Amelie Hilton RN  Outcome: Progressing  Intervention: Prevent or Manage Pain  Recent Flowsheet Documentation  Taken 5/17/2025 0200 by Amelie Hilton RN  Medication Review/Management: medications reviewed     Problem: Anemia  Goal: Anemia Symptom Improvement  5/17/2025 0458 by Amelie Hilton RN  Outcome: Progressing  5/17/2025 0057 by Amelie Hilton RN  Outcome: Progressing  Intervention: Monitor and Manage Anemia  Recent Flowsheet Documentation  Taken 5/17/2025 0200 by Amelie Hilton RN  Safety Promotion/Fall Prevention:   activity supervised   clutter free environment maintained   fall prevention program maintained   lighting adjusted   nonskid shoes/slippers when out of bed   room organization consistent   safety round/check  completed   Goal Outcome Evaluation:  Plan of Care Reviewed With: patient        Progress: improving

## 2025-05-17 NOTE — PLAN OF CARE
Problem: Adult Inpatient Plan of Care  Goal: Plan of Care Review  Outcome: Progressing  Flowsheets (Taken 5/17/2025 0057)  Plan of Care Reviewed With: patient  Goal: Patient-Specific Goal (Individualized)  Outcome: Progressing  Goal: Absence of Hospital-Acquired Illness or Injury  Outcome: Progressing  Intervention: Prevent and Manage VTE (Venous Thromboembolism) Risk  Recent Flowsheet Documentation  Taken 5/16/2025 2330 by Amelie Hilton RN  VTE Prevention/Management: other (see comments)  Goal: Optimal Comfort and Wellbeing  Outcome: Progressing  Goal: Readiness for Transition of Care  Outcome: Progressing  Intervention: Mutually Develop Transition Plan  Recent Flowsheet Documentation  Taken 5/17/2025 0024 by Amelie Hilton RN  Transportation Anticipated: family or friend will provide  Patient/Family Anticipated Services at Transition: none  Patient/Family Anticipates Transition to: home with family  Taken 5/17/2025 0009 by Amelie Hilton RN  Equipment Currently Used at Home: cpap     Problem: Comorbidity Management  Goal: Maintenance of Asthma Control  Outcome: Progressing  Goal: Maintenance of Behavioral Health Symptom Control  Outcome: Progressing  Goal: Maintenance of COPD Symptom Control  Outcome: Progressing  Goal: Blood Glucose Level Within Target Range  Outcome: Progressing  Goal: Maintenance of Heart Failure Symptom Control  Outcome: Progressing  Goal: Blood Pressure in Desired Range  Outcome: Progressing  Goal: Maintenance of Osteoarthritis Symptom Control  Outcome: Progressing  Goal: Bariatric Home Regimen Maintained  Outcome: Progressing  Goal: Maintenance of Seizure Control  Outcome: Progressing     Problem: Fall Injury Risk  Goal: Absence of Fall and Fall-Related Injury  Outcome: Progressing     Problem: Breathing Pattern Ineffective  Goal: Effective Breathing Pattern  Outcome: Progressing     Problem: Pain Acute  Goal: Optimal Pain Control and Function  Outcome: Progressing     Problem:  Anemia  Goal: Anemia Symptom Improvement  Outcome: Progressing   Goal Outcome Evaluation:  Plan of Care Reviewed With: patient

## 2025-05-18 LAB
GLUCOSE BLDC GLUCOMTR-MCNC: 158 MG/DL (ref 70–105)
GLUCOSE BLDC GLUCOMTR-MCNC: 184 MG/DL (ref 70–105)
GLUCOSE BLDC GLUCOMTR-MCNC: 197 MG/DL (ref 70–105)
INR PPP: 1.78 (ref 2–3)
PROTHROMBIN TIME: 20.7 SECONDS (ref 19.4–28.5)

## 2025-05-18 PROCEDURE — 82948 REAGENT STRIP/BLOOD GLUCOSE: CPT

## 2025-05-18 PROCEDURE — 85610 PROTHROMBIN TIME: CPT

## 2025-05-18 PROCEDURE — G0378 HOSPITAL OBSERVATION PER HR: HCPCS

## 2025-05-18 RX ORDER — HYDRALAZINE HYDROCHLORIDE 25 MG/1
25 TABLET, FILM COATED ORAL EVERY 8 HOURS SCHEDULED
Status: DISCONTINUED | OUTPATIENT
Start: 2025-05-18 | End: 2025-05-20 | Stop reason: HOSPADM

## 2025-05-18 RX ADMIN — LOSARTAN POTASSIUM 100 MG: 50 TABLET, FILM COATED ORAL at 08:50

## 2025-05-18 RX ADMIN — HYDROCODONE BITARTRATE AND ACETAMINOPHEN 1 TABLET: 5; 325 TABLET ORAL at 03:57

## 2025-05-18 RX ADMIN — AMOXICILLIN 500 MG: 250 CAPSULE ORAL at 15:59

## 2025-05-18 RX ADMIN — AMOXICILLIN 500 MG: 250 CAPSULE ORAL at 08:50

## 2025-05-18 RX ADMIN — ALLOPURINOL 300 MG: 300 TABLET ORAL at 21:15

## 2025-05-18 RX ADMIN — AMLODIPINE BESYLATE 5 MG: 5 TABLET ORAL at 08:50

## 2025-05-18 RX ADMIN — HYDROCODONE BITARTRATE AND ACETAMINOPHEN 1 TABLET: 5; 325 TABLET ORAL at 19:49

## 2025-05-18 RX ADMIN — ATORVASTATIN CALCIUM 20 MG: 20 TABLET, FILM COATED ORAL at 08:51

## 2025-05-18 RX ADMIN — Medication 10 ML: at 08:52

## 2025-05-18 RX ADMIN — HYDRALAZINE HYDROCHLORIDE 25 MG: 25 TABLET ORAL at 21:15

## 2025-05-18 RX ADMIN — HYDRALAZINE HYDROCHLORIDE 25 MG: 25 TABLET ORAL at 13:20

## 2025-05-18 RX ADMIN — FUROSEMIDE 20 MG: 20 TABLET ORAL at 08:51

## 2025-05-18 RX ADMIN — AMOXICILLIN 500 MG: 250 CAPSULE ORAL at 21:15

## 2025-05-18 RX ADMIN — Medication 10 ML: at 21:22

## 2025-05-18 NOTE — PLAN OF CARE
Problem: Adult Inpatient Plan of Care  Goal: Plan of Care Review  Outcome: Progressing  Flowsheets (Taken 5/18/2025 0432)  Progress: improving  Plan of Care Reviewed With: patient  Goal: Patient-Specific Goal (Individualized)  Outcome: Progressing  Goal: Absence of Hospital-Acquired Illness or Injury  Outcome: Progressing  Intervention: Identify and Manage Fall Risk  Recent Flowsheet Documentation  Taken 5/18/2025 0200 by Amelie Hilton RN  Safety Promotion/Fall Prevention:   activity supervised   clutter free environment maintained   fall prevention program maintained   lighting adjusted   nonskid shoes/slippers when out of bed   room organization consistent   safety round/check completed  Taken 5/18/2025 0000 by Amelie Hilton RN  Safety Promotion/Fall Prevention:   activity supervised   clutter free environment maintained   fall prevention program maintained   lighting adjusted   muscle strengthening facilitated   nonskid shoes/slippers when out of bed   room organization consistent   safety round/check completed  Taken 5/17/2025 2200 by Amelie Hilton RN  Safety Promotion/Fall Prevention:   activity supervised   clutter free environment maintained   fall prevention program maintained   lighting adjusted   nonskid shoes/slippers when out of bed   room organization consistent   safety round/check completed  Taken 5/17/2025 2000 by Amelie Hilton RN  Safety Promotion/Fall Prevention:   activity supervised   clutter free environment maintained   fall prevention program maintained   lighting adjusted   muscle strengthening facilitated   nonskid shoes/slippers when out of bed   room organization consistent   safety round/check completed  Intervention: Prevent Skin Injury  Recent Flowsheet Documentation  Taken 5/18/2025 0200 by Amelie Hilton RN  Body Position: position changed independently  Taken 5/18/2025 0000 by Amelie Hilton RN  Body Position: position changed independently  Taken 5/17/2025  2200 by Amelie Hilton RN  Body Position: position changed independently  Taken 5/17/2025 2000 by Amelie Hilton RN  Body Position: position changed independently  Intervention: Prevent Infection  Recent Flowsheet Documentation  Taken 5/18/2025 0200 by Amelie Hilton RN  Infection Prevention:   rest/sleep promoted   single patient room provided  Taken 5/18/2025 0000 by Amelie Hilton RN  Infection Prevention:   rest/sleep promoted   single patient room provided  Taken 5/17/2025 2200 by Amelie Hilton RN  Infection Prevention:   rest/sleep promoted   single patient room provided  Taken 5/17/2025 2000 by Amelie Hilton RN  Infection Prevention:   rest/sleep promoted   single patient room provided  Goal: Optimal Comfort and Wellbeing  Outcome: Progressing  Intervention: Provide Person-Centered Care  Recent Flowsheet Documentation  Taken 5/18/2025 0000 by Amelie Hilton RN  Trust Relationship/Rapport:   care explained   questions answered   questions encouraged  Taken 5/17/2025 2000 by Amelie Hilton RN  Trust Relationship/Rapport:   care explained   questions answered   questions encouraged  Goal: Readiness for Transition of Care  Outcome: Progressing     Problem: Comorbidity Management  Goal: Maintenance of Asthma Control  Outcome: Progressing  Intervention: Maintain Asthma Symptom Control  Recent Flowsheet Documentation  Taken 5/18/2025 0200 by Amelie Hilton RN  Medication Review/Management: medications reviewed  Taken 5/18/2025 0000 by Amelie Hilton RN  Medication Review/Management: medications reviewed  Taken 5/17/2025 2200 by Amelie Hilton RN  Medication Review/Management: medications reviewed  Taken 5/17/2025 2000 by Amelie Hilton RN  Medication Review/Management: medications reviewed  Goal: Maintenance of Behavioral Health Symptom Control  Outcome: Progressing  Intervention: Maintain Behavioral Health Symptom Control  Recent Flowsheet Documentation  Taken 5/18/2025 0200 by  Amelie Hilton RN  Medication Review/Management: medications reviewed  Taken 5/18/2025 0000 by Amelie Hilton RN  Medication Review/Management: medications reviewed  Taken 5/17/2025 2200 by Amelie Hilton RN  Medication Review/Management: medications reviewed  Taken 5/17/2025 2000 by Amelie Hilton RN  Medication Review/Management: medications reviewed  Goal: Maintenance of COPD Symptom Control  Outcome: Progressing  Intervention: Maintain COPD (Chronic Obstructive Pulmonary Disease) Symptom Control  Recent Flowsheet Documentation  Taken 5/18/2025 0200 by Amelie Hilton RN  Medication Review/Management: medications reviewed  Taken 5/18/2025 0000 by Amelie Hilton RN  Medication Review/Management: medications reviewed  Taken 5/17/2025 2200 by Amelie Hilton RN  Medication Review/Management: medications reviewed  Taken 5/17/2025 2000 by Amelie Hilton RN  Medication Review/Management: medications reviewed  Goal: Blood Glucose Level Within Target Range  Outcome: Progressing  Intervention: Monitor and Manage Glycemia  Recent Flowsheet Documentation  Taken 5/18/2025 0200 by Amelie Hilton RN  Medication Review/Management: medications reviewed  Taken 5/18/2025 0000 by Amelie Hilton RN  Medication Review/Management: medications reviewed  Taken 5/17/2025 2200 by Amelie Hilton RN  Medication Review/Management: medications reviewed  Taken 5/17/2025 2000 by Amelie Hilton RN  Medication Review/Management: medications reviewed  Goal: Maintenance of Heart Failure Symptom Control  Outcome: Progressing  Intervention: Maintain Heart Failure Management  Recent Flowsheet Documentation  Taken 5/18/2025 0200 by Amelie Hilton RN  Medication Review/Management: medications reviewed  Taken 5/18/2025 0000 by Amelie Hilton RN  Medication Review/Management: medications reviewed  Taken 5/17/2025 2200 by Amelie Hilton RN  Medication Review/Management: medications reviewed  Taken 5/17/2025 2000  by Amelie Hilton RN  Medication Review/Management: medications reviewed  Goal: Blood Pressure in Desired Range  Outcome: Progressing  Intervention: Maintain Blood Pressure Management  Recent Flowsheet Documentation  Taken 5/18/2025 0200 by Amelie Hilton RN  Medication Review/Management: medications reviewed  Taken 5/18/2025 0000 by Amelie Hilton RN  Medication Review/Management: medications reviewed  Taken 5/17/2025 2200 by Amelie Hilton RN  Medication Review/Management: medications reviewed  Taken 5/17/2025 2000 by Amelie Hilton RN  Medication Review/Management: medications reviewed  Goal: Maintenance of Osteoarthritis Symptom Control  Outcome: Progressing  Intervention: Maintain Osteoarthritis Symptom Control  Recent Flowsheet Documentation  Taken 5/18/2025 0200 by Amelie Hilton RN  Activity Management: activity encouraged  Adaptive Equipment Use: used independently  Medication Review/Management: medications reviewed  Taken 5/18/2025 0000 by Amelie Hilton RN  Activity Management: activity encouraged  Adaptive Equipment Use: used independently  Medication Review/Management: medications reviewed  Taken 5/17/2025 2200 by Amelie Hilton RN  Activity Management: activity encouraged  Adaptive Equipment Use: used independently  Medication Review/Management: medications reviewed  Taken 5/17/2025 2100 by Amelie Hilton RN  Activity Management: activity encouraged  Taken 5/17/2025 2000 by Amelie Hilton RN  Activity Management: activity encouraged  Adaptive Equipment Use: used independently  Medication Review/Management: medications reviewed  Goal: Bariatric Home Regimen Maintained  Outcome: Progressing  Intervention: Maintain and Manage Postbariatric Surgery Care  Recent Flowsheet Documentation  Taken 5/18/2025 0200 by Amelie Hilton RN  Medication Review/Management: medications reviewed  Taken 5/18/2025 0000 by Amelie Hilton RN  Medication Review/Management: medications  reviewed  Taken 5/17/2025 2200 by Amelie Hilton RN  Medication Review/Management: medications reviewed  Taken 5/17/2025 2000 by Amelie Hilton RN  Medication Review/Management: medications reviewed  Goal: Maintenance of Seizure Control  Outcome: Progressing  Intervention: Maintain Seizure Symptom Control  Recent Flowsheet Documentation  Taken 5/18/2025 0200 by Amelie Hilton RN  Medication Review/Management: medications reviewed  Taken 5/18/2025 0000 by Amelie Hilton RN  Medication Review/Management: medications reviewed  Taken 5/17/2025 2200 by Amelie Hilton RN  Medication Review/Management: medications reviewed  Taken 5/17/2025 2000 by Amelie Hilton RN  Sensory Stimulation Regulation: television on  Medication Review/Management: medications reviewed     Problem: Fall Injury Risk  Goal: Absence of Fall and Fall-Related Injury  Outcome: Progressing  Intervention: Identify and Manage Contributors  Recent Flowsheet Documentation  Taken 5/18/2025 0200 by Amelie Hilton RN  Medication Review/Management: medications reviewed  Self-Care Promotion: independence encouraged  Taken 5/18/2025 0000 by Amelie Hilton RN  Medication Review/Management: medications reviewed  Taken 5/17/2025 2200 by Amelie Hilton RN  Medication Review/Management: medications reviewed  Taken 5/17/2025 2000 by Amelie Hilton RN  Medication Review/Management: medications reviewed  Self-Care Promotion: independence encouraged  Intervention: Promote Injury-Free Environment  Recent Flowsheet Documentation  Taken 5/18/2025 0200 by Amelie Hilton RN  Safety Promotion/Fall Prevention:   activity supervised   clutter free environment maintained   fall prevention program maintained   lighting adjusted   nonskid shoes/slippers when out of bed   room organization consistent   safety round/check completed  Taken 5/18/2025 0000 by Amelie Hilton RN  Safety Promotion/Fall Prevention:   activity supervised   clutter free  environment maintained   fall prevention program maintained   lighting adjusted   muscle strengthening facilitated   nonskid shoes/slippers when out of bed   room organization consistent   safety round/check completed  Taken 5/17/2025 2200 by Amelie Hilton RN  Safety Promotion/Fall Prevention:   activity supervised   clutter free environment maintained   fall prevention program maintained   lighting adjusted   nonskid shoes/slippers when out of bed   room organization consistent   safety round/check completed  Taken 5/17/2025 2000 by Amelie Hilton RN  Safety Promotion/Fall Prevention:   activity supervised   clutter free environment maintained   fall prevention program maintained   lighting adjusted   muscle strengthening facilitated   nonskid shoes/slippers when out of bed   room organization consistent   safety round/check completed     Problem: Breathing Pattern Ineffective  Goal: Effective Breathing Pattern  Outcome: Progressing  Intervention: Promote Improved Breathing Pattern  Recent Flowsheet Documentation  Taken 5/18/2025 0200 by Amelie Hilton RN  Head of Bed (HOB) Positioning: HOB elevated  Taken 5/18/2025 0000 by Amelie Hilton RN  Supportive Measures: active listening utilized  Head of Bed (HOB) Positioning: HOB elevated  Taken 5/17/2025 2200 by Amelie Hilton RN  Head of Bed (HOB) Positioning: HOB elevated  Taken 5/17/2025 2000 by Amelie Hilton RN  Supportive Measures: active listening utilized  Head of Bed (HOB) Positioning: HOB elevated     Problem: Pain Acute  Goal: Optimal Pain Control and Function  Outcome: Progressing  Intervention: Optimize Psychosocial Wellbeing  Recent Flowsheet Documentation  Taken 5/18/2025 0000 by Amelie Hilton RN  Supportive Measures: active listening utilized  Diversional Activities:   television   smartphone  Taken 5/17/2025 2000 by Amelie Hilton RN  Supportive Measures: active listening utilized  Diversional Activities:   television    smartphone  Intervention: Prevent or Manage Pain  Recent Flowsheet Documentation  Taken 5/18/2025 0200 by Amelie Hilton RN  Medication Review/Management: medications reviewed  Taken 5/18/2025 0000 by Amelie Hilton RN  Medication Review/Management: medications reviewed  Taken 5/17/2025 2200 by Amelie Hilton RN  Medication Review/Management: medications reviewed  Taken 5/17/2025 2000 by Amelie Hilton RN  Sensory Stimulation Regulation: television on  Sleep/Rest Enhancement: awakenings minimized  Medication Review/Management: medications reviewed     Problem: Anemia  Goal: Anemia Symptom Improvement  Outcome: Progressing  Intervention: Monitor and Manage Anemia  Recent Flowsheet Documentation  Taken 5/18/2025 0200 by Amelie Hilton RN  Safety Promotion/Fall Prevention:   activity supervised   clutter free environment maintained   fall prevention program maintained   lighting adjusted   nonskid shoes/slippers when out of bed   room organization consistent   safety round/check completed  Taken 5/18/2025 0000 by Amelie Hilton RN  Safety Promotion/Fall Prevention:   activity supervised   clutter free environment maintained   fall prevention program maintained   lighting adjusted   muscle strengthening facilitated   nonskid shoes/slippers when out of bed   room organization consistent   safety round/check completed  Taken 5/17/2025 2200 by Amelie Hilton RN  Safety Promotion/Fall Prevention:   activity supervised   clutter free environment maintained   fall prevention program maintained   lighting adjusted   nonskid shoes/slippers when out of bed   room organization consistent   safety round/check completed  Taken 5/17/2025 2000 by Amelie Hilton RN  Safety Promotion/Fall Prevention:   activity supervised   clutter free environment maintained   fall prevention program maintained   lighting adjusted   muscle strengthening facilitated   nonskid shoes/slippers when out of bed   room organization  consistent   safety round/check completed   Goal Outcome Evaluation:  Plan of Care Reviewed With: patient        Progress: improving

## 2025-05-18 NOTE — PROGRESS NOTES
[facility] MEDICINE SERVICE  DAILY PROGRESS NOTE    NAME: Sandro Ramirez  : 1950  MRN: [id number]      LOS: 0 days     PROVIDER OF SERVICE: Nav Petty MD    Chief Complaint: Gross hematuria    Subjective:     Interval History:  History taken from: patient    Doing well.  Urine is clear.        Review of Systems:   Review of Systems    Objective:     Vital Signs  Temp:  [97.3 °F (36.3 °C)-98 °F (36.7 °C)] 97.3 °F (36.3 °C)  Heart Rate:  [] 76  Resp:  [12-24] 24  BP: (134-196)/() 169/75   Body mass index is 27.89 kg/m².    Physical Exam  Physical Exam  Constitutional:       Appearance: Normal appearance.   Cardiovascular:      Rate and Rhythm: Normal rate and regular rhythm.      Pulses: Normal pulses.      Heart sounds: Normal heart sounds.   Pulmonary:      Effort: Pulmonary effort is normal.      Breath sounds: Normal breath sounds.   Abdominal:      General: Abdomen is flat.      Palpations: Abdomen is soft.   Neurological:      Mental Status: He is alert.            Diagnostic Data    Results from last 7 days   Lab Units 25  0149 25  1925   WBC 10*3/mm3 9.10 9.49   HEMOGLOBIN g/dL 15.6 15.1   HEMATOCRIT % 48.2 46.5   PLATELETS 10*3/mm3 219 214   GLUCOSE mg/dL 132* 138*   CREATININE mg/dL 0.85 0.82   BUN mg/dL 11 15   SODIUM mmol/L 136 139   POTASSIUM mmol/L 3.9 3.9   AST (SGOT) U/L  --  27   ALT (SGPT) U/L  --  20   ALK PHOS U/L  --  107   BILIRUBIN mg/dL  --  0.4   ANION GAP mmol/L 8.4 11.8       XR Chest 1 View  Result Date: 2025  Impression: No acute process. Electronically Signed: Harvey Khoury MD  2025 8:00 PM EDT  Workstation ID: [id number]        I reviewed the patient's new clinical results.    Assessment/Plan:     Active and Resolved Problems  Active Hospital Problems    Diagnosis  POA    **Gross hematuria [R31.0]  Yes      Resolved Hospital Problems   No resolved problems to display.     Symptomatic bradycardia  Gross hematuria secondary to recent  urological operation  Chronic persistent atrial fibrillation  Coronary artery disease status post CABG  Dental caries with associated dental pain  Hypertension  Hyperlipidemia  Type 2 diabetes  COPD  SUPA  Gout history not in exacerbation    Patient has been taken off of his home beta-blockers and his heart rate has improved significantly.  Cardiology is recommending monitoring in the hospital until Monday with possible EP evaluation.    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  They have evaluated the patient in inpatient and there is nothing urgently from there at this time.  His urine is much improved this morning and there is no hematuria in his urinal.    Will need to see outpatient dentistry regarding his dental disease.    VTE Prophylaxis:  Mechanical VTE prophylaxis orders are present.             Disposition Planning:     Barriers to Discharge: EP/cardiology eval  Anticipated Date of Discharge: 5/19/2025  Place of Discharge: Home      Time: 35 minutes     Code Status and Medical Interventions: CPR (Attempt to Resuscitate); Full Support   Ordered at: 05/17/25 0648     Code Status (Patient has no pulse and is not breathing):    CPR (Attempt to Resuscitate)     Medical Interventions (Patient has pulse or is breathing):    Full Support       Signature: Electronically signed by Nav Petty MD, 05/18/25, 12:06 EDT.  [facility] Hospitalist Team

## 2025-05-19 LAB
ANION GAP SERPL CALCULATED.3IONS-SCNC: 13.2 MMOL/L (ref 5–15)
BASOPHILS # BLD AUTO: 0.05 10*3/MM3 (ref 0–0.2)
BASOPHILS NFR BLD AUTO: 0.6 % (ref 0–1.5)
BUN SERPL-MCNC: 12 MG/DL (ref 8–23)
BUN/CREAT SERPL: 16.9 (ref 7–25)
CALCIUM SPEC-SCNC: 9.6 MG/DL (ref 8.6–10.5)
CHLORIDE SERPL-SCNC: 102 MMOL/L (ref 98–107)
CO2 SERPL-SCNC: 23.8 MMOL/L (ref 22–29)
CREAT SERPL-MCNC: 0.71 MG/DL (ref 0.76–1.27)
DEPRECATED RDW RBC AUTO: 42.4 FL (ref 37–54)
EGFRCR SERPLBLD CKD-EPI 2021: 97.5 ML/MIN/1.73
EOSINOPHIL # BLD AUTO: 0.05 10*3/MM3 (ref 0–0.4)
EOSINOPHIL NFR BLD AUTO: 0.6 % (ref 0.3–6.2)
ERYTHROCYTE [DISTWIDTH] IN BLOOD BY AUTOMATED COUNT: 13.5 % (ref 12.3–15.4)
GLUCOSE BLDC GLUCOMTR-MCNC: 150 MG/DL (ref 70–105)
GLUCOSE BLDC GLUCOMTR-MCNC: 155 MG/DL (ref 70–105)
GLUCOSE BLDC GLUCOMTR-MCNC: 183 MG/DL (ref 70–105)
GLUCOSE BLDC GLUCOMTR-MCNC: 290 MG/DL (ref 70–105)
GLUCOSE SERPL-MCNC: 152 MG/DL (ref 65–99)
HCT VFR BLD AUTO: 50 % (ref 37.5–51)
HGB BLD-MCNC: 16.3 G/DL (ref 13–17.7)
IMM GRANULOCYTES # BLD AUTO: 0.03 10*3/MM3 (ref 0–0.05)
IMM GRANULOCYTES NFR BLD AUTO: 0.3 % (ref 0–0.5)
INR PPP: 1.23 (ref 2–3)
LYMPHOCYTES # BLD AUTO: 1.48 10*3/MM3 (ref 0.7–3.1)
LYMPHOCYTES NFR BLD AUTO: 17 % (ref 19.6–45.3)
MCH RBC QN AUTO: 28.1 PG (ref 26.6–33)
MCHC RBC AUTO-ENTMCNC: 32.6 G/DL (ref 31.5–35.7)
MCV RBC AUTO: 86.1 FL (ref 79–97)
MONOCYTES # BLD AUTO: 0.85 10*3/MM3 (ref 0.1–0.9)
MONOCYTES NFR BLD AUTO: 9.8 % (ref 5–12)
NEUTROPHILS NFR BLD AUTO: 6.24 10*3/MM3 (ref 1.7–7)
NEUTROPHILS NFR BLD AUTO: 71.7 % (ref 42.7–76)
NRBC BLD AUTO-RTO: 0 /100 WBC (ref 0–0.2)
PLATELET # BLD AUTO: 232 10*3/MM3 (ref 140–450)
PMV BLD AUTO: 9.9 FL (ref 6–12)
POTASSIUM SERPL-SCNC: 3.9 MMOL/L (ref 3.5–5.2)
PROTHROMBIN TIME: 15.4 SECONDS (ref 19.4–28.5)
QT INTERVAL: 478 MS
QT INTERVAL: 483 MS
QTC INTERVAL: 418 MS
QTC INTERVAL: 441 MS
RBC # BLD AUTO: 5.81 10*6/MM3 (ref 4.14–5.8)
SODIUM SERPL-SCNC: 139 MMOL/L (ref 136–145)
WBC NRBC COR # BLD AUTO: 8.7 10*3/MM3 (ref 3.4–10.8)

## 2025-05-19 PROCEDURE — 80048 BASIC METABOLIC PNL TOTAL CA: CPT | Performed by: STUDENT IN AN ORGANIZED HEALTH CARE EDUCATION/TRAINING PROGRAM

## 2025-05-19 PROCEDURE — 85025 COMPLETE CBC W/AUTO DIFF WBC: CPT | Performed by: STUDENT IN AN ORGANIZED HEALTH CARE EDUCATION/TRAINING PROGRAM

## 2025-05-19 PROCEDURE — 25010000002 MORPHINE PER 10 MG

## 2025-05-19 PROCEDURE — 82948 REAGENT STRIP/BLOOD GLUCOSE: CPT | Performed by: STUDENT IN AN ORGANIZED HEALTH CARE EDUCATION/TRAINING PROGRAM

## 2025-05-19 PROCEDURE — 85610 PROTHROMBIN TIME: CPT

## 2025-05-19 PROCEDURE — 82948 REAGENT STRIP/BLOOD GLUCOSE: CPT

## 2025-05-19 PROCEDURE — 63710000001 INSULIN LISPRO (HUMAN) PER 5 UNITS: Performed by: INTERNAL MEDICINE

## 2025-05-19 PROCEDURE — 99232 SBSQ HOSP IP/OBS MODERATE 35: CPT | Performed by: INTERNAL MEDICINE

## 2025-05-19 RX ORDER — IBUPROFEN 600 MG/1
1 TABLET ORAL
Status: DISCONTINUED | OUTPATIENT
Start: 2025-05-19 | End: 2025-05-20 | Stop reason: HOSPADM

## 2025-05-19 RX ORDER — NICOTINE POLACRILEX 4 MG
15 LOZENGE BUCCAL
Status: DISCONTINUED | OUTPATIENT
Start: 2025-05-19 | End: 2025-05-20 | Stop reason: HOSPADM

## 2025-05-19 RX ORDER — WARFARIN SODIUM 5 MG/1
7.5 TABLET ORAL
Status: DISCONTINUED | OUTPATIENT
Start: 2025-05-19 | End: 2025-05-20 | Stop reason: HOSPADM

## 2025-05-19 RX ORDER — INSULIN LISPRO 100 [IU]/ML
2-7 INJECTION, SOLUTION INTRAVENOUS; SUBCUTANEOUS
Status: DISCONTINUED | OUTPATIENT
Start: 2025-05-19 | End: 2025-05-20 | Stop reason: HOSPADM

## 2025-05-19 RX ORDER — DEXTROSE MONOHYDRATE 25 G/50ML
25 INJECTION, SOLUTION INTRAVENOUS
Status: DISCONTINUED | OUTPATIENT
Start: 2025-05-19 | End: 2025-05-20 | Stop reason: HOSPADM

## 2025-05-19 RX ADMIN — HYDRALAZINE HYDROCHLORIDE 25 MG: 25 TABLET ORAL at 14:34

## 2025-05-19 RX ADMIN — HYDRALAZINE HYDROCHLORIDE 25 MG: 25 TABLET ORAL at 21:14

## 2025-05-19 RX ADMIN — AMOXICILLIN 500 MG: 250 CAPSULE ORAL at 21:14

## 2025-05-19 RX ADMIN — ALLOPURINOL 300 MG: 300 TABLET ORAL at 21:14

## 2025-05-19 RX ADMIN — INSULIN LISPRO 2 UNITS: 100 INJECTION, SOLUTION INTRAVENOUS; SUBCUTANEOUS at 21:17

## 2025-05-19 RX ADMIN — MORPHINE SULFATE 1 MG: 2 INJECTION, SOLUTION INTRAMUSCULAR; INTRAVENOUS at 14:46

## 2025-05-19 RX ADMIN — ATORVASTATIN CALCIUM 20 MG: 20 TABLET, FILM COATED ORAL at 08:53

## 2025-05-19 RX ADMIN — INSULIN LISPRO 4 UNITS: 100 INJECTION, SOLUTION INTRAVENOUS; SUBCUTANEOUS at 12:32

## 2025-05-19 RX ADMIN — HYDROCODONE BITARTRATE AND ACETAMINOPHEN 1 TABLET: 5; 325 TABLET ORAL at 18:09

## 2025-05-19 RX ADMIN — WARFARIN SODIUM 7.5 MG: 5 TABLET ORAL at 18:09

## 2025-05-19 RX ADMIN — AMOXICILLIN 500 MG: 250 CAPSULE ORAL at 16:20

## 2025-05-19 RX ADMIN — AMOXICILLIN 500 MG: 250 CAPSULE ORAL at 08:52

## 2025-05-19 RX ADMIN — Medication 10 ML: at 21:15

## 2025-05-19 RX ADMIN — Medication 10 ML: at 08:54

## 2025-05-19 RX ADMIN — FUROSEMIDE 20 MG: 20 TABLET ORAL at 09:00

## 2025-05-19 NOTE — PLAN OF CARE
Goal Outcome Evaluation:    Awaiting primary cardiologist for further recommendations

## 2025-05-19 NOTE — PROGRESS NOTES
"[facility] MEDICINE SERVICE  DAILY PROGRESS NOTE    NAME: Sandro Ramirez  : 1950  MRN: [id number]      LOS: 0 days     PROVIDER OF SERVICE: Jonny Clark MD    Chief Complaint: Gross hematuria    Subjective:     Obtained from ED provider HPI on 2025:  History of present illness this is a 74-year-old male whose got multitude of health problems and takes anticoagulants who reports a 2-day history of passing blood in his urine and blood clots he states will be a blood clot initially and then is just all blood afterwards he is able to urinate without difficulty he states no dysuria or frequency.  No trauma.  He does complain subjectively of chills although has had no fever.  He denies any recent long car ride plane or immobilization foreign travels.  Patient recently had some dental work done.  Nothing otherwise seem to trigger this or make it better or worse.     25:Patient confirms the HPI noted above reporting sudden onset of gross hematuria on the day of presentation.  Patient initially notes the passage of bright red blood in his urine and subsequently began to experience some clots as well though did not experience any urinary retention or pain associated with the symptoms.  He has not had any fever, changes in bowel habits, nausea or vomiting.  Additionally patient reports that he has a planned procedure to remove a \"clip\" in his prostate from a previous surgery scheduled as an outpatient.  He is compliant with all of his outpatient medical therapies including warfarin.  Patient is currently in atrial fibrillation and has been experiencing heart rates into the 30s during his admission but denies any associated symptoms.     Interval History:  History taken from: patient  -Doing well.  Urine is clear.   seen in bed family at bed side, ROSANA RN, bp 96/78, /HR 94    Plan: From home with spouse.   Review of Systems    Objective:     Vital Signs  Temp:  [97.3 °F (36.3 °C)-98 " °F (36.7 °C)] 98 °F (36.7 °C)  Heart Rate:  [] 94  Resp:  [16-24] 18  BP: ()/(65-88) 96/78   Body mass index is 27.89 kg/m².    Physical Exam  Constitutional:       Appearance: Normal appearance.   Cardiovascular:      Rate and Rhythm: Normal rate and regular rhythm.      Pulses: Normal pulses.      Heart sounds: Normal heart sounds.   Pulmonary:      Effort: Pulmonary effort is normal.      Breath sounds: Normal breath sounds.   Abdominal:      General: Abdomen is flat.      Palpations: Abdomen is soft.   Neurological:      Mental Status: He is alert.       Diagnostic Data    Results from last 7 days   Lab Units 05/19/25  0416 05/17/25  0149 05/16/25  1925   WBC 10*3/mm3 8.70   < > 9.49   HEMOGLOBIN g/dL 16.3   < > 15.1   HEMATOCRIT % 50.0   < > 46.5   PLATELETS 10*3/mm3 232   < > 214   GLUCOSE mg/dL 152*   < > 138*   CREATININE mg/dL 0.71*   < > 0.82   BUN mg/dL 12   < > 15   SODIUM mmol/L 139   < > 139   POTASSIUM mmol/L 3.9   < > 3.9   AST (SGOT) U/L  --   --  27   ALT (SGPT) U/L  --   --  20   ALK PHOS U/L  --   --  107   BILIRUBIN mg/dL  --   --  0.4   ANION GAP mmol/L 13.2   < > 11.8    < > = values in this interval not displayed.       No radiology results for the last day  Scheduled Meds:allopurinol, 300 mg, Oral, Nightly  amLODIPine, 5 mg, Oral, Q24H  amoxicillin, 500 mg, Oral, TID  [Held by provider] atenolol, 50 mg, Oral, Nightly  atorvastatin, 20 mg, Oral, Daily  furosemide, 20 mg, Oral, Daily  hydrALAZINE, 25 mg, Oral, Q8H  losartan, 100 mg, Oral, Daily  sodium chloride, 10 mL, Intravenous, Q12H      Continuous Infusions:   PRN Meds:.  acetaminophen    albuterol    senna-docusate sodium **AND** polyethylene glycol **AND** bisacodyl **AND** bisacodyl    cetirizine    hydrALAZINE    HYDROcodone-acetaminophen    melatonin    Morphine    ondansetron    [COMPLETED] Insert Peripheral IV **AND** sodium chloride    sodium chloride    sodium chloride        I reviewed the patient's new clinical  results.    Assessment/Plan:     Active and Resolved Problems  Active Hospital Problems    Diagnosis  POA    **Gross hematuria [R31.0]  Yes      Resolved Hospital Problems   No resolved problems to display.     Symptomatic bradycardia  Gross hematuria secondary to recent urological operation  Chronic persistent atrial fibrillation  Coronary artery disease status post CABG  Dental caries with associated dental pain  Hypertension  Hyperlipidemia  Type 2 diabetes  COPD  SUPA  Gout history not in exacerbation    Patient has been taken off of his home beta-blockers and his heart rate has improved significantly.    Cardiology is recommending monitoring in the hospital until Monday with possible EP evaluation.    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  They have evaluated the patient in inpatient and there is nothing urgently from there at this time.  His urine is much improved this morning and there is no hematuria in his urinal.    Will need to see outpatient dentistry regarding his dental disease.    VTE Prophylaxis:  Mechanical VTE prophylaxis orders are present.    Disposition Planning:   Barriers to Discharge: EP/cardiology eval  Anticipated Date of Discharge: 5/19/2025  Place of Discharge: Home      Time: 35 minutes     Code Status and Medical Interventions: CPR (Attempt to Resuscitate); Full Support   Ordered at: 05/17/25 0648     Code Status (Patient has no pulse and is not breathing):    CPR (Attempt to Resuscitate)     Medical Interventions (Patient has pulse or is breathing):    Full Support       Signature: Electronically signed by Jonny Clark MD, 05/19/25, 08:26 EDT.  [facility] Hospitalist Team

## 2025-05-19 NOTE — CASE MANAGEMENT/SOCIAL WORK
Discharge Planning Assessment   Keshav     Patient Name: Sandro Ramirez  MRN: [id number]  Today's Date: 5/19/2025    Admit Date: 5/16/2025    Plan: From home with spouse.   Discharge Needs Assessment       Row Name 05/19/25 1043       Living Environment    People in Home spouse    Name(s) of People in Home Spouse Ashli    Current Living Arrangements home    Potentially Unsafe Housing Conditions none    In the past 12 months has the electric, gas, oil, or water company threatened to shut off services in your home? No    Primary Care Provided by self    Provides Primary Care For no one    Family Caregiver if Needed spouse    Family Caregiver Names Spouse Ashli    Quality of Family Relationships helpful;involved;supportive    Able to Return to Prior Arrangements yes       Resource/Environmental Concerns    Resource/Environmental Concerns none    Transportation Concerns none       Transportation Needs    In the past 12 months, has lack of transportation kept you from medical appointments or from getting medications? no    In the past 12 months, has lack of transportation kept you from meetings, work, or from getting things needed for daily living? No       Food Insecurity    Within the past 12 months, you worried that your food would run out before you got the money to buy more. Never true    Within the past 12 months, the food you bought just didn't last and you didn't have money to get more. Never true       Transition Planning    Patient/Family Anticipates Transition to home with family    Patient/Family Anticipated Services at Transition none    Transportation Anticipated car, drives self;family or friend will provide       Discharge Needs Assessment    Readmission Within the Last 30 Days no previous admission in last 30 days    Equipment Currently Used at Home cpap    Concerns to be Addressed no discharge needs identified;denies needs/concerns at this time    Do you want help finding or keeping work or a job?  I do not need or want help    Do you want help with school or training? For example, starting or completing job training or getting a high school diploma, GED or equivalent No    Anticipated Changes Related to Illness none    Equipment Needed After Discharge none    Provided Post Acute Provider List? N/A    Provided Post Acute Provider Quality & Resource List? N/A    Offered/Gave Vendor List no                   Discharge Plan       Row Name 05/19/25 1043       Plan    Plan From home with spouse.    Patient/Family in Agreement with Plan yes    Plan Comments CM met with patient and spouse Ashli at the bedside to review discharge planning. Patient was asleep so patients spouse Ashli discussed with CM. Patient lives at home with Ashli, is IADLs and drives. Spouse Debbier to transport patient at d/c. Confirmed PCP, insurance, and pharmacy. Patient [email or web address]. Patient denies any difficulty affording food, utilities, or medications. Patient is not current with any HHC/PT services. Upon exiting the room CM discussed plan of care with Dr. Clark as he was rounding. DC Barriers: Cards/Uro following                  Selected Continued Care - Episodes Includes continued care and service providers with selected services from the active episodes listed below          Expected Discharge Date and Time       Expected Discharge Date Expected Discharge Time    May 19, 2025            Demographic Summary       Row Name 05/19/25 1042       General Information    Admission Type observation    Arrived From emergency department    Required Notices Provided Observation Status Notice    Referral Source admission list    Reason for Consult discharge planning    Preferred Language English       Contact Information    Permission Granted to Share Info With     Contact Information Obtained for                    Functional Status       Row Name 05/19/25 1043       Functional Status    Usual Activity Tolerance good     Current Activity Tolerance moderate       Functional Status, IADL    Medications independent    Meal Preparation independent    Housekeeping independent    Laundry independent    Shopping independent    If for any reason you need help with day-to-day activities such as bathing, preparing meals, shopping, managing finances, etc., do you get the help you need? I get all the help I need       Mental Status    General Appearance WDL WDL       Mental Status Summary    Recent Changes in Mental Status/Cognitive Functioning no changes              Patient Forms       Row Name 05/19/25 0934       Patient Forms    Important Message from Medicare (IMM) Delivered  MOON per Reg 5/16             Kari Bae RN     [phone]  [email or web address]

## 2025-05-19 NOTE — PLAN OF CARE
Goal Outcome Evaluation:      Pt assessment done, VSS, no concerns at this time, will continue to monitor                                       Retinal tear and detachment warning symptoms reviewed and patient instructed to call immediately if increasing floaters, flashes, or decreasing peripheral vision.

## 2025-05-19 NOTE — PROGRESS NOTES
CARDIOLOGY PROGRESS NOTE:    Sandro Ramirez  74 y.o.  male  1950  [id number]      Referring Provider: Hospitalist    Reason for follow-up: Hematuria and A-fib     Patient Care Team:  Colette Dominguez APRN as PCP - General (Nurse Practitioner)  Thai Galloway MD as Consulting Physician (Interventional Cardiology)  Seipel, Joseph F, MD as Consulting Physician (Neurology)  Daniel Hamm MD as Consulting Physician (Urology)  Abbey Mary APRN as Nurse Practitioner (Nurse Practitioner)  Shawn Van MD as Consulting Physician (Endocrinology)  Giovanny Lan Jr., DPM as Consulting Physician (Podiatry)  Inna Kunz RN as Ambulatory  (Population Health)    Subjective .  Patient is currently doing well without any symptoms    Objective lying in bed comfortably     Review of Systems   Constitutional: Negative for malaise/fatigue.   Cardiovascular:  Negative for chest pain, dyspnea on exertion, leg swelling and palpitations.   Respiratory:  Negative for cough and shortness of breath.    Gastrointestinal:  Negative for abdominal pain, nausea and vomiting.   Neurological:  Negative for dizziness, headaches, light-headedness, numbness and weakness.   All other systems reviewed and are negative.      Allergies: Bee venom, Meperidine, Midazolam, Propofol, Sulfa antibiotics, Hydrocodone, and Simvastatin    Scheduled Meds:allopurinol, 300 mg, Oral, Nightly  amLODIPine, 5 mg, Oral, Q24H  amoxicillin, 500 mg, Oral, TID  [Held by provider] atenolol, 50 mg, Oral, Nightly  atorvastatin, 20 mg, Oral, Daily  empagliflozin, 10 mg, Oral, Daily  furosemide, 20 mg, Oral, Daily  hydrALAZINE, 25 mg, Oral, Q8H  insulin lispro, 2-7 Units, Subcutaneous, 4x Daily AC & at Bedtime  losartan, 100 mg, Oral, Daily  sodium chloride, 10 mL, Intravenous, Q12H      Continuous Infusions:   PRN Meds:.  acetaminophen    albuterol    senna-docusate sodium **AND** polyethylene glycol **AND** bisacodyl **AND** bisacodyl     "cetirizine    dextrose    dextrose    glucagon (human recombinant)    hydrALAZINE    HYDROcodone-acetaminophen    melatonin    Morphine    ondansetron    [COMPLETED] Insert Peripheral IV **AND** sodium chloride    sodium chloride    sodium chloride        VITAL SIGNS  Vitals:    05/19/25 0429 05/19/25 0430 05/19/25 0708 05/19/25 1103   BP:  139/74 96/78 116/65   BP Location:   Right arm Right arm   Patient Position:   Lying Lying   Pulse:  93 94 96   Resp: 19  18 18   Temp: 98 °F (36.7 °C)  98 °F (36.7 °C) 98 °F (36.7 °C)   TempSrc: Oral  Oral Oral   SpO2:  94% 98% 94%   Weight:       Height:           Flowsheet Rows      Flowsheet Row First Filed Value   Admission Height 180.3 cm (71\") Documented at 05/16/2025 1841   Admission Weight 88.8 kg (195 lb 12.3 oz) Documented at 05/16/2025 1841             TELEMETRY: Atrial fibrillation with controlled ventricular response    Physical Exam:  Constitutional:       Appearance: Well-developed.   Eyes:      General: No scleral icterus.     Conjunctiva/sclera: Conjunctivae normal.   HENT:      Head: Normocephalic and atraumatic.   Neck:      Vascular: No carotid bruit or JVD.   Pulmonary:      Effort: Pulmonary effort is normal.      Breath sounds: Normal breath sounds. No wheezing. No rales.   Cardiovascular:      Normal rate. Irregularly irregular rhythm.   Pulses:     Intact distal pulses.   Abdominal:      General: Bowel sounds are normal.      Palpations: Abdomen is soft.   Musculoskeletal:      Cervical back: Normal range of motion and neck supple. Skin:     General: Skin is warm and dry.      Findings: No rash.   Neurological:      Mental Status: Alert.          Results Review:   I reviewed the patient's new clinical results.  Lab Results (last 24 hours)       Procedure Component Value Units Date/Time    POC Glucose Finger 4x Daily Before Meals & at Bedtime [[id number]]  (Abnormal) Collected: 05/19/25 1102    Specimen: Blood from Finger Updated: 05/19/25 1105     Glucose " 290 mg/dL      Comment: Serial Number: [id number]:  [id number]       POC Glucose Finger 4x Daily Before Meals & at Bedtime [[id number]]  (Abnormal) Collected: 05/19/25 0709    Specimen: Blood from Finger Updated: 05/19/25 0710     Glucose 155 mg/dL      Comment: Serial Number: [id number]:  [id number]       Basic Metabolic Panel [[id number]]  (Abnormal) Collected: 05/19/25 0416    Specimen: Blood from Arm, Left Updated: 05/19/25 0547     Glucose 152 mg/dL      BUN 12 mg/dL      Creatinine 0.71 mg/dL      Sodium 139 mmol/L      Potassium 3.9 mmol/L      Chloride 102 mmol/L      CO2 23.8 mmol/L      Calcium 9.6 mg/dL      BUN/Creatinine Ratio 16.9     Anion Gap 13.2 mmol/L      eGFR 97.5 mL/min/1.73     Narrative:      GFR Categories in Chronic Kidney Disease (CKD)              GFR Category          GFR (mL/min/1.73)    Interpretation  G1                    90 or greater        Normal or high (1)  G2                    60-89                Mild decrease (1)  G3a                   45-59                Mild to moderate decrease  G3b                   30-44                Moderate to severe decrease  G4                    15-29                Severe decrease  G5                    14 or less           Kidney failure    (1)In the absence of evidence of kidney disease, neither GFR category G1 or G2 fulfill the criteria for CKD.    eGFR calculation 2021 CKD-EPI creatinine equation, which does not include race as a factor    Protime-INR [[id number]]  (Abnormal) Collected: 05/19/25 0416    Specimen: Blood from Arm, Left Updated: 05/19/25 0533     Protime 15.4 Seconds      INR 1.23    CBC & Differential [[id number]]  (Abnormal) Collected: 05/19/25 0416    Specimen: Blood from Arm, Left Updated: 05/19/25 0518    Narrative:      The following orders were created for panel order CBC & Differential.  Procedure                               Abnormality         Status                     ---------                                -----------         ------                     CBC Auto Differential[[id number]]        Abnormal            Final result                 Please view results for these tests on the individual orders.    CBC Auto Differential [[id number]]  (Abnormal) Collected: 05/19/25 0416    Specimen: Blood from Arm, Left Updated: 05/19/25 0518     WBC 8.70 10*3/mm3      RBC 5.81 10*6/mm3      Hemoglobin 16.3 g/dL      Hematocrit 50.0 %      MCV 86.1 fL      MCH 28.1 pg      MCHC 32.6 g/dL      RDW 13.5 %      RDW-SD 42.4 fl      MPV 9.9 fL      Platelets 232 10*3/mm3      Neutrophil % 71.7 %      Lymphocyte % 17.0 %      Monocyte % 9.8 %      Eosinophil % 0.6 %      Basophil % 0.6 %      Immature Grans % 0.3 %      Neutrophils, Absolute 6.24 10*3/mm3      Lymphocytes, Absolute 1.48 10*3/mm3      Monocytes, Absolute 0.85 10*3/mm3      Eosinophils, Absolute 0.05 10*3/mm3      Basophils, Absolute 0.05 10*3/mm3      Immature Grans, Absolute 0.03 10*3/mm3      nRBC 0.0 /100 WBC     POC Glucose Once [[id number]]  (Abnormal) Collected: 05/18/25 2108    Specimen: Blood Updated: 05/18/25 2110     Glucose 158 mg/dL      Comment: Serial Number: [id number]:  [id number]       Blood Culture - Blood, Arm, Right [[id number]]  (Normal) Collected: 05/16/25 1950    Specimen: Blood from Arm, Right Updated: 05/18/25 2000     Blood Culture No growth at 2 days    Narrative:      Less than seven (7) mL's of blood was collected.  Insufficient quantity may yield false negative results.    Blood Culture - Blood, Arm, Left [[id number]]  (Normal) Collected: 05/16/25 1925    Specimen: Blood from Arm, Left Updated: 05/18/25 1946     Blood Culture No growth at 2 days    POC Glucose Once [[id number]]  (Abnormal) Collected: 05/18/25 1627    Specimen: Blood Updated: 05/18/25 1629     Glucose 184 mg/dL      Comment: Serial Number: [id number]:  [id number]               Imaging Results (Last 24 Hours)       ** No results found for the last 24 hours. **             EKG      I personally viewed and interpreted the patient's EKG/Telemetry data:    ECHOCARDIOGRAM:  Results for orders placed during the hospital encounter of 05/16/25    Adult Transthoracic Echo Complete W/ Cont if Necessary Per Protocol    Interpretation Summary    Left ventricular systolic function is normal. Left ventricular ejection fraction appears to be 56 - 60%.    Left ventricular wall thickness is consistent with mild concentric hypertrophy.    Left ventricular diastolic function is consistent with (grade II w/high LAP) pseudonormalization.    Mildly reduced right ventricular systolic function noted.    The right ventricular cavity is borderline dilated.    The left atrial cavity is severely dilated.    Left atrial volume is severely increased.    The right atrial cavity is moderate to severely dilated.    Mild aortic valve stenosis is present.    There is moderate, bileaflet mitral valve thickening present.    Moderate tricuspid valve regurgitation is present.    Estimated right ventricular systolic pressure from tricuspid regurgitation is moderately elevated (45-55 mmHg).    Moderate pulmonary hypertension is present.       STRESS MYOVIEW:  Results for orders placed during the hospital encounter of 10/19/23    Stress Test With Myocardial Perfusion One Day    Interpretation Summary    Left ventricular ejection fraction is normal (Calculated EF = 51%).    Myocardial perfusion imaging indicates a moderate-sized infarct located in the inferior wall and apex with no significant ischemia noted.    Impressions are consistent with a low risk study.       CARDIAC CATHETERIZATION:  No results found for this or any previous visit.       OTHER:         Assessment & Plan     A-fib/bradycardia  Patient presented with bradycardia and was on atenolol which is held and will hold it but place him on a Holter monitor for 14 days to assess if he has tachybradycardia syndrome and then decide about further treatment  plan  Patient was on Eliquis which is held for hematuria but will be restarted again according to the urologist.    Hematuria  Patient is seen by urologist and has history of bladder cancer s/pTURBT and is having a procedure done to remove clips.  Patient not having any bleeding at this time    Coronary disease  Patient had history of coronary bypass surgery x 3 vessels with a LIMA to LAD and SVG to the marginal branch and RCA and is currently stable on medical therapy    Hypertension  Patient blood pressure currently stable on medications including losartan but his atenolol is being held and will watch the blood pressure and heart rate at home    Hyperlipidemia  Patient on statins and the lipid levels are well within normal limits    Diabetes  Patient is on insulin as well as oral medicines and followed by primary care doctor.        Thai Galloway MD  05/19/25  14:50 EDT

## 2025-05-20 ENCOUNTER — NURSE TRIAGE (OUTPATIENT)
Dept: CALL CENTER | Facility: HOSPITAL | Age: 75
End: 2025-05-20
Payer: MEDICARE

## 2025-05-20 ENCOUNTER — APPOINTMENT (OUTPATIENT)
Dept: RESPIRATORY THERAPY | Facility: HOSPITAL | Age: 75
End: 2025-05-20
Payer: MEDICARE

## 2025-05-20 ENCOUNTER — READMISSION MANAGEMENT (OUTPATIENT)
Dept: CALL CENTER | Facility: HOSPITAL | Age: 75
End: 2025-05-20
Payer: MEDICARE

## 2025-05-20 VITALS
RESPIRATION RATE: 18 BRPM | BODY MASS INDEX: 27.99 KG/M2 | HEART RATE: 93 BPM | DIASTOLIC BLOOD PRESSURE: 74 MMHG | OXYGEN SATURATION: 94 % | WEIGHT: 199.96 LBS | SYSTOLIC BLOOD PRESSURE: 141 MMHG | HEIGHT: 71 IN | TEMPERATURE: 97.7 F

## 2025-05-20 LAB
GLUCOSE BLDC GLUCOMTR-MCNC: 163 MG/DL (ref 70–105)
HOLD SPECIMEN: NORMAL
INR PPP: 1.17 (ref 2–3)
PROTHROMBIN TIME: 14.8 SECONDS (ref 19.4–28.5)
WHOLE BLOOD HOLD SPECIMEN: NORMAL

## 2025-05-20 PROCEDURE — 93246 EXT ECG>7D<15D RECORDING: CPT

## 2025-05-20 PROCEDURE — 82948 REAGENT STRIP/BLOOD GLUCOSE: CPT

## 2025-05-20 PROCEDURE — 25010000002 HYDRALAZINE PER 20 MG

## 2025-05-20 PROCEDURE — 99232 SBSQ HOSP IP/OBS MODERATE 35: CPT | Performed by: INTERNAL MEDICINE

## 2025-05-20 PROCEDURE — 85610 PROTHROMBIN TIME: CPT

## 2025-05-20 PROCEDURE — 63710000001 INSULIN LISPRO (HUMAN) PER 5 UNITS: Performed by: INTERNAL MEDICINE

## 2025-05-20 RX ORDER — HYDROCODONE BITARTRATE AND ACETAMINOPHEN 5; 325 MG/1; MG/1
1 TABLET ORAL EVERY 6 HOURS PRN
Qty: 21 TABLET | Refills: 0 | Status: SHIPPED | OUTPATIENT
Start: 2025-05-20 | End: 2025-05-26

## 2025-05-20 RX ORDER — ENOXAPARIN SODIUM 100 MG/ML
1 INJECTION SUBCUTANEOUS EVERY 12 HOURS SCHEDULED
Qty: 6 ML | Refills: 0 | Status: SHIPPED | OUTPATIENT
Start: 2025-05-20 | End: 2025-05-21 | Stop reason: SDUPTHER

## 2025-05-20 RX ADMIN — HYDRALAZINE HYDROCHLORIDE 10 MG: 20 INJECTION INTRAMUSCULAR; INTRAVENOUS at 09:35

## 2025-05-20 RX ADMIN — FUROSEMIDE 20 MG: 20 TABLET ORAL at 09:31

## 2025-05-20 RX ADMIN — LOSARTAN POTASSIUM 100 MG: 50 TABLET, FILM COATED ORAL at 09:32

## 2025-05-20 RX ADMIN — ATORVASTATIN CALCIUM 20 MG: 20 TABLET, FILM COATED ORAL at 09:31

## 2025-05-20 RX ADMIN — Medication 10 ML: at 09:30

## 2025-05-20 RX ADMIN — EMPAGLIFLOZIN 10 MG: 10 TABLET, FILM COATED ORAL at 09:31

## 2025-05-20 RX ADMIN — AMLODIPINE BESYLATE 5 MG: 5 TABLET ORAL at 09:31

## 2025-05-20 RX ADMIN — AMOXICILLIN 500 MG: 250 CAPSULE ORAL at 09:32

## 2025-05-20 RX ADMIN — INSULIN LISPRO 2 UNITS: 100 INJECTION, SOLUTION INTRAVENOUS; SUBCUTANEOUS at 09:31

## 2025-05-20 NOTE — OUTREACH NOTE
Prep Survey      Flowsheet Row Responses   [facility] patient discharged from? [facility]   Is LACE score < 7 ? No   Eligibility [facility]   Date of Admission 05/16/25   Date of Discharge 05/20/25   Discharge diagnosis Gross hematuria   Does the patient have one of the following disease processes/diagnoses(primary or secondary)? Other   Prep survey completed? Yes            Blanca DAVENPORT - Registered Nurse

## 2025-05-20 NOTE — PROGRESS NOTES
CARDIOLOGY PROGRESS NOTE:    Sandro Ramirez  74 y.o.  male  1950  [id number]      Referring Provider: Hospitalist    Reason for follow-up: Atrial fibrillation     Patient Care Team:  Colette Dominguez APRN as PCP - General (Nurse Practitioner)  Thai Galloway MD as Consulting Physician (Interventional Cardiology)  Seipel, Joseph F, MD as Consulting Physician (Neurology)  Daniel Hamm MD as Consulting Physician (Urology)  Abbey Mary APRN as Nurse Practitioner (Nurse Practitioner)  Shawn Van MD as Consulting Physician (Endocrinology)  Giovanny Lan Jr., DPM as Consulting Physician (Podiatry)  Inna Kunz, JOSE as Ambulatory  ([facility])    Subjective .  Patient doing well without any symptoms    Objective lying comfortably     Review of Systems   Constitutional: Negative for malaise/fatigue.   Cardiovascular:  Negative for chest pain, dyspnea on exertion, leg swelling and palpitations.   Respiratory:  Negative for cough and shortness of breath.    Gastrointestinal:  Negative for abdominal pain, nausea and vomiting.   Neurological:  Negative for dizziness, headaches, light-headedness, numbness and weakness.   All other systems reviewed and are negative.      Allergies: Bee venom, Meperidine, Midazolam, Propofol, Sulfa antibiotics, Hydrocodone, and Simvastatin    Scheduled Meds:allopurinol, 300 mg, Oral, Nightly  amLODIPine, 5 mg, Oral, Q24H  amoxicillin, 500 mg, Oral, TID  atorvastatin, 20 mg, Oral, Daily  empagliflozin, 10 mg, Oral, Daily  furosemide, 20 mg, Oral, Daily  hydrALAZINE, 25 mg, Oral, Q8H  insulin lispro, 2-7 Units, Subcutaneous, 4x Daily AC & at Bedtime  losartan, 100 mg, Oral, Daily  sodium chloride, 10 mL, Intravenous, Q12H  warfarin, 7.5 mg, Oral, Daily      Continuous Infusions:   PRN Meds:.  acetaminophen    albuterol    senna-docusate sodium **AND** polyethylene glycol **AND** bisacodyl **AND** bisacodyl    cetirizine    dextrose    dextrose     "glucagon (human recombinant)    hydrALAZINE    HYDROcodone-acetaminophen    melatonin    Morphine    ondansetron    [COMPLETED] Insert Peripheral IV **AND** sodium chloride    sodium chloride    sodium chloride        VITAL SIGNS  Vitals:    05/19/25 1939 05/20/25 0000 05/20/25 0433 05/20/25 0740   BP: 155/66 148/65  141/74   BP Location: Right arm Right arm  Right arm   Patient Position: Lying Lying  Sitting   Pulse: 87 97  93   Resp: 18 20 22 18   Temp: 98.3 °F (36.8 °C) 98 °F (36.7 °C) 98 °F (36.7 °C) 97.7 °F (36.5 °C)   TempSrc: Oral Oral Oral Oral   SpO2: 94% 97%  94%   Weight:       Height:           Flowsheet Rows      Flowsheet Row First Filed Value   Admission Height 180.3 cm (71\") Documented at 05/16/2025 1841   Admission Weight 88.8 kg (195 lb 12.3 oz) Documented at 05/16/2025 1841             TELEMETRY: Atrial fibrillation with controlled ventricular response    Physical Exam:  Constitutional:       Appearance: Well-developed.   Eyes:      General: No scleral icterus.     Conjunctiva/sclera: Conjunctivae normal.   HENT:      Head: Normocephalic and atraumatic.   Neck:      Vascular: No carotid bruit or JVD.   Pulmonary:      Effort: Pulmonary effort is normal.      Breath sounds: Normal breath sounds. No wheezing. No rales.   Cardiovascular:      Normal rate. Regular rhythm.   Pulses:     Intact distal pulses.   Abdominal:      General: Bowel sounds are normal.      Palpations: Abdomen is soft.   Musculoskeletal:      Cervical back: Normal range of motion and neck supple. Skin:     General: Skin is warm and dry.      Findings: No rash.   Neurological:      Mental Status: Alert.          Results Review:   I reviewed the patient's new clinical results.  Lab Results (last 24 hours)       Procedure Component Value Units Date/Time    POC Glucose Once [[id number]]  (Abnormal) Collected: 05/20/25 0741    Specimen: Blood Updated: 05/20/25 0743     Glucose 163 mg/dL      Comment: Serial Number: " [id number]:  [id number]       Protime-INR [[id number]]  (Abnormal) Collected: 05/20/25 0448    Specimen: Blood Updated: 05/20/25 0709     Protime 14.8 Seconds      INR 1.17    Extra Tubes [[id number]] Collected: 05/20/25 0448    Specimen: Blood, Venous Line Updated: 05/20/25 0530    Narrative:      The following orders were created for panel order Extra Tubes.  Procedure                               Abnormality         Status                     ---------                               -----------         ------                     Lavender Top[[id number]]                                     Final result               Green Top (Gel)[[id number]]                                  Final result                 Please view results for these tests on the individual orders.    Lavender Top [[id number]] Collected: 05/20/25 0448    Specimen: Blood Updated: 05/20/25 0530     Extra Tube hold for add-on     Comment: Auto resulted       Green Top (Gel) [[id number]] Collected: 05/20/25 0448    Specimen: Blood Updated: 05/20/25 0530     Extra Tube Hold for add-ons.     Comment: Auto resulted.       POC Glucose Once [[id number]]  (Abnormal) Collected: 05/19/25 2057    Specimen: Blood Updated: 05/19/25 2059     Glucose 183 mg/dL      Comment: Serial Number: [id number]:  [id number]       Blood Culture - Blood, Arm, Right [[id number]]  (Normal) Collected: 05/16/25 1950    Specimen: Blood from Arm, Right Updated: 05/19/25 2000     Blood Culture No growth at 3 days    Narrative:      Less than seven (7) mL's of blood was collected.  Insufficient quantity may yield false negative results.    Blood Culture - Blood, Arm, Left [[id number]]  (Normal) Collected: 05/16/25 1925    Specimen: Blood from Arm, Left Updated: 05/19/25 1946     Blood Culture No growth at 3 days    POC Glucose Once [[id number]]  (Abnormal) Collected: 05/19/25 1604    Specimen: Blood Updated: 05/19/25 1606     Glucose 150 mg/dL      Comment: Serial Number:  [id number]:  [id number]       POC Glucose Finger 4x Daily Before Meals & at Bedtime [[id number]]  (Abnormal) Collected: 05/19/25 1102    Specimen: Blood from Finger Updated: 05/19/25 1105     Glucose 290 mg/dL      Comment: Serial Number: [id number]:  [id number]               Imaging Results (Last 24 Hours)       ** No results found for the last 24 hours. **            EKG      I personally viewed and interpreted the patient's EKG/Telemetry data:    ECHOCARDIOGRAM:  Results for orders placed during the hospital encounter of 05/16/25    Adult Transthoracic Echo Complete W/ Cont if Necessary Per Protocol    Interpretation Summary    Left ventricular systolic function is normal. Left ventricular ejection fraction appears to be 56 - 60%.    Left ventricular wall thickness is consistent with mild concentric hypertrophy.    Left ventricular diastolic function is consistent with (grade II w/high LAP) pseudonormalization.    Mildly reduced right ventricular systolic function noted.    The right ventricular cavity is borderline dilated.    The left atrial cavity is severely dilated.    Left atrial volume is severely increased.    The right atrial cavity is moderate to severely dilated.    Mild aortic valve stenosis is present.    There is moderate, bileaflet mitral valve thickening present.    Moderate tricuspid valve regurgitation is present.    Estimated right ventricular systolic pressure from tricuspid regurgitation is moderately elevated (45-55 mmHg).    Moderate pulmonary hypertension is present.       STRESS MYOVIEW:  Results for orders placed during the hospital encounter of 10/19/23    Stress Test With Myocardial Perfusion One Day    Interpretation Summary    Left ventricular ejection fraction is normal (Calculated EF = 51%).    Myocardial perfusion imaging indicates a moderate-sized infarct located in the inferior wall and apex with no significant ischemia noted.    Impressions are consistent with a low  risk study.       CARDIAC CATHETERIZATION:  No results found for this or any previous visit.       OTHER:         Assessment & Plan     Atrial fibrillation/bradycardia  Patient presented with bradycardia and has atrial fibrillation and was on atenolol which is held but he is already on warfarin keep the INR on 2-2.5  Patient will have a Holter monitor at discharge    Hematuria  Patient is followed by the urologist and is having a procedure done next week    Coronary disease  Patient has coronary bypass surgery with a LIMA to LAD and SVG to the marginal branch digoxin is currently stable without any angina    Hypertension  Patient blood pressure currently stable on medications including losartan but his beta-blockers have been stopped because of bradycardia    Hyperlipidemia  Patient on statins and the lipid levels are well within normal limits    Diabetes  Patient is on home medications.        Thai Galloway MD  05/20/25  11:00 EDT

## 2025-05-20 NOTE — CASE MANAGEMENT/SOCIAL WORK
Case Management Discharge Note      Final Note: Routine home    Provided Post Acute Provider List?: N/A  Provided Post Acute Provider Quality & Resource List?: N/A    Selected Continued Care - Discharged on 5/20/2025 Admission date: 5/16/2025 - Discharge disposition: Home or Self Care       Transportation Services  Private: Car    Final Discharge Disposition Code: 01 - home or self-care

## 2025-05-20 NOTE — TELEPHONE ENCOUNTER
Patient's wife called to clarify how he should be taking his warfarin and lovenox because they are both blood thinners. She was not sure if she should hold the warfarin while he take the lovenox injections. She also mentioned that the patient is supposed to stop his warfarin 5 days prior to his upcoming bladder surgery with Dr Daniel Hamm, on 5/29/2025. He was also told to stop taking aspirin 7 days prior to this surgery.     I reviewed the patient's chart and AVS.   I explained that Lovenox is often given to bridge a person back to a therapeutic level on their warfarin, so it is important to take them as prescribed on the AVS.   WE looked back at different medications on when each was last given and if it was not listed on the AVS, I looked it up for her to give her an answer. We talked in depth about the medications. Just before ending the call, it stuck out to me that she briefly mentioned that prior to the hospital stay he was instructed to stop his warfarin 5 day before his bladder surgery. Because of this anomaly, I suggested that she actually call Dr Hamm's office and either speak with him or his nurse about when he wants the patient to take and when to hold his warfarin, Lovenox, and Asprin for surgery. She agreed and said she will call them now.   Reason for Disposition   [1] Caller has NON-URGENT medicine question about med that PCP prescribed AND [2] triager unable to answer question    Additional Information   Negative: [1] Intentional drug overdose AND [2] suicidal thoughts or ideas   Negative: Drug overdose and triager unable to answer question   Negative: Caller requesting a renewal or refill of a medicine patient is currently taking   Negative: Caller requesting information unrelated to medicine   Negative: Caller requesting information about COVID-19 Vaccine   Negative: Caller requesting information about Emergency Contraception   Negative: Caller requesting information about Combined Birth  Control Pills   Negative: Caller requesting information about Progestin Birth Control Pills   Negative: Caller requesting information about Post-Op pain or medicines   Negative: Caller requesting a prescription antibiotic (such as Penicillin) for Strep throat and has a positive culture result   Negative: Caller requesting a prescription anti-viral med (such as Tamiflu) and has influenza (flu) symptoms   Negative: Immunization reaction suspected   Negative: Rash while taking a medicine or within 3 days of stopping it   Negative: [1] Asthma and [2] having symptoms of asthma (cough, wheezing, etc.)   Negative: [1] Symptom of illness (e.g., headache, abdominal pain, earache, vomiting) AND [2] more than mild   Negative: Breastfeeding questions about mother's medicines and diet   Negative: MORE THAN A DOUBLE DOSE of a prescription or over-the-counter (OTC) drug   Negative: [1] DOUBLE DOSE (an extra dose or lesser amount) of prescription drug AND [2] any symptoms (e.g., dizziness, nausea, pain, sleepiness)   Negative: [1] DOUBLE DOSE (an extra dose or lesser amount) of over-the-counter (OTC) drug AND [2] any symptoms (e.g., dizziness, nausea, pain, sleepiness)   Negative: Took another person's prescription drug   Negative: [1] DOUBLE DOSE (an extra dose or lesser amount) of prescription drug AND [2] NO symptoms  (Exception: A double dose of antibiotics.)   Negative: Diabetes drug error or overdose (e.g., took wrong type of insulin or took extra dose)   Negative: [1] Prescription not at pharmacy AND [2] was prescribed by PCP recently (Exception: Triager has access to EMR and prescription is recorded there. Go to Home Care and confirm for pharmacy.)   Negative: [1] Pharmacy calling with prescription question AND [2] triager unable to answer question   Negative: [1] Caller has URGENT medicine question about med that PCP or specialist prescribed AND [2] triager unable to answer question   Negative: Medicine patch causing local  "rash or itching   Negative: [1] Caller has medicine question about med NOT prescribed by PCP AND [2] triager unable to answer question (e.g., compatibility with other med, storage)   Negative: Prescription request for new medicine (not a refill)    Answer Assessment - Initial Assessment Questions  1. NAME of MEDICINE: \"What medicine(s) are you calling about?\"      warfarin  2. QUESTION: \"What is your question?\" (e.g., double dose of medicine, side effect)      Should he take his warfarin and lovenox together?   3. PRESCRIBER: \"Who prescribed the medicine?\" Reason: if prescribed by specialist, call should be referred to that group.      hospitalist  4. SYMPTOMS: \"Do you have any symptoms?\" If Yes, ask: \"What symptoms are you having?\"  \"How bad are the symptoms (e.g., mild, moderate, severe)      Blood thinner  5. PREGNANCY:  \"Is there any chance that you are pregnant?\" \"When was your last menstrual period?\"      NA    Protocols used: Medication Question Call-ADULT-    "

## 2025-05-20 NOTE — DISCHARGE SUMMARY
[facility] Medicine Services  Discharge Summary    Date of Service: 2025  Patient Name: Sandro Ramirez  : 1950  MRN: [id number]    Date of Admission: 2025  Discharge Diagnosis: Gross hematuria  Date of Discharge: 2025  Primary Care Physician: Colette Dominguez APRN      Presenting Problem:   Gross hematuria [R31.0]    Active and Resolved Hospital Problems:  Active Hospital Problems    Diagnosis POA    **Gross hematuria [R31.0] Yes      Resolved Hospital Problems   No resolved problems to display.       Symptomatic bradycardia  Gross hematuria secondary to recent urological operation  Chronic persistent atrial fibrillation  Coronary artery disease status post CABG  Dental caries with associated dental pain  Hypertension  Hyperlipidemia  Type 2 diabetes  COPD  SUPA  Gout history not in exacerbation     Patient has been taken off of his home beta-blockers and his heart rate has improved significantly.    Cardiology is recommending monitoring in the hospital until Monday with possible EP evaluation.     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  They have evaluated the patient in inpatient and there is nothing urgently from there at this time.  His urine is much improved this morning and there is no hematuria in his urinal.     Will need to see outpatient dentistry regarding his dental disease.    Hospital Course     History of present illness this is a 74-year-old male whose got multitude of health problems and takes anticoagulants who reports a 2-day history of passing blood in his urine and blood clots he states will be a blood clot initially and then is just all blood afterwards he is able to urinate without difficulty he states no dysuria or frequency.  No trauma.  He does complain subjectively of chills although has had no fever.  He denies any recent long car ride plane or immobilization foreign travels.  Patient  "recently had some dental work done.  Nothing otherwise seem to trigger this or make it better or worse.     05/17/25:Patient confirms the HPI noted above reporting sudden onset of gross hematuria on the day of presentation.  Patient initially notes the passage of bright red blood in his urine and subsequently began to experience some clots as well though did not experience any urinary retention or pain associated with the symptoms.  He has not had any fever, changes in bowel habits, nausea or vomiting.  Additionally patient reports that he has a planned procedure to remove a \"clip\" in his prostate from a previous surgery scheduled as an outpatient.  He is compliant with all of his outpatient medical therapies including warfarin.  Patient is currently in atrial fibrillation and has been experiencing heart rates into the 30s during his admission but denies any associated symptoms.     Interval History:  History taken from: patient  5/18-Doing well.  Urine is clear.  5/19 seen in bed family at bed side, DW RN, bp 96/78, /HR 94  5/20 seen in bed NAD, C/O Tooth pain, occ dizziness, dw cardiology will dc home\"  Condition on dc stable    DISCHARGE Follow Up Recommendations for labs and diagnostics: Follow-up with PCP in a week  Follow-up with urology in a week  Follow with cardiology in a week      Day of Discharge     Vital Signs:  Temp:  [97.7 °F (36.5 °C)-98.3 °F (36.8 °C)] 97.7 °F (36.5 °C)  Heart Rate:  [87-98] 93  Resp:  [18-22] 18  BP: (116-155)/(65-80) 141/74      Physical Exam      Appearance: Normal appearance.   Cardiovascular:      Rate and Rhythm: Normal rate and regular rhythm.      Pulses: Normal pulses.      Heart sounds: Normal heart sounds.   Pulmonary:      Effort: Pulmonary effort is normal.      Breath sounds: Normal breath sounds.   Abdominal:      General: Abdomen is flat.      Palpations: Abdomen is soft.   Neurological:      Mental Status: He is alert.       Pertinent  and/or Most Recent Results "     LAB RESULTS:      Lab 05/20/25 0448 05/19/25 0416 05/18/25 0230 05/17/25  0340 05/17/25 0149 05/16/25 1932 05/16/25 1925   WBC  --  8.70  --   --  9.10  --  9.49   HEMOGLOBIN  --  16.3  --   --  15.6  --  15.1   HEMATOCRIT  --  50.0  --   --  48.2  --  46.5   PLATELETS  --  232  --   --  219  --  214   NEUTROS ABS  --  6.24  --   --  5.83  --  7.00   IMMATURE GRANS (ABS)  --  0.03  --   --  0.02  --  0.04   LYMPHS ABS  --  1.48  --   --  2.37  --  1.54   MONOS ABS  --  0.85  --   --  0.76  --  0.73   EOS ABS  --  0.05  --   --  0.08  --  0.12   MCV  --  86.1  --   --  87.2  --  87.9   LACTATE  --   --   --   --   --  0.6  --    PROTIME 14.8* 15.4* 20.7 22.3  --   --  26.3         Lab 05/19/25 0416 05/17/25 0550 05/17/25 0149 05/16/25 1925   SODIUM 139  --  136 139   POTASSIUM 3.9  --  3.9 3.9   CHLORIDE 102  --  100 103   CO2 23.8  --  27.6 24.2   ANION GAP 13.2  --  8.4 11.8   BUN 12  --  11 15   CREATININE 0.71*  --  0.85 0.82   EGFR 97.5  --  92.3 93.3   GLUCOSE 152*  --  132* 138*   CALCIUM 9.6  --  9.6 9.5   MAGNESIUM  --  2.1  --   --    TSH  --  3.830  --   --          Lab 05/16/25 1925   TOTAL PROTEIN 7.7   ALBUMIN 4.4   GLOBULIN 3.3   ALT (SGPT) 20   AST (SGOT) 27   BILIRUBIN 0.4   ALK PHOS 107         Lab 05/20/25 0448 05/19/25 0416 05/18/25 0230 05/17/25  0550 05/17/25 0435 05/17/25  0340 05/16/25 1925   HSTROP T  --   --   --  16 17  --   --    PROTIME 14.8* 15.4* 20.7  --   --  22.3 26.3   INR 1.17* 1.23* 1.78*  --   --  1.95* 2.39                 Brief Urine Lab Results  (Last result in the past 365 days)        Color   Clarity   Blood   Leuk Est   Nitrite   Protein   CREAT   Urine HCG        05/16/25 1925 Red  Comment: Any Substance that causes an abnormal urine color can alter the accuracy of the chemical reactions.   Turbid  Comment: Result checked     Large (3+)   Negative   Negative   >=300 mg/dL (3+)                 Microbiology Results (last 10 days)       Procedure Component  Value - Date/Time    Blood Culture - Blood, Arm, Right [[id number]]  (Normal) Collected: 05/16/25 1950    Lab Status: Preliminary result Specimen: Blood from Arm, Right Updated: 05/19/25 2000     Blood Culture No growth at 3 days    Narrative:      Less than seven (7) mL's of blood was collected.  Insufficient quantity may yield false negative results.    Blood Culture - Blood, Arm, Left [[id number]]  (Normal) Collected: 05/16/25 1925    Lab Status: Preliminary result Specimen: Blood from Arm, Left Updated: 05/19/25 1946     Blood Culture No growth at 3 days            XR Chest 1 View  Result Date: 5/16/2025  Impression: Impression: No acute process. Electronically Signed: Harvey Khoury MD  5/16/2025 8:00 PM EDT  Workstation ID: [id number]      Results for orders placed during the hospital encounter of 02/24/25    Duplex Aorta IVC Iliac Graft Complete CAR    Interpretation Summary    Patent abdominal aortic stent graft without obvious endoleak identified.  Aneurysm today measures 5 cm with slight interval growth from prior study.      Results for orders placed during the hospital encounter of 02/24/25    Duplex Aorta IVC Iliac Graft Complete CAR    Interpretation Summary    Patent abdominal aortic stent graft without obvious endoleak identified.  Aneurysm today measures 5 cm with slight interval growth from prior study.      Results for orders placed during the hospital encounter of 05/16/25    Adult Transthoracic Echo Complete W/ Cont if Necessary Per Protocol    Interpretation Summary    Left ventricular systolic function is normal. Left ventricular ejection fraction appears to be 56 - 60%.    Left ventricular wall thickness is consistent with mild concentric hypertrophy.    Left ventricular diastolic function is consistent with (grade II w/high LAP) pseudonormalization.    Mildly reduced right ventricular systolic function noted.    The right ventricular cavity is borderline dilated.    The left atrial cavity is  severely dilated.    Left atrial volume is severely increased.    The right atrial cavity is moderate to severely dilated.    Mild aortic valve stenosis is present.    There is moderate, bileaflet mitral valve thickening present.    Moderate tricuspid valve regurgitation is present.    Estimated right ventricular systolic pressure from tricuspid regurgitation is moderately elevated (45-55 mmHg).    Moderate pulmonary hypertension is present.      Labs Pending at Discharge:  Pending Results       Procedure [Order ID] Specimen - Date/Time    Holter Monitor - 72 Hour Up To 15 Days [[id number]] Resulted: 05/20/25 1001     Updated: 05/20/25 1003            Procedures Performed           Consults:   Consults       Date and Time Order Name Status Description    5/17/2025 11:27 AM Inpatient Hospitalist Consult      5/17/2025  9:04 AM Inpatient Cardiology Consult Completed     5/16/2025 11:29 PM Inpatient Urology Consult Completed           Assessment & Plan  A-fib/bradycardia  Patient presented with bradycardia and was on atenolol which is held and will hold it but place him on a Holter monitor for 14 days to assess if he has tachybradycardia syndrome and then decide about further treatment plan  Patient was on Eliquis which is held for hematuria but will be restarted again according to the urologist.     Hematuria  Patient is seen by urologist and has history of bladder cancer s/pTURBT and is having a procedure done to remove clips.  Patient not having any bleeding at this time     Coronary disease  Patient had history of coronary bypass surgery x 3 vessels with a LIMA to LAD and SVG to the marginal branch and RCA and is currently stable on medical therapy     Hypertension  Patient blood pressure currently stable on medications including losartan but his atenolol is being held and will watch the blood pressure and heart rate at home     Hyperlipidemia  Patient on statins and the lipid levels are well within normal limits      Diabetes  Patient is on insulin as well as oral medicines and followed by primary care doctor.           Thai Galloway MD  05/19/25  14:50 EDT    Discharge Details        Discharge Medications        New Medications        Instructions Start Date   enoxaparin sodium 100 MG/ML solution prefilled syringe syringe  Commonly known as: LOVENOX   1 mg/kg (90 mg), Subcutaneous, Every 12 Hours Scheduled      HYDROcodone-acetaminophen 5-325 MG per tablet  Commonly known as: NORCO   1 tablet, Oral, Every 6 Hours PRN      Jardiance 10 MG tablet tablet  Generic drug: empagliflozin   10 mg, Oral, Daily             Continue These Medications        Instructions Start Date   albuterol sulfate  (90 Base) MCG/ACT inhaler  Commonly known as: PROVENTIL HFA;VENTOLIN HFA;PROAIR HFA   2 puffs, Inhalation, Every 4 Hours PRN      allopurinol 300 MG tablet  Commonly known as: ZYLOPRIM   Take 1 tablet by mouth Every Night.      amLODIPine 10 MG tablet  Commonly known as: NORVASC   10 mg, Oral, Daily      amoxicillin 500 MG capsule  Commonly known as: AMOXIL   500 mg, Oral, 3 Times Daily      aspirin 81 MG tablet   81 mg, Daily      atorvastatin 20 MG tablet  Commonly known as: LIPITOR   TAKE 1 TABLET BY MOUTH EVERY DAY      cetirizine 10 MG tablet  Commonly known as: zyrTEC   10 mg, Daily PRN      fluticasone 50 MCG/ACT nasal spray  Commonly known as: FLONASE   2 sprays, Daily PRN      furosemide 20 MG tablet  Commonly known as: Lasix   20 mg, Oral, Daily      losartan 100 MG tablet  Commonly known as: COZAAR   100 mg, Oral, Daily      warfarin 7.5 MG tablet  Commonly known as: COUMADIN   See Admin Instructions      warfarin 7.5 MG tablet  Commonly known as: COUMADIN   See Admin Instructions             Stop These Medications      atenolol 50 MG tablet  Commonly known as: TENORMIN              Allergies   Allergen Reactions    Bee Venom Anaphylaxis     HIVES-SWOLLEN THROAT    Meperidine Hives    Midazolam Hives    Propofol Other (See  Comments)     UNCLEAR-SVT, HYPOTENSION-STATES SEVERE ALMOST CODED    Sulfa Antibiotics Hives    Hydrocodone Nausea And Vomiting    Simvastatin Myalgia         Discharge Disposition:   Home or Self Care    Diet:  Hospital:  Diet Order   Procedures    Diet: Diabetic; Consistent Carbohydrate; Fluid Consistency: Thin (IDDSI 0)         Discharge Activity:   Activity Instructions       Gradually Increase Activity Until at Pre-Hospitalization Level                CODE STATUS:  Code Status and Medical Interventions: CPR (Attempt to Resuscitate); Full Support   Ordered at: 05/17/25 0648     Code Status (Patient has no pulse and is not breathing):    CPR (Attempt to Resuscitate)     Medical Interventions (Patient has pulse or is breathing):    Full Support         Future Appointments   Date Time Provider Department Center   6/2/2025  2:45 PM [facility] 3 [facility]   6/10/2025  8:45 AM Seipel, Joseph F, MD [facility]   6/12/2025  8:30 AM Shawn Van MD [facility]   6/16/2025  9:45 AM [facility] [place] [facility]   8/4/2025  2:30 PM Thai Galloway MD [facility]   8/25/2025 10:00 AM Colette Dominguez APRN [facility]   3/2/2026  8:30 AM [facility] V[facility] MACHINE 4 [facility] C[facility]   3/2/2026 12:00 PM Eric Sainz MD [facility]       Additional Instructions for the Follow-ups that You Need to Schedule       Discharge Follow-up with PCP   As directed       Currently Documented PCP:    Colette Dominguez APRN    PCP Phone Number:    [phone]     Follow Up Details: 1 week                Time spent on Discharge including face to face service:  34 minutes    Signature: Electronically signed by Jonny Clark MD, 05/20/25, 10:48 EDT.  [facility] Hospitalist Team

## 2025-05-20 NOTE — PLAN OF CARE
Problem: Adult Inpatient Plan of Care  Goal: Plan of Care Review  Outcome: Progressing  Goal: Patient-Specific Goal (Individualized)  Outcome: Progressing  Goal: Absence of Hospital-Acquired Illness or Injury  Outcome: Progressing  Intervention: Identify and Manage Fall Risk  Intervention: Prevent Skin Injury  Goal: Optimal Comfort and Wellbeing  Outcome: Progressing  Intervention: Provide Person-Centered Care  Goal: Readiness for Transition of Care  Outcome: Progressing     Problem: Comorbidity Management  Goal: Maintenance of Asthma Control  Outcome: Progressing  Goal: Maintenance of Behavioral Health Symptom Control  Outcome: Progressing  Goal: Maintenance of COPD Symptom Control  Outcome: Progressing  Goal: Blood Glucose Level Within Target Range  Outcome: Progressing  Goal: Maintenance of Heart Failure Symptom Control  Outcome: Progressing  Goal: Blood Pressure in Desired Range  Outcome: Progressing  Goal: Maintenance of Osteoarthritis Symptom Control  Outcome: Progressing  Intervention: Maintain Osteoarthritis Symptom Control  Goal: Bariatric Home Regimen Maintained  Outcome: Progressing  Goal: Maintenance of Seizure Control  Outcome: Progressing     Problem: Fall Injury Risk  Goal: Absence of Fall and Fall-Related Injury  Outcome: Progressing  Intervention: Promote Injury-Free Environment     Problem: Breathing Pattern Ineffective  Goal: Effective Breathing Pattern  Outcome: Progressing     Problem: Pain Acute  Goal: Optimal Pain Control and Function  Outcome: Progressing  Intervention: Optimize Psychosocial Wellbeing     Problem: Anemia  Goal: Anemia Symptom Improvement  Outcome: Progressing  Intervention: Monitor and Manage Anemia     Problem: Dysrhythmia  Goal: Normalized Cardiac Rhythm  Outcome: Progressing   Goal Outcome Evaluation:

## 2025-05-20 NOTE — PROGRESS NOTES
"    [facility] MEDICINE SERVICE  DAILY PROGRESS NOTE    NAME: Sandro Ramirez  : 1950  MRN: [id number]      LOS: 1 day     PROVIDER OF SERVICE: Jonny Clark MD    Chief Complaint: Gross hematuria    Subjective:     Obtained from ED provider HPI on 2025:  History of present illness this is a 74-year-old male whose got multitude of health problems and takes anticoagulants who reports a 2-day history of passing blood in his urine and blood clots he states will be a blood clot initially and then is just all blood afterwards he is able to urinate without difficulty he states no dysuria or frequency.  No trauma.  He does complain subjectively of chills although has had no fever.  He denies any recent long car ride plane or immobilization foreign travels.  Patient recently had some dental work done.  Nothing otherwise seem to trigger this or make it better or worse.     25:Patient confirms the HPI noted above reporting sudden onset of gross hematuria on the day of presentation.  Patient initially notes the passage of bright red blood in his urine and subsequently began to experience some clots as well though did not experience any urinary retention or pain associated with the symptoms.  He has not had any fever, changes in bowel habits, nausea or vomiting.  Additionally patient reports that he has a planned procedure to remove a \"clip\" in his prostate from a previous surgery scheduled as an outpatient.  He is compliant with all of his outpatient medical therapies including warfarin.  Patient is currently in atrial fibrillation and has been experiencing heart rates into the 30s during his admission but denies any associated symptoms.     Interval History:  History taken from: patient  -Doing well.  Urine is clear.   seen in bed family at bed side, DW RN, bp 96/78, /HR 94   seen in bed NAD, C/O Tooth pain, occ dizziness, dw cardiology will dc home    Plan: From home with spouse. "   Review of Systems    Objective:     Vital Signs  Temp:  [97.7 °F (36.5 °C)-98.3 °F (36.8 °C)] 97.7 °F (36.5 °C)  Heart Rate:  [87-98] 93  Resp:  [18-22] 18  BP: (116-155)/(65-80) 141/74   Body mass index is 27.89 kg/m².    Physical Exam  Constitutional:       Appearance: Normal appearance.   Cardiovascular:      Rate and Rhythm: Normal rate and regular rhythm.      Pulses: Normal pulses.      Heart sounds: Normal heart sounds.   Pulmonary:      Effort: Pulmonary effort is normal.      Breath sounds: Normal breath sounds.   Abdominal:      General: Abdomen is flat.      Palpations: Abdomen is soft.   Neurological:      Mental Status: He is alert.       Diagnostic Data    Results from last 7 days   Lab Units 05/19/25  0416 05/17/25  0149 05/16/25  1925   WBC 10*3/mm3 8.70   < > 9.49   HEMOGLOBIN g/dL 16.3   < > 15.1   HEMATOCRIT % 50.0   < > 46.5   PLATELETS 10*3/mm3 232   < > 214   GLUCOSE mg/dL 152*   < > 138*   CREATININE mg/dL 0.71*   < > 0.82   BUN mg/dL 12   < > 15   SODIUM mmol/L 139   < > 139   POTASSIUM mmol/L 3.9   < > 3.9   AST (SGOT) U/L  --   --  27   ALT (SGPT) U/L  --   --  20   ALK PHOS U/L  --   --  107   BILIRUBIN mg/dL  --   --  0.4   ANION GAP mmol/L 13.2   < > 11.8    < > = values in this interval not displayed.       No radiology results for the last day  Scheduled Meds:allopurinol, 300 mg, Oral, Nightly  amLODIPine, 5 mg, Oral, Q24H  amoxicillin, 500 mg, Oral, TID  atorvastatin, 20 mg, Oral, Daily  empagliflozin, 10 mg, Oral, Daily  furosemide, 20 mg, Oral, Daily  hydrALAZINE, 25 mg, Oral, Q8H  insulin lispro, 2-7 Units, Subcutaneous, 4x Daily AC & at Bedtime  losartan, 100 mg, Oral, Daily  sodium chloride, 10 mL, Intravenous, Q12H  warfarin, 7.5 mg, Oral, Daily      Continuous Infusions:   PRN Meds:.  acetaminophen    albuterol    senna-docusate sodium **AND** polyethylene glycol **AND** bisacodyl **AND** bisacodyl    cetirizine    dextrose    dextrose    glucagon (human recombinant)     hydrALAZINE    HYDROcodone-acetaminophen    melatonin    Morphine    ondansetron    [COMPLETED] Insert Peripheral IV **AND** sodium chloride    sodium chloride    sodium chloride        I reviewed the patient's new clinical results.    Assessment/Plan:     Active and Resolved Problems  Active Hospital Problems    Diagnosis  POA    **Gross hematuria [R31.0]  Yes      Resolved Hospital Problems   No resolved problems to display.     Symptomatic bradycardia  Gross hematuria secondary to recent urological operation  Chronic persistent atrial fibrillation  Coronary artery disease status post CABG  Dental caries with associated dental pain  Hypertension  Hyperlipidemia  Type 2 diabetes  COPD  SUPA  Gout history not in exacerbation    Patient has been taken off of his home beta-blockers and his heart rate has improved significantly.    Cardiology is recommending monitoring in the hospital until Monday with possible EP evaluation.    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  They have evaluated the patient in inpatient and there is nothing urgently from there at this time.  His urine is much improved this morning and there is no hematuria in his urinal.    Will need to see outpatient dentistry regarding his dental disease.    VTE Prophylaxis:  Pharmacologic & mechanical VTE prophylaxis orders are present.    Disposition Planning:   Barriers to Discharge: EP/cardiology eval  Anticipated Date of Discharge: 5/19/2025  Place of Discharge: Home      Time: 35 minutes     Code Status and Medical Interventions: CPR (Attempt to Resuscitate); Full Support   Ordered at: 05/17/25 0648     Code Status (Patient has no pulse and is not breathing):    CPR (Attempt to Resuscitate)     Medical Interventions (Patient has pulse or is breathing):    Full Support       Signature: Electronically signed by Jonny Clark MD, 05/20/25, 08:26 EDT.  [facility] Hospitalist Team

## 2025-05-21 ENCOUNTER — TELEPHONE (OUTPATIENT)
Dept: CARDIOLOGY | Facility: CLINIC | Age: 75
End: 2025-05-21
Payer: MEDICARE

## 2025-05-21 ENCOUNTER — TRANSITIONAL CARE MANAGEMENT TELEPHONE ENCOUNTER (OUTPATIENT)
Dept: CALL CENTER | Facility: HOSPITAL | Age: 75
End: 2025-05-21
Payer: MEDICARE

## 2025-05-21 DIAGNOSIS — I48.11 LONGSTANDING PERSISTENT ATRIAL FIBRILLATION: Primary | ICD-10-CM

## 2025-05-21 DIAGNOSIS — Z79.01 LONG TERM CURRENT USE OF ANTICOAGULANT: ICD-10-CM

## 2025-05-21 LAB
BACTERIA SPEC AEROBE CULT: NORMAL
BACTERIA SPEC AEROBE CULT: NORMAL

## 2025-05-21 RX ORDER — ENOXAPARIN SODIUM 100 MG/ML
INJECTION SUBCUTANEOUS
Qty: 10 ML | Refills: 1 | Status: SHIPPED | OUTPATIENT
Start: 2025-05-23 | End: 2025-05-27

## 2025-05-21 NOTE — OUTREACH NOTE
Call Center TCM Note      Flowsheet Row Responses   [facility] patient discharged from? [facility]   Does the patient have one of the following disease processes/diagnoses(primary or secondary)? Other   TCM attempt successful? Yes   Call start time 0928   Call end time 0933   Discharge diagnosis Gross hematuria   Meds reviewed with patient/caregiver? Yes   Is the patient having any side effects they believe may be caused by any medication additions or changes? No   Does the patient have all medications ordered at discharge? Yes   Is the patient taking all medications as directed (includes completed medication regime)? Yes   Comments Pt will be having Prostate surgery on 5/29/25 and was unable to take the offered PCP appt for 5/30/25.  TCM call complete.   Does the patient have an appointment with their PCP within 7-14 days of discharge? Other   Nursing Interventions Routed TCM call to PCP office, Patient declined scheduling/rescheduling appointment at this time   Has home health visited the patient within 72 hours of discharge? N/A   Psychosocial issues? No   Did the patient receive a copy of their discharge instructions? Yes   Nursing interventions Reviewed instructions with patient   What is the patient's perception of their health status since discharge? Improving   Is the patient/caregiver able to teach back signs and symptoms related to disease process for when to call PCP? Yes   Is the patient/caregiver able to teach back signs and symptoms related to disease process for when to call 911? Yes   Is the patient/caregiver able to teach back the hierarchy of who to call/visit for symptoms/problems? PCP, Specialist, Home health nurse, Urgent Care, ED, 911 Yes   TCM call completed? Yes   Wrap up additional comments Pt will be having Prostate surgery on 5/29/25 and was unable to take the offered PCP appt for 5/30/25. Pt reports he is doing much better at this time.   Call end time 0933            ASHLEY Story  Nurse    5/21/2025, 09:34 EDT

## 2025-05-21 NOTE — TELEPHONE ENCOUNTER
Please call Veronica with [facility]   Phone [phone]      Needs call back soon. Patient told them he has started Lovenox and is still on blood thinner.  I can see Lovenox was prescribed yesterday by Dr. Clark.     We did clear patient to hold warfarin/aspirin, but no mention of Lovenox.

## 2025-05-21 NOTE — TELEPHONE ENCOUNTER
Pt came by office to clarify what the surgical orders were for Warfarin and Lovenox. Pt had used Lovenox, but had not started Warfarin. Spoke to Dr. Galloway, who called Dr. Hamm, to confirm that the procedure was going forward on 5/29/25. He will not resume Warfarin at this time, but he will continue Lovenox 100 mg, bid through 5/27/25. No Lovenox on 5/28/25, procedure 5/29/25. Following procedure, he will resume Warfarin and Lovenox and have INR checked on 6/3/25. Lovenox RX will be sent in to [facility] per pts request.Maribel

## 2025-05-23 NOTE — TELEPHONE ENCOUNTER
Called Veronica,  for Dr. Hamm, left voicemail to just make sure they have contacted patient RE: instructions for rescheduled procedure. Advised them that pt has been advised of usage of Lovenox and Warfarin. Feel free to call us back if any further questions. [email or web address]

## 2025-05-27 ENCOUNTER — PATIENT OUTREACH (OUTPATIENT)
Dept: CASE MANAGEMENT | Facility: CLINIC | Age: 75
End: 2025-05-27
Payer: MEDICARE

## 2025-05-27 DIAGNOSIS — I10 PRIMARY HYPERTENSION: Primary | ICD-10-CM

## 2025-05-27 DIAGNOSIS — I48.21 PERMANENT ATRIAL FIBRILLATION: Chronic | ICD-10-CM

## 2025-05-27 RX ORDER — ATENOLOL 50 MG/1
50 TABLET ORAL
Qty: 90 TABLET | Refills: 3 | OUTPATIENT
Start: 2025-05-27

## 2025-05-27 NOTE — TELEPHONE ENCOUNTER
Rx Refill Note  Requested Prescriptions     Refused Prescriptions Disp Refills    atenolol (TENORMIN) 50 MG tablet [Pharmacy Med Name: ATENOLOL 50 MG TABLET] 90 tablet 3     Sig: TAKE 1 TABLET BY MOUTH EVERY DAY AT NIGHT      Last office visit with prescribing clinician: 2/3/2025     Next office visit with prescribing clinician: 8/4/2025                         *DENIED* - PATIENT WAS TAKEN OFF OF THIS MEDICATION WHILE IN THE HOSPITAL ON 5/20/25. REFILL NOT APPROPRIATE    Mireya Gray MA  05/27/25, 16:40 EDT

## 2025-05-27 NOTE — OUTREACH NOTE
"AMBULATORY CASE MANAGEMENT NOTE    Names and Relationships of Patient/Support Persons: Contact: Sandro Ramirez \"Manish\"; Relationship: Self -     CCM Interim Update    Spoke with patient at this time regarding recent hospitalization, identified self and role.  Patient stated he is feeling pretty good, reports that he has surgery scheduled for Thursday for urolift and clip removal.  He declines PCP f/u at this time, but will call after his upcoming surgery to schedule an appointment.  Offered assistance with chronic disease management through CCM, patient declines.  He denies any other needs at this time, will close program.        Inna BARRON  Ambulatory Case Management    5/27/2025, 10:32 EDT  "

## 2025-05-29 ENCOUNTER — HOSPITAL ENCOUNTER (OUTPATIENT)
Facility: HOSPITAL | Age: 75
Discharge: HOME | End: 2025-05-30
Attending: UROLOGY | Admitting: UROLOGY
Payer: MEDICARE

## 2025-05-29 ENCOUNTER — READMISSION MANAGEMENT (OUTPATIENT)
Dept: CALL CENTER | Facility: HOSPITAL | Age: 75
End: 2025-05-29
Payer: MEDICARE

## 2025-05-29 ENCOUNTER — ANESTHESIA EVENT (OUTPATIENT)
Dept: PERIOP | Facility: HOSPITAL | Age: 75
End: 2025-05-29
Payer: MEDICARE

## 2025-05-29 ENCOUNTER — ANESTHESIA (OUTPATIENT)
Dept: PERIOP | Facility: HOSPITAL | Age: 75
End: 2025-05-29
Payer: MEDICARE

## 2025-05-29 DIAGNOSIS — N13.8 BPH WITH OBSTRUCTION/LOWER URINARY TRACT SYMPTOMS: Primary | ICD-10-CM

## 2025-05-29 DIAGNOSIS — N40.1 BPH WITH OBSTRUCTION/LOWER URINARY TRACT SYMPTOMS: Primary | ICD-10-CM

## 2025-05-29 LAB
ABO GROUP BLD: NORMAL
BLD GP AB SCN SERPL QL: NEGATIVE
GLUCOSE BLDC GLUCOMTR-MCNC: 123 MG/DL (ref 70–105)
GLUCOSE BLDC GLUCOMTR-MCNC: 128 MG/DL (ref 70–105)
INR PPP: 0.99 (ref 2–3)
PROTHROMBIN TIME: 13 SECONDS (ref 19.4–28.5)
RH BLD: POSITIVE
T&S EXPIRATION DATE: NORMAL

## 2025-05-29 PROCEDURE — 86850 RBC ANTIBODY SCREEN: CPT | Performed by: UROLOGY

## 2025-05-29 PROCEDURE — 25810000003 LACTATED RINGERS PER 1000 ML: Performed by: UROLOGY

## 2025-05-29 PROCEDURE — 86900 BLOOD TYPING SEROLOGIC ABO: CPT

## 2025-05-29 PROCEDURE — A9270 NON-COVERED ITEM OR SERVICE: HCPCS | Performed by: UROLOGY

## 2025-05-29 PROCEDURE — 25010000002 CEFAZOLIN PER 500 MG: Performed by: UROLOGY

## 2025-05-29 PROCEDURE — 25010000002 DEXAMETHASONE PER 1 MG: Performed by: NURSE ANESTHETIST, CERTIFIED REGISTERED

## 2025-05-29 PROCEDURE — 63710000001 AMLODIPINE 5 MG TABLET: Performed by: UROLOGY

## 2025-05-29 PROCEDURE — 25010000002 FENTANYL CITRATE (PF) 100 MCG/2ML SOLUTION: Performed by: NURSE ANESTHETIST, CERTIFIED REGISTERED

## 2025-05-29 PROCEDURE — 25010000002 ONDANSETRON PER 1 MG: Performed by: NURSE ANESTHETIST, CERTIFIED REGISTERED

## 2025-05-29 PROCEDURE — 63710000001 LOSARTAN 50 MG TABLET: Performed by: UROLOGY

## 2025-05-29 PROCEDURE — 86900 BLOOD TYPING SEROLOGIC ABO: CPT | Performed by: UROLOGY

## 2025-05-29 PROCEDURE — G0378 HOSPITAL OBSERVATION PER HR: HCPCS

## 2025-05-29 PROCEDURE — 25010000002 LIDOCAINE PF 1% 1 % SOLUTION: Performed by: UROLOGY

## 2025-05-29 PROCEDURE — 63710000001 EMPAGLIFLOZIN 10 MG TABLET: Performed by: UROLOGY

## 2025-05-29 PROCEDURE — 63710000001 ALLOPURINOL 300 MG TABLET: Performed by: UROLOGY

## 2025-05-29 PROCEDURE — 63710000001 FUROSEMIDE 20 MG TABLET: Performed by: UROLOGY

## 2025-05-29 PROCEDURE — 25810000003 LACTATED RINGERS PER 1000 ML: Performed by: NURSE ANESTHETIST, CERTIFIED REGISTERED

## 2025-05-29 PROCEDURE — 63710000001 ATORVASTATIN 20 MG TABLET: Performed by: UROLOGY

## 2025-05-29 PROCEDURE — 25010000002 LIDOCAINE PF 2% 2 % SOLUTION: Performed by: NURSE ANESTHETIST, CERTIFIED REGISTERED

## 2025-05-29 PROCEDURE — 86901 BLOOD TYPING SEROLOGIC RH(D): CPT | Performed by: UROLOGY

## 2025-05-29 PROCEDURE — 25010000002 GLYCOPYRROLATE 0.2 MG/ML SOLUTION: Performed by: NURSE ANESTHETIST, CERTIFIED REGISTERED

## 2025-05-29 PROCEDURE — 82948 REAGENT STRIP/BLOOD GLUCOSE: CPT

## 2025-05-29 PROCEDURE — 86901 BLOOD TYPING SEROLOGIC RH(D): CPT

## 2025-05-29 PROCEDURE — 85610 PROTHROMBIN TIME: CPT | Performed by: UROLOGY

## 2025-05-29 RX ORDER — IPRATROPIUM BROMIDE AND ALBUTEROL SULFATE 2.5; .5 MG/3ML; MG/3ML
3 SOLUTION RESPIRATORY (INHALATION) ONCE AS NEEDED
Status: DISCONTINUED | OUTPATIENT
Start: 2025-05-29 | End: 2025-05-29 | Stop reason: HOSPADM

## 2025-05-29 RX ORDER — OXYCODONE HYDROCHLORIDE 5 MG/1
5 TABLET ORAL ONCE AS NEEDED
Status: DISCONTINUED | OUTPATIENT
Start: 2025-05-29 | End: 2025-05-29 | Stop reason: HOSPADM

## 2025-05-29 RX ORDER — LIDOCAINE HYDROCHLORIDE 20 MG/ML
INJECTION, SOLUTION EPIDURAL; INFILTRATION; INTRACAUDAL; PERINEURAL AS NEEDED
Status: DISCONTINUED | OUTPATIENT
Start: 2025-05-29 | End: 2025-05-29 | Stop reason: SURG

## 2025-05-29 RX ORDER — NALOXONE HCL 0.4 MG/ML
0.4 VIAL (ML) INJECTION
Status: DISCONTINUED | OUTPATIENT
Start: 2025-05-29 | End: 2025-05-30 | Stop reason: HOSPADM

## 2025-05-29 RX ORDER — CETIRIZINE HYDROCHLORIDE 10 MG/1
10 TABLET ORAL DAILY PRN
Status: DISCONTINUED | OUTPATIENT
Start: 2025-05-29 | End: 2025-05-30 | Stop reason: HOSPADM

## 2025-05-29 RX ORDER — ACETAMINOPHEN 325 MG/1
650 TABLET ORAL EVERY 4 HOURS PRN
Status: DISCONTINUED | OUTPATIENT
Start: 2025-05-29 | End: 2025-05-30 | Stop reason: HOSPADM

## 2025-05-29 RX ORDER — LIDOCAINE HYDROCHLORIDE 10 MG/ML
0.5 INJECTION, SOLUTION EPIDURAL; INFILTRATION; INTRACAUDAL; PERINEURAL ONCE AS NEEDED
Status: COMPLETED | OUTPATIENT
Start: 2025-05-29 | End: 2025-05-29

## 2025-05-29 RX ORDER — ONDANSETRON 2 MG/ML
INJECTION INTRAMUSCULAR; INTRAVENOUS AS NEEDED
Status: DISCONTINUED | OUTPATIENT
Start: 2025-05-29 | End: 2025-05-29 | Stop reason: SURG

## 2025-05-29 RX ORDER — OXYCODONE AND ACETAMINOPHEN 5; 325 MG/1; MG/1
1 TABLET ORAL EVERY 6 HOURS PRN
Qty: 10 TABLET | Refills: 0 | Status: SHIPPED | OUTPATIENT
Start: 2025-05-29

## 2025-05-29 RX ORDER — HYDRALAZINE HYDROCHLORIDE 20 MG/ML
5 INJECTION INTRAMUSCULAR; INTRAVENOUS
Status: DISCONTINUED | OUTPATIENT
Start: 2025-05-29 | End: 2025-05-29 | Stop reason: HOSPADM

## 2025-05-29 RX ORDER — ACETAMINOPHEN 650 MG/1
650 SUPPOSITORY RECTAL EVERY 4 HOURS PRN
Status: DISCONTINUED | OUTPATIENT
Start: 2025-05-29 | End: 2025-05-30 | Stop reason: HOSPADM

## 2025-05-29 RX ORDER — EPHEDRINE SULFATE 5 MG/ML
5 INJECTION INTRAVENOUS ONCE AS NEEDED
Status: DISCONTINUED | OUTPATIENT
Start: 2025-05-29 | End: 2025-05-29 | Stop reason: HOSPADM

## 2025-05-29 RX ORDER — ONDANSETRON 2 MG/ML
4 INJECTION INTRAMUSCULAR; INTRAVENOUS EVERY 6 HOURS PRN
Status: DISCONTINUED | OUTPATIENT
Start: 2025-05-29 | End: 2025-05-30 | Stop reason: HOSPADM

## 2025-05-29 RX ORDER — CIPROFLOXACIN 500 MG/1
500 TABLET, FILM COATED ORAL 2 TIMES DAILY
Qty: 10 TABLET | Refills: 0 | Status: SHIPPED | OUTPATIENT
Start: 2025-05-29 | End: 2025-06-03

## 2025-05-29 RX ORDER — HYDROMORPHONE HYDROCHLORIDE 1 MG/ML
0.5 INJECTION, SOLUTION INTRAMUSCULAR; INTRAVENOUS; SUBCUTANEOUS
Status: DISCONTINUED | OUTPATIENT
Start: 2025-05-29 | End: 2025-05-29 | Stop reason: HOSPADM

## 2025-05-29 RX ORDER — DIPHENHYDRAMINE HYDROCHLORIDE 50 MG/ML
12.5 INJECTION, SOLUTION INTRAMUSCULAR; INTRAVENOUS ONCE AS NEEDED
Status: DISCONTINUED | OUTPATIENT
Start: 2025-05-29 | End: 2025-05-29 | Stop reason: HOSPADM

## 2025-05-29 RX ORDER — ETOMIDATE 2 MG/ML
INJECTION INTRAVENOUS AS NEEDED
Status: DISCONTINUED | OUTPATIENT
Start: 2025-05-29 | End: 2025-05-29 | Stop reason: SURG

## 2025-05-29 RX ORDER — ONDANSETRON 2 MG/ML
4 INJECTION INTRAMUSCULAR; INTRAVENOUS ONCE AS NEEDED
Status: DISCONTINUED | OUTPATIENT
Start: 2025-05-29 | End: 2025-05-29 | Stop reason: HOSPADM

## 2025-05-29 RX ORDER — ALBUTEROL SULFATE 90 UG/1
2 INHALANT RESPIRATORY (INHALATION) EVERY 4 HOURS PRN
Status: DISCONTINUED | OUTPATIENT
Start: 2025-05-29 | End: 2025-05-30 | Stop reason: HOSPADM

## 2025-05-29 RX ORDER — ONDANSETRON 4 MG/1
4 TABLET, ORALLY DISINTEGRATING ORAL EVERY 6 HOURS PRN
Status: DISCONTINUED | OUTPATIENT
Start: 2025-05-29 | End: 2025-05-30 | Stop reason: HOSPADM

## 2025-05-29 RX ORDER — ATORVASTATIN CALCIUM 20 MG/1
20 TABLET, FILM COATED ORAL DAILY
Status: DISCONTINUED | OUTPATIENT
Start: 2025-05-29 | End: 2025-05-30 | Stop reason: HOSPADM

## 2025-05-29 RX ORDER — OXYCODONE HYDROCHLORIDE 5 MG/1
10 TABLET ORAL EVERY 4 HOURS PRN
Status: DISCONTINUED | OUTPATIENT
Start: 2025-05-29 | End: 2025-05-29 | Stop reason: HOSPADM

## 2025-05-29 RX ORDER — LABETALOL HYDROCHLORIDE 5 MG/ML
5 INJECTION, SOLUTION INTRAVENOUS
Status: DISCONTINUED | OUTPATIENT
Start: 2025-05-29 | End: 2025-05-29 | Stop reason: HOSPADM

## 2025-05-29 RX ORDER — SODIUM CHLORIDE 0.9 % (FLUSH) 0.9 %
10 SYRINGE (ML) INJECTION AS NEEDED
Status: DISCONTINUED | OUTPATIENT
Start: 2025-05-29 | End: 2025-05-29 | Stop reason: HOSPADM

## 2025-05-29 RX ORDER — HYDROCODONE BITARTRATE AND ACETAMINOPHEN 7.5; 325 MG/1; MG/1
2 TABLET ORAL EVERY 4 HOURS PRN
Status: DISCONTINUED | OUTPATIENT
Start: 2025-05-29 | End: 2025-05-30 | Stop reason: HOSPADM

## 2025-05-29 RX ORDER — AMLODIPINE BESYLATE 5 MG/1
10 TABLET ORAL DAILY
Status: DISCONTINUED | OUTPATIENT
Start: 2025-05-29 | End: 2025-05-30 | Stop reason: HOSPADM

## 2025-05-29 RX ORDER — DIPHENHYDRAMINE HYDROCHLORIDE 50 MG/ML
12.5 INJECTION, SOLUTION INTRAMUSCULAR; INTRAVENOUS
Status: DISCONTINUED | OUTPATIENT
Start: 2025-05-29 | End: 2025-05-29 | Stop reason: HOSPADM

## 2025-05-29 RX ORDER — FUROSEMIDE 20 MG/1
20 TABLET ORAL DAILY
Status: DISCONTINUED | OUTPATIENT
Start: 2025-05-29 | End: 2025-05-30 | Stop reason: HOSPADM

## 2025-05-29 RX ORDER — GLYCOPYRROLATE 0.2 MG/ML
INJECTION INTRAMUSCULAR; INTRAVENOUS AS NEEDED
Status: DISCONTINUED | OUTPATIENT
Start: 2025-05-29 | End: 2025-05-29 | Stop reason: SURG

## 2025-05-29 RX ORDER — SODIUM CHLORIDE, SODIUM LACTATE, POTASSIUM CHLORIDE, CALCIUM CHLORIDE 600; 310; 30; 20 MG/100ML; MG/100ML; MG/100ML; MG/100ML
20 INJECTION, SOLUTION INTRAVENOUS ONCE
Status: COMPLETED | OUTPATIENT
Start: 2025-05-29 | End: 2025-05-29

## 2025-05-29 RX ORDER — SODIUM CHLORIDE, SODIUM LACTATE, POTASSIUM CHLORIDE, CALCIUM CHLORIDE 600; 310; 30; 20 MG/100ML; MG/100ML; MG/100ML; MG/100ML
INJECTION, SOLUTION INTRAVENOUS CONTINUOUS PRN
Status: DISCONTINUED | OUTPATIENT
Start: 2025-05-29 | End: 2025-05-29 | Stop reason: SURG

## 2025-05-29 RX ORDER — DEXAMETHASONE SODIUM PHOSPHATE 4 MG/ML
INJECTION, SOLUTION INTRA-ARTICULAR; INTRALESIONAL; INTRAMUSCULAR; INTRAVENOUS; SOFT TISSUE AS NEEDED
Status: DISCONTINUED | OUTPATIENT
Start: 2025-05-29 | End: 2025-05-29 | Stop reason: SURG

## 2025-05-29 RX ORDER — FENTANYL CITRATE 50 UG/ML
INJECTION, SOLUTION INTRAMUSCULAR; INTRAVENOUS AS NEEDED
Status: DISCONTINUED | OUTPATIENT
Start: 2025-05-29 | End: 2025-05-29 | Stop reason: SURG

## 2025-05-29 RX ORDER — LOSARTAN POTASSIUM 50 MG/1
100 TABLET ORAL DAILY
Status: DISCONTINUED | OUTPATIENT
Start: 2025-05-29 | End: 2025-05-30 | Stop reason: HOSPADM

## 2025-05-29 RX ORDER — ALLOPURINOL 300 MG/1
300 TABLET ORAL NIGHTLY
Status: DISCONTINUED | OUTPATIENT
Start: 2025-05-29 | End: 2025-05-30 | Stop reason: HOSPADM

## 2025-05-29 RX ORDER — NALOXONE HCL 0.4 MG/ML
0.4 VIAL (ML) INJECTION AS NEEDED
Status: DISCONTINUED | OUTPATIENT
Start: 2025-05-29 | End: 2025-05-29 | Stop reason: HOSPADM

## 2025-05-29 RX ADMIN — CEFAZOLIN 2000 MG: 2 INJECTION, POWDER, FOR SOLUTION INTRAMUSCULAR; INTRAVENOUS at 17:47

## 2025-05-29 RX ADMIN — FENTANYL CITRATE 50 MCG: 50 INJECTION, SOLUTION INTRAMUSCULAR; INTRAVENOUS at 10:20

## 2025-05-29 RX ADMIN — LIDOCAINE HYDROCHLORIDE 80 MG: 20 INJECTION, SOLUTION EPIDURAL; INFILTRATION; INTRACAUDAL; PERINEURAL at 10:09

## 2025-05-29 RX ADMIN — GLYCOPYRROLATE 0.2 MG: 0.2 INJECTION INTRAMUSCULAR; INTRAVENOUS at 10:12

## 2025-05-29 RX ADMIN — FENTANYL CITRATE 50 MCG: 50 INJECTION, SOLUTION INTRAMUSCULAR; INTRAVENOUS at 10:13

## 2025-05-29 RX ADMIN — SODIUM CHLORIDE, SODIUM LACTATE, POTASSIUM CHLORIDE, AND CALCIUM CHLORIDE: .6; .31; .03; .02 INJECTION, SOLUTION INTRAVENOUS at 10:05

## 2025-05-29 RX ADMIN — DEXMEDETOMIDINE HYDROCHLORIDE 4 MCG: 400 INJECTION INTRAVENOUS at 10:28

## 2025-05-29 RX ADMIN — FUROSEMIDE 20 MG: 20 TABLET ORAL at 15:19

## 2025-05-29 RX ADMIN — CEFAZOLIN 2000 MG: 2 INJECTION, POWDER, FOR SOLUTION INTRAMUSCULAR; INTRAVENOUS at 09:59

## 2025-05-29 RX ADMIN — DEXMEDETOMIDINE HYDROCHLORIDE 4 MCG: 400 INJECTION INTRAVENOUS at 10:18

## 2025-05-29 RX ADMIN — ETOMIDATE 20 MG: 2 INJECTION INTRAVENOUS at 10:09

## 2025-05-29 RX ADMIN — DEXMEDETOMIDINE HYDROCHLORIDE 4 MCG: 400 INJECTION INTRAVENOUS at 10:13

## 2025-05-29 RX ADMIN — ATORVASTATIN CALCIUM 20 MG: 20 TABLET, FILM COATED ORAL at 15:19

## 2025-05-29 RX ADMIN — AMLODIPINE BESYLATE 10 MG: 5 TABLET ORAL at 15:19

## 2025-05-29 RX ADMIN — LIDOCAINE HYDROCHLORIDE 0.5 ML: 10 INJECTION, SOLUTION EPIDURAL; INFILTRATION; INTRACAUDAL; PERINEURAL at 09:26

## 2025-05-29 RX ADMIN — ALLOPURINOL 300 MG: 300 TABLET ORAL at 20:14

## 2025-05-29 RX ADMIN — DEXAMETHASONE SODIUM PHOSPHATE 4 MG: 4 INJECTION, SOLUTION INTRAMUSCULAR; INTRAVENOUS at 10:15

## 2025-05-29 RX ADMIN — ONDANSETRON 4 MG: 2 INJECTION, SOLUTION INTRAMUSCULAR; INTRAVENOUS at 10:27

## 2025-05-29 RX ADMIN — LOSARTAN POTASSIUM 100 MG: 50 TABLET, FILM COATED ORAL at 15:19

## 2025-05-29 RX ADMIN — EMPAGLIFLOZIN 10 MG: 10 TABLET, FILM COATED ORAL at 15:19

## 2025-05-29 RX ADMIN — SODIUM CHLORIDE, POTASSIUM CHLORIDE, SODIUM LACTATE AND CALCIUM CHLORIDE 20 ML/HR: 600; 310; 30; 20 INJECTION, SOLUTION INTRAVENOUS at 09:26

## 2025-05-29 NOTE — OP NOTE
CYSTOSCOPY TRANSURETHRAL RESECTION OF PROSTATE  Procedure Report    Patient Name:  Sandro Ramirez  YOB: 1950    Date of Surgery:  5/29/2025     Indications: 74-year-old gentleman who has had a greenlight TURP in the past as well as a UroLift.  Recent cystoscopy revealed erosion of UroLift clips into the prostatic urethra as well as the prostate band apically.    Pre-op Diagnosis:   Benign localized prostatic hyperplasia with lower urinary tract symptoms (LUTS) [N40.1]       Post-Op Diagnosis Codes:     * Benign localized prostatic hyperplasia with lower urinary tract symptoms (LUTS) [N40.1]    Procedure/CPT® Codes:  IA TRURL ELECTROSURG RESCJ PROSTATE BLEED COMPLETE [80371]    Procedure(s):  CYSTOSCOPY TRANSURETHRAL RESECTION OF PROSTATE REMOVAL OF UROLIFT CLIPS DIVISION OF PROSTATE BAND    Staff:  Surgeon(s):  Daniel Hamm MD            was responsible for performing the following activities:  None  and their skilled assistance was necessary for the success of this case.    Anesthesia: General    Estimated Blood Loss: minimal    Implants:    Nothing was implanted during the procedure    Specimen:          None      Findings: 3 UroLift clips found that had eroded into the prostatic urethra and these were all removed.  Prostate gland apically which was divided.    Complications: None    Description of Procedure: Patient induced with general anesthesia and placed in dorsolithotomy position.  Prepped and draped in sterile fashion.  28 Amharic continuous-flow resectoscope placed under direct vision with the above-noted findings.  Urethra is within normal limits.  Bladder shows mild trabeculation there is a diverticulum left side of the bladder.  Ureteral orifice visualized and normal.  No evidence for any tumor or stone.  The prostate band was resected.  Some of the apical tissue was also resected with this.  The 3 clips were removed by resecting the tissue behind them and dividing the suture.   All of the chips and clips were irrigated out of the bladder.  Hemostasis was obtained using ultra cautery.  The resectoscope was removed and a 24 Ukrainian three-way Cardoza catheter was placed.  Patient was taken out of the dorsolithotomy position and awakened from general anesthesia.  He was transported to the postanesthesia care unit in stable condition having tolerated the procedure well without any complications.      Daniel Hamm MD     Date: 5/29/2025  Time: 10:34 EDT

## 2025-05-29 NOTE — ANESTHESIA PREPROCEDURE EVALUATION
Anesthesia Evaluation     Patient summary reviewed and Nursing notes reviewed   NPO Solid Status: > 8 hours             Airway   Mallampati: II  TM distance: >3 FB  Neck ROM: full  No difficulty expected  Dental - normal exam     Pulmonary - normal exam   (+) a smoker Former, COPD moderate, asthma,  Cardiovascular - normal exam    ECG reviewed    (+) hypertension, past MI  >12 months, CAD, CABG, dysrhythmias Atrial Fib, hyperlipidemia  (-) angina, JEAN    ROS comment:  Infrarenal abdominal aortic aneurysm sac measures 4.5 x 4.4 cm,      Left ventricular systolic function is normal. Left ventricular ejection fraction appears to be 56 - 60%.  ·  Left ventricular wall thickness is consistent with mild concentric hypertrophy.  ·  Left ventricular diastolic function is consistent with (grade II w/high LAP) pseudonormalization.      Neuro/Psych- negative ROS  GI/Hepatic/Renal/Endo    (+) renal disease- CRI, diabetes mellitus type 2 using insulin    Musculoskeletal (-) negative ROS    Abdominal  - normal exam    Bowel sounds: normal.   Substance History - negative use     OB/GYN negative ob/gyn ROS         Other                    Anesthesia Plan    ASA 4     general       Anesthetic plan, risks, benefits, and alternatives have been provided, discussed and informed consent has been obtained with: patient.    CODE STATUS:

## 2025-05-29 NOTE — OUTREACH NOTE
Medical Week 2 Survey      Flowsheet Row Responses   [facility] facility patient discharged from? [facility]   Does the patient have one of the following disease processes/diagnoses(primary or secondary)? Other   Week 2 attempt successful? No   Unsuccessful attempts Attempt 1   Revoke Readmitted            Blanca DAVENPORT - Registered Nurse

## 2025-05-29 NOTE — H&P
Urology History and Physical    Patient:Sandro Ramirez  :1950   Room:Room/bed info not found   Admit Date(Not on file)  Age:74 y.o.      SEX:male      DOS:2025      MR:[id number]      Visit:[id number]       Chief complaint voiding difficulties    Subjective     Patient is a 74 y.o. male who presents with history of bladder cancer and recent surveillance cystoscopy revealing a prostatic band as well as 2 exposed UroLift clips.  He has had a greenlight TURP in the past.  Patient now presents for division of the prostate band and removal of the UroLift clips.     Review of Systems  10 point review of systems were reviewed and are negative except for:  Constitution:  positive for See HPI    History  Past Medical History:   Diagnosis Date    AAA (abdominal aortic aneurysm) 2006    Abnormal ECG     Allergic     Aneurysm     Arthritis     Asthma     Atrial fibrillation     COUMADIN    Benign prostatic hyperplasia     Bladder cancer     CHF (congestive heart failure)     Chronic anticoagulation     COUMADIN STARTED     Clotting disorder     Colon polyp     Congenital heart disease     COPD (chronic obstructive pulmonary disease)     Coronary artery disease     Diabetes mellitus     treat with diet    Ehrlichiosis     Elevated cholesterol     Erectile dysfunction     Facial basal cell cancer     Gout     Hard to intubate     History of pancreatitis 2006    History of pneumonia     History of tick-borne disease     HL (hearing loss)     BILAT HEARING AIDS    Hyperlipidemia     Hypertension     Myocardial infarction     X5    Obesity     Pancreatitis     Pneumonia     Sleep apnea     CURRENTLY NOT USING CPAP D/T RECALL    Transfusion history     STATES HIS SISTER HAD A SEVERE REACTION TO BLOOD TRANSFUSION    Type 2 diabetes mellitus      Past Surgical History:   Procedure Laterality Date    ARTERIAL ANEURYSM REPAIR      ARTERIOGRAM AORTIC Left 2022    Procedure: AORTIC STENT GRAFT PLACEMENT WITH  ILIAC COILING;  Surgeon: Eric Sainz MD;  Location: [facility] HYBRID OR ;  Service: Vascular;  Laterality: Left;    CARDIAC CATHETERIZATION      CARDIAC SURGERY      CHOLECYSTECTOMY      COLONOSCOPY      COLONOSCOPY N/A 2023    Procedure: COLONOSCOPY to cecum with cold biopsy and cold snare polypectomies and clips;  Surgeon: Vitor Haley MD;  Location: [facility] ENDOSCOPY;  Service: Gastroenterology;  Laterality: N/A;  Pre - H/O polyps.  Post - diverticulosis, polyps, hemorrhoids    COLONOSCOPY W/ POLYPECTOMY N/A 2024    Procedure: COLONOSCOPY WITH POLYPECTOMY;  Surgeon: Vitor Haley MD;  Location:  [facility] OR;  Service: Gastroenterology;  Laterality: N/A;  diverticulosis, sigmoid polyp x2    CORONARY ARTERY BYPASS GRAFT  2006    X2    CYSTOSCOPY      STATES HAD BLADDER TUMORS REMOVED X2    CYSTOSCOPY WITH CLOT EVACUATION N/A 2024    Procedure: CYSTOSCOPY WITH CLOT EVACUATION, WITH FULGRATION;  Surgeon: Daniel Hamm MD;  Location: [facility] MAIN OR;  Service: Urology;  Laterality: N/A;    PANCREAS SURGERY      PROSTATE SURGERY      X2- STATES PLACED COILS IN TO HELP WITH FLOW AND THEN HAD TO REMOVE A PIECE THAT BROKE OFF AND WAS IN BLADDER    UPPER GASTROINTESTINAL ENDOSCOPY      VEIN SURGERY       Social History     Socioeconomic History    Marital status:      Spouse name: Nichelle    Number of children: 3    Years of education: 12   Tobacco Use    Smoking status: Former     Current packs/day: 0.00     Average packs/day: 1 pack/day for 30.0 years (30.0 ttl pk-yrs)     Types: Cigarettes     Start date: 1976     Quit date: 2006     Years since quittin.9     Passive exposure: Past    Smokeless tobacco: Never   Vaping Use    Vaping status: Never Used   Substance and Sexual Activity    Alcohol use: Not Currently     Comment: one or two beers 6 times a year    Drug use: Never    Sexual activity: Not Currently     Partners: Female     Birth control/protection: None      Family History   Problem Relation Age of Onset    Hypertension Mother     Colon cancer Mother     Cancer Mother     Diabetes Mother     Arthritis Mother     Cancer Father     Heart attack Father     Hearing loss Father     Heart disease Father     Hypertension Sister     Hypertension Brother     Colon cancer Brother     Cancer Brother     Heart disease Paternal Grandfather     Heart attack Paternal Grandfather     Malig Hyperthermia Neg Hx     Colon polyps Neg Hx     Crohn's disease Neg Hx     Irritable bowel syndrome Neg Hx     Ulcerative colitis Neg Hx      Allergy  Allergies   Allergen Reactions    Bee Venom Anaphylaxis     HIVES-SWOLLEN THROAT    Meperidine Hives    Midazolam Hives    Propofol Other (See Comments)     UNCLEAR-SVT, HYPOTENSION-STATES SEVERE ALMOST CODED    Sulfa Antibiotics Hives    Hydrocodone Nausea And Vomiting    Simvastatin Myalgia     Prior to Admission medications    Medication Sig Start Date End Date Taking? Authorizing Provider   albuterol sulfate  (90 Base) MCG/ACT inhaler TAKE 2 PUFFS BY MOUTH EVERY 4 HOURS AS NEEDED FOR WHEEZE 3/17/25  Yes Lyell, Reggie Duane, MD   allopurinol (ZYLOPRIM) 300 MG tablet Take 1 tablet by mouth Every Night. 10/22/24  Yes Kelby Aguilar MD   amLODIPine (NORVASC) 10 MG tablet TAKE 1 TABLET BY MOUTH EVERY DAY 3/17/25  Yes Colette Dominguez APRN   aspirin 81 MG tablet Take 1 tablet by mouth Daily. 12/23/11  Yes Kelby Aguilar MD   atorvastatin (LIPITOR) 20 MG tablet TAKE 1 TABLET BY MOUTH EVERY DAY 2/10/25  Yes Thai Galloway MD   cetirizine (zyrTEC) 10 MG tablet Take 1 tablet by mouth Daily As Needed for Allergies.   Yes ProviderKelby MD   furosemide (Lasix) 20 MG tablet Take 1 tablet by mouth Daily. 4/8/25  Yes Colette Dominguez APRN   losartan (COZAAR) 100 MG tablet TAKE 1 TABLET BY MOUTH EVERY DAY 12/19/24  Yes Thai Galloway MD   warfarin (COUMADIN) 7.5 MG tablet See Admin Instructions. Take 1 tablet by mouth daily on  "Saturday, Sunday, Tuesday, Wednesday, and Thursday   Yes Kelby Aguilar MD   warfarin (COUMADIN) 7.5 MG tablet See Admin Instructions. Take 1.5 tablets by mouth on Monday and Friday   Yes Kelby Aguilar MD   empagliflozin (JARDIANCE) 10 MG tablet tablet Take 1 tablet by mouth Daily. 5/20/25   Jonny Clark MD   fluticasone (FLONASE) 50 MCG/ACT nasal spray Administer 2 sprays into the nostril(s) as directed by provider Daily As Needed for Rhinitis.    ProviderKelby MD         Objective     tMax 24 hours:  No data recorded.    Vital Sign Ranges:     Intake and Output Last 3 Shifts:  No intake/output data recorded.    Physical Exam:   General Appearance alert, appears stated age, and cooperative  Head normocephalic, without obvious abnormality and atraumatic  Lungs respirations regular, respirations even, and respirations unlabored  Heart regular rhythm & normal rate  Abdomen no guarding and no rebound tenderness  Skin no bleeding, bruising or rash  Neurologic Mental Status orientated to person, place, time and situation    Results Review:     Lab Results (last 24 hours)       ** No results found for the last 24 hours. **           No results found for: \"URINECX\"     Imaging Results (Last 7 Days)       ** No results found for the last 168 hours. **            Inpatient Meds:   Scheduled Meds:   Continuous Infusions:No current facility-administered medications for this encounter.     PRN Meds:.      Assessment & Plan     Active Problems:    * No active hospital problems. *    Prostate band  Exposed UroLift clips    Plan  Cystoscopy with transurethral resection of prostate band as well as removal of his UroLift clips.  Risk, benefits, alternatives have been discussed with the patient he wishes to proceed.      I discussed the patient's findings and my recommendations with patient    Daniel Hamm MD  05/29/25  07:43 EDT        "

## 2025-05-29 NOTE — ANESTHESIA PROCEDURE NOTES
Airway  Reason: elective    Date/Time: 5/29/2025 10:10 AM  Airway not difficult    General Information and Staff    Patient location during procedure: OR  CRNA/CAA: Betty Ortez CRNA    Indications and Patient Condition  Indications for airway management: airway protection    Preoxygenated: yes  MILS maintained throughout    Mask difficulty assessment: 1 - vent by mask    Final Airway Details    Final airway type: supraglottic airway      Successful airway: I-gel  Size: 4   Number of attempts at approach: 1  Assessment: lips, teeth, and gum same as pre-op and atraumatic intubation

## 2025-05-29 NOTE — ANESTHESIA POSTPROCEDURE EVALUATION
Patient: Sandro Ramirez    Procedure Summary       Date: 05/29/25 Room / Location: [facility] OR 06 / [facility] MAIN OR    Anesthesia Start: 1005 Anesthesia Stop: 1043    Procedure: CYSTOSCOPY TRANSURETHRAL RESECTION OF PROSTATE REMOVAL OF UROLIFT CLIPS DIVISION OF PROSTATE BAND (Bladder) Diagnosis:       Benign localized prostatic hyperplasia with lower urinary tract symptoms (LUTS)      (Benign localized prostatic hyperplasia with lower urinary tract symptoms (LUTS) [N40.1])    Surgeons: Daniel Hamm MD Provider: Joseph Alcocer MD    Anesthesia Type: general ASA Status: 4            Anesthesia Type: general    Vitals  Vitals Value Taken Time   /57 05/29/25 13:11   Temp 98.2 °F (36.8 °C) 05/29/25 10:42   Pulse 78 05/29/25 13:18   Resp 13 05/29/25 13:00   SpO2 97 % 05/29/25 13:18   Vitals shown include unfiled device data.        Post Anesthesia Care and Evaluation    Patient location during evaluation: PACU  Patient participation: complete - patient participated  Level of consciousness: awake  Pain score: 0  Pain management: adequate  Anesthetic complications: No anesthetic complications  PONV Status: none  Cardiovascular status: acceptable  Respiratory status: acceptable  Hydration status: acceptable

## 2025-05-30 ENCOUNTER — TELEPHONE (OUTPATIENT)
Dept: CARDIOLOGY | Facility: CLINIC | Age: 75
End: 2025-05-30
Payer: MEDICARE

## 2025-05-30 VITALS
OXYGEN SATURATION: 93 % | WEIGHT: 191.6 LBS | SYSTOLIC BLOOD PRESSURE: 131 MMHG | HEART RATE: 66 BPM | BODY MASS INDEX: 26.82 KG/M2 | HEIGHT: 71 IN | DIASTOLIC BLOOD PRESSURE: 55 MMHG | RESPIRATION RATE: 18 BRPM | TEMPERATURE: 98 F

## 2025-05-30 PROBLEM — N13.8 BPH WITH OBSTRUCTION/LOWER URINARY TRACT SYMPTOMS: Status: ACTIVE | Noted: 2025-05-30

## 2025-05-30 PROBLEM — N40.1 BPH WITH OBSTRUCTION/LOWER URINARY TRACT SYMPTOMS: Status: ACTIVE | Noted: 2025-05-30

## 2025-05-30 PROCEDURE — A9270 NON-COVERED ITEM OR SERVICE: HCPCS | Performed by: UROLOGY

## 2025-05-30 PROCEDURE — 63710000001 FUROSEMIDE 20 MG TABLET: Performed by: UROLOGY

## 2025-05-30 PROCEDURE — G0378 HOSPITAL OBSERVATION PER HR: HCPCS

## 2025-05-30 PROCEDURE — 25010000002 CEFAZOLIN PER 500 MG: Performed by: UROLOGY

## 2025-05-30 PROCEDURE — 63710000001 ATORVASTATIN 20 MG TABLET: Performed by: UROLOGY

## 2025-05-30 PROCEDURE — 63710000001 EMPAGLIFLOZIN 10 MG TABLET: Performed by: UROLOGY

## 2025-05-30 PROCEDURE — 63710000001 AMLODIPINE 5 MG TABLET: Performed by: UROLOGY

## 2025-05-30 PROCEDURE — 63710000001 LOSARTAN 50 MG TABLET: Performed by: UROLOGY

## 2025-05-30 RX ORDER — ENOXAPARIN SODIUM 100 MG/ML
INJECTION SUBCUTANEOUS
Qty: 10 ML | Refills: 0 | Status: SHIPPED | OUTPATIENT
Start: 2025-05-30

## 2025-05-30 RX ADMIN — AMLODIPINE BESYLATE 10 MG: 5 TABLET ORAL at 08:02

## 2025-05-30 RX ADMIN — CEFAZOLIN 2000 MG: 2 INJECTION, POWDER, FOR SOLUTION INTRAMUSCULAR; INTRAVENOUS at 01:48

## 2025-05-30 RX ADMIN — LOSARTAN POTASSIUM 100 MG: 50 TABLET, FILM COATED ORAL at 08:02

## 2025-05-30 RX ADMIN — ATORVASTATIN CALCIUM 20 MG: 20 TABLET, FILM COATED ORAL at 08:02

## 2025-05-30 RX ADMIN — EMPAGLIFLOZIN 10 MG: 10 TABLET, FILM COATED ORAL at 08:02

## 2025-05-30 RX ADMIN — FUROSEMIDE 20 MG: 20 TABLET ORAL at 08:01

## 2025-05-30 NOTE — CASE MANAGEMENT/SOCIAL WORK
Case Management Discharge Note      Final Note: HOME  DISCHARGED PRIOR TO CASE MANAGEMENT SEEING PATIENT         Selected Continued Care - Discharged on 5/30/2025 Admission date: 5/29/2025 - Discharge disposition: Home or Self Care            Transportation Services  Private: Car    Final Discharge Disposition Code: 01 - home or self-care

## 2025-05-30 NOTE — PLAN OF CARE
Goal Outcome Evaluation:      Patient alert and oriented x 4. Able to make needs known. CBI going, light pink output. No complaints of pain at this time. Personal items and call light in reach. Plan of care is ongoing.

## 2025-05-30 NOTE — DISCHARGE INSTR - ACTIVITY
resume his home medications other than blood thinners.  He is to resume his home diet.  He is restricted to no heavy lifting or strenuous activity he will follow-up with me in approximately 1 week.

## 2025-05-30 NOTE — DISCHARGE SUMMARY
Urology Discharge Note    Name:  Sandro Ramirez  Age:  74 y.o.  Sex:  male  :  1950  MRN:  [id number]    Date of Admission: 2025   Date of Discharge:  2025    Admitting Diagnosis: BPH with lower urinary tract symptoms  Discharge Diagnosis: BPH with lower urinary tract symptoms    Presenting Problem  Active Hospital Problems   No active problems to display.      Resolved Hospital Problems    Diagnosis Date Resolved POA    **BPH with obstruction/lower urinary tract symptoms [N40.1, N13.8] 2025 Yes         Hospital Course  Patient is a 74 y.o. male presented with BPH with lower urinary tract symptoms.  Patient underwent cystoscopy with transurethral resection of the prostate as well as removal of UroLift clips.  Patient was watched overnight and his urine cleared appropriately.  Catheter was removed on postoperative day #1 and the patient was discharged home after voiding well.  Patient is to resume his home medications other than blood thinners.  He is to resume his home diet.  He is restricted Medley heavy lifting or strenuous activity he will follow-up with me in approximately 1 week.      Procedures Performed    Procedure(s):  CYSTOSCOPY TRANSURETHRAL RESECTION OF PROSTATE REMOVAL OF UROLIFT CLIPS DIVISION OF PROSTATE BAND  -------------------       Consults:   Consults       Date and Time Order Name Status Description    2025  9:04 AM Inpatient Cardiology Consult Completed     2025 11:29 PM Inpatient Urology Consult Completed             Pertinent Test Results:   Lab Results (last 24 hours)       Procedure Component Value Units Date/Time    POC Glucose Once [[id number]]  (Abnormal) Collected: 25 1044    Specimen: Blood Updated: 25 1045     Glucose 128 mg/dL      Comment: Serial Number: [id number]:  [id number]       Protime-INR [[id number]]  (Abnormal) Collected: 25 0911    Specimen: Blood Updated: 2541     Protime 13.0 Seconds      INR 0.99    POC  Glucose Once [[id number]]  (Abnormal) Collected: 05/29/25 0910    Specimen: Blood Updated: 05/29/25 0914     Glucose 123 mg/dL      Comment: Serial Number: [id number]:  [id number]             Imaging Results (Last 72 Hours)       ** No results found for the last 72 hours. **            Condition on Discharge:  Stable    Vital Signs     Vitals:    05/29/25 2040 05/29/25 2347 05/30/25 0329 05/30/25 0731   BP: 121/57 119/54 139/65 131/55   BP Location: Left arm Left arm Left arm Left arm   Patient Position: Lying Lying Lying Lying   Pulse: 78 70 57 66   Resp: 19 17 14 18   Temp: 98 °F (36.7 °C) 97.8 °F (36.6 °C) 97.8 °F (36.6 °C) 98 °F (36.7 °C)   TempSrc: Oral Oral Oral Oral   SpO2: 92% 96% 95% 93%   Weight:       Height:           Physical Exam:   General Appearance alert, appears stated age, and cooperative  Head normocephalic, without obvious abnormality and atraumatic  Abdomen no guarding and no rebound tenderness  Skin no bleeding, bruising or rash  Neurologic Mental Status orientated to person, place, time and situation    Discharge Disposition  Home or Self Care    Discharge Medications     Discharge Medications        New Medications        Instructions Start Date   ciprofloxacin 500 MG tablet  Commonly known as: Cipro   500 mg, Oral, 2 Times Daily      oxyCODONE-acetaminophen 5-325 MG per tablet  Commonly known as: Percocet   1 tablet, Oral, Every 6 Hours PRN             Continue These Medications        Instructions Start Date   albuterol sulfate  (90 Base) MCG/ACT inhaler  Commonly known as: PROVENTIL HFA;VENTOLIN HFA;PROAIR HFA   2 puffs, Inhalation, Every 4 Hours PRN      allopurinol 300 MG tablet  Commonly known as: ZYLOPRIM   Take 1 tablet by mouth Every Night.      amLODIPine 10 MG tablet  Commonly known as: NORVASC   10 mg, Oral, Daily      atorvastatin 20 MG tablet  Commonly known as: LIPITOR   TAKE 1 TABLET BY MOUTH EVERY DAY      cetirizine 10 MG tablet  Commonly known as: zyrTEC   10  mg, Daily PRN      fluticasone 50 MCG/ACT nasal spray  Commonly known as: FLONASE   2 sprays, Daily PRN      furosemide 20 MG tablet  Commonly known as: Lasix   20 mg, Oral, Daily      Jardiance 10 MG tablet tablet  Generic drug: empagliflozin   10 mg, Oral, Daily      losartan 100 MG tablet  Commonly known as: COZAAR   100 mg, Oral, Daily               Discharge Diet:   Diet Instructions       Advance Diet As Tolerated -Target Diet: Regular      Target Diet: Regular            Activity at Discharge:   Activity Instructions       Other Activity Restrictions      Type of Restriction: Other    Explain Other Restrictions: No strenuous activity for 1 month            Follow-up Appointments  Future Appointments   Date Time Provider Department Center   6/2/2025  2:45 PM [facility] CT 3 [facility]   6/10/2025  8:45 AM Seipel, Joseph F, MD [facility] NE[facility]O [facility]   6/12/2025  8:30 AM Shawn Van MD [facility] END [facility]   6/16/2025  9:45 AM [facility] [place] [facility]   8/4/2025  2:30 PM Thai Galloway MD [facility]   8/25/2025 10:00 AM Colette Dominguez APRN [facility]   3/2/2026  8:30 AM [facility] VASC MACHINE 4 [facility] CARDI [facility]   3/2/2026 12:00 PM Eric Sainz MD [facility] VS [facility]          Daniel Hamm MD  05/30/25  07:46 EDT

## 2025-05-30 NOTE — PLAN OF CARE
Goal Outcome Evaluation:                                          Patient has urinated after kim removal, discharging home.

## 2025-05-30 NOTE — TELEPHONE ENCOUNTER
Spoke to patient and wife, he is getting D/C from hospital today from procedure. His INR yesterday .99. Patient to start warfarin at his routine schedule and Lovenox this evening. He is to take Lovenox inj twice daily as close to 12 hours apart  as possible. Check INR on Monday, scheduled 9 am. He currently has no blood in urine.

## 2025-06-02 ENCOUNTER — HOSPITAL ENCOUNTER (OUTPATIENT)
Dept: CT IMAGING | Facility: HOSPITAL | Age: 75
Discharge: HOME | End: 2025-06-02
Admitting: INTERNAL MEDICINE
Payer: MEDICARE

## 2025-06-02 ENCOUNTER — ANTICOAGULATION VISIT (OUTPATIENT)
Dept: CARDIOLOGY | Facility: CLINIC | Age: 75
End: 2025-06-02
Payer: MEDICARE

## 2025-06-02 VITALS
HEART RATE: 86 BPM | DIASTOLIC BLOOD PRESSURE: 92 MMHG | BODY MASS INDEX: 26.36 KG/M2 | WEIGHT: 189 LBS | SYSTOLIC BLOOD PRESSURE: 161 MMHG

## 2025-06-02 DIAGNOSIS — R91.8 LUNG NODULES: ICD-10-CM

## 2025-06-02 DIAGNOSIS — I48.21 PERMANENT ATRIAL FIBRILLATION: Primary | Chronic | ICD-10-CM

## 2025-06-02 LAB — INR PPP: 1.2 (ref 0.9–1.1)

## 2025-06-02 PROCEDURE — 36416 COLLJ CAPILLARY BLOOD SPEC: CPT | Performed by: INTERNAL MEDICINE

## 2025-06-02 PROCEDURE — 71250 CT THORAX DX C-: CPT

## 2025-06-02 PROCEDURE — 85610 PROTHROMBIN TIME: CPT | Performed by: INTERNAL MEDICINE

## 2025-06-02 NOTE — PROGRESS NOTES
Patient's INR- 1.2, outside of therapeutic range. This Sn instructed patient to take 11.25mg on Kristi Wed & Fri this week and 7.5 all other day. We will recheck in 1 week. PilarN

## 2025-06-03 NOTE — PROGRESS NOTES
Chief Complaint  Sleep Apnea    Subjective          Sandro Ramirez presents to [facility] NEUROLOGY  History of Present Illness    Manish Ramirez is a 74-year-old male seen today for yearly PAP compliance for mild SUPA.  Last sleep study was done September 2017 which showed an overall AHI of 6.3.  He was last seen by Dr. Seipel 6/5/2024.  Patient states that he is doing well with his current DreamStation 2 auto CPAP advanced that he received through the [employer].  He is having no problems with the device and does not use the humidifier.  The patient currently uses nasal mask and gets resupply through [facility].    The patient was recently in the hospital for hematuria.  He is scheduled to have surgery for his prostate in the near future.  The patient does have COPD.  He no longer smokes, but his wife smokes in the house.       SLEEP TESTING HISTORY:    On NPSG at [facility] , 09/12/2017 patient had Mild obstructive sleep apnea syndrome with apnea-hypopnea index of 6.3 per sleep hour, minimum SpO2 of 88%    PAP download (care  3/5/2025-6/2/25):  The patient is on auto CPAP therapy at 10-20 cm/H2O.   Data indicates Moderate compliance. With 80% usage for more than 4 hours with an average usage of 8 hours 41 minutes. AHI down to 1.9 .  Average pressures 11.  Average unintended leak 4.4 LPM.     The patient's hypersomnia has resolved       [place] Sleepiness Scale:  Sitting and reading JS [place] Sleepiness: 1 WatchingTV JS [place] Sleepiness: 0  Sitting, inactive, in a public place JS [place] Sleepiness: 0  As a passenger in a car for 1 hour w/o a break  JS [place] Sleepiness: 0  Lying down to rest in the afternoon JS [place] Sleepiness: 2  Sitting and talking to someone  JS [place] Sleepiness: 0  Sitting quietly after a lunch  JS [place] Sleepiness: 2  In a car, while stopped for traffic or a light  JS [place] Sleepiness: 0  Total 5    Review of Systems   Respiratory:  Positive  "for apnea.    All other systems reviewed and are negative.     Objective   Vital Signs:   /68   Pulse 84   Ht 180.3 cm (71\")   Wt 84.8 kg (187 lb)   BMI 26.08 kg/m²     Physical Exam  Vitals reviewed.   Constitutional:       Appearance: He is well-developed and overweight.   HENT:      Head: Normocephalic and atraumatic.      Right Ear: Decreased hearing noted.      Left Ear: Decreased hearing noted.      Ears:      Comments: Bilateral hearing aids, MMP 4  Eyes:      Extraocular Movements: Extraocular movements intact.      Pupils: Pupils are equal, round, and reactive to light.   Neck:      Vascular: No carotid bruit.   Cardiovascular:      Rate and Rhythm: Normal rate.      Heart sounds: Murmur heard.   Pulmonary:      Effort: Pulmonary effort is normal.      Breath sounds: Decreased air movement present.   Musculoskeletal:      Cervical back: Normal range of motion and neck supple.   Skin:     General: Skin is warm and dry.      Findings: No rash.   Neurological:      Mental Status: He is alert and oriented to person, place, and time.      Cranial Nerves: Cranial nerves 2-12 are intact. No cranial nerve deficit.      Sensory: No sensory deficit.      Motor: Motor function is intact. No tremor.      Coordination: Finger-Nose-Finger Test normal. Rapid alternating movements normal.      Gait: Gait is intact. Gait normal.   Psychiatric:         Attention and Perception: Attention normal.         Mood and Affect: Mood normal.         Speech: Speech normal.         Behavior: Behavior normal.         Cognition and Memory: Cognition and memory normal.         Judgment: Judgment normal.        Result Review :                 Assessment and Plan    Diagnoses and all orders for this visit:    1. Obstructive sleep apnea syndrome (Primary)  -     PAP Therapy    2. Chronic obstructive pulmonary disease, unspecified COPD type    Manish Ramirez is seen today for yearly PAP compliance.  He was diagnosed with mild sleep " apnea in 2017 from a sleep study that showed an overall AHI of 6.3/hour.  The patient is currently using a repeat placement device that he received through the [employer].  He is compliant with PAP therapy 80% over 4 hours over the last 90 days.  On his days that he did not use this device, he states he used his other device while he was hunting.  There was a period in May that he was admitted to the hospital due to hematuria.    PAP therapy is effective in treating his sleep apnea with an overall AHI of 1.9.    He will continue to wear PAP therapy over 4 hours every night and will continue to try to try to get 7-8 hours of sleep each night.    The patient has COPD and though he is quit smoking, his wife still smokes in the house.  I encouraged him to talk to her about reducing her smoking in enclosed spaces.  The patient has decreased air movement.    He is eligible for getting a new device through his insurance if this current device stops working.    He will follow-up in 1 year.    The patient is compliant with and benefiting from PAP therapy.      Follow Up   Return in about 1 year (around 6/4/2026).    Patient was given instructions and counseling regarding his condition or for health maintenance advice. Please see specific information pulled into the AVS if appropriate.       This document has been electronically signed by ALICJA Bashir on June 4, 2025 09:46 EDT

## 2025-06-04 ENCOUNTER — OFFICE VISIT (OUTPATIENT)
Dept: NEUROLOGY | Facility: CLINIC | Age: 75
End: 2025-06-04
Payer: MEDICARE

## 2025-06-04 VITALS
DIASTOLIC BLOOD PRESSURE: 68 MMHG | SYSTOLIC BLOOD PRESSURE: 124 MMHG | HEIGHT: 71 IN | BODY MASS INDEX: 26.18 KG/M2 | WEIGHT: 187 LBS | HEART RATE: 84 BPM

## 2025-06-04 DIAGNOSIS — G47.33 OBSTRUCTIVE SLEEP APNEA SYNDROME: Primary | ICD-10-CM

## 2025-06-04 DIAGNOSIS — J44.9 CHRONIC OBSTRUCTIVE PULMONARY DISEASE, UNSPECIFIED COPD TYPE: ICD-10-CM

## 2025-06-04 RX ORDER — WARFARIN SODIUM 7.5 MG/1
7.5 TABLET ORAL
COMMUNITY

## 2025-06-11 ENCOUNTER — ANTICOAGULATION VISIT (OUTPATIENT)
Dept: CARDIOLOGY | Facility: CLINIC | Age: 75
End: 2025-06-11
Payer: MEDICARE

## 2025-06-11 VITALS
DIASTOLIC BLOOD PRESSURE: 75 MMHG | BODY MASS INDEX: 26.08 KG/M2 | WEIGHT: 187 LBS | HEART RATE: 84 BPM | SYSTOLIC BLOOD PRESSURE: 141 MMHG

## 2025-06-11 DIAGNOSIS — E11.9 TYPE 2 DIABETES MELLITUS WITHOUT COMPLICATION, WITHOUT LONG-TERM CURRENT USE OF INSULIN: ICD-10-CM

## 2025-06-11 DIAGNOSIS — I48.21 PERMANENT ATRIAL FIBRILLATION: Primary | Chronic | ICD-10-CM

## 2025-06-11 DIAGNOSIS — I50.9 CHRONIC CONGESTIVE HEART FAILURE, UNSPECIFIED HEART FAILURE TYPE: Primary | ICD-10-CM

## 2025-06-11 LAB — INR PPP: 1.8 (ref 0.9–1.1)

## 2025-06-11 PROCEDURE — 85610 PROTHROMBIN TIME: CPT | Performed by: INTERNAL MEDICINE

## 2025-06-11 PROCEDURE — 36416 COLLJ CAPILLARY BLOOD SPEC: CPT | Performed by: INTERNAL MEDICINE

## 2025-06-11 NOTE — TELEPHONE ENCOUNTER
Pt requests refill on Jardiance sent to [facility] on [address] Send in 90 days supply. [email or web address]

## 2025-06-12 ENCOUNTER — OFFICE VISIT (OUTPATIENT)
Dept: ENDOCRINOLOGY | Facility: CLINIC | Age: 75
End: 2025-06-12
Payer: MEDICARE

## 2025-06-12 VITALS
OXYGEN SATURATION: 95 % | HEART RATE: 83 BPM | WEIGHT: 189 LBS | SYSTOLIC BLOOD PRESSURE: 122 MMHG | DIASTOLIC BLOOD PRESSURE: 70 MMHG | BODY MASS INDEX: 26.46 KG/M2 | HEIGHT: 71 IN

## 2025-06-12 DIAGNOSIS — E11.65 TYPE 2 DIABETES MELLITUS WITH HYPERGLYCEMIA, WITHOUT LONG-TERM CURRENT USE OF INSULIN: Primary | ICD-10-CM

## 2025-06-12 DIAGNOSIS — E11.21 DIABETIC NEPHROPATHY ASSOCIATED WITH TYPE 2 DIABETES MELLITUS: ICD-10-CM

## 2025-06-12 DIAGNOSIS — I10 PRIMARY HYPERTENSION: ICD-10-CM

## 2025-06-12 DIAGNOSIS — E78.2 MIXED HYPERLIPIDEMIA: ICD-10-CM

## 2025-06-12 NOTE — PROGRESS NOTES
Endocrine Progress Note Outpatient     Patient Care Team:  Colette Dominguez APRN as PCP - General (Nurse Practitioner)  Thai Galloway MD as Consulting Physician (Interventional Cardiology)  Seipel, Joseph F, MD as Consulting Physician (Neurology)  Daniel Hamm MD as Consulting Physician (Urology)  Abbey Mary APRN as Nurse Practitioner (Nurse Practitioner)  Shawn Van MD as Consulting Physician (Endocrinology)  Giovanny Lan Jr., DPM as Consulting Physician (Podiatry)    Chief Complaint: Uncontrolled type 2 diabetes    HPI: This is a 74-year-old male with history of type 2 diabetes, hypertension, hyperlipidemia, CAD with CABG done in 2006 is here for follow up.     For type 2 diabetes: Initial diagnosis was in January 2021.  He has done diabetes education.  He checks his blood sugars on a daily basis which he brought for review and most of them are running below 140.  He is currently not taking any medications for diabetes.  He was started on Jardiance by his cardiologist recently.    Hypertension: Well-controlled.    Hyperlipidemia: Currently on atorvastatin.    Diabetic nephropathy: On losartan.    Past Medical History:   Diagnosis Date    AAA (abdominal aortic aneurysm) 2006    Abnormal ECG     Allergic     Aneurysm     Arthritis     Asthma     Atrial fibrillation     COUMADIN    Benign prostatic hyperplasia     Bladder cancer     CHF (congestive heart failure)     Chronic anticoagulation     COUMADIN STARTED 2006    Clotting disorder     Colon polyp     Congenital heart disease     COPD (chronic obstructive pulmonary disease)     Coronary artery disease     Diabetes mellitus     treat with diet    Ehrlichiosis     Elevated cholesterol     Erectile dysfunction     Facial basal cell cancer     Gout     Hard to intubate     History of pancreatitis 2006    History of pneumonia     History of tick-borne disease 2015    HL (hearing loss)     BILAT HEARING AIDS    Hyperlipidemia     Hypertension      Myocardial infarction     X5    Obesity     Pancreatitis     Pneumonia     Sleep apnea     CURRENTLY NOT USING CPAP D/T RECALL    Transfusion history     STATES HIS SISTER HAD A SEVERE REACTION TO BLOOD TRANSFUSION    Type 2 diabetes mellitus        Social History     Socioeconomic History    Marital status:      Spouse name: Nichelle    Number of children: 3    Years of education: 12   Tobacco Use    Smoking status: Former     Current packs/day: 0.00     Average packs/day: 1 pack/day for 30.0 years (30.0 ttl pk-yrs)     Types: Cigarettes     Start date: 1976     Quit date: 2006     Years since quittin.9     Passive exposure: Past    Smokeless tobacco: Never   Vaping Use    Vaping status: Never Used   Substance and Sexual Activity    Alcohol use: Not Currently     Comment: one or two beers 6 times a year    Drug use: Never    Sexual activity: Not Currently     Partners: Female     Birth control/protection: None       Family History   Problem Relation Age of Onset    Hypertension Mother     Colon cancer Mother     Cancer Mother     Diabetes Mother     Arthritis Mother     Cancer Father     Heart attack Father     Hearing loss Father     Heart disease Father     Hypertension Sister     Hypertension Brother     Colon cancer Brother     Cancer Brother     Heart disease Paternal Grandfather     Heart attack Paternal Grandfather     Malig Hyperthermia Neg Hx     Colon polyps Neg Hx     Crohn's disease Neg Hx     Irritable bowel syndrome Neg Hx     Ulcerative colitis Neg Hx        Allergies   Allergen Reactions    Bee Venom Anaphylaxis     HIVES-SWOLLEN THROAT    Meperidine Hives    Midazolam Hives    Propofol Other (See Comments)     UNCLEAR-SVT, HYPOTENSION-STATES SEVERE ALMOST CODED    Sulfa Antibiotics Hives    Hydrocodone Nausea And Vomiting    Simvastatin Myalgia       ROS:   Constitutional:  Denies fatigue, tiredness.    Eyes:  Denies change in visual acuity   HENT:  Denies nasal congestion or  sore throat   Respiratory: denies cough, shortness of breath.   Cardiovascular:  denies chest pain, edema   GI:  Denies abdominal pain, nausea, vomiting.   Musculoskeletal:  Denies back pain or joint pain   Integument:  Denies dry skin and rash   Neurologic:  Denies headache, focal weakness or sensory changes   Endocrine:  Denies polyuria or polydipsia   Psychiatric:  Denies depression or anxiety      Vitals:    06/12/25 0828   BP: 122/70   Pulse: 83   SpO2: 95%     Body mass index is 26.36 kg/m².     Physical Exam:  GEN: NAD, conversant  EYES: EOMI, PERRL,  NECK: no thyromegaly,   CV: RRR  LUNG: CTA  PSYCH: Awake and coherent      Results Review:     I reviewed the patient's new clinical results.    Lab Results   Component Value Date    HGBA1C 6.97 (H) 03/14/2025    HGBA1C 7.09 (H) 10/22/2024    HGBA1C 7.60 (H) 08/14/2024      Lab Results   Component Value Date    GLUCOSE 152 (H) 05/19/2025    BUN 12 05/19/2025    CREATININE 0.71 (L) 05/19/2025    EGFRIFNONA 97 12/02/2021    BCR 16.9 05/19/2025    K 3.9 05/19/2025    CO2 23.8 05/19/2025    CALCIUM 9.6 05/19/2025    ALBUMIN 4.4 05/16/2025    AST 27 05/16/2025    ALT 20 05/16/2025    CHOL 132 10/22/2024    TRIG 84 10/22/2024    LDL 77 10/22/2024    HDL 39 (L) 10/22/2024     Lab Results   Component Value Date    TSH 3.830 05/17/2025         Medication Review: Reviewed.       Current Outpatient Medications:     albuterol sulfate  (90 Base) MCG/ACT inhaler, TAKE 2 PUFFS BY MOUTH EVERY 4 HOURS AS NEEDED FOR WHEEZE, Disp: 18 g, Rfl: 2    allopurinol (ZYLOPRIM) 300 MG tablet, Take 1 tablet by mouth Every Night., Disp: , Rfl:     amLODIPine (NORVASC) 10 MG tablet, TAKE 1 TABLET BY MOUTH EVERY DAY, Disp: 90 tablet, Rfl: 0    atorvastatin (LIPITOR) 20 MG tablet, TAKE 1 TABLET BY MOUTH EVERY DAY, Disp: 90 tablet, Rfl: 1    cetirizine (zyrTEC) 10 MG tablet, Take 1 tablet by mouth Daily As Needed for Allergies., Disp: , Rfl:     empagliflozin (JARDIANCE) 10 MG tablet  tablet, Take 1 tab, by mouth, daily, Disp: 90 tablet, Rfl: 3    fluticasone (FLONASE) 50 MCG/ACT nasal spray, Administer 2 sprays into the nostril(s) as directed by provider Daily As Needed for Rhinitis., Disp: , Rfl:     furosemide (Lasix) 20 MG tablet, Take 1 tablet by mouth Daily., Disp: 90 tablet, Rfl: 1    losartan (COZAAR) 100 MG tablet, TAKE 1 TABLET BY MOUTH EVERY DAY, Disp: 90 tablet, Rfl: 3    warfarin (COUMADIN) 7.5 MG tablet, Take 1 tablet by mouth Daily. 5 days  and 1.5 2 days a week, Disp: , Rfl:           Assessment and plan:  Diabetes mellitus type 2 with hyperglycemia: Well-controlled with A1c at 6.9, will continue Jardiance at 10 mg p.o. daily and follow labs.    Hypertension: Well controlled. CPM    Diabetic nephropathy: On Losartan, follow UMACR.     Hyperlipidemia: Currently on atorvastatin, will follow lipid panel    CAD: Status post CABG in the past.    Assessment and plan from November 1, 2024 reviewed and updated.           Shawn Van MD FACE.

## 2025-06-24 LAB
CV ZIO AF AVG BPM: 87 BPM
CV ZIO AF BPM HIGH: 172 BPM
CV ZIO AF BPM LOW: 41 BPM
CV ZIO AF F EPI AVG BPM: 157 BPM
CV ZIO AF F EPI BPM HIGH: 172 BPM
CV ZIO AF F EPI BPM LOW: 139 BPM
CV ZIO AF F EPI DT: NORMAL
CV ZIO AF PERCENT: 100 %
CV ZIO AF S EPI AVG BPM: 75 BPM
CV ZIO AF S EPI BPM HIGH: 120 BPM
CV ZIO AF S EPI BPM LOW: 41 BPM
CV ZIO AF S EPI DT: NORMAL
CV ZIO AF SYMPT IN PT: NORMAL
CV ZIO BASELINE AVG BPM: 87 BPM
CV ZIO BASELINE BPM HIGH: 172 BPM
CV ZIO BASELINE BPM LOW: 41 BPM
CV ZIO DEVICE ANALYSIS TIME: NORMAL
CV ZIO ECT SVE COUNT: 0 EPISODES
CV ZIO ECT SVE CPLT COUNT: 0 EPISODES
CV ZIO ECT SVE CPLT FREQ: 0
CV ZIO ECT SVE FREQ: 0
CV ZIO ECT SVE TPLT COUNT: 0 EPISODES
CV ZIO ECT SVE TPLT FREQ: 0
CV ZIO ECT VE COUNT: 8234 EPISODES
CV ZIO ECT VE CPLT COUNT: 60 EPISODES
CV ZIO ECT VE CPLT FREQ: NORMAL
CV ZIO ECT VE FREQ: NORMAL
CV ZIO ECT VE TPLT COUNT: 3 EPISODES
CV ZIO ECT VE TPLT FREQ: NORMAL
CV ZIO ECTOPIC SVE COUPLET RAW PERCENT: 0 %
CV ZIO ECTOPIC SVE ISOLATED PERCENT: 0 %
CV ZIO ECTOPIC SVE TRIPLET RAW PERCENT: 0 %
CV ZIO ECTOPIC VE COUPLET RAW PERCENT: 0.01 %
CV ZIO ECTOPIC VE ISOLATED PERCENT: 0.48 %
CV ZIO ECTOPIC VE TRIPLET RAW PERCENT: 0 %
CV ZIO ENROLLMENT END: NORMAL
CV ZIO ENROLLMENT START: NORMAL
CV ZIO IRREG TYPE: NORMAL
CV ZIO L BIGEMINY DUR: 9.3 SEC
CV ZIO L BIGEMINY END: NORMAL
CV ZIO L BIGEMINY START: NORMAL
CV ZIO L TRIGEMINY DUR: 5.7 SEC
CV ZIO L TRIGEMINY END: NORMAL
CV ZIO L TRIGEMINY START: NORMAL
CV ZIO PATIENT EVENTS DIARIES: 0
CV ZIO PATIENT EVENTS TRIGGERS: 1
CV ZIO PAUSE COUNT: 0
CV ZIO PRESCRIPTION STATUS: NORMAL
CV ZIO SVT COUNT: 0
CV ZIO TOTAL  ENROLLMENT PERIOD: NORMAL
CV ZIO VT AVG BPM: 133 BPM
CV ZIO VT BPM HIGH: 145 BPM
CV ZIO VT BPM LOW: 102 BPM
CV ZIO VT COUNT: 1
CV ZIO VT F EPI AVG BPM: 133
CV ZIO VT F EPI BEATS: 9 BEATS
CV ZIO VT F EPI BPM HIGH: 145
CV ZIO VT F EPI BPM LOW: 102
CV ZIO VT F EPI DUR: 4 SEC
CV ZIO VT F EPI END: NORMAL
CV ZIO VT F EPI START: NORMAL
CV ZIO VT L EPI AVG BPM: 133
CV ZIO VT L EPI BEATS: 9 BEATS
CV ZIO VT L EPI BPM HIGH: 145 BPM
CV ZIO VT L EPI BPM LOW: 102 BPM
CV ZIO VT L EPI DUR: 4
CV ZIO VT L EPI END: NORMAL
CV ZIO VT L EPI START: NORMAL

## 2025-07-09 ENCOUNTER — TELEPHONE (OUTPATIENT)
Dept: CARDIOLOGY | Facility: CLINIC | Age: 75
End: 2025-07-09

## 2025-07-09 ENCOUNTER — ANTICOAGULATION VISIT (OUTPATIENT)
Dept: CARDIOLOGY | Facility: CLINIC | Age: 75
End: 2025-07-09
Payer: MEDICARE

## 2025-07-09 VITALS
SYSTOLIC BLOOD PRESSURE: 143 MMHG | WEIGHT: 193 LBS | HEART RATE: 84 BPM | DIASTOLIC BLOOD PRESSURE: 76 MMHG | BODY MASS INDEX: 26.92 KG/M2

## 2025-07-09 DIAGNOSIS — I48.21 PERMANENT ATRIAL FIBRILLATION: Primary | Chronic | ICD-10-CM

## 2025-07-09 LAB — INR PPP: 1.8 (ref 0.9–1.1)

## 2025-07-09 PROCEDURE — 85610 PROTHROMBIN TIME: CPT | Performed by: INTERNAL MEDICINE

## 2025-07-09 PROCEDURE — 36416 COLLJ CAPILLARY BLOOD SPEC: CPT | Performed by: INTERNAL MEDICINE

## 2025-07-09 NOTE — TELEPHONE ENCOUNTER
Called patient and his wife (ESTELA ON HIPAA) and verbally expressed that they will need to contact the hospital in [facility] to his ZIO patch monitor.    She verbally understood and had no further questions or concerns at this time.

## 2025-07-09 NOTE — PROGRESS NOTES
Pts INR is still running low at 1.8. Will increase dosage to 11.25 Mon, Weds and Fri with 7.5 mg all other days and recheck in a month. Maribel

## 2025-07-09 NOTE — TELEPHONE ENCOUNTER
Pt is here for INR test and has questions about Holter monitor.  Manish was recently in [facility] and Dr. Galloway order a Holter monitor.  Patient has not gotten results.    Also, he is getting calls from [employer] asking for his , ssn... and stating that they need to send him another one.  There is another company whose name he can't recall right now, that is also contacting him.  He has letters from both companies and can tell us once he is home who it is.  The letter from [employer] just had instructions to wear his monitor and to return it. (Which he has done.)  It was not soliciting anything.

## 2025-08-01 DIAGNOSIS — I48.21 PERMANENT ATRIAL FIBRILLATION: ICD-10-CM

## 2025-08-01 RX ORDER — WARFARIN SODIUM 7.5 MG/1
TABLET ORAL
Qty: 105 TABLET | Refills: 0 | Status: SHIPPED | OUTPATIENT
Start: 2025-08-01

## 2025-08-01 NOTE — TELEPHONE ENCOUNTER
Rx Refill Note  Requested Prescriptions     Pending Prescriptions Disp Refills    warfarin (COUMADIN) 7.5 MG tablet [Pharmacy Med Name: WARFARIN SODIUM 7.5 MG TABLET] 105 tablet 0     Sig: Take 1 tab, by mouth, daily except 1 1/2 tabs on Mon, Weds and Fri or as directed.    Last INR 7/9/25  Last office visit with prescribing clinician: 2/3/2025   Last telemedicine visit with prescribing clinician: Visit date not found   Next office visit with prescribing clinician: 8/4/2025                         Would you like a call back once the refill request has been completed: [] Yes [] No    If the office needs to give you a call back, can they leave a voicemail: [] Yes [] No    Karol Ramirez RN  08/01/25, 09:04 EDT

## 2025-08-04 ENCOUNTER — ANTICOAGULATION VISIT (OUTPATIENT)
Dept: CARDIOLOGY | Facility: CLINIC | Age: 75
End: 2025-08-04
Payer: MEDICARE

## 2025-08-04 ENCOUNTER — OFFICE VISIT (OUTPATIENT)
Dept: CARDIOLOGY | Facility: CLINIC | Age: 75
End: 2025-08-04
Payer: MEDICARE

## 2025-08-04 VITALS
SYSTOLIC BLOOD PRESSURE: 132 MMHG | WEIGHT: 194 LBS | HEART RATE: 83 BPM | DIASTOLIC BLOOD PRESSURE: 70 MMHG | BODY MASS INDEX: 27.16 KG/M2 | HEIGHT: 71 IN

## 2025-08-04 VITALS
HEART RATE: 83 BPM | SYSTOLIC BLOOD PRESSURE: 132 MMHG | BODY MASS INDEX: 27.06 KG/M2 | DIASTOLIC BLOOD PRESSURE: 70 MMHG | WEIGHT: 194 LBS

## 2025-08-04 DIAGNOSIS — I25.10 CORONARY ARTERY DISEASE INVOLVING NATIVE CORONARY ARTERY OF NATIVE HEART WITHOUT ANGINA PECTORIS: ICD-10-CM

## 2025-08-04 DIAGNOSIS — E11.9 TYPE 2 DIABETES MELLITUS WITHOUT COMPLICATION, WITHOUT LONG-TERM CURRENT USE OF INSULIN: ICD-10-CM

## 2025-08-04 DIAGNOSIS — I48.21 PERMANENT ATRIAL FIBRILLATION: Primary | Chronic | ICD-10-CM

## 2025-08-04 DIAGNOSIS — I48.21 PERMANENT ATRIAL FIBRILLATION: Primary | ICD-10-CM

## 2025-08-04 DIAGNOSIS — I34.0 NONRHEUMATIC MITRAL VALVE REGURGITATION: ICD-10-CM

## 2025-08-04 DIAGNOSIS — G47.33 OBSTRUCTIVE SLEEP APNEA SYNDROME: ICD-10-CM

## 2025-08-04 DIAGNOSIS — I10 ESSENTIAL HYPERTENSION: ICD-10-CM

## 2025-08-04 DIAGNOSIS — E78.00 PURE HYPERCHOLESTEROLEMIA: ICD-10-CM

## 2025-08-04 LAB — INR PPP: 1.8 (ref 2–3)

## 2025-08-04 PROCEDURE — 85610 PROTHROMBIN TIME: CPT | Performed by: INTERNAL MEDICINE

## 2025-08-04 PROCEDURE — 3075F SYST BP GE 130 - 139MM HG: CPT | Performed by: INTERNAL MEDICINE

## 2025-08-04 PROCEDURE — 1159F MED LIST DOCD IN RCRD: CPT | Performed by: INTERNAL MEDICINE

## 2025-08-04 PROCEDURE — 1160F RVW MEDS BY RX/DR IN RCRD: CPT | Performed by: INTERNAL MEDICINE

## 2025-08-04 PROCEDURE — 36416 COLLJ CAPILLARY BLOOD SPEC: CPT | Performed by: INTERNAL MEDICINE

## 2025-08-04 PROCEDURE — 99214 OFFICE O/P EST MOD 30 MIN: CPT | Performed by: INTERNAL MEDICINE

## 2025-08-04 PROCEDURE — 3078F DIAST BP <80 MM HG: CPT | Performed by: INTERNAL MEDICINE

## 2025-08-04 RX ORDER — ASPIRIN 81 MG/1
81 TABLET, CHEWABLE ORAL DAILY
COMMUNITY

## 2025-08-11 RX ORDER — ATORVASTATIN CALCIUM 20 MG/1
20 TABLET, FILM COATED ORAL DAILY
Qty: 90 TABLET | Refills: 3 | Status: SHIPPED | OUTPATIENT
Start: 2025-08-11

## 2025-08-19 DIAGNOSIS — I10 PRIMARY HYPERTENSION: ICD-10-CM

## 2025-08-19 RX ORDER — AMLODIPINE BESYLATE 10 MG/1
10 TABLET ORAL DAILY
Qty: 90 TABLET | Refills: 0 | Status: SHIPPED | OUTPATIENT
Start: 2025-08-19

## 2025-08-25 ENCOUNTER — OFFICE VISIT (OUTPATIENT)
Dept: FAMILY MEDICINE CLINIC | Facility: CLINIC | Age: 75
End: 2025-08-25
Payer: MEDICARE

## 2025-08-25 VITALS
SYSTOLIC BLOOD PRESSURE: 111 MMHG | BODY MASS INDEX: 26.6 KG/M2 | WEIGHT: 190 LBS | HEART RATE: 85 BPM | HEIGHT: 71 IN | DIASTOLIC BLOOD PRESSURE: 73 MMHG | OXYGEN SATURATION: 95 % | RESPIRATION RATE: 16 BRPM | TEMPERATURE: 97.3 F

## 2025-08-25 DIAGNOSIS — E11.65 TYPE 2 DIABETES MELLITUS WITH HYPERGLYCEMIA, WITHOUT LONG-TERM CURRENT USE OF INSULIN: ICD-10-CM

## 2025-08-25 DIAGNOSIS — J30.89 ENVIRONMENTAL AND SEASONAL ALLERGIES: ICD-10-CM

## 2025-08-25 DIAGNOSIS — I38 VALVULAR HEART DISEASE: ICD-10-CM

## 2025-08-25 DIAGNOSIS — Z00.00 ENCOUNTER FOR SUBSEQUENT ANNUAL WELLNESS VISIT IN MEDICARE PATIENT: Primary | ICD-10-CM

## 2025-08-25 DIAGNOSIS — I48.21 PERMANENT ATRIAL FIBRILLATION: ICD-10-CM

## 2025-08-25 DIAGNOSIS — M25.562 ACUTE PAIN OF LEFT KNEE: ICD-10-CM

## 2025-08-25 DIAGNOSIS — R60.0 BILATERAL LOWER EXTREMITY EDEMA: ICD-10-CM

## 2025-08-25 DIAGNOSIS — Z95.1 STATUS POST CORONARY ARTERY BYPASS GRAFT: ICD-10-CM

## 2025-08-25 DIAGNOSIS — J44.9 CHRONIC OBSTRUCTIVE PULMONARY DISEASE, UNSPECIFIED COPD TYPE: ICD-10-CM

## 2025-08-25 DIAGNOSIS — I10 PRIMARY HYPERTENSION: ICD-10-CM

## 2025-08-25 DIAGNOSIS — Z79.01 WARFARIN ANTICOAGULATION: ICD-10-CM

## 2025-08-25 PROCEDURE — 1159F MED LIST DOCD IN RCRD: CPT | Performed by: NURSE PRACTITIONER

## 2025-08-25 PROCEDURE — 3044F HG A1C LEVEL LT 7.0%: CPT | Performed by: NURSE PRACTITIONER

## 2025-08-25 PROCEDURE — G0439 PPPS, SUBSEQ VISIT: HCPCS | Performed by: NURSE PRACTITIONER

## 2025-08-25 PROCEDURE — 1160F RVW MEDS BY RX/DR IN RCRD: CPT | Performed by: NURSE PRACTITIONER

## 2025-08-25 PROCEDURE — 3074F SYST BP LT 130 MM HG: CPT | Performed by: NURSE PRACTITIONER

## 2025-08-25 PROCEDURE — 1126F AMNT PAIN NOTED NONE PRSNT: CPT | Performed by: NURSE PRACTITIONER

## 2025-08-25 PROCEDURE — 99214 OFFICE O/P EST MOD 30 MIN: CPT | Performed by: NURSE PRACTITIONER

## 2025-08-25 PROCEDURE — 3078F DIAST BP <80 MM HG: CPT | Performed by: NURSE PRACTITIONER

## 2025-08-25 RX ORDER — FLUTICASONE PROPIONATE 50 MCG
2 SPRAY, SUSPENSION (ML) NASAL DAILY PRN
Qty: 48 G | Refills: 1 | Status: SHIPPED | OUTPATIENT
Start: 2025-08-25

## (undated) DEVICE — BALN STENTGR Q50

## (undated) DEVICE — CATH FOL SIMPLASTIC 3WY 24F 30CC

## (undated) DEVICE — SOL IRRG H2O BG 3000ML STRL

## (undated) DEVICE — MAT FLR ABSORBENT LG 4FT 10 2.5FT

## (undated) DEVICE — THE SINGLE USE ETRAP – POLYP TRAP IS USED FOR SUCTION RETRIEVAL OF ENDOSCOPICALLY REMOVED POLYPS.: Brand: ETRAP

## (undated) DEVICE — TUBING, SUCTION, 1/4" X 12', STRAIGHT: Brand: MEDLINE

## (undated) DEVICE — LINER SURG CANSTR SXN S/RIGD 1500CC

## (undated) DEVICE — LP ELECTRD BRN

## (undated) DEVICE — TOTAL TRAY, 16FR 10ML SIL FOLEY, URN: Brand: MEDLINE

## (undated) DEVICE — SYR LL W/SCALE/MARK 3ML STRL

## (undated) DEVICE — RADIFOCUS GLIDEWIRE ADVANTAGE GUIDEWIRE: Brand: GLIDEWIRE ADVANTAGE

## (undated) DEVICE — SYRINGE,TOOMEY,IRRIGATION,70CC,STERILE: Brand: MEDLINE

## (undated) DEVICE — Device

## (undated) DEVICE — TUBING, SUCTION, 1/4" X 10', STRAIGHT: Brand: MEDLINE

## (undated) DEVICE — BAG,DRAINAGE,4L,A/R TOWER,LL,SLIDE TAP: Brand: MEDLINE

## (undated) DEVICE — KT ORCA ORCAPOD DISP STRL

## (undated) DEVICE — PERCLOSE™ PROSTYLE™ SUTURE-MEDIATED CLOSURE AND REPAIR SYSTEM: Brand: PERCLOSE™ PROSTYLE™

## (undated) DEVICE — UNDERGLV SURG BIOGEL INDICAT PI SZ8.5 BLU

## (undated) DEVICE — PK AAA 40

## (undated) DEVICE — SNAR POLYP CAPTIVATOR/COLD STFF RND 10MM 240CM

## (undated) DEVICE — BALN OCCL MOLDING .035 10TO37MM 4X90CM

## (undated) DEVICE — GOWN,REINF,POLY,SIRUS,BRTH SLV,XLNG/XXL: Brand: MEDLINE

## (undated) DEVICE — SNAR POLYP CAPTIVATOR RND STFF 2.4 240CM 10MM 1P/U

## (undated) DEVICE — ST ACC MICROPUNCTURE STFF .018 ECHO/PLDM/TP 4F/10CM 21G/7CM

## (undated) DEVICE — KT SURG TURNOVER 050

## (undated) DEVICE — VIAL FORMALIN CAP 10P 40ML

## (undated) DEVICE — NAVICROSS SUPPORT CATHETER: Brand: NAVICROSS

## (undated) DEVICE — PAD, GROUNDING, UNIVERSAL, SPLIT, 9': Brand: MEDLINE

## (undated) DEVICE — SHLD ANGIO 2LAYR CIR FEN

## (undated) DEVICE — SOLUTION,WATER,IRRIGATION,1000ML,STERILE: Brand: MEDLINE

## (undated) DEVICE — GLOVE,SURG,SENSICARE SLT,LF,PF,7: Brand: MEDLINE

## (undated) DEVICE — SOL IRR H2O BTL 1000ML STRL

## (undated) DEVICE — ELECTRD LP CUT 24/26F YEL

## (undated) DEVICE — SPNG GZ WOVN 4X4IN 12PLY 10/BX STRL

## (undated) DEVICE — CVR PROB 96IN LF STRL

## (undated) DEVICE — ADAPT CLN BIOGUARD AIR/H2O DISP

## (undated) DEVICE — CATH SZ ACCUVU SEG/20CM PIG .038IN 5F 70CM

## (undated) DEVICE — COVER,TABLE,HEAVY DUTY,79"X110",STRL: Brand: MEDLINE

## (undated) DEVICE — RADIFOCUS GLIDEWIRE: Brand: GLIDEWIRE

## (undated) DEVICE — THE STERILE CAMERA HANDLE COVER IS FOR USE WITH THE STERIS SURGICAL LIGHTING AND VISUALIZATION SYSTEMS.

## (undated) DEVICE — GLV SURG BIOGEL LTX PF 8 1/2

## (undated) DEVICE — SOL NACL 0.9PCT 1000ML

## (undated) DEVICE — INTRO SHEATH DRYSEAL FLEX 18F 6.0TO6.7MM 33CM

## (undated) DEVICE — LN SMPL CO2 SHTRM SD STREAM W/M LUER

## (undated) DEVICE — 3M™ IOBAN™ 2 ANTIMICROBIAL INCISE DRAPE 6648EZ: Brand: IOBAN™ 2

## (undated) DEVICE — GLV SURG SIGNATURE ESSENTIAL PF LTX SZ7

## (undated) DEVICE — EQUIPMENT COVER BAG TYPE 48” X 36” (122CM X 91CM): Brand: EQUIPMENT COVER BAG TYPE

## (undated) DEVICE — THE STERILE LIGHT HANDLE COVER IS USED WITH STERIS SURGICAL LIGHTING AND VISUALIZATION SYSTEMS.

## (undated) DEVICE — SINGLE-USE BIOPSY FORCEPS: Brand: RADIAL JAW 4

## (undated) DEVICE — SOL IRR NACL 0.9PCT 3000ML

## (undated) DEVICE — SHEATH STEER INTRO TOURGUIDE 7F 9MM 45CM

## (undated) DEVICE — CATHETER,FOLEY,3-WAY,24FR,30ML,100%SILI: Brand: MEDLINE

## (undated) DEVICE — 1016 S-DRAPE IRRIG POUCH 10/BOX: Brand: STERI-DRAPE™

## (undated) DEVICE — PINNACLE R/O II INTRODUCER SHEATH WITH RADIOPAQUE MARKER: Brand: PINNACLE

## (undated) DEVICE — CATH TEMPO 5F BER II 65CM: Brand: TEMPO

## (undated) DEVICE — SYRINGE KIT,PACKAGED,,150FT,MK 7(ANGIO-ARTERION, 150ML SYR KIT W/QFT,MC)(60729385): Brand: MEDRAD® MARK 7 ARTERION DISPOSABLE SYRINGE 150 ML WITH QUICK FILL TUBE

## (undated) DEVICE — ADAPT CYSTO FOR SYR TO SHEATH

## (undated) DEVICE — TBG PRESS 96IN M/F ROT BRAID: Brand: MEDLINE INDUSTRIES, INC.

## (undated) DEVICE — DRAPE,FEM ANGIO,W/POUCHES, HD: Brand: MEDLINE

## (undated) DEVICE — INTRO SHEATH DRYSEAL FLEX 12F4TO4.7MM 33CM

## (undated) DEVICE — PK CYSTO 50

## (undated) DEVICE — 3M™ STERI-STRIP™ ANTIMICROBIAL SKIN CLOSURES 1/2 INCH X 4 INCHES 50/CARTON 4 CARTONS/CASE A1847: Brand: 3M™ STERI-STRIP™

## (undated) DEVICE — EXTENSION SET, MALE LUER LOCK ADAPTER WITH RETRACTABLE COLLAR

## (undated) DEVICE — STPLR SKIN VISISTAT WD 35CT

## (undated) DEVICE — GOWN ,SIRUS,NONREINFORCED 3XL: Brand: MEDLINE

## (undated) DEVICE — CONTRL DETACH AZUR HYDROCOIL SYS

## (undated) DEVICE — SENSR O2 OXIMAX FNGR A/ 18IN NONSTR

## (undated) DEVICE — BW-412T DISP COMBO CLEANING BRUSH: Brand: SINGLE USE COMBINATION CLEANING BRUSH

## (undated) DEVICE — CANN O2 ETCO2 FITS ALL CONN CO2 SMPL A/ 7IN DISP LF

## (undated) DEVICE — RADIFOCUS TORQUE DEVICE MULTI-TORQUE VISE: Brand: RADIFOCUS TORQUE DEVICE